# Patient Record
Sex: FEMALE | Race: BLACK OR AFRICAN AMERICAN | Employment: UNEMPLOYED | URBAN - METROPOLITAN AREA
[De-identification: names, ages, dates, MRNs, and addresses within clinical notes are randomized per-mention and may not be internally consistent; named-entity substitution may affect disease eponyms.]

---

## 2017-07-13 ENCOUNTER — OFFICE VISIT (OUTPATIENT)
Dept: FAMILY MEDICINE CLINIC | Age: 57
End: 2017-07-13

## 2017-07-13 VITALS
HEIGHT: 65 IN | HEART RATE: 52 BPM | TEMPERATURE: 98.5 F | DIASTOLIC BLOOD PRESSURE: 71 MMHG | SYSTOLIC BLOOD PRESSURE: 110 MMHG | RESPIRATION RATE: 20 BRPM | WEIGHT: 258 LBS | OXYGEN SATURATION: 96 % | BODY MASS INDEX: 42.99 KG/M2

## 2017-07-13 DIAGNOSIS — J44.9 CHRONIC OBSTRUCTIVE PULMONARY DISEASE, UNSPECIFIED COPD TYPE (HCC): Primary | ICD-10-CM

## 2017-07-13 DIAGNOSIS — F17.218 NICOTINE DEPENDENCE, CIGARETTES, WITH OTHER NICOTINE-INDUCED DISORDERS: ICD-10-CM

## 2017-07-13 DIAGNOSIS — C49.9 FIBROSARCOMA (HCC): ICD-10-CM

## 2017-07-13 DIAGNOSIS — F33.42 RECURRENT MAJOR DEPRESSIVE DISORDER, IN FULL REMISSION (HCC): ICD-10-CM

## 2017-07-13 DIAGNOSIS — M25.561 CHRONIC PAIN OF RIGHT KNEE: ICD-10-CM

## 2017-07-13 DIAGNOSIS — G89.29 CHRONIC PAIN OF RIGHT KNEE: ICD-10-CM

## 2017-07-13 RX ORDER — ALBUTEROL SULFATE 90 UG/1
AEROSOL, METERED RESPIRATORY (INHALATION)
COMMUNITY
End: 2017-12-14 | Stop reason: SDUPTHER

## 2017-07-13 RX ORDER — MULTIVIT WITH MINERALS/HERBS
1 TABLET ORAL DAILY
COMMUNITY
End: 2020-03-20 | Stop reason: SDUPTHER

## 2017-07-13 RX ORDER — DEXLANSOPRAZOLE 60 MG/1
CAPSULE, DELAYED RELEASE ORAL
COMMUNITY
End: 2017-12-14 | Stop reason: SDUPTHER

## 2017-07-13 RX ORDER — FLUTICASONE PROPIONATE 50 MCG
2 SPRAY, SUSPENSION (ML) NASAL DAILY
COMMUNITY
End: 2017-12-14 | Stop reason: SDUPTHER

## 2017-07-13 RX ORDER — DIPHENHYDRAMINE HCL 25 MG
4 CAPSULE ORAL AS NEEDED
Qty: 20 EACH | Refills: 2 | Status: SHIPPED | OUTPATIENT
Start: 2017-07-13 | End: 2017-08-12

## 2017-07-13 RX ORDER — BUPROPION HYDROCHLORIDE 75 MG/1
75 TABLET ORAL DAILY
Qty: 30 TAB | Refills: 0 | Status: SHIPPED | OUTPATIENT
Start: 2017-07-13 | End: 2017-08-15 | Stop reason: SDUPTHER

## 2017-07-13 RX ORDER — ARIPIPRAZOLE 5 MG/1
TABLET ORAL DAILY
COMMUNITY
End: 2018-05-08 | Stop reason: SDUPTHER

## 2017-07-13 RX ORDER — PREGABALIN 150 MG/1
150 CAPSULE ORAL 2 TIMES DAILY
COMMUNITY
End: 2017-07-27 | Stop reason: SDUPTHER

## 2017-07-13 RX ORDER — CITALOPRAM 40 MG/1
40 TABLET, FILM COATED ORAL DAILY
COMMUNITY
End: 2018-01-23 | Stop reason: SDUPTHER

## 2017-07-13 RX ORDER — BISMUTH SUBSALICYLATE 262 MG
1 TABLET,CHEWABLE ORAL DAILY
COMMUNITY

## 2017-07-13 NOTE — PROGRESS NOTES
Chief Complaint   Patient presents with   [address]     pt here  to establish care     1. Have you been to the ER, urgent care clinic since your last visit? Hospitalized since your last visit? No    2. Have you seen or consulted any other health care providers outside of the [address] [place] [address] since your last visit? Include any pap smears or colon screening.  No

## 2017-07-13 NOTE — MR AVS SNAPSHOT
Visit Information Date & Time Provider Department Dept. Phone Encounter #  
 7/13/2017 11:15 AM Serina Garcia Vasiliy [phone] [id number] Follow-up Instructions Return in about 2 months (around 9/13/2017) for chronic disease followup- 30 min. Upcoming Health Maintenance Date Due  
 MEDICARE YEARLY EXAM 6/6/1976 DTaP/Tdap/Td series (1 - Tdap) 6/6/1979 PAP AKA CERVICAL CYTOLOGY 6/6/1979 BREAST CANCER SCRN MAMMOGRAM 6/6/2008 FOBT Q 1 YEAR AGE 50-75 6/6/2008 INFLUENZA AGE 9 TO ADULT 8/1/2017 Allergies as of 7/13/2017  Review Complete On: 7/13/2017 By: Serina Garcia NP Severity Noted Reaction Type Reactions Adhesive Tape-silicones High 18/51/7638   Topical Swelling REALLY BAD SWELLING AND SORE APPEAR Alcohol High 07/13/2017   Intolerance Other (comments) PT STATES SHE IS AN ALCOHOLIC Lactose High 07/13/2017   Intolerance Other (comments) PT STATES IT MAKES HER STOMACH HURT Percodan [Oxycodone Hcl-oxycodone-asa] High 07/13/2017   Systemic Itching, Other (comments) crying Nsaids (Non-steroidal Anti-inflammatory Drug)  07/13/2017   Intolerance Other (comments) PT NOT ABLE TO TAKE DUE TO GASTRIC BYPASS Current Immunizations  Never Reviewed No immunizations on file. Not reviewed this visit You Were Diagnosed With   
  
 Codes Comments Chronic obstructive pulmonary disease, unspecified COPD type ([facility]    -  Primary ICD-10-CM: J44.9 ICD-9-CM: 652 Recurrent major depressive disorder, in full remission ([facility]     ICD-10-CM: F33.42 
ICD-9-CM: 296.36 Fibrosarcoma ([facility]     ICD-10-CM: C49.9 ICD-9-CM: 171.9 Chronic pain of right knee     ICD-10-CM: M25.561, G89.29 ICD-9-CM: 719.46, 338.29 Nicotine dependence, cigarettes, with other nicotine-induced disorders     ICD-10-CM: F17.218 ICD-9-CM: 292.89 Vitals BP Pulse Temp Resp Height(growth percentile) Weight(growth percentile) 110/71 (BP 1 Location: Right arm, BP Patient Position: Sitting) (!) 52 98.5 °F (36.9 °C) (Oral) 20 5' 5\" (1.651 m) 258 lb (117 kg) SpO2 BMI OB Status Smoking Status 96% 42.93 kg/m2 Hysterectomy Current Every Day Smoker Vitals History BMI and BSA Data Body Mass Index Body Surface Area 42.93 kg/m 2 2.32 m 2 Preferred Pharmacy Pharmacy Name Phone [facility] [address] [facility] [phone] Your Updated Medication List  
  
   
This list is accurate as of: 7/13/17 11:49 AM.  Always use your most recent med list.  
  
  
  
  
 ABILIFY 5 mg tablet Generic drug:  ARIPiprazole Take  by mouth daily. albuterol 90 mcg/actuation inhaler Commonly known as:  PROVENTIL HFA, VENTOLIN HFA, PROAIR HFA Take  by inhalation. b complex vitamins tablet Take 1 Tab by mouth daily. buPROPion 75 mg tablet Commonly known as:  [facility] Take 1 Tab by mouth daily. CALCIUM 600 + D 600-125 mg-unit Tab Generic drug:  calcium-cholecalciferol (d3) Take 1,065 mg by mouth nightly. Indications: TAKES 2 AT BEDTIME  
  
 citalopram 40 mg tablet Commonly known as:  Leny Dusky Take 40 mg by mouth daily. DEXILANT 60 mg Cpdb Generic drug:  Dexlansoprazole Take  by mouth. FISH -160-1,000 mg Cap Generic drug:  omega 3-dha-epa-fish oil Take 1,065 mg by mouth daily. * FLOVENT DISKUS 100 mcg/actuation Dsdv Generic drug:  fluticasone Take  by inhalation. * fluticasone 50 mcg/actuation nasal spray Commonly known as:  Cyndra Puna 2 Sprays by Both Nostrils route daily. Iron 160 mg (50 mg iron) Tber tablet Generic drug:  ferrous sulfate ER Take 1 Tab by mouth daily. Indications: PT TAKES 40 MG  
  
 LYRICA 150 mg capsule Generic drug:  pregabalin Take 150 mg by mouth two (2) times a day. multivitamin tablet Commonly known as:  ONE A DAY Take 1 Tab by mouth daily. nicotine 4 mg gum Commonly known as:  Wagner Saliva Take 1 Each by mouth as needed for Smoking Cessation for up to 30 days. * Notice: This list has 2 medication(s) that are the same as other medications prescribed for you. Read the directions carefully, and ask your doctor or other care provider to review them with you. Prescriptions Sent to Pharmacy Refills buPROPion (WELLBUTRIN) 75 mg tablet 0 Sig: Take 1 Tab by mouth daily. Class: Normal  
 Pharmacy: [facility] [address] [place] [address] Ph #: [phone] Route: Oral  
 nicotine (NICORETTE) 4 mg gum 2 Sig: Take 1 Each by mouth as needed for Smoking Cessation for up to 30 days. Class: Normal  
 Pharmacy: [facility] [address] [place] [address] Ph #: [phone] Route: Oral  
  
We Performed the Following REFERRAL TO ORTHOPEDICS [VFW822 Custom] Comments:  
 Please evaluate for knee pain, chronic. Follow-up Instructions Return in about 2 months (around 9/13/2017) for chronic disease followup- 30 min. Referral Information Referral ID Referred By Referred To  
  
 [id number] Beverly KASPER Not Available Visits Status Start Date End Date 1 New Request 7/13/17 7/13/18 If your referral has a status of pending review or denied, additional information will be sent to support the outcome of this decision. Introducing [facility]! Crista Vanessa introduces [employer] patient portal. Now you can access parts of your medical record, email your doctor's office, and request medication refills online. 1. In your internet browser, go to [email or web address]. [employer]. [employer] 2. Click on the First Time User? Click Here link in the Sign In box. You will see the New Member Sign Up page. 3. Enter your [employer] Access Code exactly as it appears below. You will not need to use this code after youve completed the sign-up process. If you do not sign up before the expiration date, you must request a new code. · [employer] Access Code: [email or web address] Expires: 10/11/2017 11:48 AM 
 
4. Enter the last four digits of your Social Security Number (xxxx) and Date of Birth (mm/dd/yyyy) as indicated and click Submit. You will be taken to the next sign-up page. 5. Create a [employer] ID. This will be your [employer] login ID and cannot be changed, so think of one that is secure and easy to remember. 6. Create a [employer] password. You can change your password at any time. 7. Enter your Password Reset Question and Answer. This can be used at a later time if you forget your password. 8. Enter your e-mail address. You will receive e-mail notification when new information is available in [address] 9. Click Sign Up. You can now view and download portions of your medical record. 10. Click the Download Summary menu link to download a portable copy of your medical information. If you have questions, please visit the Frequently Asked Questions section of the [employer] website. Remember, [employer] is NOT to be used for urgent needs. For medical emergencies, dial 911. Now available from your iPhone and Android! Please provide this summary of care documentation to your next provider. If you have any questions after today's visit, please call [phone].

## 2017-07-13 NOTE — PROGRESS NOTES
HISTORY OF PRESENT ILLNESS  Kuldip Beth is a 59 y.o. female. 7/13/2017  11:22 AM    Chief Complaint   Patient presents with   [address] [place] [address] Chang Establish Care     pt here  to establish care       HPI: Here today as a new patient, establishing care in office. Moved to [place] about 5 weeks ago from [place]. Used to follow with [id number] in [place] [address]. No records available at this time. She reports having labs done shortly before leaving [place]. COPD: Diagnosed a few years ago- taking Flovent for maintenance and uses Albuterol PRN- using Albuterol about 1-2 times a week. Feels controlled. Continues to smoke about 2 PPD- would like to quit smoking. Depression: Taking Celexa and Abilify as prescribed. Reports feeling stable on medication but has some good days and bad days. Denies SI/HI. History of alcoholism. 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 Reports several surgeries due to condition. Reports UTD on mammo and colonoscopy. History of hysterectomy. Complains of chronic right knee pain- states that she has had several procedures done. Used to follow with orthopedics- has not found anyone here in [address] [facility]. Review of Systems   Constitutional: Negative for chills, fever and malaise/fatigue. Eyes: Negative for double vision, photophobia and pain. Respiratory: Negative for cough, shortness of breath and wheezing. Cardiovascular: Positive for leg swelling. Negative for chest pain and palpitations.        +chronic swelling per patient   Gastrointestinal: Negative for abdominal pain, constipation, diarrhea, nausea and vomiting. Genitourinary: Negative for dysuria, frequency and urgency. Musculoskeletal: Positive for joint pain. Negative for back pain and myalgias. Neurological: Negative for dizziness, weakness and headaches. Psychiatric/Behavioral: Negative for depression and suicidal ideas.  The patient is not nervous/anxious. PHQ Screening   PHQ over the last two weeks 7/13/2017   Little interest or pleasure in doing things Not at all   Feeling down, depressed or hopeless Not at all   Total Score PHQ 2 0         History  Past Medical History:   Diagnosis Date    Alcoholism in remission ([facility]     Chronic obstructive pulmonary disease ([facility]     Depression     Fibrosarcoma ([facility] 1963    connective tissue cancer       Past Surgical History:   Procedure Laterality Date    HX ARTHRODESIS  01/2000    Herniated Disc, arthritis right foot 06/06, 07/07, C 6/7, C 4/6 Stenosis    HX CHOLECYSTECTOMY  09/2008    gallbladder disease    HX COLONOSCOPY  05/2009    HX GI  2012    GASTRIC BYPASS  [place] [facility] Gastric Bypass      HX HYSTERECTOMY  06/1994    HX MENISCECTOMY  10/2015    right repari of torn meniscus    HX MYOMECTOMY  06/1984    benign tumor    HX ORTHOPAEDIC  02/1964 / 03/1664    orthopaedic excision Fibrosarcoma and Lymphangiogram    HX PELVIC LAPAROSCOPY  17/2971    large uterine blockage       Social History     Social History    Marital status:      Spouse name: N/A    Number of children: N/A    Years of education: N/A     Occupational History    Not on file.      Social History Main Topics    Smoking status: Current Every Day Smoker    Smokeless tobacco: Never Used    Alcohol use No    Drug use: No    Sexual activity: No     Other Topics Concern    Not on file     Social History Narrative    No narrative on file       Family History   Problem Relation Age of Onset    Hypertension Mother     Depression Mother     Cancer Mother     Cancer Father     Cancer Sister     Depression Sister     Depression Brother        Allergies   Allergen Reactions    Adhesive Tape-Silicones Swelling     REALLY BAD SWELLING AND SORE APPEAR    Alcohol Other (comments)     PT STATES SHE IS AN ALCOHOLIC    Lactose Other (comments)     PT STATES IT MAKES HER STOMACH HURT    Percodan [Oxycodone Hcl-Oxycodone-Asa] Itching and Other (comments)     crying    Nsaids (Non-Steroidal Anti-Inflammatory Drug) Other (comments)     PT NOT ABLE TO TAKE DUE TO GASTRIC BYPASS       Current Outpatient Prescriptions   Medication Sig Dispense Refill    citalopram (CELEXA) 40 mg tablet Take 40 mg by mouth daily.  ARIPiprazole (ABILIFY) 5 mg tablet Take  by mouth daily.  Dexlansoprazole (DEXILANT) 60 mg CpDB Take  by mouth.  fluticasone (FLOVENT DISKUS) 100 mcg/actuation dsdv Take  by inhalation.  pregabalin (LYRICA) 150 mg capsule Take 150 mg by mouth two (2) times a day.  b complex vitamins tablet Take 1 Tab by mouth daily.  calcium-cholecalciferol, d3, (CALCIUM 600 + D) 600-125 mg-unit tab Take 1,065 mg by mouth nightly. Indications: TAKES 2 AT BEDTIME      omega 3-dha-epa-fish oil (FISH OIL) 100-160-1,000 mg cap Take 1,065 mg by mouth daily.  fluticasone (FLONASE) 50 mcg/actuation nasal spray 2 Sprays by Both Nostrils route daily.  ferrous sulfate ER (IRON) 160 mg (50 mg iron) TbER tablet Take 1 Tab by mouth daily. Indications: PT TAKES 40 MG      multivitamin (ONE A DAY) tablet Take 1 Tab by mouth daily.  albuterol (PROVENTIL HFA, VENTOLIN HFA, PROAIR HFA) 90 mcg/actuation inhaler Take  by inhalation. Health Maintenance and Screenings  *will get records from previous PCP- will need Medicare Wellness      Advance Care Planning:   Patient was offered the opportunity to discuss advance care planning NO   Does patient have an Advance Directive:  NO   If no, did you provide information on Caring Connections? Patient Care Team:  Patient Care Team:  Linda Raygoza NP as PCP - General (Nurse Practitioner)        LABS:  None new to review    RADIOLOGY:  None new to review      Physical Exam   Constitutional: She is oriented to person, place, and time. She appears well-developed and well-nourished. No distress. Neck: Normal range of motion. Neck supple.  No thyromegaly present. Cardiovascular: Normal rate, regular rhythm and normal heart sounds. No murmur heard. Pulmonary/Chest: Effort normal and breath sounds normal. No respiratory distress. Abdominal: Soft. Bowel sounds are normal. There is no tenderness. Musculoskeletal: She exhibits edema.   -bilateral ankle and pedal +1 nonpitting edema   Neurological: She is alert and oriented to person, place, and time. She exhibits normal muscle tone. Coordination normal.   Skin: Skin is warm and dry. Vitals:    07/13/17 1055   BP: 110/71   Pulse: (!) 52   Resp: 20   Temp: 98.5 °F (36.9 °C)   TempSrc: Oral   SpO2: 96%   Weight: 258 lb (117 kg)   Height: 5' 5\" (1.651 m)   PainSc:   4   PainLoc: Knee       ASSESSMENT and PLAN  Larita Cockayne was seen today for establish care. Diagnoses and all orders for this visit:    Chronic obstructive pulmonary disease, unspecified COPD type ([facility]  *Continue current management- stable. Denies need for refills at this time. Recurrent major depressive disorder, in full remission ([address] [facility]  *Continue current medications. She reports stable. Denies need for refills at this time. Fibrosarcoma ([facility]  *Noted. She reports that she completed her observation period. Will review previous records when received and continue with age related cancer surveillance per guidelines. Address PRN. Chronic pain of right knee  *Will refer- patient preference. -     REFERRAL TO ORTHOPEDICS    Nicotine dependence, cigarettes, with other nicotine-induced disorders  *Discussion for about 5 minutes regarding smoking cessation:  She began smoking 40 years ago. She currently smokes 2 packs per day. She has attempted to quit smoking in the past. Barriers to quitting include fear of failing again and enjoyment of the habit. Tobacco Use/Cessation. I assessed Reba Ansari to be in contemplative stage with respect to tobacco use.  Plan is to start on Wellbutrin low dose, use NRT gum, and replace habit with something constructive or healthy that she enjoys. *Requested that she call office once close to being finished with Wellbutrin to update me on progress. May increase Wellbutrin to 150 mg daily (either together or split) in a few weeks if desired. -     buPROPion (WELLBUTRIN) 75 mg tablet; Take 1 Tab by mouth daily. -     nicotine (NICORETTE) 4 mg gum; Take 1 Each by mouth as needed for Smoking Cessation for up to 30 days. *Plan of care reviewed with patient. Patient in agreement with plan and expresses understanding. All questions answered and patient encouraged to call or RTO if further questions or concerns. Follow-up Disposition:  Return in about 2 months (around 9/13/2017) for chronic disease followup and Medicare Wellness- 30 min.

## 2017-07-27 ENCOUNTER — OFFICE VISIT (OUTPATIENT)
Dept: FAMILY MEDICINE CLINIC | Age: 57
End: 2017-07-27

## 2017-07-27 ENCOUNTER — TELEPHONE (OUTPATIENT)
Dept: FAMILY MEDICINE CLINIC | Age: 57
End: 2017-07-27

## 2017-07-27 VITALS
BODY MASS INDEX: 43.65 KG/M2 | WEIGHT: 262 LBS | DIASTOLIC BLOOD PRESSURE: 84 MMHG | TEMPERATURE: 97.5 F | RESPIRATION RATE: 20 BRPM | HEART RATE: 66 BPM | OXYGEN SATURATION: 96 % | HEIGHT: 65 IN | SYSTOLIC BLOOD PRESSURE: 124 MMHG

## 2017-07-27 DIAGNOSIS — M48.00 SPINAL STENOSIS, UNSPECIFIED SPINAL REGION: ICD-10-CM

## 2017-07-27 DIAGNOSIS — M79.605 LEFT LEG PAIN: Primary | ICD-10-CM

## 2017-07-27 DIAGNOSIS — M79.89 LEG SWELLING: ICD-10-CM

## 2017-07-27 DIAGNOSIS — M51.26 HNP (HERNIATED NUCLEUS PULPOSUS), LUMBAR: ICD-10-CM

## 2017-07-27 DIAGNOSIS — M54.42 ACUTE LEFT-SIDED LOW BACK PAIN WITH LEFT-SIDED SCIATICA: ICD-10-CM

## 2017-07-27 RX ORDER — PREGABALIN 150 MG/1
150 CAPSULE ORAL 2 TIMES DAILY
Qty: 180 CAP | Refills: 0 | Status: SHIPPED | OUTPATIENT
Start: 2017-07-27 | End: 2017-12-14 | Stop reason: SDUPTHER

## 2017-07-27 RX ORDER — CYCLOBENZAPRINE HCL 5 MG
5 TABLET ORAL
Qty: 20 TAB | Refills: 0 | Status: SHIPPED | OUTPATIENT
Start: 2017-07-27 | End: 2017-09-14 | Stop reason: ALTCHOICE

## 2017-07-27 RX ORDER — METHYLPREDNISOLONE 4 MG/1
TABLET ORAL
Qty: 1 DOSE PACK | Refills: 0 | Status: SHIPPED | OUTPATIENT
Start: 2017-07-27 | End: 2017-09-14 | Stop reason: ALTCHOICE

## 2017-07-27 RX ORDER — ACETAMINOPHEN AND CODEINE PHOSPHATE 300; 30 MG/1; MG/1
1 TABLET ORAL
Qty: 10 TAB | Refills: 0 | Status: SHIPPED | OUTPATIENT
Start: 2017-07-27 | End: 2017-09-14 | Stop reason: ALTCHOICE

## 2017-07-27 NOTE — PROGRESS NOTES
Chief Complaint   Patient presents with    Leg Pain     pt here with c/o left leg pain has noted for 3 days    Medication Refill     pt requesting refill on Lyrica 90 day supply     1. Have you been to the ER, urgent care clinic since your last visit? Hospitalized since your last visit? No    2. Have you seen or consulted any other health care providers outside of the [address] [place] [address] since your last visit? Include any pap smears or colon screening.  No

## 2017-07-27 NOTE — PATIENT INSTRUCTIONS
Learning About Relief for Back Pain  What is back tension and strain? Back strain happens when you overstretch, or pull, a muscle in your back. You may hurt your back in an accident or when you exercise or lift something. Most back pain will get better with rest and time. You can take care of yourself at home to help your back heal.  What can you do first to relieve back pain? When you first feel back pain, try these steps:  · Walk. Take a short walk (10 to 20 minutes) on a level surface (no slopes, hills, or stairs) every 2 to 3 hours. Walk only distances you can manage without pain, especially leg pain. · Relax. Find a comfortable position for rest. Some people are comfortable on the floor or a medium-firm bed with a small pillow under their head and another under their knees. Some people prefer to lie on their side with a pillow between their knees. Don't stay in one position for too long. · Try heat or ice. Try using a heating pad on a low or medium setting, or take a warm shower, for 15 to 20 minutes every 2 to 3 hours. Or you can buy single-use heat wraps that last up to 8 hours. You can also try an ice pack for 10 to 15 minutes every 2 to 3 hours. You can use an ice pack or a bag of frozen vegetables wrapped in a thin towel. There is not strong evidence that either heat or ice will help, but you can try them to see if they help. You may also want to try switching between heat and cold. · Take pain medicine exactly as directed. ¨ If the doctor gave you a prescription medicine for pain, take it as prescribed. ¨ If you are not taking a prescription pain medicine, ask your doctor if you can take an over-the-counter medicine. What else can you do? · Stretch and exercise. Exercises that increase flexibility may relieve your pain and make it easier for your muscles to keep your spine in a good, neutral position. And don't forget to keep walking. · Do self-massage.  You can use self-massage to unwind after work or school or to energize yourself in the morning. You can easily massage your feet, hands, or neck. Self-massage works best if you are in comfortable clothes and are sitting or lying in a comfortable position. Use oil or lotion to massage bare skin. · Reduce stress. Back pain can lead to a vicious [place]: Distress about the pain tenses the muscles in your back, which in turn causes more pain. Learn how to relax your mind and your muscles to lower your stress. Where can you learn more? Go to http://steve-anand.info/. Enter X191 in the search box to learn more about \"Learning About Relief for Back Pain. \"  Current as of: March 21, 2017  Content Version: 11.3  © 2450-1080 "[employer]", [employer]. Care instructions adapted under license by [employer] (which disclaims liability or warranty for this information). If you have questions about a medical condition or this instruction, always ask your healthcare professional. [place] [address] any warranty or liability for your use of this information.

## 2017-07-27 NOTE — TELEPHONE ENCOUNTER
Call made to pt to make aware of appointment made for venus doppler studies to be done at [facility] tomorrow at 9:00 Pt to arrive at 8:30. Order faxed to [phone]. Arnoldo Wilson.

## 2017-07-27 NOTE — MR AVS SNAPSHOT
Visit Information Date & Time Provider Department Dept. Phone Encounter #  
 7/27/2017 10:30 AM Ariella Ramirez, [address] [facility] [address] [phone] [id number] Follow-up Instructions Return for already scheduled appointment. Sooner if needed. Christine Camara Your Appointments 9/14/2017  8:00 AM  
ROUTINE CARE with Ariella Ramirez  [facility] [address] ([address]) Appt Note: Return in about 2 months (around 9/13/2017) for chronic disease followup- 30 min. [address] [id number] [address]  
  
   
 [address] Formerly [facility], [facility] Upcoming Health Maintenance Date Due Hepatitis C Screening 1960 MEDICARE YEARLY EXAM 6/6/1976 Pneumococcal 19-64 Highest Risk (1 of 3 - PCV13) 6/6/1977 DTaP/Tdap/Td series (1 - Tdap) 6/6/1979 PAP AKA CERVICAL CYTOLOGY 6/6/1979 BREAST CANCER SCRN MAMMOGRAM 6/6/2008 FOBT Q 1 YEAR AGE 50-75 6/6/2008 INFLUENZA AGE 9 TO ADULT 8/1/2017 Allergies as of 7/27/2017  Review Complete On: 7/27/2017 By: Ariella Ramirez NP Severity Noted Reaction Type Reactions Adhesive Tape-silicones High 13/05/0968   Topical Swelling REALLY BAD SWELLING AND SORE APPEAR Alcohol High 07/13/2017   Intolerance Other (comments) PT STATES SHE IS AN ALCOHOLIC Lactose High 07/13/2017   Intolerance Other (comments) PT STATES IT MAKES HER STOMACH HURT Percodan [Oxycodone Hcl-oxycodone-asa] High 07/13/2017   Systemic Itching, Other (comments) crying Nsaids (Non-steroidal Anti-inflammatory Drug)  07/13/2017   Intolerance Other (comments) PT NOT ABLE TO TAKE DUE TO GASTRIC BYPASS Current Immunizations  Never Reviewed No immunizations on file. Not reviewed this visit You Were Diagnosed With   
  
 Codes Comments Left leg pain    -  Primary ICD-10-CM: M79.605 ICD-9-CM: 729.5 Acute left-sided low back pain with left-sided sciatica     ICD-10-CM: M54.42 
ICD-9-CM: 724.2, 724.3 Vitals BP Pulse Temp Resp Height(growth percentile) Weight(growth percentile) 124/84 (BP 1 Location: Right arm, BP Patient Position: Sitting) 66 97.5 °F (36.4 °C) (Oral) 20 5' 5\" (1.651 m) 262 lb (118.8 kg) SpO2 BMI OB Status Smoking Status 96% 43.6 kg/m2 Hysterectomy Current Every Day Smoker Vitals History BMI and BSA Data Body Mass Index Body Surface Area  
 43.6 kg/m 2 2.33 m 2 Preferred Pharmacy Pharmacy Name Phone [facility] [address] [place] [address] AT [facility] [phone] Your Updated Medication List  
  
   
This list is accurate as of: 7/27/17 11:05 AM.  Always use your most recent med list.  
  
  
  
  
 ABILIFY 5 mg tablet Generic drug:  ARIPiprazole Take  by mouth daily. acetaminophen-codeine 300-30 mg per tablet Commonly known as:  TYLENOL #3 Take 1 Tab by mouth every six (6) hours as needed for Pain. Max Daily Amount: 4 Tabs. albuterol 90 mcg/actuation inhaler Commonly known as:  PROVENTIL HFA, VENTOLIN HFA, PROAIR HFA Take  by inhalation. b complex vitamins tablet Take 1 Tab by mouth daily. buPROPion 75 mg tablet Commonly known as:  [facility] Take 1 Tab by mouth daily. CALCIUM 600 + D 600-125 mg-unit Tab Generic drug:  calcium-cholecalciferol (d3) Take 1,065 mg by mouth nightly. Indications: TAKES 2 AT BEDTIME  
  
 citalopram 40 mg tablet Commonly known as:  Margreta Amita Take 40 mg by mouth daily. cyclobenzaprine 5 mg tablet Commonly known as:  FLEXERIL Take 1 Tab by mouth every twelve (12) hours as needed for Muscle Spasm(s). DEXILANT 60 mg Cpdb Generic drug:  Dexlansoprazole Take  by mouth. FISH -160-1,000 mg Cap Generic drug:  omega 3-dha-epa-fish oil Take 1,065 mg by mouth daily. * FLOVENT DISKUS 100 mcg/actuation Dsdv Generic drug:  fluticasone Take  by inhalation. * fluticasone 50 mcg/actuation nasal spray Commonly known as:  Cyndra Puna 2 Sprays by Both Nostrils route daily. Iron 160 mg (50 mg iron) Tber tablet Generic drug:  ferrous sulfate ER Take 1 Tab by mouth daily. Indications: PT TAKES 40 MG  
  
 LYRICA 150 mg capsule Generic drug:  pregabalin Take 150 mg by mouth two (2) times a day. methylPREDNISolone 4 mg tablet Commonly known as:  Limmie Gilford Take as directed  
  
 multivitamin tablet Commonly known as:  ONE A DAY Take 1 Tab by mouth daily. nicotine 4 mg gum Commonly known as:  Mer Arbour Take 1 Each by mouth as needed for Smoking Cessation for up to 30 days. * Notice: This list has 2 medication(s) that are the same as other medications prescribed for you. Read the directions carefully, and ask your doctor or other care provider to review them with you. Prescriptions Printed Refills  
 acetaminophen-codeine (TYLENOL #3) 300-30 mg per tablet 0 Sig: Take 1 Tab by mouth every six (6) hours as needed for Pain. Max Daily Amount: 4 Tabs. Class: Print Route: Oral  
  
Prescriptions Sent to Pharmacy Refills  
 methylPREDNISolone (MEDROL DOSEPACK) 4 mg tablet 0 Sig: Take as directed Class: Normal  
 Pharmacy: [facility] [place] [employer] [place] [address] Ph #: [phone]  
 cyclobenzaprine (FLEXERIL) 5 mg tablet 0 Sig: Take 1 Tab by mouth every twelve (12) hours as needed for Muscle Spasm(s). Class: Normal  
 [facility] [address] [place] [address] [facility] Ph #: [phone] Route: Oral  
  
We Performed the Following DUPLEX EXTREM VENOUS,BILAT [id number] CPT(R)] Follow-up Instructions Return for already scheduled appointment. Sooner if needed. Azeem Brink Patient Instructions Learning About Relief for Back Pain What is back tension and strain? Back strain happens when you overstretch, or pull, a muscle in your back. You may hurt your back in an accident or when you exercise or lift something. Most back pain will get better with rest and time. You can take care of yourself at home to help your back heal. 
What can you do first to relieve back pain? When you first feel back pain, try these steps: 
· Walk. Take a short walk (10 to 20 minutes) on a level surface (no slopes, hills, or stairs) every 2 to 3 hours. Walk only distances you can manage without pain, especially leg pain. · Relax. Find a comfortable position for rest. Some people are comfortable on the floor or a medium-firm bed with a small pillow under their head and another under their knees. Some people prefer to lie on their side with a pillow between their knees. Don't stay in one position for too long. · Try heat or ice. Try using a heating pad on a low or medium setting, or take a warm shower, for 15 to 20 minutes every 2 to 3 hours. Or you can buy single-use heat wraps that last up to 8 hours. You can also try an ice pack for 10 to 15 minutes every 2 to 3 hours. You can use an ice pack or a bag of frozen vegetables wrapped in a thin towel. There is not strong evidence that either heat or ice will help, but you can try them to see if they help. You may also want to try switching between heat and cold. · Take pain medicine exactly as directed. ¨ If the doctor gave you a prescription medicine for pain, take it as prescribed. ¨ If you are not taking a prescription pain medicine, ask your doctor if you can take an over-the-counter medicine. What else can you do? · Stretch and exercise. Exercises that increase flexibility may relieve your pain and make it easier for your muscles to keep your spine in a good, neutral position. And don't forget to keep walking. · Do self-massage. You can use self-massage to unwind after work or school or to energize yourself in the morning. You can easily massage your feet, hands, or neck. Self-massage works best if you are in comfortable clothes and are sitting or lying in a comfortable position. Use oil or lotion to massage bare skin. · Reduce stress. Back pain can lead to a vicious [place]: Distress about the pain tenses the muscles in your back, which in turn causes more pain. Learn how to relax your mind and your muscles to lower your stress. Where can you learn more? Go to http://steve-anand.info/. Enter H177 in the search box to learn more about \"Learning About Relief for Back Pain. \" Current as of: March 21, 2017 Content Version: 11.3 © 7569-9305 Healthwise, Incorporated. Care instructions adapted under license by [employer] (which disclaims liability or warranty for this information). If you have questions about a medical condition or this instruction, always ask your healthcare professional. [place] [address] any warranty or liability for your use of this information. Introducing [facility]! [employer] introduces betNOW patient portal. Now you can access parts of your medical record, email your doctor's office, and request medication refills online. 1. In your internet browser, go to [email or web address] 2. Click on the First Time User? Click Here link in the Sign In box. You will see the New Member Sign Up page. 3. Enter your betNOW Access Code exactly as it appears below. You will not need to use this code after youve completed the sign-up process. If you do not sign up before the expiration date, you must request a new code. · betNOW Access Code: [email or web address] Expires: 10/11/2017 11:48 AM 
 
4.  Enter the last four digits of your Social Security Number (xxxx) and Date of Birth (mm/dd/yyyy) as indicated and click Submit. You will be taken to the next sign-up page. 5. Create a [employer] ID. This will be your [employer] login ID and cannot be changed, so think of one that is secure and easy to remember. 6. Create a [employer] password. You can change your password at any time. 7. Enter your Password Reset Question and Answer. This can be used at a later time if you forget your password. 8. Enter your e-mail address. You will receive e-mail notification when new information is available in [address] 9. Click Sign Up. You can now view and download portions of your medical record. 10. Click the Download Summary menu link to download a portable copy of your medical information. If you have questions, please visit the Frequently Asked Questions section of the [employer] website. Remember, [employer] is NOT to be used for urgent needs. For medical emergencies, dial 911. Now available from your iPhone and Android! Please provide this summary of care documentation to your next provider. Your primary care clinician is listed as Abby Early. If you have any questions after today's visit, please call [phone].

## 2017-07-27 NOTE — PROGRESS NOTES
HISTORY OF PRESENT ILLNESS  Omari Leo is a 59 y.o. female. 7/27/2017  10:50 AM    Chief Complaint   Patient presents with    Leg Pain     pt here with c/o left leg pain has noted for 3 days    Medication Refill     pt requesting refill on Lyrica 90 day supply       HPI: Here today for complaints of left leg pain that has been happening for about 3 days. Reports that she has associated leg swelling. In addition, she also reports some left sided low back pain. No numbness or tingling of legs. No urinary or bowel incontinence. Has not been taking anything to help with pain. Pain worse with some movements, but constant in nature. No injuries that could have caused symptoms. No recent travel or bed ridden status. Also needs refill on chronic Lyrica. Review of Systems   Constitutional: Negative for malaise/fatigue. Cardiovascular: Positive for leg swelling. Gastrointestinal: Negative for abdominal pain, diarrhea, nausea and vomiting. Genitourinary: Negative for dysuria, frequency, hematuria and urgency. Musculoskeletal: Positive for back pain and myalgias. Negative for joint pain. Neurological: Negative for dizziness, tingling, sensory change, weakness and headaches.         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       Past Surgical History:   Procedure Laterality Date    HX ARTHRODESIS  01/2000    Herniated Disc, arthritis right foot 06/06, 07/07, C 6/7, C 4/6 Stenosis    HX CHOLECYSTECTOMY  09/2008    gallbladder disease    HX COLONOSCOPY  05/2009    HX GI  2012    GASTRIC BYPASS  [place] [facility] Gastric Bypass      HX HYSTERECTOMY  06/1994    HX MENISCECTOMY  10/2015    right repari of torn meniscus    HX MYOMECTOMY  06/1984    benign tumor    HX ORTHOPAEDIC  02/1964 / 03/1664    orthopaedic excision Fibrosarcoma and Lymphangiogram    HX PELVIC LAPAROSCOPY  93/0699    large uterine blockage       Social History     Social History    Marital status:      Spouse name: N/A    Number of children: N/A    Years of education: N/A     Occupational History    Not on file. Social History Main Topics    Smoking status: Current Every Day Smoker    Smokeless tobacco: Never Used    Alcohol use No    Drug use: No    Sexual activity: No     Other Topics Concern    Not on file     Social History Narrative       Allergies   Allergen Reactions    Adhesive Tape-Silicones Swelling     REALLY BAD SWELLING AND SORE APPEAR    Alcohol Other (comments)     PT STATES SHE IS AN ALCOHOLIC    Lactose Other (comments)     PT STATES IT MAKES HER STOMACH HURT    Percodan [Oxycodone Hcl-Oxycodone-Asa] Itching and Other (comments)     crying    Nsaids (Non-Steroidal Anti-Inflammatory Drug) Other (comments)     PT NOT ABLE TO TAKE DUE TO GASTRIC BYPASS       Current Outpatient Prescriptions   Medication Sig Dispense Refill    citalopram (CELEXA) 40 mg tablet Take 40 mg by mouth daily.  ARIPiprazole (ABILIFY) 5 mg tablet Take  by mouth daily.  Dexlansoprazole (DEXILANT) 60 mg CpDB Take  by mouth.  fluticasone (FLOVENT DISKUS) 100 mcg/actuation dsdv Take  by inhalation.  pregabalin (LYRICA) 150 mg capsule Take 150 mg by mouth two (2) times a day.  b complex vitamins tablet Take 1 Tab by mouth daily.  calcium-cholecalciferol, d3, (CALCIUM 600 + D) 600-125 mg-unit tab Take 1,065 mg by mouth nightly. Indications: TAKES 2 AT BEDTIME      omega 3-dha-epa-fish oil (FISH OIL) 100-160-1,000 mg cap Take 1,065 mg by mouth daily.  fluticasone (FLONASE) 50 mcg/actuation nasal spray 2 Sprays by Both Nostrils route daily.       ferrous sulfate ER (IRON) 160 mg (50 mg iron) TbER tablet Take 1 Tab by mouth daily. Indications: PT TAKES 40 MG      multivitamin (ONE A DAY) tablet Take 1 Tab by mouth daily.  albuterol (PROVENTIL HFA, VENTOLIN HFA, PROAIR HFA) 90 mcg/actuation inhaler Take  by inhalation.  buPROPion (WELLBUTRIN) 75 mg tablet Take 1 Tab by mouth daily. 30 Tab 0    nicotine (NICORETTE) 4 mg gum Take 1 Each by mouth as needed for Smoking Cessation for up to 30 days. 20 Each 2         Patient Care Team:  Patient Care Team:  Vadim Germain NP as PCP - General (Nurse Practitioner)        LABS:  None new to review    RADIOLOGY:  None new to review      Physical Exam   Constitutional: She is oriented to person, place, and time. She appears well-developed and well-nourished. No distress. Musculoskeletal: She exhibits no edema. Left hip: She exhibits normal range of motion, normal strength, no tenderness and no swelling. Lumbar back: She exhibits pain. She exhibits normal range of motion, no tenderness, no swelling and no spasm. Back:    -negative Bereket's sign bilaterally  -straight leg raise negative bilaterally  -5/5 strength bilateral lower extremities   Neurological: She is alert and oriented to person, place, and time. She exhibits normal muscle tone. Coordination normal.   Skin: Skin is warm and dry. Vitals:    07/27/17 1041   BP: 124/84   Pulse: 66   Resp: 20   Temp: 97.5 °F (36.4 °C)   TempSrc: Oral   SpO2: 96%   Weight: 262 lb (118.8 kg)   Height: 5' 5\" (1.651 m)   PainSc:   7   PainLoc: Leg       ASSESSMENT and PLAN  Diagnoses and all orders for this visit:    Left leg pain  *Discussed with patient- will do venous US to r/o DVT- will schedule for later today or tomorrow. PRN OTC medications for mild to moderate pain. PRN Tylenol #3 for severe pain. *Discussed with patient about symptoms that would require an ER visit.  Patient understands symptoms that are an emergency and will go to the ER if they occur.   -     acetaminophen-codeine (TYLENOL #3) 300-30 mg per tablet; Take 1 Tab by mouth every six (6) hours as needed for Pain. Max Daily Amount: 4 Tabs. -     DUPLEX EXTREM VENOUS,BILAT    Acute left-sided low back pain with left-sided sciatica  *Symptoms may be due to sciatica- will do Medrol dosepack and PRN Flexeril to alleviate musculoskeletal cause. Due to history of HNP and spinal stenosis, this is a likely cause. -     methylPREDNISolone (MEDROL DOSEPACK) 4 mg tablet; Take as directed  -     cyclobenzaprine (FLEXERIL) 5 mg tablet; Take 1 Tab by mouth every twelve (12) hours as needed for Muscle Spasm(s). HNP (herniated nucleus pulposus), lumbar/ Spinal stenosis, unspecified spinal region  *Refilled. -     pregabalin (LYRICA) 150 mg capsule; Take 1 Cap by mouth two (2) times a day. Max Daily Amount: 300 mg.      *Plan of care reviewed with patient. Patient in agreement with plan and expresses understanding. All questions answered and patient encouraged to call or RTO if further questions or concerns. Follow-up Disposition:  Return for already scheduled appointment. Sooner if needed. Leonor Angelo

## 2017-07-28 NOTE — TELEPHONE ENCOUNTER
7/28/2017  9:11 AM    Chief Complaint   Patient presents with    New Order       Dinesh Rodriguez from [facility] called requesting change of order to left leg since there is no indication to US the right leg. Order changed and will be faxed to [phone] as requested.

## 2017-08-01 ENCOUNTER — TELEPHONE (OUTPATIENT)
Dept: FAMILY MEDICINE CLINIC | Age: 57
End: 2017-08-01

## 2017-08-01 NOTE — TELEPHONE ENCOUNTER
Return call made to pt regarding her appointment with Orthopedics, pt states it the wrong leg that needed to be evaluated. On pts first appointment one of her complaints were right knee pain. Referral was placed to evaluate right knee pain, meanwhile, pt was seen 7 days later for left leg pain. Pt is requesting that both knees be evaluated. Ortho needs a new referral entered for this. Pt made aware the provider was out of the office for 2 weeks. Will make  aware on next week pts request when he returns.

## 2017-08-08 DIAGNOSIS — M25.562 PAIN IN BOTH KNEES, UNSPECIFIED CHRONICITY: Primary | ICD-10-CM

## 2017-08-08 DIAGNOSIS — M25.561 PAIN IN BOTH KNEES, UNSPECIFIED CHRONICITY: Primary | ICD-10-CM

## 2017-08-15 DIAGNOSIS — F17.218 NICOTINE DEPENDENCE, CIGARETTES, WITH OTHER NICOTINE-INDUCED DISORDERS: ICD-10-CM

## 2017-08-17 RX ORDER — BUPROPION HYDROCHLORIDE 75 MG/1
TABLET ORAL
Qty: 30 TAB | Refills: 0 | Status: SHIPPED | OUTPATIENT
Start: 2017-08-17 | End: 2017-09-14 | Stop reason: SDUPTHER

## 2017-08-17 NOTE — TELEPHONE ENCOUNTER
8/17/2017  9:38 AM    Chief Complaint   Patient presents with    Medication Refill       Noted refill request for below medication. Last visit 7/2017 and upcoming 9/2017. Refill completed. She did not call with update on her smoking cessation progress as requested at last visit.      Requested Prescriptions     Signed Prescriptions Disp Refills    buPROPion (WELLBUTRIN) 75 mg tablet 30 Tab 0     Sig: TAKE 1 TABLET BY MOUTH DAILY     Authorizing Provider: Rondel Dance

## 2017-09-14 ENCOUNTER — HOSPITAL ENCOUNTER (OUTPATIENT)
Dept: LAB | Age: 57
Discharge: HOME | End: 2017-09-14
Payer: MEDICARE

## 2017-09-14 ENCOUNTER — OFFICE VISIT (OUTPATIENT)
Dept: FAMILY MEDICINE CLINIC | Age: 57
End: 2017-09-14

## 2017-09-14 VITALS
RESPIRATION RATE: 16 BRPM | DIASTOLIC BLOOD PRESSURE: 63 MMHG | WEIGHT: 265 LBS | HEIGHT: 65 IN | HEART RATE: 70 BPM | SYSTOLIC BLOOD PRESSURE: 108 MMHG | BODY MASS INDEX: 44.15 KG/M2 | TEMPERATURE: 97.5 F | OXYGEN SATURATION: 98 %

## 2017-09-14 DIAGNOSIS — Z13.0 SCREENING FOR ENDOCRINE, METABOLIC AND IMMUNITY DISORDER: ICD-10-CM

## 2017-09-14 DIAGNOSIS — Z23 NEED FOR SHINGLES VACCINE: ICD-10-CM

## 2017-09-14 DIAGNOSIS — F33.9 RECURRENT DEPRESSION (HCC): ICD-10-CM

## 2017-09-14 DIAGNOSIS — F10.21 ALCOHOLISM IN REMISSION (HCC): ICD-10-CM

## 2017-09-14 DIAGNOSIS — Z00.00 ENCOUNTER FOR MEDICARE ANNUAL WELLNESS EXAM: ICD-10-CM

## 2017-09-14 DIAGNOSIS — Z23 ENCOUNTER FOR IMMUNIZATION: ICD-10-CM

## 2017-09-14 DIAGNOSIS — M51.26 HNP (HERNIATED NUCLEUS PULPOSUS), LUMBAR: ICD-10-CM

## 2017-09-14 DIAGNOSIS — Z01.89 ENCOUNTER FOR LABORATORY EXAMINATION: ICD-10-CM

## 2017-09-14 DIAGNOSIS — E56.9 VITAMIN DEFICIENCY: ICD-10-CM

## 2017-09-14 DIAGNOSIS — Z12.31 ENCOUNTER FOR SCREENING MAMMOGRAM FOR BREAST CANCER: ICD-10-CM

## 2017-09-14 DIAGNOSIS — Z13.29 SCREENING FOR ENDOCRINE, METABOLIC AND IMMUNITY DISORDER: ICD-10-CM

## 2017-09-14 DIAGNOSIS — Z98.84 HISTORY OF GASTRIC BYPASS: ICD-10-CM

## 2017-09-14 DIAGNOSIS — Z13.1 SCREENING FOR DIABETES MELLITUS: ICD-10-CM

## 2017-09-14 DIAGNOSIS — Z12.2 ENCOUNTER FOR SCREENING FOR LUNG CANCER: ICD-10-CM

## 2017-09-14 DIAGNOSIS — C49.9 FIBROSARCOMA (HCC): ICD-10-CM

## 2017-09-14 DIAGNOSIS — F17.219 CIGARETTE NICOTINE DEPENDENCE WITH NICOTINE-INDUCED DISORDER: ICD-10-CM

## 2017-09-14 DIAGNOSIS — Z13.820 ENCOUNTER FOR SCREENING FOR OSTEOPOROSIS: ICD-10-CM

## 2017-09-14 DIAGNOSIS — Z13.6 ENCOUNTER FOR LIPID SCREENING FOR CARDIOVASCULAR DISEASE: ICD-10-CM

## 2017-09-14 DIAGNOSIS — Z78.0 POST-MENOPAUSAL: ICD-10-CM

## 2017-09-14 DIAGNOSIS — Z13.228 SCREENING FOR ENDOCRINE, METABOLIC AND IMMUNITY DISORDER: ICD-10-CM

## 2017-09-14 DIAGNOSIS — G47.30 SLEEP APNEA, UNSPECIFIED TYPE: ICD-10-CM

## 2017-09-14 DIAGNOSIS — Z71.89 ACP (ADVANCE CARE PLANNING): ICD-10-CM

## 2017-09-14 DIAGNOSIS — G89.29 CHRONIC PAIN OF BOTH KNEES: ICD-10-CM

## 2017-09-14 DIAGNOSIS — M25.561 CHRONIC PAIN OF BOTH KNEES: ICD-10-CM

## 2017-09-14 DIAGNOSIS — M48.061 LUMBAR STENOSIS: ICD-10-CM

## 2017-09-14 DIAGNOSIS — M25.562 CHRONIC PAIN OF BOTH KNEES: ICD-10-CM

## 2017-09-14 DIAGNOSIS — Z13.220 ENCOUNTER FOR LIPID SCREENING FOR CARDIOVASCULAR DISEASE: ICD-10-CM

## 2017-09-14 DIAGNOSIS — J44.9 CHRONIC OBSTRUCTIVE PULMONARY DISEASE, UNSPECIFIED COPD TYPE (HCC): Primary | ICD-10-CM

## 2017-09-14 LAB
ALBUMIN SERPL-MCNC: 3.4 G/DL (ref 3.4–5)
ALBUMIN/GLOB SERPL: 1.1 {RATIO} (ref 0.8–1.7)
ALP SERPL-CCNC: 146 U/L (ref 45–117)
ALT SERPL-CCNC: 115 U/L (ref 13–56)
ANION GAP SERPL CALC-SCNC: 3 MMOL/L (ref 3–18)
AST SERPL-CCNC: 166 U/L (ref 15–37)
BILIRUB SERPL-MCNC: 0.3 MG/DL (ref 0.2–1)
BUN SERPL-MCNC: 6 MG/DL (ref 7–18)
BUN/CREAT SERPL: 7 (ref 12–20)
CALCIUM SERPL-MCNC: 8.5 MG/DL (ref 8.5–10.1)
CHLORIDE SERPL-SCNC: 108 MMOL/L (ref 100–108)
CHOLEST SERPL-MCNC: 155 MG/DL
CO2 SERPL-SCNC: 32 MMOL/L (ref 21–32)
CREAT SERPL-MCNC: 0.87 MG/DL (ref 0.6–1.3)
ERYTHROCYTE [DISTWIDTH] IN BLOOD BY AUTOMATED COUNT: 14.8 % (ref 11.6–14.5)
FOLATE SERPL-MCNC: >20 NG/ML (ref 3.1–17.5)
GLOBULIN SER CALC-MCNC: 3.2 G/DL (ref 2–4)
GLUCOSE SERPL-MCNC: 101 MG/DL (ref 74–99)
HCT VFR BLD AUTO: 43.8 % (ref 35–45)
HDLC SERPL-MCNC: 63 MG/DL (ref 40–60)
HDLC SERPL: 2.5 {RATIO} (ref 0–5)
HGB BLD-MCNC: 14.6 G/DL (ref 12–16)
LDLC SERPL CALC-MCNC: 79 MG/DL (ref 0–100)
LIPID PROFILE,FLP: ABNORMAL
MCH RBC QN AUTO: 30.8 PG (ref 24–34)
MCHC RBC AUTO-ENTMCNC: 33.3 G/DL (ref 31–37)
MCV RBC AUTO: 92.4 FL (ref 74–97)
PLATELET # BLD AUTO: 316 K/UL (ref 135–420)
PMV BLD AUTO: 10.6 FL (ref 9.2–11.8)
POTASSIUM SERPL-SCNC: 5 MMOL/L (ref 3.5–5.5)
PROT SERPL-MCNC: 6.6 G/DL (ref 6.4–8.2)
RBC # BLD AUTO: 4.74 M/UL (ref 4.2–5.3)
SODIUM SERPL-SCNC: 143 MMOL/L (ref 136–145)
TRIGL SERPL-MCNC: 65 MG/DL (ref ?–150)
TSH SERPL DL<=0.05 MIU/L-ACNC: 1.89 UIU/ML (ref 0.36–3.74)
VIT B12 SERPL-MCNC: 1052 PG/ML (ref 211–911)
VLDLC SERPL CALC-MCNC: 13 MG/DL
WBC # BLD AUTO: 10.6 K/UL (ref 4.6–13.2)

## 2017-09-14 PROCEDURE — 82607 VITAMIN B-12: CPT | Performed by: NURSE PRACTITIONER

## 2017-09-14 PROCEDURE — 82746 ASSAY OF FOLIC ACID SERUM: CPT | Performed by: NURSE PRACTITIONER

## 2017-09-14 PROCEDURE — 85027 COMPLETE CBC AUTOMATED: CPT | Performed by: NURSE PRACTITIONER

## 2017-09-14 PROCEDURE — 82652 VIT D 1 25-DIHYDROXY: CPT | Performed by: NURSE PRACTITIONER

## 2017-09-14 PROCEDURE — 84443 ASSAY THYROID STIM HORMONE: CPT | Performed by: NURSE PRACTITIONER

## 2017-09-14 PROCEDURE — 80061 LIPID PANEL: CPT | Performed by: NURSE PRACTITIONER

## 2017-09-14 PROCEDURE — 80053 COMPREHEN METABOLIC PANEL: CPT | Performed by: NURSE PRACTITIONER

## 2017-09-14 RX ORDER — METHYLPREDNISOLONE 4 MG/1
TABLET ORAL
Qty: 1 DOSE PACK | Refills: 0 | Status: SHIPPED | OUTPATIENT
Start: 2017-09-14 | End: 2017-12-14 | Stop reason: ALTCHOICE

## 2017-09-14 RX ORDER — BUPROPION HYDROCHLORIDE 75 MG/1
75 TABLET ORAL 2 TIMES DAILY
Qty: 180 TAB | Refills: 0 | Status: SHIPPED | OUTPATIENT
Start: 2017-09-14 | End: 2017-11-29 | Stop reason: SDUPTHER

## 2017-09-14 NOTE — PROGRESS NOTES
9/14/2017  8:45 AM      This is an Initial Medicare Annual Wellness Exam (AWV) (Performed 12 months after IPPE or effective date of Medicare Part B enrollment, Once in a lifetime)    I have reviewed the patient's medical history in detail and updated the computerized patient record. 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 tissue cancer    HNP (herniated nucleus pulposus), lumbar     patient reported    Recurrent depression ([facility]     Sleep apnea     Spinal stenosis, lumbar     patient reported      Past Surgical History:   Procedure Laterality Date    HX ARTHRODESIS  01/2000    Herniated Disc, arthritis right foot 06/06, 07/07, C 6/7, C 4/6 Stenosis    HX CHOLECYSTECTOMY  09/2008    gallbladder disease    HX COLONOSCOPY  05/2009    HX GI  2012    GASTRIC BYPASS  [facility] Gastric Bypass      HX HYSTERECTOMY  06/1994    HX MENISCECTOMY  10/2015    right repari of torn meniscus    HX MYOMECTOMY  06/1984    benign tumor    HX ORTHOPAEDIC  02/1964 / 03/1664    orthopaedic excision Fibrosarcoma and Lymphangiogram    HX PELVIC LAPAROSCOPY  28/2471    large uterine blockage     Current Outpatient Prescriptions   Medication Sig Dispense Refill    buPROPion (WELLBUTRIN) 75 mg tablet Take 1 Tab by mouth daily. 180 Tab 0    pregabalin (LYRICA) 150 mg capsule Take 1 Cap by mouth two (2) times a day. Max Daily Amount: 300 mg. 180 Cap 0    citalopram (CELEXA) 40 mg tablet Take 40 mg by mouth daily.  ARIPiprazole (ABILIFY) 5 mg tablet Take  by mouth daily.  Dexlansoprazole (DEXILANT) 60 mg CpDB Take  by mouth.  fluticasone (FLOVENT DISKUS) 100 mcg/actuation dsdv Take  by inhalation.  b complex vitamins tablet Take 1 Tab by mouth daily.  calcium-cholecalciferol, d3, (CALCIUM 600 + D) 600-125 mg-unit tab Take 1,065 mg by mouth nightly.  Indications: TAKES 2 AT BEDTIME      omega 3-dha-epa-fish oil (FISH OIL) 100-160-1,000 mg cap Take 1,065 mg by mouth daily.  fluticasone (FLONASE) 50 mcg/actuation nasal spray 2 Sprays by Both Nostrils route daily.  ferrous sulfate ER (IRON) 160 mg (50 mg iron) TbER tablet Take 1 Tab by mouth daily. Indications: PT TAKES 40 MG      multivitamin (ONE A DAY) tablet Take 1 Tab by mouth daily.  albuterol (PROVENTIL HFA, VENTOLIN HFA, PROAIR HFA) 90 mcg/actuation inhaler Take  by inhalation. Allergies   Allergen Reactions    Adhesive Tape-Silicones Swelling     REALLY BAD SWELLING AND SORE APPEAR    Alcohol Other (comments)     PT STATES SHE IS AN ALCOHOLIC    Lactose Other (comments)     PT STATES IT MAKES HER STOMACH HURT    Percodan [Oxycodone Hcl-Oxycodone-Asa] Itching and Other (comments)     crying    Nsaids (Non-Steroidal Anti-Inflammatory Drug) Other (comments)     PT NOT ABLE TO TAKE DUE TO GASTRIC BYPASS     Family History   Problem Relation Age of Onset    Hypertension Mother     Depression Mother     Cancer Mother     Cancer Father     Cancer Sister     Depression Sister     Depression Brother      Social History   Substance Use Topics    Smoking status: Current Every Day Smoker    Smokeless tobacco: Never Used    Alcohol use No     Patient Active Problem List   Diagnosis Code    Chronic obstructive pulmonary disease (HCC) J44.9    Recurrent depression (HCC) F33.9    Fibrosarcoma ([facility] C49.9    Alcoholism in remission ([facility] F10.21    Sleep apnea G47.30    History of gastric bypass Z98.890         Depression Risk Factor Screening:     PHQ over the last two weeks 9/14/2017   Little interest or pleasure in doing things Not at all   Feeling down, depressed or hopeless Not at all   Total Score PHQ 2 0       Alcohol Risk Factor Screening:    You do not drink alcohol      Functional Ability and Level of Safety:     Hearing Loss   No hearing loss noted    Activities of Daily Living Self-care. Requires assistance with: no ADLs    Fall Risk     Fall Risk Assessment, last 12 mths 9/14/2017   Able to walk? Yes   Fall in past 12 months? No       Abuse Screen   Patient is not abused  Denies financial, physical, or verbal abuse    Review of Systems   Constitutional: negative for fevers, chills and weight loss  Respiratory: negative for cough, wheezing or dyspnea on exertion  Cardiovascular: negative for chest pain, palpitations  Gastrointestinal: negative for abdominal pain, N/V/D  Musculoskeletal:negative for myalgias and muscle weakness    Labwork/Imaging     No results found for: GLU, GLUCPOC    No results found for: CHOL, CHOLPOCT, CHOLX, CHLST, CHOLV, HDL, HDLPOC, LDL, LDLCPOC, LDLC, DLDLP, VLDLC, VLDL, TGLX, TRIGL, TRIGP, TGLPOCT, CHHD, CHHDX        Physical Examination     Evaluation of Cognitive Function:  Mood/affect:  neutral  Appearance: age appropriate, casually dressed and within normal Limits  Family member/caregiver input: none present    Blood pressure 108/63, pulse 70, temperature 97.5 °F (36.4 °C), temperature source Oral, resp. rate 16, height 5' 5\" (1.651 m), weight 265 lb (120.2 kg), SpO2 98 %. Body mass index is 44.1 kg/(m^2). General appearance: alert, cooperative, no distress  Lungs: clear to auscultation bilaterally  Heart: regular rate and rhythm, S1, S2 normal, no murmur, click, rub or gallop  Abdomen: soft, non-tender.  Bowel sounds normal. No masses,  no organomegaly  Extremities: extremities normal, atraumatic, no cyanosis or edema    Patient Care Team:  Adriana Guardado NP as PCP - General (Nurse Practitioner)  Jourdan Copeland DO (Orthopedic Surgery)    Advice/Referrals/Counseling   Education and counseling provided:  Are appropriate based on today's review and evaluation  End-of-Life planning (with patient's consent)  Influenza Vaccine  Screening Mammography  Cardiovascular screening blood test  Bone mass measurement (DEXA)  Screening for glaucoma  Diabetes screening test  Low dose CT scan  Shingles vaccine    Assessment/Plan   Diagnoses and all orders for this visit:      Encounter for Medicare annual wellness exam  *Medicare wellness completed. Education and counseling provided, recommendations made- see Medicare chart in patient instructions and see below. *I have reviewed/discussed the above normal BMI with the patient. I have recommended the following interventions: dietary management education, guidance, and counseling, encourage exercise and monitor weight . Dian Barrios Recurrent depression ([facility]  Assessment & Plan:  Stable, based on history, physical exam and review of pertinent labs, studies and medications; meds reconciled; continue current treatment plan. Key Psychotherapeutic Meds             buPROPion (WELLBUTRIN) 75 mg tablet  (Taking) Take 1 Tab by mouth two (2) times a day. citalopram (CELEXA) 40 mg tablet  (Taking) Take 40 mg by mouth daily. ARIPiprazole (ABILIFY) 5 mg tablet  (Taking) Take  by mouth daily. Other [facility] Meds             pregabalin (LYRICA) 150 mg capsule  (Taking) Take 1 Cap by mouth two (2) times a day. Max Daily Amount: 300 mg. No results found for: NA, NAPOC, CREA, MCREA, CREAPOC, CREATEXT, TSH, WBC, WBCT, WBCPOC, GPT, ALTPOC, ALT, SGOT, ASTPOC, GGT, LITHM, ATRPA, NORTR, TSHEXT      Alcoholism in remission [facility])  Assessment & Plan:  Resolved, in remission since 1988. Key Psychotherapeutic Meds             buPROPion (WELLBUTRIN) 75 mg tablet  (Taking) Take 1 Tab by mouth two (2) times a day. citalopram (CELEXA) 40 mg tablet  (Taking) Take 40 mg by mouth daily. ARIPiprazole (ABILIFY) 5 mg tablet  (Taking) Take  by mouth daily. Other [address] Meds             pregabalin (LYRICA) 150 mg capsule  (Taking) Take 1 Cap by mouth two (2) times a day. Max Daily Amount: 300 mg.         No results found for: NA, NAPOC, CREA, MCREA, CREAPOC, CREATEXT, TSH, WBC, WBCT, WBCPOC, GPT, ALTPOC, ALT, SGOT, ASTPOC, GGT, LITHM, ATRPA, NORTR, TSHEXT      Encounter for lipid screening for cardiovascular disease  -     LIPID PANEL; Future    Screening for diabetes mellitus  -     METABOLIC PANEL, COMPREHENSIVE; Future    Encounter for screening mammogram for breast cancer  -     ANSHU MAMMO BI SCREENING INCL CAD; Future    Encounter for screening for osteoporosis/ Post-menopausal  -     DEXA BONE DENSITY STUDY AXIAL; Future    Encounter for screening for lung cancer/ Cigarette nicotine dependence with nicotine-induced disorder  -     buPROPion (WELLBUTRIN) 75 mg tablet; Take 1 Tab by mouth two (2) times a day. -     CT LOW DOSE LUNG CANCER SCREENING; Future    Need for shingles vaccine  -     varicella zoster vacine live (VARICELLA-ZOSTER VACINE LIVE) 19,400 unit/0.65 mL susr injection; 1 Vial by SubCUTAneous route once for 1 dose. Encounter for immunization  *Given today in office. See LPN documentation.   -     ADMIN INFLUENZA VIRUS VAC  -     INFLUENZA VIRUS VACCINE QUADRIVALENT, PRESERVATIVE FREE SYRINGE ([id number])    Encounter for laboratory examination  -     MD COLLECTION VENOUS BLOOD,VENIPUNCTURE    ACP (advance care planning)  *See ACP note. *Plan of care reviewed with patient. Patient in agreement with plan and expresses understanding. All questions answered and patient encouraged to call or RTO if further questions or concerns. Follow-up Disposition:  Return in about 3 months (around 12/14/2017) for chronic disease routine care- 30 min. Medicare Wellness yearly. ..

## 2017-09-14 NOTE — ACP (ADVANCE CARE PLANNING)
Advance Care Planning (ACP) Provider Note - Comprehensive     Date of ACP Conversation: 09/14/17  Persons included in Conversation:  patient  Length of ACP Conversation in minutes:  <16 minutes (Non-Billable)    Authorized Decision Maker (if patient is incapable of making informed decisions): This person is:  None at this time          General ACP for ALL Patients with Decision Making Capacity:   Importance of advance care planning, including choosing a healthcare agent to communicate patient's healthcare decisions if patient lost the ability to make decisions, such as after a sudden illness or accident    Review of Existing Advance Directive:  N/A    For Serious or Chronic Illness:  Understanding of medical condition    Understanding of CPR, goals and expected outcomes, benefits and burdens discussed.     Interventions Provided:  Recommended completion of Advance Directive form after review of ACP materials and conversation with prospective healthcare agent

## 2017-09-14 NOTE — MR AVS SNAPSHOT
Visit Information Date & Time Provider Department Dept. Phone Encounter #  
 9/14/2017  8:00 AM Ghazala Santana NP [facility] [phone] [id number] Follow-up Instructions Return in about 3 months (around 12/14/2017) for chronic disease routine care- 30 min. Medicare Wellness yearly. Evette Alpha Upcoming Health Maintenance Date Due Hepatitis C Screening 1960 MEDICARE YEARLY EXAM 6/6/1976 COLONOSCOPY 6/6/1976 Pneumococcal 19-64 Highest Risk (1 of 3 - PCV13) 6/6/1977 DTaP/Tdap/Td series (1 - Tdap) 6/6/1979 BREAST CANCER SCRN MAMMOGRAM 6/6/2008 INFLUENZA AGE 9 TO ADULT 8/1/2017 Allergies as of 9/14/2017  Review Complete On: 9/14/2017 By: Ghazala Santana NP Severity Noted Reaction Type Reactions Adhesive Tape-silicones High 21/89/2004   Topical Swelling REALLY BAD SWELLING AND SORE APPEAR Alcohol High 07/13/2017   Intolerance Other (comments) PT STATES SHE IS AN ALCOHOLIC Lactose High 07/13/2017   Intolerance Other (comments) PT STATES IT MAKES HER STOMACH HURT Percodan [Oxycodone Hcl-oxycodone-asa] High 07/13/2017   Systemic Itching, Other (comments) crying Nsaids (Non-steroidal Anti-inflammatory Drug)  07/13/2017   Intolerance Other (comments) PT NOT ABLE TO TAKE DUE TO GASTRIC BYPASS Current Immunizations  Never Reviewed Name Date Influenza Vaccine (Quad) PF  Incomplete Not reviewed this visit You Were Diagnosed With   
  
 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   
 Cigarette nicotine dependence with nicotine-induced disorder     ICD-10-CM: F17.219 ICD-9-CM: 292.9 Nicotine dependence, cigarettes, with other nicotine-induced disorders     ICD-10-CM: F17.218 ICD-9-CM: 292.89 Encounter for lipid screening for cardiovascular disease     ICD-10-CM: Z13.220, Z13.6 ICD-9-CM: V77.91, V81.2 Screening for diabetes mellitus     ICD-10-CM: Z13.1 ICD-9-CM: V77.1 Screening for endocrine, metabolic and immunity disorder     ICD-10-CM: Z13.29, Z13.228, Z13.0 ICD-9-CM: V77.99 History of gastric bypass     ICD-10-CM: Z98.890 ICD-9-CM: V45.86 Vitamin deficiency     ICD-10-CM: E56.9 ICD-9-CM: 269.2 Left leg pain     ICD-10-CM: M79.605 ICD-9-CM: 729.5 Acute left-sided low back pain with left-sided sciatica     ICD-10-CM: M54.42 
ICD-9-CM: 724.2, 724.3 Encounter for immunization     ICD-10-CM: K60 ICD-9-CM: V03.89 Need for shingles vaccine     ICD-10-CM: T66 ICD-9-CM: V04.89 Encounter for screening mammogram for breast cancer     ICD-10-CM: Z12.31 
ICD-9-CM: V76.12 Encounter for screening for osteoporosis     ICD-10-CM: Z13.820 ICD-9-CM: V82.81 Post-menopausal     ICD-10-CM: Z78.0 ICD-9-CM: V49.81 Encounter for screening for lung cancer     ICD-10-CM: Z12.2 ICD-9-CM: V76.0 Encounter for Medicare annual wellness exam     ICD-10-CM: Z00.00 ICD-9-CM: V70.0 Vitals BP Pulse Temp Resp Height(growth percentile) Weight(growth percentile) 108/63 (BP 1 Location: Left arm, BP Patient Position: Sitting) 70 97.5 °F (36.4 °C) (Oral) 16 5' 5\" (1.651 m) 265 lb (120.2 kg) SpO2 BMI OB Status Smoking Status 98% 44.1 kg/m2 Hysterectomy Current Every Day Smoker BMI and BSA Data Body Mass Index Body Surface Area  
 44.1 kg/m 2 2.35 m 2 Preferred Pharmacy Pharmacy Name Phone [facility] [address] [place] [address] AT [facility] [phone] Your Updated Medication List  
  
   
This list is accurate as of: 9/14/17  9:03 AM.  Always use your most recent med list.  
  
  
  
  
 ABILIFY 5 mg tablet Generic drug:  ARIPiprazole Take  by mouth daily. albuterol 90 mcg/actuation inhaler Commonly known as:  PROVENTIL HFA, VENTOLIN HFA, PROAIR HFA Take  by inhalation. b complex vitamins tablet Take 1 Tab by mouth daily. buPROPion 75 mg tablet Commonly known as:  [facility] Take 1 Tab by mouth two (2) times a day. CALCIUM 600 + D 600-125 mg-unit Tab Generic drug:  calcium-cholecalciferol (d3) Take 1,065 mg by mouth nightly. Indications: TAKES 2 AT BEDTIME  
  
 citalopram 40 mg tablet Commonly known as:  Jerl Bulla Take 40 mg by mouth daily. DEXILANT 60 mg Cpdb Generic drug:  Dexlansoprazole Take  by mouth. FISH -160-1,000 mg Cap Generic drug:  omega 3-dha-epa-fish oil Take 1,065 mg by mouth daily. * FLOVENT DISKUS 100 mcg/actuation Dsdv Generic drug:  fluticasone Take  by inhalation. * fluticasone 50 mcg/actuation nasal spray Commonly known as:  Luisa Price 2 Sprays by Both Nostrils route daily. Iron 160 mg (50 mg iron) Tber tablet Generic drug:  ferrous sulfate ER Take 1 Tab by mouth daily. Indications: PT TAKES 40 MG  
  
 methylPREDNISolone 4 mg tablet Commonly known as:  Vernestine Bonds Take as directed  
  
 multivitamin tablet Commonly known as:  ONE A DAY Take 1 Tab by mouth daily. pregabalin 150 mg capsule Commonly known as:  Carlrose marien Danyel Take 1 Cap by mouth two (2) times a day. Max Daily Amount: 300 mg.  
  
 varicella zoster vacine live 19,400 unit/0.65 mL Susr injection Commonly known as:  varicella-zoster vacine live 1 Vial by SubCUTAneous route once for 1 dose. * Notice: This list has 2 medication(s) that are the same as other medications prescribed for you.  Read the directions carefully, and ask your doctor or other care provider to review them with you. Prescriptions Printed Refills  
 varicella zoster vacine live (VARICELLA-ZOSTER VACINE LIVE) 19,400 unit/0.65 mL susr injection 0 Si Vial by SubCUTAneous route once for 1 dose. Class: Print Route: SubCUTAneous Prescriptions Sent to Pharmacy Refills buPROPion (WELLBUTRIN) 75 mg tablet 0 Sig: Take 1 Tab by mouth two (2) times a day. Class: Normal  
 Pharmacy: [facility] [address] [place] [address] [facility] Ph #: [phone] Route: Oral  
 methylPREDNISolone (MEDROL DOSEPACK) 4 mg tablet 0 Sig: Take as directed Class: Normal  
 Pharmacy: [facility] [address] [place] [address] [facility] Ph #: [phone] We Performed the Following ADMIN INFLUENZA VIRUS VAC [ HCP] INFLUENZA VIRUS VAC QUAD,SPLIT,PRESV FREE SYRINGE IM [id number] CPT(R)] SLEEP MEDICINE REFERRAL [MBD909 Custom] Comments:  
 Please evaluate patient for new order for CPAP. Sleep study earlier this year with previous PCP. Follow-up Instructions Return in about 3 months (around 2017) for chronic disease routine care- 30 min. Medicare Wellness yearly. [facility] To-Do List   
 2017 Imaging:  CT LOW DOSE LUNG CANCER SCREENING   
  
 2017 Imaging:  DEXA BONE DENSITY STUDY AXIAL   
  
 2017 Imaging:  ANSHU MAMMO BI SCREENING INCL CAD Around 09/15/2017 Lab:  CBC W/O DIFF Around 09/15/2017 Lab:  FOLATE Around 09/15/2017 Lab:  LIPID PANEL Around 09/15/2017 Lab:  METABOLIC PANEL, COMPREHENSIVE Around 09/15/2017 Lab:  TSH 3RD GENERATION Around 09/15/2017 Lab:  URINALYSIS W/MICROSCOPIC Around 09/15/2017 Lab:  VITAMIN B12   
  
 09/15/2017 Lab:  VITAMIN D, 1, 25 DIHYDROXY Referral Information Referral ID Referred By Referred To  
  
 [id number] Becca KASPER Not Available Visits Status Start Date End Date 1 New Request 9/14/17 9/14/18 If your referral has a status of pending review or denied, additional information will be sent to support the outcome of this decision. Patient Instructions Preventive Services Limitations Recommendation Preventative Services Limitations Recommendation Glaucoma Screening (no USPSTF recommendation) Diabetes mellitus, family history, , age 48 or over,  American, age 72 or over Recommended Periodontal Disease- Dentistry Services *May not be covered under Medicare Recommended Skin Cancer Screening Exam every year *May not be covered under Medicare Self checks encouraged Hearing Impairment Pure Tone Test every 3 years *May not be covered under Medicare No hearing loss noted COPD and Lung Cancer Screening CT chest or chest xray yearly as deemed necessary *May not be covered under Medicare Diagnosed with COPD Opts to do CT low dose for lung cancer screening Counseling to Prevent Tobacco Use 
- Counseling greater than 3 and up to 10 minutes - Counseling greater than 10 minutes Patients must be asymptomatic of tobacco-related conditions to receive as preventive service Up to 8 sessions/year Working on cessation Alcohol Misuse Counseling 4 brief face to face counseling sessions/year History of alcoholism Sober since 1988 Obesity Screening and Counseling BMI of 30 or more. Weekly visits for first month, then biweekly for months 2-6, then every month for months 7-12 if you lose 6.6 lbs in months 1-6 Body mass index is 44.1 kg/(m^2). Screening for STIs and Counseling Annually screenings- 2 face to face counseling sessions/year Declines Human Immunodeficiency Virus (HIV) Screening (annually for increased risk patients) HIV-1 and HIV-2 by EIA, MAO, rapid antibody test, or oral mucosa transudate Tests covered annually for patients at increased risk. Pregnant patients may receive up to 3 test during pregnancy. Declines Diabetes Screening Tests (at least every 3 years, Medicare covers annually or at 6-month intervals for prediabetic patients) Fasting blood sugar (FBS) or glucose tolerance test (GTT) Diagnosed with one of the following: 
-Hypertension, Dyslipidemia, obesity, previous impaired FBS or GTT 
Or any two of the following: overweight, FH of diabetes, age ? 72, history of gestational diabetes, birth of baby weighing more than 9 pounds Glucose ordered today Cardiovascular Screening Blood Tests (every 5 years) Total cholesterol, HDL, Triglycerides Order as a panel if possible Lipids ordered today Medical Nutrition Therapy (MNT) (for diabetes or renal disease not recommended schedule) Requires referral by treating physician for patient with diabetes or renal disease. Up to 3 hours for initial year and 2 hours in subsequent years. Not indicated Diabetes Self-Management Training (DSMT) (no USPSTF recommendation) Requires referral by treating physician for patient with diabetes or renal disease. 10 hours of initial DSMT session of no less than 30 minutes each in a continuous 12-month period. 2 hours of follow-up DSMT in subsequent years. Not indicated Behavioral Therapy for Cardiovascular Disease Annually Lipids drawn today Will discuss further once results resceived Ultrasound Screening for Abdominal Aortic Aneurysm (AAA) (once) Patient must be referred through IPPE. Criteria: 
- Men who are 73-68 years old and smoked >100 cigarettes. -Anyone with FH of AAA 
-Or recommended for screening by USPSTF Not indicated Prostate Cancer Screening (annually up to age 76) - Digital rectal exam (MICHAEL) - Prostate specific antigen (PSA) Annually (age 48 or over), MICHAEL not paid separately when covered E/M service is provided on same date N/A Colorectal Cancer Screening -Fecal occult blood test (annual) -Flexible sigmoidoscopy (5y) 
-Screening colonoscopy (10y) -Barium Enema Colonoscopy in 2017- patient reports repeat in 5 years due to polyps Awaiting records Bone Mass Measurement 
(age 72 & older, biennial) Requires diagnosis related to osteoporosis or estrogen deficiency. History of long-term glucocorticoid tx or baseline is needed. DEXA ordered Screening Mammography (biennial age 54-69) Annually (age 36 or over) Mammo ordered Screening Pap Tests and Pelvic Examination (up to age 79 and after 79 if unknown history or abnormal study last 10 years) Every 24 months except high risk Not indicated for PAP testing due to history hysterectomy Hepatitis B Vaccinations (if medium/high risk) Medium/high risk factors:  End-stage renal disease, Hemophiliacs who received Factor VIII or IX concentrates, Clients of institutions for the mentally retarded, Persons who live in the same house as a HepB virus carrier, Homosexual men, Illicit injectable drug abusers. Not indicated Seasonal Influenza Vaccination (annually)  Given today Pneumococcal Vaccination (once after 65)  She reports given in 2017 Awaiting records Herpes Zoster (Shingles) Vaccination (once after age 61) [de-identified] to persons at age 48 years in specific conditions *May not be covered by Medicare Given Rx today Tetanus Vaccine Td booster every 10 years- Tdap if exposed to children under 1 year) *May not be covered by [facility] Unsure of last booster No medical indication Optometry/Ophthalmology [employer] [address]. General[address] Phone: [phone] [address] [place] [address] Latasha Phone: [phone] [facility] 34, [place], [address] Phone: [phone] [address] No Adrianna Easton, [place], [address]  Phone: [phone] [employer] [address] [facility], [address] Phone: [phone] [employer] [facility] [address], [place], [address] Phone: [phone] Dental 
 
[address] Dentistry- also has several [place] and [facility] locations [address] [facility], [address], [place], [place] Phone: [phone] [address] Dentistry [address] [facility], [address] [facility] [address] Phone: [phone] [facility]- also has [place], [place], and [place] locations [address] [facility] Phone: [phone] [facility] [address] [place], [place] Phone: [phone] Gilda Stanton, & [facility] [address] [place], [place] Phone: [phone] [address] Pediatric Dentistry- also has [facility] office [address] [place], [place] Phone: [phone] 900 [facility], [address], [place], [place] Phone: [phone] [facility] [place] 1010 [facility], [address] [place] [address] Road [phone] Ching Castro DDS 
[address] [place], [address] [phone] Shaina SANON [address] [place], [address] [phone] Dr. Jourdan Rees, 9320 [facility] 
200 [facility], [address] [phone] Dr. Tariq Stephens 1 [facility], [address] [phone] 508 [facility] DDS 
401 [facility], [address] [phone] Here are some dental clinics in the area that offer discounted care: 
 
[facility] 2[address] [facility]. [place], [place] Phone: [phone] [facility] 49 [address] [place], [place] Phone: [phone] [address] [place] [address]. [place], [place] Phone: [phone] [facility] [address]01 [place], [place] Phone: [phone] [facility] [address] [place] [address] 
 Phone: [phone] Introducing [facility] & HEALTH SERVICES! Kirsten Marlene introduces [employer] patient portal. Now you can access parts of your medical record, email your doctor's office, and request medication refills online. 1. In your internet browser, go to [email or web address] 2. Click on the First Time User? Click Here link in the Sign In box. You will see the New Member Sign Up page. 3. Enter your [employer] Access Code exactly as it appears below. You will not need to use this code after youve completed the sign-up process. If you do not sign up before the expiration date, you must request a new code. · [employer] Access Code: [email or web address] Expires: 10/11/2017 11:48 AM 
 
4. Enter the last four digits of your Social Security Number (xxxx) and Date of Birth (mm/dd/yyyy) as indicated and click Submit. You will be taken to the next sign-up page. 5. Create a [employer] ID. This will be your [employer] login ID and cannot be changed, so think of one that is secure and easy to remember. 6. Create a [employer] password. You can change your password at any time. 7. Enter your Password Reset Question and Answer. This can be used at a later time if you forget your password. 8. Enter your e-mail address. You will receive e-mail notification when new information is available in [address] 9. Click Sign Up. You can now view and download portions of your medical record. 10. Click the Download Summary menu link to download a portable copy of your medical information. If you have questions, please visit the Frequently Asked Questions section of the [employer] website. Remember, [employer] is NOT to be used for urgent needs. For medical emergencies, dial 911. Now available from your iPhone and Android! Please provide this summary of care documentation to your next provider. Your primary care clinician is listed as Kosta Wade.  If you have any questions after today's visit, please call [phone].

## 2017-09-14 NOTE — PROGRESS NOTES
HISTORY OF PRESENT ILLNESS  Uvaldo Dubin is a 59 y.o. female. 9/14/2017  8:22 AM    Chief Complaint   Patient presents with    COPD    Leg Pain    Nicotine Dependence       HPI: Here today for follow up on chronic disease and Medicare Wellness (see other note). Newer patient practice. Used to follow with [address] in [address]. No records available at this time. Also needs Handicap placard form filled out today. COPD/ AYE: Diagnosed a few years ago- taking Flovent for maintenance and uses Albuterol PRN- using Albuterol about 1-2 times a week. Feels controlled. Has been smoking less- previously smoking about 2 packs a day and now down to a little over a pack per day. Reports diagnosis of AYE in past- used to CPAP- has lost machine in move from [place]. Depression: Taking Celexa and Abilify as prescribed. Reports feeling stable on medication but has some good days and bad days. Denies SI/HI. History of alcoholism. 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 Reports several surgeries due to condition. Reports UTD on colonoscopy. Due for mammogram in 10/2017. History of hysterectomy. HNP/spine stenosis lumbar/ knee pain: Patient reported- no records available. Taking Lyrica- no complaints related to. Has been following with orthopedics for right knee pain- would like to have left knee pain evaluated and referral needs amended. States that she has been told she has arthritis in both knees. Would like Medrol dose pack to help with acute on chronic left knee pain. Review of Systems   Constitutional: Negative for chills, fever and malaise/fatigue. Eyes: Negative for double vision, photophobia and pain. Respiratory: Negative for cough, shortness of breath and wheezing. Cardiovascular: Positive for leg swelling.  Negative for chest pain and palpitations.        +chronic swelling per patient Gastrointestinal: Negative for abdominal pain, constipation, diarrhea, nausea and vomiting. Genitourinary: Negative for dysuria, frequency and urgency. Musculoskeletal: Positive for back pain and joint pain. Negative for myalgias. +chronic pain in back and knees   Neurological: Negative for dizziness, weakness and headaches. Psychiatric/Behavioral: Negative for depression and suicidal ideas. The patient is not nervous/anxious. PHQ Screening   PHQ over the last two weeks 9/14/2017   Little interest or pleasure in doing things Not at all   Feeling down, depressed or hopeless Not at all   Total Score PHQ 2 0         History  Past Medical History:   Diagnosis Date    [facility]ism [facility] ([facility]     Chronic obstructive pulmonary disease ([address]     Fibrosarcoma ([facility] 1963    connective tissue cancer    HNP (herniated nucleus pulposus), lumbar     patient reported    Recurrent depression ([facility]     Sleep apnea     Spinal stenosis, lumbar     patient reported       Past Surgical History:   Procedure Laterality Date    HX ARTHRODESIS  01/2000    Herniated Disc, arthritis right foot 06/06, 07/07, C 6/7, C 4/6 Stenosis    HX CHOLECYSTECTOMY  09/2008    gallbladder disease    HX COLONOSCOPY  05/2009    HX GI  2012    GASTRIC BYPASS  [place] [facility] Gastric Bypass      HX HYSTERECTOMY  06/1994    HX MENISCECTOMY  10/2015    right repari of torn meniscus    HX MYOMECTOMY  06/1984    benign tumor    HX ORTHOPAEDIC  02/1964 / 03/1664    orthopaedic excision Fibrosarcoma and Lymphangiogram    HX PELVIC LAPAROSCOPY  64/0215    large uterine blockage       Social History     Social History    Marital status:      Spouse name: N/A    Number of children: N/A    Years of education: N/A     Occupational History    Not on file.      Social History Main Topics    Smoking status: Current Every Day Smoker    Smokeless tobacco: Never Used    Alcohol use No    Drug use: No    Sexual activity: No Other Topics Concern    Not on file     Social History Narrative       Family History   Problem Relation Age of Onset    Hypertension Mother     Depression Mother     Cancer Mother     Cancer Father     Cancer Sister     Depression Sister     Depression Brother        Allergies   Allergen Reactions    Adhesive Tape-Silicones Swelling     REALLY BAD SWELLING AND SORE APPEAR    Alcohol Other (comments)     PT STATES SHE IS AN ALCOHOLIC    Lactose Other (comments)     PT STATES IT MAKES HER STOMACH HURT    Percodan [Oxycodone Hcl-Oxycodone-Asa] Itching and Other (comments)     crying    Nsaids (Non-Steroidal Anti-Inflammatory Drug) Other (comments)     PT NOT ABLE TO TAKE DUE TO GASTRIC BYPASS       Current Outpatient Prescriptions   Medication Sig Dispense Refill    buPROPion (WELLBUTRIN) 75 mg tablet TAKE 1 TABLET BY MOUTH DAILY 30 Tab 0    pregabalin (LYRICA) 150 mg capsule Take 1 Cap by mouth two (2) times a day. Max Daily Amount: 300 mg. 180 Cap 0    citalopram (CELEXA) 40 mg tablet Take 40 mg by mouth daily.  ARIPiprazole (ABILIFY) 5 mg tablet Take  by mouth daily.  Dexlansoprazole (DEXILANT) 60 mg CpDB Take  by mouth.  fluticasone (FLOVENT DISKUS) 100 mcg/actuation dsdv Take  by inhalation.  b complex vitamins tablet Take 1 Tab by mouth daily.  calcium-cholecalciferol, d3, (CALCIUM 600 + D) 600-125 mg-unit tab Take 1,065 mg by mouth nightly. Indications: TAKES 2 AT BEDTIME      omega 3-dha-epa-fish oil (FISH OIL) 100-160-1,000 mg cap Take 1,065 mg by mouth daily.  fluticasone (FLONASE) 50 mcg/actuation nasal spray 2 Sprays by Both Nostrils route daily.  ferrous sulfate ER (IRON) 160 mg (50 mg iron) TbER tablet Take 1 Tab by mouth daily. Indications: PT TAKES 40 MG      multivitamin (ONE A DAY) tablet Take 1 Tab by mouth daily.  albuterol (PROVENTIL HFA, VENTOLIN HFA, PROAIR HFA) 90 mcg/actuation inhaler Take  by inhalation.          Health Maintenance and Screenings  *Need to get records from previous PCP, still not received- Medicare Wellness done today      Advance Care Planning:   Patient was offered the opportunity to discuss advance care planning YES   Does patient have an Advance Directive:  NO   If no, did you provide information on Caring Connections? YES       Patient Care Team:  Patient Care Team:  Valeria Ochoa NP as PCP - General (Nurse Practitioner)  Kandy Rosales DO (Orthopedic Surgery)        LABS:  None new to review    RADIOLOGY:  None new to review      Physical Exam   Constitutional: She is oriented to person, place, and time. She appears well-developed and well-nourished. No distress. Neck: Normal range of motion. Neck supple. No thyromegaly present. Cardiovascular: Normal rate, regular rhythm and normal heart sounds. No murmur heard. Pulmonary/Chest: Effort normal and breath sounds normal. No respiratory distress. Abdominal: Soft. Bowel sounds are normal. There is no tenderness. Musculoskeletal: She exhibits edema.   -bilateral ankle and pedal +1 nonpitting edema  -musculoskeletal exam deferred (no new complaints today)   Neurological: She is alert and oriented to person, place, and time. She exhibits normal muscle tone. Coordination normal.   Skin: Skin is warm and dry. Vitals:    09/14/17 0819   BP: 108/63   Pulse: 70   Resp: 16   Temp: 97.5 °F (36.4 °C)   TempSrc: Oral   SpO2: 98%   Weight: 265 lb (120.2 kg)   Height: 5' 5\" (1.651 m)   PainSc:   7   PainLoc: Knee       ASSESSMENT and PLAN    Diagnoses and all orders for this visit:    Chronic obstructive pulmonary disease, unspecified COPD type ([facility]  *Stable. Continue current medications- denies need for refill at this time. Assessment & Plan:  Stable, based on history, physical exam and review of pertinent labs, studies and medications; meds reconciled; continue current treatment plan.      Key COPD Medications             methylPREDNISolone (MEDROL DOSEPACK) 4 mg tablet  (Taking) Take as directed    fluticasone (FLOVENT DISKUS) 100 mcg/actuation dsdv  (Taking) Take  by inhalation. albuterol (PROVENTIL HFA, VENTOLIN HFA, PROAIR HFA) 90 mcg/actuation inhaler  (Taking) Take  by inhalation. No results found for: WBC, WBCT, WBCPOC, HGB, HGBPOC, HCT, HCTPOC, PLT, PLTPOC, BNP, BNPP, BNPPPOC, SHAMA, DDIME, HDDQN, COTIN, HGBEXT, HCTEXT, PLTEXT    Sleep apnea, unspecified type  *Will refer for further management.   -     SLEEP MEDICINE REFERRAL    Cigarette nicotine dependence with nicotine-induced disorder  *Increased up on Wellbutrin- has not had any interaction with chronic psychiatric medications. Encouraged continued effort to smoking cessation.   -     buPROPion (WELLBUTRIN) 75 mg tablet; Take 1 Tab by mouth two (2) times a day. Recurrent depression ([facility]  *Continue- stable- denies need for refill on medications. Assessment & Plan:  Stable, based on history, physical exam and review of pertinent labs, studies and medications; meds reconciled; continue current treatment plan. Key Psychotherapeutic Meds             buPROPion (WELLBUTRIN) 75 mg tablet  (Taking) Take 1 Tab by mouth two (2) times a day. citalopram (CELEXA) 40 mg tablet  (Taking) Take 40 mg by mouth daily. ARIPiprazole (ABILIFY) 5 mg tablet  (Taking) Take  by mouth daily. Other 5 [facility] Meds             pregabalin (LYRICA) 150 mg capsule  (Taking) Take 1 Cap by mouth two (2) times a day. Max Daily Amount: 300 mg. No results found for: NA, NAPOC, CREA, MCREA, CREAPOC, CREATEXT, TSH, WBC, WBCT, WBCPOC, GPT, ALTPOC, ALT, SGOT, ASTPOC, GGT, LITHM, ATRPA, NORTR, TSHEXT      Alcoholism in remission ([facility]  *[place] [id number]. plan established. Address PRN. Assessment & Plan:  Resolved, in remission since 1988. Key Psychotherapeutic Meds             buPROPion (WELLBUTRIN) 75 mg tablet  (Taking) Take 1 Tab by mouth two (2) times a day.     citalopram (CELEXA) 40 mg tablet  (Taking) Take 40 mg by mouth daily. ARIPiprazole (ABILIFY) 5 mg tablet  (Taking) Take  by mouth daily. Other [address] Meds             pregabalin (LYRICA) 150 mg capsule  (Taking) Take 1 Cap by mouth two (2) times a day. Max Daily Amount: 300 mg. No results found for: NA, NAPOC, CREA, MCREA, CREAPOC, CREATEXT, TSH, WBC, WBCT, WBCPOC, GPT, ALTPOC, ALT, SGOT, ASTPOC, GGT, LITHM, ATRPA, NORTR, TSHEXT      Fibrosarcoma (HCC)  *HCC plan established. She reports that she completed her observation period. Will review previous records when received and continue with age related cancer surveillance per guidelines. Address PRN. Assessment & Plan:  Resolved per patient. Routine monitoring of age related screenings needed. Key Oncology Meds     Patient is on no Oncologic meds. Key Pain Meds     The patient is on no pain meds. No results found for: WBC, WBCT, WBCPOC, ANEU, HGB, HGBPOC, HCT, HCTPOC, PLT, PLTPOC, CREA, MCREA, CREAPOC, CREATEXT, BUN, IBUN, BUNPOC, GPT, ALTPOC, ALT, SGOT, ASTPOC, ALB, ALBPOC, PREALB, HGBEXT, HCTEXT, PLTEXT      HNP (herniated nucleus pulposus), lumbar/ Lumbar stenosis  *Patient reported. She requests Medrol dose pack- see below. Continue with Lyrica. -     methylPREDNISolone (MEDROL DOSEPACK) 4 mg tablet; Take as directed    Chronic pain of both knees  *Will amend referral to orthopedics. Medrol dosepack as requested is reasonable. -     methylPREDNISolone (MEDROL DOSEPACK) 4 mg tablet; Take as directed    History of gastric bypass/ Screening for endocrine, metabolic and immunity disorder/ Vitamin deficiency  *Check labs routinely. -     CBC W/O DIFF; Future  -     TSH 3RD GENERATION; Future  -     FOLATE; Future  -     VITAMIN B12; Future  -     VITAMIN D, 1, 25 DIHYDROXY; Future    Encounter for laboratory examination  -     DE COLLECTION VENOUS BLOOD,VENIPUNCTURE      *Plan of care reviewed with patient.  Patient in agreement with plan and expresses understanding. All questions answered and patient encouraged to call or RTO if further questions or concerns. Follow-up Disposition:  Return in about 3 months (around 12/14/2017) for chronic disease routine care- 30 min. Medicare Wellness yearly. [place]

## 2017-09-14 NOTE — ASSESSMENT & PLAN NOTE
Stable, based on history, physical exam and review of pertinent labs, studies and medications; meds reconciled; continue current treatment plan. Key COPD Medications             methylPREDNISolone (MEDROL DOSEPACK) 4 mg tablet  (Taking) Take as directed    fluticasone (FLOVENT DISKUS) 100 mcg/actuation dsdv  (Taking) Take  by inhalation. albuterol (PROVENTIL HFA, VENTOLIN HFA, PROAIR HFA) 90 mcg/actuation inhaler  (Taking) Take  by inhalation.         No results found for: WBC, WBCT, WBCPOC, HGB, HGBPOC, HCT, HCTPOC, PLT, PLTPOC, BNP, BNPP, BNPPPOC, SHAMA, DDIME, HDDQN, COTIN, HGBEXT, HCTEXT, PLTEXT

## 2017-09-14 NOTE — ASSESSMENT & PLAN NOTE
Resolved per patient. Routine monitoring of age related screenings needed. Key Oncology Meds     Patient is on no Oncologic meds. Key Pain Meds     The patient is on no pain meds.         No results found for: WBC, WBCT, WBCPOC, ANEU, HGB, HGBPOC, HCT, HCTPOC, PLT, PLTPOC, CREA, MCREA, CREAPOC, CREATEXT, BUN, IBUN, BUNPOC, GPT, ALTPOC, ALT, SGOT, ASTPOC, ALB, ALBPOC, PREALB, HGBEXT, HCTEXT, PLTEXT

## 2017-09-14 NOTE — ASSESSMENT & PLAN NOTE
Stable, based on history, physical exam and review of pertinent labs, studies and medications; meds reconciled; continue current treatment plan. Key Psychotherapeutic Meds             buPROPion (WELLBUTRIN) 75 mg tablet  (Taking) Take 1 Tab by mouth two (2) times a day. citalopram (CELEXA) 40 mg tablet  (Taking) Take 40 mg by mouth daily. ARIPiprazole (ABILIFY) 5 mg tablet  (Taking) Take  by mouth daily. Other Vitals ([email or web address])5 [facility] Meds             pregabalin (LYRICA) 150 mg capsule  (Taking) Take 1 Cap by mouth two (2) times a day. Max Daily Amount: 300 mg.         No results found for: NA, NAPOC, CREA, MCREA, CREAPOC, CREATEXT, TSH, WBC, WBCT, WBCPOC, GPT, ALTPOC, ALT, SGOT, ASTPOC, GGT, LITHM, ATRPA, NORTR, TSHEXT

## 2017-09-14 NOTE — PROGRESS NOTES
Chief Complaint   Patient presents with    COPD    Leg Pain    Nicotine Dependence     1. Have you been to the ER, urgent care clinic since your last visit? Hospitalized since your last visit? No    2. Have you seen or consulted any other health care providers outside of the [address] [place] [address] since your last visit? Include any pap smears or colon screening.  No

## 2017-09-14 NOTE — PATIENT INSTRUCTIONS
Preventive Services Limitations Recommendation Preventative Services Limitations Recommendation   Glaucoma Screening (no USPSTF recommendation) Diabetes mellitus, family history, , age 48 or over,  American, age 72 or over Recommended Periodontal Disease- Dentistry Services *May not be covered under Medicare Recommended   Skin Cancer Screening Exam every year  *May not be covered under Medicare   Self checks encouraged Hearing Impairment Pure Tone Test every 3 years  *May not be covered under Medicare No hearing loss noted   COPD and Lung Cancer Screening CT chest or chest xray yearly as deemed necessary  *May not be covered under Medicare Diagnosed with COPD     Opts to do CT low dose for lung cancer screening Counseling to Prevent Tobacco Use  - Counseling greater than 3 and up to 10 minutes  - Counseling greater than 10 minutes Patients must be asymptomatic of tobacco-related conditions to receive as preventive service    Up to 8 sessions/year Working on cessation   Alcohol Misuse Counseling 4 brief face to face counseling sessions/year History of alcoholism     Sober since 1988 Obesity Screening and Counseling BMI of 30 or more. Weekly visits for first month, then biweekly for months 2-6, then every month for months 7-12 if you lose 6.6 lbs in months 1-6 Body mass index is 44.1 kg/(m^2). Screening for STIs and Counseling Annually screenings- 2 face to face counseling sessions/year Declines Human Immunodeficiency Virus (HIV) Screening (annually for increased risk patients)  HIV-1 and HIV-2 by EIA, MAO, rapid antibody test, or oral mucosa transudate Tests covered annually for patients at increased risk. Pregnant patients may receive up to 3 test during pregnancy.  Declines   Diabetes Screening Tests (at least every 3 years, Medicare covers annually or at 6-month intervals for prediabetic patients)    Fasting blood sugar (FBS) or glucose tolerance test (GTT) Diagnosed with one of the following:  -Hypertension, Dyslipidemia, obesity, previous impaired FBS or GTT  Or any two of the following: overweight, FH of diabetes, age ? 72, history of gestational diabetes, birth of baby weighing more than 9 pounds Glucose ordered today Cardiovascular Screening Blood Tests (every 5 years)  Total cholesterol, HDL, Triglycerides Order as a panel if possible Lipids ordered today   Medical Nutrition Therapy (MNT) (for diabetes or renal disease not recommended schedule) Requires referral by treating physician for patient with diabetes or renal disease. Up to 3 hours for initial year and 2 hours in subsequent years. Not indicated Diabetes Self-Management Training (DSMT) (no USPSTF recommendation) Requires referral by treating physician for patient with diabetes or renal disease. 10 hours of initial DSMT session of no less than 30 minutes each in a continuous 12-month period. 2 hours of follow-up DSMT in subsequent years. Not indicated   Behavioral Therapy for Cardiovascular Disease Annually Lipids drawn today    Will discuss further once results resceived Ultrasound Screening for Abdominal Aortic Aneurysm (AAA) (once) Patient must be referred through IPPE. Criteria:  - Men who are 73-68 years old and smoked >100 cigarettes. -Anyone with FH of AAA  -Or recommended for screening by USPSTF Not indicated   Prostate Cancer Screening (annually up to age 76)  - Digital rectal exam (MICHAEL)  - Prostate specific antigen (PSA) Annually (age 48 or over), MICHAEL not paid separately when covered E/M service is provided on same date N/A Colorectal Cancer Screening -Fecal occult blood test (annual)  -Flexible sigmoidoscopy (5y)  -Screening colonoscopy (10y)  -Barium Enema Colonoscopy in 2017- patient reports repeat in 5 years due to polyps    Awaiting records   Bone Mass Measurement  (age 72 & older, biennial) Requires diagnosis related to osteoporosis or estrogen deficiency.  History of long-term glucocorticoid tx or baseline is needed. DEXA ordered Screening Mammography (biennial age 54-69) Annually (age 36 or over) Mammo ordered   Screening Pap Tests and Pelvic Examination (up to age 79 and after 79 if unknown history or abnormal study last 10 years) Every 24 months except high risk Not indicated for PAP testing due to history hysterectomy Hepatitis B Vaccinations (if medium/high risk) Medium/high risk factors:  End-stage renal disease,  Hemophiliacs who received Factor VIII or IX concentrates, Clients of institutions for the mentally retarded, Persons who live in the same house as a HepB virus carrier, Homosexual men, Illicit injectable drug abusers.  Not indicated   Seasonal Influenza Vaccination (annually)  Given today Pneumococcal Vaccination (once after 65)  She reports given in 2017    Awaiting records   Herpes Zoster (Shingles) Vaccination (once after age 61) [de-identified] to persons at age 48 years in specific conditions  *May not be covered by Medicare Given Rx today Tetanus Vaccine Td booster every 10 years- Tdap if exposed to children under 1 year)  *May not be covered by Yadira Rodriges of last booster    No medical indication         Optometry/Ophthalmology    [facility]. [address]  Phone: [phone]    [address], [address] [place] [address]  Phone: [phone]    [address], [address]  Phone: [phone]    [facility]  [address] [place] [address], [address]  Phone: [phone]    [facility]  [address] [place], [place], [address]  Phone: [phone] and Surgeons  [address] [place], [place], [address]  Phone: [phone]    [facility]- also has several [place] locations  [address], [address] [place], [place]  Phone: [phone]    [address] Dentistry  [address] [facility], [address]  Phone: [phone], Implant, and Family Dentistry- also has [place], [place], and [place] locations  [address] [place], [place]  Phone: [phone]    [address] [facility] [address], [place], [place]  Phone: [phone]    Lui Rivera, & [address] [place] [address]  Phone: [phone]    [facility] [place] [facility]- also has MADYSON TORRES [facility]. [place], [place]  Phone: [phone]  &  [address] [facility], [place], [place]  Phone: [phone]    [facility] [place]  [address] [facility], [address]  [phone] [facility] DDS  [address], [address]  [phone]    Tony SANON [address], [address]  [phone]    Dr. Adonay Mosher, DDS  [address] [facility], [address]  [phone]    Dr. Erik Padilla  [address] [facility], [address]  [phone] [facility] DDS  [address] [facility], [address]  [phone]    Here are some dental clinics in the area that offer discounted care:    [facility]  [address] [facility]. [place], [place]  Phone: ([phone]  [address] [place], [place]  Phone: [phone]    [address]  [place], [place]  Phone: [phone]    [place], [place]  Phone: [phone]    [facility]  [address] [place] [address]  Phone: [phone]

## 2017-09-14 NOTE — ASSESSMENT & PLAN NOTE
Resolved, in remission since 1988. Key Psychotherapeutic Meds             buPROPion (WELLBUTRIN) 75 mg tablet  (Taking) Take 1 Tab by mouth two (2) times a day. citalopram (CELEXA) 40 mg tablet  (Taking) Take 40 mg by mouth daily. ARIPiprazole (ABILIFY) 5 mg tablet  (Taking) Take  by mouth daily. Other [address] Meds             pregabalin (LYRICA) 150 mg capsule  (Taking) Take 1 Cap by mouth two (2) times a day. Max Daily Amount: 300 mg.         No results found for: NA, NAPOC, CREA, MCREA, CREAPOC, CREATEXT, TSH, WBC, WBCT, WBCPOC, GPT, ALTPOC, ALT, SGOT, ASTPOC, GGT, LITHM, ATRPA, NORTR, TSHEXT

## 2017-09-15 LAB — 1,25(OH)2D3 SERPL-MCNC: 54.6 PG/ML (ref 19.9–79.3)

## 2017-09-18 ENCOUNTER — NURSE NAVIGATOR (OUTPATIENT)
Dept: OTHER | Age: 57
End: 2017-09-18

## 2017-09-21 DIAGNOSIS — R79.89 ELEVATED LFTS: Primary | ICD-10-CM

## 2017-10-03 ENCOUNTER — HOSPITAL ENCOUNTER (OUTPATIENT)
Dept: BONE DENSITY | Age: 57
Discharge: HOME | End: 2017-10-03
Attending: NURSE PRACTITIONER
Payer: MEDICARE

## 2017-10-03 ENCOUNTER — HOSPITAL ENCOUNTER (OUTPATIENT)
Dept: MAMMOGRAPHY | Age: 57
Discharge: HOME | End: 2017-10-03
Attending: NURSE PRACTITIONER
Payer: MEDICARE

## 2017-10-03 DIAGNOSIS — Z12.31 ENCOUNTER FOR SCREENING MAMMOGRAM FOR BREAST CANCER: ICD-10-CM

## 2017-10-03 DIAGNOSIS — Z13.820 ENCOUNTER FOR SCREENING FOR OSTEOPOROSIS: ICD-10-CM

## 2017-10-03 DIAGNOSIS — Z78.0 POST-MENOPAUSAL: ICD-10-CM

## 2017-10-03 PROCEDURE — 77067 SCR MAMMO BI INCL CAD: CPT

## 2017-10-03 PROCEDURE — 77080 DXA BONE DENSITY AXIAL: CPT

## 2017-10-04 ENCOUNTER — HOSPITAL ENCOUNTER (OUTPATIENT)
Dept: CT IMAGING | Age: 57
Discharge: HOME | End: 2017-10-04
Attending: NURSE PRACTITIONER
Payer: MEDICARE

## 2017-10-04 ENCOUNTER — CLINICAL SUPPORT (OUTPATIENT)
Dept: FAMILY MEDICINE CLINIC | Age: 57
End: 2017-10-04

## 2017-10-04 DIAGNOSIS — F17.219 CIGARETTE NICOTINE DEPENDENCE WITH NICOTINE-INDUCED DISORDER: ICD-10-CM

## 2017-10-04 DIAGNOSIS — J44.9 CHRONIC OBSTRUCTIVE PULMONARY DISEASE, UNSPECIFIED COPD TYPE (HCC): ICD-10-CM

## 2017-10-04 DIAGNOSIS — Z12.2 ENCOUNTER FOR SCREENING FOR LUNG CANCER: ICD-10-CM

## 2017-10-04 DIAGNOSIS — Z11.1 ENCOUNTER FOR PPD TEST: Primary | ICD-10-CM

## 2017-10-04 PROCEDURE — G0297 LDCT FOR LUNG CA SCREEN: HCPCS

## 2017-10-04 NOTE — PROGRESS NOTES
Chief Complaint   Patient presents with    PPD Placement     Pt here for PPD placement     PPD placed in left forearm. Pt tolerated well. Pt instructed not to scratch or irritate area.

## 2017-10-06 ENCOUNTER — CLINICAL SUPPORT (OUTPATIENT)
Dept: FAMILY MEDICINE CLINIC | Age: 57
End: 2017-10-06

## 2017-10-06 DIAGNOSIS — Z11.1 ENCOUNTER FOR PPD SKIN TEST READING: Primary | ICD-10-CM

## 2017-10-06 LAB
MM INDURATION POC: NORMAL MM (ref 0–5)
PPD POC: NORMAL NEGATIVE

## 2017-10-12 ENCOUNTER — HOSPITAL ENCOUNTER (OUTPATIENT)
Dept: RESPIRATORY THERAPY | Age: 57
Discharge: HOME | End: 2017-10-12
Attending: INTERNAL MEDICINE
Payer: MEDICARE

## 2017-10-12 DIAGNOSIS — J44.9 COPD (CHRONIC OBSTRUCTIVE PULMONARY DISEASE) (HCC): ICD-10-CM

## 2017-10-12 PROCEDURE — 94729 DIFFUSING CAPACITY: CPT

## 2017-10-12 PROCEDURE — 94726 PLETHYSMOGRAPHY LUNG VOLUMES: CPT

## 2017-10-12 PROCEDURE — 94060 EVALUATION OF WHEEZING: CPT

## 2017-11-22 ENCOUNTER — OFFICE VISIT (OUTPATIENT)
Dept: FAMILY MEDICINE CLINIC | Age: 57
End: 2017-11-22

## 2017-11-22 VITALS
HEART RATE: 68 BPM | HEIGHT: 65 IN | SYSTOLIC BLOOD PRESSURE: 118 MMHG | DIASTOLIC BLOOD PRESSURE: 73 MMHG | OXYGEN SATURATION: 95 % | WEIGHT: 271 LBS | RESPIRATION RATE: 20 BRPM | TEMPERATURE: 96.5 F | BODY MASS INDEX: 45.15 KG/M2

## 2017-11-22 DIAGNOSIS — M25.562 LEFT KNEE PAIN, UNSPECIFIED CHRONICITY: Primary | ICD-10-CM

## 2017-11-22 DIAGNOSIS — Z72.0 TOBACCO ABUSE: ICD-10-CM

## 2017-11-22 DIAGNOSIS — E66.01 MORBID OBESITY (HCC): ICD-10-CM

## 2017-11-22 DIAGNOSIS — Z23 ENCOUNTER FOR IMMUNIZATION: ICD-10-CM

## 2017-11-22 DIAGNOSIS — J44.9 CHRONIC OBSTRUCTIVE PULMONARY DISEASE, UNSPECIFIED COPD TYPE (HCC): ICD-10-CM

## 2017-11-22 DIAGNOSIS — F33.9 RECURRENT DEPRESSION (HCC): ICD-10-CM

## 2017-11-22 RX ORDER — FLUTICASONE FUROATE AND VILANTEROL 100; 25 UG/1; UG/1
1 POWDER RESPIRATORY (INHALATION) DAILY
COMMUNITY
End: 2017-12-14 | Stop reason: ALTCHOICE

## 2017-11-22 RX ORDER — DEXTROMETHORPHAN HYDROBROMIDE, GUAIFENESIN 5; 100 MG/5ML; MG/5ML
650 LIQUID ORAL
Qty: 100 TAB | Refills: 0 | Status: SHIPPED | OUTPATIENT
Start: 2017-11-22 | End: 2019-05-13

## 2017-11-22 NOTE — PROGRESS NOTES
Odette Greenwood is a 59 y.o. female who presents for routine immunizations. She denies any symptoms , reactions or allergies that would exclude them from being immunized today. Risks and adverse reactions were discussed and the VIS was given to them. All questions were addressed. She was observed for 15min post injection. There were no reactions observed.     Wing Caller, LPN

## 2017-11-22 NOTE — MR AVS SNAPSHOT
Visit Information Date & Time Provider Department Dept. Phone Encounter #  
 11/22/2017 11:30 AM Hesed MD Vasiliy Munoz [phone] [id number] Follow-up Instructions Return in about 1 week (around 11/29/2017), or if symptoms worsen or fail to improve, for knee pain, mood disorder. Your Appointments 12/14/2017  7:30 AM  
ROUTINE CARE with KIM Platt ([facility]) Appt Note: [id number] chronic disease [address] [id number] [address]  
  
   
 [address] [facility] [place] Health Maintenance Date Due Hepatitis C Screening 1960 Pneumococcal 19-64 Highest Risk (2 of 3 - PPSV23) 12/19/2013 MEDICARE YEARLY EXAM 9/15/2018 DTaP/Tdap/Td series (2 - Td) 2/10/2019 Bone Densitometry 10/3/2019 BREAST CANCER SCRN MAMMOGRAM 10/3/2019 COLONOSCOPY 9/20/2021 Allergies as of 11/22/2017  Review Complete On: 11/22/2017 By: Marilu Hamm MD  
  
 Severity Noted Reaction Type Reactions Adhesive Tape-silicones High 10[birth year]97/1957   Topical Swelling REALLY BAD SWELLING AND SORE APPEAR Alcohol High 07/13/2017   Intolerance Other (comments) PT STATES SHE IS AN ALCOHOLIC Lactose High 07/13/2017   Intolerance Other (comments) PT STATES IT MAKES HER STOMACH HURT Percodan [Oxycodone Hcl-oxycodone-asa] High 07/13/2017   Systemic Itching, Other (comments) crying Nsaids (Non-steroidal Anti-inflammatory Drug)  07/13/2017   Intolerance Other (comments) PT NOT ABLE TO TAKE DUE TO GASTRIC BYPASS Current Immunizations  Reviewed on 10/6/2017 Name Date Influenza Vaccine 10/24/2013, 10/3/2012, 9/16/2011 Influenza Vaccine (Quad) PF 9/14/2017 Pneumococcal Conjugate (PCV-13) 10/24/2013 TB Skin Test (PPD) Intradermal 10/6/2017 Tdap 2/10/2009 Not reviewed this visit You Were Diagnosed With   
  
 Codes Comments Left knee pain, unspecified chronicity    -  Primary ICD-10-CM: Z48.008 ICD-9-CM: 719.46 Morbid obesity ([facility]     ICD-10-CM: E66.01 
ICD-9-CM: 278.01 Tobacco abuse     ICD-10-CM: Z72.0 ICD-9-CM: 305.1 Chronic obstructive pulmonary disease, unspecified COPD type ([facility]     ICD-10-CM: J44.9 ICD-9-CM: 144 Vitals BP Pulse Temp Resp Height(growth percentile) Weight(growth percentile) 118/73 (BP 1 Location: Left arm, BP Patient Position: Sitting) 68 96.5 °F (35.8 °C) (Oral) 20 5' 5\" (1.651 m) 271 lb (122.9 kg) SpO2 BMI OB Status Smoking Status 95% 45.1 kg/m2 Hysterectomy Current Every Day Smoker Vitals History BMI and BSA Data Body Mass Index Body Surface Area  
 45.1 kg/m 2 2.37 m 2 Preferred Pharmacy Pharmacy Name Phone [facility] [address] [place] [address] AT [place] [phone] Your Updated Medication List  
  
   
This list is accurate as of: 11/22/17 11:45 AM.  Always use your most recent med list.  
  
  
  
  
 ABILIFY 5 mg tablet Generic drug:  ARIPiprazole Take  by mouth daily. acetaminophen 650 mg Tber Commonly known as:  TYLENOL ARTHRITIS PAIN Take 1 Tab by mouth three (3) times daily as needed. albuterol 90 mcg/actuation inhaler Commonly known as:  PROVENTIL HFA, VENTOLIN HFA, PROAIR HFA Take  by inhalation. b complex vitamins tablet Take 1 Tab by mouth daily. BREO ELLIPTA 100-25 mcg/dose inhaler Generic drug:  fluticasone-vilanterol Take 1 Puff by inhalation daily. buPROPion 75 mg tablet Commonly known as:  [facility] Take 1 Tab by mouth two (2) times a day. CALCIUM 600 + D 600-125 mg-unit Tab Generic drug:  calcium-cholecalciferol (d3) Take 1,065 mg by mouth nightly. Indications: TAKES 2 AT BEDTIME  
  
 citalopram 40 mg tablet Commonly known as:  Liz Walker Take 40 mg by mouth daily. DEXILANT 60 mg Cpdb Generic drug:  Dexlansoprazole Take  by mouth. FISH -160-1,000 mg Cap Generic drug:  omega 3-dha-epa-fish oil Take 1,065 mg by mouth daily. * FLOVENT DISKUS 100 mcg/actuation Dsdv Generic drug:  fluticasone Take  by inhalation. * fluticasone 50 mcg/actuation nasal spray Commonly known as:  Rahat Niland 2 Sprays by Both Nostrils route daily. Iron 160 mg (50 mg iron) Tber tablet Generic drug:  ferrous sulfate ER Take 1 Tab by mouth daily. Indications: PT TAKES 40 MG  
  
 methylPREDNISolone 4 mg tablet Commonly known as:  Karly Cambric Take as directed  
  
 multivitamin tablet Commonly known as:  ONE A DAY Take 1 Tab by mouth daily. pregabalin 150 mg capsule Commonly known as:  Yanique Citizen Take 1 Cap by mouth two (2) times a day. Max Daily Amount: 300 mg.  
  
 * Notice: This list has 2 medication(s) that are the same as other medications prescribed for you. Read the directions carefully, and ask your doctor or other care provider to review them with you. Prescriptions Sent to Pharmacy Refills  
 acetaminophen (TYLENOL ARTHRITIS PAIN) 650 mg TbER 0 Sig: Take 1 Tab by mouth three (3) times daily as needed. Class: Normal  
 Pharmacy: [facility] [address] [place] [address] #: [phone] Route: Oral  
  
We Performed the Following REFERRAL TO BARIATRIC SURGERY [JIP872 Custom] Follow-up Instructions Return in about 1 week (around 11/29/2017), or if symptoms worsen or fail to improve, for knee pain, mood disorder. Referral Information Referral ID Referred By Referred To  
  
 [id number] Kamron Lisa MD   
   [id number] [facility]: [phone] Fax: [phone] Visits Status Start Date End Date 1 New Request 11/22/17 11/22/18 If your referral has a status of pending review or denied, additional information will be sent to support the outcome of this decision. Patient Instructions Knee Pain or Injury: Care Instructions Your Care Instructions Injuries are a common cause of knee problems. Sudden (acute) injuries may be caused by a direct blow to the knee. They can also be caused by abnormal twisting, bending, or falling on the knee. Pain, bruising, or swelling may be severe, and may start within minutes of the injury. Overuse is another cause of knee pain. Other causes are climbing stairs, kneeling, and other activities that use the knee. Everyday wear and tear, especially as you get older, also can cause knee pain. Rest, along with home treatment, often relieves pain and allows your knee to heal. If you have a serious knee injury, you may need tests and treatment. Follow-up care is a key part of your treatment and safety. Be sure to make and go to all appointments, and call your doctor if you are having problems. It's also a good idea to know your test results and keep a list of the medicines you take. How can you care for yourself at home? · Be safe with medicines. Read and follow all instructions on the label. ¨ If the doctor gave you a prescription medicine for pain, take it as prescribed. ¨ If you are not taking a prescription pain medicine, ask your doctor if you can take an over-the-counter medicine. · Rest and protect your knee. Take a break from any activity that may cause pain. · Put ice or a cold pack on your knee for 10 to 20 minutes at a time. Put a thin cloth between the ice and your skin. · Prop up a sore knee on a pillow when you ice it or anytime you sit or lie down for the next 3 days. Try to keep it above the level of your heart. This will help reduce swelling.  
· If your knee is not swollen, you can put moist heat, a heating pad, or a warm cloth on your knee. · If your doctor recommends an elastic bandage, sleeve, or other type of support for your knee, wear it as directed. · Follow your doctor's instructions about how much weight you can put on your leg. Use a cane, crutches, or a walker as instructed. · Follow your doctor's instructions about activity during your healing process. If you can do mild exercise, slowly increase your activity. · Reach and stay at a healthy weight. Extra weight can strain the joints, especially the knees and hips, and make the pain worse. Losing even a few pounds may help. When should you call for help? Call 911 anytime you think you may need emergency care. For example, call if: 
? · You have symptoms of a blood clot in your lung (called a pulmonary embolism). These may include: 
¨ Sudden chest pain. ¨ Trouble breathing. ¨ Coughing up blood. ?Call your doctor now or seek immediate medical care if: 
? · You have severe or increasing pain. ? · Your leg or foot turns cold or changes color. ? · You cannot stand or put weight on your knee. ? · Your knee looks twisted or bent out of shape. ? · You cannot move your knee. ? · You have signs of infection, such as: 
¨ Increased pain, swelling, warmth, or redness. ¨ Red streaks leading from the knee. ¨ Pus draining from a place on your knee. ¨ A fever. ? · You have signs of a blood clot in your leg (called a deep vein thrombosis), such as: 
¨ Pain in your calf, back of the knee, thigh, or groin. ¨ Redness and swelling in your leg or groin. ? Watch closely for changes in your health, and be sure to contact your doctor if: 
? · You have tingling, weakness, or numbness in your knee. ? · You have any new symptoms, such as swelling. ? · You have bruises from a knee injury that last longer than 2 weeks. ? · You do not get better as expected. Where can you learn more? Go to http://steve-anand.info/. Enter K195 in the search box to learn more about \"Knee Pain or Injury: Care Instructions. \" Current as of: March 20, 2017 Content Version: 11.4 © 4391-3025 [employer]. Care instructions adapted under license by [employer] (which disclaims liability or warranty for this information). If you have questions about a medical condition or this instruction, always ask your healthcare professional. [address] any warranty or liability for your use of this information. Introducing [facility]! Dear Alison Wayne: 
Thank you for requesting a [employer] account. Our records indicate that you already have an active [employer] account. You can access your account anytime at [email or web address] Did you know that you can access your hospital and ER discharge instructions at any time in [employer]? You can also review all of your test results from your hospital stay or ER visit. Additional Information If you have questions, please visit the Frequently Asked Questions section of the [employer] website at [email or web address] Remember, MedCPU is NOT to be used for urgent needs. For medical emergencies, dial 911. Now available from your iPhone and Android! Please provide this summary of care documentation to your next provider. Your primary care clinician is listed as Yesenia Nassar. If you have any questions after today's visit, please call [phone].

## 2017-11-22 NOTE — PATIENT INSTRUCTIONS
Knee Pain or Injury: Care Instructions  Your Care Instructions    Injuries are a common cause of knee problems. Sudden (acute) injuries may be caused by a direct blow to the knee. They can also be caused by abnormal twisting, bending, or falling on the knee. Pain, bruising, or swelling may be severe, and may start within minutes of the injury. Overuse is another cause of knee pain. Other causes are climbing stairs, kneeling, and other activities that use the knee. Everyday wear and tear, especially as you get older, also can cause knee pain. Rest, along with home treatment, often relieves pain and allows your knee to heal. If you have a serious knee injury, you may need tests and treatment. Follow-up care is a key part of your treatment and safety. Be sure to make and go to all appointments, and call your doctor if you are having problems. It's also a good idea to know your test results and keep a list of the medicines you take. How can you care for yourself at home? · Be safe with medicines. Read and follow all instructions on the label. ¨ If the doctor gave you a prescription medicine for pain, take it as prescribed. ¨ If you are not taking a prescription pain medicine, ask your doctor if you can take an over-the-counter medicine. · Rest and protect your knee. Take a break from any activity that may cause pain. · Put ice or a cold pack on your knee for 10 to 20 minutes at a time. Put a thin cloth between the ice and your skin. · Prop up a sore knee on a pillow when you ice it or anytime you sit or lie down for the next 3 days. Try to keep it above the level of your heart. This will help reduce swelling. · If your knee is not swollen, you can put moist heat, a heating pad, or a warm cloth on your knee. · If your doctor recommends an elastic bandage, sleeve, or other type of support for your knee, wear it as directed.   · Follow your doctor's instructions about how much weight you can put on your leg. Use a cane, crutches, or a walker as instructed. · Follow your doctor's instructions about activity during your healing process. If you can do mild exercise, slowly increase your activity. · Reach and stay at a healthy weight. Extra weight can strain the joints, especially the knees and hips, and make the pain worse. Losing even a few pounds may help. When should you call for help? Call 911 anytime you think you may need emergency care. For example, call if:  ? · You have symptoms of a blood clot in your lung (called a pulmonary embolism). These may include:  ¨ Sudden chest pain. ¨ Trouble breathing. ¨ Coughing up blood. ?Call your doctor now or seek immediate medical care if:  ? · You have severe or increasing pain. ? · Your leg or foot turns cold or changes color. ? · You cannot stand or put weight on your knee. ? · Your knee looks twisted or bent out of shape. ? · You cannot move your knee. ? · You have signs of infection, such as:  ¨ Increased pain, swelling, warmth, or redness. ¨ Red streaks leading from the knee. ¨ Pus draining from a place on your knee. ¨ A fever. ? · You have signs of a blood clot in your leg (called a deep vein thrombosis), such as:  ¨ Pain in your calf, back of the knee, thigh, or groin. ¨ Redness and swelling in your leg or groin. ? Watch closely for changes in your health, and be sure to contact your doctor if:  ? · You have tingling, weakness, or numbness in your knee. ? · You have any new symptoms, such as swelling. ? · You have bruises from a knee injury that last longer than 2 weeks. ? · You do not get better as expected. Where can you learn more? Go to http://steve-anand.info/. Enter K195 in the search box to learn more about \"Knee Pain or Injury: Care Instructions. \"  Current as of: March 20, 2017  Content Version: 11.4  © 8912-5506 [employer].  Care instructions adapted under license by [employer] (which disclaims liability or warranty for this information). If you have questions about a medical condition or this instruction, always ask your healthcare professional. [address] any warranty or liability for your use of this information.

## 2017-11-22 NOTE — PROGRESS NOTES
Chief Complaint   Patient presents with    Knee Pain     pt here with c/o left knee pain     1. Have you been to the ER, urgent care clinic since your last visit? Hospitalized since your last visit? No    2. Have you seen or consulted any other health care providers outside of the [address] [place] [address] since your last visit? Include any pap smears or colon screening. No     [facility]  Margloretta Filter comes in for acute care. Knee pain: Patient has left knee pain that is severe. She has seen the orthopedic physician and did get a knee injection that did not help. She states that the pain got worse. She now has  limited motion with the knee that triggers pain. She would like medication for pain. I will put on Tylenol arthritis to take up to 3 times a day as needed. She should follow-up with orthopedist.  She does have an MRI ordered. She could get this done at [facility] if her insurance allows. Morbid obesity: Patient has a BMI of 45. She has had gastric bypass surgery done in the past. She wonders about modification for the gastric bypass. I will refer her to the bariatric clinic for this discussion. She does needs to lose weight. This will also help with relieving weight on her knees. COPD: Patient has a history of COPD. Occasionally having wheezing on and off. She is followed up by her PCP for this. Currently she is not wheezing. She does have an albuterol inhaler and Flovent inhaler. She should use this and follow-up with her primary doctor. We will give her pneumonia 23 vaccine today. Tobacco abuse: Patient is on Wellbutrin for this. Currently at 75 mg twice daily. She will follow-up with her primary provider for this. I did continue to encourage her to try and quit smoking. Mood disorder: Patient has history of mood disorder with depression. She is on Celexa. Wonders about a different medication. I did let her know that Wellbutrin is also on antidepressant.   She should discuss with her provider about any changes in medication. She will make an appointment to come in next week to talk to her PCP about this. 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    HNP (herniated nucleus pulposus), lumbar     patient reported    Lung nodules     monitor yearly- stable 10/2017    Osteopenia 10/03/2017    Recurrent depression ([facility]     Sleep apnea     Spinal stenosis, lumbar     patient reported       Surgical History  Past Surgical History:   Procedure Laterality Date    HX ARTHRODESIS  01/2000    Herniated Disc, arthritis right foot 06/06, 07/07, C 6/7, C 4/6 Stenosis    HX CHOLECYSTECTOMY  09/2008    gallbladder disease    HX COLONOSCOPY  05/2009    HX GI  2012    GASTRIC BYPASS  [facility] Gastric Bypass      HX HYSTERECTOMY  06/1994    HX MENISCECTOMY  10/2015    right repari of torn meniscus    HX MYOMECTOMY  06/1984    benign tumor    HX ORTHOPAEDIC  02/1964 / 03/1664    orthopaedic excision Fibrosarcoma and Lymphangiogram    HX PELVIC LAPAROSCOPY  26/0192    large uterine blockage        Medications  Current Outpatient Prescriptions   Medication Sig Dispense Refill    fluticasone-vilanterol (BREO ELLIPTA) 100-25 mcg/dose inhaler Take 1 Puff by inhalation daily.  buPROPion (WELLBUTRIN) 75 mg tablet Take 1 Tab by mouth two (2) times a day. 180 Tab 0    pregabalin (LYRICA) 150 mg capsule Take 1 Cap by mouth two (2) times a day. Max Daily Amount: 300 mg. 180 Cap 0    citalopram (CELEXA) 40 mg tablet Take 40 mg by mouth daily.  ARIPiprazole (ABILIFY) 5 mg tablet Take  by mouth daily.  Dexlansoprazole (DEXILANT) 60 mg CpDB Take  by mouth.  b complex vitamins tablet Take 1 Tab by mouth daily.  calcium-cholecalciferol, d3, (CALCIUM 600 + D) 600-125 mg-unit tab Take 1,065 mg by mouth nightly.  Indications: TAKES 2 AT BEDTIME      omega 3-dha-epa-fish oil (FISH OIL) 100-160-1,000 mg cap Take 1,065 mg by mouth daily.  fluticasone (FLONASE) 50 mcg/actuation nasal spray 2 Sprays by Both Nostrils route daily.  ferrous sulfate ER (IRON) 160 mg (50 mg iron) TbER tablet Take 1 Tab by mouth daily. Indications: PT TAKES 40 MG      multivitamin (ONE A DAY) tablet Take 1 Tab by mouth daily.  albuterol (PROVENTIL HFA, VENTOLIN HFA, PROAIR HFA) 90 mcg/actuation inhaler Take  by inhalation.  methylPREDNISolone (MEDROL DOSEPACK) 4 mg tablet Take as directed 1 Dose Pack 0    fluticasone (FLOVENT DISKUS) 100 mcg/actuation dsdv Take  by inhalation. Allergies  Allergies   Allergen Reactions    Adhesive Tape-Silicones Swelling     REALLY BAD SWELLING AND SORE APPEAR    Alcohol Other (comments)     PT STATES SHE IS AN ALCOHOLIC    Lactose Other (comments)     PT STATES IT MAKES HER STOMACH HURT    Percodan [Oxycodone Hcl-Oxycodone-Asa] Itching and Other (comments)     crying    Nsaids (Non-Steroidal Anti-Inflammatory Drug) Other (comments)     PT NOT ABLE TO TAKE DUE TO GASTRIC BYPASS       Family History  Family History   Problem Relation Age of Onset    Hypertension Mother     Depression Mother     Cancer Mother     Ovarian Cancer Mother     Breast Problems Mother     Cancer Father     Cancer Sister     Depression Sister     Depression Brother        Social History  Social History     Social History    Marital status:      Spouse name: N/A    Number of children: N/A    Years of education: N/A     Occupational History    Not on file.      Social History Main Topics    Smoking status: Current Every Day Smoker    Smokeless tobacco: Never Used    Alcohol use No    Drug use: No    Sexual activity: No     Other Topics Concern    Not on file     Social History Narrative       Review of Systems  Review of Systems - History obtained from chart review and the patient  General ROS: negative for - chills or fever positive for weight gain,   Psychological ROS: positive for - behavioral disorder and mood swings  Ophthalmic ROS: negative   ENT ROS: negative  Allergy and Immunology ROS: positive for - nasal congestion  Respiratory ROS: positive for - wheezing  negative for - cough, hemoptysis, shortness of breath or sputum changes  Cardiovascular ROS: negative  Gastrointestinal ROS: no abdominal pain, change in bowel habits, or black or bloody stools  Genito-Urinary ROS: negative  Musculoskeletal ROS: positive for - joint pain, joint stiffness and pain in knee - left  Neurological ROS: no TIA or stroke symptoms    Vital Signs  Visit Vitals    /73 (BP 1 Location: Left arm, BP Patient Position: Sitting)    Pulse 68    Temp 96.5 °F (35.8 °C) (Oral)    Resp 20    Ht 5' 5\" (1.651 m)    Wt 271 lb (122.9 kg)    SpO2 95%    BMI 45.1 kg/m2         Physical Exam  Physical Examination: General appearance - oriented to person, place, and time and overweight  Mental status - alert, oriented to person, place, and time, affect appropriate to mood  Neck - supple, no significant adenopathy  Chest - clear to auscultation, no wheezes, rales or rhonchi, symmetric air entry, no tachypnea, retractions or cyanosis  Heart - S1 and S2 normal  Back exam - limited range of motion  Neurological - alert, oriented, normal speech, no focal findings or movement disorder noted  Musculoskeletal -left knee with tenderness along the medial margins. Limitation in the passive flexion and extension due to discomfort. Extremities - intact peripheral pulses    Results  Results for orders placed or performed in visit on 10/04/17   AMB POC TUBERCULOSIS, INTRADERMAL (SKIN TEST)   Result Value Ref Range    PPD neg Negative    mm Induration  mm       ASSESSMENT and PLAN  1. Left knee pain, unspecified chronicity  Patient has been followed up by the orthopedist and has an MRI pending.   Previous knee x-rays was stated to be negative for osteoarthritis. She may need to use a knee brace as advised by the orthopedist.  The meantime will put on pain medication. - acetaminophen (TYLENOL ARTHRITIS PAIN) 650 mg TbER; Take 1 Tab by mouth three (3) times daily as needed. Dispense: 100 Tab; Refill: 0    2. Morbid obesity ([facility]  Patient has had previous  gastric bypass in [place]. I will refer her back to the bariatric clinic to discuss surgical options if any.  - [facility]ric S[facility] Ref [facility]    3. Tobacco abuse  Patient is on Wellbutrin. 4. Chronic obstructive pulmonary disease, unspecified COPD type ([facility] [address]  Continue with inhaler medication. 5. Encounter for immunization  - PNEUMOCOCCAL POLYSACCHARIDE VACCINE, 23-VALENT, ADULT OR IMMUNOSUPPRESSED PT DOSE,  - ADMIN PNEUMOCOCCAL VACCINE  (MEDICARE ONLY)    6. Recurrent depression ([facility] [address] [facility]  Patient currently on Celexa and is also taking Wellbutrin. She should come in and discuss any medication adjustments with her PCP. I have discussed the diagnosis with the patient and the intended plan of care as seen in the above orders. The patient has received an after-visit summary and questions were answered concerning future plans. I have discussed medication, side effects, and warnings with the patient in detail. The patient verbalized understanding and is in agreement with the plan of care. The patient will contact the office with any additional concerns.     Chelsie Weller MD

## 2017-11-29 ENCOUNTER — TELEPHONE (OUTPATIENT)
Dept: FAMILY MEDICINE CLINIC | Age: 57
End: 2017-11-29

## 2017-11-29 ENCOUNTER — OFFICE VISIT (OUTPATIENT)
Dept: FAMILY MEDICINE CLINIC | Age: 57
End: 2017-11-29

## 2017-11-29 ENCOUNTER — PATIENT MESSAGE (OUTPATIENT)
Dept: FAMILY MEDICINE CLINIC | Age: 57
End: 2017-11-29

## 2017-11-29 VITALS
DIASTOLIC BLOOD PRESSURE: 70 MMHG | RESPIRATION RATE: 20 BRPM | HEART RATE: 69 BPM | WEIGHT: 280 LBS | TEMPERATURE: 97 F | HEIGHT: 65 IN | OXYGEN SATURATION: 98 % | BODY MASS INDEX: 46.65 KG/M2 | SYSTOLIC BLOOD PRESSURE: 123 MMHG

## 2017-11-29 DIAGNOSIS — M79.662 PAIN OF LEFT CALF: Primary | ICD-10-CM

## 2017-11-29 DIAGNOSIS — G89.29 CHRONIC PAIN OF LEFT KNEE: ICD-10-CM

## 2017-11-29 DIAGNOSIS — M79.662 PAIN AND SWELLING OF LEFT LOWER LEG: ICD-10-CM

## 2017-11-29 DIAGNOSIS — F10.21 ALCOHOLISM IN REMISSION (HCC): ICD-10-CM

## 2017-11-29 DIAGNOSIS — R41.3 MEMORY CHANGES: ICD-10-CM

## 2017-11-29 DIAGNOSIS — M79.89 PAIN AND SWELLING OF LEFT LOWER LEG: ICD-10-CM

## 2017-11-29 DIAGNOSIS — E66.01 MORBID OBESITY WITH BMI OF 45.0-49.9, ADULT (HCC): ICD-10-CM

## 2017-11-29 DIAGNOSIS — M25.562 CHRONIC PAIN OF LEFT KNEE: ICD-10-CM

## 2017-11-29 DIAGNOSIS — F33.9 RECURRENT DEPRESSION (HCC): ICD-10-CM

## 2017-11-29 DIAGNOSIS — F17.219 CIGARETTE NICOTINE DEPENDENCE WITH NICOTINE-INDUCED DISORDER: ICD-10-CM

## 2017-11-29 DIAGNOSIS — Z98.84 HISTORY OF GASTRIC BYPASS: ICD-10-CM

## 2017-11-29 RX ORDER — BUPROPION HYDROCHLORIDE 100 MG/1
100 TABLET ORAL 2 TIMES DAILY
Qty: 60 TAB | Refills: 0 | Status: SHIPPED | OUTPATIENT
Start: 2017-11-29 | End: 2017-12-14 | Stop reason: SDUPTHER

## 2017-11-29 RX ORDER — HYDROCODONE BITARTRATE AND ACETAMINOPHEN 5; 325 MG/1; MG/1
1 TABLET ORAL
Qty: 21 TAB | Refills: 0 | Status: SHIPPED | OUTPATIENT
Start: 2017-11-29 | End: 2017-12-14 | Stop reason: ALTCHOICE

## 2017-11-29 NOTE — MR AVS SNAPSHOT
Visit Information Date & Time Provider Department Dept. Phone Encounter #  
 11/29/2017 10:30 AM Vasiliy Villa [phone] [id number] Follow-up Instructions Return for already scheduled appointment 12/2017. Renato Winter Your Appointments 12/14/2017  7:30 AM  
ROUTINE CARE with KIM Villaourjeison ([facility]) Appt Note: [id number] chronic disease [id number] [address]  
  
   
 [address] [facility] Upcoming Health Maintenance Date Due Hepatitis C Screening 1960 MEDICARE YEARLY EXAM 9/15/2018 DTaP/Tdap/Td series (2 - Td) 2/10/2019 Bone Densitometry 10/3/2019 BREAST CANCER SCRN MAMMOGRAM 10/3/2019 COLONOSCOPY 9/20/2021 Pneumococcal 19-64 Highest Risk (3 of 3 - PPSV23) 11/22/2022 Allergies as of 11/29/2017  Review Complete On: 11/29/2017 By: Jm Ramirez NP Severity Noted Reaction Type Reactions Adhesive Tape-silicones High 36/01/2789   Topical Swelling REALLY BAD SWELLING AND SORE APPEAR Alcohol High 07/13/2017   Intolerance Other (comments) PT STATES SHE IS AN ALCOHOLIC Lactose High 07/13/2017   Intolerance Other (comments) PT STATES IT MAKES HER STOMACH HURT Percodan [Oxycodone Hcl-oxycodone-asa] High 07/13/2017   Systemic Itching, Other (comments) crying Nsaids (Non-steroidal Anti-inflammatory Drug)  07/13/2017   Intolerance Other (comments) PT NOT ABLE TO TAKE DUE TO GASTRIC BYPASS Current Immunizations  Reviewed on 10/6/2017 Name Date Influenza Vaccine 10/24/2013, 10/3/2012, 9/16/2011 Influenza Vaccine (Quad) PF 9/14/2017 Pneumococcal Conjugate (PCV-13) 10/24/2013 Pneumococcal Polysaccharide (PPSV-23) 11/22/2017 TB Skin Test (PPD) Intradermal 10/6/2017 Tdap 2/10/2009 Not reviewed this visit You Were Diagnosed With   
  
 Codes Comments Pain of left calf    -  Primary ICD-10-CM: R47.888 ICD-9-CM: 729.5 Pain and swelling of left lower leg     ICD-10-CM: M79.662, M79.89 ICD-9-CM: 729.5, 729.81 Chronic pain of left knee     ICD-10-CM: M25.562, G89.29 ICD-9-CM: 719.46, 338.29 Cigarette nicotine dependence with nicotine-induced disorder     ICD-10-CM: F17.219 ICD-9-CM: 292.9 Vitals BP Pulse Temp Resp Height(growth percentile) Weight(growth percentile) 123/70 (BP 1 Location: Left arm, BP Patient Position: Sitting) 69 97 °F (36.1 °C) (Oral) 20 5' 5\" (1.651 m) 280 lb (127 kg) SpO2 BMI OB Status Smoking Status 98% 46.59 kg/m2 Hysterectomy Current Every Day Smoker Vitals History BMI and BSA Data Body Mass Index Body Surface Area  
 46.59 kg/m 2 2.41 m 2 Preferred Pharmacy Pharmacy Name Phone [facility] [address] [place] [address] AT [facility] [phone] Your Updated Medication List  
  
   
This list is accurate as of: 11/29/17 11:09 AM.  Always use your most recent med list.  
  
  
  
  
 ABILIFY 5 mg tablet Generic drug:  ARIPiprazole Take  by mouth daily. acetaminophen 650 mg Tber Commonly known as:  TYLENOL ARTHRITIS PAIN Take 1 Tab by mouth three (3) times daily as needed. albuterol 90 mcg/actuation inhaler Commonly known as:  PROVENTIL HFA, VENTOLIN HFA, PROAIR HFA Take  by inhalation. b complex vitamins tablet Take 1 Tab by mouth daily. BREO ELLIPTA 100-25 mcg/dose inhaler Generic drug:  fluticasone-vilanterol Take 1 Puff by inhalation daily. buPROPion 100 mg tablet Commonly known as:  [facility] Take 1 Tab by mouth two (2) times a day. CALCIUM 600 + D 600-125 mg-unit Tab Generic drug:  calcium-cholecalciferol (d3) Take 1,065 mg by mouth nightly. Indications: TAKES 2 AT BEDTIME  
  
 citalopram 40 mg tablet Commonly known as:  Lovetta Rape Take 40 mg by mouth daily. DEXILANT 60 mg Cpdb Generic drug:  Dexlansoprazole Take  by mouth. FISH -160-1,000 mg Cap Generic drug:  omega 3-dha-epa-fish oil Take 1,065 mg by mouth daily. * FLOVENT DISKUS 100 mcg/actuation Dsdv Generic drug:  fluticasone Take  by inhalation. * fluticasone 50 mcg/actuation nasal spray Commonly known as:  Yumiko Finely 2 Sprays by Both Nostrils route daily. HYDROcodone-acetaminophen 5-325 mg per tablet Commonly known as:  Wayna Glaze Take 1 Tab by mouth every eight (8) hours as needed for Pain. Max Daily Amount: 3 Tabs. Iron 160 mg (50 mg iron) Tber tablet Generic drug:  ferrous sulfate ER Take 1 Tab by mouth daily. Indications: PT TAKES 40 MG  
  
 methylPREDNISolone 4 mg tablet Commonly known as:  Scooter Angst Take as directed  
  
 multivitamin tablet Commonly known as:  ONE A DAY Take 1 Tab by mouth daily. pregabalin 150 mg capsule Commonly known as:  Lorenza Rico Take 1 Cap by mouth two (2) times a day. Max Daily Amount: 300 mg.  
  
 * Notice: This list has 2 medication(s) that are the same as other medications prescribed for you. Read the directions carefully, and ask your doctor or other care provider to review them with you. Prescriptions Printed Refills HYDROcodone-acetaminophen (NORCO) 5-325 mg per tablet 0 Sig: Take 1 Tab by mouth every eight (8) hours as needed for Pain. Max Daily Amount: 3 Tabs. Class: Print Route: Oral  
  
Prescriptions Sent to Pharmacy Refills buPROPion (WELLBUTRIN) 100 mg tablet 0 Sig: Take 1 Tab by mouth two (2) times a day. Class: Normal  
 Pharmacy: [facility] [address] [facility] [address]  #: [phone] Route: Oral  
  
Follow-up Instructions Return for already scheduled appointment 12/2017. Meliza Gordon To-Do List   
 11/29/2017   Imaging:  DUPLEX LOWER EXT VENOUS LEFT   
 Patient Instructions Starting a Weight Loss Plan: Care Instructions Your Care Instructions If you are thinking about losing weight, it can be hard to know where to start. Your doctor can help you set up a weight loss plan that best meets your needs. You may want to take a class on nutrition or exercise, or join a weight loss support group. If you have questions about how to make changes to your eating or exercise habits, ask your doctor about seeing a registered dietitian or an exercise specialist. 
It can be a big challenge to lose weight. But you do not have to make huge changes at once. Make small changes, and stick with them. When those changes become habit, add a few more changes. If you do not think you are ready to make changes right now, try to pick a date in the future. Make an appointment to see your doctor to discuss whether the time is right for you to start a plan. Follow-up care is a key part of your treatment and safety. Be sure to make and go to all appointments, and call your doctor if you are having problems. It's also a good idea to know your test results and keep a list of the medicines you take. How can you care for yourself at home? · Set realistic goals. Many people expect to lose much more weight than is likely. A weight loss of 5% to 10% of your body weight may be enough to improve your health. · Get family and friends involved to provide support. Talk to them about why you are trying to lose weight, and ask them to help. They can help by participating in exercise and having meals with you, even if they may be eating something different. · Find what works best for you. If you do not have time or do not like to cook, a program that offers meal replacement bars or shakes may be better for you.  Or if you like to prepare meals, finding a plan that includes daily menus and recipes may be best. 
· Ask your doctor about other health professionals who can help you achieve your weight loss goals. ¨ A dietitian can help you make healthy changes in your diet. ¨ An exercise specialist or  can help you develop a safe and effective exercise program. 
¨ A counselor or psychiatrist can help you cope with issues such as depression, anxiety, or family problems that can make it hard to focus on weight loss. · Consider joining a support group for people who are trying to lose weight. Your doctor can suggest groups in your area. Where can you learn more? Go to http://steve-anand.info/. Enter C489 in the search box to learn more about \"Starting a Weight Loss Plan: Care Instructions. \" Current as of: October 13, 2016 Content Version: 11.4 © 4628-3551 [employer]. Care instructions adapted under license by [employer] (which disclaims liability or warranty for this information). If you have questions about a medical condition or this instruction, always ask your healthcare professional. [place] [address] any warranty or liability for your use of this information. Introducing [facility]! Dear Mary Beth El: 
Thank you for requesting a Av[employer]â„¢ account. Our records indicate that you already have an active [employer]â„¢ account. You can access your account anytime at [email or web address] Did you know that you can access your hospital and ER discharge instructions at any time in Avocadoâ„¢? You can also review all of your test results from your hospital stay or ER visit. Additional Information If you have questions, please visit the Frequently Asked Questions section of the Av[employer]â„¢ website at [email or web address] Remember, [employer]ocadoâ„¢ is NOT to be used for urgent needs. For medical emergencies, dial 911. Now available from your iPhone and Android! Please provide this summary of care documentation to your next provider. Your primary care clinician is listed as Shannan Palomino. If you have any questions after today's visit, please call [phone].

## 2017-11-29 NOTE — PROGRESS NOTES
OFFICE NOTE    Murali Beth is a 59 y.o. female presenting today for office visit. 11/29/2017  10:49 AM      Chief Complaint   Patient presents with    Knee Pain     pt here with c/o pain behind her left knee    Weight Management     pt here to discuss weight management    Medication Evaluation     pt wants to discuss increasing the medication to help with smoke habits         HPI: Here today for multiple complaints. Evaluated last week by Dr Raquel Rg for same complaints and here today for follow up on them. Primary complaint of left knee and leg pain that has been happening for a few months; however, the pain has changed slightly and has worsened significantly over the last 1-2 weeks. She has been seeing orthopedics- did an injection which made worse and is in process of getting an MRI scheduled. Finds it frustrating that this has not been completed and also feels that it may not be from her knee. She reports the pain behind the knee/upper calf area ad that has tenderness as well. Has bilateral lower leg swelling, no worsening. She expresses concern of something further happening. Calf area burning pain at times. Has not had any prolonged travel, no injury to leg, and no open areas. Has been taking Tylenol- does not help. Unable to do NSAIDs due to history of gastric bypass. Also, would like to discuss management of her smoking cessation and history of depression. Currently on Celexa 40 mg and expresses that she is not sure if it really is helping. Not really having any symptoms of depression or feelings, however. She has been on Wellbutrin 75 mg BID for smoking cessation and has cut back to about 1.5 PPD instead of 2 PPD. She is wondering about increasing up on the Wellbutrin. She is concerned about her weight as well- history of gastric bypass in 2012.     Finally, she reports that she is having memory changes- finds herself being lost in conversations and talking about things that do not pertain to current situation. Review of Systems   Constitutional: Negative for appetite change, fatigue and unexpected weight change. Musculoskeletal: Positive for arthralgias and myalgias. Neurological: Negative for dizziness, numbness and headaches. +memory changes   Psychiatric/Behavioral: Negative for agitation, behavioral problems, dysphoric mood, sleep disturbance and suicidal ideas. The patient is not nervous/anxious. 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    HNP (herniated nucleus pulposus), lumbar     patient reported    Lung nodules     monitor yearly- stable 10/2017    Osteopenia 10/03/2017    Recurrent depression ([facility]     Sleep apnea     Spinal stenosis, lumbar     patient reported       Past Surgical History:   Procedure Laterality Date    HX ARTHRODESIS  01/2000    Herniated Disc, arthritis right foot 06/06, 07/07, C 6/7, C 4/6 Stenosis    HX CHOLECYSTECTOMY  09/2008    gallbladder disease    HX COLONOSCOPY  05/2009    HX GI  2012    GASTRIC BYPASS  [facility] Gastric Bypass      HX HYSTERECTOMY  06/1994    HX MENISCECTOMY  10/2015    right repari of torn meniscus    HX MYOMECTOMY  06/1984    benign tumor    HX ORTHOPAEDIC  02/1964 / 03/1664    orthopaedic excision Fibrosarcoma and Lymphangiogram    HX PELVIC LAPAROSCOPY  33/2908    large uterine blockage       Social History     Social History    Marital status:      Spouse name: N/A    Number of children: N/A    Years of education: N/A     Occupational History    Not on file.      Social History Main Topics    Smoking status: Current Every Day Smoker    Smokeless tobacco: Never Used    Alcohol use No    Drug use: No    Sexual activity: No     Other Topics Concern    Not on file     Social History Narrative       Allergies   Allergen Reactions    Adhesive Tape-Silicones Swelling     REALLY BAD SWELLING AND SORE APPEAR    Alcohol Other (comments)     PT STATES SHE IS AN ALCOHOLIC    Lactose Other (comments)     PT STATES IT MAKES HER STOMACH HURT    Percodan [Oxycodone Hcl-Oxycodone-Asa] Itching and Other (comments)     crying    Nsaids (Non-Steroidal Anti-Inflammatory Drug) Other (comments)     PT NOT ABLE TO TAKE DUE TO GASTRIC BYPASS       Current Outpatient Prescriptions   Medication Sig Dispense Refill    fluticasone-vilanterol (BREO ELLIPTA) 100-25 mcg/dose inhaler Take 1 Puff by inhalation daily.  buPROPion (WELLBUTRIN) 75 mg tablet Take 1 Tab by mouth two (2) times a day. 180 Tab 0    citalopram (CELEXA) 40 mg tablet Take 40 mg by mouth daily.  ARIPiprazole (ABILIFY) 5 mg tablet Take  by mouth daily.  Dexlansoprazole (DEXILANT) 60 mg CpDB Take  by mouth.  b complex vitamins tablet Take 1 Tab by mouth daily.  calcium-cholecalciferol, d3, (CALCIUM 600 + D) 600-125 mg-unit tab Take 1,065 mg by mouth nightly. Indications: TAKES 2 AT BEDTIME      omega 3-dha-epa-fish oil (FISH OIL) 100-160-1,000 mg cap Take 1,065 mg by mouth daily.  fluticasone (FLONASE) 50 mcg/actuation nasal spray 2 Sprays by Both Nostrils route daily.  ferrous sulfate ER (IRON) 160 mg (50 mg iron) TbER tablet Take 1 Tab by mouth daily. Indications: PT TAKES 40 MG      multivitamin (ONE A DAY) tablet Take 1 Tab by mouth daily.  albuterol (PROVENTIL HFA, VENTOLIN HFA, PROAIR HFA) 90 mcg/actuation inhaler Take  by inhalation.  acetaminophen (TYLENOL ARTHRITIS PAIN) 650 mg TbER Take 1 Tab by mouth three (3) times daily as needed.  100 Tab 0    methylPREDNISolone (MEDROL DOSEPACK) 4 mg tablet Take as directed 1 Dose Pack 0    pregabalin (LYRICA) 150 mg capsule Take 1 Cap by mouth two (2) times a day. Max Daily Amount: 300 mg. 180 Cap 0    fluticasone (FLOVENT DISKUS) 100 mcg/actuation dsdv Take  by inhalation. Patient Care Team:  Patient Care Team:  Kadie Thompson NP as PCP - General (Nurse Practitioner)  Jose Snyder DO (Orthopedic Surgery)        LABS:  None new to review    RADIOLOGY:  None new to review        Physical Exam   Constitutional: She is oriented to person, place, and time. She appears well-developed and well-nourished. No distress. Pulmonary/Chest: Effort normal. No respiratory distress. Musculoskeletal:        Left knee: She exhibits decreased range of motion and swelling. She exhibits no erythema, no LCL laxity, normal patellar mobility and no MCL laxity. Tenderness found. Left lower leg: She exhibits tenderness and swelling. Legs:  -bilateral lower extremities to about mid calf with 1+ edema   Neurological: She is alert and oriented to person, place, and time. She exhibits normal muscle tone. Coordination normal.   Skin: Skin is warm and dry. Vitals:    11/29/17 1034   BP: 123/70   Pulse: 69   Resp: 20   Temp: 97 °F (36.1 °C)   TempSrc: Oral   SpO2: 98%   Weight: 280 lb (127 kg)   Height: 5' 5\" (1.651 m)   PainSc:   9   PainLoc: Knee       Acute Pain Review  Gretchen Meza presents due to pain of her left knee and left leg that is secondary to unknown reasons. Since this event her pain has worsened. She describes the pain as aching, burning, sharp. Since this event she is able to do  her normal daily activities. She reports the following adverse side effects from treatment: none. Least pain over the last week has been 5/10. Worst pain over the last week has been 10/10. Prior records: available records have been reviewed   Personal or family history of psychiatric, addiction, or substance abuse: yes    Aberrant behaviors: None.       Urine Drug Screen: N/A  Pain Management Contract: N/A      [facility] reviewed: yes. Concomitant use of a benzodiazepine: no        Assessment and Plan    Pain of left calf/ Pain and swelling of left lower leg  *Discussed with patient about possible causes. Will do venous doppler to r/o DVT. Advised on need to follow up with orthopedics as below. PRN Kenna- 7 day supply. - DUPLEX LOWER EXT VENOUS LEFT; Future  - HYDROcodone-acetaminophen (NORCO) 5-325 mg per tablet; Take 1 Tab by mouth every eight (8) hours as needed for Pain. Max Daily Amount: 3 Tabs. Dispense: 21 Tab; Refill: 0    Chronic pain of left knee  *As above. Follow up with orthopedics. I placed a phone call at end of visit to [facility] requesting call back regarding status of MRI. Cigarette nicotine dependence with nicotine-induced disorder  *Discussed with patient. She opts to increase up on Wellbutrin. Advised on use of Celexa and interactions between medications- titrating downwards as below. *Commended patient on smoking reduction- encouragement given to continue. She reports current difficulty with need to do something with her hands- advised on gum, straws, knitting, coloring, etc.   - buPROPion (WELLBUTRIN) 100 mg tablet; Take 1 Tab by mouth two (2) times a day. Dispense: 60 Tab; Refill: 0    Recurrent depression (HCC)/ Alcoholism in remission ([facility]  *Will titrate down on Celexa to 20 mg with increase to 100 mg BID on Wellbutrin. Will revisit in a few weeks at next visit regarding appropriateness for continuation. Morbid obesity with BMI of 45.0-49.9, adult (HCC)/ History of gastric bypass  *Encouraged to develop a weight loss plan. Advised on typical recommendation to lose 5-10% of total body weight before pharmacological or surgical management. Noted that Dr Yodit Pizarro has placed a referral to [facility]. Memory changes  *Due to complexity and time constraints, unable to address fully. Will discuss further at next visit. *Plan of care reviewed with patient.  Patient in agreement with plan and expresses understanding. All questions answered and patient encouraged to call or RTO if further questions or concerns. Follow-up Disposition:  Return for already scheduled appointment 12/2017. Mikayla Serna

## 2017-11-29 NOTE — TELEPHONE ENCOUNTER
11/29/2017  3:24 PM    Chief Complaint   Patient presents with    Visit Follow-up Call       Call received back from [facility], [facility]. She reports that patient is able to be scheduled for the MRI of the left knee and she will call the patient to let her know. Patient will then call to get scheduled for the MRI at her leisure.

## 2017-11-29 NOTE — PATIENT INSTRUCTIONS

## 2017-12-07 ENCOUNTER — HOSPITAL ENCOUNTER (OUTPATIENT)
Dept: VASCULAR SURGERY | Age: 57
Discharge: HOME | End: 2017-12-07
Attending: NURSE PRACTITIONER
Payer: MEDICARE

## 2017-12-07 ENCOUNTER — HOSPITAL ENCOUNTER (OUTPATIENT)
Age: 57
Discharge: HOME | End: 2017-12-07
Attending: NURSE PRACTITIONER
Payer: MEDICARE

## 2017-12-07 DIAGNOSIS — M25.562 LEFT KNEE PAIN: ICD-10-CM

## 2017-12-07 DIAGNOSIS — M79.89 PAIN AND SWELLING OF LEFT LOWER LEG: ICD-10-CM

## 2017-12-07 DIAGNOSIS — M79.662 PAIN OF LEFT CALF: ICD-10-CM

## 2017-12-07 DIAGNOSIS — M79.662 PAIN AND SWELLING OF LEFT LOWER LEG: ICD-10-CM

## 2017-12-07 PROCEDURE — 93971 EXTREMITY STUDY: CPT

## 2017-12-07 PROCEDURE — 73721 MRI JNT OF LWR EXTRE W/O DYE: CPT

## 2017-12-07 NOTE — PROCEDURES
[facility] [place] [facility]  *** FINAL REPORT ***    Name: Calin Shin  MRN: [id number]    Outpatient  : 1960  HIS Order #: [id number]  [address] [facility] Visit #: [id number]  Date: 07 Dec 2017    TYPE OF TEST: Peripheral Venous Testing    REASON FOR TEST  Pain in limb    Left Leg:-  Deep venous thrombosis:           No  Superficial venous thrombosis:    No  Deep venous insufficiency:        Not examined  Superficial venous insufficiency: Not examined      INTERPRETATION/FINDINGS  Duplex images were obtained using 2-D gray scale, color flow, and  spectral Doppler analysis. Left leg :  1. Deep vein(s) visualized include the common femoral, proximal  femoral, mid femoral, distal femoral, popliteal(above knee),  popliteal(fossa), popliteal(below knee), posterior tibial and peroneal   veins. 2. No evidence of deep venous thrombosis detected in the veins  visualized. 3. Superficial vein(s) visualized include the great saphenous vein. 4. No evidence of superficial thrombosis detected. ADDITIONAL COMMENTS    I have personally reviewed the data relevant to the interpretation of  this  study. TECHNOLOGIST: Val Mckenzie, [facility], RVT/  Signed: 2017 08:26 AM    PHYSICIAN: Augie Carney D.O.   Signed: 2017 08:51 AM

## 2017-12-13 DIAGNOSIS — M51.26 HNP (HERNIATED NUCLEUS PULPOSUS), LUMBAR: ICD-10-CM

## 2017-12-13 DIAGNOSIS — M48.00 SPINAL STENOSIS, UNSPECIFIED SPINAL REGION: ICD-10-CM

## 2017-12-13 RX ORDER — PREGABALIN 150 MG/1
150 CAPSULE ORAL 2 TIMES DAILY
Qty: 180 CAP | Refills: 0 | Status: CANCELLED | OUTPATIENT
Start: 2017-12-13

## 2017-12-14 ENCOUNTER — OFFICE VISIT (OUTPATIENT)
Dept: FAMILY MEDICINE CLINIC | Age: 57
End: 2017-12-14

## 2017-12-14 ENCOUNTER — HOSPITAL ENCOUNTER (OUTPATIENT)
Dept: LAB | Age: 57
Discharge: HOME | End: 2017-12-14
Payer: MEDICARE

## 2017-12-14 VITALS
SYSTOLIC BLOOD PRESSURE: 133 MMHG | BODY MASS INDEX: 47.15 KG/M2 | TEMPERATURE: 96.9 F | HEART RATE: 71 BPM | WEIGHT: 283 LBS | OXYGEN SATURATION: 95 % | RESPIRATION RATE: 20 BRPM | HEIGHT: 65 IN | DIASTOLIC BLOOD PRESSURE: 84 MMHG

## 2017-12-14 DIAGNOSIS — R41.3 MEMORY CHANGES: ICD-10-CM

## 2017-12-14 DIAGNOSIS — M79.89 LEG SWELLING: ICD-10-CM

## 2017-12-14 DIAGNOSIS — J44.9 CHRONIC OBSTRUCTIVE PULMONARY DISEASE, UNSPECIFIED COPD TYPE (HCC): Primary | ICD-10-CM

## 2017-12-14 DIAGNOSIS — R79.89 ELEVATED LFTS: ICD-10-CM

## 2017-12-14 DIAGNOSIS — G47.30 SLEEP APNEA, UNSPECIFIED TYPE: ICD-10-CM

## 2017-12-14 DIAGNOSIS — K21.9 GASTROESOPHAGEAL REFLUX DISEASE, ESOPHAGITIS PRESENCE NOT SPECIFIED: ICD-10-CM

## 2017-12-14 DIAGNOSIS — Z01.89 ENCOUNTER FOR LABORATORY EXAMINATION: ICD-10-CM

## 2017-12-14 DIAGNOSIS — M48.00 SPINAL STENOSIS, UNSPECIFIED SPINAL REGION: ICD-10-CM

## 2017-12-14 DIAGNOSIS — J30.9 ALLERGIC RHINITIS, UNSPECIFIED CHRONICITY, UNSPECIFIED SEASONALITY, UNSPECIFIED TRIGGER: ICD-10-CM

## 2017-12-14 DIAGNOSIS — F33.9 RECURRENT DEPRESSION (HCC): ICD-10-CM

## 2017-12-14 DIAGNOSIS — M51.26 HNP (HERNIATED NUCLEUS PULPOSUS), LUMBAR: ICD-10-CM

## 2017-12-14 DIAGNOSIS — E66.01 OBESITY, MORBID (HCC): ICD-10-CM

## 2017-12-14 DIAGNOSIS — F17.219 CIGARETTE NICOTINE DEPENDENCE WITH NICOTINE-INDUCED DISORDER: ICD-10-CM

## 2017-12-14 PROBLEM — M85.80 OSTEOPENIA: Status: ACTIVE | Noted: 2017-12-14

## 2017-12-14 LAB
ALBUMIN SERPL-MCNC: 3.7 G/DL (ref 3.4–5)
ALBUMIN/GLOB SERPL: 1.1 {RATIO} (ref 0.8–1.7)
ALP SERPL-CCNC: 99 U/L (ref 45–117)
ALT SERPL-CCNC: 41 U/L (ref 13–56)
ANION GAP SERPL CALC-SCNC: 9 MMOL/L (ref 3–18)
APPEARANCE UR: CLEAR
AST SERPL-CCNC: 26 U/L (ref 15–37)
BACTERIA URNS QL MICRO: NEGATIVE /HPF
BILIRUB SERPL-MCNC: 0.3 MG/DL (ref 0.2–1)
BILIRUB UR QL: NEGATIVE
BUN SERPL-MCNC: 5 MG/DL (ref 7–18)
BUN/CREAT SERPL: 6 (ref 12–20)
CALCIUM SERPL-MCNC: 9 MG/DL (ref 8.5–10.1)
CHLORIDE SERPL-SCNC: 105 MMOL/L (ref 100–108)
CO2 SERPL-SCNC: 26 MMOL/L (ref 21–32)
COLOR UR: YELLOW
CREAT SERPL-MCNC: 0.81 MG/DL (ref 0.6–1.3)
EPITH CASTS URNS QL MICRO: NORMAL /LPF (ref 0–5)
GLOBULIN SER CALC-MCNC: 3.3 G/DL (ref 2–4)
GLUCOSE SERPL-MCNC: 89 MG/DL (ref 74–99)
GLUCOSE UR STRIP.AUTO-MCNC: NEGATIVE MG/DL
HGB UR QL STRIP: NEGATIVE
KETONES UR QL STRIP.AUTO: NEGATIVE MG/DL
LEUKOCYTE ESTERASE UR QL STRIP.AUTO: NEGATIVE
NITRITE UR QL STRIP.AUTO: NEGATIVE
PH UR STRIP: 8 [PH] (ref 5–8)
POTASSIUM SERPL-SCNC: 4.5 MMOL/L (ref 3.5–5.5)
PROT SERPL-MCNC: 7 G/DL (ref 6.4–8.2)
PROT UR STRIP-MCNC: NEGATIVE MG/DL
RBC #/AREA URNS HPF: 0 /HPF (ref 0–5)
SODIUM SERPL-SCNC: 140 MMOL/L (ref 136–145)
SP GR UR REFRACTOMETRY: 1.01 (ref 1–1.03)
UROBILINOGEN UR QL STRIP.AUTO: 0.2 EU/DL (ref 0.2–1)
WBC URNS QL MICRO: NORMAL /HPF (ref 0–4)

## 2017-12-14 PROCEDURE — 81001 URINALYSIS AUTO W/SCOPE: CPT | Performed by: NURSE PRACTITIONER

## 2017-12-14 PROCEDURE — 80053 COMPREHEN METABOLIC PANEL: CPT | Performed by: NURSE PRACTITIONER

## 2017-12-14 PROCEDURE — 80074 ACUTE HEPATITIS PANEL: CPT | Performed by: NURSE PRACTITIONER

## 2017-12-14 RX ORDER — CYCLOBENZAPRINE HCL 10 MG
TABLET ORAL
Refills: 0 | COMMUNITY
Start: 2017-11-22 | End: 2017-12-14 | Stop reason: ALTCHOICE

## 2017-12-14 RX ORDER — TRAMADOL HYDROCHLORIDE 50 MG/1
TABLET ORAL
Refills: 0 | COMMUNITY
Start: 2017-12-11 | End: 2018-01-23

## 2017-12-14 RX ORDER — PREGABALIN 150 MG/1
150 CAPSULE ORAL 2 TIMES DAILY
Qty: 180 CAP | Refills: 1 | Status: SHIPPED | OUTPATIENT
Start: 2017-12-14 | End: 2018-05-08 | Stop reason: SDUPTHER

## 2017-12-14 RX ORDER — BUPROPION HYDROCHLORIDE 100 MG/1
100 TABLET ORAL 2 TIMES DAILY
Qty: 180 TAB | Refills: 1 | Status: SHIPPED | OUTPATIENT
Start: 2017-12-14 | End: 2018-05-08 | Stop reason: SDUPTHER

## 2017-12-14 RX ORDER — FUROSEMIDE 20 MG/1
20 TABLET ORAL
Qty: 30 TAB | Refills: 2 | Status: SHIPPED | OUTPATIENT
Start: 2017-12-14 | End: 2019-05-13

## 2017-12-14 RX ORDER — ALBUTEROL SULFATE 90 UG/1
2 AEROSOL, METERED RESPIRATORY (INHALATION)
Qty: 3 INHALER | Refills: 4 | Status: SHIPPED | OUTPATIENT
Start: 2017-12-14 | End: 2019-03-27 | Stop reason: SDUPTHER

## 2017-12-14 RX ORDER — DEXLANSOPRAZOLE 60 MG/1
1 CAPSULE, DELAYED RELEASE ORAL DAILY
Qty: 90 CAP | Refills: 1 | Status: SHIPPED | OUTPATIENT
Start: 2017-12-14 | End: 2018-05-08 | Stop reason: SDUPTHER

## 2017-12-14 RX ORDER — BUDESONIDE AND FORMOTEROL FUMARATE DIHYDRATE 160; 4.5 UG/1; UG/1
2 AEROSOL RESPIRATORY (INHALATION) 2 TIMES DAILY
Qty: 6 INHALER | Refills: 1 | Status: SHIPPED | OUTPATIENT
Start: 2017-12-14 | End: 2018-05-08 | Stop reason: SDUPTHER

## 2017-12-14 RX ORDER — FLUTICASONE PROPIONATE 50 MCG
2 SPRAY, SUSPENSION (ML) NASAL DAILY
Qty: 3 BOTTLE | Refills: 4 | Status: SHIPPED | OUTPATIENT
Start: 2017-12-14 | End: 2019-02-05 | Stop reason: SDUPTHER

## 2017-12-14 NOTE — MR AVS SNAPSHOT
Visit Information Date & Time Provider Department Dept. Phone Encounter #  
 12/14/2017  7:30 AM Kindra Bita, [facility] [phone] [id number] Follow-up Instructions Return in about 3 months (around 3/14/2018) for chronic disease routine care- 30 min. Upcoming Health Maintenance Date Due Hepatitis C Screening 1960 MEDICARE YEARLY EXAM 9/15/2018 DTaP/Tdap/Td series (2 - Td) 2/10/2019 Bone Densitometry 10/3/2019 COLONOSCOPY 9/20/2021 Pneumococcal 19-64 Highest Risk (3 of 3 - PPSV23) 11/22/2022 Allergies as of 12/14/2017  Review Complete On: 12/14/2017 By: Chantel Miller LPN Severity Noted Reaction Type Reactions Adhesive Tape-silicones High 75/83/3922   Topical Swelling REALLY BAD SWELLING AND SORE APPEAR Alcohol High 07/13/2017   Intolerance Other (comments) PT STATES SHE IS AN ALCOHOLIC Lactose High 07/13/2017   Intolerance Other (comments) PT STATES IT MAKES HER STOMACH HURT Percodan [Oxycodone Hcl-oxycodone-asa] High 07/13/2017   Systemic Itching, Other (comments) crying Nsaids (Non-steroidal Anti-inflammatory Drug)  07/13/2017   Intolerance Other (comments) PT NOT ABLE TO TAKE DUE TO GASTRIC BYPASS Current Immunizations  Reviewed on 10/6/2017 Name Date Influenza Vaccine 10/24/2013, 10/3/2012, 9/16/2011 Influenza Vaccine (Quad) PF 9/14/2017 Pneumococcal Conjugate (PCV-13) 10/24/2013 Pneumococcal Polysaccharide (PPSV-23) 11/22/2017 TB Skin Test (PPD) Intradermal 10/6/2017 Tdap 2/10/2009 Not reviewed this visit You Were Diagnosed With   
  
 Codes Comments Chronic obstructive pulmonary disease, unspecified COPD type ([facility]    -  Primary ICD-10-CM: J44.9 ICD-9-CM: 901 Sleep apnea, unspecified type     ICD-10-CM: G47.30 ICD-9-CM: 780.57 Recurrent depression ([facility]     ICD-10-CM: F33.9 ICD-9-CM: 296.30 HNP (herniated nucleus pulposus), lumbar     ICD-10-CM: M51.26 
ICD-9-CM: 722.10 Spinal stenosis, unspecified spinal region     ICD-10-CM: M48.00 ICD-9-CM: 724.00 Cigarette nicotine dependence with nicotine-induced disorder     ICD-10-CM: F17.219 ICD-9-CM: 292.9 Leg swelling     ICD-10-CM: M79.89 ICD-9-CM: 729.81 Memory changes     ICD-10-CM: R41.3 ICD-9-CM: 780.93 Elevated LFTs     ICD-10-CM: R79.89 ICD-9-CM: 790.6 Encounter for laboratory examination     ICD-10-CM: Z01.89 ICD-9-CM: V72.60 Vitals BP Pulse Temp Resp Height(growth percentile) Weight(growth percentile) 133/84 (BP 1 Location: Left arm, BP Patient Position: Sitting) 71 96.9 °F (36.1 °C) (Oral) 20 5' 5\" (1.651 m) 283 lb (128.4 kg) SpO2 BMI OB Status Smoking Status 95% 47.09 kg/m2 Hysterectomy Current Every Day Smoker Vitals History BMI and BSA Data Body Mass Index Body Surface Area 47.09 kg/m 2 2.43 m 2 Preferred Pharmacy Pharmacy Name Phone [facility] [address] [place] [address] [phone] Your Updated Medication List  
  
   
This list is accurate as of: 12/14/17  8:38 AM.  Always use your most recent med list.  
  
  
  
  
 ABILIFY 5 mg tablet Generic drug:  ARIPiprazole Take  by mouth daily. acetaminophen 650 mg Tber Commonly known as:  TYLENOL ARTHRITIS PAIN Take 1 Tab by mouth three (3) times daily as needed. albuterol 90 mcg/actuation inhaler Commonly known as:  PROVENTIL HFA, VENTOLIN HFA, PROAIR HFA Take 2 Puffs by inhalation every four (4) hours as needed for Wheezing or Shortness of Breath.  
  
 b complex vitamins tablet Take 1 Tab by mouth daily. budesonide-formoterol 160-4.5 mcg/actuation Hfaa Commonly known as:  SYMBICORT Take 2 Puffs by inhalation two (2) times a day. buPROPion 100 mg tablet Commonly known as:  [facility]  
 Take 1 Tab by mouth two (2) times a day. CALCIUM 600 + D 600-125 mg-unit Tab Generic drug:  calcium-cholecalciferol (d3) Take 1,065 mg by mouth nightly. Indications: TAKES 2 AT BEDTIME  
  
 citalopram 40 mg tablet Commonly known as:  Edwina Eye Take 40 mg by mouth daily. Dexlansoprazole 60 mg Cpdb Commonly known as:  DEXILANT Take 1 Cap by mouth daily. FISH -160-1,000 mg Cap Generic drug:  omega 3-dha-epa-fish oil Take 1,065 mg by mouth daily. fluticasone 50 mcg/actuation nasal spray Commonly known as:  Sangeeta La Jara 2 Sprays by Both Nostrils route daily. furosemide 20 mg tablet Commonly known as:  LASIX Take 1 Tab by mouth daily as needed. Iron 160 mg (50 mg iron) Tber tablet Generic drug:  ferrous sulfate ER Take 1 Tab by mouth daily. Indications: PT TAKES 40 MG  
  
 multivitamin tablet Commonly known as:  ONE A DAY Take 1 Tab by mouth daily. pregabalin 150 mg capsule Commonly known as:  Sofie Tay Take 1 Cap by mouth two (2) times a day. Max Daily Amount: 300 mg.  
  
 traMADol 50 mg tablet Commonly known as:  ULTRAM  
TK 1 TO 2 TS PO Q 6 TO 8 H PRN Prescriptions Printed Refills  
 pregabalin (LYRICA) 150 mg capsule 1 Sig: Take 1 Cap by mouth two (2) times a day. Max Daily Amount: 300 mg. Class: Print Route: Oral  
  
Prescriptions Sent to Pharmacy Refills  
 fluticasone (FLONASE) 50 mcg/actuation nasal spray 4 Si Sprays by Both Nostrils route daily. Class: Normal  
 Pharmacy: [facility] [address] [place] [address] Ph #: [phone] Route: Both Nostrils  
 albuterol (PROVENTIL HFA, VENTOLIN HFA, PROAIR HFA) 90 mcg/actuation inhaler 4 Sig: Take 2 Puffs by inhalation every four (4) hours as needed for Wheezing or Shortness of Breath.   
 Class: Normal  
 Pharmacy: [facility] [address] [place] [address], [address] [place] [address] [facility] Ph #: [phone] Route: Inhalation Dexlansoprazole (DEXILANT) 60 mg CpDB 1 Sig: Take 1 Cap by mouth daily. Class: Normal  
 Pharmacy: [facility] [place] [address] Ph #: [phone] Route: Oral  
 buPROPion (WELLBUTRIN) 100 mg tablet 1 Sig: Take 1 Tab by mouth two (2) times a day. Class: Normal  
 Pharmacy: [facility] [place] [address] Ph #: [phone] Route: Oral  
 budesonide-formoterol (SYMBICORT) 160-4.5 mcg/actuation HFAA 1 Sig: Take 2 Puffs by inhalation two (2) times a day. Class: Normal  
 Pharmacy: [facility] [place] [address] Ph #: [phone] Route: Inhalation  
 furosemide (LASIX) 20 mg tablet 2 Sig: Take 1 Tab by mouth daily as needed. Class: Normal  
 Pharmacy: [facility] [place] [address] Ph #: [phone] Route: Oral  
  
We Performed the Following SD COLLECTION VENOUS BLOOD,VENIPUNCTURE [id number] CPT(R)] REFERRAL TO NEUROLOGY [BZB38 Custom] Follow-up Instructions Return in about 3 months (around 3/14/2018) for chronic disease routine care- 30 min. To-Do List   
 Around 12/14/2017 Lab:  HEPATITIS PANEL, ACUTE Around 12/15/2017 Lab:  METABOLIC PANEL, COMPREHENSIVE Referral Information Referral ID Referred By Referred To  
  
 [id number] Edy Donis MD   
   [address] [place] [address] [address] [place], [address] Phone: [phone] Fax: [phone] Visits Status Start Date End Date 1 New Request 12/14/17 12/14/18 If your referral has a status of pending review or denied, additional information will be sent to support the outcome of this decision. Introducing [facility] [place] [facility]! Dear JIA: 
Thank you for requesting a [employer] account. Our records indicate that you already have an active [employer] account. You can access your account anytime at [email or web address] Did you know that you can access your hospital and ER discharge instructions at any time in [employer]? You can also review all of your test results from your hospital stay or ER visit. Additional Information If you have questions, please visit the Frequently Asked Questions section of the [employer] website at [email or web address] Remember, [employer] is NOT to be used for urgent needs. For medical emergencies, dial 911. Now available from your iPhone and Android! Please provide this summary of care documentation to your next provider. Your primary care clinician is listed as Anita Fruits. If you have any questions after today's visit, please call [phone].

## 2017-12-14 NOTE — PROGRESS NOTES
OFFICE NOTE    Kenia Dockery is a 59 y.o. female presenting today for office visit. 12/14/2017  7:42 AM    Chief Complaint   Patient presents with    Follow Up Chronic Condition     pt here for follow up chronic conditions       HPI: Here today for follow up on chronic disease and follow up on several complaints. Last labs done 9/2017- needs follow up on elevated LFTs. Follows with sleep medicine and orthopedics. Referral pending to bariatrics. COPD/ AYE: Diagnosed a few years ago- taking Breo Ellipta as prescribed by sleep medicine provider- used to be on Flovent. She reports that she has been having increased wheezing the last few weeks- unsure of reasoning- has been using Albuterol 2 times a day lately. She has a cough but no production. Has been smoking less- previously smoking about 1 pack a day from 2 PPD. Reports diagnosis of AYE in past- has been seeing sleep medicine for CPAP- sleep study upcoming on 12/27. Has allergies that she uses Flonase for. GERD: Taking Dexilant for GERD symptoms chronically. Reports that it keeps her symptoms controlled. Depression: Taking Celexa and Abilify as prescribed. Reports feeling stable on medication but has some good days and bad days. Denies SI/HI. History of alcoholism. Also on Wellbutrin for smoking cessation- states that this has been doing well for her. HNP/spine stenosis lumbar/ knee pain: Patient reported. Taking Lyrica- no complaints related to. Recent diagnosis of left knee meniscal tear- surgery being discussed for end of Jan, begin Feb. She has been taking Tramadol PRN as prescribed by orthopedics. States that she has been told she has arthritis in both knees.        Complains of memory changes that have been happening for a while, but she has recently starting noticing more.  She states that she can be having a conversation with someone and finds herself wandering to other thoughts, which leads to her speaking about these thoughts rather than the current conversation. She likens it some to zoning out at times. She is worried about it and would like further evaluation.      Also complains of bilateral leg swelling that has been happening for an undetermined amount of time. She reports that it seems to be worsening. Would like a water pill for swelling in her legs. States that she does try to keep her legs elevated when possible. Review of Systems   Constitutional: Negative for chills, fatigue and fever. Respiratory: Positive for cough, shortness of breath and wheezing. Cardiovascular: Positive for leg swelling. Negative for chest pain and palpitations. Gastrointestinal: Negative for abdominal pain, constipation, diarrhea, nausea and vomiting. Genitourinary: Negative for difficulty urinating and frequency. Musculoskeletal: Positive for arthralgias and back pain. Negative for myalgias. Skin: Negative for rash. Neurological: Negative for dizziness and headaches. Psychiatric/Behavioral: Negative for behavioral problems and dysphoric mood. The patient is not nervous/anxious.          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    GERD (gastroesophageal reflux disease)     HNP (herniated nucleus pulposus), lumbar     patient reported    Lung nodules     monitor yearly- stable 10/2017    Osteopenia 10/03/2017    Recurrent depression ([address]     Sleep apnea     Spinal stenosis, lumbar     patient reported       Past Surgical History:   Procedure Laterality Date    HX ARTHRODESIS  01/2000    Herniated Disc, arthritis right foot 06/06, 07/07, C 6/7, C 4/6 Stenosis    HX CHOLECYSTECTOMY  09/2008    gallbladder disease    HX COLONOSCOPY  05/2009    HX GI  2012    GASTRIC BYPASS  [facility] Gastric Bypass      HX HYSTERECTOMY  06/1994    HX MENISCECTOMY  10/2015    right repari of torn meniscus    HX MYOMECTOMY  06/1984    benign tumor    HX ORTHOPAEDIC  02/1964 / 03/1664    orthopaedic excision Fibrosarcoma and Lymphangiogram    HX PELVIC LAPAROSCOPY  04/4088    large uterine blockage       Social History     Social History    Marital status:      Spouse name: N/A    Number of children: N/A    Years of education: N/A     Occupational History    Not on file. Social History Main Topics    Smoking status: Current Every Day Smoker    Smokeless tobacco: Never Used    Alcohol use No    Drug use: No    Sexual activity: No     Other Topics Concern    Not on file     Social History Narrative       Family History   Problem Relation Age of Onset    Hypertension Mother     Depression Mother     Cancer Mother     Ovarian Cancer Mother     Breast Problems Mother     Cancer Father     Cancer Sister     Depression Sister     Depression Brother        Allergies   Allergen Reactions    Adhesive Tape-Silicones Swelling     REALLY BAD SWELLING AND SORE APPEAR    Alcohol Other (comments)     PT STATES SHE IS AN ALCOHOLIC    Lactose Other (comments)     PT STATES IT MAKES HER STOMACH HURT    Percodan [Oxycodone Hcl-Oxycodone-Asa] Itching and Other (comments)     crying    Nsaids (Non-Steroidal Anti-Inflammatory Drug) Other (comments)     PT NOT ABLE TO TAKE DUE TO GASTRIC BYPASS       Current Outpatient Prescriptions   Medication Sig Dispense Refill    traMADol (ULTRAM) 50 mg tablet TK 1 TO 2 TS PO Q 6 TO 8 H PRN  0    buPROPion (WELLBUTRIN) 100 mg tablet Take 1 Tab by mouth two (2) times a day. 60 Tab 0    fluticasone-vilanterol (BREO ELLIPTA) 100-25 mcg/dose inhaler Take 1 Puff by inhalation daily.       acetaminophen (TYLENOL ARTHRITIS PAIN) 650 mg TbER Take 1 Tab by mouth three (3) times daily as needed. 100 Tab 0    pregabalin (LYRICA) 150 mg capsule Take 1 Cap by mouth two (2) times a day. Max Daily Amount: 300 mg. 180 Cap 0    citalopram (CELEXA) 40 mg tablet Take 40 mg by mouth daily.  ARIPiprazole (ABILIFY) 5 mg tablet Take  by mouth daily.  Dexlansoprazole (DEXILANT) 60 mg CpDB Take  by mouth.  b complex vitamins tablet Take 1 Tab by mouth daily.  calcium-cholecalciferol, d3, (CALCIUM 600 + D) 600-125 mg-unit tab Take 1,065 mg by mouth nightly. Indications: TAKES 2 AT BEDTIME      omega 3-dha-epa-fish oil (FISH OIL) 100-160-1,000 mg cap Take 1,065 mg by mouth daily.  fluticasone (FLONASE) 50 mcg/actuation nasal spray 2 Sprays by Both Nostrils route daily.  ferrous sulfate ER (IRON) 160 mg (50 mg iron) TbER tablet Take 1 Tab by mouth daily. Indications: PT TAKES 40 MG      multivitamin (ONE A DAY) tablet Take 1 Tab by mouth daily.  albuterol (PROVENTIL HFA, VENTOLIN HFA, PROAIR HFA) 90 mcg/actuation inhaler Take  by inhalation. Health Maintenance and Screenings  *Medicare wellness done 9/14/17      Advance Care Planning:   Patient was offered the opportunity to discuss advance care planning NO   Does patient have an Advance Directive:  NO   If no, did you provide information on [facility]? Patient Care Team:  Patient Care Team:  Felicia Ramirez NP as PCP - General (Nurse Practitioner)  Arie Lopez DO (Orthopedic Surgery)        LABS:     Ref.  Range 9/14/2017 09:09   WBC Latest Ref Range: 4.6 - 13.2 K/uL 10.6   RBC Latest Ref Range: 4.20 - 5.30 M/uL 4.74   HGB Latest Ref Range: 12.0 - 16.0 g/dL 14.6   HCT Latest Ref Range: 35.0 - 45.0 % 43.8   MCV Latest Ref Range: 74.0 - 97.0 FL 92.4   MCH Latest Ref Range: 24.0 - 34.0 PG 30.8   MCHC Latest Ref Range: 31.0 - 37.0 g/dL 33.3   RDW Latest Ref Range: 11.6 - 14.5 % 14.8 (H)   PLATELET Latest Ref Range: 135 - 420 K/uL 316   MPV Latest Ref Range: 9.2 - 11.8 FL 10.6   Sodium Latest Ref Range: 136 - 145 mmol/L 143   Potassium Latest Ref Range: 3.5 - 5.5 mmol/L 5.0   Chloride Latest Ref Range: 100 - 108 mmol/L 108   CO2 Latest Ref Range: 21 - 32 mmol/L 32   Anion gap Latest Ref Range: 3.0 - 18 mmol/L 3   Glucose Latest Ref Range: 74 - 99 mg/dL 101 (H)   BUN Latest Ref Range: 7.0 - 18 MG/DL 6 (L)   Creatinine Latest Ref Range: 0.6 - 1.3 MG/DL 0.87   BUN/Creatinine ratio Latest Ref Range: 12 - 20   7 (L)   Calcium Latest Ref Range: 8.5 - 10.1 MG/DL 8.5   GFR est non-AA Latest Ref Range: >60 ml/min/1.73m2 >60   GFR est AA Latest Ref Range: >60 ml/min/1.73m2 >60   Bilirubin, total Latest Ref Range: 0.2 - 1.0 MG/DL 0.3   Protein, total Latest Ref Range: 6.4 - 8.2 g/dL 6.6   Albumin Latest Ref Range: 3.4 - 5.0 g/dL 3.4   Globulin Latest Ref Range: 2.0 - 4.0 g/dL 3.2   A-G Ratio Latest Ref Range: 0.8 - 1.7   1.1   ALT (SGPT) Latest Ref Range: 13 - 56 U/L 115 (H)   AST Latest Ref Range: 15 - 37 U/L 166 (H)   Alk. phosphatase Latest Ref Range: 45 - 117 U/L 146 (H)   Triglyceride Latest Ref Range: <150 MG/DL 65   Cholesterol, total Latest Ref Range: <200 MG/   HDL Cholesterol Latest Ref Range: 40 - 60 MG/DL 63 (H)   CHOL/HDL Ratio Latest Ref Range: 0 - 5.0   2.5   LDL, calculated Latest Ref Range: 0 - 100 MG/DL 79   VLDL, calculated Latest Units: MG/DL 13   Vitamin B12 Latest Ref Range: 211 - 911 pg/mL 1052 (H)   Folate Latest Ref Range: 3.10 - 17.50 ng/mL >20.0 (H)   TSH Latest Ref Range: 0.36 - 3.74 uIU/mL 1.89   VITAMIN D, 1, 25 DIHYDROXY Unknown 54.6         RADIOLOGY:    INTERPRETATION/FINDINGS  Duplex images were obtained using 2-D gray scale, color flow, and  spectral Doppler analysis. Left leg :  1. Deep vein(s) visualized include the common femoral, proximal  femoral, mid femoral, distal femoral, popliteal(above knee),  popliteal(fossa), popliteal(below knee), posterior tibial and peroneal   veins.   2. No evidence of deep venous thrombosis detected in the veins  visualized. 3. Superficial vein(s) visualized include the great saphenous vein. 4. No evidence of superficial thrombosis detected. MRI left knee. TECHNIQUE: MRI of the knee was obtained utilizing sagittal T2 fat-saturated and  proton density fat-saturated, coronal fast spin-echo proton density and fast  spin-echo T2 fat-saturated and axial fast spin-echo T2 fat-saturated sequences  without the administration of IV contrast.  HISTORY: Severe left knee pain. COMPARISON: None. FINDINGS:   Soft tissues: Mild joint effusion. Soft tissue edema in the anterior knee. Osseous/Cartilage Structures: No fracture is seen. Mild marrow edema at the  tibial eminence. Cartilage along the patella is intact. Mild thinning of  cartilage in the medial compartment. Cartilage in the lateral compartment is  intact. Menisci: Abnormal signal and irregularity of posterior root of medial meniscus. There is underlying marrow edema. Anterior horn of the medial meniscus is  intact. Lateral meniscus is intact. Ligaments: ACL, PCL, MCL, LCL complex is intact   Muscles/Tendons: Quadriceps and patellar tendons are intact. Impression   IMPRESSION:  Tear of the posterior root of the medial meniscus. Mild joint effusion. Physical Exam   Constitutional: She is oriented to person, place, and time. She appears well-developed and well-nourished. No distress. Neck: Normal range of motion. Neck supple. No thyromegaly present. Cardiovascular: Normal rate, regular rhythm and normal heart sounds. No murmur heard. Pulmonary/Chest: Effort normal. No respiratory distress. She has decreased breath sounds. Abdominal: Soft. Bowel sounds are normal. There is no tenderness. Musculoskeletal: She exhibits edema. +1 bilateral leg edema noted; appears to be mild lymphedema   Neurological: She is alert and oriented to person, place, and time. She exhibits normal muscle tone.  Coordination and gait normal.   MMSE 28 out of 30- reported season being fall and did not include verb in sentence   Skin: Skin is warm and dry. Psychiatric: Her speech is normal and behavior is normal. Judgment and thought content normal. Her mood appears not anxious. Cognition and memory are normal. She does not exhibit a depressed mood. Vitals:    12/14/17 0739   BP: 133/84   Pulse: 71   Resp: 20   Temp: 96.9 °F (36.1 °C)   TempSrc: Oral   SpO2: 95%   Weight: 283 lb (128.4 kg)   Height: 5' 5\" (1.651 m)   PainSc:   6   PainLoc: Knee         Assessment and Plan    Chronic obstructive pulmonary disease, unspecified COPD type ([address]  *Discussed with patient about increase in symptoms. Switched not long ago from [employer] to [employer]. Will switch to BID ICS/LABA in attempt to get better control of symptoms. Continue with PRN Albuterol.   - albuterol (PROVENTIL HFA, VENTOLIN HFA, PROAIR HFA) 90 mcg/actuation inhaler; Take 2 Puffs by inhalation every four (4) hours as needed for Wheezing or Shortness of Breath. Dispense: 3 Inhaler; Refill: 4  - budesonide-formoterol (SYMBICORT) 160-4.5 mcg/actuation HFAA; Take 2 Puffs by inhalation two (2) times a day. Dispense: 6 Inhaler; Refill: 1    Sleep apnea, unspecified type  *Continue with sleep specialist. Upcoming sleep study scheduled. Allergic rhinitis, unspecified chronicity, unspecified seasonality, unspecified trigger  *Refilled. Address PRN. - fluticasone (FLONASE) 50 mcg/actuation nasal spray; 2 Sprays by Both Nostrils route daily. Dispense: 3 Bottle; Refill: 4    Gastroesophageal reflux disease, esophagitis presence not specified  *Refilled. Will need to discuss in the future of the impact PPIs can have on bone density. She reports it keeps symptoms controlled. - Dexlansoprazole (DEXILANT) 60 mg CpDB; Take 1 Cap by mouth daily. Dispense: 90 Cap; Refill: 1    Recurrent depression ([address]  *Continue with Celexa and Abilify- controlled per patient. Denies need for refills.  On Wellbutrin as well for smoking cessation- no interaction side effects noted. HNP (herniated nucleus pulposus), lumbar/ Spinal stenosis, unspecified spinal region  *Refilled. Manages chronic back pain. - pregabalin (LYRICA) 150 mg capsule; Take 1 Cap by mouth two (2) times a day. Max Daily Amount: 300 mg. Dispense: 180 Cap; Refill: 1    Cigarette nicotine dependence with nicotine-induced disorder  *Continue with higher dose Wellbutrin as it is helping with smoking cessation. She is down to about 1 PPD. Encouragement given. Her daughter is working on quitting as well and their goal is to be done with cigarettes by the end of the year. - buPROPion (WELLBUTRIN) 100 mg tablet; Take 1 Tab by mouth two (2) times a day. Dispense: 180 Tab; Refill: 1    Leg swelling  *Discussed with patient. Advised on need for compression stockings- Rx given, as well as DME stores in the area. PRN Lasix for swelling. Negative venous doppler done on left leg recently. - furosemide (LASIX) 20 mg tablet; Take 1 Tab by mouth daily as needed. Dispense: 30 Tab; Refill: 2    Memory changes  *MMSE within normal ranges (28 of 30). I do not note any deficits today. She opts to do referral still with neurology for further discussion.   - [facility]    Elevated LFTs  *Repeat LFTs, check hepatitis panel. Will follow up on.  - METABOLIC PANEL, COMPREHENSIVE; Future  - HEPATITIS PANEL, ACUTE; Future  - URINALYSIS W/MICROSCOPIC; Future    Obesity, morbid ([address]  *Pending bariatric referral.   *I have reviewed/discussed the above normal BMI with the patient. I have recommended the following interventions: dietary management education, guidance, and counseling, encourage exercise and monitor weight . Encounter for laboratory examination  - DE COLLECTION VENOUS BLOOD,VENIPUNCTURE        *Plan of care reviewed with patient. Patient in agreement with plan and expresses understanding.  All questions answered and patient encouraged to call or RTO if further questions or concerns. Follow-up Disposition:  Return in about 3 months (around 3/14/2018) for chronic disease routine care- 30 min.

## 2017-12-15 LAB
HAV IGM SER QL: NEGATIVE
HBV CORE IGM SER QL: NEGATIVE
HBV SURFACE AG SER QL: <0.1 INDEX
HBV SURFACE AG SER QL: NEGATIVE
HCV AB SER IA-ACNC: 0.07 INDEX
HCV AB SERPL QL IA: NEGATIVE
HCV COMMENT,HCGAC: NORMAL
SP1: NORMAL
SP2: NORMAL
SP3: NORMAL

## 2017-12-18 DIAGNOSIS — M79.89 LEG SWELLING: ICD-10-CM

## 2017-12-19 RX ORDER — FUROSEMIDE 20 MG/1
20 TABLET ORAL
Qty: 30 TAB | Refills: 2 | OUTPATIENT
Start: 2017-12-19

## 2017-12-19 NOTE — TELEPHONE ENCOUNTER
12/19/2017  8:44 AM    Chief Complaint   Patient presents with    Medication Refill       Noted request for 90 day supply of Lasix. Change not appropriate. This is a PRN medication, not to be taken daily. Trial started at last visit and if effective during next evaluation, will consider increasing tab quantity.

## 2017-12-22 ENCOUNTER — OFFICE VISIT (OUTPATIENT)
Dept: NEUROLOGY | Age: 57
End: 2017-12-22

## 2017-12-22 VITALS
OXYGEN SATURATION: 95 % | SYSTOLIC BLOOD PRESSURE: 140 MMHG | RESPIRATION RATE: 18 BRPM | TEMPERATURE: 97.1 F | BODY MASS INDEX: 45.48 KG/M2 | DIASTOLIC BLOOD PRESSURE: 90 MMHG | HEART RATE: 85 BPM | WEIGHT: 273 LBS | HEIGHT: 65 IN

## 2017-12-22 DIAGNOSIS — R41.3 MEMORY IMPAIRMENT: ICD-10-CM

## 2017-12-22 DIAGNOSIS — R41.840 ATTENTION OR CONCENTRATION DEFICIT: Primary | ICD-10-CM

## 2017-12-22 DIAGNOSIS — R44.3 HALLUCINATIONS: ICD-10-CM

## 2017-12-22 NOTE — PROGRESS NOTES
[facility]  [address], [address] [place], [id number], [address]  Office:  [phone]  Fax: [phone]    Referring: Shannan Palomino NP    Chief Complaint   Patient presents with   [facility]     NP: Memory Changes       This is a 59year old female who presents for new patient evaluation with concerns for her memory. She said she cannot track a long conversation. She said she will be in a conversation and she will close her eyes and go into a \"daydream\" state. She said she will hear something different from what the conversation is actually saying. She will answer regarding the \"daydream,\" and people will ask what she is talking about. This happens when she speaks to various people. She talks on the phone a lot of because she is new to the area. She previously lived and [place]. This is happening 3-4 times a week. Denies waking up on the floor unsure how she got there. Denies biting her tongue or losing her urine. She endorses auditory hallucinations. She endorses hearing noises that are not there. Denies hearing voices. This happened once a day. She endorses seeing things in her periphery that are not there. This happens twice a day. This started happening about a year ago. She endorses feeling lethargic during a conversation. She said she will not feel tired though. Describes this as \"zoning out. \" She moves to [place] in June to be with her sister. She said she does not leave the house much. Endorses agoraphobia. Patient also has a daughter that lives close. Her family tells her that she \"zones out\" and \"answers inappropriately. 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 She has had people hang up at her on work because she could not track the conversation. She does not have a pyschiatrist local. Her PCP adjusted her Celexa and Wellbutrin four week ago. Denies having a family member with something similar.  Has a nephew who is schiziphrenic and another that is bipolar. Denies homicidal or suicidal ideation. 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    GERD (gastroesophageal reflux disease)     HNP (herniated nucleus pulposus), lumbar     patient reported    Lung nodules     monitor yearly- stable 10/2017    Osteopenia 10/03/2017    Recurrent depression ([facility]     Sleep apnea     Spinal stenosis, lumbar     patient reported       Past Surgical History:   Procedure Laterality Date    HX ARTHRODESIS  01/2000    Herniated Disc, arthritis right foot 06/06, 07/07, C 6/7, C 4/6 Stenosis    HX CHOLECYSTECTOMY  09/2008    gallbladder disease    HX COLONOSCOPY  05/2009    HX GI  2012    GASTRIC BYPASS  [facility]andel[facility] En [facility] Gastric Bypass      HX HYSTERECTOMY  06/1994    HX MENISCECTOMY  10/2015    right repari of torn meniscus    HX MYOMECTOMY  06/1984    benign tumor    HX ORTHOPAEDIC  02/1964 / 03/1664    orthopaedic excision Fibrosarcoma and Lymphangiogram    HX PELVIC LAPAROSCOPY  87/7951    large uterine blockage       Current Outpatient Prescriptions   Medication Sig Dispense Refill    pregabalin (LYRICA) 150 mg capsule Take 1 Cap by mouth two (2) times a day. Max Daily Amount: 300 mg. 180 Cap 1    traMADol (ULTRAM) 50 mg tablet TK 1 TO 2 TS PO Q 6 TO 8 H PRN  0    fluticasone (FLONASE) 50 mcg/actuation nasal spray 2 Sprays by Both Nostrils route daily. 3 Bottle 4    albuterol (PROVENTIL HFA, VENTOLIN HFA, PROAIR HFA) 90 mcg/actuation inhaler Take 2 Puffs by inhalation every four (4) hours as needed for Wheezing or Shortness of Breath. 3 Inhaler 4    Dexlansoprazole (DEXILANT) 60 mg CpDB Take 1 Cap by mouth daily. 90 Cap 1    buPROPion (WELLBUTRIN) 100 mg tablet Take 1 Tab by mouth two (2) times a day.  180 Tab 1    budesonide-formoterol (SYMBICORT) 160-4.5 mcg/actuation HFAA Take 2 Puffs by inhalation two (2) times a day. 6 Inhaler 1    furosemide (LASIX) 20 mg tablet Take 1 Tab by mouth daily as needed. 30 Tab 2    acetaminophen (TYLENOL ARTHRITIS PAIN) 650 mg TbER Take 1 Tab by mouth three (3) times daily as needed. 100 Tab 0    citalopram (CELEXA) 40 mg tablet Take 40 mg by mouth daily.  ARIPiprazole (ABILIFY) 5 mg tablet Take  by mouth daily.  b complex vitamins tablet Take 1 Tab by mouth daily.  calcium-cholecalciferol, d3, (CALCIUM 600 + D) 600-125 mg-unit tab Take 1,065 mg by mouth nightly. Indications: TAKES 2 AT BEDTIME      omega 3-dha-epa-fish oil (FISH OIL) 100-160-1,000 mg cap Take 1,065 mg by mouth daily.  ferrous sulfate ER (IRON) 160 mg (50 mg iron) TbER tablet Take 1 Tab by mouth daily. Indications: PT TAKES 40 MG      multivitamin (ONE A DAY) tablet Take 1 Tab by mouth daily. Allergies   Allergen Reactions    Adhesive Tape-Silicones Swelling     REALLY BAD SWELLING AND SORE APPEAR    Alcohol Other (comments)     PT STATES SHE IS AN ALCOHOLIC    Lactose Other (comments)     PT STATES IT MAKES HER STOMACH HURT    Percodan [Oxycodone Hcl-Oxycodone-Asa] Itching and Other (comments)     crying    Nsaids (Non-Steroidal Anti-Inflammatory Drug) Other (comments)     PT NOT ABLE TO TAKE DUE TO GASTRIC BYPASS       Social History   Substance Use Topics    Smoking status: Current Every Day Smoker    Smokeless tobacco: Never Used    Alcohol use No       Family History   Problem Relation Age of Onset    Hypertension Mother     Depression Mother     Cancer Mother     Ovarian Cancer Mother     Breast Problems Mother     Cancer Father     Cancer Sister     Depression Sister     Depression Brother        Review of Systems:  Pertinent positives and negatives as noted otherwise comprehensive review is negative.     Physical Examination:    Visit Vitals    /90    Pulse 85    Temp 97.1 °F (36.2 °C) (Oral)    Resp 18    Ht 5' 5\" (1.651 m)    Wt 123.8 kg (273 lb)    SpO2 95%    BMI 45.43 kg/m2     General:  Well defined, obese and well groomed individual in no acute distress. Neck: Supple, nontender, no bruits. Heart: Regular rate and rhythm, no murmurs, rub, or gallop. Normal S1S2. Lungs:  Clear to auscultation bilaterally with equal chest expansion, no cough, no wheeze  Musculoskeletal:  Extremities revealed no apparent edema   Psych:  Good mood and bright affect    Neurological Examination:     Mental Status:   Alert and oriented to person, place, and time with recent and remote memory intact. Attention span and concentration are normal. Speech is fluent with a full fund of knowledge. Cranial Nerves:    II, III, IV, VI:  Visual acuity grossly intact. Visual fields are normal.    Pupils are equal, round, and reactive to light and accommodation. Extra-ocular movements are full and fluid. No ptosis or nystagmus. V-XII: Hearing is grossly intact. Facial features are symmetric. The palate rises symmetrically and the tongue protrudes midline. Sternocleidomastoids 5/5. Motor Examination: Normal tone, bulk, and strength, 5/5 muscle strength throughout. No cogwheel rigidity or clonus present. Sensory exam:  Sensory examination is intact to primary modalities and there is no lateralization of sensation. Coordination:  Finger to nose was normal.   No resting or intention tremor    Gait and Station: She ambulates with the assistance of a cane. .  No pronator drift. No muscle wasting or fasiculations noted. Reflexes:  DTRs symmetric. Impression/Plan  Andres Mays is a 59 y.o. female whose history and physical are consistent with memory impairment, lack of concentration, and hallucinations. Differential diagnosis includes metabolic versus structural versus underlying psychiatric disorder versus dementing process versus other. Patient endorses 1 year history of what she calls periods of \"zoning out. \"  She endorses both auditory and visual hallucinations. Denies homicidal or suicidal ideation. She carries a diagnosis of depression and anxiety. Since moving to [place] 6 months ago she denies having a psychiatrist manage her medications. I will place referral for psychiatry. Regarding her lack of concentration and memory complaints I will place referral to neuropsychology for formal memory evaluation. Consider this may be due to a underlying psychiatric disorder. Today patient is alert and oriented and answers questions appropriately. Obtain MRI of the brain. Obtain EEG. I have reviewed connect care to see normal B12 and thyroid levels. We will follow-up after tests and determine plan of care. Patient is agreeable with plan of care. Diagnoses and all orders for this visit:    1. Attention or concentration deficit  -     REFERRAL TO PSYCHIATRY    2. Memory impairment  -     REFERRAL TO NEUROPSYCHOLOGY  -     MRI BRAIN W WO CONT; Future  -     EEG; Future  -     REFERRAL TO PSYCHIATRY    3. Hallucinations  -     REFERRAL TO PSYCHIATRY    Signed By: Prabhjot Hutchinson NP    This note will not be viewable in [employer]. This note was created using voice recognition software. Despite editing, there may be syntax errors.

## 2017-12-22 NOTE — MR AVS SNAPSHOT
Visit Information Date & Time Provider Department Dept. Phone Encounter #  
 12/22/2017 10:15 AM Amaury Ferrell NP [facility] at [address] [id number] Follow-up Instructions Return for after tests and neuropsych results. Upcoming Health Maintenance Date Due  
 MEDICARE YEARLY EXAM 9/15/2018 DTaP/Tdap/Td series (2 - Td) 2/10/2019 Bone Densitometry 10/3/2019 COLONOSCOPY 9/20/2021 Pneumococcal 19-64 Highest Risk (3 of 3 - PPSV23) 11/22/2022 Allergies as of 12/22/2017  Review Complete On: 12/22/2017 By: Mervat Gallardo LPN Severity Noted Reaction Type Reactions Adhesive Tape-silicones High 23/14/3741   Topical Swelling REALLY BAD SWELLING AND SORE APPEAR Alcohol High 07/13/2017   Intolerance Other (comments) PT STATES SHE IS AN ALCOHOLIC Lactose High 07/13/2017   Intolerance Other (comments) PT STATES IT MAKES HER STOMACH HURT Percodan [Oxycodone Hcl-oxycodone-asa] High 07/13/2017   Systemic Itching, Other (comments) crying Nsaids (Non-steroidal Anti-inflammatory Drug)  07/13/2017   Intolerance Other (comments) PT NOT ABLE TO TAKE DUE TO GASTRIC BYPASS Current Immunizations  Reviewed on 10/6/2017 Name Date Influenza Vaccine 10/24/2013, 10/3/2012, 9/16/2011 Influenza Vaccine (Quad) PF 9/14/2017 Pneumococcal Conjugate (PCV-13) 10/24/2013 Pneumococcal Polysaccharide (PPSV-23) 11/22/2017 TB Skin Test (PPD) Intradermal 10/6/2017 Tdap 2/10/2009 Not reviewed this visit You Were Diagnosed With   
  
 Codes Comments Attention or concentration deficit    -  Primary ICD-10-CM: R41.840 ICD-9-CM: 799.51 Memory impairment     ICD-10-CM: R41.3 ICD-9-CM: 780.93 Hallucinations     ICD-10-CM: R44.3 ICD-9-CM: 780.1 Vitals BP Pulse Temp Resp Height(growth percentile) Weight(growth percentile) 140/90 85 97.1 °F (36.2 °C) (Oral) 18 5' 5\" (1.651 m) 273 lb (123.8 kg) SpO2 BMI OB Status Smoking Status 95% 45.43 kg/m2 Hysterectomy Current Every Day Smoker BMI and BSA Data Body Mass Index Body Surface Area 45.43 kg/m 2 2.38 m 2 Preferred Pharmacy Pharmacy Name Phone [facility] [address] [place] [address] AT [facility] [phone] Your Updated Medication List  
  
   
This list is accurate as of: 12/22/17 10:59 AM.  Always use your most recent med list.  
  
  
  
  
 ABILIFY 5 mg tablet Generic drug:  ARIPiprazole Take  by mouth daily. acetaminophen 650 mg Tber Commonly known as:  TYLENOL ARTHRITIS PAIN Take 1 Tab by mouth three (3) times daily as needed. albuterol 90 mcg/actuation inhaler Commonly known as:  PROVENTIL HFA, VENTOLIN HFA, PROAIR HFA Take 2 Puffs by inhalation every four (4) hours as needed for Wheezing or Shortness of Breath.  
  
 b complex vitamins tablet Take 1 Tab by mouth daily. budesonide-formoterol 160-4.5 mcg/actuation Hfaa Commonly known as:  SYMBICORT Take 2 Puffs by inhalation two (2) times a day. buPROPion 100 mg tablet Commonly known as:  [facility] Take 1 Tab by mouth two (2) times a day. CALCIUM 600 + D 600-125 mg-unit Tab Generic drug:  calcium-cholecalciferol (d3) Take 1,065 mg by mouth nightly. Indications: TAKES 2 AT BEDTIME  
  
 citalopram 40 mg tablet Commonly known as:  Delona Nestle Take 40 mg by mouth daily. Dexlansoprazole 60 mg Cpdb Commonly known as:  DEXILANT Take 1 Cap by mouth daily. FISH -160-1,000 mg Cap Generic drug:  omega 3-dha-epa-fish oil Take 1,065 mg by mouth daily. fluticasone 50 mcg/actuation nasal spray Commonly known as:  Cathlyn Erik 2 Sprays by Both Nostrils route daily. furosemide 20 mg tablet Commonly known as:  LASIX Take 1 Tab by mouth daily as needed. Iron 160 mg (50 mg iron) Tber tablet Generic drug:  ferrous sulfate ER Take 1 Tab by mouth daily. Indications: PT TAKES 40 MG  
  
 multivitamin tablet Commonly known as:  ONE A DAY Take 1 Tab by mouth daily. pregabalin 150 mg capsule Commonly known as:  Sofie Bard Take 1 Cap by mouth two (2) times a day. Max Daily Amount: 300 mg.  
  
 traMADol 50 mg tablet Commonly known as:  ULTRAM  
TK 1 TO 2 TS PO Q 6 TO 8 H PRN We Performed the Following REFERRAL TO NEUROPSYCHOLOGY [RUC38 Custom] Comments:  
 Please evaluate patient for memory impairment. REFERRAL TO PSYCHIATRY [REF91 Custom] Comments:  
 Please evaluate for anxiety, depression, and hallucinations. Follow-up Instructions Return for after tests and neuropsych results. To-Do List   
 12/22/2017 Neurology:  EEG   
  
 12/22/2017 Imaging:  MRI BRAIN W WO CONT Referral Information Referral ID Referred By Referred To  
  
 [id number] Benedicto VAUGHAN Not Available Visits Status Start Date End Date 1 New Request 12/22/17 12/22/18 If your referral has a status of pending review or denied, additional information will be sent to support the outcome of this decision. Referral ID Referred By Referred To  
 [id number] Benedicto VAUGHAN Not Available Visits Status Start Date End Date 1 New Request 12/22/17 12/22/18 If your referral has a status of pending review or denied, additional information will be sent to support the outcome of this decision. Referral ID Referred By Referred To  
 [id number] Benedicto VAUGHAN Not Available Visits Status Start Date End Date 1 New Request 12/22/17 12/22/18 If your referral has a status of pending review or denied, additional information will be sent to support the outcome of this decision. Referral ID Referred By Referred To  
 [id number] Benedicto VAUGHAN Not Available Visits Status Start Date End Date 1 New Request 12/22/17 12/22/18 If your referral has a status of pending review or denied, additional information will be sent to support the outcome of this decision. Patient Instructions Have tests complete and we will follow up afterwards. Happy Holidays! Introducing [facility]! Dear Paulo Salgado: 
Thank you for requesting a Remark account. Our records indicate that you already have an active Remark account. You can access your account anytime at [email or web address] Did you know that you can access your hospital and ER discharge instructions at any time in Remark? You can also review all of your test results from your hospital stay or ER visit. Additional Information If you have questions, please visit the Frequently Asked Questions section of the Remark website at [email or web address] Remember, Remark is NOT to be used for urgent needs. For medical emergencies, dial 911. Now available from your iPhone and Android! Please provide this summary of care documentation to your next provider. Your primary care clinician is listed as Letty Hawley. If you have any questions after today's visit, please call [phone].

## 2018-01-12 ENCOUNTER — HOSPITAL ENCOUNTER (OUTPATIENT)
Dept: MRI IMAGING | Age: 58
Discharge: HOME | End: 2018-01-12
Attending: NURSE PRACTITIONER
Payer: MEDICARE

## 2018-01-12 ENCOUNTER — HOSPITAL ENCOUNTER (OUTPATIENT)
Dept: NEUROLOGY | Age: 58
End: 2018-01-12
Attending: NURSE PRACTITIONER
Payer: MEDICARE

## 2018-01-12 DIAGNOSIS — R41.3 MEMORY IMPAIRMENT: ICD-10-CM

## 2018-01-12 PROCEDURE — A9575 INJ GADOTERATE MEGLUMI 0.1ML: HCPCS | Performed by: NURSE PRACTITIONER

## 2018-01-12 PROCEDURE — 74011636320 HC RX REV CODE- 636/320: Performed by: NURSE PRACTITIONER

## 2018-01-12 PROCEDURE — 70553 MRI BRAIN STEM W/O & W/DYE: CPT

## 2018-01-12 RX ORDER — GADOTERATE MEGLUMINE 376.9 MG/ML
20 INJECTION INTRAVENOUS
Status: COMPLETED | OUTPATIENT
Start: 2018-01-12 | End: 2018-01-12

## 2018-01-12 RX ADMIN — GADOTERATE MEGLUMINE 20 ML: 376.9 INJECTION INTRAVENOUS at 10:00

## 2018-01-12 NOTE — PROGRESS NOTES
Patient arrived for EEG at 10:05 AM to a 9:30 AM appointment and when she was received it was later than expected and the test would run into the 11:00 AM patient. Pt had hair extensions on scalp which made it difficult for the tech to access her scalp the extensions caused the marks to be moved out of position so patient stated that she could remove them.   She was rescheduled for Friday Jan 19, 2018 at 8:00 AM.

## 2018-01-15 ENCOUNTER — TELEPHONE (OUTPATIENT)
Dept: FAMILY MEDICINE CLINIC | Age: 58
End: 2018-01-15

## 2018-01-19 ENCOUNTER — HOSPITAL ENCOUNTER (OUTPATIENT)
Dept: NEUROLOGY | Age: 58
Discharge: HOME | End: 2018-01-19
Attending: NURSE PRACTITIONER
Payer: MEDICARE

## 2018-01-19 DIAGNOSIS — R41.3 MEMORY IMPAIRMENT: ICD-10-CM

## 2018-01-19 PROCEDURE — 95816 EEG AWAKE AND DROWSY: CPT

## 2018-01-23 ENCOUNTER — OFFICE VISIT (OUTPATIENT)
Dept: FAMILY MEDICINE CLINIC | Age: 58
End: 2018-01-23

## 2018-01-23 VITALS
OXYGEN SATURATION: 98 % | WEIGHT: 275 LBS | TEMPERATURE: 96.5 F | HEART RATE: 76 BPM | RESPIRATION RATE: 20 BRPM | HEIGHT: 65 IN | BODY MASS INDEX: 45.82 KG/M2 | DIASTOLIC BLOOD PRESSURE: 68 MMHG | SYSTOLIC BLOOD PRESSURE: 110 MMHG

## 2018-01-23 DIAGNOSIS — S89.92XA INJURY OF LEFT KNEE, INITIAL ENCOUNTER: Primary | ICD-10-CM

## 2018-01-23 DIAGNOSIS — R41.3 MEMORY CHANGES: ICD-10-CM

## 2018-01-23 DIAGNOSIS — E66.01 OBESITY, MORBID (HCC): ICD-10-CM

## 2018-01-23 DIAGNOSIS — F33.9 RECURRENT DEPRESSION (HCC): ICD-10-CM

## 2018-01-23 RX ORDER — HYDROCODONE BITARTRATE AND ACETAMINOPHEN 5; 325 MG/1; MG/1
1 TABLET ORAL
Qty: 20 TAB | Refills: 0 | Status: SHIPPED | OUTPATIENT
Start: 2018-01-23 | End: 2018-05-03

## 2018-01-23 RX ORDER — CYCLOBENZAPRINE HCL 5 MG
5 TABLET ORAL
Qty: 20 TAB | Refills: 0 | Status: SHIPPED | OUTPATIENT
Start: 2018-01-23 | End: 2018-05-03

## 2018-01-23 RX ORDER — CITALOPRAM 20 MG/1
20 TABLET, FILM COATED ORAL DAILY
Qty: 90 TAB | Refills: 1 | Status: SHIPPED | OUTPATIENT
Start: 2018-01-23 | End: 2018-05-08 | Stop reason: SDUPTHER

## 2018-01-23 NOTE — PROGRESS NOTES
OFFICE NOTE    Arleth Avitia is a 59 y.o. female presenting today for office visit. 1/23/2018  8:17 AM      Chief Complaint   Patient presents with   [facility] Fall     pt here with c/o falling on her left knee on yesterday whie at the [employer] Results     pt asking for results of test she had done    Medication Evaluation     pt asking to evaluate her Celexa dosage         HPI: Here today for complaints of left knee pain, review of results, and medication evaluation. Last chronic disease follow up in 12/2017. Reports that she injured her left knee further yesterday while at the [employer]. She states that she was walking and slipped causing her knee to turn inward and her to fall. She reports landing on the inside of her knee. She has been seeing [facility] for left knee pain and was diagnosed with a tear of the posterior root of the medial mensicus of left knee via MRI 12/2017- she was scheduled to have surgery here in Feb; however, she reports that the hospital he does surgery at is out of network for her. She has scheduled an appointment with Dr Kim Lopez on 2/2. She reports increased pain since the fall yesterday and Tylenol has not been helping. She was given Tramadol in the past by orthopedics but this is not helping either. Also, she had MRI brain and EEG done in work up by neurology for memory changes. She has also been seeing neuropsychology. She is hoping that the results of her testing by neurology is returned. Also, she would like to discuss a decrease in her Celexa dosing. She is also on Abilify for depression as adjunctive medication. She is on Wellbutrin for smoking cessation. Review of Systems   Constitutional: Negative for appetite change, fatigue and unexpected weight change. Musculoskeletal: Positive for arthralgias. Neurological: Negative for dizziness, numbness and headaches.         +memory changes   Psychiatric/Behavioral: Negative for agitation, behavioral problems, dysphoric mood, sleep disturbance and suicidal ideas. The patient is not nervous/anxious. 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    GERD (gastroesophageal reflux disease)     HNP (herniated nucleus pulposus), lumbar     patient reported    Lung nodules     monitor yearly- stable 10/2017    Osteopenia 10/03/2017    Recurrent depression ([facility]     Sleep apnea     Spinal stenosis, lumbar     patient reported       Past Surgical History:   Procedure Laterality Date    HX ARTHRODESIS  01/2000    Herniated Disc, arthritis right foot 06/06, 07/07, C 6/7, C 4/6 Stenosis    HX CHOLECYSTECTOMY  09/2008    gallbladder disease    HX COLONOSCOPY  05/2009    HX GI  2012    GASTRIC BYPASS  [facility] Gastric Bypass      HX HYSTERECTOMY  06/1994    HX MENISCECTOMY  10/2015    right repari of torn meniscus    HX MYOMECTOMY  06/1984    benign tumor    HX ORTHOPAEDIC  02/1964 / 03/1664    orthopaedic excision Fibrosarcoma and Lymphangiogram    HX PELVIC LAPAROSCOPY  60/2376    large uterine blockage       Social History     Social History    Marital status:      Spouse name: N/A    Number of children: N/A    Years of education: N/A     Occupational History    Not on file.      Social History Main Topics    Smoking status: Current Every Day Smoker    Smokeless tobacco: Never Used    Alcohol use No    Drug use: No    Sexual activity: No     Other Topics Concern    Not on file     Social History Narrative       Allergies   Allergen Reactions    Adhesive Tape-Silicones Swelling     REALLY BAD SWELLING AND SORE APPEAR    Alcohol Other (comments)     PT STATES SHE IS AN ALCOHOLIC    Lactose Other (comments)     PT STATES IT MAKES HER STOMACH HURT    Percodan [Oxycodone Hcl-Oxycodone-Asa] Itching and Other (comments)     crying    Nsaids (Non-Steroidal Anti-Inflammatory Drug) Other (comments)     PT NOT ABLE TO TAKE DUE TO GASTRIC BYPASS       Current Outpatient Prescriptions   Medication Sig Dispense Refill    pregabalin (LYRICA) 150 mg capsule Take 1 Cap by mouth two (2) times a day. Max Daily Amount: 300 mg. 180 Cap 1    traMADol (ULTRAM) 50 mg tablet TK 1 TO 2 TS PO Q 6 TO 8 H PRN  0    fluticasone (FLONASE) 50 mcg/actuation nasal spray 2 Sprays by Both Nostrils route daily. 3 Bottle 4    albuterol (PROVENTIL HFA, VENTOLIN HFA, PROAIR HFA) 90 mcg/actuation inhaler Take 2 Puffs by inhalation every four (4) hours as needed for Wheezing or Shortness of Breath. 3 Inhaler 4    Dexlansoprazole (DEXILANT) 60 mg CpDB Take 1 Cap by mouth daily. 90 Cap 1    buPROPion (WELLBUTRIN) 100 mg tablet Take 1 Tab by mouth two (2) times a day. 180 Tab 1    budesonide-formoterol (SYMBICORT) 160-4.5 mcg/actuation HFAA Take 2 Puffs by inhalation two (2) times a day. 6 Inhaler 1    furosemide (LASIX) 20 mg tablet Take 1 Tab by mouth daily as needed. 30 Tab 2    acetaminophen (TYLENOL ARTHRITIS PAIN) 650 mg TbER Take 1 Tab by mouth three (3) times daily as needed. 100 Tab 0    citalopram (CELEXA) 40 mg tablet Take 40 mg by mouth daily.  ARIPiprazole (ABILIFY) 5 mg tablet Take  by mouth daily.  b complex vitamins tablet Take 1 Tab by mouth daily.  calcium-cholecalciferol, d3, (CALCIUM 600 + D) 600-125 mg-unit tab Take 1,065 mg by mouth nightly. Indications: TAKES 2 AT BEDTIME      omega 3-dha-epa-fish oil (FISH OIL) 100-160-1,000 mg cap Take 1,065 mg by mouth daily.  ferrous sulfate ER (IRON) 160 mg (50 mg iron) TbER tablet Take 1 Tab by mouth daily. Indications: PT TAKES 40 MG      multivitamin (ONE A DAY) tablet Take 1 Tab by mouth daily.            Patient Care Team:  Patient Care Team:  Lauri Weston NP as PCP - General (Nurse Practitioner)  Lavell Blanton DO (Orthopedic Surgery)  Farooq Peterson NP (Neurology)        LABS:  None new to review    RADIOLOGY:    *Xray left knee done in office at this time- awaiting radiology reading      12/7/2017 11:53 AM - Malik, Rad Results In       Narrative      MRI left knee. TECHNIQUE: MRI of the knee was obtained utilizing sagittal T2 fat-saturated and  proton density fat-saturated, coronal fast spin-echo proton density and fast  spin-echo T2 fat-saturated and axial fast spin-echo T2 fat-saturated sequences  without the administration of IV contrast.    HISTORY: Severe left knee pain. COMPARISON: None. FINDINGS:     Soft tissues: Mild joint effusion. Soft tissue edema in the anterior knee. Osseous/Cartilage Structures: No fracture is seen. Mild marrow edema at the  tibial eminence. Cartilage along the patella is intact. Mild thinning of  cartilage in the medial compartment. Cartilage in the lateral compartment is  intact. Menisci: Abnormal signal and irregularity of posterior root of medial meniscus. There is underlying marrow edema. Anterior horn of the medial meniscus is  intact. Lateral meniscus is intact. Ligaments: ACL, PCL, MCL, LCL complex is intact     Muscles/Tendons: Quadriceps and patellar tendons are intact.             Impression      IMPRESSION:    Tear of the posterior root of the medial meniscus. Mild joint effusion. Physical Exam   Constitutional: She is oriented to person, place, and time. She appears well-developed and well-nourished. No distress. Pulmonary/Chest: Effort normal. No respiratory distress. Musculoskeletal:        Left knee: She exhibits decreased range of motion. Tenderness found. Neurological: She is alert and oriented to person, place, and time. She exhibits normal muscle tone. Coordination normal.   Skin: Skin is warm and dry.    Psychiatric: Her speech is normal and behavior is normal. Her mood appears not anxious. She does not exhibit a depressed mood. Vitals:    01/23/18 0809   BP: 110/68   Pulse: 76   Resp: 20   Temp: 96.5 °F (35.8 °C)   TempSrc: Oral   SpO2: 98%   Weight: 275 lb (124.7 kg)   Height: 5' 5\" (1.651 m)   PainSc:   7   PainLoc: Knee       Acute Pain Review  Verner Foil presents due to pain of her left knee that is secondary to fall, yesterday, as well as chronic condition. Since this event her pain has worsened. She describes the pain as aching, sharp. Since this event she is able to do  her normal daily activities. Least pain over the last week has been 5/10. Worst pain over the last week has been 9/10. Prior records: Not needed at this time. Personal or family history of psychiatric, addiction, or substance abuse: yes    Aberrant behaviors: None. Urine Drug Screen: N/A  Pain Management Contract: Not of file       reviewed: yes. Concomitant use of a benzodiazepine: no      Assessment and Plan    Injury of left knee, initial encounter  *Discussed with patient. Xray obtained in office today to r/o fracture. Will await radiology reading. Upcoming appointment with new orthopedic on 2/2. *Has not been getting relief with Tylenol or Tramadol. Will do short term supply of Norco. Flexeril given as she is experiencing muscle spasms in the left leg and knee area. - XR KNEE LT MIN 4 V; Future  - HYDROcodone-acetaminophen (NORCO) 5-325 mg per tablet; Take 1 Tab by mouth every eight (8) hours as needed for Pain. Max Daily Amount: 3 Tabs. Dispense: 20 Tab; Refill: 0  - cyclobenzaprine (FLEXERIL) 5 mg tablet; Take 1 Tab by mouth every twelve (12) hours as needed for Muscle Spasm(s). Dispense: 20 Tab; Refill: 0    Memory changes  *Continue with neurology and neuropsychology. Encouraged to follow up with neurology regarding results of testing completed. Recurrent depression ([facility]  *She opts to lower Celexa dosing. Has been started on Wellbutrin recently for smoking cessation and has found this to be effective as well. - citalopram (CELEXA) 20 mg tablet; Take 1 Tab by mouth daily. Dispense: 90 Tab; Refill: 1    Obesity, morbid ([facility]  *Reports having weight loss seminar upcoming on 2/8. *Plan of care reviewed with patient. Patient in agreement with plan and expresses understanding. All questions answered and patient encouraged to call or RTO if further questions or concerns. Follow-up Disposition:  Return in about 2 months (around 3/23/2018) for chronic disease routine care- 30 min.

## 2018-01-23 NOTE — PATIENT INSTRUCTIONS
Knee Pain or Injury: Care Instructions  Your Care Instructions    Injuries are a common cause of knee problems. Sudden (acute) injuries may be caused by a direct blow to the knee. They can also be caused by abnormal twisting, bending, or falling on the knee. Pain, bruising, or swelling may be severe, and may start within minutes of the injury. Overuse is another cause of knee pain. Other causes are climbing stairs, kneeling, and other activities that use the knee. Everyday wear and tear, especially as you get older, also can cause knee pain. Rest, along with home treatment, often relieves pain and allows your knee to heal. If you have a serious knee injury, you may need tests and treatment. Follow-up care is a key part of your treatment and safety. Be sure to make and go to all appointments, and call your doctor if you are having problems. It's also a good idea to know your test results and keep a list of the medicines you take. How can you care for yourself at home? · Be safe with medicines. Read and follow all instructions on the label. ¨ If the doctor gave you a prescription medicine for pain, take it as prescribed. ¨ If you are not taking a prescription pain medicine, ask your doctor if you can take an over-the-counter medicine. · Rest and protect your knee. Take a break from any activity that may cause pain. · Put ice or a cold pack on your knee for 10 to 20 minutes at a time. Put a thin cloth between the ice and your skin. · Prop up a sore knee on a pillow when you ice it or anytime you sit or lie down for the next 3 days. Try to keep it above the level of your heart. This will help reduce swelling. · If your knee is not swollen, you can put moist heat, a heating pad, or a warm cloth on your knee. · If your doctor recommends an elastic bandage, sleeve, or other type of support for your knee, wear it as directed.   · Follow your doctor's instructions about how much weight you can put on your leg. Use a cane, crutches, or a walker as instructed. · Follow your doctor's instructions about activity during your healing process. If you can do mild exercise, slowly increase your activity. · Reach and stay at a healthy weight. Extra weight can strain the joints, especially the knees and hips, and make the pain worse. Losing even a few pounds may help. When should you call for help? Call 911 anytime you think you may need emergency care. For example, call if:  ? · You have symptoms of a blood clot in your lung (called a pulmonary embolism). These may include:  ¨ Sudden chest pain. ¨ Trouble breathing. ¨ Coughing up blood. ?Call your doctor now or seek immediate medical care if:  ? · You have severe or increasing pain. ? · Your leg or foot turns cold or changes color. ? · You cannot stand or put weight on your knee. ? · Your knee looks twisted or bent out of shape. ? · You cannot move your knee. ? · You have signs of infection, such as:  ¨ Increased pain, swelling, warmth, or redness. ¨ Red streaks leading from the knee. ¨ Pus draining from a place on your knee. ¨ A fever. ? · You have signs of a blood clot in your leg (called a deep vein thrombosis), such as:  ¨ Pain in your calf, back of the knee, thigh, or groin. ¨ Redness and swelling in your leg or groin. ? Watch closely for changes in your health, and be sure to contact your doctor if:  ? · You have tingling, weakness, or numbness in your knee. ? · You have any new symptoms, such as swelling. ? · You have bruises from a knee injury that last longer than 2 weeks. ? · You do not get better as expected. Where can you learn more? Go to http://steve-anand.info/. Enter K195 in the search box to learn more about \"Knee Pain or Injury: Care Instructions. \"  Current as of: March 20, 2017  Content Version: 11.4  © 2870-0200 [employer].  Care instructions adapted under license by [employer] (which disclaims liability or warranty for this information). If you have questions about a medical condition or this instruction, always ask your healthcare professional. [address] any warranty or liability for your use of this information.

## 2018-01-23 NOTE — MR AVS SNAPSHOT
[facility] [address] 
[phone] Patient: Tiff Traylor MRN: [id number] HDS:7/6/[birth year]103 Visit Information Date & Time Provider Department Dept. Phone Encounter #  
 1/23/2018  7:45 AM Bo Bruner, [facility] 21  Follow-up Instructions Return in about 2 months (around 3/23/2018) for chronic disease routine care- 30 min. Your Appointments 2/2/2018  8:30 AM  
New Patient with Magdi Abraham MD  
[address] [facility] [address] [facility] [place] ([address]) Appt Note: lt knee pain [address] [facility], [address]  
[phone] [address] [facility], [facility] [address] Upcoming Health Maintenance Date Due  
 MEDICARE YEARLY EXAM 9/15/2018 DTaP/Tdap/Td series (2 - Td) 2/10/2019 Bone Densitometry 10/3/2019 BREAST CANCER SCRN MAMMOGRAM 10/3/2019 COLONOSCOPY 9/20/2021 Pneumococcal 19-64 Highest Risk (3 of 3 - PPSV23) 11/22/2022 Allergies as of 1/23/2018  Review Complete On: 1/23/2018 By: Bo Bruner NP Severity Noted Reaction Type Reactions Adhesive Tape-silicones High 33/68/1332   Topical Swelling REALLY BAD SWELLING AND SORE APPEAR Alcohol High 07/13/2017   Intolerance Other (comments) PT STATES SHE IS AN ALCOHOLIC Lactose High 07/13/2017   Intolerance Other (comments) PT STATES IT MAKES HER STOMACH HURT Percodan [Oxycodone Hcl-oxycodone-asa] High 07/13/2017   Systemic Itching, Other (comments) crying Nsaids (Non-steroidal Anti-inflammatory Drug)  07/13/2017   Intolerance Other (comments) PT NOT ABLE TO TAKE DUE TO GASTRIC BYPASS Current Immunizations  Reviewed on 10/6/2017 Name Date Influenza Vaccine 10/24/2013, 10/3/2012, 9/16/2011 Influenza Vaccine (Quad) PF 9/14/2017 Pneumococcal Conjugate (PCV-13) 10/24/2013 Pneumococcal Polysaccharide (PPSV-23) 11/22/2017 TB Skin Test (PPD) Intradermal 10/6/2017 Tdap 2/10/2009 Not reviewed this visit You Were Diagnosed With   
  
 Codes Comments Injury of left knee, initial encounter    -  Primary ICD-10-CM: S89. 92XA ICD-9-CM: 959.7 Recurrent depression ([facility]     ICD-10-CM: F33.9 ICD-9-CM: 296.30 Obesity, morbid ([facility]     ICD-10-CM: E66.01 
ICD-9-CM: 278.01 Memory changes     ICD-10-CM: R41.3 ICD-9-CM: 780.93 Vitals BP Pulse Temp Resp Height(growth percentile) Weight(growth percentile) 110/68 (BP 1 Location: Left arm, BP Patient Position: Sitting) 76 96.5 °F (35.8 °C) (Oral) 20 5' 5\" (1.651 m) 275 lb (124.7 kg) SpO2 BMI OB Status Smoking Status 98% 45.76 kg/m2 Hysterectomy Current Every Day Smoker Vitals History BMI and BSA Data Body Mass Index Body Surface Area 45.76 kg/m 2 2.39 m 2 Preferred Pharmacy Pharmacy Name Phone [facility] [address] [place] [address] AT [facility] [phone] Your Updated Medication List  
  
   
This list is accurate as of: 1/23/18  8:40 AM.  Always use your most recent med list.  
  
  
  
  
 ABILIFY 5 mg tablet Generic drug:  ARIPiprazole Take  by mouth daily. acetaminophen 650 mg Tber Commonly known as:  TYLENOL ARTHRITIS PAIN Take 1 Tab by mouth three (3) times daily as needed. albuterol 90 mcg/actuation inhaler Commonly known as:  PROVENTIL HFA, VENTOLIN HFA, PROAIR HFA Take 2 Puffs by inhalation every four (4) hours as needed for Wheezing or Shortness of Breath.  
  
 b complex vitamins tablet Take 1 Tab by mouth daily. budesonide-formoterol 160-4.5 mcg/actuation Hfaa Commonly known as:  SYMBICORT Take 2 Puffs by inhalation two (2) times a day. buPROPion 100 mg tablet Commonly known as:  [facility] Take 1 Tab by mouth two (2) times a day. CALCIUM 600 + D 600-125 mg-unit Tab Generic drug:  calcium-cholecalciferol (d3) Take 1,065 mg by mouth nightly. Indications: TAKES 2 AT BEDTIME  
  
 citalopram 20 mg tablet Commonly known as:  Layla Seen Take 1 Tab by mouth daily. cyclobenzaprine 5 mg tablet Commonly known as:  FLEXERIL Take 1 Tab by mouth every twelve (12) hours as needed for Muscle Spasm(s). Dexlansoprazole 60 mg Cpdb Commonly known as:  DEXILANT Take 1 Cap by mouth daily. FISH -160-1,000 mg Cap Generic drug:  omega 3-dha-epa-fish oil Take 1,065 mg by mouth daily. fluticasone 50 mcg/actuation nasal spray Commonly known as:  [place] Speller 2 Sprays by Both Nostrils route daily. furosemide 20 mg tablet Commonly known as:  LASIX Take 1 Tab by mouth daily as needed. HYDROcodone-acetaminophen 5-325 mg per tablet Commonly known as:  Clemetine Brendan Take 1 Tab by mouth every eight (8) hours as needed for Pain. Max Daily Amount: 3 Tabs. Iron 160 mg (50 mg iron) Tber tablet Generic drug:  ferrous sulfate ER Take 1 Tab by mouth daily. Indications: PT TAKES 40 MG  
  
 multivitamin tablet Commonly known as:  ONE A DAY Take 1 Tab by mouth daily. pregabalin 150 mg capsule Commonly known as:  Papa Schultz Take 1 Cap by mouth two (2) times a day. Max Daily Amount: 300 mg. Prescriptions Printed Refills HYDROcodone-acetaminophen (NORCO) 5-325 mg per tablet 0 Sig: Take 1 Tab by mouth every eight (8) hours as needed for Pain. Max Daily Amount: 3 Tabs. Class: Print Route: Oral  
  
Prescriptions Sent to Pharmacy Refills  
 citalopram (CELEXA) 20 mg tablet 1 Sig: Take 1 Tab by mouth daily. Class: Normal  
 Pharmacy: [facility] [address] [place] [address] [address] Ph #: [phone]  Route: Oral  
 cyclobenzaprine (FLEXERIL) 5 mg tablet 0  
 Sig: Take 1 Tab by mouth every twelve (12) hours as needed for Muscle Spasm(s). Class: Normal  
 Pharmacy: [facility] [address] [facility] [address] Ph #: [phone] Route: Oral  
  
Follow-up Instructions Return in about 2 months (around 3/23/2018) for chronic disease routine care- 30 min. To-Do List   
 01/23/2018 Imaging:  XR KNEE LT MIN 4 V Patient Instructions Knee Pain or Injury: Care Instructions Your Care Instructions Injuries are a common cause of knee problems. Sudden (acute) injuries may be caused by a direct blow to the knee. They can also be caused by abnormal twisting, bending, or falling on the knee. Pain, bruising, or swelling may be severe, and may start within minutes of the injury. Overuse is another cause of knee pain. Other causes are climbing stairs, kneeling, and other activities that use the knee. Everyday wear and tear, especially as you get older, also can cause knee pain. Rest, along with home treatment, often relieves pain and allows your knee to heal. If you have a serious knee injury, you may need tests and treatment. Follow-up care is a key part of your treatment and safety. Be sure to make and go to all appointments, and call your doctor if you are having problems. It's also a good idea to know your test results and keep a list of the medicines you take. How can you care for yourself at home? · Be safe with medicines. Read and follow all instructions on the label. ¨ If the doctor gave you a prescription medicine for pain, take it as prescribed. ¨ If you are not taking a prescription pain medicine, ask your doctor if you can take an over-the-counter medicine. · Rest and protect your knee. Take a break from any activity that may cause pain. · Put ice or a cold pack on your knee for 10 to 20 minutes at a time. Put a thin cloth between the ice and your skin. · Prop up a sore knee on a pillow when you ice it or anytime you sit or lie down for the next 3 days. Try to keep it above the level of your heart. This will help reduce swelling. · If your knee is not swollen, you can put moist heat, a heating pad, or a warm cloth on your knee. · If your doctor recommends an elastic bandage, sleeve, or other type of support for your knee, wear it as directed. · Follow your doctor's instructions about how much weight you can put on your leg. Use a cane, crutches, or a walker as instructed. · Follow your doctor's instructions about activity during your healing process. If you can do mild exercise, slowly increase your activity. · Reach and stay at a healthy weight. Extra weight can strain the joints, especially the knees and hips, and make the pain worse. Losing even a few pounds may help. When should you call for help? Call 911 anytime you think you may need emergency care. For example, call if: 
? · You have symptoms of a blood clot in your lung (called a pulmonary embolism). These may include: 
¨ Sudden chest pain. ¨ Trouble breathing. ¨ Coughing up blood. ?Call your doctor now or seek immediate medical care if: 
? · You have severe or increasing pain. ? · Your leg or foot turns cold or changes color. ? · You cannot stand or put weight on your knee. ? · Your knee looks twisted or bent out of shape. ? · You cannot move your knee. ? · You have signs of infection, such as: 
¨ Increased pain, swelling, warmth, or redness. ¨ Red streaks leading from the knee. ¨ Pus draining from a place on your knee. ¨ A fever. ? · You have signs of a blood clot in your leg (called a deep vein thrombosis), such as: 
¨ Pain in your calf, back of the knee, thigh, or groin. ¨ Redness and swelling in your leg or groin. ? Watch closely for changes in your health, and be sure to contact your doctor if: 
? · You have tingling, weakness, or numbness in your knee. ? · You have any new symptoms, such as swelling. ? · You have bruises from a knee injury that last longer than 2 weeks. ? · You do not get better as expected. Where can you learn more? Go to http://steve-anand.info/. Enter K195 in the search box to learn more about \"Knee Pain or Injury: Care Instructions. \" Current as of: March 20, 2017 Content Version: 11.4 © 9433-3466 [employer]. Care instructions adapted under license by [employer] (which disclaims liability or warranty for this information). If you have questions about a medical condition or this instruction, always ask your healthcare professional. [address] any warranty or liability for your use of this information. Introducing \[facility] [place] [facility]\"" & HEALTH SERVICES! Dear Camron Ramos: 
Thank you for requesting a [employer] account. Our records indicate that you already have an active [employer] account. You can access your account anytime at [email or web address] Did you know that you can access your hospital and ER discharge instructions at any time in [employer]? You can also review all of your test results from your hospital stay or ER visit. Additional Information If you have questions, please visit the Frequently Asked Questions section of the [employer] website at [email or web address] Remember, [employer] is NOT to be used for urgent needs. For medical emergencies, dial 911. Now available from your iPhone and Android! Please provide this summary of care documentation to your next provider. Your primary care clinician is listed as Kamron Flor. If you have any questions after today's visit, please call [phone].

## 2018-02-02 ENCOUNTER — OFFICE VISIT (OUTPATIENT)
Dept: ORTHOPEDIC SURGERY | Age: 58
End: 2018-02-02

## 2018-02-02 VITALS
WEIGHT: 282 LBS | HEIGHT: 65 IN | BODY MASS INDEX: 46.98 KG/M2 | HEART RATE: 77 BPM | OXYGEN SATURATION: 94 % | TEMPERATURE: 96.9 F | SYSTOLIC BLOOD PRESSURE: 125 MMHG | DIASTOLIC BLOOD PRESSURE: 71 MMHG

## 2018-02-02 DIAGNOSIS — G89.29 CHRONIC PAIN OF LEFT KNEE: Primary | ICD-10-CM

## 2018-02-02 DIAGNOSIS — M25.562 CHRONIC PAIN OF LEFT KNEE: Primary | ICD-10-CM

## 2018-02-02 DIAGNOSIS — S83.242A ACUTE MEDIAL MENISCUS TEAR, LEFT, INITIAL ENCOUNTER: ICD-10-CM

## 2018-02-02 RX ORDER — BETAMETHASONE SODIUM PHOSPHATE AND BETAMETHASONE ACETATE 3; 3 MG/ML; MG/ML
6 INJECTION, SUSPENSION INTRA-ARTICULAR; INTRALESIONAL; INTRAMUSCULAR; SOFT TISSUE ONCE
Qty: 1 ML | Refills: 0 | Status: CANCELLED
Start: 2018-02-02 | End: 2018-02-02

## 2018-02-02 NOTE — PROGRESS NOTES
Patient: Prateek Almaguer                MRN: [id number]       SSN: [id number]  YOB: 1960        AGE: 59 y.o. SEX: female  Body mass index is 46.93 kg/(m^2). PCP: Evan Rod NP  02/02/18    HISTORY: JIA presents today with left knee pain. It is moderate, aching. She states it hurts all the time. It is worse with stair, kneeling, and getting up and down from a chair. She has no true locking, but she does have difficulties in sitting with it out straight at home in the recliner chairs. She denies radiculopathy. There is not too much in the way of back problems. She has otherwise been feeling well. She has already tried a cortisone injection, which was not efficacious. She states it made it worse. PHYSICAL EXAMINATION:  On examination today, she has a touch of a positive movie theater sign when arising from a chair. She stands in varus on the right and has neutral alignment on the left. Her BMI is 47. She is very pleasant. She moves the head and neck adequately. The hips rotate nicely. The left knee has just a slight effusion. She actually has good motion today. There is good full extension, and she bends to about 115° limited by body habitus and a painful but non-clicking Randa's test itself. There is a little bit of patellofemoral irritation as well. The lateral joint line is not as tender. She is neurologically intact distally to L4-5. Tib/ant and EHL are 5/5. The calf is nontender. Bereket's sign is negative. RADIOGRAPHS:  Review of her x-rays, AP, tunnel, lateral, and skyline, confirms mild to moderate arthritis of the left knee, fairly severe arthritis of the right knee. MRI confirms a medial meniscus tear. PLAN:  This lady has already tried nonoperative measures including pills and injection. I would recommend an arthroscopy for her.   I am going to recommend Dr. Fabiola Griffin for her, and she will see him next week with the thought of a knee scope in mind. She will need a right knee replacement eventually, but I think it is too soon for the left. I think she will do well with a scope. REVIEW OF SYSTEMS:      CON: negative for weight loss, fever  EYE: negative for double vision  ENT: negative for hoarseness  RS:   negative for Tb  GI:    negative for blood in stool  :  negative for blood in urine  Other systems reviewed and noted below. Past Medical History:   Diagnosis Date    Alcoholism in remission ([facility]     Chronic obstructive pulmonary disease ([facility]     Fibrosarcoma (HCC) 1963    connective tissue cancer    GERD (gastroesophageal reflux disease)     HNP (herniated nucleus pulposus), lumbar     patient reported    Lung nodules     monitor yearly- stable 10/2017    Osteopenia 10/03/2017    Recurrent depression ([facility]     Sleep apnea     Spinal stenosis, lumbar     patient reported       Family History   Problem Relation Age of Onset    Hypertension Mother     Depression Mother     Cancer Mother     Ovarian Cancer Mother     Breast Problems Mother     Cancer Father     Cancer Sister     Depression Sister     Depression Brother        Current Outpatient Prescriptions   Medication Sig Dispense Refill    citalopram (CELEXA) 20 mg tablet Take 1 Tab by mouth daily. 90 Tab 1    HYDROcodone-acetaminophen (NORCO) 5-325 mg per tablet Take 1 Tab by mouth every eight (8) hours as needed for Pain. Max Daily Amount: 3 Tabs. 20 Tab 0    cyclobenzaprine (FLEXERIL) 5 mg tablet Take 1 Tab by mouth every twelve (12) hours as needed for Muscle Spasm(s). 20 Tab 0    pregabalin (LYRICA) 150 mg capsule Take 1 Cap by mouth two (2) times a day. Max Daily Amount: 300 mg. 180 Cap 1    fluticasone (FLONASE) 50 mcg/actuation nasal spray 2 Sprays by Both Nostrils route daily.  3 Bottle 4    albuterol (PROVENTIL HFA, VENTOLIN HFA, PROAIR HFA) 90 mcg/actuation inhaler Take 2 Puffs by inhalation every four (4) hours as needed for Wheezing or Shortness of Breath. 3 Inhaler 4    Dexlansoprazole (DEXILANT) 60 mg CpDB Take 1 Cap by mouth daily. 90 Cap 1    buPROPion (WELLBUTRIN) 100 mg tablet Take 1 Tab by mouth two (2) times a day. 180 Tab 1    budesonide-formoterol (SYMBICORT) 160-4.5 mcg/actuation HFAA Take 2 Puffs by inhalation two (2) times a day. 6 Inhaler 1    furosemide (LASIX) 20 mg tablet Take 1 Tab by mouth daily as needed. 30 Tab 2    acetaminophen (TYLENOL ARTHRITIS PAIN) 650 mg TbER Take 1 Tab by mouth three (3) times daily as needed. 100 Tab 0    ARIPiprazole (ABILIFY) 5 mg tablet Take  by mouth daily.  b complex vitamins tablet Take 1 Tab by mouth daily.  calcium-cholecalciferol, d3, (CALCIUM 600 + D) 600-125 mg-unit tab Take 1,065 mg by mouth nightly. Indications: TAKES 2 AT BEDTIME      omega 3-dha-epa-fish oil (FISH OIL) 100-160-1,000 mg cap Take 1,065 mg by mouth daily.  ferrous sulfate ER (IRON) 160 mg (50 mg iron) TbER tablet Take 1 Tab by mouth daily. Indications: PT TAKES 40 MG      multivitamin (ONE A DAY) tablet Take 1 Tab by mouth daily.          Allergies   Allergen Reactions    Adhesive Tape-Silicones Swelling     REALLY BAD SWELLING AND SORE APPEAR    Alcohol Other (comments)     PT STATES SHE IS AN ALCOHOLIC    Lactose Other (comments)     PT STATES IT MAKES HER STOMACH HURT    Percodan [Oxycodone Hcl-Oxycodone-Asa] Itching and Other (comments)     crying    Nsaids (Non-Steroidal Anti-Inflammatory Drug) Other (comments)     PT NOT ABLE TO TAKE DUE TO GASTRIC BYPASS       Past Surgical History:   Procedure Laterality Date    HX ARTHRODESIS  01/2000    Herniated Disc, arthritis right foot 06/06, 07/07, C 6/7, C 4/6 Stenosis    HX CHOLECYSTECTOMY  09/2008    gallbladder disease    HX COLONOSCOPY  05/2009    HX GI  2012    GASTRIC BYPASS  Candelario En Y Gastric Bypass      HX HYSTERECTOMY  06/1994    HX MENISCECTOMY  10/2015    right repari of torn meniscus    HX MYOMECTOMY  06/1984 benign tumor    HX ORTHOPAEDIC  02/1964 / 03/1664    orthopaedic excision Fibrosarcoma and Lymphangiogram    HX PELVIC LAPAROSCOPY  41/1044    large uterine blockage       Social History     Social History    Marital status:      Spouse name: N/A    Number of children: N/A    Years of education: N/A     Occupational History    Not on file. Social History Main Topics    Smoking status: Current Every Day Smoker    Smokeless tobacco: Never Used    Alcohol use No    Drug use: No    Sexual activity: No     Other Topics Concern    Not on file     Social History Narrative       Visit Vitals    /71 (BP 1 Location: Left arm, BP Patient Position: Sitting)    Pulse 77    Temp 96.9 °F (36.1 °C)    Ht 5' 5\" (1.651 m)    Wt 282 lb (127.9 kg)    SpO2 94%    BMI 46.93 kg/m2         PHYSICAL EXAMINATION:  GENERAL: Alert and oriented x3, in no acute distress, well-developed, well-nourished, afebrile. HEART: No JVD. EYES: No scleral icterus   NECK: No significant lymphadenopathy   LUNGS: No respiratory compromise or indrawing  ABDOMEN: Soft, non-tender, non-distended. Electronically signed by:  Delisa Nuñez MD

## 2018-02-07 ENCOUNTER — OFFICE VISIT (OUTPATIENT)
Dept: ORTHOPEDIC SURGERY | Age: 58
End: 2018-02-07

## 2018-02-07 VITALS
WEIGHT: 279.2 LBS | OXYGEN SATURATION: 97 % | BODY MASS INDEX: 46.52 KG/M2 | HEART RATE: 81 BPM | HEIGHT: 65 IN | SYSTOLIC BLOOD PRESSURE: 134 MMHG | DIASTOLIC BLOOD PRESSURE: 86 MMHG

## 2018-02-07 DIAGNOSIS — M17.12 PRIMARY OSTEOARTHRITIS OF LEFT KNEE: ICD-10-CM

## 2018-02-07 DIAGNOSIS — S83.242A ACUTE MEDIAL MENISCUS TEAR, LEFT, INITIAL ENCOUNTER: Primary | ICD-10-CM

## 2018-02-07 DIAGNOSIS — E66.01 MORBID OBESITY WITH BMI OF 45.0-49.9, ADULT (HCC): ICD-10-CM

## 2018-02-07 NOTE — PROGRESS NOTES
Nedra Lindsey  1960   Chief Complaint   Patient presents with    Knee Pain     left        HISTORY OF PRESENT ILLNESS  Nedra Lindsey is a 59 y.o. female who presents today for evaluation of left knee pain. Patient was referred by Dr. Bridger Potts. she rates her pain 5/10 today. Pain has been present for one year, worsening over the last few months. Patient describes the pain as aching, sharp and stabbing that is Intermittent in nature. Symptoms are worse with certain movements of the leg, standing up after sitting for a long time, getting in bed, getting dressed, prolonged walking and standing, Activity and is better with  Rest. Associated symptoms include stiffness, swelling. Since problem started, it: has worsened. Pain does wake patient up at night. Has Flexeril, Norco, and Tylenol recent medications for the problem. Has tried following treatments: Injections:YES; Brace:NO;  Therapy:NO; Cane/Crutch:NO       Allergies   Allergen Reactions    Adhesive Tape-Silicones Swelling     REALLY BAD SWELLING AND SORE APPEAR    Alcohol Other (comments)     PT STATES SHE IS AN ALCOHOLIC    Lactose Other (comments)     PT STATES IT MAKES HER STOMACH HURT    Percodan [Oxycodone Hcl-Oxycodone-Asa] Itching and Other (comments)     crying    Nsaids (Non-Steroidal Anti-Inflammatory Drug) Other (comments)     PT NOT ABLE TO TAKE DUE TO GASTRIC BYPASS        Past Medical History:   Diagnosis Date    [facility]ism [facility] remission [facility] [address]     Chronic obstructive pulmonary disease (HCC)     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 Social History Main Topics    Smoking status: Current Every Day Smoker    Smokeless tobacco: Never Used    Alcohol use No    Drug use: No    Sexual activity: No     Other Topics Concern    Not on file     Social History Narrative      Past Surgical History:   Procedure Laterality Date    HX ARTHRODESIS  01/2000    Herniated Disc, arthritis right foot 06/06, 07/07, C 6/7, C 4/6 Stenosis    HX CHOLECYSTECTOMY  09/2008    gallbladder disease    HX COLONOSCOPY  05/2009    HX GI  2012    GASTRIC BYPASS  Candelario En Y Gastric Bypass      HX HYSTERECTOMY  06/1994    HX MENISCECTOMY  10/2015    right repari of torn meniscus    HX MYOMECTOMY  06/1984    benign tumor    HX ORTHOPAEDIC  02/1964 / 03/1664    orthopaedic excision Fibrosarcoma and Lymphangiogram    HX PELVIC LAPAROSCOPY  81/9327    large uterine blockage      Family History   Problem Relation Age of Onset    Hypertension Mother     Depression Mother     Cancer Mother     Ovarian Cancer Mother     Breast Problems Mother     Cancer Father     Cancer Sister     Depression Sister     Depression Brother       Current Outpatient Prescriptions   Medication Sig    citalopram (CELEXA) 20 mg tablet Take 1 Tab by mouth daily.  HYDROcodone-acetaminophen (NORCO) 5-325 mg per tablet Take 1 Tab by mouth every eight (8) hours as needed for Pain. Max Daily Amount: 3 Tabs.  cyclobenzaprine (FLEXERIL) 5 mg tablet Take 1 Tab by mouth every twelve (12) hours as needed for Muscle Spasm(s).  pregabalin (LYRICA) 150 mg capsule Take 1 Cap by mouth two (2) times a day. Max Daily Amount: 300 mg.    fluticasone (FLONASE) 50 mcg/actuation nasal spray 2 Sprays by Both Nostrils route daily.  albuterol (PROVENTIL HFA, VENTOLIN HFA, PROAIR HFA) 90 mcg/actuation inhaler Take 2 Puffs by inhalation every four (4) hours as needed for Wheezing or Shortness of Breath.     Dexlansoprazole (DEXILANT) 60 mg CpDB Take 1 Cap by mouth daily.  buPROPion (WELLBUTRIN) 100 mg tablet Take 1 Tab by mouth two (2) times a day.  budesonide-formoterol (SYMBICORT) 160-4.5 mcg/actuation HFAA Take 2 Puffs by inhalation two (2) times a day.  furosemide (LASIX) 20 mg tablet Take 1 Tab by mouth daily as needed.  acetaminophen (TYLENOL ARTHRITIS PAIN) 650 mg TbER Take 1 Tab by mouth three (3) times daily as needed.  ARIPiprazole (ABILIFY) 5 mg tablet Take  by mouth daily.  b complex vitamins tablet Take 1 Tab by mouth daily.  calcium-cholecalciferol, d3, (CALCIUM 600 + D) 600-125 mg-unit tab Take 1,065 mg by mouth nightly. Indications: TAKES 2 AT BEDTIME    omega 3-dha-epa-fish oil (FISH OIL) 100-160-1,000 mg cap Take 1,065 mg by mouth daily.  ferrous sulfate ER (IRON) 160 mg (50 mg iron) TbER tablet Take 1 Tab by mouth daily. Indications: PT TAKES 40 MG    multivitamin (ONE A DAY) tablet Take 1 Tab by mouth daily. No current facility-administered medications for this visit. REVIEW OF SYSTEM   Patient denies: Weight loss, Fever/Chills, HA, Visual changes, Fatigue, Chest pain, SOB, Abdominal pain, N/V/D/C, Blood in stool or urine, Edema. Pertinent positive as above in HPI. All others were negative    PHYSICAL EXAM:   Visit Vitals    /86 (BP 1 Location: Left arm)    Pulse 81    Ht 5' 5\" (1.651 m)    Wt 279 lb 3.2 oz (126.6 kg)    SpO2 97%    BMI 46.46 kg/m2     The patient is a well-developed, well-nourished female   in no acute distress. The patient is alert and oriented times three. The patient is alert and oriented times three. Mood and affect are normal.  LYMPHATIC: lymph nodes are not enlarged and are within normal limits  SKIN: normal in color and non tender to palpation. There are no bruises or abrasions noted. NEUROLOGICAL: Motor sensory exam is within normal limits. Reflexes are equal bilaterally.  There is normal sensation to pinprick and light touch  MUSCULOSKELETAL:  Examination Left knee   Skin Intact   Range of motion 0-110   Effusion +   Medial joint line tenderness +   Lateral joint line tenderness -   Tenderness Pes Bursa -   Tenderness insertion MCL -   Tenderness insertion LCL -   Randas +   Patella crepitus -   Patella grind -   Lachman -   Pivot shift -   Anterior drawer -   Posterior drawer -   Varus stress -   Valgus stress -   Neurovascular Intact   Calf Swelling and Tenderness to Palpation -   Bereket's Test -   Hamstring Cord Tightness -       IMAGING: MRI of the left knee dated 12/7/17 was reviewed and read:   IMPRESSION:  Tear of the posterior root of the medial meniscus. Mild joint effusion. XR of the left knee dated 1/23/18 was reviewed and read:   IMPRESSION:  1. No acute pathology appreciated in the left knee. IMPRESSION:      ICD-10-CM ICD-9-CM    1. Acute medial meniscus tear, left, initial encounter S83.242A 836.0    2. Primary osteoarthritis of left knee M17.12 715.16    3. Morbid obesity with BMI of 45.0-49.9, adult ([facility] E66.01 278.01     Z68.42 V85.42         PLAN:  1. Patient has an MRI documented left medial meniscus tear and degenerative changes. Discussed future visco supplementation. I discussed the risks and benefits and potential adverse outcomes of both operative vs non operative treatment of left medial meniscus tear with the patient. Patient wishes to proceed with arthroscopic left knee medial meniscectomy. Risks of operative intervention include but not limited to bleeding, infection, deep vein thrombosis, pulmonary embolism, death, limb length discrepancy, reflexive sympathetic dystrophy, fat embolism syndrome,damage to blood vessels and nerves, malunion, non-union, delayed union, failure of hardware, post traumatic arthritis, stroke, heart attack, and death. Patient understands that infection may arise and may require numerous surgeries.     History and physical exam scheduled for a later date.    Risk factors include: BMI>45  2. No cortisone injection indicated today   3. No Physical/Occupational Therapy indicated today  4. No diagnostic test indicated today  5. No durable medical equipment indicated today  6. No referral indicated today   7. No medications indicated today  8. No Narcotic indicated today     RTC H&P  Follow-up Disposition: Not on File  Office note will be sent to referring provider.     Scribed by Ramakrishna Marquez ([address] [place] [address] [address]) as dictated by TREY Galvan [address] and Spine Specialist

## 2018-02-12 DIAGNOSIS — S83.242A ACUTE MEDIAL MENISCUS TEAR OF LEFT KNEE, INITIAL ENCOUNTER: Primary | ICD-10-CM

## 2018-02-12 DIAGNOSIS — Z01.818 PREOP EXAMINATION: ICD-10-CM

## 2018-02-21 ENCOUNTER — HOSPITAL ENCOUNTER (OUTPATIENT)
Dept: NON INVASIVE DIAGNOSTICS | Age: 58
Discharge: HOME | End: 2018-02-21
Attending: INTERNAL MEDICINE
Payer: MEDICARE

## 2018-02-21 ENCOUNTER — HOSPITAL ENCOUNTER (OUTPATIENT)
Dept: GENERAL RADIOLOGY | Age: 58
Discharge: HOME | End: 2018-02-21
Attending: INTERNAL MEDICINE
Payer: MEDICARE

## 2018-02-21 DIAGNOSIS — R63.5 RAPID WEIGHT GAIN: ICD-10-CM

## 2018-02-21 DIAGNOSIS — R06.00 DYSPNEA: ICD-10-CM

## 2018-02-21 DIAGNOSIS — Z98.84 BARIATRIC SURGERY STATUS: ICD-10-CM

## 2018-02-21 DIAGNOSIS — K21.9 ESOPHAGEAL REFLUX: ICD-10-CM

## 2018-02-21 DIAGNOSIS — R60.9 EDEMA: ICD-10-CM

## 2018-02-21 PROCEDURE — 74011000255 HC RX REV CODE- 255

## 2018-02-21 PROCEDURE — 74241 XR UPPER GI W KUB/ BA SWALLOW: CPT

## 2018-02-21 PROCEDURE — 93306 TTE W/DOPPLER COMPLETE: CPT

## 2018-02-21 RX ADMIN — BARIUM SULFATE 700 MG: 700 TABLET ORAL at 10:00

## 2018-02-21 RX ADMIN — BARIUM SULFATE 176 G: 960 POWDER, FOR SUSPENSION ORAL at 10:00

## 2018-02-21 NOTE — PROGRESS NOTES
Completed echocardiogram. Report to follow. I removed the band off and placed in shred box.        Jennifer Concepcion, RCS, RDCS

## 2018-03-06 ENCOUNTER — OFFICE VISIT (OUTPATIENT)
Dept: FAMILY MEDICINE CLINIC | Age: 58
End: 2018-03-06

## 2018-03-06 ENCOUNTER — HOSPITAL ENCOUNTER (OUTPATIENT)
Dept: LAB | Age: 58
Discharge: HOME | End: 2018-03-06
Payer: MEDICARE

## 2018-03-06 VITALS
RESPIRATION RATE: 20 BRPM | BODY MASS INDEX: 44.65 KG/M2 | HEIGHT: 65 IN | HEART RATE: 75 BPM | OXYGEN SATURATION: 97 % | TEMPERATURE: 96.1 F | DIASTOLIC BLOOD PRESSURE: 70 MMHG | WEIGHT: 268 LBS | SYSTOLIC BLOOD PRESSURE: 123 MMHG

## 2018-03-06 DIAGNOSIS — S83.242A ACUTE MEDIAL MENISCUS TEAR OF LEFT KNEE, INITIAL ENCOUNTER: ICD-10-CM

## 2018-03-06 DIAGNOSIS — Z01.818 PREOPERATIVE CLEARANCE: ICD-10-CM

## 2018-03-06 DIAGNOSIS — Z01.89 ENCOUNTER FOR LABORATORY EXAMINATION: ICD-10-CM

## 2018-03-06 DIAGNOSIS — S83.207D ACUTE MENISCAL TEAR OF LEFT KNEE, SUBSEQUENT ENCOUNTER: ICD-10-CM

## 2018-03-06 DIAGNOSIS — Z01.818 PREOP EXAMINATION: ICD-10-CM

## 2018-03-06 DIAGNOSIS — Z01.818 PREOPERATIVE CLEARANCE: Primary | ICD-10-CM

## 2018-03-06 LAB
ANION GAP SERPL CALC-SCNC: 7 MMOL/L (ref 3–18)
APPEARANCE UR: CLEAR
ATRIAL RATE: 75 BPM
BACTERIA URNS QL MICRO: NEGATIVE /HPF
BASOPHILS # BLD: 0 K/UL (ref 0–0.06)
BASOPHILS NFR BLD: 0 % (ref 0–2)
BILIRUB UR QL: ABNORMAL
BUN SERPL-MCNC: 11 MG/DL (ref 7–18)
BUN/CREAT SERPL: 12 (ref 12–20)
CALCIUM SERPL-MCNC: 9.6 MG/DL (ref 8.5–10.1)
CALCULATED P AXIS, ECG09: 30 DEGREES
CALCULATED R AXIS, ECG10: -6 DEGREES
CALCULATED T AXIS, ECG11: 11 DEGREES
CHLORIDE SERPL-SCNC: 108 MMOL/L (ref 100–108)
CO2 SERPL-SCNC: 27 MMOL/L (ref 21–32)
COLOR UR: ABNORMAL
CREAT SERPL-MCNC: 0.94 MG/DL (ref 0.6–1.3)
DIAGNOSIS, 93000: NORMAL
DIFFERENTIAL METHOD BLD: ABNORMAL
EOSINOPHIL # BLD: 0.2 K/UL (ref 0–0.4)
EOSINOPHIL NFR BLD: 2 % (ref 0–5)
EPITH CASTS URNS QL MICRO: ABNORMAL /LPF (ref 0–5)
ERYTHROCYTE [DISTWIDTH] IN BLOOD BY AUTOMATED COUNT: 14.9 % (ref 11.6–14.5)
GLUCOSE SERPL-MCNC: 94 MG/DL (ref 74–99)
GLUCOSE UR STRIP.AUTO-MCNC: NEGATIVE MG/DL
HCT VFR BLD AUTO: 47.2 % (ref 35–45)
HGB BLD-MCNC: 15.5 G/DL (ref 12–16)
HGB UR QL STRIP: NEGATIVE
KETONES UR QL STRIP.AUTO: NEGATIVE MG/DL
LEUKOCYTE ESTERASE UR QL STRIP.AUTO: NEGATIVE
LYMPHOCYTES # BLD: 2.8 K/UL (ref 0.9–3.6)
LYMPHOCYTES NFR BLD: 30 % (ref 21–52)
MCH RBC QN AUTO: 30.2 PG (ref 24–34)
MCHC RBC AUTO-ENTMCNC: 32.8 G/DL (ref 31–37)
MCV RBC AUTO: 92 FL (ref 74–97)
MONOCYTES # BLD: 0.4 K/UL (ref 0.05–1.2)
MONOCYTES NFR BLD: 5 % (ref 3–10)
NEUTS SEG # BLD: 5.8 K/UL (ref 1.8–8)
NEUTS SEG NFR BLD: 63 % (ref 40–73)
NITRITE UR QL STRIP.AUTO: NEGATIVE
P-R INTERVAL, ECG05: 144 MS
PH UR STRIP: 5 [PH] (ref 5–8)
PLATELET # BLD AUTO: 417 K/UL (ref 135–420)
PMV BLD AUTO: 10.6 FL (ref 9.2–11.8)
POTASSIUM SERPL-SCNC: 4.4 MMOL/L (ref 3.5–5.5)
PROT UR STRIP-MCNC: NEGATIVE MG/DL
Q-T INTERVAL, ECG07: 402 MS
QRS DURATION, ECG06: 84 MS
QTC CALCULATION (BEZET), ECG08: 448 MS
RBC # BLD AUTO: 5.13 M/UL (ref 4.2–5.3)
RBC #/AREA URNS HPF: 0 /HPF (ref 0–5)
SODIUM SERPL-SCNC: 142 MMOL/L (ref 136–145)
SP GR UR REFRACTOMETRY: 1.02 (ref 1–1.03)
UROBILINOGEN UR QL STRIP.AUTO: 0.2 EU/DL (ref 0.2–1)
VENTRICULAR RATE, ECG03: 75 BPM
WBC # BLD AUTO: 9.3 K/UL (ref 4.6–13.2)
WBC URNS QL MICRO: ABNORMAL /HPF (ref 0–4)

## 2018-03-06 PROCEDURE — 93005 ELECTROCARDIOGRAM TRACING: CPT

## 2018-03-06 PROCEDURE — 80048 BASIC METABOLIC PNL TOTAL CA: CPT | Performed by: PHYSICIAN ASSISTANT

## 2018-03-06 PROCEDURE — 81001 URINALYSIS AUTO W/SCOPE: CPT | Performed by: NURSE PRACTITIONER

## 2018-03-06 PROCEDURE — 85025 COMPLETE CBC W/AUTO DIFF WBC: CPT | Performed by: PHYSICIAN ASSISTANT

## 2018-03-06 RX ORDER — PHENTERMINE HYDROCHLORIDE 8 MG/1
TABLET ORAL
Refills: 5 | COMMUNITY
Start: 2018-03-02 | End: 2019-05-13

## 2018-03-06 NOTE — MR AVS SNAPSHOT
[facility] [address] 
[phone] Patient: Jose Macias MRN: [id number] [facility]:[birth year] Visit Information Date & Time Provider Department Dept. Phone Encounter #  
 3/6/2018  8:00 AM Felisha De Dios NP [address] [phone] [id number] Follow-up Instructions Return in about 2 months (around 5/6/2018) for chronic disease routine care- 30 min. Kevin Levine Your Appointments 3/8/2018 11:00 AM  
HISTORY AND PHYSICAL with RHONDA Mcfarlane  
[facility] ([facility]) Appt Note: LEFT KNEE ARTHROSCOPIC PARTIAL MEDIAL  MENISECTOMY [address], [address]  
[phone] [address], [address]  
  
    
 3/23/2018  7:30 AM  
SAME DAY with Felisha De Dios NP [address] ([facility]) Appt Note: chronic disease routine care- 30 min. [address]  
  
   
 [address]  
  
    
 3/23/2018  1:00 PM  
[facility] ([facility] Appt Note: LEFT KNEE ARTHROSCOPIC PARTIAL MEDIAL  MENISECTOMY [address], [address]  
[phone] [address] [facility], [facility] [address] Upcoming Health Maintenance Date Due  
 MEDICARE YEARLY EXAM 9/15/2018 DTaP/Tdap/Td series (2 - Td) 2/10/2019 Bone Densitometry 10/3/2019 BREAST CANCER SCRN MAMMOGRAM 10/3/2019 COLONOSCOPY 9/20/2021 Pneumococcal 19-64 Highest Risk (3 of 3 - PPSV23) 11/22/2022 Allergies as of 3/6/2018  Review Complete On: 3/6/2018 By: Felisha De Dios NP Severity Noted Reaction Type Reactions Adhesive Tape-silicones High 72/45/6859   Topical Swelling REALLY BAD SWELLING AND SORE APPEAR Alcohol High 07/13/2017   Intolerance Other (comments) PT STATES SHE IS AN ALCOHOLIC Lactose High 07/13/2017   Intolerance Other (comments) PT STATES IT MAKES HER STOMACH HURT Percodan [Oxycodone Hcl-oxycodone-asa] High 07/13/2017   Systemic Itching, Other (comments) crying Nsaids (Non-steroidal Anti-inflammatory Drug)  07/13/2017   Intolerance Other (comments) PT NOT ABLE TO TAKE DUE TO GASTRIC BYPASS Current Immunizations  Reviewed on 10/6/2017 Name Date Influenza Vaccine 10/24/2013, 10/3/2012, 9/16/2011 Influenza Vaccine (Quad) PF 9/14/2017 Pneumococcal Conjugate (PCV-13) 10/24/2013 Pneumococcal Polysaccharide (PPSV-23) 11/22/2017 TB Skin Test (PPD) Intradermal 10/6/2017 Tdap 2/10/2009 Not reviewed this visit You Were Diagnosed With   
  
 Codes Comments Preoperative clearance    -  Primary ICD-10-CM: N49.536 ICD-9-CM: V72.84 Encounter for laboratory examination     ICD-10-CM: Z01.89 ICD-9-CM: V72.60 Vitals BP Pulse Temp Resp Height(growth percentile) Weight(growth percentile) 123/70 (BP 1 Location: Left arm, BP Patient Position: Sitting) 75 96.1 °F (35.6 °C) (Oral) 20 5' 5\" (1.651 m) 268 lb (121.6 kg) SpO2 BMI OB Status Smoking Status 97% 44.6 kg/m2 Hysterectomy Current Every Day Smoker Vitals History BMI and BSA Data Body Mass Index Body Surface Area  
 44.6 kg/m 2 2.36 m 2 Preferred Pharmacy Pharmacy Name Phone [facility] [address] [place] [address] AT [facility] [phone] Your Updated Medication List  
  
   
This list is accurate as of 3/6/18  8:40 AM.  Always use your most recent med list.  
  
  
  
  
 ABILIFY 5 mg tablet Generic drug:  ARIPiprazole Take  by mouth daily. acetaminophen 650 mg Tber Commonly known as:  TYLENOL ARTHRITIS PAIN  
 Take 1 Tab by mouth three (3) times daily as needed. albuterol 90 mcg/actuation inhaler Commonly known as:  PROVENTIL HFA, VENTOLIN HFA, PROAIR HFA Take 2 Puffs by inhalation every four (4) hours as needed for Wheezing or Shortness of Breath.  
  
 b complex vitamins tablet Take 1 Tab by mouth daily. budesonide-formoterol 160-4.5 mcg/actuation Hfaa Commonly known as:  SYMBICORT Take 2 Puffs by inhalation two (2) times a day. buPROPion 100 mg tablet Commonly known as:  [facility] Take 1 Tab by mouth two (2) times a day. CALCIUM 600 + D 600-125 mg-unit Tab Generic drug:  calcium-cholecalciferol (d3) Take 1,065 mg by mouth nightly. Indications: TAKES 2 AT BEDTIME  
  
 citalopram 20 mg tablet Commonly known as:  Drucilla Eng Take 1 Tab by mouth daily. cyclobenzaprine 5 mg tablet Commonly known as:  FLEXERIL Take 1 Tab by mouth every twelve (12) hours as needed for Muscle Spasm(s). Dexlansoprazole 60 mg Cpdb Commonly known as:  DEXILANT Take 1 Cap by mouth daily. FISH -160-1,000 mg Cap Generic drug:  omega 3-dha-epa-fish oil Take 1,065 mg by mouth daily. fluticasone 50 mcg/actuation nasal spray Commonly known as:  Theadore David 2 Sprays by Both Nostrils route daily. furosemide 20 mg tablet Commonly known as:  LASIX Take 1 Tab by mouth daily as needed. HYDROcodone-acetaminophen 5-325 mg per tablet Commonly known as:  Treasure Na Take 1 Tab by mouth every eight (8) hours as needed for Pain. Max Daily Amount: 3 Tabs. Iron 160 mg (50 mg iron) Tber tablet Generic drug:  ferrous sulfate ER Take 1 Tab by mouth daily. Indications: PT TAKES 40 MG  
  
 multivitamin tablet Commonly known as:  ONE A DAY Take 1 Tab by mouth daily. pregabalin 150 mg capsule Commonly known as:  Mercedes Lone Take 1 Cap by mouth two (2) times a day. Max Daily Amount: 300 mg. We Performed the Following COLLECTION VENOUS BLOOD,VENIPUNCTURE [id number] CPT(R)] Follow-up Instructions Return in about 2 months (around 5/6/2018) for chronic disease routine care- 30 min. Parmjit Lock To-Do List   
 Around 03/07/2018 Lab:  URINALYSIS W/MICROSCOPIC Introducing [facility]! Dear Jeanette Wren: 
Thank you for requesting a reBounces account. Our records indicate that you already have an active reBounces account. You can access your account anytime at [email or web address] Did you know that you can access your hospital and ER discharge instructions at any time in reBounces? You can also review all of your test results from your hospital stay or ER visit. Additional Information If you have questions, please visit the Frequently Asked Questions section of the reBounces website at [email or web address] Remember, reBounces is NOT to be used for urgent needs. For medical emergencies, dial 911. Now available from your iPhone and Android! Please provide this summary of care documentation to your next provider. Your primary care clinician is listed as Orlando Nicholson. If you have any questions after today's visit, please call [phone].

## 2018-03-06 NOTE — PROGRESS NOTES
OFFICE NOTE    Lucía Wong is a 59 y.o. female presenting today for office visit. 3/6/2018  8:21 AM      Chief Complaint   Patient presents with    Pre-op Exam     pt here for pre-op for left knee repair         HPI: Here today for preoperative examination for left knee arthroscopic partial meniscectomy with Dr Ilsa Holter on 3/16/18. She denies any complaints today and has been working on losing weight through the [employer]- newly started on Phentermine (Lomaira) 8 mg. Last chronic disease routine care visit with me 12/14/17- significant health diagnoses of COPD, sleep apnea, obesity, and depression. Other conditions include allergies, GERD, HNP, and spinal stenosis. Review of Systems   Constitutional: Negative for chills, fatigue and fever. HENT: Negative for congestion, ear pain, sinus pressure and sore throat. Eyes: Negative for visual disturbance. Respiratory: Negative for cough, shortness of breath and wheezing. Cardiovascular: Negative for chest pain, palpitations and leg swelling. Gastrointestinal: Negative for abdominal pain, constipation, diarrhea, nausea and vomiting. Endocrine: Negative for cold intolerance, heat intolerance, polydipsia, polyphagia and polyuria. Genitourinary: Negative for difficulty urinating, dysuria and frequency. Musculoskeletal: Positive for arthralgias. Skin: Negative for rash. Neurological: Negative for dizziness, weakness, numbness and headaches. Psychiatric/Behavioral: Negative for dysphoric mood and sleep disturbance. The patient is not nervous/anxious.           PHQ Screening   PHQ over the last two weeks 9/14/2017   Little interest or pleasure in doing things Not at all   Feeling down, depressed or hopeless Not at all   Total Score PHQ 2 0         History  Past Medical History:   Diagnosis Date    [facility]ism in remission ([facility] [address]     Chronic obstructive pulmonary disease ([facility] [address]     Fibrosarcoma ([facility] [address] 1963    connective tissue cancer    GERD (gastroesophageal reflux disease)     HNP (herniated nucleus pulposus), lumbar     patient reported    Lung nodules     monitor yearly- stable 10/2017    Osteopenia 10/03/2017    Recurrent depression ([facility] [address]     Sleep apnea     Spinal stenosis, lumbar     patient reported       Past Surgical History:   Procedure Laterality Date    HX ARTHRODESIS  01/2000    Herniated Disc, arthritis right foot 06/06, 07/07, C 6/7, C 4/6 Stenosis    HX CHOLECYSTECTOMY  09/2008    gallbladder disease    HX COLONOSCOPY  05/2009    HX GI  2012    GASTRIC BYPASS  Candelario En Y Gastric Bypass      HX HYSTERECTOMY  06/1994    HX MENISCECTOMY  10/2015    right repari of torn meniscus    HX MYOMECTOMY  06/1984    benign tumor    HX ORTHOPAEDIC  02/1964 / 03/1664    orthopaedic excision Fibrosarcoma and Lymphangiogram    HX PELVIC LAPAROSCOPY  82/4922    large uterine blockage       Social History     Social History    Marital status:      Spouse name: N/A    Number of children: N/A    Years of education: N/A     Occupational History    Not on file. Social History Main Topics    Smoking status: Current Every Day Smoker    Smokeless tobacco: Never Used    Alcohol use No    Drug use: No    Sexual activity: No     Other Topics Concern    Not on file     Social History Narrative       Allergies   Allergen Reactions    Adhesive Tape-Silicones Swelling     REALLY BAD SWELLING AND SORE APPEAR    Alcohol Other (comments)     PT STATES SHE IS AN ALCOHOLIC    Lactose Other (comments)     PT STATES IT MAKES HER STOMACH HURT    Percodan [Oxycodone Hcl-Oxycodone-Asa] Itching and Other (comments)     crying    Nsaids (Non-Steroidal Anti-Inflammatory Drug) Other (comments)     PT NOT ABLE TO TAKE DUE TO GASTRIC BYPASS       Current Outpatient Prescriptions   Medication Sig Dispense Refill    citalopram (CELEXA) 20 mg tablet Take 1 Tab by mouth daily.  90 Tab 1    pregabalin (LYRICA) 150 mg capsule Take 1 Cap by mouth two (2) times a day. Max Daily Amount: 300 mg. 180 Cap 1    fluticasone (FLONASE) 50 mcg/actuation nasal spray 2 Sprays by Both Nostrils route daily. 3 Bottle 4    albuterol (PROVENTIL HFA, VENTOLIN HFA, PROAIR HFA) 90 mcg/actuation inhaler Take 2 Puffs by inhalation every four (4) hours as needed for Wheezing or Shortness of Breath. 3 Inhaler 4    Dexlansoprazole (DEXILANT) 60 mg CpDB Take 1 Cap by mouth daily. 90 Cap 1    buPROPion (WELLBUTRIN) 100 mg tablet Take 1 Tab by mouth two (2) times a day. 180 Tab 1    budesonide-formoterol (SYMBICORT) 160-4.5 mcg/actuation HFAA Take 2 Puffs by inhalation two (2) times a day. 6 Inhaler 1    furosemide (LASIX) 20 mg tablet Take 1 Tab by mouth daily as needed. 30 Tab 2    acetaminophen (TYLENOL ARTHRITIS PAIN) 650 mg TbER Take 1 Tab by mouth three (3) times daily as needed. 100 Tab 0    b complex vitamins tablet Take 1 Tab by mouth daily.  calcium-cholecalciferol, d3, (CALCIUM 600 + D) 600-125 mg-unit tab Take 1,065 mg by mouth nightly. Indications: TAKES 2 AT BEDTIME      omega 3-dha-epa-fish oil (FISH OIL) 100-160-1,000 mg cap Take 1,065 mg by mouth daily.  ferrous sulfate ER (IRON) 160 mg (50 mg iron) TbER tablet Take 1 Tab by mouth daily. Indications: PT TAKES 40 MG      multivitamin (ONE A DAY) tablet Take 1 Tab by mouth daily.  LOMAIRA 8 mg tab TK 1 T PO  BID  5    HYDROcodone-acetaminophen (NORCO) 5-325 mg per tablet Take 1 Tab by mouth every eight (8) hours as needed for Pain. Max Daily Amount: 3 Tabs. 20 Tab 0    cyclobenzaprine (FLEXERIL) 5 mg tablet Take 1 Tab by mouth every twelve (12) hours as needed for Muscle Spasm(s). 20 Tab 0    ARIPiprazole (ABILIFY) 5 mg tablet Take  by mouth daily.            Patient Care Team:  Patient Care Team:  Umang Purvis NP as PCP - General (Nurse Practitioner)  Ha Gill DO (Orthopedic Surgery)  Uma Nicholson NP (Neurology)        LABS:  None new to review    RADIOLOGY:  None new to review      Physical Exam   Constitutional: She is oriented to person, place, and time. She appears well-developed and well-nourished. No distress. Neck: Normal range of motion. Neck supple. No thyromegaly present. Cardiovascular: Normal rate, regular rhythm and normal heart sounds. No murmur heard. Pulmonary/Chest: Effort normal and breath sounds normal. No respiratory distress. Abdominal: Soft. Bowel sounds are normal. There is no tenderness. Musculoskeletal: She exhibits no edema. +knee pain reported   Neurological: She is alert and oriented to person, place, and time. She exhibits normal muscle tone. Coordination and gait normal.   Skin: Skin is warm and dry. Vitals:    03/06/18 0808   BP: 123/70   Pulse: 75   Resp: 20   Temp: 96.1 °F (35.6 °C)   TempSrc: Oral   SpO2: 97%   Weight: 268 lb (121.6 kg)   Height: 5' 5\" (1.651 m)   PainSc:   6       Wt Readings from Last 3 Encounters:   03/06/18 268 lb (121.6 kg)   02/07/18 279 lb 3.2 oz (126.6 kg)   02/02/18 282 lb (127.9 kg)         Assessment and Plan    Preoperative clearance/ Acute meniscal tear of left knee, subsequent encounter  *Preoperative examination completed today, but labs and EKG were not completed by patient before visit; therefore, once resulted final clearance can be given. Labs done in office today per orthopedics order and U/A added. Encouraged to have EKG done at facility as previously ordered by orthopedics. *Historically has pretty well controlled chronic conditions. - URINALYSIS W/MICROSCOPIC; Future    Encounter for laboratory examination  - COLLECTION VENOUS BLOOD,VENIPUNCTURE      *Plan of care reviewed with patient. Patient in agreement with plan and expresses understanding. All questions answered and patient encouraged to call or RTO if further questions or concerns.        Follow-up Disposition:  Return in about 2 months (around 5/6/2018) for chronic disease routine care- 27 min.. Move upcoming appointment to in May 2018.

## 2018-03-08 ENCOUNTER — OFFICE VISIT (OUTPATIENT)
Dept: ORTHOPEDIC SURGERY | Age: 58
End: 2018-03-08

## 2018-03-08 VITALS
DIASTOLIC BLOOD PRESSURE: 80 MMHG | WEIGHT: 270 LBS | HEIGHT: 65 IN | HEART RATE: 85 BPM | SYSTOLIC BLOOD PRESSURE: 120 MMHG | TEMPERATURE: 97.6 F | BODY MASS INDEX: 44.98 KG/M2 | OXYGEN SATURATION: 96 %

## 2018-03-08 DIAGNOSIS — S83.242D OTHER TEAR OF MEDIAL MENISCUS OF LEFT KNEE AS CURRENT INJURY, SUBSEQUENT ENCOUNTER: Primary | ICD-10-CM

## 2018-03-08 RX ORDER — HYDROCODONE BITARTRATE AND ACETAMINOPHEN 10; 325 MG/1; MG/1
1-2 TABLET ORAL
Qty: 60 TAB | Refills: 0 | Status: SHIPPED | OUTPATIENT
Start: 2018-03-08 | End: 2018-05-03

## 2018-03-08 NOTE — H&P
HISTORY AND PHYSICAL          Patient: Abdulkadir Denise                MRN: [id number]       SSN: [id number]  YOB: 1960          AGE: 59 y.o. SEX: female      Patient scheduled for:  Left knee arthroscopic partial medial menisectomy    Surgeon: Jonnie Hutchison MD    ANESTHESIA TYPE:  General    HISTORY:     The patient was seen in the office today for a preoperative history and physical for an upcoming above listed surgery. The patient is a pleasant 59 y.o. female who has a history of left knee pain. Patient was referred by Dr. Brice Lindsey. she rates her pain 7/10 today. Pain has been present for one year, worsening over the last few months. Patient describes the pain as aching, sharp and stabbing that is Intermittent in nature. Symptoms are worse with certain movements of the leg, standing up after sitting for a long time, getting in bed, getting dressed, prolonged walking and standing, Activity and is better with  Rest. Associated symptoms include stiffness, swelling. Since problem started, it: has worsened. Pain does wake patient up at night. Has Flexeril, Norco, and Tylenol recent medications for the problem. Has tried following treatments: Injections:YES; Brace:NO; Therapy:NO; Cane/Crutch:NO       Due to the current findings, affected activity of daily living and continued pain and discomfort, surgical intervention is indicated. The alternatives, risks, and complications, including but not limited to infection, blood loss, need for blood transfusion, neurovascular damage, alma-incisional numbness, subcutaneous hematoma, bone fracture, anesthetic complications, DVT, PE, death, RSD, postoperative stiffness and pain, possible surgical scar, delayed healing and nonhealing, reflexive sympathetic dystrophy, damage to blood vessels and nerves, need for more surgery, MI, and stroke,  failure of hardware, gait disturbances,have been discussed.   The patient understands and wishes to proceed with surgery. 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 Max Daily Amount: 12 Tabs. Do not take until after surgery 60 Tab 0    LOMAIRA 8 mg tab TK 1 T PO  BID  5    citalopram (CELEXA) 20 mg tablet Take 1 Tab by mouth daily. 90 Tab 1    HYDROcodone-acetaminophen (NORCO) 5-325 mg per tablet Take 1 Tab by mouth every eight (8) hours as needed for Pain. Max Daily Amount: 3 Tabs. 20 Tab 0    cyclobenzaprine (FLEXERIL) 5 mg tablet Take 1 Tab by mouth every twelve (12) hours as needed for Muscle Spasm(s). 20 Tab 0    pregabalin (LYRICA) 150 mg capsule Take 1 Cap by mouth two (2) times a day. Max Daily Amount: 300 mg. 180 Cap 1    fluticasone (FLONASE) 50 mcg/actuation nasal spray 2 Sprays by Both Nostrils route daily. 3 Bottle 4    albuterol (PROVENTIL HFA, VENTOLIN HFA, PROAIR HFA) 90 mcg/actuation inhaler Take 2 Puffs by inhalation every four (4) hours as needed for Wheezing or Shortness of Breath. 3 Inhaler 4    Dexlansoprazole (DEXILANT) 60 mg CpDB Take 1 Cap by mouth daily. 90 Cap 1    buPROPion (WELLBUTRIN) 100 mg tablet Take 1 Tab by mouth two (2) times a day. 180 Tab 1    budesonide-formoterol (SYMBICORT) 160-4.5 mcg/actuation HFAA Take 2 Puffs by inhalation two (2) times a day.  6 Inhaler 1    furosemide (LASIX) 20 mg tablet Take 1 Tab by mouth daily as needed. 30 Tab 2    acetaminophen (TYLENOL ARTHRITIS PAIN) 650 mg TbER Take 1 Tab by mouth three (3) times daily as needed. 100 Tab 0    ARIPiprazole (ABILIFY) 5 mg tablet Take  by mouth daily.  b complex vitamins tablet Take 1 Tab by mouth daily.  calcium-cholecalciferol, d3, (CALCIUM 600 + D) 600-125 mg-unit tab Take 1,065 mg by mouth nightly. Indications: TAKES 2 AT BEDTIME      omega 3-dha-epa-fish oil (FISH OIL) 100-160-1,000 mg cap Take 1,065 mg by mouth daily.  ferrous sulfate ER (IRON) 160 mg (50 mg iron) TbER tablet Take 1 Tab by mouth daily. Indications: PT TAKES 40 MG      multivitamin (ONE A DAY) tablet Take 1 Tab by mouth daily.          ALLERGIES:     Allergies   Allergen Reactions    Adhesive Tape-Silicones Swelling     REALLY BAD SWELLING AND SORE APPEAR    Alcohol Other (comments)     PT STATES SHE IS AN ALCOHOLIC    Lactose Other (comments)     PT STATES IT MAKES HER STOMACH HURT    Percodan [Oxycodone Hcl-Oxycodone-Asa] Itching and Other (comments)     crying    Nsaids (Non-Steroidal Anti-Inflammatory Drug) Other (comments)     PT NOT ABLE TO TAKE DUE TO GASTRIC BYPASS         SURGICAL HISTORY:     Past Surgical History:   Procedure Laterality Date    HX ARTHRODESIS  01/2000    Herniated Disc, arthritis right foot 06/06, 07/07, C 6/7, C 4/6 Stenosis    HX CHOLECYSTECTOMY  09/2008    gallbladder disease    HX COLONOSCOPY  05/2009    HX GI  2012    GASTRIC BYPASS  [facility] Gastric Bypass      HX HYSTERECTOMY  06/1994    HX MENISCECTOMY  10/2015    right repari of torn meniscus    HX MYOMECTOMY  06/1984    benign tumor    HX ORTHOPAEDIC  02/1964 / 03/1664    orthopaedic excision Fibrosarcoma and Lymphangiogram    HX PELVIC LAPAROSCOPY  21/1006    large uterine blockage    NEUROLOGICAL PROCEDURE UNLISTED  2000, 2007    Cervical fusion       SOCIAL HISTORY:     Social History     Social History    Marital status:      Spouse name: N/A    Number of children: N/A    Years of education: N/A     Social History Main Topics    Smoking status: Current Every Day Smoker     Packs/day: 1.00     Years: 45.00    Smokeless tobacco: Never Used    Alcohol use No    Drug use: No      Comment: marijuana, cocaine 30 yrs ago    Sexual activity: No     Other Topics Concern    None     Social History Narrative       FAMILY HISTORY:     Family History   Problem Relation Age of Onset    Hypertension Mother     Depression Mother     Cancer Mother     Ovarian Cancer Mother     Breast Problems Mother     Cancer Father     Cancer Sister     Depression Sister     Depression Brother        REVIEW OF SYSTEMS:     Negative for fevers, chills, chest pain, shortness of breath, weight loss, recent illness     General: Negative for fever and chills. No unexpected change in weight. Denies fatigue. No change in appetite. Skin: Negative for rash or itching. HEENT: Negative for congestion, sore throat, neck pain and neck stiffness. No change in vision or hearing. Hasn't noted any enlarged lymph nodes in the neck. Cardiovascular:  Negative for chest pain and palpitations. Has not noted pedal edema. Respiratory: Negative for cough, colds, sinus, hemoptysis, shortness of breath and wheezing. Gastrointestinal: Negative for nausea and vomiting, rectal bleeding, coffee ground emesis, abdominal pain, diarrhea and constipation. Genitourinary: Negative for dysuria, frequency urgency, or burning on micturition. No flank pain, no foul smelling urine, no difficulty with initiating urination. Hematological: Negative for bleeding or easy bruising. Musculoskeletal: Negative  for arthralgias, back pain or neck pain. Neurological: Negative for dizziness, seizures or syncopal episodes. Denies headaches. Endocrine: Denies excessive thirst.  No heat/cold intolerance. Psychiatric: Negative for depression or insomnia.     PHYSICAL EXAMINATION:     VITALS:   Visit Vitals    /80  Pulse 85    Temp 97.6 °F (36.4 °C)    Ht 5' 5\" (1.651 m)    Wt 270 lb (122.5 kg)    SpO2 96%    BMI 44.93 kg/m2     GEN:  Well developed, well nourished 59 y.o. female in no acute distress. HEENT: Normocephalic and atraumatic. Eyes: Conjunctivae and EOM are normal.Pupils are equal, round, and reactive to light. External ear normal appearance, external nose normal appearing. Mouth/Throat: Oropharynx is clear and moist, able to handle oral secretions w/out difficulty, airway patent  NECK: Supple. Normal ROM, No lymphadenopathy. Trachea is midline. No bruising, swelling or deformity  RESP: Clear to auscultation bilaterally. No wheezes, rales, rhonchi. Normal effort and breath sounds. No respiratory distress  CARDIO:  Normal rate, regular rhythm and normal heart sounds. No MGR. ABDOMEN: Soft, non-tender, non-distended, normoactive bowel sounds in all four quadrants. There is no tenderness. There is no rebound and no guarding. BACK: No CVA or spinal tenderness  BREAST:  Deferred  PELVIC:    Deferred   RECTAL:  Deferred   :           Deferred  EXTREMITIES: EXAMINATION OF: left knee  Examination Left knee   Skin Intact   Range of motion 0-110   Effusion +   Medial joint line tenderness +   Lateral joint line tenderness -   Tenderness Pes Bursa -   Tenderness insertion MCL -   Tenderness insertion LCL -   Randas -   Patella crepitus +   Patella grind -   Lachman -   Pivot shift -   Anterior drawer -   Posterior drawer -   Varus stress -   Valgus stress -   Neurovascular Intact   Calf Swelling and Tenderness to Palpation -   Bereket's Test -   Hamstring Cord Tightness -       NEUROVASCULAR: Sensation intact to light touch and strength grossly intact and symmetrical. No nystagmus. Positive distal pulses and capillary refill. DVT ASSESSMENT:  There is not  calf tenderness. No evidence of DVT seen on physical exam.  MOTOR: In tact  PSYCH: Alert an oriented to person, place and time.  Mood, memory, affect, behavior and judgment normal       RADIOGRAPHS & DIAGNOSTIC STUDIES:     MRI/xray reveals : MRI of the left knee dated 12/7/17 was reviewed and read:   IMPRESSION:  Tear of the posterior root of the medial meniscus. Mild joint effusion. LABS:       @  CBC:   Lab Results   Component Value Date/Time    WBC 9.3 03/06/2018 09:55 AM    RBC 5.13 03/06/2018 09:55 AM    HGB 15.5 03/06/2018 09:55 AM    HCT 47.2 (H) 03/06/2018 09:55 AM    PLATELET 635 15/58/1497 09:55 AM    and BMP:   Lab Results   Component Value Date/Time    Glucose 94 03/06/2018 09:55 AM    Sodium 142 03/06/2018 09:55 AM    Potassium 4.4 03/06/2018 09:55 AM    Chloride 108 03/06/2018 09:55 AM    CO2 27 03/06/2018 09:55 AM    BUN 11 03/06/2018 09:55 AM    Creatinine 0.94 03/06/2018 09:55 AM    Calcium 9.6 03/06/2018 09:55 AM   @    Preoperative labs were reviewed and are substantially within normal limits   EKG: Normal sinus rhythm   Minimal voltage criteria for LVH, may be normal variant   Borderline ECG   No previous ECGs available   Confirmed by Azeem Cortez ([id number]) on 3/6/2018 11:57:52 AM       ASSESSMENT:       Encounter Diagnosis   Name Primary?  Other tear of medial meniscus of left knee as current injury, subsequent encounter Yes       PLAN:     Again, the alternatives, risks, and complications, as well as expected outcome were discussed. The patient understands and agrees to proceed with left knee arthroscopic partial medial menisectomy. Patient has been cleared by her PCP.  Patient given orders listed below:    Orders Placed This Encounter    HYDROcodone-acetaminophen (NORCO)  mg tablet         Alva Traylor PA-C  3/8/2018  11:43 AM

## 2018-03-09 NOTE — DISCHARGE INSTRUCTIONS
Dr. Olga Garcia Knee Arthroscopy  Postoperative Information    You will be given a prescription for pain medication. It may be taken every 4-6 hours as needed for the first 4-5 days. You may elevate your leg and place an ice pack on top of the dressing in  order to prevent swelling. A soft bandage was placed on your knee to soak up blood and fluid. You may take the bandage off the day after surgery, carefully cleaning the incision sites with peroxide. Band-Aids should be used for the first 4-5 days over each incision. There are 2 small incisions in your knee that may be sore and develop bruising over the next several days. This should resolve over the next few weeks. No special care will be needed. You should expect swelling in the area. You may elevate your leg and apply an icepack on top of the dressing to help minimize the swelling. Deep massage to the lower leg may also be utilized. It is safe to take a shower two days after surgery. You may begin bearing weight on your leg with the use of crutches for the first 4-5 days. Apply as much weight as tolerated so that at the end of 4-5 days, you can walk without crutches. Avoid prolonged walking or standing during the first few weeks after surgery. During your first week please work on gentle range of motion of your knee. Even though your incisions are small, there has been an operation inside and around the knee joint. Complete healing may take several months. If you have a high temperature, unexpected pain, redness or swelling, or any drainage around your knee area, please call my office immediately. Please make an appointment to return to my office in one week. Dr. Olga Garcia office number [phone]         Knee Arthroscopy: What to Expect at Home  Your Recovery    Arthroscopy is a way to find problems and do surgery inside a joint without making a large cut (incision).  Your doctor put a lighted tube with a tiny camera-called an arthroscope, or scope-and surgical tools through small incisions in your knee. You will feel tired for several days. Your knee will be swollen, and you may notice that your skin is a different color near the cuts (incisions). The swelling is normal and will start to go away in a few days. Keeping your leg higher than your heart will help with swelling and pain. You will probably need about 6 weeks to recover. If your doctor repaired damaged tissue, recovery will take longer. You may have to limit your activity until your knee strength and movement return to normal. You may also be in a physical rehabilitation (rehab) program.  You may be able to return to a desk job or your normal routine in a few days. But if you do physical labor, it may be as long as 2 months before you can return to work. This care sheet gives you a general idea about how long it will take for you to recover. But each person recovers at a different pace. Follow the steps below to get better as quickly as possible. How can you care for yourself at home? Activity  ? · Rest when you feel tired. Getting enough sleep will help you recover. Use pillows to raise your ankle and leg above the level of your heart. ? · Try to walk each day, after your doctor has said you can. Start by walking a little more than you did the day before. Bit by bit, increase the amount you walk. Walking boosts blood flow and helps prevent pneumonia and constipation. ? · You may have a brace or crutches or both. ? · Your doctor will tell you how often and how much you can move your leg and knee. ? · If you have a desk job, you may be able to return to work a few days after the surgery. If you lift things or stand or walk a lot at work, it may be as long as 2 months before you can return. ? · You can take a shower 48 to 72 hours after surgery and clean the incisions with regular soap and water.  Do not take a bath or soak your knee until your doctor says it is okay.   ? · Ask your doctor when you can drive again. ? · If you had a repair of torn tissue, follow your doctor's instructions for lifting things or moving your knee. Diet  ? · You can eat your normal diet. If your stomach is upset, try bland, low-fat foods like plain rice, broiled chicken, toast, and yogurt. ? · Drink plenty of fluids, unless your doctor tells you not to. ? · You may notice that your bowel movements are not regular right after your surgery. This is common. Try to avoid constipation and straining with bowel movements. You may want to take a fiber supplement every day. If you have not had a bowel movement after a couple of days, ask your doctor about taking a mild laxative. Medicines  ? · Your doctor will tell you if and when you can restart your medicines. He or she will also give you instructions about taking any new medicines. ? · If you take blood thinners, such as warfarin (Coumadin), clopidogrel (Plavix), or aspirin, be sure to talk to your doctor. He or she will tell you if and when to start taking those medicines again. Make sure that you understand exactly what your doctor wants you to do. ? · Be safe with medicines. Take pain medicines exactly as directed. ¨ If the doctor gave you a prescription medicine for pain, take it as prescribed. ¨ If you are not taking a prescription pain medicine, ask your doctor if you can take an over-the-counter medicine. ? · If you think your pain medicine is making you sick to your stomach:  ¨ Take your medicine after meals (unless your doctor has told you not to). ¨ Ask your doctor for a different pain medicine. ? · If your doctor prescribed antibiotics, take them as directed. Do not stop taking them just because you feel better. You need to take the full course of antibiotics. Incision care  ? · If you have a dressing over your cuts (incisions), keep it clean and dry. You may remove it 48 to 72 hours after the surgery.    ? · If your incisions are open to the air, keep the area clean and dry. ? · If you have strips of tape on the incisions, leave the tape on for a week or until it falls off. Exercise  ? · Move your toes and ankle as much as your bandages will allow. ? · Bend and straighten your knee slowly several times during the day. ? · Depending on why you had the surgery, you may have to do ankle and leg exercises. Your doctor or physical therapist will give you exercises as part of a rehabilitation program.   ? · Stop any activity that causes sharp pain. Talk to your doctor or physical therapist about what sports or other exercise you can do. Ice and elevation  ? · To reduce swelling and pain, put ice or a cold pack on your knee for 10 to 20 minutes at a time. Do this every 1 to 2 hours. Put a thin cloth between the ice and your skin. Follow-up care is a key part of your treatment and safety. Be sure to make and go to all appointments, and call your doctor if you are having problems. It's also a good idea to know your test results and keep a list of the medicines you take. When should you call for help? Call 911 anytime you think you may need emergency care. For example, call if:  ? · You passed out (lost consciousness). ? · You have severe trouble breathing. ? · You have sudden chest pain and shortness of breath, or you cough up blood. ?Call your doctor now or seek immediate medical care if:  ? · Your foot or toes are numb or tingling. ? · Your foot is cool or pale, or it changes color. ? · You have signs of a blood clot, such as:  ¨ Pain in your calf, back of the knee, thigh, or groin. ¨ Redness and swelling in your leg or groin. ? · You are sick to your stomach or cannot keep fluids down. ? · You have pain that does not get better after you take pain medicine. ? · You have loose stitches, or your incision comes open. ? · Bright red blood has soaked through the bandage over your incision.    ? · You have signs of infection, such as:  ¨ Increased pain, swelling, warmth, or redness. ¨ Red streaks leading from the incisions. ¨ Pus draining from the incisions. ¨ A fever. ? Watch closely for any changes in your health, and be sure to contact your doctor if:  ? · You do not have a bowel movement after taking a laxative. Where can you learn more? Go to http://steve-anand.info/. Enter M892 in the search box to learn more about \"Knee Arthroscopy: What to Expect at Home. \"  Current as of: March 21, 2017  Content Version: 11.4  © 7781-2109 [employer]. Care instructions adapted under license by [employer] (which disclaims liability or warranty for this information). If you have questions about a medical condition or this instruction, always ask your healthcare professional. [place] [address] any warranty or liability for your use of this information. Learning About How to Use a [address] Instructions  Using a walker can help you move with less pain and more stability. A walker can help you be more independent and safe as you do your daily activities. Be sure your walker fits you. When you stand up in your normal posture and rest your hands on the walker's hand , your hands should be even with the tops of your legs. Your elbows should be slightly bent. To stay safe when using a walker:  · Look straight ahead, not down at your feet. · Clear away small rugs, cords, or anything else that could cause you to trip, slip, or fall. · Be very careful around pets and small children. They can get in your path when you least expect it. · Be sure the rubber tips on your walker are clean and in good condition to help prevent slipping. · Avoid slick conditions, such as wet floors and snowy or icy driveways. In bad weather, be especially careful on curbs and steps. How to use a walker  Getting started    1.  Set the walker at arm's length in front of you, with all four legs on the floor. If your walker has wheels on the front legs, push the walker forward so it's an arm's length in front of you. Walking with a walker    1. Use the handles of the walker for balance as you move your weak or injured leg forward to the middle area of the walker. Don't step all the way to the front. 2. Push straight down on the handles of the walker as you bring your strong leg up, so it is even with your injured leg. 3. Repeat. Getting out of a chair and sitting down    1. To get out of a chair, use both hands and push against the arms of your chair. Then put both hands on your walker. 2. Don't use your walker to help you stand up or sit down. 3. To sit, back up to the chair. Touch the back of your legs to the chair. 4. Support most of your weight on your strong leg, and reach back for the arms of the chair. 5. Slowly and carefully lower yourself into the chair. Going up and down a curb    1. The first few times you try this, have another person nearby to steady you if needed. 2. Stand as close to the edge as you can while keeping all four legs of the walker on the surface you're standing on.  3. When you have your balance, move the walker up or down to the surface you are moving to. 4. Push straight down on the handles for balance and to take weight off your injured leg. 5. If you are going up, step up with your stronger leg first, and then bring your weaker or injured leg up to meet it. If you are going down, step down with your weaker leg first, and then bring your stronger leg down to meet it. (Remember \"up with the good, and down with the bad\" to help you lead with the correct leg.)  6. Get your balance again before you start to walk. Follow-up care is a key part of your treatment and safety. Be sure to make and go to all appointments, and call your doctor if you are having problems.  It's also a good idea to know your test results and keep a list of the medicines you take. Where can you learn more? Go to http://steve-anand.info/. Enter M137 in the search box to learn more about \"Learning About How to Use a Walker. \"  Current as of: March 21, 2017  Content Version: 11.4  © 8239-3284 [employer]. Care instructions adapted under license by [employer] (which disclaims liability or warranty for this information). If you have questions about a medical condition or this instruction, always ask your healthcare professional. [place] [address] any warranty or liability for your use of this information. DISCHARGE SUMMARY from Nurse    PATIENT INSTRUCTIONS:    After general anesthesia or intravenous sedation, for 24 hours or while taking prescription Narcotics:  · Limit your activities  · Do not drive and operate hazardous machinery  · Do not make important personal or business decisions  · Do  not drink alcoholic beverages  · If you have not urinated within 8 hours after discharge, please contact your surgeon on call. Report the following to your surgeon:  · Excessive pain, swelling, redness or odor of or around the surgical area  · Temperature over 100.5  · Nausea and vomiting lasting longer than 4 hours or if unable to take medications  · Any signs of decreased circulation or nerve impairment to extremity: change in color, persistent  numbness, tingling, coldness or increase pain  · Any questions    *  Please give a list of your current medications to your Primary Care Provider. *  Please update this list whenever your medications are discontinued, doses are      changed, or new medications (including over-the-counter products) are added. *  Please carry medication information at all times in case of emergency situations.     These are general instructions for a healthy lifestyle:    No smoking/ No tobacco products/ Avoid exposure to second hand smoke  Surgeon General's Warning:  Quitting smoking now greatly reduces serious risk to your health. Obesity, smoking, and sedentary lifestyle greatly increases your risk for illness    A healthy diet, regular physical exercise & weight monitoring are important for maintaining a healthy lifestyle    You may be retaining fluid if you have a history of heart failure or if you experience any of the following symptoms:  Weight gain of 3 pounds or more overnight or 5 pounds in a week, increased swelling in our hands or feet or shortness of breath while lying flat in bed. Please call your doctor as soon as you notice any of these symptoms; do not wait until your next office visit. Recognize signs and symptoms of STROKE:    F-face looks uneven    A-arms unable to move or move unevenly    S-speech slurred or non-existent    T-time-call 911 as soon as signs and symptoms begin-DO NOT go       Back to bed or wait to see if you get better-TIME IS BRAIN. Warning Signs of HEART ATTACK     Call 911 if you have these symptoms:   Chest discomfort. Most heart attacks involve discomfort in the center of the chest that lasts more than a few minutes, or that goes away and comes back. It can feel like uncomfortable pressure, squeezing, fullness, or pain.  Discomfort in other areas of the upper body. Symptoms can include pain or discomfort in one or both arms, the back, neck, jaw, or stomach.  Shortness of breath with or without chest discomfort.  Other signs may include breaking out in a cold sweat, nausea, or lightheadedness. Don't wait more than five minutes to call 911 - MINUTES MATTER! Fast action can save your life. Calling 911 is almost always the fastest way to get lifesaving treatment. Emergency Medical Services staff can begin treatment when they arrive -- up to an hour sooner than if someone gets to the hospital by car. The discharge information has been reviewed with the patient and sister. The patient and sister verbalized understanding.   Discharge medications reviewed with the patient and sister and appropriate educational materials and side effects teaching were provided.   ___________________________________________________________________________________________________________________________________

## 2018-03-15 ENCOUNTER — ANESTHESIA EVENT (OUTPATIENT)
Dept: SURGERY | Age: 58
End: 2018-03-15
Payer: MEDICARE

## 2018-03-15 DIAGNOSIS — M79.89 LEG SWELLING: ICD-10-CM

## 2018-03-16 ENCOUNTER — ANESTHESIA (OUTPATIENT)
Dept: SURGERY | Age: 58
End: 2018-03-16
Payer: MEDICARE

## 2018-03-16 ENCOUNTER — HOSPITAL ENCOUNTER (OUTPATIENT)
Age: 58
Setting detail: OUTPATIENT SURGERY
Discharge: HOME | End: 2018-03-16
Attending: ORTHOPAEDIC SURGERY | Admitting: ORTHOPAEDIC SURGERY
Payer: MEDICARE

## 2018-03-16 VITALS
BODY MASS INDEX: 44.32 KG/M2 | OXYGEN SATURATION: 95 % | WEIGHT: 266 LBS | TEMPERATURE: 95.9 F | RESPIRATION RATE: 16 BRPM | SYSTOLIC BLOOD PRESSURE: 122 MMHG | HEIGHT: 65 IN | HEART RATE: 81 BPM | DIASTOLIC BLOOD PRESSURE: 78 MMHG

## 2018-03-16 PROCEDURE — 74011250636 HC RX REV CODE- 250/636: Performed by: PHYSICIAN ASSISTANT

## 2018-03-16 PROCEDURE — 77030020782 HC GWN BAIR PAWS FLX 3M -B: Performed by: ORTHOPAEDIC SURGERY

## 2018-03-16 PROCEDURE — 74011250636 HC RX REV CODE- 250/636: Performed by: NURSE ANESTHETIST, CERTIFIED REGISTERED

## 2018-03-16 PROCEDURE — 77030002933 HC SUT MCRYL J&J -A: Performed by: ORTHOPAEDIC SURGERY

## 2018-03-16 PROCEDURE — 74011000250 HC RX REV CODE- 250: Performed by: NURSE ANESTHETIST, CERTIFIED REGISTERED

## 2018-03-16 PROCEDURE — 74011000250 HC RX REV CODE- 250

## 2018-03-16 PROCEDURE — 77030010509 HC AIRWY LMA MSK TELE -A: Performed by: ANESTHESIOLOGY

## 2018-03-16 PROCEDURE — 76060000032 HC ANESTHESIA 0.5 TO 1 HR: Performed by: ORTHOPAEDIC SURGERY

## 2018-03-16 PROCEDURE — 74011250636 HC RX REV CODE- 250/636

## 2018-03-16 PROCEDURE — 76210000006 HC OR PH I REC 0.5 TO 1 HR: Performed by: ORTHOPAEDIC SURGERY

## 2018-03-16 PROCEDURE — 77030032490 HC SLV COMPR SCD KNE COVD -B: Performed by: ORTHOPAEDIC SURGERY

## 2018-03-16 PROCEDURE — 74011000250 HC RX REV CODE- 250: Performed by: ORTHOPAEDIC SURGERY

## 2018-03-16 PROCEDURE — 77030008574 HC TBNG SUC IRR STRY -B: Performed by: ORTHOPAEDIC SURGERY

## 2018-03-16 PROCEDURE — 76210000021 HC REC RM PH II 0.5 TO 1 HR: Performed by: ORTHOPAEDIC SURGERY

## 2018-03-16 PROCEDURE — 76010000138 HC OR TIME 0.5 TO 1 HR: Performed by: ORTHOPAEDIC SURGERY

## 2018-03-16 RX ORDER — MORPHINE SULFATE 4 MG/ML
INJECTION, SOLUTION INTRAMUSCULAR; INTRAVENOUS
Status: DISCONTINUED
Start: 2018-03-16 | End: 2018-03-16 | Stop reason: HOSPADM

## 2018-03-16 RX ORDER — KETOROLAC TROMETHAMINE 30 MG/ML
INJECTION, SOLUTION INTRAMUSCULAR; INTRAVENOUS AS NEEDED
Status: DISCONTINUED | OUTPATIENT
Start: 2018-03-16 | End: 2018-03-16 | Stop reason: HOSPADM

## 2018-03-16 RX ORDER — SODIUM CHLORIDE 0.9 % (FLUSH) 0.9 %
5-10 SYRINGE (ML) INJECTION EVERY 8 HOURS
Status: DISCONTINUED | OUTPATIENT
Start: 2018-03-16 | End: 2018-03-16 | Stop reason: HOSPADM

## 2018-03-16 RX ORDER — FAMOTIDINE 10 MG/ML
20 INJECTION INTRAVENOUS ONCE
Status: COMPLETED | OUTPATIENT
Start: 2018-03-16 | End: 2018-03-16

## 2018-03-16 RX ORDER — SODIUM CHLORIDE, SODIUM LACTATE, POTASSIUM CHLORIDE, CALCIUM CHLORIDE 600; 310; 30; 20 MG/100ML; MG/100ML; MG/100ML; MG/100ML
75 INJECTION, SOLUTION INTRAVENOUS CONTINUOUS
Status: DISCONTINUED | OUTPATIENT
Start: 2018-03-16 | End: 2018-03-16 | Stop reason: HOSPADM

## 2018-03-16 RX ORDER — DIPHENHYDRAMINE HYDROCHLORIDE 50 MG/ML
12.5 INJECTION, SOLUTION INTRAMUSCULAR; INTRAVENOUS
Status: DISCONTINUED | OUTPATIENT
Start: 2018-03-16 | End: 2018-03-16 | Stop reason: HOSPADM

## 2018-03-16 RX ORDER — SODIUM CHLORIDE 0.9 % (FLUSH) 0.9 %
5-10 SYRINGE (ML) INJECTION AS NEEDED
Status: DISCONTINUED | OUTPATIENT
Start: 2018-03-16 | End: 2018-03-16 | Stop reason: HOSPADM

## 2018-03-16 RX ORDER — FUROSEMIDE 20 MG/1
TABLET ORAL
Qty: 30 TAB | Refills: 0 | OUTPATIENT
Start: 2018-03-16

## 2018-03-16 RX ORDER — DEXAMETHASONE SODIUM PHOSPHATE 4 MG/ML
INJECTION, SOLUTION INTRA-ARTICULAR; INTRALESIONAL; INTRAMUSCULAR; INTRAVENOUS; SOFT TISSUE AS NEEDED
Status: DISCONTINUED | OUTPATIENT
Start: 2018-03-16 | End: 2018-03-16 | Stop reason: HOSPADM

## 2018-03-16 RX ORDER — HYDROCODONE BITARTRATE AND ACETAMINOPHEN 5; 325 MG/1; MG/1
1 TABLET ORAL
Status: DISCONTINUED | OUTPATIENT
Start: 2018-03-16 | End: 2018-03-16 | Stop reason: HOSPADM

## 2018-03-16 RX ORDER — ONDANSETRON 2 MG/ML
INJECTION INTRAMUSCULAR; INTRAVENOUS AS NEEDED
Status: DISCONTINUED | OUTPATIENT
Start: 2018-03-16 | End: 2018-03-16 | Stop reason: HOSPADM

## 2018-03-16 RX ORDER — LIDOCAINE HYDROCHLORIDE 20 MG/ML
INJECTION, SOLUTION EPIDURAL; INFILTRATION; INTRACAUDAL; PERINEURAL AS NEEDED
Status: DISCONTINUED | OUTPATIENT
Start: 2018-03-16 | End: 2018-03-16 | Stop reason: HOSPADM

## 2018-03-16 RX ORDER — PROPOFOL 10 MG/ML
INJECTION, EMULSION INTRAVENOUS AS NEEDED
Status: DISCONTINUED | OUTPATIENT
Start: 2018-03-16 | End: 2018-03-16 | Stop reason: HOSPADM

## 2018-03-16 RX ORDER — INSULIN LISPRO 100 [IU]/ML
INJECTION, SOLUTION INTRAVENOUS; SUBCUTANEOUS ONCE
Status: DISCONTINUED | OUTPATIENT
Start: 2018-03-16 | End: 2018-03-16 | Stop reason: HOSPADM

## 2018-03-16 RX ORDER — FENTANYL CITRATE 50 UG/ML
INJECTION, SOLUTION INTRAMUSCULAR; INTRAVENOUS AS NEEDED
Status: DISCONTINUED | OUTPATIENT
Start: 2018-03-16 | End: 2018-03-16 | Stop reason: HOSPADM

## 2018-03-16 RX ORDER — ONDANSETRON 2 MG/ML
4 INJECTION INTRAMUSCULAR; INTRAVENOUS AS NEEDED
Status: DISCONTINUED | OUTPATIENT
Start: 2018-03-16 | End: 2018-03-16 | Stop reason: HOSPADM

## 2018-03-16 RX ORDER — BUPIVACAINE HYDROCHLORIDE 5 MG/ML
INJECTION, SOLUTION EPIDURAL; INTRACAUDAL AS NEEDED
Status: DISCONTINUED | OUTPATIENT
Start: 2018-03-16 | End: 2018-03-16 | Stop reason: HOSPADM

## 2018-03-16 RX ORDER — LIDOCAINE HYDROCHLORIDE 10 MG/ML
0.1 INJECTION, SOLUTION EPIDURAL; INFILTRATION; INTRACAUDAL; PERINEURAL AS NEEDED
Status: DISCONTINUED | OUTPATIENT
Start: 2018-03-16 | End: 2018-03-16 | Stop reason: HOSPADM

## 2018-03-16 RX ORDER — MORPHINE SULFATE 2 MG/ML
2 INJECTION, SOLUTION INTRAMUSCULAR; INTRAVENOUS
Status: COMPLETED | OUTPATIENT
Start: 2018-03-16 | End: 2018-03-16

## 2018-03-16 RX ADMIN — ONDANSETRON 4 MG: 2 INJECTION INTRAMUSCULAR; INTRAVENOUS at 13:20

## 2018-03-16 RX ADMIN — FAMOTIDINE 20 MG: 10 INJECTION INTRAVENOUS at 11:09

## 2018-03-16 RX ADMIN — PROPOFOL 200 MG: 10 INJECTION, EMULSION INTRAVENOUS at 12:51

## 2018-03-16 RX ADMIN — DEXAMETHASONE SODIUM PHOSPHATE 4 MG: 4 INJECTION, SOLUTION INTRA-ARTICULAR; INTRALESIONAL; INTRAMUSCULAR; INTRAVENOUS; SOFT TISSUE at 12:56

## 2018-03-16 RX ADMIN — Medication 2 MG: at 13:54

## 2018-03-16 RX ADMIN — Medication 2 MG: at 14:03

## 2018-03-16 RX ADMIN — CEFAZOLIN SODIUM IN SODIUM CHLORIDE 0.9% IV SOLN 3 GM/100ML 3 G: 3-0.9/1 SOLUTION at 12:45

## 2018-03-16 RX ADMIN — Medication 2 MG: at 14:13

## 2018-03-16 RX ADMIN — SODIUM CHLORIDE, SODIUM LACTATE, POTASSIUM CHLORIDE, AND CALCIUM CHLORIDE 75 ML/HR: 600; 310; 30; 20 INJECTION, SOLUTION INTRAVENOUS at 13:43

## 2018-03-16 RX ADMIN — SODIUM CHLORIDE, SODIUM LACTATE, POTASSIUM CHLORIDE, AND CALCIUM CHLORIDE 75 ML/HR: 600; 310; 30; 20 INJECTION, SOLUTION INTRAVENOUS at 11:01

## 2018-03-16 RX ADMIN — FENTANYL CITRATE 50 MCG: 50 INJECTION, SOLUTION INTRAMUSCULAR; INTRAVENOUS at 12:51

## 2018-03-16 RX ADMIN — KETOROLAC TROMETHAMINE 30 MG: 30 INJECTION, SOLUTION INTRAMUSCULAR; INTRAVENOUS at 13:35

## 2018-03-16 RX ADMIN — LIDOCAINE HYDROCHLORIDE 50 MG: 20 INJECTION, SOLUTION EPIDURAL; INFILTRATION; INTRACAUDAL; PERINEURAL at 12:51

## 2018-03-16 RX ADMIN — Medication 2 MG: at 14:23

## 2018-03-16 NOTE — PROGRESS NOTES
conducted a pre-surgery visit with Jose Dockery, who is a 59 y.o.,female. The  provided the following Interventions:  Initiated a relationship of care and support. Assisted patient with the execution of Advance Medical Directive. Placed the copy into patient's \"like paper chart. \"  See Flow Sheet for other pastoral interventions. Chaplains will continue to follow and provide pastoral care on an as needed/requested basis. Plan:  Chaplains will continue to follow and will provide pastoral care on an as needed/requested basis.  recommends bedside caregivers page  on duty if patient shows signs of acute spiritual or emotional distress.     [facility] Certified [address] [place] [address]   [phone]

## 2018-03-16 NOTE — ACP (ADVANCE CARE PLANNING)
Assisted patient with the execution of Advance Medical Directive. Placed the copy into patient's \"like paper chart. \"  See Flow Sheet for other pastoral interventions. Chaplains will continue to follow and provide pastoral care on an as needed/requested basis.     [facility] Certified [address] [place] [address]   [phone]

## 2018-03-16 NOTE — PERIOP NOTES
Report rec'd from [facility] - told pt needed walker and CM @ ext [phone] was working on it. Spoke with CM and told DME rep would deliver it when it was approved by insurance. Spoke with pt and given update. @0735: I have reviewed discharge instructions with the patient and sister. The patient and sister verbalized understanding. @7295: CM called and said she was on her way now to bring the walker down to pt. Pt updated. @1229: CM given pt walker and instructed her on use. Pt amb with steady gait using walker. Patient armband removed and shredded.

## 2018-03-16 NOTE — ROUTINE PROCESS
TRANSFER - OUT REPORT:    Verbal report given to Patodavid radha on Lety Kappa  being transferred to room 5 for routine progression of care       Report consisted of patients Situation, Background, Assessment and   Recommendations(SBAR). Information from the following report(s) SBAR, OR Summary, Intake/Output and MAR was reviewed with the receiving nurse. Opportunity for questions and clarification was provided.       Patient transported with:   [employer]

## 2018-03-16 NOTE — PROGRESS NOTES
Care Management Interventions  PCP Verified by CM: Yes Dc Zuniga NP)  Palliative Care Criteria Met (RRAT>21 & CHF Dx)?: No  Mode of Transport at Discharge: Other (see comment) (Family POV)  Transition of Care Consult (CM Consult): Other (Home to self care with walker. Patient has used one before, no steps, feels comfortable using it. [employer] will deliver and put it together for her. Call if in need of further assistance. )  Discharge Durable Medical Equipment: Yes (FWW)  Physical Therapy Consult: No  Occupational Therapy Consult: No  Speech Therapy Consult: No  Current Support Network: Own Home (Adult daughter to help her with recovery activities and rides to f/u. )  Confirm Follow Up Transport: Family  Plan discussed with Pt/Family/Caregiver: Yes  Discharge Location  Discharge Placement: Home     Consulted to PACU to dispense a walker. Order was in for [employer] and Lorenzo Terrazas the liaison is aware. Patient lives with her adult daughter who can help with recovery activities. She has used a walker in the past and knows how to negotiate with no weight bearing and partial weight bearing. Discussed the step over tub to sit on the edge then swing her legs in, but just sponge off for the first couple days or but a shower stool OTC, she agreed. No steps to negotiate. She will call after she gets the walker if any other instruction needed will ask for a PT consult.

## 2018-03-16 NOTE — IP AVS SNAPSHOT
[address] Patient: Claudia Leiva MRN: [id number] About your hospitalization You were admitted on:  March 16, 2018 You last received care in the:  [facility] PHASE 2 RECOVERY You were discharged on:  March 16, 2018 Why you were hospitalized Your primary diagnosis was:  Not on File Follow-up Information Follow up With Details Comments Contact Info [address] [facility] 
[phone] Goldie Kendall MD Schedule an appointment as soon as possible for a visit today Call for a follow up appt in 1 wk. [address] [facility] [address] [facility] 
[phone] Your Scheduled Appointments Friday March 23, 2018  1:00 PM EDT  
POST OP with [facility] [place] [address] ([address] [facility]) [place] [address], [address] [facility]  
[phone] Tuesday May 08, 2018  7:30 AM EDT ROUTINE CARE with Sanjuana Jaramillo NP [facility] ([address] [facility]) [address] [facility]  
[phone] Discharge Orders None A check miquel indicates which time of day the medication should be taken. My Medications CONTINUE taking these medications Instructions Each Dose to Equal  
 Morning Noon Evening Bedtime ABILIFY 5 mg tablet Generic drug:  ARIPiprazole Your last dose was: Your next dose is: Take  by mouth daily. acetaminophen 650 mg Tber Commonly known as:  TYLENOL ARTHRITIS PAIN Your last dose was: Your next dose is: Take 1 Tab by mouth three (3) times daily as needed. 650 mg  
    
   
   
   
  
 albuterol 90 mcg/actuation inhaler Commonly known as:  PROVENTIL HFA, VENTOLIN HFA, PROAIR HFA Your last dose was: Your next dose is: Take 2 Puffs by inhalation every four (4) hours as needed for Wheezing or Shortness of Breath. 2 Puff  
    
   
   
   
  
 b complex vitamins tablet Your last dose was: Your next dose is: Take 1 Tab by mouth daily. 1 Tab  
    
   
   
   
  
 budesonide-formoterol 160-4.5 mcg/actuation Hfaa Commonly known as:  SYMBICORT Your last dose was: Your next dose is: Take 2 Puffs by inhalation two (2) times a day. 2 Puff  
    
   
   
   
  
 buPROPion 100 mg tablet Commonly known as:  [facility] Your last dose was: Your next dose is: Take 1 Tab by mouth two (2) times a day. 100 mg CALCIUM 600 + D 600-125 mg-unit Tab Generic drug:  calcium-cholecalciferol (d3) Your last dose was: Your next dose is: Take 1,065 mg by mouth nightly. Indications: TAKES 2 AT BEDTIME  
 1065 mg  
    
   
   
   
  
 citalopram 20 mg tablet Commonly known as:  Shantanu Taylor Your last dose was: Your next dose is: Take 1 Tab by mouth daily. 20 mg  
    
   
   
   
  
 cyclobenzaprine 5 mg tablet Commonly known as:  FLEXERIL Your last dose was: Your next dose is: Take 1 Tab by mouth every twelve (12) hours as needed for Muscle Spasm(s). 5 mg Dexlansoprazole 60 mg Cpdb Commonly known as:  DEXILANT Your last dose was: Your next dose is: Take 1 Cap by mouth daily. 1 Cap FISH -160-1,000 mg Cap Generic drug:  omega 3-dha-epa-fish oil Your last dose was: Your next dose is: Take 1,065 mg by mouth daily. 1065 mg  
    
   
   
   
  
 fluticasone 50 mcg/actuation nasal spray Commonly known as:  Loren Paget Your last dose was: Your next dose is: 2 Sprays by Both Nostrils route daily. 2 Spray  
    
   
   
   
  
 furosemide 20 mg tablet Commonly known as:  LASIX Your last dose was: Your next dose is: Take 1 Tab by mouth daily as needed. 20 mg  
    
   
   
   
  
 * HYDROcodone-acetaminophen 5-325 mg per tablet Commonly known as:  Inocencio Daring Your last dose was: Your next dose is: Take 1 Tab by mouth every eight (8) hours as needed for Pain. Max Daily Amount: 3 Tabs. 1 Tab  
    
   
   
   
  
 * HYDROcodone-acetaminophen  mg tablet Commonly known as:  Delano Daring Your last dose was: Your next dose is: Take 1-2 Tabs by mouth every four (4) hours as needed for Pain. Max Daily Amount: 12 Tabs. Do not take until after surgery 1-2 Tab Iron 160 mg (50 mg iron) Tber tablet Generic drug:  ferrous sulfate ER Your last dose was: Your next dose is: Take 1 Tab by mouth daily. Indications: PT TAKES 40 MG  
 1 Tab  
    
   
   
   
  
 LOMAIRA 8 mg Tab Generic drug:  phentermine Your last dose was: Your next dose is:    
   
   
 TK 1 T PO  BID  
     
   
   
   
  
 multivitamin tablet Commonly known as:  ONE A DAY Your last dose was: Your next dose is: Take 1 Tab by mouth daily. 1 Tab  
    
   
   
   
  
 pregabalin 150 mg capsule Commonly known as:  Ayana Zuñiga Your last dose was: Your next dose is: Take 1 Cap by mouth two (2) times a day. Max Daily Amount: 300 mg.  
 150 mg  
    
   
   
   
  
 * Notice: This list has 2 medication(s) that are the same as other medications prescribed for you. Read the directions carefully, and ask your doctor or other care provider to review them with you. Discharge Instructions Dr. Meaghan Cheng Postoperative Information You will be given a prescription for pain medication. It may be taken every 4-6 hours as needed for the first 4-5 days. You may elevate your leg and place an ice pack on top of the dressing in 
order to prevent swelling. A soft bandage was placed on your knee to soak up blood and fluid. You may take the bandage off the day after surgery, carefully cleaning the incision sites with peroxide. Band-Aids should be used for the first 4-5 days over each incision. There are 2 small incisions in your knee that may be sore and develop bruising over the next several days. This should resolve over the next few weeks. No special care will be needed. You should expect swelling in the area. You may elevate your leg and apply an icepack on top of the dressing to help minimize the swelling. Deep massage to the lower leg may also be utilized. It is safe to take a shower two days after surgery. You may begin bearing weight on your leg with the use of crutches for the first 4-5 days. Apply as much weight as tolerated so that at the end of 4-5 days, you can walk without crutches. Avoid prolonged walking or standing during the first few weeks after surgery. During your first week please work on gentle range of motion of your knee. Even though your incisions are small, there has been an operation inside and around the knee joint. Complete healing may take several months. If you have a high temperature, unexpected pain, redness or swelling, or any drainage around your knee area, please call my office immediately. Please make an appointment to return to my office in one week. Dr. Olga Garcia office number [phone] Knee Arthroscopy: What to Expect at [facility] Your Recovery Arthroscopy is a way to find problems and do surgery inside a joint without making a large cut (incision).  Your doctor put a lighted tube with a tiny camera-called an arthroscope, or scope-and surgical tools through small incisions in your knee. You will feel tired for several days. Your knee will be swollen, and you may notice that your skin is a different color near the cuts (incisions). The swelling is normal and will start to go away in a few days. Keeping your leg higher than your heart will help with swelling and pain. You will probably need about 6 weeks to recover. If your doctor repaired damaged tissue, recovery will take longer. You may have to limit your activity until your knee strength and movement return to normal. You may also be in a physical rehabilitation (rehab) program. 
You may be able to return to a desk job or your normal routine in a few days. But if you do physical labor, it may be as long as 2 months before you can return to work. This care sheet gives you a general idea about how long it will take for you to recover. But each person recovers at a different pace. Follow the steps below to get better as quickly as possible. How can you care for yourself at home? Activity ? · Rest when you feel tired. Getting enough sleep will help you recover. Use pillows to raise your ankle and leg above the level of your heart. ? · Try to walk each day, after your doctor has said you can. Start by walking a little more than you did the day before. Bit by bit, increase the amount you walk. Walking boosts blood flow and helps prevent pneumonia and constipation. ? · You may have a brace or crutches or both. ? · Your doctor will tell you how often and how much you can move your leg and knee. ? · If you have a desk job, you may be able to return to work a few days after the surgery. If you lift things or stand or walk a lot at work, it may be as long as 2 months before you can return. ? · You can take a shower 48 to 72 hours after surgery and clean the incisions with regular soap and water.  Do not take a bath or soak your knee until your doctor says it is okay. ? · Ask your doctor when you can drive again. ? · If you had a repair of torn tissue, follow your doctor's instructions for lifting things or moving your knee. Diet ? · You can eat your normal diet. If your stomach is upset, try bland, low-fat foods like plain rice, broiled chicken, toast, and yogurt. ? · Drink plenty of fluids, unless your doctor tells you not to. ? · You may notice that your bowel movements are not regular right after your surgery. This is common. Try to avoid constipation and straining with bowel movements. You may want to take a fiber supplement every day. If you have not had a bowel movement after a couple of days, ask your doctor about taking a mild laxative. Medicines ? · Your doctor will tell you if and when you can restart your medicines. He or she will also give you instructions about taking any new medicines. ? · If you take blood thinners, such as warfarin (Coumadin), clopidogrel (Plavix), or aspirin, be sure to talk to your doctor. He or she will tell you if and when to start taking those medicines again. Make sure that you understand exactly what your doctor wants you to do. ? · Be safe with medicines. Take pain medicines exactly as directed. ¨ If the doctor gave you a prescription medicine for pain, take it as prescribed. ¨ If you are not taking a prescription pain medicine, ask your doctor if you can take an over-the-counter medicine. ? · If you think your pain medicine is making you sick to your stomach: 
¨ Take your medicine after meals (unless your doctor has told you not to). ¨ Ask your doctor for a different pain medicine. ? · If your doctor prescribed antibiotics, take them as directed. Do not stop taking them just because you feel better. You need to take the full course of antibiotics. Incision care ?  · If you have a dressing over your cuts (incisions), keep it clean and dry. You may remove it 48 to 72 hours after the surgery. ? · If your incisions are open to the air, keep the area clean and dry. ? · If you have strips of tape on the incisions, leave the tape on for a week or until it falls off. Exercise ? · Move your toes and ankle as much as your bandages will allow. ? · Bend and straighten your knee slowly several times during the day. ? · Depending on why you had the surgery, you may have to do ankle and leg exercises. Your doctor or physical therapist will give you exercises as part of a rehabilitation program.  
? · Stop any activity that causes sharp pain. Talk to your doctor or physical therapist about what sports or other exercise you can do. Ice and elevation ? · To reduce swelling and pain, put ice or a cold pack on your knee for 10 to 20 minutes at a time. Do this every 1 to 2 hours. Put a thin cloth between the ice and your skin. Follow-up care is a key part of your treatment and safety. Be sure to make and go to all appointments, and call your doctor if you are having problems. It's also a good idea to know your test results and keep a list of the medicines you take. When should you call for help? Call 911 anytime you think you may need emergency care. For example, call if: 
? · You passed out (lost consciousness). ? · You have severe trouble breathing. ? · You have sudden chest pain and shortness of breath, or you cough up blood. ?Call your doctor now or seek immediate medical care if: 
? · Your foot or toes are numb or tingling. ? · Your foot is cool or pale, or it changes color. ? · You have signs of a blood clot, such as: 
¨ Pain in your calf, back of the knee, thigh, or groin. ¨ Redness and swelling in your leg or groin. ? · You are sick to your stomach or cannot keep fluids down. ? · You have pain that does not get better after you take pain medicine. ? · You have loose stitches, or your incision comes open. ? · Bright red blood has soaked through the bandage over your incision. ? · You have signs of infection, such as: 
¨ Increased pain, swelling, warmth, or redness. ¨ Red streaks leading from the incisions. ¨ Pus draining from the incisions. ¨ A fever. ? Watch closely for any changes in your health, and be sure to contact your doctor if: 
? · You do not have a bowel movement after taking a laxative. Where can you learn more? Go to http://steve-anand.info/. Enter K022 in the search box to learn more about \"Knee Arthroscopy: What to Expect at Home. \" Current as of: March 21, 2017 Content Version: 11.4 © 2264-1567 [employer]. Care instructions adapted under license by [employer] (which disclaims liability or warranty for this information). If you have questions about a medical condition or this instruction, always ask your healthcare professional. [place] [address] any warranty or liability for your use of this information. Learning About How to Use a Lety Sugey Your Care Instructions Using a walker can help you move with less pain and more stability. A walker can help you be more independent and safe as you do your daily activities. Be sure your walker fits you. When you stand up in your normal posture and rest your hands on the walker's hand , your hands should be even with the tops of your legs. Your elbows should be slightly bent. To stay safe when using a walker: · Look straight ahead, not down at your feet. · Clear away small rugs, cords, or anything else that could cause you to trip, slip, or fall. · Be very careful around pets and small children. They can get in your path when you least expect it. · Be sure the rubber tips on your walker are clean and in good condition to help prevent slipping. · Avoid slick conditions, such as wet floors and snowy or icy driveways. In bad weather, be especially careful on curbs and steps. How to use a walker Getting started 1. Set the walker at arm's length in front of you, with all four legs on the floor. If your walker has wheels on the front legs, push the walker forward so it's an arm's length in front of you. Walking with a walker 1. Use the handles of the walker for balance as you move your weak or injured leg forward to the middle area of the walker. Don't step all the way to the front. 2. Push straight down on the handles of the walker as you bring your strong leg up, so it is even with your injured leg. 3. Repeat. Getting out of a chair and sitting down 1. To get out of a chair, use both hands and push against the arms of your chair. Then put both hands on your walker. 2. Don't use your walker to help you stand up or sit down. 3. To sit, back up to the chair. Touch the back of your legs to the chair. 4. Support most of your weight on your strong leg, and reach back for the arms of the chair. 5. Slowly and carefully lower yourself into the chair. Going up and down a curb 1. The first few times you try this, have another person nearby to steady you if needed. 2. Stand as close to the edge as you can while keeping all four legs of the walker on the surface you're standing on. 
3. When you have your balance, move the walker up or down to the surface you are moving to. 4. Push straight down on the handles for balance and to take weight off your injured leg. 5. If you are going up, step up with your stronger leg first, and then bring your weaker or injured leg up to meet it. If you are going down, step down with your weaker leg first, and then bring your stronger leg down to meet it. (Remember \"up with the good, and down with the bad\" to help you lead with the correct leg.) 6. Get your balance again before you start to walk. Follow-up care is a key part of your treatment and safety.  Be sure to make and go to all appointments, and call your doctor if you are having problems. It's also a good idea to know your test results and keep a list of the medicines you take. Where can you learn more? Go to http://steve-anand.info/. Enter M137 in the search box to learn more about \"Learning About How to Use a Walker. \" Current as of: March 21, 2017 Content Version: 11.4 © 1950-8443 [employer]. Care instructions adapted under license by [employer] (which disclaims liability or warranty for this information). If you have questions about a medical condition or this instruction, always ask your healthcare professional. [place] [address] any warranty or liability for your use of this information. DISCHARGE SUMMARY from Nurse PATIENT INSTRUCTIONS: 
 
 
F-face looks uneven A-arms unable to move or move unevenly S-speech slurred or non-existent T-time-call 911 as soon as signs and symptoms begin-DO NOT go Back to bed or wait to see if you get better-TIME IS BRAIN. Warning Signs of HEART ATTACK Call 911 if you have these symptoms: 
? Chest discomfort. Most heart attacks involve discomfort in the center of the chest that lasts more than a few minutes, or that goes away and comes back. It can feel like uncomfortable pressure, squeezing, fullness, or pain. ? Discomfort in other areas of the upper body. Symptoms can include pain or discomfort in one or both arms, the back, neck, jaw, or stomach. ? Shortness of breath with or without chest discomfort. ? Other signs may include breaking out in a cold sweat, nausea, or lightheadedness. Don't wait more than five minutes to call [address]! Fast action can save your life. Calling 911 is almost always the fastest way to get lifesaving treatment. Emergency Medical Services staff can begin treatment when they arrive  up to an hour sooner than if someone gets to the hospital by car. The discharge information has been reviewed with the {PATIENT PARENT GUARDIAN:16180}. The {PATIENT PARENT GUARDIAN:12649} verbalized understanding. Discharge medications reviewed with the {Dishcarge meds reviewed RNCI:46355} and appropriate educational materials and side effects teaching were provided. ___________________________________________________________________________________________________________________________________ Introducing [facility]! Dear Agnes Keane: 
Thank you for requesting a ZinkoTek account. Our records indicate that you already have an active ZinkoTek account. You can access your account anytime at [email or web address] "[email or web address]/Pet Wireless Did you know that you can access your hospital and ER discharge instructions at any time in ZinkoTek? You can also review all of your test results from your hospital stay or ER visit. Additional Information If you have questions, please visit the Frequently Asked Questions section of the ZinkoTek website at [email or web address] Remember, ZinkoTek is NOT to be used for urgent needs. For medical emergencies, dial 911. Now available from your iPhone and Android! Providers Seen During Your Hospitalization Provider Specialty Primary office phone Rajwinder Salinas MD Orthopedic Surgery [phone] Your Primary Care Physician (PCP) Primary Care Physician Office Phone Office Fax Jarad Díaz [phone] You are allergic to the following Allergen Reactions Adhesive Tape-Silicones Swelling REALLY BAD SWELLING AND SORE APPEAR Alcohol Other (comments) PT STATES SHE IS AN ALCOHOLIC Lactose Other (comments) PT STATES IT MAKES HER STOMACH HURT Percodan (Oxycodone Hcl-Oxycodone-Asa) Itching Other (comments) crying Nsaids (Non-Steroidal Anti-Inflammatory Drug) Other (comments) PT NOT ABLE TO TAKE DUE TO GASTRIC BYPASS Recent Documentation Height Weight BMI OB Status Smoking Status 1.651 m 120.7 kg 44.26 kg/m2 Hysterectomy Current Every Day Smoker Emergency Contacts Name Discharge Info Relation Home Work Mobile [address] CAREGIVER [3] Sister [23] [phone] [facility] DISCHARGE CAREGIVER [3] Daughter [21] [phone] Patient Belongings The following personal items are in your possession at time of discharge: 
  Dental Appliances: None  Visual Aid: Glasses      Home Medications: None   Jewelry: None  Clothing: Undergarments, Jacket/Coat, Socks, Footwear, Shirt, Pants    Other Valuables: Cell Phone, [employer] Please provide this summary of care documentation to your next provider. Signatures-by signing, you are acknowledging that this After Visit Summary has been reviewed with you and you have received a copy. Patient Signature:  ____________________________________________________________ Date:  ____________________________________________________________  
  
Krunal Avila Provider Signature:  ____________________________________________________________ Date:  ____________________________________________________________

## 2018-03-16 NOTE — BRIEF OP NOTE
BRIEF OPERATIVE NOTE    Date of Procedure: 3/16/2018   Preoperative Diagnosis: Acute medial meniscal tear, left, initial encounter [S83.242A]  Postoperative Diagnosis: Acute medial meniscal tear, left, initial encounter [S83.242A]    Procedure(s):  LEFT KNEE ARTHROSCOPIC PARTIAL MEDIAL MENISCECTOMY  Surgeon(s) and Role:     * Goldie Kendall MD - Primary         Assistant Staff: None      Surgical Staff:  Circ-1: Pricilla Weaver RN  Scrub Tech-1: Tariq Chaudhary  Surg Asst-1: Susan Cheng  Event Time In   Incision Start 1308   Incision Close      Anesthesia: General   Estimated Blood Loss: minimal  Specimens: * No specimens in log *   Findings: as above   Complications: none  Implants: * No implants in log *

## 2018-03-16 NOTE — ANESTHESIA PREPROCEDURE EVALUATION
Anesthetic History   No history of anesthetic complications            Review of Systems / Medical History  Patient summary reviewed and pertinent labs reviewed    Pulmonary    COPD    Sleep apnea: CPAP  Smoker         Neuro/Psych           Pertinent negatives: Psychiatric history: Depression. Cardiovascular                  Exercise tolerance: >4 METS     GI/Hepatic/Renal     GERD: well controlled           Endo/Other        Morbid obesity     Other Findings   Comments: Documentation of current medication  Current medications obtained, documented and obtained? YES      Risk Factors for Postoperative nausea/vomiting:       History of postoperative nausea/vomiting? NO       Female? YES       Motion sickness? NO       Intended opioid administration for postoperative analgesia? YES      Smoking Abstinence:  Current Smoker? YES  Elective Surgery? YES  Seen preoperatively by anesthesiologist or proxy prior to day of surgery? YES  Pt abstained from smoking 24 hours prior to anesthesia?  NO    Preventive care/screening for High Blood Pressure:  Aged 18 years and older: YES  Screened for high blood pressure: YES  Patients with high blood pressure referred to primary care provider   for BP management: YES                 Physical Exam    Airway  Mallampati: II  TM Distance: 4 - 6 cm  Neck ROM: normal range of motion   Mouth opening: Normal     Cardiovascular  Regular rate and rhythm,  S1 and S2 normal,  no murmur, click, rub, or gallop             Dental  No notable dental hx       Pulmonary  Breath sounds clear to auscultation               Abdominal  GI exam deferred       Other Findings            Anesthetic Plan    ASA: 3  Anesthesia type: general          Induction: Intravenous  Anesthetic plan and risks discussed with: Patient

## 2018-03-16 NOTE — PROGRESS NOTES
Date of Surgery Update:  German Santillan was seen and examined. History and physical has been reviewed. The patient has been examined.  There have been no significant clinical changes since the completion of the originally dated History and Physical.    Signed By: Mu Velazquez MD     March 16, 2018 1:24 PM

## 2018-03-16 NOTE — ANESTHESIA POSTPROCEDURE EVALUATION
Post-Anesthesia Evaluation and Assessment    Patient: Lety Mahoney MRN: [id number]  SSN: [id number]    YOB: 1960  Age: 59 y.o. Sex: female       Cardiovascular Function/Vital Signs  Visit Vitals    /78 (BP 1 Location: Left arm, BP Patient Position: At rest)    Pulse 81    Temp 35.5 °C (95.9 °F)    Resp 16    Ht 5' 5\" (1.651 m)    Wt 120.7 kg (266 lb)    SpO2 95%    BMI 44.26 kg/m2       Patient is status post general anesthesia for Procedure(s):  LEFT KNEE ARTHROSCOPIC PARTIAL MEDIAL MENISCECTOMY. Nausea/Vomiting: None    Postoperative hydration reviewed and adequate. Pain:  Pain Scale 1: Numeric (0 - 10) (03/16/18 1450)  Pain Intensity 1: 0 (03/16/18 1450)   Managed    Neurological Status:   Neuro (WDL): Within Defined Limits (03/16/18 1105)   At baseline    Mental Status and Level of Consciousness: Arousable    Pulmonary Status:   O2 Device: Room air (03/16/18 1450)   Adequate oxygenation and airway patent    Complications related to anesthesia: None    Post-anesthesia assessment completed.  No concerns    Signed By: Katherine Mares MD     March 16, 2018

## 2018-03-17 NOTE — TELEPHONE ENCOUNTER
3/16/2018  10:20 PM    Chief Complaint   Patient presents with    Medication Refill       Noted refill request for Lasix tablets. This is a PRN medication. Last routine care office visit 12/2017 and has been seen for acute care and preoperative clearance since then. She has been recommended to use compression stockings as main therapy. Refill declined at this time, will need to discuss further with patient in future. She has just had knee surgery.

## 2018-03-19 ENCOUNTER — PATIENT OUTREACH (OUTPATIENT)
Dept: FAMILY MEDICINE CLINIC | Age: 58
End: 2018-03-19

## 2018-03-19 RX ORDER — IBUPROFEN 200 MG
CAPSULE ORAL DAILY
COMMUNITY
End: 2019-05-13

## 2018-03-19 NOTE — PROGRESS NOTES
Follow Up Telephone Call     Patient admitted to [facility]  Admission Date:  3-16-18  Discharge Date:   3-16-18    Disposition:  Home     Discharge Diagnosis and DC Instructions per Dr. Bhargav Main  (In Italics):     Left Knee Arthroscopic Partial Medial Meniscetomy      Discharge Instructions        Dr. Mikael Berrios Knee Arthroscopy  Postoperative Information     You will be given a prescription for pain medication. It may be taken every 4-6 hours as needed for the first 4-5 days. You may elevate your leg and place an ice pack on top of the dressing in  order to prevent swelling.     A soft bandage was placed on your knee to soak up blood and fluid. You may take the bandage off the day after surgery, carefully cleaning the incision sites with peroxide. Band-Aids should be used for the first 4-5 days over each incision.      There are 2 small incisions in your knee that may be sore and develop bruising over the next several days. This should resolve over the next few weeks. No special care will be needed. You should expect swelling in the area. You may elevate your leg and apply an icepack on top of the dressing to help minimize the swelling. Deep massage to the lower leg may also be utilized.      It is safe to take a shower two days after surgery.     You may begin bearing weight on your leg with the use of crutches for the first 4-5 days. Apply as much weight as tolerated so that at the end of 4-5 days, you can walk without crutches. Avoid prolonged walking or standing during the first few weeks after surgery. During your first week please work on gentle range of motion of your knee.      Even though your incisions are small, there has been an operation inside and around the knee joint.  Complete healing may take several months.     If you have a high temperature, unexpected pain, redness or swelling, or any drainage around your knee area, please call my office immediately.      Please make an appointment to return to my office in one week.     Dr. Pietro Naidu office number [phone]      Subjective:   Patient states, \" I am doing OK\"   Patient states that her leg feels a little tight and that she is not in a lot of pain. Patient denies fever, redness, or excessive swelling. Vianney Do referred back to orthopedic surgeon re: report of leg feeling tight. DC Instructions (as noted above) reviewed with patient. Red Flags per dc instructions reviewed with patient. Patient denies fever, redness, or excessive swelling. Patient states that her leg feels a little tight. Patient referred back to orthopedic surgeon. DME:   Patient reports that she has a walker and cane for use as needed. Medication Review completed with patient. Follow Up Appointments: See Goals     Goals Addressed      Attends follow-up appointments as directed. Follow Up appointments scheduled as follows:     - RHONDA Puga with [facility] for 3/23/18     - Faith [address]  5/8/18  Patient did not want to schedule a sooner appointment with PCP       Understands red flags post discharge. 3-19-18:  Red flags per dc instuctions reviewed with patient. - High Temperature  - Unexpected pain, redness, swelling  - Any Drainage around knee area  Call Dr. Bridger Meyer office immediately @ [phone]          Patient alerted to availability of PCP/specialty provider 24 hours a day 7 days a week through the answering service for any concerns or questions. Nurse Navigator contact information and hours of availability provided. Patient voiced understanding  information provided. Chart CC to BARBARA Da Silva for review.

## 2018-03-23 ENCOUNTER — OFFICE VISIT (OUTPATIENT)
Dept: ORTHOPEDIC SURGERY | Age: 58
End: 2018-03-23

## 2018-03-23 VITALS
OXYGEN SATURATION: 97 % | DIASTOLIC BLOOD PRESSURE: 69 MMHG | HEART RATE: 93 BPM | TEMPERATURE: 97 F | SYSTOLIC BLOOD PRESSURE: 118 MMHG

## 2018-03-23 DIAGNOSIS — Z98.890 STATUS POST MEDIAL MENISCECTOMY OF LEFT KNEE: Primary | ICD-10-CM

## 2018-03-23 NOTE — PROGRESS NOTES
Patient: Telly Munoz  YOB: 1960       HISTORY:  The patient presents for reevaluation of her left knee status post arthroscopic partial medial menisectomy on 3/16/18. Patient is improved, states pain is a 4 out of 10. She states it feels stiff and achey. she has not gone to physical therapy. Patient denies any fever, chills, chest pain, shortness of breath or calf pain. There are no new illness or injuries to report since last seen in the office. PHYSICAL EXAMINATION:    Visit Vitals    /69    Pulse 93    Temp 97 °F (36.1 °C)    SpO2 97%     The patient is a well-developed, well-nourished female in no acute distress. The patient is alert and oriented times three. The patient appears to be well groomed. Mood and affect are normal.   ORTHOPEDIC EXAM of Left knee: Inspection: Effusion present,  incisions clean, dry intact, sutures in place  TTP: medial joint line  Range of motion: 0-100 flexion  Stability: Stable  Strength: 5/5  2+ distal pulses    IMPRESSION:  Status post Left knee arthroscopic partial medial menisectomy with degenerative arthritis with joint space narrowing. PLAN: Incisions cleaned. Surgery was discussed at length today. Stressed to patient that nothing causes an increase in pain or swelling. Patient is weight bearing as tolerated. OK to d/c use of crutches/walker. Will set up with physical therapy. Patient does not request refill of pain medication. Patient will follow up in 3 weeks.     [facility]

## 2018-03-27 ENCOUNTER — HOSPITAL ENCOUNTER (OUTPATIENT)
Dept: PHYSICAL THERAPY | Age: 58
Discharge: HOME | End: 2018-03-27
Payer: MEDICARE

## 2018-03-27 PROCEDURE — 97110 THERAPEUTIC EXERCISES: CPT

## 2018-03-27 PROCEDURE — 97162 PT EVAL MOD COMPLEX 30 MIN: CPT

## 2018-03-27 PROCEDURE — 97140 MANUAL THERAPY 1/> REGIONS: CPT

## 2018-03-27 PROCEDURE — G8979 MOBILITY GOAL STATUS: HCPCS

## 2018-03-27 PROCEDURE — G8978 MOBILITY CURRENT STATUS: HCPCS

## 2018-03-27 NOTE — PROGRESS NOTES
[facility]              [address]        [place], [address] [place]   [phone] fax    Plan of Care/ Statement of Necessity for Physical Therapy Services    Patient name: Juancarlos Saravia Start of Care: 3/27/2018   Referral source: Cheryl Galvan,* : 1960    Medical Diagnosis: Pain in left knee [M25.562]   Onset Date:DoS 3/16/2018    Treatment Diagnosis: s/p left knee medial menisectomy   Prior Hospitalization: see medical history Provider#: [id number]   Medications: Verified on Patient summary List    Comorbidities: L foot fusion x2 (, ), Chronic R Knee Pain (arthroscopy ), BMI>30, Depression, Tobacco Use, Gastric Bypass   Prior Level of Function: SPC with ambulation, Chronic limitations in activity tolerance secondary to bilateral knee pain and L foot pain,  (3x/week - Resistance, Cardio)      The Plan of Care and following information is based on the information from the initial evaluation. Assessment:    Patient presents s/p left knee partial medial menisectomy 3/16/2018 with patient reporting completion secondary to chronic left knee pain with utilization of SPC prior to surgery secondarily. Patient as well reports history of chronic right knee pain (medial menisectomy ) and left foot fusion x2 (, ) with patient reporting chronic limitations in activity and weightbearing tolerance secondarily. Patient reports self-discharge of utilization of FWW x4 days post-surgery with patient reporting an uncomplicated post-surgical recovery and denying the presence of signs and symptoms consistent with infection, DVT, PE nor neurovascular compromise. Patient reports chronic left foot numbness/tingling in a non-dermatomal pattern secondary to [address] consistent with foot surgery but reports non-presence of foot drop.  Patient subjectively reports pain primarily localized to popliteal region without radiation superiorly or inferiorly post-surgically with patient noted with TTP to the medial and lateral distal hamstring musculature and painful weakness with strength assessment. Patient noted with left knee PROM 8-0-100 degrees but actively with ability to only achieve 40 degrees knee flexion AROM secondary to distal hamstring discomfort reported. Emphasis of treatment to be placed on reducing irritability of left hamstring musculature through progressive loading with concomitant progressive weightbearing strengthening and balance training. Patient may be limited in progression of weightbearing exercises secondary to chronic right knee and left ankle pain. Patient with desire to return back to completion of workout regime with  3x/week.     Patient will continue to benefit from skilled PT services to modify and progress therapeutic interventions, address functional mobility deficits, address ROM deficits, address strength deficits, analyze and address soft tissue restrictions, analyze and cue movement patterns, analyze and modify body mechanics/ergonomics and assess and modify postural abnormalities to attain remaining goals. Key Information:    BP: 114/84 mmHg  Posture/Observation:                        Static Standing: Symmetrical standing posture with bilateral knee valgus moment     Gait: L antalgic gait pattern with decreased L stance phase time and diminished hip extension during termnal stance phase     Functional Tests:  1. Bilateral Squat: Bilateral anterior knee translation with knee valgus moment and increased forward trunk flexion (popliteal pain 4.10)  2.  SLS: L <2 sec (p! 6/10), R <2 sec     Neurovascular Screen: Diminished sensation noted to L foot (non-specific dermatomal pattern), Dorsalis pedis L/R 1+     ROM / Strength [] Unable to assess AROM                  MMT (1-5)      Left Right Left Right   Hip Flexion     4 (p!) 5     Extension             Abduction             ER     NT 5     IR     NT 5   Knee Flexion 40 (p! 4/10)  PROM 100 122 4 (p!) 5     Extension 8(0) 8(0)  5 4 (p!)   Ankle Dorsiflexion     5 5   *Assessed in supine     Palpation:                        Pain: TTP medial/lateral distal hamstrings                        Edema: None noted     Evaluation Complexity History MEDIUM  Complexity : 1-2 comorbidities / personal factors will impact the outcome/ POC ; Examination MEDIUM Complexity : 3 Standardized tests and measures addressing body structure, function, activity limitation and / or participation in recreation  ;Presentation MEDIUM Complexity : Evolving with changing characteristics  ; Clinical Decision Making MEDIUM Complexity : FOTO score of 26-74  Overall Complexity Rating: MEDIUM  Problem List: pain affecting function, decrease ROM, decrease strength, impaired gait/ balance, decrease ADL/ functional abilitiies, decrease activity tolerance and decrease flexibility/ joint mobility   Treatment Plan may include any combination of the following: Therapeutic exercise, Therapeutic activities, Neuromuscular re-education, Physical agent/modality, Gait/balance training, Manual therapy, Patient education, Self Care training, Functional mobility training, Home safety training and Stair training  Patient / Family readiness to learn indicated by: asking questions, trying to perform skills and interest  Persons(s) to be included in education: patient (P)  Barriers to Learning/Limitations: None  Patient Goal (s): \"I want to improve my ease with getting dressed\", \"Improve my ease with donning. doffing shoes\"  Patient Self Reported Health Status: good  Rehabilitation Potential: good    Short Term Goals: To be accomplished in 3 weeks:  1.  Patient will subjectively report full compliance with prescribed HEP. 2. Patient will demonstrate supine left knee AROM 0-120 degrees in order to improve ease with donning/doffing clothing. 3. Patient will demonstrate left hamstring MMT 5/5 without pain in order to improve ease with bed mobility. Long Term Goals: To be accomplished in 6 weeks:  1. Patient will demonstrate a significant functional improvement as demonstrated by a score of >/= 52 on FOTO. 2. Patient will demonstrate the ability to perform bilateral SLS >/=10 seconds in order to improve ease with donning/doffing pants. 3. Patient will demonstrate the ability to ascend/descent 4 stairs with a step-over-step gait pattern with single UE assist in order to improve ease with household ambulation. Frequency / Duration: Patient to be seen 2 times per week for 8 weeks. Patient/ Caregiver education and instruction: Diagnosis, prognosis, self care, activity modification and exercises   [x]  Plan of care has been reviewed with SEVERO    G-Codes (GP)  Mobility   Current  CL= 60-79%   Goal  CK= 40-59%    The severity rating is based on clinical judgment and the FOTO score. Certification Period: 3/27/2018 - 5/26/2018  Oz Dunn, PT 3/27/2018 8:07 AM  ________________________________________________________________________    I certify that the above Therapy Services are being furnished while the patient is under my care. I agree with the treatment plan and certify that this therapy is necessary.     [de-identified] Signature:____________________  Date:____________Time: _________    Please sign and return to [facility] - [address] [place], [address] [employer]   [phone] fax

## 2018-03-27 NOTE — PROGRESS NOTES
PT DAILY TREATMENT NOTE - [facility]     Patient Name: Angela Dutta  Date:3/27/2018  : 1960  [x]  Patient  Verified  Payor: [employer] COMPLETE / Plan: [address] / Product Type: Managed Care Medicare /    In time:905  Out time:1000  Total Treatment Time (min): 55  Total Timed Codes (min): 45  1:1 Treatment Time ( [address] only): 25   Visit #: 1 of 16    Treatment Area: Pain in left knee [M25.562]    Physical Therapy Evaluation - Knee    SUBJECTIVE      Any medication changes, allergies to medications, adverse drug reactions, diagnosis change, or new procedure performed?: [x] No    [] Yes (see summary sheet for update)    Subjective functional status/changes:     PLOF: SPC with ambulation, Chronic limitations in activity tolerance secondary to bilateral knee pain and L foot pain,  (3x/week - Resistance, Cardio)  Current Functional Status: Walking tolerance (5 min), No assistive devices utilized, Driving tolerance 60 min  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 doffing shoes\"    OBJECTIVE EXAMINATION    BP: 114/84 mmHg  Posture/Observation:   Static Standing: Symmetrical standing posture with bilateral knee valgus moment    Gait: L antalgic gait pattern with decreased L stance phase time and diminished hip extension during termnal stance phase    Functional Tests:  1. Bilateral Squat: Bilateral anterior knee translation with knee valgus moment and increased forward trunk flexion (popliteal pain 4.10)  2.  SLS: L <2 sec (p! 6/10), R <2 sec    Neurovascular Screen: Diminished sensation noted to L foot (non-specific dermatomal pattern), Dorsalis pedis L/R 1+    ROM / Strength [] Unable to assess                                                                              AROM                              MMT (1-5)    Left Right Left Right   Hip Flexion   4 (p!) 5    Extension        Abduction        ER   NT 5    IR   NT 5   Knee Flexion 40 (p! 4/10)  PROM 100 122 4 (p!) 5    Extension 8(0) 8(0)  5 4 (p!)   Ankle Dorsiflexion   5 5   *Assessed in supine    Palpation:   Pain: TTP medial/lateral distal hamstrings   Edema: None noted     OBJECTIVE    Modality rationale: decrease pain and increase tissue extensibility to improve the patients ability to improve ease with sleep   Min Type Additional Details   10 -  Ice     X  heat  -  Ice massage Position: Supine  Location: Left Distal Hamstring, Post-Tx     20 min [x]Eval                  []Re-Eval     15 min Therapeutic Exercise:  [x] See flow sheet : Patient educated in completion of prescribed HEP and provided with written HEP instructions, Patient educated regarding diagnosis and PT POC   Rationale: increase ROM and increase strength to improve the patients ability to improve ease with household ambulation    10 min Manual Therapy:    Supine, Left Hamstring 90-90 Contract-Relax  Seated, Left Tibial Distraction with Knee Flexion PROM  Supine, Manually Resisted Hamstring Curl (w/ SB)   Rationale: decrease pain, increase ROM and increase tissue extensibility to improve ease with stair management.,          With   [] TE   [] TA   [] neuro   [] other: Patient Education: [x] Review HEP    [] Progressed/Changed HEP based on:   [] positioning   [] body mechanics   [] transfers   [] heat/ice application    [] other:      Pain Level (0-10 scale) post treatment: 4    ASSESSMENT/Changes in Function: Patient presents s/p left knee partial medial menisectomy 3/16/2018 with patient reporting completion secondary to chronic left knee pain with utilization of SPC prior to surgery secondarily. Patient as well reports history of chronic right knee pain (medial menisectomy 2016) and left foot fusion x2 (2006, 2007) with patient reporting chronic limitations in activity and weightbearing tolerance secondarily. Patient reports self-discharge of utilization of FWW x4 days post-surgery with patient reporting an uncomplicated post-surgical recovery and denying the presence of signs and symptoms consistent with infection, DVT, PE nor neurovascular compromise. Patient reports chronic left foot numbness/tingling in a non-dermatomal pattern secondary to [address] consistent with foot surgery but reports non-presence of foot drop. Patient subjectively reports pain primarily localized to popliteal region without radiation superiorly or inferiorly post-surgically with patient noted with TTP to the medial and lateral distal hamstring musculature and painful weakness with strength assessment. Patient noted with left knee PROM 8-0-100 degrees but actively with ability to only achieve 40 degrees knee flexion AROM secondary to distal hamstring discomfort reported. Emphasis of treatment to be placed on reducing irritability of left hamstring musculature through progressive loading with concomitant progressive weightbearing strengthening and balance training. Patient may be limited in progression of weightbearing exercises secondary to chronic right knee and left ankle pain. Patient with desire to return back to completion of workout regime with  3x/week.     Patient will continue to benefit from skilled PT services to modify and progress therapeutic interventions, address functional mobility deficits, address ROM deficits, address strength deficits, analyze and address soft tissue restrictions, analyze and cue movement patterns, analyze and modify body mechanics/ergonomics and assess and modify postural abnormalities to attain remaining goals. [x]  See Plan of Care  []  See progress note/recertification  []  See Discharge Summary         Progress towards goals / Updated goals:    Short Term Goals: To be accomplished in 3 weeks:  1. Patient will subjectively report full compliance with prescribed HEP. Eval: HEP provided  2. Patient will demonstrate supine left knee AROM 0-120 degrees in order to improve ease with donning/doffing clothing. Eval: Supine Left Knee AROM 8-0-40 degrees  3. Patient will demonstrate left hamstring MMT 5/5 without pain in order to improve ease with bed mobility. Eval: Left Hamstring MMT 4/5 (pain)    Long Term Goals: To be accomplished in 6 weeks:  1. Patient will demonstrate a significant functional improvement as demonstrated by a score of >/= 52 on FOTO. Eval: FOTO = 26  2. Patient will demonstrate the ability to perform bilateral SLS >/=10 seconds in order to improve ease with donning/doffing pants. Eval: Left SLS <2 sec, Right SLS <2 sec  3. Patient will demonstrate the ability to ascend/descent 4 stairs with a step-over-step gait pattern with single UE assist in order to improve ease with household ambulation.   Eval: Step-to gait pattern with single UE assist subjectively reported    PLAN  [x]  Upgrade activities as tolerated     []  Continue plan of care  []  Update interventions per flow sheet       []  Discharge due to:_  []  Other:_      Matt Bertrand PT 3/27/2018  8:06 AM    Future Appointments  Date Time Provider [facility]   3/27/2018 9:00 AM Matt Bertrand PT [facility]   4/13/2018 1:30 PM RHONDA [address]if[address]es [address]   4/20/2018 8:00 AM MD Ana Gunn [address]   5/8/2018 7:30 AM Marion Simmons NP [address]

## 2018-03-28 NOTE — OP NOTES
[facility]  OPERATIVE REPORT    Chucho Rinaldi  MR#: [id number]  : 1960  ACCOUNT #: [de-identified]   DATE OF SERVICE: 2018    PREOPERATIVE DIAGNOSIS:  Medial meniscus tear, left knee. POSTOPERATIVE DIAGNOSIS:  Medial meniscus tear, left knee. PROCEDURE:  Arthroscopy with partial medial meniscectomy, left knee. SURGEON:  Goldie Kendall MD     ESTIMATED BLOOD LOSS:  Minimal.    COMPLICATIONS:  None. SPECIMENS:  None. ASSISTANT:  Lety Mayes    IMPLANTS:  None. ANESTHESIA:  General.    BRIEF HISTORY:  The patient has had significant amount of problems with the left knee not responding conservative treatment, was consented for surgery after having discussed at length possible risks and complications of surgery including infection, bleeding, recurrence, pain, among other possible problems. PROCEDURE IN DETAIL:  The patient was taken to the operating room. General endotracheal anesthesia by the anesthesia staff, placed in standard arthroscopy martines, and her left leg was prepped with ChloraPrep solution and draped as a free sterile field. The anterolateral portal was used as an arthroscopy portal and 2 medial portals were working portals. Portals were made with 11 blade followed by blunt trocar. Once the arthroscope was in place, the knee was systematically evaluated, suprapatellar pouch, patellofemoral joint. No significant changes noted. Diffuse degenerative changes in the medial compartment with a radial tear on the posterior medial attachment. It was debrided using straight basket forceps and 3.5 shaver, leaving a stable rim. Anterior cruciate ligament was intact. The lateral compartment had no evidence of tears. Fluid was suctioned out. The knee was injected with a mixture of Marcaine. Portals were closed with 4-0 Monocryl. Sterile dressings were applied.   The patient tolerated the procedure well and was taken to recovery room without problems.       MD CRISPIN Connors / Ivis Nevarez  D: 03/28/2018 15:52     T: 03/28/2018 17:28  JOB #: [id number]

## 2018-03-30 ENCOUNTER — HOSPITAL ENCOUNTER (OUTPATIENT)
Dept: PHYSICAL THERAPY | Age: 58
Discharge: HOME | End: 2018-03-30
Payer: MEDICARE

## 2018-03-30 PROCEDURE — 97140 MANUAL THERAPY 1/> REGIONS: CPT

## 2018-03-30 PROCEDURE — 97110 THERAPEUTIC EXERCISES: CPT

## 2018-03-30 NOTE — PROGRESS NOTES
PT DAILY TREATMENT NOTE - [facility]     Patient Name: Zenobia Albarran  Date:3/30/2018  : 1960  [x]  Patient  Verified  Payor: [employer] MEDICARE COMPLETE / Plan: [address] / Product Type: Managed Care Medicare /    In time:3:07  Out time:3:56  Total Treatment Time (min): 49  Total Timed Codes (min): 39  1:1 Treatment Time ( [address] only): 44   Visit #: 2 of 16    Treatment Area: Pain in left knee [M25.562]    SUBJECTIVE  Pain Level (0-10 scale): 4  Any medication changes, allergies to medications, adverse drug reactions, diagnosis change, or new procedure performed?: [x] No    [] Yes (see summary sheet for update)  Subjective functional status/changes:   [] No changes reported  Pt reports that she has been doing her home exercises. OBJECTIVE    Modality rationale: decrease pain to improve the patients ability to decrease difficulty while performing tasks.     Min Type Additional Details    [] Estim:  []Unatt       []IFC  []Premod                        []Other:  []w/ice   []w/heat  Position:  Location:    [] Estim: []Att    []TENS instruct  []NMES                    []Other:  []w/US   []w/ice   []w/heat  Position:  Location:    []  Traction: [] Cervical       []Lumbar                       [] Prone          []Supine                       []Intermittent   []Continuous Lbs:  [] before manual  [] after manual    []  Ultrasound: []Continuous   [] Pulsed                           []1MHz   []3MHz W/cm2:  Location:    []  Iontophoresis with dexamethasone         Location: [] Take home patch   [] In clinic   10 []  Ice     [x]  heat  []  Ice massage  []  Laser   []  Anodyne Position:sitting  Location:hamstrings    []  Laser with stim  []  Other:  Position:  Location:    []  Vasopneumatic Device Pressure:       [] lo [] med [] hi   Temperature: [] lo [] med [] hi   [] Skin assessment post-treatment:  []intact []redness- no adverse reaction    []redness - adverse reaction:       30 min Therapeutic Exercise: [x] See flow sheet :   Rationale: increase ROM and increase strength to improve the patients ability to increase tolerance to activities. 9 min Manual Therapy:  STM/TPR distal hamstring, proximal gastroc, knee flex and extension mobs,    Rationale: decrease pain, increase ROM, increase tissue extensibility and decrease trigger points to increase ease with ADLs. With   [] TE   [] TA   [] neuro   [] other: Patient Education: [x] Review HEP    [] Progressed/Changed HEP based on:   [] positioning   [] body mechanics   [] transfers   [] heat/ice application    [] other:      Other Objective/Functional Measures: pt reports performing HEP     Pain Level (0-10 scale) post treatment: 4     ASSESSMENT/Changes in Function: Initiated exercises per POC. Pt tender along distal hamstrings. Patient will continue to benefit from skilled PT services to modify and progress therapeutic interventions, address functional mobility deficits, address ROM deficits, address strength deficits and analyze and address soft tissue restrictions to attain remaining goals. []  See Plan of Care  []  See progress note/recertification  []  See Discharge Summary         Progress towards goals / Updated goals:  Short Term Goals: To be accomplished in 3 weeks:  1. Patient will subjectively report full compliance with prescribed HEP. Eval: HEP provided  Current: Goal met: pt reports performing HEP. 3/30/18  2. Patient will demonstrate supine left knee AROM 0-120 degrees in order to improve ease with donning/doffing clothing. Eval: Supine Left Knee AROM 8-0-40 degrees  3. Patient will demonstrate left hamstring MMT 5/5 without pain in order to improve ease with bed mobility. Eval: Left Hamstring MMT 4/5 (pain)     Long Term Goals: To be accomplished in 6 weeks:  1. Patient will demonstrate a significant functional improvement as demonstrated by a score of >/= 52 on FOTO. Eval: FOTO = 26  2.  Patient will demonstrate the ability to perform bilateral SLS >/=10 seconds in order to improve ease with donning/doffing pants. Eval: Left SLS <2 sec, Right SLS <2 sec  3. Patient will demonstrate the ability to ascend/descent 4 stairs with a step-over-step gait pattern with single UE assist in order to improve ease with household ambulation.   Eval: Step-to gait pattern with single UE assist subjectively reported    PLAN  []  Upgrade activities as tolerated     [x]  Continue plan of care  []  Update interventions per flow sheet       []  Discharge due to:_  []  Other:_      Aakash Pruitt, PTA 3/30/2018  3:30 PM    Future Appointments  Date Time Provider Cesar Dang   4/2/2018 10:00 AM Ludlow Malling, PT [facility]   4/5/2018 9:00 AM Sunshine Canchola, PT [facility]   4/10/2018 9:30 AM Aakash Pruitt, PTA [facility]   4/12/2018 8:30 AM Ludlowmartin Partidaing, PT [facility]   4/13/2018 1:30 PM RHONDA Claros [facility] GERRY SCHED   4/16/2018 9:00 AM Aakash Pruitt, PTA [facility]   4/19/2018 8:30 AM Ludlowmartin Partidaing, PT [facility]   4/20/2018 8:00 AM [facility]   4/23/2018 8:30 AM Aakash Pruitt, PTA [facility]   4/27/2018 8:00 AM Jovanymartin Partidaing, PT [facility]   4/30/2018 8:30 AM Aakash Pruitt, PTA [facility]   5/3/2018 8:30 AM Jovanymartin Partidaing, PT [facility]   5/8/2018 7:30 AM Molly Auguste NP [facility] GERRY [address] [place] [address]

## 2018-04-05 ENCOUNTER — HOSPITAL ENCOUNTER (OUTPATIENT)
Dept: PHYSICAL THERAPY | Age: 58
Discharge: HOME | End: 2018-04-05
Payer: MEDICARE

## 2018-04-05 PROCEDURE — 97112 NEUROMUSCULAR REEDUCATION: CPT

## 2018-04-05 PROCEDURE — 97140 MANUAL THERAPY 1/> REGIONS: CPT
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
## 2018-04-10 ENCOUNTER — HOSPITAL ENCOUNTER (OUTPATIENT)
Dept: PHYSICAL THERAPY | Age: 58
Discharge: HOME | End: 2018-04-10
Payer: MEDICARE

## 2018-04-10 PROCEDURE — 97140 MANUAL THERAPY 1/> REGIONS: CPT | Performed by: PHYSICAL THERAPIST

## 2018-04-10 PROCEDURE — 97112 NEUROMUSCULAR REEDUCATION: CPT | Performed by: PHYSICAL THERAPIST

## 2018-04-10 NOTE — PROGRESS NOTES
PT DAILY TREATMENT NOTE - [facility]     Patient Name: Michael Gonzalez  Date:4/10/2018  : 1960  [x]  Patient  Verified  Payor: [employer] MEDICARE COMPLETE / Plan: [facility] MEDICARE COMPLETE / Product Type: Managed Care Medicare /    In time:230  Out time:301  Total Treatment Time (min): 31  Total Timed Codes (min): 31  1:1 Treatment Time ([facility] only): 31   Visit #: 4 of 16    Treatment Area: Pain in left knee [M25.562]    SUBJECTIVE  Pain Level (0-10 scale): 0/10  Any medication changes, allergies to medications, adverse drug reactions, diagnosis change, or new procedure performed?: [x] No    [] Yes (see summary sheet for update)  Subjective functional status/changes:   [] No changes reported  Pt reports improved pain overall and mostly soreness from the exercises    OBJECTIVE    Modality rationale:  to improve the patients ability to    Min Type Additional Details    [] Estim:  []Unatt       []IFC  []Premod                        []Other:  []w/ice   []w/heat  Position:  Location:    [] Estim: []Att    []TENS instruct  []NMES                    []Other:  []w/US   []w/ice   []w/heat  Position:  Location:    []  Traction: [] Cervical       []Lumbar                       [] Prone          []Supine                       []Intermittent   []Continuous Lbs:  [] before manual  [] after manual    []  Ultrasound: []Continuous   [] Pulsed                           []1MHz   []3MHz W/cm2:  Location:    []  Iontophoresis with dexamethasone         Location: [] Take home patch   [] In clinic    []  Ice     []  heat  []  Ice massage  []  Laser   []  Anodyne Position:  Location:    []  Laser with stim  []  Other:  Position:  Location:    []  Vasopneumatic Device Pressure:       [] lo [] med [] hi   Temperature: [] lo [] med [] hi   [] Skin assessment post-treatment:  []intact []redness- no adverse reaction    []redness - adverse reaction:      min []Eval                  []Re-Eval       13 min Therapeutic Exercise:  [x] See flow sheet : increased per flow sheet   Rationale: increase ROM, increase strength and improve coordination to improve the patients ability to improve activity tolerance and gait     min Therapeutic Activity:  []  See flow sheet :   Rationale:   to improve the patients ability to      10 min Neuromuscular Re-education:  [x]  See flow sheet : core and quad activation ex's per flow sheet   Rationale: increase strength, improve coordination, improve balance and increase proprioception  to improve the patients ability to decrease pain and improve activity tolerance     8 min Manual Therapy:  Stick to distal HS and ITB, knee flexion mobs   Rationale: decrease pain, increase ROM, increase tissue extensibility and decrease trigger points to improve activity tolerance and gait      min Gait Training:  ___ feet with ___ device on level surfaces with ___ level of assist   Rationale: With   [] TE   [] TA   [] neuro   [] other: Patient Education: [x] Review HEP    [] Progressed/Changed HEP based on:   [] positioning   [] body mechanics   [] transfers   [] heat/ice application    [] other:      Other Objective/Functional Measures:      Pain Level (0-10 scale) post treatment: 0/10    ASSESSMENT/Changes in Function: Pt is progressing well and improving towards all goals. Continue to progress as tolerated. Patient will continue to benefit from skilled PT services to modify and progress therapeutic interventions, address functional mobility deficits, address ROM deficits, address strength deficits, analyze and address soft tissue restrictions, analyze and cue movement patterns, analyze and modify body mechanics/ergonomics, address imbalance/dizziness and instruct in home and community integration to attain remaining goals. []  See Plan of Care  []  See progress note/recertification  []  See Discharge Summary         Progress towards goals / Updated goals:  Short Term Goals: To be accomplished in 3 weeks:  1.  Patient will subjectively report full compliance with prescribed HEP. Eval: HEP provided  Current: Goal met: pt reports performing HEP. 3/30/18  2. Patient will demonstrate supine left knee AROM 0-120 degrees in order to improve ease with donning/doffing clothing. Eval: Supine Left Knee AROM 8-0-40 degrees   Current: Progressing, Supine Left Knee AROM 8-0-115 degrees, 4/10/2018   3. Patient will demonstrate left hamstring MMT 5/5 without pain in order to improve ease with bed mobility. Eval: Left Hamstring MMT 4/5 (pain)  Current: progressing, MMT left HS = 4+/5 w/ pain reported at 4/10 4/10/18    Long Term Goals: To be accomplished in 6 weeks:  1. Patient will demonstrate a significant functional improvement as demonstrated by a score of >/= 52 on FOTO. Eval: FOTO = 26  2. Patient will demonstrate the ability to perform bilateral SLS >/=10 seconds in order to improve ease with donning/doffing pants. Eval: Left SLS <2 sec, Right SLS <2 sec  3. Patient will demonstrate the ability to ascend/descent 4 stairs with a step-over-step gait pattern with single UE assist in order to improve ease with household ambulation.   Eval: Step-to gait pattern with single UE assist subjectively reported    PLAN  [x]  Upgrade activities as tolerated     [x]  Continue plan of care  []  Update interventions per flow sheet       []  Discharge due to:_  []  Other:_      Jossie Sanchez, PT 4/10/2018  2:45 PM    Future Appointments  Date Time Provider Cesar Leong   4/12/2018 8:30 AM Olimpia Carrillo, PT [facility]   4/13/2018 1:30 PM RHONDA Blackwell [address]   4/16/2018 9:00 AM Tommy Balloon, PTA [facility]   4/19/2018 8:30 AM Olimpia Carrillo, PT [facility]   4/20/2018 8:00 AM MD Sol Royaltsa [address]   4/23/2018 8:30 AM Maredouard Balloon, PTA [facility]   4/27/2018 8:00 AM Olimpia Bending, PT [facility]   4/30/2018 8:30 AM Tommy Baez, PTA [facility]   5/3/2018 8:30 AM Olimpia Bending, PT [facility]   5/8/2018 7:30 AM KIM Cornejo SCHED

## 2018-04-12 ENCOUNTER — HOSPITAL ENCOUNTER (OUTPATIENT)
Dept: PHYSICAL THERAPY | Age: 58
Discharge: HOME | End: 2018-04-12
Payer: MEDICARE

## 2018-04-12 PROCEDURE — 97140 MANUAL THERAPY 1/> REGIONS: CPT

## 2018-04-12 PROCEDURE — 97112 NEUROMUSCULAR REEDUCATION: CPT

## 2018-04-12 NOTE — PROGRESS NOTES
PT DAILY TREATMENT NOTE - [facility]     Patient Name: Grace Phipps  Date:2018  : 1960  [x]  Patient  Verified  Payor: Vonda Padilla / Plan: [facility] MEDICARE COMPLETE / Product Type: Managed Care Medicare /    In RQAR:[id number]  Out ARVU:[id number]  Total Treatment Time (min): 49  Total Timed Codes (min): 39  1:1 Treatment Time ( only): 28   Visit #: 5 of 16    Treatment Area: Pain in left knee [M25.562]    SUBJECTIVE  Pain Level (0-10 scale): 4  Any medication changes, allergies to medications, adverse drug reactions, diagnosis change, or new procedure performed?: [x] No    [] Yes (see summary sheet for update)  Subjective functional status/changes:   [] No changes reported  Patient reports increase in bilateral knee pain without known reason for increase.     OBJECTIVE    Modality rationale: decrease pain and increase tissue extensibility to improve the patients ability to improve ease with sleep   Min Type Additional Details   10 []  Ice     [x]  heat  []  Ice massage Position: Reclined  Location: Left Hamstring, Post-Tx     20 min Therapeutic Exercise:  [x] See flow sheet : Emphasis placed on improving available knee AROM and improving activation and recruitment of the gluteal and quadriceps musculature   Rationale: increase ROM and increase strength to improve the patients ability to improve ease with stair management.     11 min Neuromuscular Re-education:  [x]  See flow sheet : Emphasis placed on improving activation and recruitment of the gluteal and quadriceps musculature and improving LE proprioceptive and kinesthetic awareness   Rationale: increase ROM, increase strength, improve balance and increase proprioception  to improve the patients ability to improve safety with community ambulation.     8 min Manual Therapy:    Supine, Left Hamstring 90-90 Contract-Relax  Supine, 90-90 Left Hamstring STM with Stick  Supine, Manually Resisted TKE   Rationale: decrease pain, increase ROM and increase tissue extensibility to improve ease with stair management. With   [] TE   [] TA   [] neuro   [] other: Patient Education: [x] Review HEP    [] Progressed/Changed HEP based on:   [] positioning   [] body mechanics   [] transfers   [] heat/ice application    [] other:      Pain Level (0-10 scale) post treatment: 2    ASSESSMENT/Changes in Function: Patient noted with increased hypertonicity to the medial hamstring musculature with reproduction of patient reported symptoms with manual palpation. Further progressed resistance with weightbearing and non-weightbearing knee extension with good tolerance demonstrated. Patient encouraged to utilize heat independently for reduction of hamstring hypertonicity. Patient will continue to benefit from skilled PT services to modify and progress therapeutic interventions, address functional mobility deficits, address ROM deficits, address strength deficits, analyze and address soft tissue restrictions and analyze and cue movement patterns to attain remaining goals. []  See Plan of Care  []  See progress note/recertification  []  See Discharge Summary         Progress towards goals / Updated goals:    Short Term Goals: To be accomplished in 3 weeks:  1. Patient will subjectively report full compliance with prescribed HEP. Eval: HEP provided  Current: Goal met: pt reports performing HEP. 3/30/18  2. Patient will demonstrate supine left knee AROM 0-120 degrees in order to improve ease with donning/doffing clothing. Eval: Supine Left Knee AROM 8-0-40 degrees   Current: Progressing, Supine Left Knee AROM 8-0-115 degrees, 4/10/2018   3. Patient will demonstrate left hamstring MMT 5/5 without pain in order to improve ease with bed mobility. Eval: Left Hamstring MMT 4/5 (pain)  Current: progressing, MMT left HS = 4+/5 w/ pain reported at 4/10 4/10/18    Long Term Goals: To be accomplished in 6 weeks:  1.  Patient will demonstrate a significant functional improvement as demonstrated by a score of >/= 52 on FOTO. Eval: FOTO = 26  2. Patient will demonstrate the ability to perform bilateral SLS >/=10 seconds in order to improve ease with donning/doffing pants. Eval: Left SLS <2 sec, Right SLS <2 sec  3. Patient will demonstrate the ability to ascend/descent 4 stairs with a step-over-step gait pattern with single UE assist in order to improve ease with household ambulation.   Eval: Step-to gait pattern with single UE assist subjectively reported    PLAN  [x]  Upgrade activities as tolerated     [x]  Continue plan of care  []  Update interventions per flow sheet       []  Discharge due to:_  []  Other:_      Lizette Martinez PT 4/12/2018  7:32 AM    Future Appointments  Date Time Provider Cesar Leong   4/12/2018 8:30 AM Lizette Martinez PT [facility]   4/13/2018 1:30 PM RHONDA Goff [address]   4/16/2018 9:00 AM Felipe Lutz PTA [facility]   4/19/2018 8:30 AM Lizette Martinez PT [facility]   4/20/2018 8:00 AM [facility]   4/23/2018 8:30 AM Felipe Lutz PTA [facility]   4/27/2018 8:00 AM Lizette Martinez PT [facility]   4/30/2018 8:30 AM Felipe Lutz PTA [facility]   5/3/2018 8:30 AM Lizette Martinez PT [facility]   5/8/2018 7:30 AM KIM Hannah

## 2018-04-13 ENCOUNTER — OFFICE VISIT (OUTPATIENT)
Dept: ORTHOPEDIC SURGERY | Age: 58
End: 2018-04-13

## 2018-04-13 VITALS
HEIGHT: 65 IN | WEIGHT: 266 LBS | OXYGEN SATURATION: 98 % | BODY MASS INDEX: 44.32 KG/M2 | DIASTOLIC BLOOD PRESSURE: 77 MMHG | HEART RATE: 76 BPM | SYSTOLIC BLOOD PRESSURE: 139 MMHG

## 2018-04-13 DIAGNOSIS — Z98.890 STATUS POST MEDIAL MENISCECTOMY OF LEFT KNEE: Primary | ICD-10-CM

## 2018-04-13 NOTE — PROGRESS NOTES
Patient: Tahmina Officer  YOB: 1960       HISTORY:  The patient presents for reevaluation of her left knee status post arthroscopic partial medial menisectomy on 3/16/18. Patient is improved, states pain is a 0 out of 10. She has been doing PT and has about 2 weeks remaining. She is walking without assist and only has occasional aches - usually at night. Patient denies any fever, chills, chest pain, shortness of breath or calf pain. There are no new illness or injuries to report since last seen in the office. PHYSICAL EXAMINATION:    Visit Vitals    /77    Pulse 76    Ht 5' 5\" (1.651 m)    Wt 266 lb (120.7 kg)    SpO2 98%    BMI 44.26 kg/m2     The patient is a well-developed, well-nourished female in no acute distress. The patient is alert and oriented times three. The patient appears to be well groomed. Mood and affect are normal.   ORTHOPEDIC EXAM of Left knee: Inspection: Effusion not present,  incisions well healed  TTP: medial joint line  Range of motion: 0-120 flexion  Stability: Stable  Strength: 5/5  2+ distal pulses    IMPRESSION:  Status post Left knee arthroscopic partial medial menisectomy with degenerative arthritis with joint space narrowing.      PLAN:   Patient is doing very well post operatively  She will cont with her PT transitioning to  A HEP when ready  Cont to let pain and swelling be her guide  RTC PRN    TREY Stallings [facility]

## 2018-04-16 ENCOUNTER — HOSPITAL ENCOUNTER (OUTPATIENT)
Dept: PHYSICAL THERAPY | Age: 58
Discharge: HOME | End: 2018-04-16
Payer: MEDICARE

## 2018-04-16 PROCEDURE — 97112 NEUROMUSCULAR REEDUCATION: CPT

## 2018-04-16 PROCEDURE — 97110 THERAPEUTIC EXERCISES: CPT

## 2018-04-16 NOTE — PROGRESS NOTES
PT DAILY TREATMENT NOTE - [facility]     Patient Name: Judson Mckeon  Date:2018  : 1960  [x]  Patient  Verified  Payor: [employer] / Plan: [facility] MEDICARE COMPLETE / Product Type: Managed Care Medicare /    In time:8:56  Out time:9:34  Total Treatment Time (min): 38  Total Timed Codes (min): 38  1:1 Treatment Time ( only): 45   Visit #: 6 of 16    Treatment Area: Pain in left knee [M25.562]    SUBJECTIVE  Pain Level (0-10 scale): 0  Any medication changes, allergies to medications, adverse drug reactions, diagnosis change, or new procedure performed?: [x] No    [] Yes (see summary sheet for update)  Subjective functional status/changes:   [] No changes reported  Pt reports that walking and bending her knee has gotten a lot better, but she continues to have difficulty crossing one leg over the other.       OBJECtive  Min Type Additional Details    [] Estim:  []Unatt       []IFC  []Premod                        []Other:  []w/ice   []w/heat  Position:  Location:    [] Estim: []Att    []TENS instruct  []NMES                    []Other:  []w/US   []w/ice   []w/heat  Position:  Location:    []  Traction: [] Cervical       []Lumbar                       [] Prone          []Supine                       []Intermittent   []Continuous Lbs:  [] before manual  [] after manual    []  Ultrasound: []Continuous   [] Pulsed                           []1MHz   []3MHz W/cm2:  Location:    []  Iontophoresis with dexamethasone         Location: [] Take home patch   [] In clinic    []  Ice     []  heat  []  Ice massage  []  Laser   []  Anodyne Position:  Location:    []  Laser with stim  []  Other:  Position:  Location:    []  Vasopneumatic Device Pressure:       [] lo [] med [] hi   Temperature: [] lo [] med [] hi   [] Skin assessment post-treatment:  []intact []redness- no adverse reaction    []redness - adverse reaction:        23 min Therapeutic Exercise:  [x] See flow sheet :   Rationale: increase ROM and increase strength to improve the patients ability to increase tolerance to activities.         15 min Neuromuscular Re-education:  [x]  See flow sheet :quad activation exercises. Rationale: increase ROM, increase strength, improve balance and increase proprioception  to improve the patients ability to improve safety with community ambulation.                   With   [] TE   [] TA   [] neuro   [] other: Patient Education: [x] Review HEP    [] Progressed/Changed HEP based on:   [] positioning   [] body mechanics   [] transfers   [] heat/ice application    [] other:      Other Objective/Functional Measures: Supine Left Knee AROM 8-0-126 degrees     Pain Level (0-10 scale) post treatment: 0    ASSESSMENT/Changes in Function: pt's AROM of left knee has returned to WNL. Pt had no increase in pain with exercises. Patient will continue to benefit from skilled PT services to modify and progress therapeutic interventions, address functional mobility deficits, address ROM deficits, address strength deficits and analyze and address soft tissue restrictions to attain remaining goals. []  See Plan of Care  []  See progress note/recertification  []  See Discharge Summary         Progress towards goals / Updated goals:     Short Term Goals: To be accomplished in 3 weeks:  1. Patient will subjectively report full compliance with prescribed HEP. Eval: HEP provided  Current: Goal met: pt reports performing HEP. 3/30/18  2. Patient will demonstrate supine left knee AROM 0-120 degrees in order to improve ease with donning/doffing clothing. Eval: Supine Left Knee AROM 8-0-40 degrees   Current: Progressing, Supine Left Knee AROM 8-0-126 degrees, 4/16/2018   3. Patient will demonstrate left hamstring MMT 5/5 without pain in order to improve ease with bed mobility.   Eval: Left Hamstring MMT 4/5 (pain)  Current: progressing, MMT left HS = 4+/5 w/ pain reported at 4/10 4/10/18    Long Term Goals: To be accomplished in 6 weeks:  1. Patient will demonstrate a significant functional improvement as demonstrated by a score of >/= 52 on FOTO. Eval: FOTO = 26  Current: Progressin, increased by 20 since [facility]. 18  2. Patient will demonstrate the ability to perform bilateral SLS >/=10 seconds in order to improve ease with donning/doffing pants. Eval: Left SLS <2 sec, Right SLS <2 sec  3. Patient will demonstrate the ability to ascend/descent 4 stairs with a step-over-step gait pattern with single UE assist in order to improve ease with household ambulation.   Eval: Step-to gait pattern with single UE assist subjectively reported    PLAN  []  Upgrade activities as tolerated     [x]  Continue plan of care  []  Update interventions per flow sheet       []  Discharge due to:_  []  Other:_      Aakash Pruitt PTA 2018  9:05 AM    Future Appointments  Date Time Provider Cesar Leong   2018 8:30 AM Jovany Echevarria PT [facility]   2018 8:00 AM MD Ana Perkins 67   2018 8:30 AM SE[facility]O [facility]   2018 8:00 AM Jovnay [facility]chevarria PT [facility]   2018 8:30 AM [facility]   5/3/2018 8:30 AM Jovany Echevarria PT [facility]   2018 7:30 AM KIM Aranda

## 2018-04-17 ENCOUNTER — PATIENT OUTREACH (OUTPATIENT)
Dept: FAMILY MEDICINE CLINIC | Age: 58
End: 2018-04-17

## 2018-04-17 NOTE — PROGRESS NOTES
Post Hospitalization Follow UP     Nurse Navigator spoke with patient via telephone call. Patient reports, \" I am doing pretty good\" and that she is still receiving physical therapy. Patient reports that she has all of her medications that are needed. Nurse Navigator reviewed follow up appointment with Mrs. Denia Graham @ [address] is scheduled for   5/8/17 @ 4944. Patient stated, \" I will be there\". Patient reminded that she can contact physician office 24/7 to include evenings, weekends, and holidays should she have any concerns or questions. Chart reviewed, patient was not re-admitted within 30 days of 3-16-18  Hospital Discharge. Goals completed.        Post Hospitalization Encounter To Be Resolved

## 2018-04-19 ENCOUNTER — HOSPITAL ENCOUNTER (OUTPATIENT)
Dept: PHYSICAL THERAPY | Age: 58
Discharge: HOME | End: 2018-04-19
Payer: MEDICARE

## 2018-04-19 PROCEDURE — 97112 NEUROMUSCULAR REEDUCATION: CPT

## 2018-04-19 PROCEDURE — 97110 THERAPEUTIC EXERCISES: CPT

## 2018-04-19 NOTE — PROGRESS NOTES
PT DAILY TREATMENT NOTE - [facility]     Patient Name: Ji Champion  Date:2018  : 1960  [x]  Patient  Verified  Payor: Froy Murry / Plan: [facility] MEDICARE COMPLETE / Product Type: Managed Care Medicare /    In IPIL:[id number]  Out time:0911  Total Treatment Time (min): 44  Total Timed Codes (min): 34  1:1 Treatment Time ( only): 34   Visit #: 7 of 16    Treatment Area: Pain in left knee [M25.562]    SUBJECTIVE  Pain Level (0-10 scale): 0  Any medication changes, allergies to medications, adverse drug reactions, diagnosis change, or new procedure performed?: [x] No    [] Yes (see summary sheet for update)  Subjective functional status/changes:   [] No changes reported  Patient reports good response to last treatment session without adverse response without completion of manual therapy. OBJECTIVE    Modality rationale: decrease inflammation and decrease pain to improve the patients ability to improve ease with sleep   Min Type Additional Details   10 [x]  Ice     []  heat  []  Ice massage Position: Reclined  Location: Right Knee, Post-Tx     20 min Therapeutic Exercise:  [x] See flow sheet : Emphasis placed on improving available knee AROM and improving activation and recruitment of the gluteal and quadriceps musculature   Rationale: increase ROM and increase strength to improve the patients ability to improve ease with stair management.      14 min Neuromuscular Re-education:  [x]  See flow sheet : Emphasis placed on improving activation and recruitment of the gluteal and quadriceps musculature and improving LE proprioceptive and kinesthetic awareness   Rationale: increase ROM, increase strength, improve balance and increase proprioception  to improve the patients ability to improve safety with community ambulation.                                                            With   [] TE   [] TA   [] neuro   [] other: Patient Education: [x] Review HEP    [] Progressed/Changed HEP based on: [] positioning   [] body mechanics   [] transfers   [] heat/ice application    [] other:      Pain Level (0-10 scale) post treatment: 0    ASSESSMENT/Changes in Function: Continue to defer completion of manual therapy with patient with good tolerance to last treatment session without adverse response. Significant improvement in hamstring strength demonstrated with patient demonstrating full strength but slight discomfort with assessment. Progressed weightbearing exercises in order to improve functional activity tolerance. Patient will continue to benefit from skilled PT services to modify and progress therapeutic interventions, address functional mobility deficits, address ROM deficits, address strength deficits, analyze and address soft tissue restrictions, analyze and cue movement patterns and analyze and modify body mechanics/ergonomics to attain remaining goals. []  See Plan of Care  []  See progress note/recertification  []  See Discharge Summary         Progress towards goals / Updated goals:    Short Term Goals: To be accomplished in 3 weeks:  1. Patient will subjectively report full compliance with prescribed HEP. Eval: HEP provided  Current: Goal met: pt reports performing HEP. 3/30/18  2. Patient will demonstrate supine left knee AROM 0-120 degrees in order to improve ease with donning/doffing clothing. Eval: Supine Left Knee AROM 8-0-40 degrees   Current: Met, Supine Left Knee AROM 8-0-126 degrees, 4/16/2018   3. Patient will demonstrate left hamstring MMT 5/5 without pain in order to improve ease with bed mobility. Eval: Left Hamstring MMT 4/5 (pain)  Current: Progressing, Left Hamstring MMT 5/5 (pain), 4/19/2018  Long Term Goals: To be accomplished in 6 weeks:  1. Patient will demonstrate a significant functional improvement as demonstrated by a score of >/= 52 on FOTO. Eval: FOTO = 26  Current: Progressing: FOTO = 46, increased by 20 since [facility]. 4/16/18  2.  Patient will demonstrate the ability to perform bilateral SLS >/=10 seconds in order to improve ease with donning/doffing pants. Eval: Left SLS <2 sec, Right SLS <2 sec   Current: Progressing, Left SLS 10 sec, Right SLS 4 sec, 4/19/2018    3. Patient will demonstrate the ability to ascend/descent 4 stairs with a step-over-step gait pattern with single UE assist in order to improve ease with household ambulation.   Eval: Step-to gait pattern with single UE assist subjectively reported    PLAN  [x]  Upgrade activities as tolerated     [x]  Continue plan of care  []  Update interventions per flow sheet       []  Discharge due to:_  []  Other:_      Corey Chairez PT 4/19/2018  7:37 AM    Future Appointments  Date Time Provider Cesar Leong   4/19/2018 8:30 AM Corey Chairez, PT [facility]   4/23/2018 8:30 AM Shann Rubinstein, PTA [facility]   4/27/2018 8:00 AM Corey Chairez PT [facility]   4/30/2018 8:30 AM Shann Rubinstein, SEVERO [facility]   5/3/2018 8:30 AM Corey Chairez PT [facility]   5/3/2018 3:00 PM MD Netta Hawk [address]   5/8/2018 7:30 AM Joceline Long NP [address]

## 2018-04-23 ENCOUNTER — HOSPITAL ENCOUNTER (OUTPATIENT)
Dept: PHYSICAL THERAPY | Age: 58
Discharge: HOME | End: 2018-04-23
Payer: MEDICARE

## 2018-04-23 PROCEDURE — 97112 NEUROMUSCULAR REEDUCATION: CPT

## 2018-04-23 PROCEDURE — 97110 THERAPEUTIC EXERCISES: CPT

## 2018-04-23 NOTE — PROGRESS NOTES
PT DAILY TREATMENT NOTE - [facility]     Patient Name: Julianne Reyna  Date:2018  : 1960  [x]  Patient  Verified  Payor: Manish Krueger / Plan: [address] / Product Type: Managed Care Medicare /    In time:11:59  Out time:12:35  Total Treatment Time (min): 36  Total Timed Codes (min): 36  1:1 Treatment Time ( [address] only): 36   Visit #: 8 of 16    Treatment Area: Pain in left knee [M25.562]    SUBJECTIVE  Pain Level (0-10 scale): 0  Any medication changes, allergies to medications, adverse drug reactions, diagnosis change, or new procedure performed?: [x] No    [] Yes (see summary sheet for update)  Subjective functional status/changes:   [] No changes reported  Pt reports that her knee gives her the biggest problem at night and she has difficulty sleeping.      OBJECTIVE    Modality rationale:    Min Type Additional Details    [] Estim:  []Unatt       []IFC  []Premod                        []Other:  []w/ice   []w/heat  Position:  Location:    [] Estim: []Att    []TENS instruct  []NMES                    []Other:  []w/US   []w/ice   []w/heat  Position:  Location:    []  Traction: [] Cervical       []Lumbar                       [] Prone          []Supine                       []Intermittent   []Continuous Lbs:  [] before manual  [] after manual    []  Ultrasound: []Continuous   [] Pulsed                           []1MHz   []3MHz W/cm2:  Location:    []  Iontophoresis with dexamethasone         Location: [] Take home patch   [] In clinic    []  Ice     []  heat  []  Ice massage  []  Laser   []  Anodyne Position:  Location:    []  Laser with stim  []  Other:  Position:  Location:    []  Vasopneumatic Device Pressure:       [] lo [] med [] hi   Temperature: [] lo [] med [] hi   [] Skin assessment post-treatment:  []intact []redness- no adverse reaction    []redness - adverse reaction:      21 min Therapeutic Exercise:  [x] See flow sheet :   Rationale: increase ROM and increase strength to improve the patients ability to increase tolerance to activities.         15 min Neuromuscular Re-education:  [x]  See flow sheet :quad activation exercises. Rationale: increase ROM, increase strength, improve balance and increase proprioception  to improve the patients ability to improve safety with community ambulation.                 With   [] TE   [] TA   [] neuro   [] other: Patient Education: [x] Review HEP    [] Progressed/Changed HEP based on:   [] positioning   [] body mechanics   [] transfers   [] heat/ice application    [] other:      Other Objective/Functional Measures:  Pt is able to perform step over step with slight pain in hamstrings and at times using B UE     Pain Level (0-10 scale) post treatment: 1    ASSESSMENT/Changes in Function: Continue to hold on manual. Continue to progress pt as tolerated. Patient will continue to benefit from skilled PT services to modify and progress therapeutic interventions, address functional mobility deficits, address ROM deficits, address strength deficits and analyze and address soft tissue restrictions to attain remaining goals. []  See Plan of Care  []  See progress note/recertification  []  See Discharge Summary         Progress towards goals / Updated goals:  Short Term Goals: To be accomplished in 3 weeks:  1. Patient will subjectively report full compliance with prescribed HEP. Eval: HEP provided  Current: Goal met: pt reports performing HEP. 3/30/18  2. Patient will demonstrate supine left knee AROM 0-120 degrees in order to improve ease with donning/doffing clothing. Eval: Supine Left Knee AROM 8-0-40 degrees   Current: Met, Supine Left Knee AROM 8-0-126 degrees, 4/16/2018   3. Patient will demonstrate left hamstring MMT 5/5 without pain in order to improve ease with bed mobility.   Eval: Left Hamstring MMT 4/5 (pain)  Current: Progressing, Left Hamstring MMT 5/5 (pain), 4/19/2018  Long Term Goals: To be accomplished in 6 weeks:  1. Patient will demonstrate a significant functional improvement as demonstrated by a score of >/= 52 on FOTO. Eval: FOTO = 26  Current: Progressing: FOTO = 46, increased by 20 since [facility]. 4/16/18  2. Patient will demonstrate the ability to perform bilateral SLS >/=10 seconds in order to improve ease with donning/doffing pants. Eval: Left SLS <2 sec, Right SLS <2 sec   Current: Progressing, Left SLS 10 sec, Right SLS 4 sec, 4/19/2018    3. Patient will demonstrate the ability to ascend/descent 4 stairs with a step-over-step gait pattern with single UE assist in order to improve ease with household ambulation.   Eval: Step-to gait pattern with single UE assist subjectively reported  Current; Progressing: Pt is able to perform step over step with slight pain in hamstrings and at times using B UE. 4/23/18    PLAN  []  Upgrade activities as tolerated     [x]  Continue plan of care  []  Update interventions per flow sheet       []  Discharge due to:_  []  Other:_      Viral Cruz PTA 4/23/2018  12:19 PM    Future Appointments  Date Time Provider Cesar Leong   4/27/2018 8:00 AM Alistair Post, PT [facility]   4/30/2018 8:30 AM Viral Cruz PTA [facility]   5/3/2018 8:30 AM Alistair Post, PT [facility]   5/3/2018 3:00 PM MD Netta Graham 75   5/8/2018 7:30 AM Linda Raygoza NP [facility]

## 2018-04-27 ENCOUNTER — HOSPITAL ENCOUNTER (OUTPATIENT)
Dept: PHYSICAL THERAPY | Age: 58
Discharge: HOME | End: 2018-04-27
Payer: MEDICARE

## 2018-04-27 PROCEDURE — 97140 MANUAL THERAPY 1/> REGIONS: CPT

## 2018-04-27 PROCEDURE — 97110 THERAPEUTIC EXERCISES: CPT

## 2018-04-27 PROCEDURE — G8978 MOBILITY CURRENT STATUS: HCPCS

## 2018-04-27 PROCEDURE — G8979 MOBILITY GOAL STATUS: HCPCS

## 2018-04-27 NOTE — PROGRESS NOTES
PT DAILY TREATMENT NOTE - [facility]     Patient Name: Calin Serra  Date:2018  : 1960  [x]  Patient  Verified  Payor: Sapna Meza / Plan: [facility] MEDICARE COMPLETE / Product Type: Managed Care Medicare /    In [id number]  Out [id number]  Total Treatment Time (min): 40  Total Timed Codes (min): 40  1:1 Treatment Time ( [address] only): 25   Visit #: 9 of 16    Treatment Area: Pain in left knee [M25.562]    SUBJECTIVE  Pain Level (0-10 scale): 4  Any medication changes, allergies to medications, adverse drug reactions, diagnosis change, or new procedure performed?: [x] No    [] Yes (see summary sheet for update)  Subjective functional status/changes:   [] No changes reported  Patient reports increase in pain secondary to sitting in low chairs x2 days. OBJECTIVE    12 min Therapeutic Exercise:  [x] See flow sheet : Emphasis placed on improving available knee AROM and improving activation and recruitment of the gluteal and quadriceps musculature   Rationale: increase ROM and increase strength to improve the patients ability to improve ease with stair management.      20 min Neuromuscular Re-education:  [x]  See flow sheet : Emphasis placed on improving activation and recruitment of the gluteal and quadriceps musculature and improving LE proprioceptive and kinesthetic awareness   Rationale: increase ROM, increase strength, improve balance and increase proprioception  to improve the patients ability to improve safety with community ambulation.     8 min Manual Therapy:    Supine, Left Knee Flexion Physiological Grade III Mobilization with Popliteal Wedge  Supine, Left Knee Flexion Physiological Grade III Mobilization with Distraction   Rationale: decrease pain, increase ROM and increase tissue extensibility to improve ease with stair management.            With   [] TE   [] TA   [] neuro   [] other: Patient Education: [x] Review HEP    [] Progressed/Changed HEP based on:   [] positioning   [] body mechanics   [] transfers   [] heat/ice application    [] other:      Pain Level (0-10 scale) post treatment: 2    ASSESSMENT/Changes in Function: Since SoC patient has demonstrated a significant functional improvement with patient objectively demonstrating left knee AROM 8-0-126 degrees. Patient noted with pain primarily localized to the distal hamstring region with patient demonstrating full but painful knee flexion MMT. A significant improvement in SLS stability and ease with stair management demonstrated with patient subjectively reporting greater tolerance to weightbearing functional activities and ambulation. Patient would benefit from the continuation of PT services in order to allow patient to further gain functional strength and mobility to improve ease with functional ADLs. Patient will continue to benefit from skilled PT services to modify and progress therapeutic interventions, address functional mobility deficits, address ROM deficits, address strength deficits, analyze and address soft tissue restrictions, analyze and cue movement patterns, analyze and modify body mechanics/ergonomics and assess and modify postural abnormalities to attain remaining goals. []  See Plan of Care  [x]  See progress note/recertification  []  See Discharge Summary         Progress towards goals / Updated goals:    Short Term Goals: To be accomplished in 3 weeks:  1. Patient will subjectively report full compliance with prescribed HEP. Eval: HEP provided  Current: Goal met: pt reports performing HEP. 3/30/18  2. Patient will demonstrate supine left knee AROM 0-120 degrees in order to improve ease with donning/doffing clothing. Eval: Supine Left Knee AROM 8-0-40 degrees   Current: Met, Supine Left Knee AROM 8-0-126 degrees, 4/16/2018   3. Patient will demonstrate left hamstring MMT 5/5 without pain in order to improve ease with bed mobility.   Eval: Left Hamstring MMT 4/5 (pain)  Current: Progressing, Left Hamstring MMT 5/5 (pain), 4/19/2018  Long Term Goals: To be accomplished in 6 weeks:  1. Patient will demonstrate a significant functional improvement as demonstrated by a score of >/= 52 on FOTO. Eval: FOTO = 26  Current: Progressing: FOTO = 46, increased by 20 since [facility]. 4/16/18  2. Patient will demonstrate the ability to perform bilateral SLS >/=10 seconds in order to improve ease with donning/doffing pants. Eval: Left SLS <2 sec, Right SLS <2 sec   Current: Progressing, Left SLS 10 sec, Right SLS 4 sec, 4/19/2018    3. Patient will demonstrate the ability to ascend/descent 4 stairs with a step-over-step gait pattern with single UE assist in order to improve ease with household ambulation.   Eval: Step-to gait pattern with single UE assist subjectively reported  Current; Progressing: Pt is able to perform step over step with slight pain in hamstrings and at times using B UE. 4/23/18    PLAN  [x]  Upgrade activities as tolerated     [x]  Continue plan of care  []  Update interventions per flow sheet       []  Discharge due to:_  []  Other:_      Alistair Post, PT 4/27/2018  6:39 AM    Future Appointments  Date Time Provider Cesar Leong   4/27/2018 8:00 AM Alistair Post PT [facility] [id number]   4/30/2018 8:30 AM Viral Cruz PTA [facility] [address]   5/3/2018 8:30 AM Alistair Post PT [facility] [address]   5/3/2018 3:00 PM MD Ana Graham [address]   5/8/2018 7:30 AM KIM Kerns

## 2018-04-27 NOTE — PROGRESS NOTES
[facility]              [address]        [place], [address] [place]   [phone] fax    Medicare Progress Report    Patient name: Cherelle Quevedo Start of Care: 3/27/2018   Referral source: Tarik Pace,* : 1960   Medical/Treatment Diagnosis: Pain in left knee [M25.562] Onset Date:DoS 3/16/2018     Prior Hospitalization: see medical history Provider#: [id number]   Medications: Verified on Patient Summary List    Comorbidities: L foot fusion x2 (, ), Chronic R Knee Pain (arthroscopy ), BMI>30, Depression, Tobacco Use, Gastric Bypass   Prior Level of Function: SPC with ambulation, Chronic limitations in activity tolerance secondary to bilateral knee pain and L foot pain,  (3x/week - Resistance, Cardio)  Visits from Start of Care: 9    Missed Visits: 2    Reporting Period: 3/27/2018 to 2018    Subjective Reports:     Short Term Goals: To be accomplished in 3 weeks:  1. Patient will subjectively report full compliance with prescribed HEP. Eval: HEP provided  At PN: Goal met: pt reports performing HEP  2. Patient will demonstrate supine left knee AROM 0-120 degrees in order to improve ease with donning/doffing clothing. Eval: Supine Left Knee AROM 8-0-40 degrees   At PN: Met, Supine Left Knee AROM 8-0-126 degrees  3. Patient will demonstrate left hamstring MMT 5/5 without pain in order to improve ease with bed mobility. Eval: Left Hamstring MMT 4/5 (pain)  At PN: Progressing, Left Hamstring MMT 5/5 (pain)  Long Term Goals: To be accomplished in 6 weeks:  1. Patient will demonstrate a significant functional improvement as demonstrated by a score of >/= 52 on FOTO. Eval: FOTO = 26  At PN: Progressing: FOTO = 46, increased by 20 since [facility]  2. Patient will demonstrate the ability to perform bilateral SLS >/=10 seconds in order to improve ease with donning/doffing pants.    Eval: Left SLS <2 sec, Right SLS <2 sec   At PN: Progressing, Left SLS 10 sec, Right SLS 4 sec  3. Patient will demonstrate the ability to ascend/descent 4 stairs with a step-over-step gait pattern with single UE assist in order to improve ease with household ambulation. Eval: Step-to gait pattern with single UE assist subjectively reported  At PN: Progressing: Pt is able to perform step over step with slight pain in hamstrings and at times using B UE. Key functional changes: See above goals. Problems/ barriers to goal attainment: None     Assessment / Recommendations:    Since SoC patient has demonstrated a significant functional improvement with patient objectively demonstrating left knee AROM 8-0-126 degrees. Patient noted with pain primarily localized to the distal hamstring region with patient demonstrating full but painful knee flexion MMT. A significant improvement in SLS stability and ease with stair management demonstrated with patient subjectively reporting greater tolerance to weightbearing functional activities and ambulation. Patient would benefit from the continuation of PT services in order to allow patient to further gain functional strength and mobility to improve ease with functional ADLs.    Patient will continue to benefit from skilled PT services to modify and progress therapeutic interventions, address functional mobility deficits, address ROM deficits, address strength deficits, analyze and address soft tissue restrictions, analyze and cue movement patterns, analyze and modify body mechanics/ergonomics and assess and modify postural abnormalities to attain remaining goals.     Problem List: pain affecting function, decrease ROM, decrease strength, impaired gait/ balance, decrease ADL/ functional abilitiies, decrease activity tolerance, decrease flexibility/ joint mobility and decrease transfer abilities   Treatment Plan: Therapeutic exercise, Therapeutic activities, Neuromuscular re-education, Physical agent/modality, Gait/balance training, Manual therapy, Patient education, Self Care training, Functional mobility training and Home safety training    Updated Goals: Continue with unmet goals above. Frequency / Duration: Patient to be seen 2 times per week for 4 weeks:    G-Virgil (GP)  Mobility  [id number] Current  CK= 40-59%   Goal  CK= 40-59%    The severity rating is based on clinical judgment and the FOTO score.       Latha Mi, PT 4/27/2018 6:40 AM

## 2018-04-30 ENCOUNTER — HOSPITAL ENCOUNTER (OUTPATIENT)
Dept: PHYSICAL THERAPY | Age: 58
Discharge: HOME | End: 2018-04-30
Payer: MEDICARE

## 2018-04-30 PROCEDURE — 97110 THERAPEUTIC EXERCISES: CPT

## 2018-04-30 PROCEDURE — 97112 NEUROMUSCULAR REEDUCATION: CPT

## 2018-04-30 NOTE — PROGRESS NOTES
PT DAILY TREATMENT NOTE - [facility]     Patient Name: Uvaldo Dubin  Date:2018  : 1960  [x]  Patient  Verified  Payor: Chichi Richardson [facility] / Product Type: Managed Care Medicare /    In time:8:30  Out time:9:10  Total Treatment Time (min): 40  Total Timed Codes (min): 40  1:1 Treatment Time ( [address] only): 40   Visit #: 10 of 16    Treatment Area: Pain in left knee [M25.562]    SUBJECTIVE  Pain Level (0-10 scale): 2  Any medication changes, allergies to medications, adverse drug reactions, diagnosis change, or new procedure performed?: [x] No    [] Yes (see summary sheet for update)  Subjective functional status/changes:   [] No changes reported  Pt reports she did a lot of walking around the mall this weekend and her knee bothered her with that.      OBJECTIVE        Min Type Additional Details    [] Estim:  []Unatt       []IFC  []Premod                        []Other:  []w/ice   []w/heat  Position:  Location:    [] Estim: []Att    []TENS instruct  []NMES                    []Other:  []w/US   []w/ice   []w/heat  Position:  Location:    []  Traction: [] Cervical       []Lumbar                       [] Prone          []Supine                       []Intermittent   []Continuous Lbs:  [] before manual  [] after manual    []  Ultrasound: []Continuous   [] Pulsed                           []1MHz   []3MHz W/cm2:  Location:    []  Iontophoresis with dexamethasone         Location: [] Take home patch   [] In clinic    []  Ice     []  heat  []  Ice massage  []  Laser   []  Anodyne Position:  Location:    []  Laser with stim  []  Other:  Position:  Location:    []  Vasopneumatic Device Pressure:       [] lo [] med [] hi   Temperature: [] lo [] med [] hi   [] Skin assessment post-treatment:  []intact []redness- no adverse reaction    []redness - adverse reaction:       25 min Therapeutic Exercise:  [x] See flow sheet :   Rationale: increase ROM and increase strength to improve the patients ability to increase tolerance to activities.         15 min Neuromuscular Re-education:  [x]  See flow sheet :quad activation exercises. Rationale: increase ROM, increase strength, improve balance and increase proprioception  to improve the patients ability to improve safety with community ambulation. With   [] TE   [] TA   [] neuro   [] other: Patient Education: [x] Review HEP    [] Progressed/Changed HEP based on:   [] positioning   [] body mechanics   [] transfers   [] heat/ice application    [] other:      Other Objective/Functional Measures:      Pain Level (0-10 scale) post treatment: 0    ASSESSMENT/Changes in Function: Pt was having pain along hamstrings with prone hamstring curls with weight. Decreased the weights and pt had less pain. Patient will continue to benefit from skilled PT services to modify and progress therapeutic interventions, address functional mobility deficits, address ROM deficits, address strength deficits and analyze and address soft tissue restrictions to attain remaining goals. []  See Plan of Care  []  See progress note/recertification  []  See Discharge Summary         Progress towards goals / Updated goals:     Short Term Goals: To be accomplished in 3 weeks:  1. Patient will subjectively report full compliance with prescribed HEP. AT PN: Goal met: pt reports performing HEP. 3/30/18  2. Patient will demonstrate supine left knee AROM 0-120 degrees in order to improve ease with donning/doffing clothing. AT PN:  Met, Supine Left Knee AROM 8-0-126 degrees, 4/16/2018   3. Patient will demonstrate left hamstring MMT 5/5 without pain in order to improve ease with bed mobility. AT PN Progressing, Left Hamstring MMT 5/5 (pain), 4/19/2018  Long Term Goals: To be accomplished in 6 weeks:  1. Patient will demonstrate a significant functional improvement as demonstrated by a score of >/= 52 on FOTO. AT PN:  FOTO = 46, increased by 20 since [facility]. 4/16/18  2. Patient will demonstrate the ability to perform bilateral SLS >/=10 seconds in order to improve ease with donning/doffing pants. AT PN: Left SLS 10 sec, Right SLS 4 sec, 4/19/2018    3. Patient will demonstrate the ability to ascend/descent 4 stairs with a step-over-step gait pattern with single UE assist in order to improve ease with household ambulation.   AT PN:  Pt is able to perform step over step with slight pain in hamstrings and at times using B UE. 4/23/18    PLAN  []  Upgrade activities as tolerated     [x]  Continue plan of care  []  Update interventions per flow sheet       []  Discharge due to:_  []  Other:_      Armando Greenwood PTA 4/30/2018  8:42 AM    Future Appointments  Date Time Provider Cesar Leong   5/3/2018 8:30 AM Priscilla Greenfield PT [facility]   5/3/2018 3:00 PM Corie Wiggins MD [address]   5/8/2018 7:30 AM Santy Olson NP [id number] [facility]

## 2018-05-03 ENCOUNTER — HOSPITAL ENCOUNTER (OUTPATIENT)
Dept: PHYSICAL THERAPY | Age: 58
Discharge: HOME | End: 2018-05-03
Payer: MEDICARE

## 2018-05-03 ENCOUNTER — OFFICE VISIT (OUTPATIENT)
Dept: ORTHOPEDIC SURGERY | Age: 58
End: 2018-05-03

## 2018-05-03 VITALS
WEIGHT: 258.4 LBS | OXYGEN SATURATION: 96 % | TEMPERATURE: 97.8 F | HEIGHT: 65 IN | DIASTOLIC BLOOD PRESSURE: 79 MMHG | SYSTOLIC BLOOD PRESSURE: 118 MMHG | RESPIRATION RATE: 16 BRPM | BODY MASS INDEX: 43.05 KG/M2

## 2018-05-03 DIAGNOSIS — M17.11 PRIMARY OSTEOARTHRITIS OF RIGHT KNEE: ICD-10-CM

## 2018-05-03 DIAGNOSIS — M25.561 RIGHT KNEE PAIN, UNSPECIFIED CHRONICITY: Primary | ICD-10-CM

## 2018-05-03 PROCEDURE — 97110 THERAPEUTIC EXERCISES: CPT

## 2018-05-03 PROCEDURE — 97112 NEUROMUSCULAR REEDUCATION: CPT

## 2018-05-03 RX ORDER — BETAMETHASONE SODIUM PHOSPHATE AND BETAMETHASONE ACETATE 3; 3 MG/ML; MG/ML
6 INJECTION, SUSPENSION INTRA-ARTICULAR; INTRALESIONAL; INTRAMUSCULAR; SOFT TISSUE ONCE
Qty: 1 ML | Refills: 0
Start: 2018-05-03 | End: 2018-05-03

## 2018-05-03 NOTE — PROGRESS NOTES
Patient: Alon Borrero                MRN: [id number]       SSN: [id number]  YOB: 1960        AGE: 59 y.o. SEX: female  Body mass index is 43 kg/(m^2). PCP: Marisol Santana NP  05/03/18    HISTORY: JIA returns in follow up with right knee pain. She is status post left knee arthroscopy by Dr. Dora Ibarra. I referred her to him. She is doing well. She is just finishing up therapy. The right knee has achy pain. It is worse with getting up and down from a chair, stairs, kneeling, and night pain is a feature. She has on catching, locking, or giving way. She is actually requesting an injection. The pain is moderate and aching in nature. PHYSICAL EXAMINATION:  On examination today, she moves the head and neck adequately. There is no respiratory compromise or indrawing. There is no scleral icterus. There is no JVD. EOM is normal.  Abdominal examination is nontender. The pelvis itself is stable. Both feet are warm and well perfused. She is mildly obese. Her BMI is just over 40. RADIOGRAPHS:  X-rays reviewed, AP, tunnel, lateral, and skyline, reveal advanced arthritis involving the right knee and moderate left. PROCEDURE:  Under aseptic conditions and after informed, written consent with a time out, the right knee was injected with 1 cc of the Celestone preparation, i.e. 6 mg, which was well tolerated. PLAN:  She will return to see us in about three or four weeks time. We may consider viscosupplementation for the right knee, and at some point, she will likely need some injections for the left knee as well, which I would be happy to do for her. We will certainly try to maximize her nonoperative management.            REVIEW OF SYSTEMS:      CON: negative for weight loss, fever  EYE: negative for double vision  ENT: negative for hoarseness  RS:   negative for Tb  GI:    negative for blood in stool  :  negative for blood in urine  Other systems reviewed and noted below. Past Medical History:   Diagnosis Date    Alcoholism in remission 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    GERD (gastroesophageal reflux disease)     History of gastric bypass 2012    History of meniscal tear 2015, 2018    right in 2015, left in 2018    HNP (herniated nucleus pulposus), lumbar     patient reported    Lung nodules     monitor yearly- stable 10/2017    Osteopenia 10/03/2017    Recurrent depression ([facility] [address]     Sleep apnea     on cpap    Spinal stenosis, lumbar     patient reported       Family History   Problem Relation Age of Onset    Hypertension Mother     Depression Mother     Cancer Mother     Ovarian Cancer Mother     Breast Problems Mother     Cancer Father     Cancer Sister     Depression Sister     Depression Brother        Current Outpatient Prescriptions   Medication Sig Dispense Refill    calcium citrate 200 mg (950 mg) tablet Take  by mouth daily.  LOMAIRA 8 mg tab TK 1 T PO  BID  5    citalopram (CELEXA) 20 mg tablet Take 1 Tab by mouth daily. 90 Tab 1    pregabalin (LYRICA) 150 mg capsule Take 1 Cap by mouth two (2) times a day. Max Daily Amount: 300 mg. 180 Cap 1    fluticasone (FLONASE) 50 mcg/actuation nasal spray 2 Sprays by Both Nostrils route daily. 3 Bottle 4    albuterol (PROVENTIL HFA, VENTOLIN HFA, PROAIR HFA) 90 mcg/actuation inhaler Take 2 Puffs by inhalation every four (4) hours as needed for Wheezing or Shortness of Breath. 3 Inhaler 4    Dexlansoprazole (DEXILANT) 60 mg CpDB Take 1 Cap by mouth daily. 90 Cap 1    buPROPion (WELLBUTRIN) 100 mg tablet Take 1 Tab by mouth two (2) times a day. 180 Tab 1    budesonide-formoterol (SYMBICORT) 160-4.5 mcg/actuation HFAA Take 2 Puffs by inhalation two (2) times a day. 6 Inhaler 1    furosemide (LASIX) 20 mg tablet Take 1 Tab by mouth daily as needed.  30 Tab 2    ARIPiprazole (ABILIFY) 5 mg tablet Take  by mouth daily.      b complex vitamins tablet Take 1 Tab by mouth daily.  calcium-cholecalciferol, d3, (CALCIUM 600 + D) 600-125 mg-unit tab Take 1,065 mg by mouth nightly. Indications: TAKES 2 AT BEDTIME      omega 3-dha-epa-fish oil (FISH OIL) 100-160-1,000 mg cap Take 1,065 mg by mouth daily.  ferrous sulfate ER (IRON) 160 mg (50 mg iron) TbER tablet Take 1 Tab by mouth daily. Indications: PT TAKES 40 MG      multivitamin (ONE A DAY) tablet Take 1 Tab by mouth daily.  acetaminophen (TYLENOL ARTHRITIS PAIN) 650 mg TbER Take 1 Tab by mouth three (3) times daily as needed.  100 Tab 0       Allergies   Allergen Reactions    Adhesive Tape-Silicones Swelling     REALLY BAD SWELLING AND SORE APPEAR    Alcohol Other (comments)     PT STATES SHE IS AN ALCOHOLIC    Lactose Other (comments)     PT STATES IT MAKES HER STOMACH HURT    Percodan [Oxycodone Hcl-Oxycodone-Asa] Itching and Other (comments)     crying    Nsaids (Non-Steroidal Anti-Inflammatory Drug) Other (comments)     PT NOT ABLE TO TAKE DUE TO GASTRIC BYPASS       Past Surgical History:   Procedure Laterality Date    HX ARTHRODESIS  01/2000    Herniated Disc, arthritis right foot 06/06, 07/07, C 6/7, C 4/6 Stenosis    HX CHOLECYSTECTOMY  09/2008    gallbladder disease    HX COLONOSCOPY  05/2009    HX GASTRIC BYPASS  2012    GASTRIC BYPASS  [place] [facility] Gastric Bypass      HX HYSTERECTOMY  06/1994    HX MENISCECTOMY  10/2015    right repari of torn meniscus    HX MENISCECTOMY Left 03/16/2018    partial meniscectomy due to tear    HX MYOMECTOMY  06/1984    benign tumor    HX ORTHOPAEDIC  1964    orthopaedic excision Fibrosarcoma and Lymphangiogram    HX PELVIC LAPAROSCOPY  04/1984    large uterine blockage    NEUROLOGICAL PROCEDURE UNLISTED  2000, 2007    Cervical fusion       Social History     Social History    Marital status:      Spouse name: N/A    Number of children: N/A    Years of education: N/A     Occupational History    Not on file. Social History Main Topics    Smoking status: Current Every Day Smoker     Packs/day: 1.00     Years: 45.00    Smokeless tobacco: Never Used    Alcohol use No    Drug use: No      Comment: marijuana, cocaine 30 yrs ago    Sexual activity: No     Other Topics Concern    Not on file     Social History Narrative       Visit Vitals    /79    Temp 97.8 °F (36.6 °C) (Oral)    Resp 16    Ht 5' 5\" (1.651 m)    Wt 258 lb 6.4 oz (117.2 kg)    SpO2 96%    BMI 43 kg/m2         PHYSICAL EXAMINATION:  GENERAL: Alert and oriented x3, in no acute distress, well-developed, well-nourished, afebrile. HEART: No JVD. EYES: No scleral icterus   NECK: No significant lymphadenopathy   LUNGS: No respiratory compromise or indrawing  ABDOMEN: Soft, non-tender, non-distended. Electronically signed by:  Hussein Lozoya MD

## 2018-05-03 NOTE — PROGRESS NOTES
PT DAILY TREATMENT NOTE - [facility]     Patient Name: Calin Serra  Date:5/3/2018  : 1960  [x]  Patient  Verified  Payor: [address] [place] [address] / Plan: [facility] MEDICARE COMPLETE / Product Type: Managed Care Medicare /    In MZD  Out time:[id number]  Total Treatment Time (min): 37  Total Timed Codes (min): 37  1:1 Treatment Time ( [address] only): 30   Visit #: 11 of 16    Treatment Area: Pain in left knee [M25.562]    SUBJECTIVE  Pain Level (0-10 scale): 0  Any medication changes, allergies to medications, adverse drug reactions, diagnosis change, or new procedure performed?: [x] No    [] Yes (see summary sheet for update)  Subjective functional status/changes:   [] No changes reported  Patient reports greatest difficulty with sleeping with stiffness reported with prolonged positioning. OBJECTIVE    20 min Therapeutic Exercise:  [x] See flow sheet : Emphasis placed on improving available knee AROM and improving activation and recruitment of the gluteal and quadriceps musculature   Rationale: increase ROM and increase strength to improve the patients ability to improve ease with stair management.      17 min Neuromuscular Re-education:  [x]  See flow sheet : Emphasis placed on improving activation and recruitment of the gluteal and quadriceps musculature and improving LE proprioceptive and kinesthetic awareness   Rationale: increase ROM, increase strength, improve balance and increase proprioception  to improve the patients ability to improve safety with community ambulation.           With   [] TE   [] TA   [] neuro   [] other: Patient Education: [x] Review HEP    [] Progressed/Changed HEP based on:   [] positioning   [] body mechanics   [] transfers   [] heat/ice application    [] other:      Pain Level (0-10 scale) post treatment: 0    ASSESSMENT/Changes in Function: Continue to hold completion of manual therapy secondary to good tolerance demonstrated with emphasis placed on strengthening with patient noted with continued poor SLS stability bilaterally. Patient to be seen for one remaining session and then placed on hold x30 days with continued performance of HEP as prescribed. Patient to be out of town x14 days, beginning 5/12/2018. Patient will continue to benefit from skilled PT services to modify and progress therapeutic interventions, address functional mobility deficits, address ROM deficits, address strength deficits, analyze and address soft tissue restrictions, analyze and cue movement patterns and analyze and modify body mechanics/ergonomics to attain remaining goals. []  See Plan of Care  []  See progress note/recertification  []  See Discharge Summary         Progress towards goals / Updated goals:    Short Term Goals: To be accomplished in 3 weeks:  1. Patient will subjectively report full compliance with prescribed HEP. AT PN: Goal met: pt reports performing HEP. 3/30/18  2. Patient will demonstrate supine left knee AROM 0-120 degrees in order to improve ease with donning/doffing clothing. AT PN:  Met, Supine Left Knee AROM 8-0-126 degrees, 4/16/2018   3. Patient will demonstrate left hamstring MMT 5/5 without pain in order to improve ease with bed mobility. AT PN Progressing, Left Hamstring MMT 5/5 (pain), 4/19/2018  Long Term Goals: To be accomplished in 6 weeks:  1. Patient will demonstrate a significant functional improvement as demonstrated by a score of >/= 52 on FOTO. AT PN:  FOTO = 46, increased by 20 since [facility]. 4/16/18  2. Patient will demonstrate the ability to perform bilateral SLS >/=10 seconds in order to improve ease with donning/doffing pants. AT PN: Left SLS 10 sec, Right SLS 4 sec, 4/19/2018   Current: Remains,  Left SLS 10 sec, Right SLS 4 sec, 5/3/2018   3. Patient will demonstrate the ability to ascend/descent 4 stairs with a step-over-step gait pattern with single UE assist in order to improve ease with household ambulation.   AT PN:  Pt is able to perform step over step with slight pain in hamstrings and at times using B UE. 4/23/18       PLAN  [x]  Upgrade activities as tolerated     [x]  Continue plan of care  []  Update interventions per flow sheet       []  Discharge due to:_  []  Other:_      Abdias Paul, PT 5/3/2018  12:25 PM    Future Appointments  Date Time Provider Cesar Leong   5/3/2018 3:00 PM MD Netta Coates 75   5/8/2018 7:30 AM Argenis Gresham NP [address]

## 2018-05-08 ENCOUNTER — HOSPITAL ENCOUNTER (OUTPATIENT)
Dept: PHYSICAL THERAPY | Age: 58
Discharge: HOME | End: 2018-05-08
Payer: MEDICARE

## 2018-05-08 ENCOUNTER — OFFICE VISIT (OUTPATIENT)
Dept: FAMILY MEDICINE CLINIC | Age: 58
End: 2018-05-08

## 2018-05-08 VITALS
RESPIRATION RATE: 20 BRPM | BODY MASS INDEX: 42.65 KG/M2 | OXYGEN SATURATION: 98 % | SYSTOLIC BLOOD PRESSURE: 127 MMHG | WEIGHT: 256 LBS | HEIGHT: 65 IN | DIASTOLIC BLOOD PRESSURE: 73 MMHG | HEART RATE: 69 BPM | TEMPERATURE: 97.9 F

## 2018-05-08 DIAGNOSIS — G47.30 SLEEP APNEA, UNSPECIFIED TYPE: ICD-10-CM

## 2018-05-08 DIAGNOSIS — M51.26 HNP (HERNIATED NUCLEUS PULPOSUS), LUMBAR: ICD-10-CM

## 2018-05-08 DIAGNOSIS — F33.9 RECURRENT DEPRESSION (HCC): ICD-10-CM

## 2018-05-08 DIAGNOSIS — C49.9 FIBROSARCOMA (HCC): ICD-10-CM

## 2018-05-08 DIAGNOSIS — E66.01 OBESITY, MORBID (HCC): ICD-10-CM

## 2018-05-08 DIAGNOSIS — J44.9 CHRONIC OBSTRUCTIVE PULMONARY DISEASE, UNSPECIFIED COPD TYPE (HCC): Primary | ICD-10-CM

## 2018-05-08 DIAGNOSIS — K21.9 GASTROESOPHAGEAL REFLUX DISEASE, ESOPHAGITIS PRESENCE NOT SPECIFIED: ICD-10-CM

## 2018-05-08 DIAGNOSIS — F10.21 ALCOHOLISM IN REMISSION (HCC): ICD-10-CM

## 2018-05-08 DIAGNOSIS — Z23 NEED FOR SHINGLES VACCINE: ICD-10-CM

## 2018-05-08 DIAGNOSIS — F17.219 CIGARETTE NICOTINE DEPENDENCE WITH NICOTINE-INDUCED DISORDER: ICD-10-CM

## 2018-05-08 DIAGNOSIS — M48.00 SPINAL STENOSIS, UNSPECIFIED SPINAL REGION: ICD-10-CM

## 2018-05-08 PROCEDURE — G8979 MOBILITY GOAL STATUS: HCPCS

## 2018-05-08 PROCEDURE — 97110 THERAPEUTIC EXERCISES: CPT

## 2018-05-08 PROCEDURE — 97112 NEUROMUSCULAR REEDUCATION: CPT

## 2018-05-08 PROCEDURE — G8980 MOBILITY D/C STATUS: HCPCS

## 2018-05-08 RX ORDER — BUPROPION HYDROCHLORIDE 100 MG/1
100 TABLET ORAL 2 TIMES DAILY
Qty: 180 TAB | Refills: 1 | Status: SHIPPED | OUTPATIENT
Start: 2018-05-08 | End: 2018-09-12 | Stop reason: ALTCHOICE

## 2018-05-08 RX ORDER — PREGABALIN 150 MG/1
150 CAPSULE ORAL 2 TIMES DAILY
Qty: 180 CAP | Refills: 1 | Status: SHIPPED | OUTPATIENT
Start: 2018-05-08 | End: 2019-01-12 | Stop reason: SDUPTHER

## 2018-05-08 RX ORDER — BUDESONIDE AND FORMOTEROL FUMARATE DIHYDRATE 160; 4.5 UG/1; UG/1
2 AEROSOL RESPIRATORY (INHALATION) 2 TIMES DAILY
Qty: 6 INHALER | Refills: 1 | Status: SHIPPED | OUTPATIENT
Start: 2018-05-08 | End: 2019-03-27 | Stop reason: SDUPTHER

## 2018-05-08 RX ORDER — DEXLANSOPRAZOLE 60 MG/1
1 CAPSULE, DELAYED RELEASE ORAL DAILY
Qty: 90 CAP | Refills: 1 | Status: SHIPPED | OUTPATIENT
Start: 2018-05-08 | End: 2019-03-01 | Stop reason: SDUPTHER

## 2018-05-08 RX ORDER — CITALOPRAM 20 MG/1
20 TABLET, FILM COATED ORAL DAILY
Qty: 90 TAB | Refills: 1 | Status: SHIPPED | OUTPATIENT
Start: 2018-05-08 | End: 2018-12-27 | Stop reason: SDUPTHER

## 2018-05-08 RX ORDER — ARIPIPRAZOLE 5 MG/1
5 TABLET ORAL DAILY
Qty: 90 TAB | Refills: 1 | Status: SHIPPED | OUTPATIENT
Start: 2018-05-08 | End: 2018-09-12 | Stop reason: ALTCHOICE

## 2018-05-08 NOTE — PATIENT INSTRUCTIONS
Shingles Vaccine: What You Need to Know  What is shingles? Shingles is a painful skin rash, often with blisters. It is also called herpes zoster, or just zoster. A shingles rash usually appears on one side of the face or body and lasts from 2 to 4 weeks. Its main symptom is pain, which can be quite severe. Other symptoms of shingles can include fever, headache, chills and upset stomach. Very rarely, a shingles infection can lead to pneumonia, hearing problems, blindness, brain inflammation (encephalitis) or death. For about 1 person in 5, severe pain can continue even long after the rash clears up. This is called post-herpetic neuralgia. Shingles is caused by the varicella zoster virus, the same virus that causes chickenpox. Only someone who has had chickenpox-or, rarely, has gotten chickenpox vaccine-can get shingles. The virus stays in your body, and can cause shingles many years later. You can't catch shingles from another person with shingles. However, a person who has never had chickenpox (or chickenpox vaccine) could get chickenpox from someone with shingles. This is not very common. Shingles is far more common in people 48years of age and older than in younger people. It is also more common in people whose immune systems are weakened because of a disease such as cancer, or drugs such as steroids or chemotherapy. At least 1 million people a year in the [facility] get shingles. Shingles vaccine  A vaccine for shingles was licensed in 1546. In clinical trials, the vaccine reduced the risk of shingles by 50%. It can also reduce pain in people who still get shingles after being vaccinated. A single dose of shingles vaccine is recommended for adults 61years of age and older.   Some people should not get shingles vaccine or should wait  A person should not get shingles vaccine who:  · Has ever had a life-threatening allergic reaction to gelatin, the antibiotic neomycin, or any other component of shingles vaccine. Tell your doctor if you have any severe allergies. · Has a weakened immune system because of current:  ¨ AIDS or another disease that affects the immune system. ¨ Treatment with drugs that affect the immune system, such as prolonged use of high-dose steroids. ¨ Cancer treatment such as radiation or chemotherapy. ¨ Cancer affecting the bone marrow or lymphatic system, such as leukemia or lymphoma. · Is pregnant, or might be pregnant. Women should not become pregnant until at least 4 weeks after getting shingles vaccine. Someone with a minor acute illness, such as a cold, may be vaccinated. But anyone with a moderate or severe acute illness should usually wait until they recover before getting the vaccine. This includes anyone with a temperature of 101.3° F or higher. What are the risks from shingles vaccine? A vaccine, like any medicine, could possibly cause serious problems, such as severe allergic reactions. However, the risk of a vaccine causing serious harm, or death, is extremely small. No serious problems have been identified with shingles vaccine. Mild problems  · Redness, soreness, swelling, or itching at the site of the injection (about 1 person in 3)  · Headache (about 1 person in 70)  Like all vaccines, shingles vaccine is being closely monitored for unusual or severe problems. What if there is a serious reaction? What should I look for? · Look for anything that concerns you, such as signs of a severe allergic reaction, very high fever, or behavior changes. Signs of a severe allergic reaction can include hives, swelling of the face and throat, difficulty breathing, a fast heartbeat, dizziness, and weakness. These would start a few minutes to a few hours after the vaccination. What should I do? · If you think it is a severe allergic reaction or other emergency that can't wait, call 9-1-1 or get the person to the nearest hospital. Otherwise, call your doctor.   · Afterward, the reaction should be reported to the Vaccine Adverse Event Reporting System (VAERS). Your doctor might file this report, or you can do it yourself through the VAERS web site at www.vaers. [facility].gov, or by calling 3-827.207.4571. VAERS is only for reporting reactions. They do not give medical advice. How can I learn more? · Ask your doctor. · Call your local or state health department. · Contact the Centers for Disease Control and Prevention (CDC):  ¨ Call 5-592.626.7327 (1-800-CDC-INFO) or  ¨ Visit CDC's website at www.cdc.gov/vaccines  Vaccine Information Statement  Shingles Vaccine  (10/6/2009)  Department of Health and Human Services  Centers for Disease Control and Prevention  Many Vaccine Information Statements are available in Vietnamese and other languages. See www.immunize.org/vis. Muchas hojas de información sobre vacunas están disponibles en español y en otros idiomas. Visite www.immunize.org/vis. Care instructions adapted under license by [employer] (which disclaims liability or warranty for this information). If you have questions about a medical condition or this instruction, always ask your healthcare professional. [address] any warranty or liability for your use of this information.

## 2018-05-08 NOTE — PROGRESS NOTES
[facility] - [facility]              [address]        [place], [address] [place]   [phone] fax      Discharge Summary  Patient name: Rico Nash Start of Care: 3/27/2018   Referral source: Ronald Cruz,* : 1960   Medical/Treatment Diagnosis: Pain in left knee [M25.562] Onset Date:DoS 3/16/2018     Prior Hospitalization: see medical history Provider#: [id number]   Medications: Verified on Patient Summary List    Comorbidities: L foot fusion x2 (, ), Chronic R Knee Pain (arthroscopy ), BMI>30, Depression, Tobacco Use, Gastric Bypass   Prior Level of Function: SPC with ambulation, Chronic limitations in activity tolerance secondary to bilateral knee pain and L foot pain,  (3x/week - Resistance, Cardio)  Visits from Start of Care: 12    Missed Visits: 2  Reporting Period : 2018 to 2018    Summary of Care:    Short Term Goals: To be accomplished in 3 weeks:  1. Patient will subjectively report full compliance with prescribed HEP. AT PN: Goal met: pt reports performing HEP  2. Patient will demonstrate supine left knee AROM 0-120 degrees in order to improve ease with donning/doffing clothing. AT PN:  Met, Supine Left Knee AROM 8-0-126 degrees   3. Patient will demonstrate left hamstring MMT 5/5 without pain in order to improve ease with bed mobility. AT PN Progressing, Left Hamstring MMT 5/5 (pain)  At DC: Remains, Left Hamstring MMT 5/5 (pain)  Long Term Goals: To be accomplished in 6 weeks:  1. Patient will demonstrate a significant functional improvement as demonstrated by a score of >/= 52 on FOTO. AT PN:  FOTO = 46, increased by 20 since  [address]: Remains, FOTO = 46  2. Patient will demonstrate the ability to perform bilateral SLS >/=10 seconds in order to improve ease with donning/doffing pants. AT PN: Left SLS 10 sec, Right SLS 4 sec  At DC: Remains,  Left SLS 10 sec, Right SLS 4 sec  3.  Patient will demonstrate the ability to ascend/descent 4 stairs with a step-over-step gait pattern with single UE assist in order to improve ease with household ambulation. AT PN:  Pt is able to perform step over step with slight pain in hamstrings and at times using B UE. At DC: Met, Step-over-step gait pattern without UE assistance with slight pain reported,    G-Codes (GP)  Mobility    Goal  CK= 40-59%  D/C  CK= 40-59%    The severity rating is based on clinical judgment and the FOTO Score score. ASSESSMENT/RECOMMENDATIONS:    At this time patient suitable for discharge with patient having demonstrated significant improvement in functional mobility since [facility]. Patient demonstrates left knee AROM symmetrical to that of the right knee with patient noted with full and symmetrical LE strength bilaterally with slight pain continued to be reported with hamstring MMT. Patient reports no functional limitations secondary to left knee pain but does report continued sleep disturbances with disturbances reported ~2x/night. Patient in agreement to continue with completion of prescribed HEP as instructed post-discharge.     [x]Discontinue therapy: [x]Patient has reached or is progressing toward set goals      []Patient is non-compliant or has abdicated      []Due to lack of appreciable progress towards set [address] Coy Vázquez, PT 5/8/2018 8:11 AM

## 2018-05-08 NOTE — MR AVS SNAPSHOT
[facility] [address] [facility] [address] 
[phone] Patient: Dion Ferguson MRN: [id number] TFO:[birth year] Visit Information Date & Time Provider Department Dept. Phone Encounter #  
 5/8/2018  7:30 AM Mani Quintero Vasiliy [phone] [id number] Follow-up Instructions Return in about 4 months (around 9/8/2018) for Medicare Wellness Visit and chronic routine care- 45 mins. Your Appointments 6/1/2018  8:45 AM  
Follow Up with Iris Perez MD  
[facility] - [facility] ([address]) Appt Note: 1 MONTH rt knee F/U  
 [address], [address] [facility] [address]  
[phone] [address] [place] [address], [address] [facility] Upcoming Health Maintenance Date Due ZOSTER VACCINE AGE 60> 4/6/2018 Influenza Age 5 to Adult 8/1/2018 MEDICARE YEARLY EXAM 9/15/2018 DTaP/Tdap/Td series (2 - Td) 2/10/2019 Bone Densitometry 10/3/2019 BREAST CANCER SCRN MAMMOGRAM 10/3/2019 COLONOSCOPY 9/20/2021 Pneumococcal 19-64 Highest Risk (3 of 3 - PPSV23) 11/22/2022 Allergies as of 5/8/2018  Review Complete On: 5/8/2018 By: Mani Quintero NP Severity Noted Reaction Type Reactions Adhesive Tape-silicones High 18/95/6278   Topical Swelling REALLY BAD SWELLING AND SORE APPEAR Alcohol High 07/13/2017   Intolerance Other (comments) PT STATES SHE IS AN ALCOHOLIC Lactose High 07/13/2017   Intolerance Other (comments) PT STATES IT MAKES HER STOMACH HURT Percodan [Oxycodone Hcl-oxycodone-asa] High 07/13/2017   Systemic Itching, Other (comments) crying Nsaids (Non-steroidal Anti-inflammatory Drug)  07/13/2017   Intolerance Other (comments) PT NOT ABLE TO TAKE DUE TO GASTRIC BYPASS Current Immunizations  Reviewed on 10/6/2017 Name Date Influenza Vaccine 10/24/2013, 10/3/2012, 9/16/2011 Influenza Vaccine (Quad) PF 9/14/2017 Pneumococcal Conjugate (PCV-13) 10/24/2013 Pneumococcal Polysaccharide (PPSV-23) 11/22/2017 TB Skin Test (PPD) Intradermal 10/6/2017 Tdap 2/10/2009 Not reviewed this visit You Were Diagnosed With   
  
 Codes Comments Need for shingles vaccine    -  Primary ICD-10-CM: R85 ICD-9-CM: V04.89 HNP (herniated nucleus pulposus), lumbar     ICD-10-CM: M51.26 
ICD-9-CM: 722.10 Spinal stenosis, unspecified spinal region     ICD-10-CM: M48.00 ICD-9-CM: 724.00 Recurrent depression ([facility]     ICD-10-CM: F33.9 ICD-9-CM: 296.30 Gastroesophageal reflux disease, esophagitis presence not specified     ICD-10-CM: K21.9 ICD-9-CM: 530.81 Cigarette nicotine dependence with nicotine-induced disorder     ICD-10-CM: F17.219 ICD-9-CM: 292.9 Chronic obstructive pulmonary disease, unspecified COPD type ([facility]     ICD-10-CM: J44.9 ICD-9-CM: 630 Vitals BP Pulse Temp Resp Height(growth percentile) Weight(growth percentile) 127/73 (BP 1 Location: Left arm, BP Patient Position: Sitting) 69 97.9 °F (36.6 °C) (Oral) 20 5' 5\" (1.651 m) 256 lb (116.1 kg) SpO2 BMI OB Status Smoking Status 98% 42.6 kg/m2 Hysterectomy Current Every Day Smoker BMI and BSA Data Body Mass Index Body Surface Area  
 42.6 kg/m 2 2.31 m 2 Preferred Pharmacy Pharmacy Name Phone [facility] [address] [place] [address] AT [facility] [phone] Your Updated Medication List  
  
   
This list is accurate as of 5/8/18  8:06 AM.  Always use your most recent med list.  
  
  
  
  
 acetaminophen 650 mg Tber Commonly known as:  TYLENOL ARTHRITIS PAIN Take 1 Tab by mouth three (3) times daily as needed. albuterol 90 mcg/actuation inhaler Commonly known as:  PROVENTIL HFA, VENTOLIN HFA, PROAIR HFA Take 2 Puffs by inhalation every four (4) hours as needed for Wheezing or Shortness of Breath. ARIPiprazole 5 mg tablet Commonly known as:  ABILIFY Take 1 Tab by mouth daily. b complex vitamins tablet Take 1 Tab by mouth daily. budesonide-formoterol 160-4.5 mcg/actuation Hfaa Commonly known as:  SYMBICORT Take 2 Puffs by inhalation two (2) times a day. buPROPion 100 mg tablet Commonly known as:  [facility] Take 1 Tab by mouth two (2) times a day. CALCIUM 600 + D 600-125 mg-unit Tab Generic drug:  calcium-cholecalciferol (d3) Take 1,065 mg by mouth nightly. Indications: TAKES 2 AT BEDTIME  
  
 calcium citrate 200 mg (950 mg) tablet Take  by mouth daily. citalopram 20 mg tablet Commonly known as:  Dearl Bury Take 1 Tab by mouth daily. Dexlansoprazole 60 mg Cpdb Commonly known as:  DEXILANT Take 1 Cap by mouth daily. FISH -160-1,000 mg Cap Generic drug:  omega 3-dha-epa-fish oil Take 1,065 mg by mouth daily. fluticasone 50 mcg/actuation nasal spray Commonly known as:  Deetta Britain 2 Sprays by Both Nostrils route daily. furosemide 20 mg tablet Commonly known as:  LASIX Take 1 Tab by mouth daily as needed. Iron 160 mg (50 mg iron) Tber tablet Generic drug:  ferrous sulfate ER Take 1 Tab by mouth daily. Indications: PT TAKES 40 MG  
  
 LOMAIRA 8 mg Tab Generic drug:  phentermine TK 1 T PO  BID  
  
 multivitamin tablet Commonly known as:  ONE A DAY Take 1 Tab by mouth daily. pregabalin 150 mg capsule Commonly known as:  Flora Paredes Take 1 Cap by mouth two (2) times a day. Max Daily Amount: 300 mg.  
  
 varicella zoster vaccine live 19,400 unit/0.65 mL Susr injection Commonly known as:  ZOSTAVAX  
1 Vial by SubCUTAneous route once for 1 dose. Prescriptions Printed Refills  
 pregabalin (LYRICA) 150 mg capsule 1 Sig: Take 1 Cap by mouth two (2) times a day. Max Daily Amount: 300 mg. Class: Print  Route: Oral  
 varicella zoster vaccine live (ZOSTAVAX) 19,400 unit/0.65 mL susr injection 0 Si Vial by SubCUTAneous route once for 1 dose. Class: Print Route: SubCUTAneous Prescriptions Sent to Pharmacy Refills  
 citalopram (CELEXA) 20 mg tablet 1 Sig: Take 1 Tab by mouth daily. Class: Normal  
 Pharmacy: [facility] [facility] [place] [address] Ph #: [phone] Route: Oral  
 Dexlansoprazole (DEXILANT) 60 mg CpDB 1 Sig: Take 1 Cap by mouth daily. Class: Normal  
 Pharmacy: [facility] Store [address] [place] [address] Ph #: [phone] Route: Oral  
 buPROPion (WELLBUTRIN) 100 mg tablet 1 Sig: Take 1 Tab by mouth two (2) times a day. Class: Normal  
 Pharmacy: [facility] [place] [address] Ph #: [phone] Route: Oral  
 budesonide-formoterol (SYMBICORT) 160-4.5 mcg/actuation HFAA 1 Sig: Take 2 Puffs by inhalation two (2) times a day. Class: Normal  
 Pharmacy: [facility] [place] [address] Ph #: [phone] Route: Inhalation ARIPiprazole (ABILIFY) 5 mg tablet 1 Sig: Take 1 Tab by mouth daily. Class: Normal  
 Pharmacy: [facility] [place] [address] Ph #: [phone] Route: Oral  
  
Follow-up Instructions Return in about 4 months (around 2018) for Medicare Wellness Visit and chronic routine care- 45 mins. To-Do List   
 2018 11:00 AM  
  Appointment with Marianna Choi PT at [address] ([phone]) Patient Instructions Shingles Vaccine: What You Need to Know What is shingles? Shingles is a painful skin rash, often with blisters. It is also called herpes zoster, or just zoster. A shingles rash usually appears on one side of the face or body and lasts from 2 to 4 weeks. Its main symptom is pain, which can be quite severe. Other symptoms of shingles can include fever, headache, chills and upset stomach. Very rarely, a shingles infection can lead to pneumonia, hearing problems, blindness, brain inflammation (encephalitis) or death. For about 1 person in 5, severe pain can continue even long after the rash clears up. This is called post-herpetic neuralgia. Shingles is caused by the varicella zoster virus, the same virus that causes chickenpox. Only someone who has had chickenpox-or, rarely, has gotten chickenpox vaccine-can get shingles. The virus stays in your body, and can cause shingles many years later. You can't catch shingles from another person with shingles. However, a person who has never had chickenpox (or chickenpox vaccine) could get chickenpox from someone with shingles. This is not very common. Shingles is far more common in people 48years of age and older than in younger people. It is also more common in people whose immune systems are weakened because of a disease such as cancer, or drugs such as steroids or chemotherapy. At least 1 million people a year in the [facility] get shingles. Shingles vaccine A vaccine for shingles was licensed in 9121. In clinical trials, the vaccine reduced the risk of shingles by 50%. It can also reduce pain in people who still get shingles after being vaccinated. A single dose of shingles vaccine is recommended for adults 61years of age and older. Some people should not get shingles vaccine or should wait A person should not get shingles vaccine who: 
· Has ever had a life-threatening allergic reaction to gelatin, the antibiotic neomycin, or any other component of shingles vaccine. Tell your doctor if you have any severe allergies. · Has a weakened immune system because of current: ¨ AIDS or another disease that affects the immune system. ¨ Treatment with drugs that affect the immune system, such as prolonged use of high-dose steroids. ¨ Cancer treatment such as radiation or chemotherapy. ¨ Cancer affecting the bone marrow or lymphatic system, such as leukemia or lymphoma. · Is pregnant, or might be pregnant. Women should not become pregnant until at least 4 weeks after getting shingles vaccine. Someone with a minor acute illness, such as a cold, may be vaccinated. But anyone with a moderate or severe acute illness should usually wait until they recover before getting the vaccine. This includes anyone with a temperature of 101.3° F or higher. What are the risks from shingles vaccine? A vaccine, like any medicine, could possibly cause serious problems, such as severe allergic reactions. However, the risk of a vaccine causing serious harm, or death, is extremely small. No serious problems have been identified with shingles vaccine. Mild problems · Redness, soreness, swelling, or itching at the site of the injection (about 1 person in 3) · Headache (about 1 person in 79) Like all vaccines, shingles vaccine is being closely monitored for unusual or severe problems. What if there is a serious reaction? What should I look for? · Look for anything that concerns you, such as signs of a severe allergic reaction, very high fever, or behavior changes. Signs of a severe allergic reaction can include hives, swelling of the face and throat, difficulty breathing, a fast heartbeat, dizziness, and weakness. These would start a few minutes to a few hours after the vaccination. What should I do? · If you think it is a severe allergic reaction or other emergency that can't wait, call 9-1-1 or get the person to the nearest hospital. Otherwise, call your doctor.  
· Afterward, the reaction should be reported to the Vaccine Adverse Event Reporting System (VAERS). Your doctor might file this report, or you can do it yourself through the VAERS web site at www.vaers. [facility].gov, or by calling 4-857.755.6705. VAERS is only for reporting reactions. They do not give medical advice. How can I learn more? · Ask your doctor. · Call your local or state health department. · Contact the Centers for Disease Control and Prevention (CDC): 
¨ Call 5-967.323.4399 (1-800-CDC-INFO) or ¨ Visit CDC's website at www.cdc.gov/vaccines Vaccine Information Statement Shingles Vaccine 
(10/6/2009) Department of Health and [employer] Centers for Disease Control and Prevention Many Vaccine Information Statements are available in Citizen of [place] and other languages. See www.immunize.org/vis. Muchas hojas de información sobre vacunas están disponibles en español y en otros idiomas. Visite www.immunize.org/vis. Care instructions adapted under license by [employer] (which disclaims liability or warranty for this information). If you have questions about a medical condition or this instruction, always ask your healthcare professional. [address] any warranty or liability for your use of this information. Introducing [facility]! Dear JIA: 
Thank you for requesting a OROS account. Our records indicate that you already have an active OROS account. You can access your account anytime at [email or web address] Did you know that you can access your hospital and ER discharge instructions at any time in OROS? You can also review all of your test results from your hospital stay or ER visit. Additional Information If you have questions, please visit the Frequently Asked Questions section of the OROS website at [email or web address] Remember, OROS is NOT to be used for urgent needs. For medical emergencies, dial 911. Now available from your iPhone and Android! Please provide this summary of care documentation to your next provider. Your primary care clinician is listed as Charles Rosado. If you have any questions after today's visit, please call [phone].

## 2018-05-08 NOTE — PROGRESS NOTES
OFFICE NOTE    Aleks Gonzalez is a 59 y.o. female presenting today for office visit. 5/8/2018  7:42 AM    Chief Complaint   Patient presents with    Follow Up Chronic Condition     pt here for follow up chronic conditions       HPI: Here today for follow up on chronic disease. Last routine labs done 9/2017 and most recently for operative work up 3/2018. Follows with sleep medicine, weight loss center, and orthopedics. Remote history of fibrosarcoma addressed PRN. COPD/ AYE: Currently on Symbicort and PRN Albuterol with allergy medications. Follows with sleep specialist. No complaints related to. On Wellbutrin for smoking cessation. GERD: Taking Dexilant for GERD symptoms chronically. Reports in past that it keeps her symptoms controlled. Depression: Taking Celexa and Abilify as prescribed. Reports in past of feeling stable on medication but has some good days and bad days. Denies SI/HI. History of alcoholism. Also on Wellbutrin for smoking cessation. HNP/spine stenosis lumbar/ knee pain: Patient reported. Taking Lyrica- no complaints related to. Recent diagnosis of left knee meniscal tear- surgical repair completed 3/16/18. Recently got injection in right knee. States that she has been told she has arthritis in both knees.          Review of Systems   Constitutional: Negative for chills, fatigue and fever. Respiratory: Negative for cough, shortness of breath and wheezing. Cardiovascular: Negative for chest pain, palpitations and leg swelling. Gastrointestinal: Negative for abdominal pain, constipation, diarrhea, nausea and vomiting. Genitourinary: Negative for difficulty urinating and frequency. Musculoskeletal: Positive for arthralgias and back pain.        +chronic symptoms   Skin: Negative for rash. Neurological: Negative for dizziness and headaches.          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    connective tissue cancer    GERD (gastroesophageal reflux disease)     History of gastric bypass 2012    History of meniscal tear 2015, 2018    right in 2015, left in 2018    HNP (herniated nucleus pulposus), lumbar     patient reported    Lung nodules     monitor yearly- stable 10/2017    Osteopenia 10/03/2017    Recurrent depression ([facility]     Sleep apnea     on cpap    Spinal stenosis, lumbar     patient reported       Past Surgical History:   Procedure Laterality Date    HX ARTHRODESIS  01/2000    Herniated Disc, arthritis right foot 06/06, 07/07, C 6/7, C 4/6 Stenosis    HX CHOLECYSTECTOMY  09/2008    gallbladder disease    HX COLONOSCOPY  05/2009    HX GASTRIC BYPASS  2012    GASTRIC BYPASS  [address] Gastric Bypass      HX HYSTERECTOMY  06/1994    HX MENISCECTOMY  10/2015    right repari of torn meniscus    HX MENISCECTOMY Left 03/16/2018    partial meniscectomy due to tear    HX MYOMECTOMY  06/1984    benign tumor    HX ORTHOPAEDIC  1964    orthopaedic excision Fibrosarcoma and Lymphangiogram    HX PELVIC LAPAROSCOPY  04/1984    large uterine blockage    NEUROLOGICAL PROCEDURE UNLISTED  2000, 2007    Cervical fusion       Social History     Social History    Marital status:      Spouse name: N/A    Number of children: N/A    Years of education: N/A     Occupational History    Not on file.      Social History Main Topics    Smoking status: Current Every Day Smoker     Packs/day: 1.00     Years: 45.00    Smokeless tobacco: Never Used    Alcohol use No    Drug use: No      Comment: marijuana, cocaine 30 yrs ago    Sexual activity: No     Other Topics Concern    Not on file     Social History Narrative       Family History   Problem Relation Age of Onset  Hypertension Mother     Depression Mother     Cancer Mother     Ovarian Cancer Mother     Breast Problems Mother     Cancer Father     Cancer Sister     Depression Sister     Depression Brother        Allergies   Allergen Reactions    Adhesive Tape-Silicones Swelling     REALLY BAD SWELLING AND SORE APPEAR    Alcohol Other (comments)     PT STATES SHE IS AN ALCOHOLIC    Lactose Other (comments)     PT STATES IT MAKES HER STOMACH HURT    Percodan [Oxycodone Hcl-Oxycodone-Asa] Itching and Other (comments)     crying    Nsaids (Non-Steroidal Anti-Inflammatory Drug) Other (comments)     PT NOT ABLE TO TAKE DUE TO GASTRIC BYPASS       Current Outpatient Prescriptions   Medication Sig Dispense Refill    calcium citrate 200 mg (950 mg) tablet Take  by mouth daily.  LOMAIRA 8 mg tab TK 1 T PO  BID  5    citalopram (CELEXA) 20 mg tablet Take 1 Tab by mouth daily. 90 Tab 1    pregabalin (LYRICA) 150 mg capsule Take 1 Cap by mouth two (2) times a day. Max Daily Amount: 300 mg. 180 Cap 1    fluticasone (FLONASE) 50 mcg/actuation nasal spray 2 Sprays by Both Nostrils route daily. 3 Bottle 4    albuterol (PROVENTIL HFA, VENTOLIN HFA, PROAIR HFA) 90 mcg/actuation inhaler Take 2 Puffs by inhalation every four (4) hours as needed for Wheezing or Shortness of Breath. 3 Inhaler 4    Dexlansoprazole (DEXILANT) 60 mg CpDB Take 1 Cap by mouth daily. 90 Cap 1    buPROPion (WELLBUTRIN) 100 mg tablet Take 1 Tab by mouth two (2) times a day. 180 Tab 1    budesonide-formoterol (SYMBICORT) 160-4.5 mcg/actuation HFAA Take 2 Puffs by inhalation two (2) times a day. 6 Inhaler 1    omega 3-dha-epa-fish oil (FISH OIL) 100-160-1,000 mg cap Take 1,065 mg by mouth daily.  ferrous sulfate ER (IRON) 160 mg (50 mg iron) TbER tablet Take 1 Tab by mouth daily. Indications: PT TAKES 40 MG      multivitamin (ONE A DAY) tablet Take 1 Tab by mouth daily.       furosemide (LASIX) 20 mg tablet Take 1 Tab by mouth daily as needed. 30 Tab 2    acetaminophen (TYLENOL ARTHRITIS PAIN) 650 mg TbER Take 1 Tab by mouth three (3) times daily as needed. 100 Tab 0    ARIPiprazole (ABILIFY) 5 mg tablet Take  by mouth daily.  b complex vitamins tablet Take 1 Tab by mouth daily.  calcium-cholecalciferol, d3, (CALCIUM 600 + D) 600-125 mg-unit tab Take 1,065 mg by mouth nightly. Indications: TAKES 2 AT BEDTIME           Health Maintenance and Screenings  *Medicare wellness done 9/14/17      Advance Care Planning:   Patient was offered the opportunity to discuss advance care planning NO   Does patient have an Advance Directive:  NO   If no, did you provide information on Caring Connections? Patient Care Team:  Patient Care Team:  Shawna Matrell NP as PCP - General (Nurse Practitioner)  Sergio Simpson DO (Orthopedic Surgery)  Elizabeth Carias NP (Neurology)  Nav Poole MD ([address] [place] [address])  RHONDA Martins as Physician Assistant (Psychiatry)        LABS:  None new to review    RADIOLOGY:  None new to review        Physical Exam   Constitutional: She is oriented to person, place, and time. She appears well-developed and well-nourished. No distress. Pulmonary/Chest: Effort normal. No respiratory distress. Neurological: She is alert and oriented to person, place, and time. She exhibits normal muscle tone. Coordination normal.   Skin: Skin is warm and dry. Psychiatric: Her speech is normal and behavior is normal. Her mood appears not anxious. She does not exhibit a depressed mood.          Vitals:    05/08/18 0750   BP: 127/73   Pulse: 69   Resp: 20   Temp: 97.9 °F (36.6 °C)   TempSrc: Oral   SpO2: 98%   Weight: 256 lb (116.1 kg)   Height: 5' 5\" (1.651 m)   PainSc:   0 - No pain     Wt Readings from Last 3 Encounters:   05/08/18 256 lb (116.1 kg)   05/03/18 258 lb 6.4 oz (117.2 kg)   04/13/18 266 lb (120.7 kg)         Assessment and Plan      Chronic obstructive pulmonary disease, unspecified COPD type ([facility]  *Refilled. - budesonide-formoterol (SYMBICORT) 160-4.5 mcg/actuation HFAA; Take 2 Puffs by inhalation two (2) times a day. Dispense: 6 Inhaler; Refill: 1    Sleep apnea, unspecified type  *Follows with sleep specialist.    Gastroesophageal reflux disease, esophagitis presence not specified  *Refilled. Will need to discuss in the future of the impact PPIs can have on bone density. She reports it keeps symptoms controlled. - Dexlansoprazole (DEXILANT) 60 mg CpDB; Take 1 Cap by mouth daily. Dispense: 90 Cap; Refill: 1    Recurrent depression (HCC)/ Alcoholism in remission ([facility]  *Continue with Celexa and Abilify- controlled per patient. Denies need for refills. On Wellbutrin as well for smoking cessation- no interaction side effects noted. - citalopram (CELEXA) 20 mg tablet; Take 1 Tab by mouth daily. Dispense: 90 Tab; Refill: 1  - ARIPiprazole (ABILIFY) 5 mg tablet; Take 1 Tab by mouth daily. Dispense: 90 Tab; Refill: 1    HNP (herniated nucleus pulposus), lumbar/ Spinal stenosis, unspecified spinal region  *Refilled. Manages chronic back pain. - pregabalin (LYRICA) 150 mg capsule; Take 1 Cap by mouth two (2) times a day. Max Daily Amount: 300 mg. Dispense: 180 Cap; Refill: 1    Obesity, morbid ([facility]  *She has lost some weight. Commended patient. Following with weight loss center- on Phentermine. Cigarette nicotine dependence with nicotine-induced disorder  *Refilled. - buPROPion (WELLBUTRIN) 100 mg tablet; Take 1 Tab by mouth two (2) times a day. Dispense: 180 Tab; Refill: 1    Fibrosarcoma ([facility]  *In past, she reports that she completed her observation period. Continue with age related cancer surveillance per guidelines. Address PRN. Need for shingles vaccine  *Discussed. She opts for Zostavax- given Rx to get at pharmacy. - varicella zoster vaccine live (ZOSTAVAX) 19,400 unit/0.65 mL susr injection; 1 Vial by SubCUTAneous route once for 1 dose.   Dispense: 0.65 mL; Refill: 0        *Plan of care reviewed with patient. Patient in agreement with plan and expresses understanding. All questions answered and patient encouraged to call or RTO if further questions or concerns. Follow-up Disposition:  Return in about 4 months (around 9/8/2018) for Medicare Wellness Visit and chronic routine care- 45 mins.

## 2018-05-08 NOTE — PROGRESS NOTES
PT DAILY TREATMENT NOTE - [facility]     Patient Name: Elias Taylor  Date:2018  : 1960  [x]  Patient  Verified  Payor: [employer] MEDICARE COMPLETE / Plan: [facility] MEDICARE COMPLETE / Product Type: Managed Care Medicare /    In time:1057  Out time:1133  Total Treatment Time (min): 36  Total Timed Codes (min): 36  1:1 Treatment Time ( only): 36   Visit #: 12 of 16    Treatment Area: Pain in left knee [M25.562]    SUBJECTIVE  Pain Level (0-10 scale): 0  Any medication changes, allergies to medications, adverse drug reactions, diagnosis change, or new procedure performed?: [x] No    [] Yes (see summary sheet for update)  Subjective functional status/changes:   [] No changes reported  Patient reports greatest difficulties with sleep    OBJECTIVE    26 min Therapeutic Exercise:  [x] See flow sheet : Emphasis placed on improving available knee AROM and improving activation and recruitment of the gluteal and quadriceps musculature   Rationale: increase ROM and increase strength to improve the patients ability to improve ease with stair management.      10 min Neuromuscular Re-education:  [x]  See flow sheet : Emphasis placed on improving activation and recruitment of the gluteal and quadriceps musculature and improving LE proprioceptive and kinesthetic awareness   Rationale: increase ROM, increase strength, improve balance and increase proprioception  to improve the patients ability to improve safety with community ambulation. With   [] TE   [] TA   [] neuro   [] other: Patient Education: [x] Review HEP    [] Progressed/Changed HEP based on:   [] positioning   [] body mechanics   [] transfers   [] heat/ice application    [] other:      Pain Level (0-10 scale) post treatment: 0    ASSESSMENT/Changes in Function: At this time patient suitable for discharge with patient having demonstrated significant improvement in functional mobility since [facility].  Patient demonstrates left knee AROM symmetrical to that of the right knee with patient noted with full and symmetrical LE strength bilaterally with slight pain continued to be reported with hamstring MMT. Patient reports no functional limitations secondary to left knee pain but does report continued sleep disturbances with disturbances reported ~2x/night. Patient in agreement to continue with completion of prescribed HEP as instructed post-discharge. []  See Plan of Care  []  See progress note/recertification  [x]  See Discharge Summary         Progress towards goals / Updated goals:    Short Term Goals: To be accomplished in 3 weeks:  1. Patient will subjectively report full compliance with prescribed HEP. AT PN: Goal met: pt reports performing HEP. 3/30/18  2. Patient will demonstrate supine left knee AROM 0-120 degrees in order to improve ease with donning/doffing clothing. AT PN:  Met, Supine Left Knee AROM 8-0-126 degrees, 4/16/2018   3. Patient will demonstrate left hamstring MMT 5/5 without pain in order to improve ease with bed mobility. AT PN Progressing, Left Hamstring MMT 5/5 (pain), 4/19/2018  Current: Remains, Left Hamstring MMT 5/5 (pain), 5/8/2018  Long Term Goals: To be accomplished in 6 weeks:  1. Patient will demonstrate a significant functional improvement as demonstrated by a score of >/= 52 on FOTO. AT PN:  FOTO = 46, increased by 20 since [facility]. 4/16/18  Current: Remains, FOTO = 46, 5/8/2018  2. Patient will demonstrate the ability to perform bilateral SLS >/=10 seconds in order to improve ease with donning/doffing pants. AT PN: Left SLS 10 sec, Right SLS 4 sec, 4/19/2018   Current: Remains,  Left SLS 10 sec, Right SLS 4 sec, 5/8/2018   3. Patient will demonstrate the ability to ascend/descent 4 stairs with a step-over-step gait pattern with single UE assist in order to improve ease with household ambulation.   AT PN:  Pt is able to perform step over step with slight pain in hamstrings and at times using B UE. 4/23/18  Current: Met, Step-over-step gait pattern without UE assistance with slight pain reported, 5/8/2018    PLAN  []  Upgrade activities as tolerated     []  Continue plan of care  []  Update interventions per flow sheet       [x]  Discharge due to:_  []  Other:_      Lizette Martinez, PT 5/8/2018  8:10 AM    Future Appointments  Date Time Provider Cesar Leong   5/8/2018 11:00 AM Isiah Hameed [facility]   6/1/2018 8:45 AM MD Ana Anderson [address]   9/11/2018 7:30 AM Tip Bee NP [address]

## 2018-05-21 ENCOUNTER — PATIENT OUTREACH (OUTPATIENT)
Dept: FAMILY MEDICINE CLINIC | Age: 58
End: 2018-05-21

## 2018-06-01 ENCOUNTER — OFFICE VISIT (OUTPATIENT)
Dept: ORTHOPEDIC SURGERY | Age: 58
End: 2018-06-01

## 2018-06-01 VITALS
SYSTOLIC BLOOD PRESSURE: 127 MMHG | DIASTOLIC BLOOD PRESSURE: 89 MMHG | BODY MASS INDEX: 43.32 KG/M2 | WEIGHT: 260 LBS | HEIGHT: 65 IN | TEMPERATURE: 95.3 F | HEART RATE: 90 BPM | RESPIRATION RATE: 16 BRPM | OXYGEN SATURATION: 95 %

## 2018-06-01 DIAGNOSIS — M17.0 PRIMARY OSTEOARTHRITIS OF BOTH KNEES: Primary | ICD-10-CM

## 2018-06-01 DIAGNOSIS — M17.12 PRIMARY OSTEOARTHRITIS OF LEFT KNEE: ICD-10-CM

## 2018-06-01 RX ORDER — BETAMETHASONE SODIUM PHOSPHATE AND BETAMETHASONE ACETATE 3; 3 MG/ML; MG/ML
6 INJECTION, SUSPENSION INTRA-ARTICULAR; INTRALESIONAL; INTRAMUSCULAR; SOFT TISSUE ONCE
Qty: 1 VIAL | Refills: 0
Start: 2018-06-01 | End: 2018-06-01

## 2018-06-01 NOTE — PROGRESS NOTES
Patient: Jared Cancer                MRN: [id number]       SSN: [id number]  YOB: 1960        AGE: 59 y.o. SEX: female  Body mass index is 43.27 kg/(m^2). PCP: Tip Bee NP  06/01/18  HISTORY: Ms. Poornima Aguila returns in follow up with complaints of knee pain. It is actually more on the left than the right. She has known advanced arthritis on the right knee and moderate left. A cortisone injection helped quite a bit, and she is very interested in viscosupplementation, a friend of hers had it. The pain in the left knee is moderate. It took about 2 weeks for the right knee injection to work and it is helping quite a bit, decreased her pain significantly. She denies fevers or chills. She is actually in a weight loss program and lost a little bit of weight already. PHYSICAL EXAMINATION:  She is a very nice lady, appears younger than her stated age. Hips rotate adequately. She is neurologically intact distally. Calf nontender. Homans sign is negative and both feet were normally perfused. The left knee has almost full range of motion, missing just a degree or 2 of extension, bends well. The right knee, about 2-3 degrees of flexion deformity. Mild angular deformity. Calf nontender. Homans sign negative, and no evidence for DVT or infection. Minimal effusion. Previous portal evidence on the right side, clean and dry without evidence of infection. RADIOGRAPHS:  Review of x-rays confirmed moderately advanced arthritis, right knee, and moderate left. PROCEDURE:  Under aseptic conditions and after informed, written consent with a time out, the left knee was injected with 1 mL of Celestone preparation, 6 mg, which was well tolerated. PLAN:  She would like viscosupplementation for both knees. I think it is a good idea. I think she will respond well to it. Therefore, we will have it ordered for both knees. Euflexxa to start in a couple of weeks.           REVIEW OF SYSTEMS:      CON: negative for weight loss, fever  EYE: negative for double vision  ENT: negative for hoarseness  RS:   negative for Tb  GI:    negative for blood in stool  :  negative for blood in urine  Other systems reviewed and noted below. Past Medical History:   Diagnosis Date    Alcoholism in remission [facility])     Chronic obstructive pulmonary disease [facility]     Fibrosarcoma ([facility] [facility] 1963    connective tissue cancer    GERD (gastroesophageal reflux disease)     History of gastric bypass 2012    History of meniscal tear 2015, 2018    right in 2015, left in 2018    HNP (herniated nucleus pulposus), lumbar     patient reported    Lung nodules     monitor yearly- stable 10/2017    Osteopenia 10/03/2017    Recurrent depression ([facility]     Sleep apnea     on cpap    Spinal stenosis, lumbar     patient reported       Family History   Problem Relation Age of Onset    Hypertension Mother     Depression Mother     Cancer Mother     Ovarian Cancer Mother     Breast Problems Mother     Cancer Father     Cancer Sister     Depression Sister     Depression Brother        Current Outpatient Prescriptions   Medication Sig Dispense Refill    betamethasone (CELESTONE SOLUSPAN) 6 mg/mL injection 1 mL by Intra artICUlar route once for 1 dose. 1 Vial 0    pregabalin (LYRICA) 150 mg capsule Take 1 Cap by mouth two (2) times a day. Max Daily Amount: 300 mg. 180 Cap 1    citalopram (CELEXA) 20 mg tablet Take 1 Tab by mouth daily. 90 Tab 1    Dexlansoprazole (DEXILANT) 60 mg CpDB Take 1 Cap by mouth daily. 90 Cap 1    buPROPion (WELLBUTRIN) 100 mg tablet Take 1 Tab by mouth two (2) times a day. 180 Tab 1    budesonide-formoterol (SYMBICORT) 160-4.5 mcg/actuation HFAA Take 2 Puffs by inhalation two (2) times a day. 6 Inhaler 1    ARIPiprazole (ABILIFY) 5 mg tablet Take 1 Tab by mouth daily. 90 Tab 1    calcium citrate 200 mg (950 mg) tablet Take  by mouth daily.       LOMAIRA 8 mg tab TK 1 T PO  BID  5  fluticasone (FLONASE) 50 mcg/actuation nasal spray 2 Sprays by Both Nostrils route daily. 3 Bottle 4    albuterol (PROVENTIL HFA, VENTOLIN HFA, PROAIR HFA) 90 mcg/actuation inhaler Take 2 Puffs by inhalation every four (4) hours as needed for Wheezing or Shortness of Breath. 3 Inhaler 4    acetaminophen (TYLENOL ARTHRITIS PAIN) 650 mg TbER Take 1 Tab by mouth three (3) times daily as needed. 100 Tab 0    b complex vitamins tablet Take 1 Tab by mouth daily.  calcium-cholecalciferol, d3, (CALCIUM 600 + D) 600-125 mg-unit tab Take 1,065 mg by mouth nightly. Indications: TAKES 2 AT BEDTIME      omega 3-dha-epa-fish oil (FISH OIL) 100-160-1,000 mg cap Take 1,065 mg by mouth daily.  ferrous sulfate ER (IRON) 160 mg (50 mg iron) TbER tablet Take 1 Tab by mouth daily. Indications: PT TAKES 40 MG      multivitamin (ONE A DAY) tablet Take 1 Tab by mouth daily.  furosemide (LASIX) 20 mg tablet Take 1 Tab by mouth daily as needed.  30 Tab 2       Allergies   Allergen Reactions    Adhesive Tape-Silicones Swelling     REALLY BAD SWELLING AND SORE APPEAR    Alcohol Other (comments)     PT STATES SHE IS AN ALCOHOLIC    Lactose Other (comments)     PT STATES IT MAKES HER STOMACH HURT    Percodan [Oxycodone Hcl-Oxycodone-Asa] Itching and Other (comments)     crying    Nsaids (Non-Steroidal Anti-Inflammatory Drug) Other (comments)     PT NOT ABLE TO TAKE DUE TO GASTRIC BYPASS       Past Surgical History:   Procedure Laterality Date    HX ARTHRODESIS  01/2000    Herniated Disc, arthritis right foot 06/06, 07/07, C 6/7, C 4/6 Stenosis    HX CHOLECYSTECTOMY  09/2008    gallbladder disease    HX COLONOSCOPY  05/2009    HX GASTRIC BYPASS  2012    GASTRIC BYPASS  [place] [facility] Gastric Bypass      HX HYSTERECTOMY  06/1994    HX MENISCECTOMY  10/2015    right repari of torn meniscus    HX MENISCECTOMY Left 03/16/2018    partial meniscectomy due to tear    HX MYOMECTOMY  06/1984    benign tumor    HX [address] 13    orthopaedic excision Fibrosarcoma and Lymphangiogram    HX PELVIC LAPAROSCOPY  04/1984    large uterine blockage    NEUROLOGICAL PROCEDURE UNLISTED  2000, 2007    Cervical fusion       Social History     Social History    Marital status:      Spouse name: N/A    Number of children: N/A    Years of education: N/A     Occupational History    Not on file. Social History Main Topics    Smoking status: Current Every Day Smoker     Packs/day: 1.00     Years: 45.00    Smokeless tobacco: Never Used    Alcohol use No    Drug use: No      Comment: marijuana, cocaine 30 yrs ago    Sexual activity: No     Other Topics Concern    Not on file     Social History Narrative       Visit Vitals    /89    Pulse 90    Temp 95.3 °F (35.2 °C) (Oral)    Resp 16    Ht 5' 5\" (1.651 m)    Wt 260 lb (117.9 kg)    SpO2 95%    BMI 43.27 kg/m2         PHYSICAL EXAMINATION:  GENERAL: Alert and oriented x3, in no acute distress, well-developed, well-nourished, afebrile. HEART: No JVD. EYES: No scleral icterus   NECK: No significant lymphadenopathy   LUNGS: No respiratory compromise or indrawing  ABDOMEN: Soft, non-tender, non-distended. Electronically signed by:  Pia Snyder MD

## 2018-07-26 ENCOUNTER — OFFICE VISIT (OUTPATIENT)
Dept: ORTHOPEDIC SURGERY | Age: 58
End: 2018-07-26

## 2018-07-26 VITALS
HEART RATE: 80 BPM | BODY MASS INDEX: 41.65 KG/M2 | TEMPERATURE: 97.2 F | OXYGEN SATURATION: 96 % | DIASTOLIC BLOOD PRESSURE: 71 MMHG | SYSTOLIC BLOOD PRESSURE: 115 MMHG | RESPIRATION RATE: 16 BRPM | WEIGHT: 250 LBS | HEIGHT: 65 IN

## 2018-07-26 DIAGNOSIS — M17.0 PRIMARY OSTEOARTHRITIS OF BOTH KNEES: Primary | ICD-10-CM

## 2018-07-26 RX ORDER — HYALURONATE SODIUM 10 MG/ML
2 SYRINGE (ML) INTRAARTICULAR ONCE
Qty: 2 ML | Refills: 0
Start: 2018-07-26 | End: 2018-07-26

## 2018-07-26 NOTE — PROGRESS NOTES
Patient: Andres Mays                MRN: [id number]       SSN: [id number]  YOB: 1960        AGE: 59 y.o. SEX: female  Body mass index is 41.6 kg/(m^2). PCP: Marybel Carlos NP  07/26/18        Pt presents today for their 1st euflexxa  injection for Bilateral knee . There has been recent fevers/chills, systemic changes, or injuries to report. PE:  General A&Ox3, NAD  Exam of the knees reveals skin intact, no erythema, no ecchymosis, no signs for infection. No cyanosis, clubbing, or edema present distally. Neg calf tenderness, neg homans, no signs for DVT present. A: Bilateral knee OA    P: * Patient was identified by name and date of birth       * Agreement on procedure being performed was verified      * Risks and Benefits explained to the patient      * Procedure site verified and marked as necessary      * Patient was positioned for comfort      * Consent was signed and verified     3:09 PM      the Bilateral knee was injected US guided assistance without complications. Patient was instructed on post injection care.        Rayne Reece PA-C

## 2018-08-02 ENCOUNTER — OFFICE VISIT (OUTPATIENT)
Dept: ORTHOPEDIC SURGERY | Age: 58
End: 2018-08-02

## 2018-08-02 VITALS
OXYGEN SATURATION: 97 % | DIASTOLIC BLOOD PRESSURE: 82 MMHG | HEART RATE: 76 BPM | HEIGHT: 65 IN | BODY MASS INDEX: 41.99 KG/M2 | WEIGHT: 252 LBS | TEMPERATURE: 97.6 F | RESPIRATION RATE: 16 BRPM | SYSTOLIC BLOOD PRESSURE: 126 MMHG

## 2018-08-02 DIAGNOSIS — G89.29 CHRONIC PAIN OF BOTH KNEES: Primary | ICD-10-CM

## 2018-08-02 DIAGNOSIS — M25.562 CHRONIC PAIN OF BOTH KNEES: Primary | ICD-10-CM

## 2018-08-02 DIAGNOSIS — M17.0 PRIMARY OSTEOARTHRITIS OF BOTH KNEES: ICD-10-CM

## 2018-08-02 DIAGNOSIS — M25.561 CHRONIC PAIN OF BOTH KNEES: Primary | ICD-10-CM

## 2018-08-02 RX ORDER — HYDROCODONE BITARTRATE AND ACETAMINOPHEN 5; 325 MG/1; MG/1
1 TABLET ORAL
Qty: 28 TAB | Refills: 0 | Status: SHIPPED | OUTPATIENT
Start: 2018-08-02 | End: 2018-09-12 | Stop reason: ALTCHOICE

## 2018-08-02 RX ORDER — HYALURONATE SODIUM 10 MG/ML
2 SYRINGE (ML) INTRAARTICULAR ONCE
Qty: 2 ML | Refills: 0
Start: 2018-08-02 | End: 2018-08-02

## 2018-08-02 RX ORDER — DICLOFENAC SODIUM 10 MG/G
2 GEL TOPICAL 4 TIMES DAILY
Qty: 100 G | Refills: 2 | Status: SHIPPED | OUTPATIENT
Start: 2018-08-02 | End: 2019-03-27 | Stop reason: SDUPTHER

## 2018-08-02 NOTE — PROGRESS NOTES
Patient: Odette Greenwood                MRN: [id number]       SSN: [id number]  YOB: 1960        AGE: 59 y.o. SEX: female  Body mass index is 41.93 kg/(m^2). PCP: Lakesha Perry NP  08/02/18    Chief Complaint   Patient presents with    Knee Pain     bilateral knee inj 2/3       HISTORY:  Odette Greenwood is a 59 y.o. female who is seen for reevaluation of Bilateral knee here for 2nd injection of euflexxa. PROCEDURE:  Patient's Bilateral knee, after timeout under sterile conditions, was injected with 2 cc of Euflexxa. [facility] - [facility]  OFFICE PROCEDURE PROGRESS NOTE        Chart reviewed for the following:   Han EMERSON PA-C, have reviewed the History, Physical and updated the Allergic reactions for Sunshine TSE [address] performed immediately prior to start of procedure:   Han EMERSON PA-C, have performed the following reviews on Odette Greenwood prior to the start of the procedure:            * Patient was identified by name and date of birth   * Agreement on procedure being performed was verified  * Risks and Benefits explained to the patient  * Procedure site verified and marked as necessary  * Patient was positioned for comfort  * Consent was signed and verified     Time: 3:43 PM    Date of procedure: 8/2/2018    Procedure performed by:  Lesvia Gutierrez PA-C  Provider assisted by: None     How tolerated by patient: tolerated the procedure well with no complications    Comments: none    IMPRESSION:     ICD-10-CM ICD-9-CM    1. Chronic pain of both knees M25.561 719.46 SC DRAIN/INJECT LARGE JOINT/BURSA    M25.562 338.29 EUFLEXXA INJECTION PER DOSE    G89.29  sodium hyaluronate (SUPARTZ FX/HYALGAN/GENIVSC) 10 mg/mL syrg injection   2.  Primary osteoarthritis of both knees M17.0 715.16 SC DRAIN/INJECT LARGE JOINT/BURSA      EUFLEXXA INJECTION PER DOSE      sodium hyaluronate (SUPARTZ FX/HYALGAN/GENIVSC) 10 mg/mL syrg injection HYDROcodone-acetaminophen (NORCO) 5-325 mg per tablet        PLAN:  Ms. Rahat Hickey will return in one week for her third Euflexxa injection. Also given a rx for volteran gel and norco 5 mg to help with discomfort. Patient given pain medication for short term acute pain relief. Goal is to treat patient according to above plan and to ultimately have patient off all pain medications once appropriate. If chronic pain management is required beyond what is expected for current orthopedic problem, will refer patient to pain management.   was reviewed and will be reviewed with every medication refill request.       TREY Campbell [facility]

## 2018-08-09 ENCOUNTER — OFFICE VISIT (OUTPATIENT)
Dept: ORTHOPEDIC SURGERY | Age: 58
End: 2018-08-09

## 2018-08-09 VITALS
OXYGEN SATURATION: 92 % | BODY MASS INDEX: 41.69 KG/M2 | TEMPERATURE: 97.4 F | HEART RATE: 88 BPM | DIASTOLIC BLOOD PRESSURE: 72 MMHG | WEIGHT: 250.2 LBS | HEIGHT: 65 IN | RESPIRATION RATE: 16 BRPM | SYSTOLIC BLOOD PRESSURE: 123 MMHG

## 2018-08-09 DIAGNOSIS — M25.561 CHRONIC PAIN OF BOTH KNEES: Primary | ICD-10-CM

## 2018-08-09 DIAGNOSIS — M25.562 CHRONIC PAIN OF BOTH KNEES: Primary | ICD-10-CM

## 2018-08-09 DIAGNOSIS — M17.0 PRIMARY OSTEOARTHRITIS OF BOTH KNEES: ICD-10-CM

## 2018-08-09 DIAGNOSIS — G89.29 CHRONIC PAIN OF BOTH KNEES: Primary | ICD-10-CM

## 2018-08-09 RX ORDER — HYALURONATE SODIUM 10 MG/ML
2 SYRINGE (ML) INTRAARTICULAR ONCE
Qty: 2 ML | Refills: 0
Start: 2018-08-09 | End: 2018-08-09

## 2018-08-09 NOTE — PROGRESS NOTES
Patient: Andres Mays                MRN: [id number]       SSN: [id number]  YOB: 1960        AGE: 59 y.o. SEX: female  Body mass index is 41.64 kg/(m^2). PCP: Marybel Carlos NP  08/09/18    Chief Complaint   Patient presents with    Knee Pain     SHELBY KNEE PAIN, F/U APPT. HISTORY:  Andres Mays is a 59 y.o. female who is seen for reevaluation of Bilateral knee here for 3rd and final injection of euflexxa. PROCEDURE:  Patient's Bilateral knee, after timeout under sterile conditions, was injected with 2 cc of Euflexxa. [facility]  OFFICE PROCEDURE PROGRESS NOTE        Chart reviewed for the following:   Pushpa EMERSON PA-C, have reviewed the History, Physical and updated the Allergic reactions for Sunshine [address]akes 157 performed immediately prior to start of procedure:   Pushpa EMERSON PA-C, have performed the following reviews on Andres Mays prior to the start of the procedure:            * Patient was identified by name and date of birth   * Agreement on procedure being performed was verified  * Risks and Benefits explained to the patient  * Procedure site verified and marked as necessary  * Patient was positioned for comfort  * Consent was signed and verified     Time: 3:54 PM       Date of procedure: 8/9/2018    Procedure performed by:  Theoplis Cabot PA-C  Provider assisted by: None     How tolerated by patient: tolerated the procedure well with no complications    Comments: none    IMPRESSION:     ICD-10-CM ICD-9-CM    1. Chronic pain of both knees M25.561 719.46 TN DRAIN/INJECT LARGE JOINT/BURSA    M25.562 338.29 EUFLEXXA INJECTION PER DOSE    G89.29  sodium hyaluronate (SUPARTZ FX/HYALGAN/GENIVSC) 10 mg/mL syrg injection   2.  Primary osteoarthritis of both knees M17.0 715.16 TN DRAIN/INJECT LARGE JOINT/BURSA      EUFLEXXA INJECTION PER DOSE      sodium hyaluronate (SUPARTZ FX/HYALGAN/GENIVSC) 10 mg/mL syrg injection        PLAN: Ms. Georges Dawson has completed her Euflexxa injection series. she will return as needed.       [facility]

## 2018-09-12 ENCOUNTER — OFFICE VISIT (OUTPATIENT)
Dept: FAMILY MEDICINE CLINIC | Age: 58
End: 2018-09-12

## 2018-09-12 VITALS
BODY MASS INDEX: 41.99 KG/M2 | OXYGEN SATURATION: 98 % | TEMPERATURE: 97.5 F | SYSTOLIC BLOOD PRESSURE: 121 MMHG | DIASTOLIC BLOOD PRESSURE: 68 MMHG | RESPIRATION RATE: 20 BRPM | WEIGHT: 252 LBS | HEART RATE: 76 BPM | HEIGHT: 65 IN

## 2018-09-12 DIAGNOSIS — Z23 NEED FOR SHINGLES VACCINE: ICD-10-CM

## 2018-09-12 DIAGNOSIS — Z12.31 ENCOUNTER FOR SCREENING MAMMOGRAM FOR BREAST CANCER: ICD-10-CM

## 2018-09-12 DIAGNOSIS — M85.80 OSTEOPENIA, UNSPECIFIED LOCATION: ICD-10-CM

## 2018-09-12 DIAGNOSIS — F10.21 ALCOHOLISM IN REMISSION (HCC): ICD-10-CM

## 2018-09-12 DIAGNOSIS — J44.9 CHRONIC OBSTRUCTIVE PULMONARY DISEASE, UNSPECIFIED COPD TYPE (HCC): Primary | ICD-10-CM

## 2018-09-12 DIAGNOSIS — F33.9 RECURRENT DEPRESSION (HCC): ICD-10-CM

## 2018-09-12 DIAGNOSIS — M25.562 CHRONIC PAIN OF BOTH KNEES: ICD-10-CM

## 2018-09-12 DIAGNOSIS — Z00.00 ENCOUNTER FOR MEDICARE ANNUAL WELLNESS EXAM: ICD-10-CM

## 2018-09-12 DIAGNOSIS — G47.30 SLEEP APNEA, UNSPECIFIED TYPE: ICD-10-CM

## 2018-09-12 DIAGNOSIS — M51.26 HNP (HERNIATED NUCLEUS PULPOSUS), LUMBAR: ICD-10-CM

## 2018-09-12 DIAGNOSIS — E66.01 OBESITY, MORBID (HCC): ICD-10-CM

## 2018-09-12 DIAGNOSIS — G89.29 CHRONIC PAIN OF BOTH KNEES: ICD-10-CM

## 2018-09-12 DIAGNOSIS — Z23 ENCOUNTER FOR IMMUNIZATION: ICD-10-CM

## 2018-09-12 DIAGNOSIS — F17.219 CIGARETTE NICOTINE DEPENDENCE WITH NICOTINE-INDUCED DISORDER: ICD-10-CM

## 2018-09-12 DIAGNOSIS — M25.561 CHRONIC PAIN OF BOTH KNEES: ICD-10-CM

## 2018-09-12 DIAGNOSIS — Z71.89 ACP (ADVANCE CARE PLANNING): ICD-10-CM

## 2018-09-12 DIAGNOSIS — Z80.41 FAMILY HISTORY OF OVARIAN CANCER: ICD-10-CM

## 2018-09-12 DIAGNOSIS — M48.00 SPINAL STENOSIS, UNSPECIFIED SPINAL REGION: ICD-10-CM

## 2018-09-12 DIAGNOSIS — C49.9 FIBROSARCOMA (HCC): ICD-10-CM

## 2018-09-12 DIAGNOSIS — K21.9 GASTROESOPHAGEAL REFLUX DISEASE, ESOPHAGITIS PRESENCE NOT SPECIFIED: ICD-10-CM

## 2018-09-12 DIAGNOSIS — Z80.3 FAMILY HISTORY OF BREAST CANCER: ICD-10-CM

## 2018-09-12 DIAGNOSIS — R91.8 LUNG NODULE, MULTIPLE: ICD-10-CM

## 2018-09-12 RX ORDER — PHENTERMINE HYDROCHLORIDE 37.5 MG/1
TABLET ORAL
Refills: 1 | COMMUNITY
Start: 2018-08-21 | End: 2019-03-27

## 2018-09-12 RX ORDER — BUPROPION HYDROCHLORIDE 150 MG/1
TABLET, EXTENDED RELEASE ORAL
Refills: 3 | COMMUNITY
Start: 2018-09-05 | End: 2018-09-12 | Stop reason: SDUPTHER

## 2018-09-12 RX ORDER — TOPIRAMATE 25 MG/1
TABLET ORAL
Refills: 1 | COMMUNITY
Start: 2018-06-15 | End: 2019-03-27

## 2018-09-12 RX ORDER — BUPROPION HYDROCHLORIDE 150 MG/1
150 TABLET, EXTENDED RELEASE ORAL 2 TIMES DAILY
Qty: 180 TAB | Refills: 0 | Status: SHIPPED | OUTPATIENT
Start: 2018-09-12 | End: 2019-02-19 | Stop reason: SDUPTHER

## 2018-09-12 NOTE — MR AVS SNAPSHOT
[facility] 107 200 [facility] 
[phone] Patient: Catherine Heart MRN: [id number] Visit Information Date & Time Provider Department Dept. Phone Encounter #  
 9/12/2018 10:30 AM Hans Gentile, [address] [phone] [id number] Follow-up Instructions Return in about 6 months (around 3/12/2019) for chronic disease routine care- 30 min, Yearly Medicare Wellness-  done today. [facility] Upcoming Health Maintenance Date Due ZOSTER VACCINE AGE 60> 4/6/2018 Influenza Age 5 to Adult 8/1/2018 MEDICARE YEARLY EXAM 9/15/2018 DTaP/Tdap/Td series (2 - Td) 2/10/2019 Bone Densitometry 10/3/2019 BREAST CANCER SCRN MAMMOGRAM 10/3/2019 COLONOSCOPY 9/20/2021 Pneumococcal 19-64 Highest Risk (3 of 3 - PPSV23) 11/22/2022 Allergies as of 9/12/2018  Review Complete On: 8/9/2018 By: RHONDA Aguirre Severity Noted Reaction Type Reactions Adhesive Tape-silicones High 46/34/0843   Topical Swelling REALLY BAD SWELLING AND SORE APPEAR Alcohol High 07/13/2017   Intolerance Other (comments) PT STATES SHE IS AN ALCOHOLIC Lactose High 07/13/2017   Intolerance Other (comments) PT STATES IT MAKES HER STOMACH HURT Percodan [Oxycodone Hcl-oxycodone-asa] High 07/13/2017   Systemic Itching, Other (comments) crying Nsaids (Non-steroidal Anti-inflammatory Drug)  07/13/2017   Intolerance Other (comments) PT NOT ABLE TO TAKE DUE TO GASTRIC BYPASS Current Immunizations  Reviewed on 10/6/2017 Name Date Influenza Vaccine 10/24/2013, 10/3/2012, 9/16/2011 Influenza Vaccine (Quad) PF  Incomplete, 9/14/2017 Pneumococcal Conjugate (PCV-13) 10/24/2013 Pneumococcal Polysaccharide (PPSV-23) 11/22/2017 TB Skin Test (PPD) Intradermal 10/6/2017 Tdap 2/10/2009 Not reviewed this visit You Were Diagnosed With   
  
 Codes Comments Encounter for Medicare annual wellness exam    -  Primary ICD-10-CM: Z00.00 ICD-9-CM: V70.0 Recurrent depression ([address]     ICD-10-CM: F33.9 ICD-9-CM: 296.30 Alcoholism in remission [facility])     ICD-10-CM: W70.71 ICD-9-CM: 303.93 Lung nodule, multiple     ICD-10-CM: R91.8 ICD-9-CM: 793.19 Cigarette nicotine dependence with nicotine-induced disorder     ICD-10-CM: F17.219 ICD-9-CM: 292.9 Encounter for screening mammogram for breast cancer     ICD-10-CM: Z12.31 
ICD-9-CM: V76.12 Encounter for immunization     ICD-10-CM: Y82 ICD-9-CM: V03.89   
 ACP (advance care planning)     ICD-10-CM: Z71.89 ICD-9-CM: V65.49 Vitals BP Pulse Temp Resp Height(growth percentile) Weight(growth percentile) 121/68 (BP 1 Location: Left arm, BP Patient Position: Sitting) 76 97.5 °F (36.4 °C) (Oral) 20 5' 5\" (1.651 m) 252 lb (114.3 kg) SpO2 BMI OB Status Smoking Status 98% 41.93 kg/m2 Hysterectomy Current Every Day Smoker Vitals History BMI and BSA Data Body Mass Index Body Surface Area 41.93 kg/m 2 2.29 m 2 Preferred Pharmacy Pharmacy Name Phone [facility] [address] [place], [place] [phone] Your Updated Medication List  
  
   
This list is accurate as of 9/12/18 11:53 AM.  Always use your most recent med list.  
  
  
  
  
 acetaminophen 650 mg Tber Commonly known as:  TYLENOL ARTHRITIS PAIN Take 1 Tab by mouth three (3) times daily as needed. albuterol 90 mcg/actuation inhaler Commonly known as:  PROVENTIL HFA, VENTOLIN HFA, PROAIR HFA Take 2 Puffs by inhalation every four (4) hours as needed for Wheezing or Shortness of Breath.  
  
 b complex vitamins tablet Take 1 Tab by mouth daily. budesonide-formoterol 160-4.5 mcg/actuation Hfaa Commonly known as:  SYMBICORT Take 2 Puffs by inhalation two (2) times a day. buPROPion  mg SR tablet Commonly known as:  Amrita [place] Take 1 Tab by mouth two (2) times a day. CALCIUM 600 + D 600-125 mg-unit Tab Generic drug:  calcium-cholecalciferol (d3) Take 1,065 mg by mouth nightly. Indications: TAKES 2 AT BEDTIME  
  
 calcium citrate 200 mg (950 mg) tablet Take  by mouth daily. citalopram 20 mg tablet Commonly known as:  Malhotra Busing Take 1 Tab by mouth daily. Dexlansoprazole 60 mg Cpdb Commonly known as:  DEXILANT Take 1 Cap by mouth daily. diclofenac 1 % Gel Commonly known as:  VOLTAREN Apply 2 g to affected area four (4) times daily. FISH -160-1,000 mg Cap Generic drug:  omega 3-dha-epa-fish oil Take 1,065 mg by mouth daily. fluticasone 50 mcg/actuation nasal spray Commonly known as:  Elihue Caller 2 Sprays by Both Nostrils route daily. furosemide 20 mg tablet Commonly known as:  LASIX Take 1 Tab by mouth daily as needed. Iron 160 mg (50 mg iron) Tber tablet Generic drug:  ferrous sulfate ER Take 1 Tab by mouth daily. Indications: PT TAKES 40 MG * LOMAIRA 8 mg Tab Generic drug:  phentermine TK 1 T PO  BID * phentermine 37.5 mg tablet Commonly known as:  ADIPEX-P  
  
 multivitamin tablet Commonly known as:  ONE A DAY Take 1 Tab by mouth daily. pregabalin 150 mg capsule Commonly known as:  Edman Speaker Take 1 Cap by mouth two (2) times a day. Max Daily Amount: 300 mg.  
  
 topiramate 25 mg tablet Commonly known as:  TOPAMAX TK 1 TO 2 TS PO QD  
  
 * Notice: This list has 2 medication(s) that are the same as other medications prescribed for you. Read the directions carefully, and ask your doctor or other care provider to review them with you. Prescriptions Sent to Pharmacy Refills buPROPion SR (WELLBUTRIN SR) 150 mg SR tablet 0 Sig: Take 1 Tab by mouth two (2) times a day.   
 Class: Normal  
 Pharmacy: [facility] [address] [facility], 310 [facility] #: [phone] Route: Oral  
  
We Performed the Following ADMIN INFLUENZA VIRUS VAC [ [facility]] INFLUENZA VIRUS VAC QUAD,SPLIT,PRESV FREE SYRINGE IM [id number] CPT(R)] Follow-up Instructions Return in about 6 months (around 3/12/2019) for chronic disease routine care- 30 min, Yearly Medicare Wellness-  done today. Baylee Gearing To-Do List   
 10/12/2018 Imaging:  CT LOW DOSE LUNG CANCER SCREENING   
  
 10/12/2018 Imaging:  ANSHU MAMMO BI SCREENING INCL CAD Referral Information Referral ID Referred By Referred To  
  
 [id number] Babatunde KASPER Not Available Visits Status Start Date End Date 1 New Request 9/12/18 9/12/19 If your referral has a status of pending review or denied, additional information will be sent to support the outcome of this decision. Patient Instructions Preventive Services Limitations Recommendation Preventative Services Limitations Recommendation Glaucoma Screening (no USPSTF recommendation) Diabetes mellitus, family history, , age 48 or over,  American, age 72 or over Recommended List given Periodontal Disease- Dentistry Services *May not be covered under Medicare Recommended List given Skin Cancer Screening Exam every year *May not be covered under Medicare Self checks encouraged Hearing Impairment Pure Tone Test every 3 years *May not be covered under Medicare No hearing loss noted COPD and Lung Cancer Screening CT chest or chest xray yearly as deemed necessary *May not be covered under Medicare Diagnosed with COPD  
  
CT low dose with some small nodules 10/2017 Repeat in one year Counseling to Prevent Tobacco Use 
- Counseling greater than 3 and up to 10 minutes - Counseling greater than 10 minutes Patients must be asymptomatic of tobacco-related conditions to receive as preventive service 
  
Up to 8 sessions/year Working on cessation Alcohol Misuse Counseling 4 brief face to face counseling sessions/year History of alcoholism  
  
Sober for the second time for 6 years Obesity Screening and Counseling BMI of 30 or more. Weekly visits for first month, then biweekly for months 2-6, then every month for months 7-12 if you lose 6.6 lbs in months 1-6 Body mass index is 41.93 kg/(m^2). Working on weight loss Screening for STIs and Counseling Annually screenings- 2 face to face counseling sessions/year Declines Hep C negative 12/2017 Human Immunodeficiency Virus (HIV) Screening (annually for increased risk patients) HIV-1 and HIV-2 by EIA, MAO, rapid antibody test, or oral mucosa transudate Tests covered annually for patients at increased risk. Pregnant patients may receive up to 3 test during pregnancy. Declines Diabetes Screening Tests (at least every 3 years, Medicare covers annually or at 6-month intervals for prediabetic patients) 
  Fasting blood sugar (FBS) or glucose tolerance test (GTT) Diagnosed with one of the following: 
-Hypertension, Dyslipidemia, obesity, previous impaired FBS or GTT 
Or any two of the following: overweight, FH of diabetes, age ? 72, history of gestational diabetes, birth of baby weighing more than 9 pounds Glucose 94 3/2018 Cardiovascular Screening Blood Tests (every 5 years) Total cholesterol, HDL, Triglycerides Order as a panel if possible LDL 79 9/2017 Medical Nutrition Therapy (MNT) (for diabetes or renal disease not recommended schedule) Requires referral by treating physician for patient with diabetes or renal disease. Up to 3 hours for initial year and 2 hours in subsequent years. Not indicated Diabetes Self-Management Training (DSMT) (no USPSTF recommendation) Requires referral by treating physician for patient with diabetes or renal disease. 10 hours of initial DSMT session of no less than 30 minutes each in a continuous 12-month period. 2 hours of follow-up DSMT in subsequent years. Not indicated Behavioral Therapy for Cardiovascular Disease Annually LDL controlled, not diabetic, BP controlled Continue to stop smoking and on weight loss Ultrasound Screening for Abdominal Aortic Aneurysm (AAA) (once) Patient must be referred through IPPE. Criteria: 
- Men who are 73-68 years old and smoked >100 cigarettes. -Anyone with FH of AAA 
-Or recommended for screening by USPSTF Not indicated Prostate Cancer Screening (annually up to age 76) - Digital rectal exam (MICHAEL) - Prostate specific antigen (PSA) Annually (age 48 or over), MICHAEL not paid separately when covered E/M service is provided on same date N/A Colorectal Cancer Screening -Fecal occult blood test (annual) -Flexible sigmoidoscopy (5y) 
-Screening colonoscopy (10y) -Barium Enema Colonoscopy 9/2016 repeat in about 5 years Bone Mass Measurement 
(age 72 & older, biennial) Requires diagnosis related to osteoporosis or estrogen deficiency. History of long-term glucocorticoid tx or baseline is needed. DEXA osteopenia 10/2017 Take Calcium and Vit D Repeat testing in 2 years Screening Mammography (biennial age 54-69) Annually (age 36 or over) Mammo 10/2017 normal 
 
Ordered for yearly screening Screening Pap Tests and Pelvic Examination (up to age 79 and after 79 if unknown history or abnormal study last 10 years) Every 24 months except high risk Not indicated for PAP testing due to history hysterectomy Hepatitis B Vaccinations (if medium/high risk) Medium/high risk factors:  End-stage renal disease, Hemophiliacs who received Factor VIII or IX concentrates, Clients of institutions for the mentally retarded, Persons who live in the same house as a HepB virus carrier, Homosexual men, Illicit injectable drug abusers. Not indicated Seasonal Influenza Vaccination (annually)   Given today Pneumococcal Vaccination (once after 65)   PCV 13 and PPSV 23 given in the past 
 
 Boosters indicated at age 72 years Herpes Zoster (Shingles) Vaccination (once after age 61) [de-identified] to persons at age 48 years in specific conditions *May not be covered by Medicare She reports shingles vaccine at [employer] Will call to obtain records Tetanus Vaccine Td booster every 10 years- Tdap if exposed to children under 1 year) *May not be covered by Medicare Unsure of last booster No medical indication  
  
 
Optometry/Ophthalmology [employer] Дмитрийda. [facility] [place], [place] Phone: [phone] 418 [facility], [address] [place] [address] Дмитрийloretta Phone: [phone] [facility] [address], [place], [address] Phone: [phone] 347 [facility], [address] Phone: [phone] [employer] 1225 [facility] [address]  Phone: [phone] [employer] [facility] 1501 [facility], [address]  Phone: [phone] Dental 
 
411 [facility]- also has several [place] and [place] locations 5755 [facility] [address], [place] Phone: [phone] 80 [facility] [address] [place] [address] Phone: [phone] [facility]- also has [place], [place], and [place] locations 5633 [facility] Phone: [phone] [address] 27 Dentistry [address] [place], [place] Phone: [phone] [facility] [address] [place], [place] Phone: [phone] [address] Pediatric Dentistry- also has [place] office 818 [place], [place] Phone: [phone] 900 [facility] [address], [place], [place] Phone: [phone] [facility] [place] [address], [address] [phone] Alfonso Frye DDS 
9271 Clfif Johnson, [place], [address] [place] [address]  [phone] Marce BARNETT [facility], [address] [phone] Dr. Moi Mora, 9320 [facility] 
200 [facility], [address] [phone] Dr. Flowers Levels 1 [facility], [address] [phone] 508 [facility] DDS 
401 [facility], [address] [phone] Here are some dental clinics in the area that offer discounted care: 
 
[facility] 2145 [facility] HEALTHCARE Phone: [phone] [facility] 49 316 [facility] HEALTHCARE Phone: [phone] 305 [facility] 141 [facility]. [facility], [facility] HEALTHCARE Phone: [phone] [employer] 0401 [facility]ALTHCARE Phone: [phone] [facility] 2102 [facility] HEALTHCARE Phone: [phone] Introducing [facility]! Dear Kimber Morton: 
Thank you for requesting a [employer] account. Our records indicate that you already have an active [employer] account. You can access your account anytime at [email or web address] Did you know that you can access your hospital and ER discharge instructions at any time in IO.com? You can also review all of your test results from your hospital stay or ER visit. Additional Information If you have questions, please visit the Frequently Asked Questions section of the IO.com website at [email or web address] Remember, IO.com is NOT to be used for urgent needs. For medical emergencies, dial 911. Now available from your iPhone and Android! Please provide this summary of care documentation to your next provider. Your primary care clinician is listed as Gabi Diane. If you have any questions after today's visit, please call [phone].

## 2018-09-12 NOTE — PATIENT INSTRUCTIONS
Preventive Services Limitations Recommendation Preventative Services Limitations Recommendation   Glaucoma Screening (no USPSTF recommendation) Diabetes mellitus, family history, , age 48 or over,  American, age 72 or over Recommended    List given Periodontal Disease- Dentistry Services *May not be covered under Medicare Recommended    List given   Skin Cancer Screening Exam every year  *May not be covered under Medicare Self checks encouraged Hearing Impairment Pure Tone Test every 3 years  *May not be covered under Medicare No hearing loss noted   COPD and Lung Cancer Screening CT chest or chest xray yearly as deemed necessary  *May not be covered under Medicare Diagnosed with COPD      CT low dose with some small nodules 10/2017    Repeat in one year Counseling to Prevent Tobacco Use  - Counseling greater than 3 and up to 10 minutes  - Counseling greater than 10 minutes Patients must be asymptomatic of tobacco-related conditions to receive as preventive service     Up to 8 sessions/year Working on cessation   Alcohol Misuse Counseling 4 brief face to face counseling sessions/year History of alcoholism      Sober for the second time for 6 years Obesity Screening and Counseling BMI of 30 or more. Weekly visits for first month, then biweekly for months 2-6, then every month for months 7-12 if you lose 6.6 lbs in months 1-6 Body mass index is 41.93 kg/(m^2). Working on weight loss   Screening for STIs and Counseling Annually screenings- 2 face to face counseling sessions/year Declines    Hep C negative 12/2017 Human Immunodeficiency Virus (HIV) Screening (annually for increased risk patients)  HIV-1 and HIV-2 by EIA, MAO, rapid antibody test, or oral mucosa transudate Tests covered annually for patients at increased risk. Pregnant patients may receive up to 3 test during pregnancy.  Declines   Diabetes Screening Tests (at least every 3 years, Medicare covers annually or at 6-month intervals for prediabetic patients)     Fasting blood sugar (FBS) or glucose tolerance test (GTT) Diagnosed with one of the following:  -Hypertension, Dyslipidemia, obesity, previous impaired FBS or GTT  Or any two of the following: overweight, FH of diabetes, age ? 72, history of gestational diabetes, birth of baby weighing more than 9 pounds Glucose 94 3/2018 Cardiovascular Screening Blood Tests (every 5 years)  Total cholesterol, HDL, Triglycerides Order as a panel if possible LDL 79 9/2017   Medical Nutrition Therapy (MNT) (for diabetes or renal disease not recommended schedule) Requires referral by treating physician for patient with diabetes or renal disease. Up to 3 hours for initial year and 2 hours in subsequent years. Not indicated Diabetes Self-Management Training (DSMT) (no USPSTF recommendation) Requires referral by treating physician for patient with diabetes or renal disease. 10 hours of initial DSMT session of no less than 30 minutes each in a continuous 12-month period. 2 hours of follow-up DSMT in subsequent years. Not indicated   Behavioral Therapy for Cardiovascular Disease Annually LDL controlled, not diabetic, BP controlled    Continue to stop smoking and on weight loss Ultrasound Screening for Abdominal Aortic Aneurysm (AAA) (once) Patient must be referred through IPPE. Criteria:  - Men who are 73-68 years old and smoked >100 cigarettes.   -Anyone with FH of AAA  -Or recommended for screening by USPSTF Not indicated   Prostate Cancer Screening (annually up to age 76)  - Digital rectal exam (MICHAEL)  - Prostate specific antigen (PSA) Annually (age 48 or over), MICHALE not paid separately when covered E/M service is provided on same date N/A Colorectal Cancer Screening -Fecal occult blood test (annual)  -Flexible sigmoidoscopy (5y)  -Screening colonoscopy (10y)  -Barium Enema Colonoscopy 9/2016 repeat in about 5 years   Bone Mass Measurement  (age 72 & older, biennial) Requires diagnosis related to osteoporosis or estrogen deficiency. History of long-term glucocorticoid tx or baseline is needed. DEXA osteopenia 10/2017    Take Calcium and Vit D    Repeat testing in 2 years Screening Mammography (biennial age 54-69) Annually (age 36 or over) Mammo 10/2017 normal    Ordered for yearly screening   Screening Pap Tests and Pelvic Examination (up to age 79 and after 79 if unknown history or abnormal study last 10 years) Every 24 months except high risk Not indicated for PAP testing due to history hysterectomy Hepatitis B Vaccinations (if medium/high risk) Medium/high risk factors:  End-stage renal disease,  Hemophiliacs who received Factor VIII or IX concentrates, Clients of institutions for the mentally retarded, Persons who live in the same house as a HepB virus carrier, Homosexual men, Illicit injectable drug abusers.  Not indicated   Seasonal Influenza Vaccination (annually)   Given today Pneumococcal Vaccination (once after 65)   PCV 13 and PPSV 23 given in the past    Boosters indicated at age 70 years     Herpes Zoster (Shingles) Vaccination (once after age 61) [de-identified] to persons at age 48 years in specific conditions  *May not be covered by Medicare She reports shingles vaccine at [place]    Will call to obtain records Tetanus Vaccine Td booster every 10 years- Tdap if exposed to children under 1 year)  *May not be covered by Sylvester Garcia of last booster    No medical indication        Optometry/Ophthalmology    [facility]. [address], [address] [facility]  Phone: [phone] [address], [address] [place] [address]  Phone: [phone]    [address], [address]  Phone: [phone]    [facility]  800 [facility], [address]  Phone: [phone]    [place] [address] [facility], [address]  Phone: [phone] 2326 [facility]  [address] [facility], [address]  Phone: [phone]      ERICKA Berny Mims 1 also has several [place] and [place] locations  [address], [place], [place]  Phone: [phone]    80 First  Dent[facility]  2900 [facility], [address]  Phone: [phone]    [facility]- also has [place], [place], and [place] locations  1100 [place], [place]  Phone: [phone]    430 [facility]  1878 [address] [facility] [address], [place], [place]  Phone: [phone]    Jesse Shane, [facility]  [address] [place], [place]  Phone: [phone]    [facility]- also has [place] office  [address] [place], [place]  Phone: [phone]  &  1215 [place], [place]  Phone: [phone]    [facility] [place]  5201 [facility], [address]  [phone] [facility] DDS  [address], [address] [place]   [phone]    Melody SANON [address], [address] [place]   [phone]    Dr. Vane Mace, DDS  200 [facility], [address] [place]   [phone]    Dr. Celia Neville  1 [facility] [place], [address] [place]   [phone] [facility] DDS  401 [facility] Ab[place]han [place], [address] [place]   [phone]    Here are some dental clinics in the area that offer discounted care:    [facility]  2145 [facility] Addison Hernandez. [place], [place]  Phone: ([address]  [address] [place], [place]  Phone: [phone]    [address]  [place], [place]  Phone: [phone]    John [place], [place]  Phone: [phone]    [facility]  [address] [place] [address]  Phone: [phone]

## 2018-09-12 NOTE — PROGRESS NOTES
OFFICE NOTE    Zuly Landaverde is a 59 y.o. female presenting today for office visit. 9/12/2018  11:05 AM    Chief Complaint   Patient presents with    Follow Up Chronic Condition     pt here for follow up chronic conditions    Annual Wellness Visit     pt here for medicare wellness visit       HPI: Here today for follow up on chronic disease and Medicare Wellness (see other note). Last routine labs done 9/2017 and most recently for operative work up 3/2018. Follows with sleep medicine, weight loss center, and orthopedics. Remote history of fibrosarcoma addressed PRN. COPD/ AYE: Currently on Symbicort and PRN Albuterol with allergy medications. Follows with sleep specialist. No complaints related to. On Wellbutrin for smoking cessation- smoking about 1 PPD, which is reduced from 2 PPD. GERD: Taking Dexilant for GERD symptoms chronically. Reports in past that it keeps her symptoms controlled. Depression: Has been adjusted by weight specialist on Wellbutrin and taken off Abilify. Currently taking Celexa and higher dose of Wellbutrin. She reports that she has been doing pretty well on this management. Denies SI/HI. History of alcoholism. Also on Wellbutrin for smoking cessation- the increase in dosing has not improved her smoking habits. HNP/spine stenosis lumbar/ knee pain: Patient reported. Taking Lyrica- no complaints related to. Recent diagnosis of left knee meniscal tear- surgical repair completed 3/16/18. Following with orthopedics. States that she has been told she has arthritis in both knees.          Review of Systems   Constitutional: Negative for appetite change, chills, fatigue, fever and unexpected weight change. Respiratory: Negative for cough, shortness of breath and wheezing. Cardiovascular: Negative for chest pain, palpitations and leg swelling. Gastrointestinal: Negative for abdominal pain, constipation, diarrhea, nausea and vomiting.    Genitourinary: Negative for difficulty urinating and frequency. Musculoskeletal:        +chronic arthralgias; no pain reported today   Skin: Negative for rash. Neurological: Negative for dizziness and headaches. Psychiatric/Behavioral: Negative for dysphoric mood, self-injury, sleep disturbance and suicidal ideas. The patient is not nervous/anxious.           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    GERD (gastroesophageal reflux disease)     History of gastric bypass 2012    History of meniscal tear 2015, 2018    right in 2015, left in 2018    HNP (herniated nucleus pulposus), lumbar     patient reported    Lung nodules     monitor yearly- stable 10/2017    Osteopenia 10/03/2017    Recurrent depression ([address]     Sleep apnea     on cpap    Spinal stenosis, lumbar     patient reported       Past Surgical History:   Procedure Laterality Date    HX ARTHRODESIS  01/2000    Herniated Disc, arthritis right foot 06/06, 07/07, C 6/7, C 4/6 Stenosis    HX CHOLECYSTECTOMY  09/2008    gallbladder disease    HX COLONOSCOPY  05/2009    HX GASTRIC BYPASS  2012    GASTRIC BYPASS  [facility] Gastric Bypass      HX HYSTERECTOMY  06/1994    HX MENISCECTOMY  10/2015    right repari of torn meniscus    HX MENISCECTOMY Left 03/16/2018    partial meniscectomy due to tear    HX MYOMECTOMY  06/1984    benign tumor    HX ORTHOPAEDIC  1964    orthopaedic excision Fibrosarcoma and Lymphangiogram    HX PELVIC LAPAROSCOPY  04/1984    large uterine blockage    NEUROLOGICAL PROCEDURE UNLISTED  2000, 2007    Cervical fusion       Social History     Social History    Marital status:      Spouse name: N/A    Number of children: N/A    Years of education: N/A     Occupational History    Not on file. Social History Main Topics    Smoking status: Current Every Day Smoker     Packs/day: 1.00     Years: 45.00    Smokeless tobacco: Never Used    Alcohol use No    Drug use: No      Comment: marijuana, cocaine 30 yrs ago    Sexual activity: No     Other Topics Concern    Not on file     Social History Narrative       Family History   Problem Relation Age of Onset    Hypertension Mother     Depression Mother     Cancer Mother     Ovarian Cancer Mother     Breast Problems Mother     Cancer Father     Cancer Sister     Depression Sister     Depression Brother        Allergies   Allergen Reactions    Adhesive Tape-Silicones Swelling     REALLY BAD SWELLING AND SORE APPEAR    Alcohol Other (comments)     PT STATES SHE IS AN ALCOHOLIC    Lactose Other (comments)     PT STATES IT MAKES HER STOMACH HURT    Percodan [Oxycodone Hcl-Oxycodone-Asa] Itching and Other (comments)     crying    Nsaids (Non-Steroidal Anti-Inflammatory Drug) Other (comments)     PT NOT ABLE TO TAKE DUE TO GASTRIC BYPASS       Current Outpatient Prescriptions   Medication Sig Dispense Refill    pregabalin (LYRICA) 150 mg capsule Take 1 Cap by mouth two (2) times a day. Max Daily Amount: 300 mg. 180 Cap 1    citalopram (CELEXA) 20 mg tablet Take 1 Tab by mouth daily. 90 Tab 1    Dexlansoprazole (DEXILANT) 60 mg CpDB Take 1 Cap by mouth daily. 90 Cap 1    budesonide-formoterol (SYMBICORT) 160-4.5 mcg/actuation HFAA Take 2 Puffs by inhalation two (2) times a day. 6 Inhaler 1    calcium citrate 200 mg (950 mg) tablet Take  by mouth daily.  fluticasone (FLONASE) 50 mcg/actuation nasal spray 2 Sprays by Both Nostrils route daily. 3 Bottle 4    albuterol (PROVENTIL HFA, VENTOLIN HFA, PROAIR HFA) 90 mcg/actuation inhaler Take 2 Puffs by inhalation every four (4) hours as needed for Wheezing or Shortness of Breath.  3 Inhaler 4    b complex vitamins tablet Take 1 Tab by mouth daily.  calcium-cholecalciferol, d3, (CALCIUM 600 + D) 600-125 mg-unit tab Take 1,065 mg by mouth nightly. Indications: TAKES 2 AT BEDTIME      omega 3-dha-epa-fish oil (FISH OIL) 100-160-1,000 mg cap Take 1,065 mg by mouth daily.  ferrous sulfate ER (IRON) 160 mg (50 mg iron) TbER tablet Take 1 Tab by mouth daily. Indications: PT TAKES 40 MG      multivitamin (ONE A DAY) tablet Take 1 Tab by mouth daily.  buPROPion SR (WELLBUTRIN SR) 150 mg SR tablet TK 1 T PO BID  3    phentermine (ADIPEX-P) 37.5 mg tablet   1    topiramate (TOPAMAX) 25 mg tablet TK 1 TO 2 TS PO QD  1    diclofenac (VOLTAREN) 1 % gel Apply 2 g to affected area four (4) times daily. 100 g 2    LOMAIRA 8 mg tab TK 1 T PO  BID  5    furosemide (LASIX) 20 mg tablet Take 1 Tab by mouth daily as needed. 30 Tab 2    acetaminophen (TYLENOL ARTHRITIS PAIN) 650 mg TbER Take 1 Tab by mouth three (3) times daily as needed. 100 Tab 0         Advance Care Planning:   Patient was offered the opportunity to discuss advance care planning YES   Does patient have an Advance Directive:  YES   If no, did you provide information on Caring Connections? Patient Care Team:  Patient Care Team:  Toyin Cronin NP as PCP - General (Nurse Practitioner)  Chago Clinton DO (Orthopedic Surgery)  Fide Chance NP (Neurology)  Vicky Malik MD ([address] [place] [address])  RHONDA Pineda as Physician Assistant (Psychiatry)        LABS:  None new to review    RADIOLOGY:  None new to review        Physical Exam   Constitutional: She is oriented to person, place, and time. She appears well-developed and well-nourished. No distress. Neck: Normal range of motion. Neck supple. Cardiovascular: Normal rate, regular rhythm and normal heart sounds. No murmur heard. Pulmonary/Chest: Effort normal and breath sounds normal. No respiratory distress. Abdominal: Soft.  Bowel sounds are normal. There is no tenderness. Musculoskeletal: She exhibits no edema. Neurological: She is alert and oriented to person, place, and time. She exhibits normal muscle tone. Coordination and gait normal.   Skin: Skin is warm and dry. Psychiatric: Her speech is normal and behavior is normal. Her mood appears not anxious. She does not exhibit a depressed mood. Vitals:    09/12/18 1050   BP: 121/68   Pulse: 76   Resp: 20   Temp: 97.5 °F (36.4 °C)   TempSrc: Oral   SpO2: 98%   Weight: 252 lb (114.3 kg)   Height: 5' 5\" (1.651 m)   PainSc:   0 - No pain     Wt Readings from Last 3 Encounters:   09/12/18 252 lb (114.3 kg)   08/09/18 250 lb 3.2 oz (113.5 kg)   08/02/18 252 lb (114.3 kg)         Assessment and Plan    Chronic obstructive pulmonary disease, unspecified COPD type (HCC)/ Sleep apnea, unspecified type  *Continue on inhalers and continue with sleep specialist. Does not need refills today. Gastroesophageal reflux disease, esophagitis presence not specified  *Does not need refill on PPI today. Consider switching to H2 blocker due to long term adverse effects of PPI therapy. Recurrent depression (HCC)/ Alcoholism in remission ([facility]  *Refilled on Wellbutrin that she is currently taking since weight management is deferring. Has not been very effective at reducing smoking cessation further but has been managing in combination with SSRI for depression.  - buPROPion SR (WELLBUTRIN SR) 150 mg SR tablet; Take 1 Tab by mouth two (2) times a day. Dispense: 180 Tab; Refill: 0    HNP (herniated nucleus pulposus), lumbar/ Spinal stenosis, unspecified spinal region/ Chronic pain of both knees  *Continue on Lyrica. Refill not needed at this time. Following with orthopedics. *Plan of care reviewed with patient. Patient in agreement with plan and expresses understanding. All questions answered and patient encouraged to call or RTO if further questions or concerns.        Follow-up Disposition:  Return in about 6 months (around 3/12/2019) for chronic disease routine care- 30 min, Yearly Medicare Wellness-  done today. [place] Brennan

## 2018-09-14 PROBLEM — M25.562 CHRONIC PAIN OF BOTH KNEES: Status: ACTIVE | Noted: 2018-09-14

## 2018-09-14 PROBLEM — G89.29 CHRONIC PAIN OF BOTH KNEES: Status: ACTIVE | Noted: 2018-09-14

## 2018-09-14 PROBLEM — M25.561 CHRONIC PAIN OF BOTH KNEES: Status: ACTIVE | Noted: 2018-09-14

## 2018-09-14 NOTE — PROGRESS NOTES
9/12/2018  11:20 AM      Chief Complaint   Patient presents with    Follow Up Chronic Condition     pt here for follow up chronic conditions    Annual Wellness Visit     pt here for medicare wellness visit       This is a Subsequent Medicare Annual Wellness Visit providing Personalized Prevention Plan Services (PPPS) (Performed 12 months after initial AWV and PPPS )    I have reviewed the patient's medical history in detail and updated the computerized patient record.      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    GERD (gastroesophageal reflux disease)     History of gastric bypass 2012    History of meniscal tear 2015, 2018    right in 2015, left in 2018    HNP (herniated nucleus pulposus), lumbar     patient reported    Lung nodules     monitor yearly- stable 10/2017    Osteopenia 10/03/2017    Recurrent depression ([facility]     Sleep apnea     on cpap    Spinal stenosis, lumbar     patient reported      Past Surgical History:   Procedure Laterality Date    HX ARTHRODESIS  01/2000    Herniated Disc, arthritis right foot 06/06, 07/07, C 6/7, C 4/6 Stenosis    HX CHOLECYSTECTOMY  09/2008    gallbladder disease    HX COLONOSCOPY  05/2009    HX GASTRIC BYPASS  2012    GASTRIC BYPASS  [facility] Gastric Bypass      HX HYSTERECTOMY  06/1994    HX MENISCECTOMY  10/2015    right repari of torn meniscus    HX MENISCECTOMY Left 03/16/2018    partial meniscectomy due to tear    HX MYOMECTOMY  06/1984    benign tumor    HX ORTHOPAEDIC  1964    orthopaedic excision Fibrosarcoma and Lymphangiogram    HX PELVIC LAPAROSCOPY  04/1984    large uterine blockage    NEUROLOGICAL PROCEDURE UNLISTED  2000, 2007    Cervical fusion     Current Outpatient Prescriptions   Medication Sig Dispense Refill    buPROPion SR (WELLBUTRIN SR) 150 mg SR tablet Take 1 Tab by mouth two (2) times a day. 180 Tab 0    pregabalin (LYRICA) 150 mg capsule Take 1 Cap by mouth two (2) times a day. Max Daily Amount: 300 mg. 180 Cap 1    citalopram (CELEXA) 20 mg tablet Take 1 Tab by mouth daily. 90 Tab 1    Dexlansoprazole (DEXILANT) 60 mg CpDB Take 1 Cap by mouth daily. 90 Cap 1    budesonide-formoterol (SYMBICORT) 160-4.5 mcg/actuation HFAA Take 2 Puffs by inhalation two (2) times a day. 6 Inhaler 1    calcium citrate 200 mg (950 mg) tablet Take  by mouth daily.  fluticasone (FLONASE) 50 mcg/actuation nasal spray 2 Sprays by Both Nostrils route daily. 3 Bottle 4    albuterol (PROVENTIL HFA, VENTOLIN HFA, PROAIR HFA) 90 mcg/actuation inhaler Take 2 Puffs by inhalation every four (4) hours as needed for Wheezing or Shortness of Breath. 3 Inhaler 4    b complex vitamins tablet Take 1 Tab by mouth daily.  calcium-cholecalciferol, d3, (CALCIUM 600 + D) 600-125 mg-unit tab Take 1,065 mg by mouth nightly. Indications: TAKES 2 AT BEDTIME      omega 3-dha-epa-fish oil (FISH OIL) 100-160-1,000 mg cap Take 1,065 mg by mouth daily.  ferrous sulfate ER (IRON) 160 mg (50 mg iron) TbER tablet Take 1 Tab by mouth daily. Indications: PT TAKES 40 MG      multivitamin (ONE A DAY) tablet Take 1 Tab by mouth daily.  phentermine (ADIPEX-P) 37.5 mg tablet   1    topiramate (TOPAMAX) 25 mg tablet TK 1 TO 2 TS PO QD  1    diclofenac (VOLTAREN) 1 % gel Apply 2 g to affected area four (4) times daily. 100 g 2    LOMAIRA 8 mg tab TK 1 T PO  BID  5    furosemide (LASIX) 20 mg tablet Take 1 Tab by mouth daily as needed. 30 Tab 2    acetaminophen (TYLENOL ARTHRITIS PAIN) 650 mg TbER Take 1 Tab by mouth three (3) times daily as needed.  100 Tab 0     Allergies   Allergen Reactions    Adhesive Tape-Silicones Swelling     REALLY BAD SWELLING AND SORE APPEAR    Alcohol Other (comments)     PT STATES SHE IS AN ALCOHOLIC    Lactose Other (comments)     PT STATES IT MAKES HER STOMACH HURT    Percodan [Oxycodone Hcl-Oxycodone-Asa] Itching and Other (comments)     crying    Nsaids (Non-Steroidal Anti-Inflammatory Drug) Other (comments)     PT NOT ABLE TO TAKE DUE TO GASTRIC BYPASS     Family History   Problem Relation Age of Onset    Hypertension Mother     Depression Mother     Cancer Mother     Ovarian Cancer Mother     Breast Problems Mother     Cancer Father     Cancer Sister     Depression Sister     Depression Brother      Social History   Substance Use Topics    Smoking status: Current Every Day Smoker     Packs/day: 1.00     Years: 45.00    Smokeless tobacco: Never Used    Alcohol use No     Patient Active Problem List   Diagnosis Code    Chronic obstructive pulmonary disease (HCC) J44.9    Recurrent depression ([facility] F33.9    Fibrosarcoma ([facility] C49.9    Alcoholism in remission ([facility] F10.21    Sleep apnea G47.30    History of gastric bypass Z98.84    Obesity, morbid ([facility] E66.01    Osteopenia M85.80    GERD (gastroesophageal reflux disease) K21.9    Chronic pain of both knees M25.561, M25.562, G89.29       Depression Risk Factor Screening:     PHQ over the last two weeks 9/14/2017   Little interest or pleasure in doing things Not at all   Feeling down, depressed, irritable, or hopeless Not at all   Total Score PHQ 2 0       Alcohol Risk Factor Screening: You do not drink alcohol or very rarely. Functional Ability and Level of Safety:     Hearing Loss   Hearing is good. Activities of Daily Living   Self-care. Requires assistance with: no ADLs    Fall Risk     Fall Risk Assessment, last 12 mths 9/14/2017   Able to walk? Yes   Fall in past 12 months?  No       Abuse Screen   Patient is not abused  Denies financial, physical, or verbal abuse    Review of Systems   Constitutional: negative for fevers, chills and weight loss  Respiratory: negative for cough, sputum, wheezing or dyspnea on exertion  Cardiovascular: negative for chest pain, palpitations, syncope, lower extremity edema  Gastrointestinal: negative for nausea, vomiting and change in bowel habits  Neurological: negative for headaches, dizziness and weakness      Labwork/Imaging     No results found for: HBA1C, HGBE8, YND1VOWL, MWY1OHGZ    Lab Results   Component Value Date/Time    Glucose 94 03/06/2018 09:55 AM       Lab Results   Component Value Date/Time    Cholesterol, total 155 09/14/2017 09:09 AM    HDL Cholesterol 63 (H) 09/14/2017 09:09 AM    LDL, calculated 79 09/14/2017 09:09 AM    VLDL, calculated 13 09/14/2017 09:09 AM    Triglyceride 65 09/14/2017 09:09 AM    CHOL/HDL Ratio 2.5 09/14/2017 09:09 AM       Physical Examination     Evaluation of Cognitive Function:  Mood/affect:  neutral  Appearance: age appropriate and casually dressed  Family member/caregiver input: none present    Blood pressure 121/68, pulse 76, temperature 97.5 °F (36.4 °C), temperature source Oral, resp. rate 20, height 5' 5\" (1.651 m), weight 252 lb (114.3 kg), SpO2 98 %. Body mass index is 41.93 kg/(m^2). General appearance: alert, cooperative, no distress  Lungs: clear to auscultation bilaterally  Heart: regular rate and rhythm, S1, S2 normal  Abdomen: soft, non-tender.  Bowel sounds normal. No masses,  no organomegaly  Extremities: extremities normal, atraumatic, no cyanosis or edema      Patient Care Team:  Kadie Thompson NP as PCP - General (Nurse Practitioner)  Jose Snyder DO (Orthopedic Surgery)  Mi Simons NP (Neurology)  Suzan Graves MD (Bariatrics)  RHONDA Millard as Physician Assistant (Psychiatry)    Advice/Referrals/Counseling   Education and counseling provided:  Are appropriate based on today's review and evaluation  End-of-Life planning (with patient's consent)  Influenza Vaccine  Screening Mammography  Cardiovascular screening blood test  Bone mass measurement (DEXA)  Diabetes screening test    Assessment/Plan   Diagnoses and all orders for this visit:    Encounter for Medicare annual wellness exam  *Medicare wellness completed. Education and counseling provided, recommendations made- see Medicare chart in patient instructions and see below. Recurrent depression (HCC)/ Alcoholism in remission [facility])  *Doing well. Continue on medications. Obesity, morbid ([address]  *Continues to follow with weight loss center. Unsure exactly what patient is on other than Wellbutrin. Fibrosarcoma (HCC)/ Family history of ovarian cancer/ Family history of breast cancer  *Discussed with patient. She opts to do genetic screening- ordered at this time through [employer] Swab completed at this time and sent. Lung nodule, multiple  *Yearly monitoring ordered. -     CT LOW DOSE LUNG CANCER SCREENING; Future    Osteopenia, unspecified location  *Due for repeat DEXA soon. Continue on Calcium and Vit D. Cigarette nicotine dependence with nicotine-induced disorder  *Continue with Wellbutrin and cutting back. Encounter for screening mammogram for breast cancer  -     [facility] MAMMO BI SCREENING INCL CAD; Future    Encounter for immunization  *Given today in office. See LPN documentation.   -     ADMIN INFLUENZA VIRUS VAC  -     INFLUENZA VIRUS VACCINE QUADRIVALENT, PRESERVATIVE FREE SYRINGE (10044)    Need for shingles vaccine  *Called pharmacy and staff member does not show that it was administered. Advised patient to discuss vaccination status with pharmacist.     ACP (advance care planning)  *See ACP note. *Plan of care reviewed with patient. Patient in agreement with plan and expresses understanding. All questions answered and patient encouraged to call or RTO if further questions or concerns. Follow-up Disposition:  Return in about 6 months (around 3/12/2019) for chronic disease routine care- 30 min, Yearly Medicare Wellness-  done today. ..

## 2018-09-14 NOTE — ACP (ADVANCE CARE PLANNING)
Advance Care Planning (ACP) Provider Note - Comprehensive     Date of ACP Conversation: 09/12/18  Persons included in Conversation:  patient  Length of ACP Conversation in minutes:  <16 minutes (Non-Billable)    Authorized Decision Maker (if patient is incapable of making informed decisions): This person is:  Healthcare Agent/Medical Power of  under Advance Directive          General ACP for ALL Patients with Decision Making Capacity:   Importance of advance care planning, including choosing a healthcare agent to communicate patient's healthcare decisions if patient lost the ability to make decisions, such as after a sudden illness or accident    Review of Existing Advance Directive:  Does this advance directive still reflect your preferences? Yes (Provide new form/Refer for assistance in updating)    For Serious or Chronic Illness:  Understanding of medical condition    Understanding of CPR, goals and expected outcomes, benefits and burdens discussed.     Interventions Provided:  Reviewed existing Advance Directive

## 2018-09-20 ENCOUNTER — NURSE NAVIGATOR (OUTPATIENT)
Dept: OTHER | Age: 58
End: 2018-09-20

## 2018-09-20 NOTE — NURSE NAVIGATOR
Referring Provider: Randy Grady NP      Lung Cancer Risk Profile:   Age: 59  Gender: Female  Height: 65\"  Weight: 252#    Smoking History:  Smoking Status: current use  # years smokin  # years quit: 0  Packs/day: was 2 now 1  Pack years: 80    Patient discussed smoking cessation with PCP: Yes, per patient report    Patient participated in shared decision making process with PCP: Unknown    Patient is currently experiencing symptoms: No, per patient report    If yes what symptoms:     Co-Morbidities:  COPD    Cancer History:  Fibrosarcoma    Additional Risk Factors:     Exposure to second hand smoke      Patient's smoking history discussed via phone. Patient meets LDCT lung cancer screening criteria.  Call transferred to central scheduling to schedule exam.      MIGUEL CastilloN, RN, [phone] [facility] Nurse Navigator

## 2018-10-05 ENCOUNTER — HOSPITAL ENCOUNTER (OUTPATIENT)
Dept: CT IMAGING | Age: 58
Discharge: HOME | End: 2018-10-05
Attending: NURSE PRACTITIONER
Payer: MEDICARE

## 2018-10-05 ENCOUNTER — HOSPITAL ENCOUNTER (OUTPATIENT)
Dept: MAMMOGRAPHY | Age: 58
Discharge: HOME | End: 2018-10-05
Attending: NURSE PRACTITIONER
Payer: MEDICARE

## 2018-10-05 DIAGNOSIS — R91.8 LUNG NODULE, MULTIPLE: ICD-10-CM

## 2018-10-05 DIAGNOSIS — Z12.31 ENCOUNTER FOR SCREENING MAMMOGRAM FOR BREAST CANCER: ICD-10-CM

## 2018-10-05 PROCEDURE — G0297 LDCT FOR LUNG CA SCREEN: HCPCS

## 2018-10-05 PROCEDURE — 77067 SCR MAMMO BI INCL CAD: CPT

## 2018-10-24 LAB — HBA1C MFR BLD HPLC: 5.4 %

## 2018-10-29 DIAGNOSIS — F33.9 RECURRENT DEPRESSION (HCC): ICD-10-CM

## 2018-11-01 ENCOUNTER — TELEPHONE (OUTPATIENT)
Dept: FAMILY MEDICINE CLINIC | Age: 58
End: 2018-11-01

## 2018-11-01 RX ORDER — ARIPIPRAZOLE 5 MG/1
TABLET ORAL
Qty: 90 TAB | Refills: 0 | OUTPATIENT
Start: 2018-11-01

## 2018-11-01 NOTE — TELEPHONE ENCOUNTER
11/1/2018  11:02 AM    Chief Complaint   Patient presents with    Medication Refill       Noted surescripts refill request for Abilify. According to past documentation, she was discontinued off this medication by another clinician. Refill declined.

## 2018-11-03 NOTE — TELEPHONE ENCOUNTER
11/3/2018  12:08 PM    Chief Complaint   Patient presents with    Medication Refill       Noted refill request for Abilify. It is documented during one of my past visits with the patient that this was stopped by another clinician. [employer] message sent to patient for clarification.

## 2018-12-21 ENCOUNTER — DOCUMENTATION ONLY (OUTPATIENT)
Dept: FAMILY MEDICINE CLINIC | Age: 58
End: 2018-12-21

## 2018-12-27 DIAGNOSIS — F33.9 RECURRENT DEPRESSION (HCC): ICD-10-CM

## 2018-12-28 RX ORDER — CITALOPRAM 20 MG/1
TABLET, FILM COATED ORAL
Qty: 90 TAB | Refills: 0 | Status: SHIPPED | OUTPATIENT
Start: 2018-12-28 | End: 2019-03-27 | Stop reason: SDUPTHER

## 2018-12-28 NOTE — TELEPHONE ENCOUNTER
12/28/2018  1:02 PM    Chief Complaint   Patient presents with    Medication Refill       Noted refill request for Celexa. Last office visit 9/2018 and upcoming 3/2019.  Refill completed

## 2019-01-01 NOTE — ACP (ADVANCE CARE PLANNING)
Non-Provider Advance Care Planning (ACP) Note    Date of ACP Conversation: 4/17/2018  Persons included in Conversation: patient  Length of ACP Conversation in minutes: <16 minutes (Non-Billable)    Conversation requested by:   Nurse Navigator         General ACP for ALL Patients with Decision Making Capacity:      Review of Existing Advance Directive: (Select questions covered)  Does this advance directive still reflect your preferences?   Yes Statement Selected

## 2019-01-12 DIAGNOSIS — M51.26 HNP (HERNIATED NUCLEUS PULPOSUS), LUMBAR: ICD-10-CM

## 2019-01-12 DIAGNOSIS — M48.00 SPINAL STENOSIS, UNSPECIFIED SPINAL REGION: ICD-10-CM

## 2019-01-16 RX ORDER — PREGABALIN 150 MG/1
CAPSULE ORAL
Qty: 180 CAP | Refills: 0 | Status: SHIPPED | OUTPATIENT
Start: 2019-01-16 | End: 2019-03-27 | Stop reason: SDUPTHER

## 2019-01-16 NOTE — TELEPHONE ENCOUNTER
Requested Prescriptions     Pending Prescriptions Disp Refills    LYRICA 150 mg capsule [Pharmacy Med Name: LYRICA 150MG CAPSULES] 180 Cap 0     Sig: TAKE ONE CAPSULE BY MOUTH TWICE DAILY     Last Refill: 10/11/2018

## 2019-01-16 NOTE — TELEPHONE ENCOUNTER
1/16/2019  12:13 PM    Chief Complaint   Patient presents with    Medication Refill       Noted refill request for Lyrica. Last seen in office 9/2018 and upcoming appointment 3/2019. Refill completed and will be faxed to [facility] [address] for fill. Patient may need to  Rx.

## 2019-02-05 DIAGNOSIS — J30.9 ALLERGIC RHINITIS: ICD-10-CM

## 2019-02-09 RX ORDER — FLUTICASONE PROPIONATE 50 MCG
SPRAY, SUSPENSION (ML) NASAL
Qty: 3 BOTTLE | Refills: 3 | Status: SHIPPED | OUTPATIENT
Start: 2019-02-09 | End: 2020-02-06

## 2019-02-19 DIAGNOSIS — F17.219 CIGARETTE NICOTINE DEPENDENCE WITH NICOTINE-INDUCED DISORDER: ICD-10-CM

## 2019-02-19 DIAGNOSIS — F10.21 ALCOHOLISM IN REMISSION (HCC): ICD-10-CM

## 2019-02-19 DIAGNOSIS — F33.9 RECURRENT DEPRESSION (HCC): ICD-10-CM

## 2019-02-21 RX ORDER — BUPROPION HYDROCHLORIDE 150 MG/1
TABLET, EXTENDED RELEASE ORAL
Qty: 180 TAB | Refills: 3 | Status: ON HOLD | OUTPATIENT
Start: 2019-02-21 | End: 2020-02-18 | Stop reason: SDUPTHER

## 2019-02-21 NOTE — TELEPHONE ENCOUNTER
2/21/2019  5:28 PM    Chief Complaint   Patient presents with    Medication Refill       Noted refill request for Wellbutrin. Last seen 9/2018 and upcoming 3/2019. Refill sent.

## 2019-03-01 ENCOUNTER — DOCUMENTATION ONLY (OUTPATIENT)
Dept: FAMILY MEDICINE CLINIC | Age: 59
End: 2019-03-01

## 2019-03-01 DIAGNOSIS — K21.9 GASTROESOPHAGEAL REFLUX DISEASE, ESOPHAGITIS PRESENCE NOT SPECIFIED: ICD-10-CM

## 2019-03-05 RX ORDER — DEXLANSOPRAZOLE 60 MG/1
CAPSULE, DELAYED RELEASE ORAL
Qty: 90 CAP | Refills: 3 | Status: SHIPPED | OUTPATIENT
Start: 2019-03-05 | End: 2020-03-20 | Stop reason: SDUPTHER

## 2019-03-05 NOTE — TELEPHONE ENCOUNTER
3/5/2019  2:40 PM    Chief Complaint   Patient presents with    Medication Refill       Noted refill request for Dexilant. Upcoming appointment next week. Refill completed.

## 2019-03-23 DIAGNOSIS — F33.9 RECURRENT DEPRESSION (HCC): ICD-10-CM

## 2019-03-27 ENCOUNTER — HOSPITAL ENCOUNTER (OUTPATIENT)
Dept: LAB | Age: 59
Discharge: HOME | End: 2019-03-27
Payer: MEDICARE

## 2019-03-27 ENCOUNTER — OFFICE VISIT (OUTPATIENT)
Dept: FAMILY MEDICINE CLINIC | Age: 59
End: 2019-03-27

## 2019-03-27 VITALS
BODY MASS INDEX: 45.32 KG/M2 | RESPIRATION RATE: 20 BRPM | HEIGHT: 65 IN | TEMPERATURE: 97.5 F | OXYGEN SATURATION: 96 % | SYSTOLIC BLOOD PRESSURE: 141 MMHG | WEIGHT: 272 LBS | HEART RATE: 80 BPM | DIASTOLIC BLOOD PRESSURE: 82 MMHG

## 2019-03-27 DIAGNOSIS — F33.9 RECURRENT DEPRESSION (HCC): ICD-10-CM

## 2019-03-27 DIAGNOSIS — Z13.6 ENCOUNTER FOR LIPID SCREENING FOR CARDIOVASCULAR DISEASE: ICD-10-CM

## 2019-03-27 DIAGNOSIS — M25.561 CHRONIC PAIN OF BOTH KNEES: ICD-10-CM

## 2019-03-27 DIAGNOSIS — Z13.0 SCREENING FOR ENDOCRINE, METABOLIC AND IMMUNITY DISORDER: ICD-10-CM

## 2019-03-27 DIAGNOSIS — M25.562 CHRONIC PAIN OF BOTH KNEES: ICD-10-CM

## 2019-03-27 DIAGNOSIS — E66.01 OBESITY, MORBID (HCC): ICD-10-CM

## 2019-03-27 DIAGNOSIS — F10.21 ALCOHOLISM IN REMISSION (HCC): ICD-10-CM

## 2019-03-27 DIAGNOSIS — J44.9 CHRONIC OBSTRUCTIVE PULMONARY DISEASE, UNSPECIFIED COPD TYPE (HCC): Primary | ICD-10-CM

## 2019-03-27 DIAGNOSIS — Z98.84 HISTORY OF GASTRIC BYPASS: ICD-10-CM

## 2019-03-27 DIAGNOSIS — M51.26 HNP (HERNIATED NUCLEUS PULPOSUS), LUMBAR: ICD-10-CM

## 2019-03-27 DIAGNOSIS — K21.9 GASTROESOPHAGEAL REFLUX DISEASE, ESOPHAGITIS PRESENCE NOT SPECIFIED: ICD-10-CM

## 2019-03-27 DIAGNOSIS — Z13.220 ENCOUNTER FOR LIPID SCREENING FOR CARDIOVASCULAR DISEASE: ICD-10-CM

## 2019-03-27 DIAGNOSIS — Z13.228 SCREENING FOR ENDOCRINE, METABOLIC AND IMMUNITY DISORDER: ICD-10-CM

## 2019-03-27 DIAGNOSIS — G47.30 SLEEP APNEA, UNSPECIFIED TYPE: ICD-10-CM

## 2019-03-27 DIAGNOSIS — J06.9 UPPER RESPIRATORY TRACT INFECTION, UNSPECIFIED TYPE: ICD-10-CM

## 2019-03-27 DIAGNOSIS — G89.29 CHRONIC PAIN OF BOTH KNEES: ICD-10-CM

## 2019-03-27 DIAGNOSIS — M48.00 SPINAL STENOSIS, UNSPECIFIED SPINAL REGION: ICD-10-CM

## 2019-03-27 DIAGNOSIS — Z13.29 SCREENING FOR ENDOCRINE, METABOLIC AND IMMUNITY DISORDER: ICD-10-CM

## 2019-03-27 DIAGNOSIS — C49.9 FIBROSARCOMA (HCC): ICD-10-CM

## 2019-03-27 DIAGNOSIS — Z01.89 ENCOUNTER FOR LABORATORY EXAMINATION: ICD-10-CM

## 2019-03-27 LAB
ALBUMIN SERPL-MCNC: 3.4 G/DL (ref 3.4–5)
ALBUMIN/GLOB SERPL: 1.2 {RATIO} (ref 0.8–1.7)
ALP SERPL-CCNC: 94 U/L (ref 45–117)
ALT SERPL-CCNC: 33 U/L (ref 13–56)
ANION GAP SERPL CALC-SCNC: 10 MMOL/L (ref 3–18)
APPEARANCE UR: NORMAL
AST SERPL-CCNC: 20 U/L (ref 15–37)
BACTERIA URNS QL MICRO: NEGATIVE /HPF
BILIRUB SERPL-MCNC: 0.2 MG/DL (ref 0.2–1)
BILIRUB UR QL: NEGATIVE
BUN SERPL-MCNC: 10 MG/DL (ref 7–18)
BUN/CREAT SERPL: 12 (ref 12–20)
CALCIUM SERPL-MCNC: 8.1 MG/DL (ref 8.5–10.1)
CHLORIDE SERPL-SCNC: 110 MMOL/L (ref 100–108)
CHOLEST SERPL-MCNC: 135 MG/DL
CO2 SERPL-SCNC: 23 MMOL/L (ref 21–32)
COLOR UR: YELLOW
CREAT SERPL-MCNC: 0.82 MG/DL (ref 0.6–1.3)
EPITH CASTS URNS QL MICRO: NORMAL /LPF (ref 0–5)
ERYTHROCYTE [DISTWIDTH] IN BLOOD BY AUTOMATED COUNT: 14.3 % (ref 11.6–14.5)
GLOBULIN SER CALC-MCNC: 2.9 G/DL (ref 2–4)
GLUCOSE SERPL-MCNC: 94 MG/DL (ref 74–99)
GLUCOSE UR STRIP.AUTO-MCNC: NEGATIVE MG/DL
HCT VFR BLD AUTO: 43.8 % (ref 35–45)
HDLC SERPL-MCNC: 61 MG/DL (ref 40–60)
HDLC SERPL: 2.2 {RATIO} (ref 0–5)
HGB BLD-MCNC: 14.2 G/DL (ref 12–16)
HGB UR QL STRIP: NEGATIVE
KETONES UR QL STRIP.AUTO: NEGATIVE MG/DL
LDLC SERPL CALC-MCNC: 60.8 MG/DL (ref 0–100)
LEUKOCYTE ESTERASE UR QL STRIP.AUTO: NEGATIVE
LIPID PROFILE,FLP: ABNORMAL
MCH RBC QN AUTO: 29.7 PG (ref 24–34)
MCHC RBC AUTO-ENTMCNC: 32.4 G/DL (ref 31–37)
MCV RBC AUTO: 91.6 FL (ref 74–97)
NITRITE UR QL STRIP.AUTO: NEGATIVE
PH UR STRIP: 7 [PH] (ref 5–8)
PLATELET # BLD AUTO: 374 K/UL (ref 135–420)
PMV BLD AUTO: 10.7 FL (ref 9.2–11.8)
POTASSIUM SERPL-SCNC: 4 MMOL/L (ref 3.5–5.5)
PROT SERPL-MCNC: 6.3 G/DL (ref 6.4–8.2)
PROT UR STRIP-MCNC: NEGATIVE MG/DL
RBC # BLD AUTO: 4.78 M/UL (ref 4.2–5.3)
RBC #/AREA URNS HPF: 0 /HPF (ref 0–5)
SODIUM SERPL-SCNC: 143 MMOL/L (ref 136–145)
SP GR UR REFRACTOMETRY: 1.02 (ref 1–1.03)
TRIGL SERPL-MCNC: 66 MG/DL (ref ?–150)
TSH SERPL DL<=0.05 MIU/L-ACNC: 1.6 UIU/ML (ref 0.36–3.74)
UROBILINOGEN UR QL STRIP.AUTO: 0.2 EU/DL (ref 0.2–1)
VLDLC SERPL CALC-MCNC: 13.2 MG/DL
WBC # BLD AUTO: 7.8 K/UL (ref 4.6–13.2)
WBC URNS QL MICRO: NORMAL /HPF (ref 0–4)

## 2019-03-27 PROCEDURE — 85027 COMPLETE CBC AUTOMATED: CPT

## 2019-03-27 PROCEDURE — 84443 ASSAY THYROID STIM HORMONE: CPT

## 2019-03-27 PROCEDURE — 80061 LIPID PANEL: CPT

## 2019-03-27 PROCEDURE — 80053 COMPREHEN METABOLIC PANEL: CPT

## 2019-03-27 PROCEDURE — 81001 URINALYSIS AUTO W/SCOPE: CPT

## 2019-03-27 RX ORDER — DICLOFENAC SODIUM 10 MG/G
2 GEL TOPICAL
Qty: 100 G | Refills: 11 | Status: SHIPPED | OUTPATIENT
Start: 2019-03-27 | End: 2021-06-22 | Stop reason: SDUPTHER

## 2019-03-27 RX ORDER — ALBUTEROL SULFATE 90 UG/1
2 AEROSOL, METERED RESPIRATORY (INHALATION)
Qty: 3 INHALER | Refills: 4 | Status: SHIPPED | OUTPATIENT
Start: 2019-03-27 | End: 2020-07-20 | Stop reason: SDUPTHER

## 2019-03-27 RX ORDER — PREGABALIN 150 MG/1
150 CAPSULE ORAL 2 TIMES DAILY
Qty: 180 CAP | Refills: 1 | Status: SHIPPED | OUTPATIENT
Start: 2019-03-27 | End: 2019-09-27 | Stop reason: SDUPTHER

## 2019-03-27 RX ORDER — CITALOPRAM 20 MG/1
20 TABLET, FILM COATED ORAL DAILY
Qty: 90 TAB | Refills: 3 | Status: SHIPPED | OUTPATIENT
Start: 2019-03-27 | End: 2020-02-16

## 2019-03-27 RX ORDER — CITALOPRAM 20 MG/1
TABLET, FILM COATED ORAL
Qty: 90 TAB | Refills: 0 | OUTPATIENT
Start: 2019-03-27

## 2019-03-27 RX ORDER — BUDESONIDE AND FORMOTEROL FUMARATE DIHYDRATE 160; 4.5 UG/1; UG/1
2 AEROSOL RESPIRATORY (INHALATION) 2 TIMES DAILY
Qty: 6 INHALER | Refills: 3 | Status: SHIPPED | OUTPATIENT
Start: 2019-03-27 | End: 2019-07-02 | Stop reason: SDUPTHER

## 2019-03-27 NOTE — PROGRESS NOTES
Pt in for visit. Venipuncture done in left AC. Blood specimen obtained. Pt tolerated well. No comps. See blood collection.

## 2019-03-27 NOTE — PROGRESS NOTES
OFFICE NOTE    Wing Lopez is a 59 y.o. female presenting today for office visit. 3/27/2019  7:53 AM    Chief Complaint   Patient presents with    Arthritis    Depression    Cold Symptoms     pt c/o sore throat and cough       HPI: Here today for follow up on chronic disease. Last routine labs done 9/2017 and then for operative work up 3/2018. Follows with sleep medicine, weight loss center, and orthopedics. Remote history of fibrosarcoma addressed PRN. COPD/ AYE: Currently on Symbicort and PRN Albuterol with allergy medications. Follows with sleep specialist. No complaints related to. On Wellbutrin for smoking cessation- has had reduction in smoking but has not stopped completed. GERD: Taking Dexilant for GERD symptoms chronically. Reports in past that it keeps her symptoms controlled. Depression: Currently on Celexa and Wellbutrin for management- had been taking off Abilify by past weight specialist. She reports that she has been doing pretty well on this management. Denies SI/HI. History of alcoholism. Also on Wellbutrin for smoking cessation. HNP/spine stenosis lumbar/ knee pain: Patient taking Lyrica- no complaints related to other than would like to have increase in pain relief if possible. Following with orthopedics. States that she has been told she has arthritis in both knees. Noted to have lumbar chronic issues. Complains of cold symptoms that started about three days ago. The worst symptom is a dry cough that irritates her throat, and generalized body aches. She denies fevers, nasal drainage, ear pain. She has been around sick contacts. Review of Systems   Constitutional: Negative for appetite change, chills, fatigue, fever and unexpected weight change. HENT: Positive for sore throat. Negative for congestion, ear pain, facial swelling, postnasal drip, rhinorrhea, sinus pressure, sinus pain and sneezing.     Eyes: Negative for pain, discharge, itching and visual disturbance. Respiratory: Positive for cough. Negative for shortness of breath and wheezing. Cardiovascular: Negative for chest pain, palpitations and leg swelling. Gastrointestinal: Negative for abdominal pain, constipation, diarrhea, nausea and vomiting. Genitourinary: Negative for difficulty urinating and frequency. Musculoskeletal: Positive for arthralgias (chronic) and myalgias. Skin: Negative for rash. Allergic/Immunologic: Negative for environmental allergies. Neurological: Negative for dizziness and headaches. Psychiatric/Behavioral: Negative for dysphoric mood, self-injury, sleep disturbance and suicidal ideas. The patient is not nervous/anxious.           PHQ Screening   3 most recent PHQ Screens 3/27/2019   Little interest or pleasure in doing things Not at all   Feeling down, depressed, irritable, or hopeless Several days   Total Score PHQ 2 1         History  Past Medical History:   Diagnosis Date    Alcoholism in remission ([address]     Chronic obstructive pulmonary disease ([address]     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    GERD (gastroesophageal reflux disease)     History of gastric bypass 2012    History of meniscal tear 2015, 2018    right in 2015, left in 2018    HNP (herniated nucleus pulposus), lumbar     patient reported    Lung nodules     resolved 2018 CT    Osteopenia 10/03/2017    Recurrent depression ([address]     Sleep apnea     on cpap    Spinal stenosis, lumbar     patient reported       Past Surgical History:   Procedure Laterality Date    HX ARTHRODESIS  01/2000    Herniated Disc, arthritis right foot 06/06, 07/07, C 6/7, C 4/6 Stenosis    HX CHOLECYSTECTOMY  09/2008    gallbladder disease    HX COLONOSCOPY  05/2009    HX GASTRIC BYPASS  2012    GASTRIC BYPASS  [facility] Gastric Bypass      HX HYSTERECTOMY  06/1994    HX MENISCECTOMY  10/2015    right repari of torn meniscus    HX MENISCECTOMY Left 03/16/2018    partial meniscectomy due to tear    HX MYOMECTOMY  06/1984    benign tumor    HX ORTHOPAEDIC  1964    orthopaedic excision Fibrosarcoma and Lymphangiogram    HX PELVIC LAPAROSCOPY  04/1984    large uterine blockage    NEUROLOGICAL PROCEDURE UNLISTED  2000, 2007    Cervical fusion       Social History     Socioeconomic History    Marital status:      Spouse name: Not on file    Number of children: Not on file    Years of education: Not on file    Highest education level: Not on file   Occupational History    Not on file   Social Needs    Financial resource strain: Not on file    Food insecurity:     Worry: Not on file     Inability: Not on file    Transportation needs:     Medical: Not on file     Non-medical: Not on file   Tobacco Use    Smoking status: Current Every Day Smoker     Packs/day: 1.00     Years: 45.00     Pack years: 45.00    Smokeless tobacco: Never Used   Substance and Sexual Activity    Alcohol use: No    Drug use: No     Comment: marijuana, cocaine 30 yrs ago    Sexual activity: Never   Lifestyle    Physical activity:     Days per week: Not on file     Minutes per session: Not on file    Stress: Not on file   Relationships    Social connections:     Talks on phone: Not on file     Gets together: Not on file     Attends Voodoo service: Not on file     Active member of club or organization: Not on file     Attends meetings of clubs or organizations: Not on file     Relationship status: Not on file    Intimate partner violence:     Fear of current or ex partner: Not on file     Emotionally abused: Not on file     Physically abused: Not on file     Forced sexual activity: Not on file   Other Topics Concern    Not on file   Social History Narrative    Not on file       Family History   Problem Relation Age of Onset    Hypertension Mother     Depression Mother     Cancer Mother     Ovarian Cancer Mother     Breast Problems Mother     Cancer Father     Cancer Sister     Depression Sister    [facility] Depression Brother        Allergies   Allergen Reactions    Adhesive Tape-Silicones Swelling     REALLY BAD SWELLING AND SORE APPEAR    Alcohol Other (comments)     PT STATES SHE IS AN ALCOHOLIC    Lactose Other (comments)     PT STATES IT MAKES HER STOMACH HURT    Percodan [Oxycodone Hcl-Oxycodone-Asa] Itching and Other (comments)     crying    Nsaids (Non-Steroidal Anti-Inflammatory Drug) Other (comments)     PT NOT ABLE TO TAKE DUE TO GASTRIC BYPASS       Current Outpatient Medications   Medication Sig Dispense Refill    DEXILANT 60 mg CpDB capsule (delayed release) TAKE 1 CAPSULE BY MOUTH DAILY 90 Cap 3    buPROPion SR (WELLBUTRIN SR) 150 mg SR tablet TAKE 1 TABLET BY MOUTH TWICE DAILY 180 Tab 3    fluticasone (FLONASE) 50 mcg/actuation nasal spray SHAKE LIQUID AND USE 2 SPRAYS IN EACH NOSTRIL DAILY 3 Bottle 3    LYRICA 150 mg capsule TAKE ONE CAPSULE BY MOUTH TWICE DAILY 180 Cap 0    citalopram (CELEXA) 20 mg tablet TAKE 1 TABLET BY MOUTH EVERY DAY 90 Tab 0    budesonide-formoterol (SYMBICORT) 160-4.5 mcg/actuation HFAA Take 2 Puffs by inhalation two (2) times a day. 6 Inhaler 1    calcium citrate 200 mg (950 mg) tablet Take  by mouth daily.  LOMAIRA 8 mg tab TK 1 T PO  BID  5    albuterol (PROVENTIL HFA, VENTOLIN HFA, PROAIR HFA) 90 mcg/actuation inhaler Take 2 Puffs by inhalation every four (4) hours as needed for Wheezing or Shortness of Breath. 3 Inhaler 4    furosemide (LASIX) 20 mg tablet Take 1 Tab by mouth daily as needed. 30 Tab 2    acetaminophen (TYLENOL ARTHRITIS PAIN) 650 mg TbER Take 1 Tab by mouth three (3) times daily as needed. 100 Tab 0    b complex vitamins tablet Take 1 Tab by mouth daily.  calcium-cholecalciferol, d3, (CALCIUM 600 + D) 600-125 mg-unit tab Take 1,065 mg by mouth nightly. Indications: TAKES 2 AT BEDTIME      omega 3-dha-epa-fish oil (FISH OIL) 100-160-1,000 mg cap Take 1,065 mg by mouth daily.       ferrous sulfate ER (IRON) 160 mg (50 mg iron) TbER tablet Take 1 Tab by mouth daily. Indications: PT TAKES 40 MG      multivitamin (ONE A DAY) tablet Take 1 Tab by mouth daily.  diclofenac (VOLTAREN) 1 % gel Apply 2 g to affected area four (4) times daily. 100 g 2           Advance Care Planning:   Patient was offered the opportunity to discuss advance care planning NO   Does patient have an Advance Directive: If no, did you provide information on [facility]? Patient Care Team:  Patient Care Team:  Jia Le NP as PCP - General (Nurse Practitioner)  Bryanna Donnelly DO (Orthopedic Surgery)  Beverly Santos NP (Neurology)  Noris Ferro MD ([address] [place] [address])  [place], [place] as Physician Assistant (Psychiatry)        LABS:  None new to review    RADIOLOGY:  None new to review      Physical Exam   Constitutional: She is oriented to person, place, and time. She appears well-developed and well-nourished. No distress. HENT:   Right Ear: Tympanic membrane, external ear and ear canal normal.   Left Ear: Tympanic membrane, external ear and ear canal normal.   Nose: Nose normal. No mucosal edema or rhinorrhea. Right sinus exhibits no maxillary sinus tenderness and no frontal sinus tenderness. Left sinus exhibits no maxillary sinus tenderness and no frontal sinus tenderness. Mouth/Throat: Uvula is midline, oropharynx is clear and moist and mucous membranes are normal. No oropharyngeal exudate or posterior oropharyngeal erythema. Eyes: Pupils are equal, round, and reactive to light. EOM are normal. Right eye exhibits no discharge. Left eye exhibits no discharge. Neck: Normal range of motion. Neck supple. Cardiovascular: Normal rate, regular rhythm and normal heart sounds. No murmur heard. Pulmonary/Chest: Effort normal and breath sounds normal. No respiratory distress. Abdominal: Soft. Bowel sounds are normal. There is no tenderness. Musculoskeletal: She exhibits no edema.         Right knee: Tenderness found. Left knee: Tenderness found. +slow gait   Lymphadenopathy:     She has no cervical adenopathy. Neurological: She is alert and oriented to person, place, and time. She exhibits normal muscle tone. Coordination and gait normal.   Skin: Skin is warm and dry. No rash noted. Psychiatric: Her speech is normal and behavior is normal. Her mood appears not anxious. She does not exhibit a depressed mood. Vitals:    03/27/19 0741 03/27/19 0748   BP: 142/83 141/82   Pulse: 80    Resp: 20    Temp: 97.5 °F (36.4 °C)    TempSrc: Oral    SpO2: 96%    Weight: 272 lb (123.4 kg)    Height: 5' 5\" (1.651 m)    PainSc:   5    PainLoc: Knee      BP Readings from Last 3 Encounters:   03/27/19 141/82   09/12/18 121/68   08/09/18 123/72     Wt Readings from Last 3 Encounters:   03/27/19 272 lb (123.4 kg)   09/12/18 252 lb (114.3 kg)   08/09/18 250 lb 3.2 oz (113.5 kg)       Assessment and Plan    Chronic obstructive pulmonary disease, unspecified COPD type ([facility]  *Refilled on inhaler therapy. Controlled. - budesonide-formoterol (SYMBICORT) 160-4.5 mcg/actuation HFAA; Take 2 Puffs by inhalation two (2) times a day. Dispense: 6 Inhaler; Refill: 3  - albuterol (PROVENTIL HFA, VENTOLIN HFA, PROAIR HFA) 90 mcg/actuation inhaler; Take 2 Puffs by inhalation every four (4) hours as needed for Wheezing or Shortness of Breath. Dispense: 3 Inhaler; Refill: 4    Sleep apnea, unspecified type  *Continue with sleep specialist    Gastroesophageal reflux disease, esophagitis presence not specified  *Continue with PPI    Recurrent depression (HCC)/ Alcoholism in remission ([facility]  *Continue Celexa and Wellbutrin. Feels controlled on medication  - citalopram (CELEXA) 20 mg tablet; Take 1 Tab by mouth daily. Dispense: 90 Tab; Refill: 3    HNP (herniated nucleus pulposus), lumbar/ Spinal stenosis, unspecified spinal region  *Discussed other options for management of low back and chronic knee pain.  Advised on Voltaren gel that has been prescribed in past- she states that she will trial this and continue with Lyrica  - pregabalin (LYRICA) 150 mg capsule; Take 1 Cap by mouth two (2) times a day. Max Daily Amount: 300 mg. Dispense: 180 Cap; Refill: 1    Chronic pain of both knees  *Continue with orthopedics. Upper respiratory tract infection, unspecified type  *Discussed viral URI and symptom management. Obesity, morbid (HCC)/ History of gastric bypass  *I have reviewed/discussed the above normal BMI with the patient. I have recommended the following interventions: dietary management education, guidance, and counseling and encourage exercise . *Discussed increasing weight. She reports that she has found a new weight specialist. Advised on low impact exercise such as swimming    Fibrosarcoma ([facility]  *Remote history. No symptoms or issues related to    Screening for endocrine, metabolic and immunity disorder  - CBC W/O DIFF; Future  - METABOLIC PANEL, COMPREHENSIVE; Future  - TSH 3RD GENERATION; Future  - URINALYSIS W/MICROSCOPIC; Future    Encounter for lipid screening for cardiovascular disease  - LIPID PANEL; Future    Encounter for laboratory examination  - COLLECTION VENOUS BLOOD,VENIPUNCTURE        *Plan of care reviewed with patient. Patient in agreement with plan and expresses understanding. All questions answered and patient encouraged to call or RTO if further questions or concerns. Follow-up and Dispositions    · Return in about 6 months (around 9/27/2019) for chronic disease routine care- 30 min.

## 2019-03-28 ENCOUNTER — TELEPHONE (OUTPATIENT)
Dept: FAMILY MEDICINE CLINIC | Age: 59
End: 2019-03-28

## 2019-03-28 RX ORDER — BENZONATATE 100 MG/1
100 CAPSULE ORAL
Qty: 21 CAP | Refills: 0 | Status: SHIPPED | OUTPATIENT
Start: 2019-03-28 | End: 2019-04-04

## 2019-03-28 NOTE — TELEPHONE ENCOUNTER
3/28/2019  5:36 PM    Chief Complaint   Patient presents with    Request For New Medication     cough medicine       Noted patient request for cough medication. Sent in [place] at this time.

## 2019-03-28 NOTE — TELEPHONE ENCOUNTER
Patient called stating she thought she was supposed to get cough medicine. She picked up meds from pharmacy yesterday and there was none. Please advise.

## 2019-04-30 ENCOUNTER — OFFICE VISIT (OUTPATIENT)
Dept: FAMILY MEDICINE CLINIC | Age: 59
End: 2019-04-30

## 2019-04-30 ENCOUNTER — HOSPITAL ENCOUNTER (OUTPATIENT)
Dept: LAB | Age: 59
Discharge: HOME | End: 2019-04-30
Payer: MEDICARE

## 2019-04-30 VITALS
TEMPERATURE: 97.2 F | RESPIRATION RATE: 20 BRPM | DIASTOLIC BLOOD PRESSURE: 81 MMHG | WEIGHT: 269 LBS | SYSTOLIC BLOOD PRESSURE: 133 MMHG | HEART RATE: 71 BPM | HEIGHT: 65 IN | OXYGEN SATURATION: 96 % | BODY MASS INDEX: 44.82 KG/M2

## 2019-04-30 DIAGNOSIS — Z11.59 SPECIAL SCREENING EXAMINATION FOR VIRAL DISEASE: ICD-10-CM

## 2019-04-30 DIAGNOSIS — Z01.89 ENCOUNTER FOR LABORATORY EXAMINATION: ICD-10-CM

## 2019-04-30 DIAGNOSIS — Z71.85 IMMUNIZATION COUNSELING: ICD-10-CM

## 2019-04-30 DIAGNOSIS — M54.6 ACUTE LEFT-SIDED THORACIC BACK PAIN: Primary | ICD-10-CM

## 2019-04-30 DIAGNOSIS — M54.6 ACUTE LEFT-SIDED THORACIC BACK PAIN: ICD-10-CM

## 2019-04-30 LAB
BILIRUB UR QL STRIP: NEGATIVE
GLUCOSE UR-MCNC: NEGATIVE MG/DL
KETONES P FAST UR STRIP-MCNC: NEGATIVE MG/DL
PH UR STRIP: 7 [PH] (ref 4.6–8)
PROT UR QL STRIP: NEGATIVE
SP GR UR STRIP: 1.01 (ref 1–1.03)
UA UROBILINOGEN AMB POC: NORMAL (ref 0.2–1)
URINALYSIS CLARITY POC: CLEAR
URINALYSIS COLOR POC: YELLOW
URINE BLOOD POC: NEGATIVE
URINE LEUKOCYTES POC: NEGATIVE
URINE NITRITES POC: NEGATIVE

## 2019-04-30 PROCEDURE — 86765 RUBEOLA ANTIBODY: CPT

## 2019-04-30 PROCEDURE — 86735 MUMPS ANTIBODY: CPT

## 2019-04-30 PROCEDURE — 86762 RUBELLA ANTIBODY: CPT

## 2019-04-30 RX ORDER — BACLOFEN 10 MG/1
TABLET ORAL
Qty: 192 TAB | Refills: 0 | OUTPATIENT
Start: 2019-04-30

## 2019-04-30 RX ORDER — BACLOFEN 10 MG/1
10 TABLET ORAL
Qty: 15 TAB | Refills: 0 | Status: SHIPPED | OUTPATIENT
Start: 2019-04-30 | End: 2019-05-13

## 2019-04-30 RX ORDER — METHYLPREDNISOLONE 4 MG/1
TABLET ORAL
Qty: 1 DOSE PACK | Refills: 0 | Status: SHIPPED | OUTPATIENT
Start: 2019-04-30 | End: 2019-05-07 | Stop reason: ALTCHOICE

## 2019-04-30 RX ORDER — CYCLOBENZAPRINE HCL 5 MG
5 TABLET ORAL
Qty: 30 TAB | Refills: 0 | Status: CANCELLED | OUTPATIENT
Start: 2019-04-30

## 2019-04-30 RX ORDER — LIDOCAINE 50 MG/G
PATCH TOPICAL
Qty: 15 EACH | Refills: 0 | Status: SHIPPED | OUTPATIENT
Start: 2019-04-30 | End: 2019-05-13

## 2019-04-30 NOTE — PROGRESS NOTES
Pt here for visit. labs drawn. Venipuncture done in left AC. Blood specimen obtained. Pt tolerated well. No comps.

## 2019-05-01 LAB — RUBV IGG SER-IMP: NORMAL

## 2019-05-02 LAB
MEV IGG SER IA-ACNC: 83.8 AU/ML
MUV IGG SER IA-ACNC: <9 AU/ML

## 2019-05-07 ENCOUNTER — OFFICE VISIT (OUTPATIENT)
Dept: FAMILY MEDICINE CLINIC | Age: 59
End: 2019-05-07

## 2019-05-07 VITALS
BODY MASS INDEX: 44.98 KG/M2 | OXYGEN SATURATION: 95 % | HEART RATE: 75 BPM | WEIGHT: 270 LBS | TEMPERATURE: 97.6 F | HEIGHT: 65 IN | RESPIRATION RATE: 24 BRPM | SYSTOLIC BLOOD PRESSURE: 136 MMHG | DIASTOLIC BLOOD PRESSURE: 75 MMHG

## 2019-05-07 DIAGNOSIS — R19.8 CHANGE IN BOWEL MOVEMENT: ICD-10-CM

## 2019-05-07 DIAGNOSIS — R07.9 CHEST PAIN, UNSPECIFIED TYPE: ICD-10-CM

## 2019-05-07 DIAGNOSIS — R05.9 COUGH: ICD-10-CM

## 2019-05-07 DIAGNOSIS — M54.6 ACUTE LEFT-SIDED THORACIC BACK PAIN: Primary | ICD-10-CM

## 2019-05-07 DIAGNOSIS — R06.02 SOB (SHORTNESS OF BREATH): ICD-10-CM

## 2019-05-07 DIAGNOSIS — K21.9 GASTROESOPHAGEAL REFLUX DISEASE, ESOPHAGITIS PRESENCE NOT SPECIFIED: ICD-10-CM

## 2019-05-07 RX ORDER — CODEINE PHOSPHATE AND GUAIFENESIN 10; 100 MG/5ML; MG/5ML
5 SOLUTION ORAL
Qty: 105 ML | Refills: 0 | Status: SHIPPED | OUTPATIENT
Start: 2019-05-07 | End: 2019-05-13

## 2019-05-07 NOTE — PROGRESS NOTES
OFFICE NOTE    Checo Cintron is a 59 y.o. female presenting today for office visit. 5/7/2019  4:53 PM      Chief Complaint   Patient presents with    Back Pain         HPI: Here today for complaints of chest pain and back pain. She states that the chest pain is newer- seen a few weeks ago for the left sided mid-back pain and trialed on Medrol Dosepack and Baclofen- was not having chest pain at that time. She denies any improvement in her back symptoms since using steroids and muscle relaxer. She is not sure if the back pain and chest pain are occurring together or separate- she reports they occur both at rest and with movement. She just woke up with the back pain- no injuries that she can think of when it started. Chest pain is newer and happening the last 1-2 weeks. She reports symptoms in her chest are like \"terrible heartburn\" and worse with laying down- she has adding Zantac to her daily regimen of Dexilant without relief. She has also noted that her stools are softer recently although not loose. She has also noticed a cough and some SOB, especially with exertion. She is on BID Symbicort and has been using- diagnosed with COPD in the past. She has not really been using her Albuterol inhaler. Cough is sometimes productive, no fevers or chills. Review of Systems   Constitutional: Negative for chills, fatigue and fever. HENT: Negative for congestion, ear pain, facial swelling, postnasal drip, rhinorrhea, sinus pressure, sinus pain, sneezing and sore throat. Eyes: Negative for pain, discharge, itching and visual disturbance. Respiratory: Positive for cough and shortness of breath. Negative for wheezing. Cardiovascular: Positive for chest pain. Negative for palpitations and leg swelling. Gastrointestinal: Negative for abdominal pain, constipation, diarrhea, nausea and vomiting. +heartburn, softer stools   Genitourinary: Negative for difficulty urinating, dysuria and frequency. Musculoskeletal: Positive for back pain. Negative for arthralgias and myalgias. Skin: Negative for rash. Neurological: Negative for dizziness, numbness and headaches.          PHQ Screening   3 most recent PHQ Screens 3/27/2019   Little interest or pleasure in doing things Not at all   Feeling down, depressed, irritable, or hopeless Several days   Total Score PHQ 2 1         History  Past Medical History:   Diagnosis Date    [facility]     Chronic obstructive pulmonary disease (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 [address]     Sleep apnea     on cpap    Spinal stenosis, lumbar     patient reported       Past Surgical History:   Procedure Laterality Date    HX ARTHRODESIS  01/2000    Herniated Disc, arthritis right foot 06/06, 07/07, C 6/7, C 4/6 Stenosis    HX CHOLECYSTECTOMY  09/2008    gallbladder disease    HX COLONOSCOPY  05/2009    HX GASTRIC BYPASS  2012    GASTRIC BYPASS  [facility] Gastric Bypass      HX HYSTERECTOMY  06/1994    HX MENISCECTOMY  10/2015    right repari of torn meniscus    HX MENISCECTOMY Left 03/16/2018    partial meniscectomy due to tear    HX MYOMECTOMY  06/1984    benign tumor    HX ORTHOPAEDIC  1964    orthopaedic excision Fibrosarcoma and Lymphangiogram    HX PELVIC LAPAROSCOPY  04/1984    large uterine blockage    NEUROLOGICAL PROCEDURE UNLISTED  2000, 2007    Cervical fusion       Social History     Socioeconomic History    Marital status:      Spouse name: Not on file    Number of children: Not on file    Years of education: Not on file    Highest education level: Not on file   Occupational History    Not on file Social Needs    Financial resource strain: Not on file    Food insecurity:     Worry: Not on file     Inability: Not on file    Transportation needs:     Medical: Not on file     Non-medical: Not on file   Tobacco Use    Smoking status: Current Every Day Smoker     Packs/day: 1.00     Years: 45.00     Pack years: 45.00    Smokeless tobacco: Never Used   Substance and Sexual Activity    Alcohol use: No    Drug use: No     Comment: marijuana, cocaine 30 yrs ago    Sexual activity: Never   Lifestyle    Physical activity:     Days per week: Not on file     Minutes per session: Not on file    Stress: Not on file   Relationships    Social connections:     Talks on phone: Not on file     Gets together: Not on file     Attends Latter day service: Not on file     Active member of club or organization: Not on file     Attends meetings of clubs or organizations: Not on file     Relationship status: Not on file    Intimate partner violence:     Fear of current or ex partner: Not on file     Emotionally abused: Not on file     Physically abused: Not on file     Forced sexual activity: Not on file   Other Topics Concern    Not on file   Social History Narrative    Not on file       Allergies   Allergen Reactions    Adhesive Tape-Silicones Swelling     REALLY BAD SWELLING AND SORE APPEAR    Alcohol Other (comments)     PT STATES SHE IS AN ALCOHOLIC    Lactose Other (comments)     PT STATES IT MAKES HER STOMACH HURT    Percodan [Oxycodone Hcl-Oxycodone-Asa] Itching and Other (comments)     crying    Nsaids (Non-Steroidal Anti-Inflammatory Drug) Other (comments)     PT NOT ABLE TO TAKE DUE TO GASTRIC BYPASS       Current Outpatient Medications   Medication Sig Dispense Refill    baclofen (LIORESAL) 10 mg tablet Take 1 Tab by mouth every twelve (12) hours as needed for Other (muscle spasm). 15 Tab 0    lidocaine (LIDODERM) 5 % Apply patch to the affected area for 12 hours a day and remove for 12 hours a day.  15 Each 0    citalopram (CELEXA) 20 mg tablet Take 1 Tab by mouth daily. 90 Tab 3    budesonide-formoterol (SYMBICORT) 160-4.5 mcg/actuation HFAA Take 2 Puffs by inhalation two (2) times a day. 6 Inhaler 3    pregabalin (LYRICA) 150 mg capsule Take 1 Cap by mouth two (2) times a day. Max Daily Amount: 300 mg. 180 Cap 1    albuterol (PROVENTIL HFA, VENTOLIN HFA, PROAIR HFA) 90 mcg/actuation inhaler Take 2 Puffs by inhalation every four (4) hours as needed for Wheezing or Shortness of Breath. 3 Inhaler 4    diclofenac (VOLTAREN) 1 % gel Apply 2 g to affected area every six (6) hours as needed for Pain. 100 g 11    DEXILANT 60 mg CpDB capsule (delayed release) TAKE 1 CAPSULE BY MOUTH DAILY 90 Cap 3    buPROPion SR (WELLBUTRIN SR) 150 mg SR tablet TAKE 1 TABLET BY MOUTH TWICE DAILY 180 Tab 3    fluticasone (FLONASE) 50 mcg/actuation nasal spray SHAKE LIQUID AND USE 2 SPRAYS IN EACH NOSTRIL DAILY 3 Bottle 3    furosemide (LASIX) 20 mg tablet Take 1 Tab by mouth daily as needed. 30 Tab 2    acetaminophen (TYLENOL ARTHRITIS PAIN) 650 mg TbER Take 1 Tab by mouth three (3) times daily as needed. 100 Tab 0    b complex vitamins tablet Take 1 Tab by mouth daily.  calcium-cholecalciferol, d3, (CALCIUM 600 + D) 600-125 mg-unit tab Take 1,065 mg by mouth nightly. Indications: TAKES 2 AT BEDTIME      omega 3-dha-epa-fish oil (FISH OIL) 100-160-1,000 mg cap Take 1,065 mg by mouth daily.  ferrous sulfate ER (IRON) 160 mg (50 mg iron) TbER tablet Take 1 Tab by mouth daily. Indications: PT TAKES 40 MG      multivitamin (ONE A DAY) tablet Take 1 Tab by mouth daily.  methylPREDNISolone (MEDROL DOSEPACK) 4 mg tablet Take as directed on pack 1 Dose Pack 0    calcium citrate 200 mg (950 mg) tablet Take  by mouth daily.       LOMAIRA 8 mg tab TK 1 T PO  BID  5         Patient Care Team:  Patient Care Team:  Paz Vargas NP as PCP - General (Nurse Practitioner)  Agnes Rowan DO (Orthopedic Surgery)  Jada Arriaga NP (Neurology)  Andra Bran MD ([facility])  Ranulfo Handley as Physician Assistant (Psychiatry)        LABS:  None new to review    RADIOLOGY:    *Cxray completed today in office      Physical Exam   Constitutional: She is oriented to person, place, and time. She appears well-developed and well-nourished. No distress. Cardiovascular: Normal rate, regular rhythm and normal heart sounds. No murmur heard. Pulmonary/Chest: Effort normal and breath sounds normal. No respiratory distress. She exhibits no tenderness, no crepitus, no edema and no deformity. Abdominal: Soft. Normal appearance and bowel sounds are normal. She exhibits no distension. There is no tenderness. There is no rebound. Musculoskeletal: She exhibits no edema. Thoracic back: She exhibits decreased range of motion, tenderness and pain. She exhibits no edema and no spasm. Back:    Neurological: She is alert and oriented to person, place, and time. She exhibits normal muscle tone. Coordination and gait normal.   Skin: Skin is warm and dry. Psychiatric: Her speech is normal and behavior is normal. Her mood appears not anxious. She does not exhibit a depressed mood. Vitals:    05/07/19 1638   BP: 136/75   Pulse: 75   Resp: 24   Temp: 97.6 °F (36.4 °C)   TempSrc: Oral   SpO2: 95%   Weight: 270 lb (122.5 kg)   Height: 5' 5\" (1.651 m)   PainSc:   9   PainLoc: Chest         Assessment and Plan    Acute left-sided thoracic back pain  *As below. Xray taken today of chest to r/o pneumonia. Did not have any improvement with steroid and PRN muscle relaxer- initially felt to be due to musculoskeletal issue but now with more symptoms as below. Chest pain, unspecified type  *Discussed with patient. Cxray taken today. Refer to cardiology to evaluate cardiac status.   - REFERRAL TO CARDIOLOGY  - XR CHEST PA LAT; Future    Cough/ SOB (shortness of breath)  *Cxray today.  Had been on steroid recently but was not having SOB and cough when last seen for back pain. Will r/o pneumonia- if present will need to start on AB therapy and possibly course of steroids again. Gave PRN cough medication at this time. Continue on Symbicort- she has not been using Albuterol- encouraged to use. - XR CHEST PA LAT; Future  - guaiFENesin-codeine (GUAIFENESIN AC) 100-10 mg/5 mL solution; Take 5 mL by mouth three (3) times daily as needed for Cough for up to 7 days. Max Daily Amount: 15 mL. Dispense: 105 mL; Refill: 0    Gastroesophageal reflux disease, esophagitis presence not specified/n Change in bowel movement  *Refer to GI for further evaluation. Chest pains are described more as indigestion, but refer to cardiology as well. Continue [facility] reviewed with patient. Patient in agreement with plan and expresses understanding. All questions answered and patient encouraged to call or RTO if further questions or concerns. Follow-up and Dispositions    · Return if symptoms worsen or fail to improve.

## 2019-05-13 ENCOUNTER — APPOINTMENT (OUTPATIENT)
Dept: GENERAL RADIOLOGY | Age: 59
End: 2019-05-13
Attending: EMERGENCY MEDICINE
Payer: MEDICARE

## 2019-05-13 ENCOUNTER — HOSPITAL ENCOUNTER (EMERGENCY)
Age: 59
Discharge: HOME | End: 2019-05-13
Attending: EMERGENCY MEDICINE
Payer: MEDICARE

## 2019-05-13 VITALS
SYSTOLIC BLOOD PRESSURE: 133 MMHG | BODY MASS INDEX: 44.98 KG/M2 | WEIGHT: 270 LBS | OXYGEN SATURATION: 94 % | HEART RATE: 73 BPM | RESPIRATION RATE: 18 BRPM | HEIGHT: 65 IN | TEMPERATURE: 98.5 F | DIASTOLIC BLOOD PRESSURE: 81 MMHG

## 2019-05-13 DIAGNOSIS — R07.89 CHEST WALL PAIN: ICD-10-CM

## 2019-05-13 DIAGNOSIS — J20.9 ACUTE BRONCHITIS, UNSPECIFIED ORGANISM: Primary | ICD-10-CM

## 2019-05-13 LAB
ALBUMIN SERPL-MCNC: 3.1 G/DL (ref 3.4–5)
ALBUMIN/GLOB SERPL: 0.8 {RATIO} (ref 0.8–1.7)
ALP SERPL-CCNC: 152 U/L (ref 45–117)
ALT SERPL-CCNC: 40 U/L (ref 13–56)
ANION GAP SERPL CALC-SCNC: 5 MMOL/L (ref 3–18)
APPEARANCE UR: CLEAR
AST SERPL-CCNC: 5 U/L (ref 15–37)
BACTERIA URNS QL MICRO: ABNORMAL /HPF
BASOPHILS # BLD: 0 K/UL (ref 0–0.1)
BASOPHILS NFR BLD: 0 % (ref 0–2)
BILIRUB SERPL-MCNC: 0.2 MG/DL (ref 0.2–1)
BILIRUB UR QL: NEGATIVE
BUN SERPL-MCNC: 10 MG/DL (ref 7–18)
BUN/CREAT SERPL: 9 (ref 12–20)
CALCIUM SERPL-MCNC: 8.1 MG/DL (ref 8.5–10.1)
CHLORIDE SERPL-SCNC: 110 MMOL/L (ref 100–108)
CK MB CFR SERPL CALC: 1.8 % (ref 0–4)
CK MB SERPL-MCNC: 3.1 NG/ML (ref 5–25)
CK SERPL-CCNC: 175 U/L (ref 26–192)
CO2 SERPL-SCNC: 29 MMOL/L (ref 21–32)
COLOR UR: YELLOW
CREAT SERPL-MCNC: 1.08 MG/DL (ref 0.6–1.3)
DIFFERENTIAL METHOD BLD: NORMAL
EOSINOPHIL # BLD: 0.4 K/UL (ref 0–0.4)
EOSINOPHIL NFR BLD: 3 % (ref 0–5)
EPITH CASTS URNS QL MICRO: ABNORMAL /LPF (ref 0–5)
ERYTHROCYTE [DISTWIDTH] IN BLOOD BY AUTOMATED COUNT: 14.2 % (ref 11.6–14.5)
GLOBULIN SER CALC-MCNC: 3.8 G/DL (ref 2–4)
GLUCOSE SERPL-MCNC: 124 MG/DL (ref 74–99)
GLUCOSE UR STRIP.AUTO-MCNC: NEGATIVE MG/DL
HCT VFR BLD AUTO: 43.7 % (ref 35–45)
HGB BLD-MCNC: 14.2 G/DL (ref 12–16)
HGB UR QL STRIP: NEGATIVE
KETONES UR QL STRIP.AUTO: NEGATIVE MG/DL
LEUKOCYTE ESTERASE UR QL STRIP.AUTO: ABNORMAL
LIPASE SERPL-CCNC: 80 U/L (ref 73–393)
LYMPHOCYTES # BLD: 3.4 K/UL (ref 0.9–3.6)
LYMPHOCYTES NFR BLD: 28 % (ref 21–52)
MCH RBC QN AUTO: 29.6 PG (ref 24–34)
MCHC RBC AUTO-ENTMCNC: 32.5 G/DL (ref 31–37)
MCV RBC AUTO: 91 FL (ref 74–97)
MONOCYTES # BLD: 0.6 K/UL (ref 0.05–1.2)
MONOCYTES NFR BLD: 5 % (ref 3–10)
NEUTS SEG # BLD: 7.7 K/UL (ref 1.8–8)
NEUTS SEG NFR BLD: 64 % (ref 40–73)
NITRITE UR QL STRIP.AUTO: NEGATIVE
PH UR STRIP: 6 [PH] (ref 5–8)
PLATELET # BLD AUTO: 324 K/UL (ref 135–420)
PMV BLD AUTO: 9.6 FL (ref 9.2–11.8)
POTASSIUM SERPL-SCNC: 4.2 MMOL/L (ref 3.5–5.5)
PROT SERPL-MCNC: 6.9 G/DL (ref 6.4–8.2)
PROT UR STRIP-MCNC: NEGATIVE MG/DL
RBC # BLD AUTO: 4.8 M/UL (ref 4.2–5.3)
RBC #/AREA URNS HPF: ABNORMAL /HPF (ref 0–5)
SODIUM SERPL-SCNC: 144 MMOL/L (ref 136–145)
SP GR UR REFRACTOMETRY: 1.01 (ref 1–1.03)
TROPONIN I SERPL-MCNC: <0.02 NG/ML (ref 0–0.04)
UROBILINOGEN UR QL STRIP.AUTO: 0.2 EU/DL (ref 0.2–1)
WBC # BLD AUTO: 12.2 K/UL (ref 4.6–13.2)
WBC URNS QL MICRO: ABNORMAL /HPF (ref 0–4)

## 2019-05-13 PROCEDURE — 96375 TX/PRO/DX INJ NEW DRUG ADDON: CPT

## 2019-05-13 PROCEDURE — 99284 EMERGENCY DEPT VISIT MOD MDM: CPT

## 2019-05-13 PROCEDURE — 74011250636 HC RX REV CODE- 250/636: Performed by: EMERGENCY MEDICINE

## 2019-05-13 PROCEDURE — 96374 THER/PROPH/DIAG INJ IV PUSH: CPT

## 2019-05-13 PROCEDURE — 80053 COMPREHEN METABOLIC PANEL: CPT

## 2019-05-13 PROCEDURE — 71046 X-RAY EXAM CHEST 2 VIEWS: CPT

## 2019-05-13 PROCEDURE — 74011250637 HC RX REV CODE- 250/637: Performed by: EMERGENCY MEDICINE

## 2019-05-13 PROCEDURE — 83690 ASSAY OF LIPASE: CPT

## 2019-05-13 PROCEDURE — 81001 URINALYSIS AUTO W/SCOPE: CPT

## 2019-05-13 PROCEDURE — 85025 COMPLETE CBC W/AUTO DIFF WBC: CPT

## 2019-05-13 PROCEDURE — 82550 ASSAY OF CK (CPK): CPT

## 2019-05-13 RX ORDER — VARENICLINE TARTRATE 1 MG/1
1 TABLET, FILM COATED ORAL
COMMUNITY
End: 2019-07-02 | Stop reason: SDUPTHER

## 2019-05-13 RX ORDER — DIPHENHYDRAMINE HYDROCHLORIDE 50 MG/ML
25 INJECTION, SOLUTION INTRAMUSCULAR; INTRAVENOUS ONCE
Status: COMPLETED | OUTPATIENT
Start: 2019-05-13 | End: 2019-05-13

## 2019-05-13 RX ORDER — GUAIFENESIN 100 MG/5ML
200 SOLUTION ORAL
Status: COMPLETED | OUTPATIENT
Start: 2019-05-13 | End: 2019-05-13

## 2019-05-13 RX ORDER — AMOXICILLIN 250 MG/1
500 CAPSULE ORAL EVERY 8 HOURS
Status: DISCONTINUED | OUTPATIENT
Start: 2019-05-13 | End: 2019-05-14 | Stop reason: HOSPADM

## 2019-05-13 RX ORDER — AMOXICILLIN 500 MG/1
500 TABLET, FILM COATED ORAL 3 TIMES DAILY
Qty: 30 TAB | Refills: 0 | Status: SHIPPED | OUTPATIENT
Start: 2019-05-13 | End: 2019-07-02 | Stop reason: ALTCHOICE

## 2019-05-13 RX ORDER — TRAMADOL HYDROCHLORIDE 50 MG/1
50 TABLET ORAL
Qty: 8 TAB | Refills: 0 | Status: SHIPPED | OUTPATIENT
Start: 2019-05-13 | End: 2019-05-17

## 2019-05-13 RX ORDER — ACETAMINOPHEN 500 MG
TABLET ORAL
COMMUNITY

## 2019-05-13 RX ORDER — MORPHINE SULFATE 2 MG/ML
2 INJECTION, SOLUTION INTRAMUSCULAR; INTRAVENOUS
Status: COMPLETED | OUTPATIENT
Start: 2019-05-13 | End: 2019-05-13

## 2019-05-13 RX ORDER — GUAIFENESIN 100 MG/5ML
200 SOLUTION ORAL
Qty: 1 BOTTLE | Refills: 0 | Status: SHIPPED | OUTPATIENT
Start: 2019-05-13 | End: 2019-07-02 | Stop reason: ALTCHOICE

## 2019-05-13 RX ADMIN — DIPHENHYDRAMINE HYDROCHLORIDE 25 MG: 50 INJECTION INTRAMUSCULAR; INTRAVENOUS at 20:05

## 2019-05-13 RX ADMIN — AMOXICILLIN 500 MG: 250 CAPSULE ORAL at 23:04

## 2019-05-13 RX ADMIN — GUAIFENESIN 200 MG: 200 SOLUTION ORAL at 23:04

## 2019-05-13 RX ADMIN — MORPHINE SULFATE 2 MG: 2 INJECTION, SOLUTION INTRAMUSCULAR; INTRAVENOUS at 20:05

## 2019-05-13 NOTE — ED TRIAGE NOTES
Patient states productive cough x 1.5 weeks. C/o pain across back at bra line - mainly on left side. States hx of asthma and COPD. Continues to smoke.

## 2019-05-13 NOTE — LETTER
[address] [place] [address] Dr [facility] EMERG[facility] DEPT 
[address] [facility] [id number] [phone] Work/School Note Date: 5/13/2019 To Whom It May concern: 
 
Jose Luis Medina was seen and treated today in the emergency room by the following provider(s): 
Attending Provider: Donny Patricia MD.   
 
Jose Luis Medina was accompanied by her daughter, Lilian Fine, until early morning. Please excuse Ms. Mikhail Sagastume from work on 5/14/2019.  
 
Sincerely, 
 
 
 
 
Shannon Barrera RN

## 2019-05-13 NOTE — ED PROVIDER NOTES
HPI Patient C/C a  productive cough x 1.5 weeks accompanied with left upper rib/back pain across upper back at bra line. Back  wall pain mainly on left side. States hx of asthma and COPD. Past Medical History:  
Diagnosis Date  [facility]ism [facility] ([facility] [address]  Chronic obstructive pulmonary disease ([facility] [address]  Fibrosarcoma ([facility] [address] 1963  
 connective tissue cancer  GERD (gastroesophageal reflux disease)  History of gastric bypass 2012  History of meniscal tear 2015, 2018  
 right in 2015, left in 2018  HNP (herniated nucleus pulposus), lumbar   
 patient reported  Lung nodules   
 resolved 2018 CT  
 Osteopenia 10/03/2017  Recurrent depression ([facility] [address]  Sleep apnea   
 on cpap  Spinal stenosis, lumbar   
 patient reported Past Surgical History:  
Procedure Laterality Date  HX ARTHRODESIS  01/2000 Herniated Disc, arthritis right foot 06/06, 07/07, C 6/7, C 4/6 Stenosis  HX CHOLECYSTECTOMY  09/2008  
 gallbladder disease  HX COLONOSCOPY  05/2009  HX GASTRIC BYPASS  2012 GASTRIC BYPASS  [facility] Gastric Bypass  HX HYSTERECTOMY  06/1994  HX MENISCECTOMY  10/2015  
 right repari of torn meniscus  HX MENISCECTOMY Left 03/16/2018  
 partial meniscectomy due to tear Dinesh Fore  04/8217  
 benign tumor  HX ORTHOPAEDIC  1964  
 orthopaedic excision Fibrosarcoma and Lymphangiogram  
 HX PELVIC LAPAROSCOPY  04/1984  
 large uterine blockage [address]  2000, 2007 Cervical fusion Family History:  
Problem Relation Age of Onset  Hypertension Mother  Depression Mother  Cancer Mother  Ovarian Cancer Mother  Breast Problems Mother  Cancer Father  Cancer Sister  Depression Sister  Depression Brother Social History Socioeconomic History  Marital status:  Spouse name: Not on file  Number of children: Not on file  Years of education: Not on file  Highest education level: Not on file Occupational History  Not on file Social Needs  Financial resource strain: Not on file  Food insecurity:  
  Worry: Not on file Inability: Not on file  Transportation needs:  
  Medical: Not on file Non-medical: Not on file Tobacco Use  Smoking status: Current Every Day Smoker Packs/day: 1.00 Years: 45.00 Pack years: 45.00  Smokeless tobacco: Never Used Substance and Sexual Activity  Alcohol use: No  
 Drug use: No  
  Comment: marijuana, cocaine 30 yrs ago  Sexual activity: Never Lifestyle  Physical activity:  
  Days per week: Not on file Minutes per session: Not on file  Stress: Not on file Relationships  Social connections:  
  Talks on phone: Not on file Gets together: Not on file Attends [employer]op[employer] service: Not on file Active member of club or organization: Not on file Attends meetings of clubs or organizations: Not on file Relationship status: Not on file  Intimate partner violence:  
  Fear of current or ex partner: Not on file Emotionally abused: Not on file Physically abused: Not on file Forced sexual activity: Not on file Other Topics Concern  Not on file Social History Narrative  Not on file ALLERGIES: Adhesive tape-silicones; Alcohol; Lactose; Percodan [oxycodone hcl-oxycodone-asa]; and Nsaids (non-steroidal anti-inflammatory drug) Review of Systems Constitutional: Negative. HENT: Negative. Eyes: Negative. Respiratory: Negative. Cardiovascular: Negative. Gastrointestinal: Negative. Endocrine: Negative. Genitourinary: Negative. Musculoskeletal: Positive for back pain (upper left). Skin: Negative. Allergic/Immunologic: Negative. Neurological: Negative. Hematological: Negative. Psychiatric/Behavioral: Negative. All other systems reviewed and are negative. Vitals:  
 05/13/19 1816 BP: 133/81 Pulse: 73 Resp: 18 Temp: 98.5 °F (36.9 °C) SpO2: 94% Weight: 122.5 kg (270 lb) Height: 5' 5\" (1.651 m) Physical Exam  
Constitutional: She is oriented to person, place, and time. She appears well-developed and well-nourished. No distress. HENT:  
Head: Normocephalic. Right Ear: External ear normal.  
Left Ear: External ear normal.  
Mouth/Throat: No oropharyngeal exudate. Eyes: Pupils are equal, round, and reactive to light. Conjunctivae and EOM are normal. Right eye exhibits no discharge. Left eye exhibits no discharge. No scleral icterus. Neck: Normal range of motion. Neck supple. No JVD present. No tracheal deviation present. No thyromegaly present. Cardiovascular: Normal rate, regular rhythm, normal heart sounds and intact distal pulses. Exam reveals no gallop and no friction rub. No murmur heard. Pulmonary/Chest: Effort normal and breath sounds normal. No stridor. No respiratory distress. She has no wheezes. She has no rales. She exhibits no tenderness. Abdominal: Soft. Bowel sounds are normal. She exhibits no distension and no mass. There is no tenderness. There is no rebound and no guarding. Musculoskeletal: Normal range of motion. She exhibits tenderness (severe tenderness with palpation of left posterior ribs 4,5,6). She exhibits no edema. Lymphadenopathy:  
  She has no cervical adenopathy. Neurological: She is alert and oriented to person, place, and time. She displays normal reflexes. No cranial nerve deficit. She exhibits normal muscle tone. Coordination normal.  
Skin: Skin is warm and dry. No rash noted. She is not diaphoretic. No erythema. No pallor. Nursing note and vitals reviewed. MDM diagnosis: Pneumonia, bronchitis, muscle skeletal pain pneumothorax, 
  
 
Procedures Diagnosis: Bronchitis/early pneumonia Disposition: Discharge home

## 2019-05-14 NOTE — ED NOTES
Patient states not feeling any better, wants to know \"what's going on\". Dr. Earline Mitchell aware. More warm blankets provided.

## 2019-05-17 ENCOUNTER — OFFICE VISIT (OUTPATIENT)
Dept: CARDIOLOGY CLINIC | Age: 59
End: 2019-05-17

## 2019-05-17 VITALS
HEIGHT: 65 IN | HEART RATE: 66 BPM | SYSTOLIC BLOOD PRESSURE: 140 MMHG | DIASTOLIC BLOOD PRESSURE: 79 MMHG | BODY MASS INDEX: 44.48 KG/M2 | WEIGHT: 267 LBS

## 2019-05-17 DIAGNOSIS — R07.9 CHEST PAIN, UNSPECIFIED TYPE: Primary | ICD-10-CM

## 2019-05-17 DIAGNOSIS — E66.01 SEVERE OBESITY (BMI >= 40) (HCC): ICD-10-CM

## 2019-05-17 DIAGNOSIS — R06.09 DYSPNEA ON EXERTION: ICD-10-CM

## 2019-05-17 NOTE — PATIENT INSTRUCTIONS
Medications Discontinued During This Encounter   Medication Reason    traMADol (ULTRAM) 50 mg tablet           Chest Pain: Care Instructions  Your Care Instructions    There are many things that can cause chest pain. Some are not serious and will get better on their own in a few days. But some kinds of chest pain need more testing and treatment. Your doctor may have recommended a follow-up visit in the next 8 to 12 hours. If you are not getting better, you may need more tests or treatment. Even though your doctor has released you, you still need to watch for any problems. The doctor carefully checked you, but sometimes problems can develop later. If you have new symptoms or if your symptoms do not get better, get medical care right away. If you have worse or different chest pain or pressure that lasts more than 5 minutes or you passed out (lost consciousness), call 911 or seek other emergency help right away. A medical visit is only one step in your treatment. Even if you feel better, you still need to do what your doctor recommends, such as going to all suggested follow-up appointments and taking medicines exactly as directed. This will help you recover and help prevent future problems. How can you care for yourself at home? · Rest until you feel better. · Take your medicine exactly as prescribed. Call your doctor if you think you are having a problem with your medicine. · Do not drive after taking a prescription pain medicine. When should you call for help? Call 911 if:    · You passed out (lost consciousness).     · You have severe difficulty breathing.     · You have symptoms of a heart attack. These may include:  ? Chest pain or pressure, or a strange feeling in your chest.  ? Sweating. ? Shortness of breath. ? Nausea or vomiting. ? Pain, pressure, or a strange feeling in your back, neck, jaw, or upper belly or in one or both shoulders or arms. ? Lightheadedness or sudden weakness.   ? A fast or irregular heartbeat. After you call 911, the  may tell you to chew 1 adult-strength or 2 to 4 low-dose aspirin. Wait for an ambulance. Do not try to drive yourself.    Call your doctor today if:    · You have any trouble breathing.     · Your chest pain gets worse.     · You are dizzy or lightheaded, or you feel like you may faint.     · You are not getting better as expected.     · You are having new or different chest pain. Where can you learn more? Go to http://steve-anand.info/. Enter A120 in the search box to learn more about \"Chest Pain: Care Instructions. \"  Current as of: September 23, 2018  Content Version: 11.9  © 1648-2024 [employer]. Care instructions adapted under license by [employer] (which disclaims liability or warranty for this information). If you have questions about a medical condition or this instruction, always ask your healthcare professional. [place] [address] any warranty or liability for your use of this information.

## 2019-05-17 NOTE — PROGRESS NOTES
HISTORY OF PRESENT ILLNESS  Garrett Olivares is a 59 y.o. female. Chest Pain (Angina)    The history is provided by the patient. This is a new problem. The current episode started more than 1 week ago (5/19). Duration of episode(s) is 1 hour. The problem occurs daily. Associated with: waking up and again towards the evening but then it is throughout the chest. The pain is present in the substernal region. The quality of the pain is described as pressure-like. The pain radiates to the right jaw and left jaw. Associated symptoms include diaphoresis (mild on face/chest) and lower extremity edema. Pertinent negatives include no claudication, no cough, no dizziness, no fever, no headaches, no malaise/fatigue, no nausea, no orthopnea, no palpitations, no PND, no shortness of breath and no vomiting. Treatments tried: moving around/walking. Leg Swelling   The history is provided by the patient. This is a chronic problem. The current episode started more than 1 week ago (yrs). Associated symptoms include chest pain. Pertinent negatives include no headaches and no shortness of breath. The symptoms are aggravated by standing. The symptoms are relieved by sleep. Shortness of Breath   The history is provided by the patient. This is a chronic problem. The problem occurs intermittently. The current episode started more than 1 week ago. Associated symptoms include chest pain and leg swelling. Pertinent negatives include no fever, no headaches, no cough, no wheezing, no PND, no orthopnea, no vomiting, no rash and no claudication. The problem's precipitants include exercise (1/3 flight). Review of Systems   Constitutional: Positive for diaphoresis (mild on face/chest). Negative for chills, fever, malaise/fatigue and weight loss. HENT: Negative for nosebleeds. Eyes: Negative for discharge. Respiratory: Negative for cough, shortness of breath and wheezing. Cardiovascular: Positive for chest pain and leg swelling. Negative for palpitations, orthopnea, claudication and PND. Gastrointestinal: Negative for diarrhea, nausea and vomiting. Genitourinary: Negative for dysuria and hematuria. Musculoskeletal: Negative for joint pain. Skin: Negative for rash. Neurological: Negative for dizziness, seizures, loss of consciousness and headaches. Endo/Heme/Allergies: Negative for polydipsia. Does not bruise/bleed easily. Psychiatric/Behavioral: Negative for depression and substance abuse. The patient does not have insomnia.       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    GERD (gastroesophageal reflux disease)     History of gastric bypass 2012    History of meniscal tear 2015, 2018    right in 2015, left in 2018    HNP (herniated nucleus pulposus), lumbar     patient reported    Lung nodules     resolved 2018 CT    Osteopenia 10/03/2017    Recurrent depression [facility] [address]     Sleep apnea     on cpap    Spinal stenosis, lumbar     patient reported       Family History   Problem Relation Age of Onset    Hypertension Mother     Depression Mother     Cancer Mother     Ovarian Cancer Mother     Breast Problems Mother     Cancer Father     Cancer Sister     Depression Sister     Depression Brother     Stroke Neg Hx     Heart Attack Neg Hx        Social History     Tobacco Use    Smoking status: Current Every Day Smoker     Packs/day: 1.00     Years: 45.00     Pack years: 45.00    Smokeless tobacco: Never Used   Substance Use Topics    Alcohol use: No     Comment: quit 1980s- was heavy etoh    Drug use: No     Comment: marijuana, cocaine 30 yrs ago        Current Outpatient Medications   Medication Sig    acetaminophen (TYLENOL EXTRA STRENGTH) 500 mg tablet Take  by mouth every six (6) hours as needed for Pain.  varenicline (CHANTIX) 1 mg tablet Take 1 mg by mouth two (2) times daily (after meals).  amoxicillin 500 mg tab Take 500 mg by mouth three (3) times daily.  guaiFENesin (ROBITUSSIN) 100 mg/5 mL liquid Take 10 mL by mouth four (4) times daily as needed for Cough.  citalopram (CELEXA) 20 mg tablet Take 1 Tab by mouth daily.  budesonide-formoterol (SYMBICORT) 160-4.5 mcg/actuation HFAA Take 2 Puffs by inhalation two (2) times a day.  pregabalin (LYRICA) 150 mg capsule Take 1 Cap by mouth two (2) times a day. Max Daily Amount: 300 mg.    albuterol (PROVENTIL HFA, VENTOLIN HFA, PROAIR HFA) 90 mcg/actuation inhaler Take 2 Puffs by inhalation every four (4) hours as needed for Wheezing or Shortness of Breath.  diclofenac (VOLTAREN) 1 % gel Apply 2 g to affected area every six (6) hours as needed for Pain.  DEXILANT 60 mg CpDB capsule (delayed release) TAKE 1 CAPSULE BY MOUTH DAILY    buPROPion SR (WELLBUTRIN SR) 150 mg SR tablet TAKE 1 TABLET BY MOUTH TWICE DAILY    fluticasone (FLONASE) 50 mcg/actuation nasal spray SHAKE LIQUID AND USE 2 SPRAYS IN EACH NOSTRIL DAILY    b complex vitamins tablet Take 1 Tab by mouth daily.  calcium-cholecalciferol, d3, (CALCIUM 600 + D) 600-125 mg-unit tab Take 1,065 mg by mouth nightly. Indications: TAKES 2 AT BEDTIME    omega 3-dha-epa-fish oil (FISH OIL) 100-160-1,000 mg cap Take 1,065 mg by mouth daily.  ferrous sulfate ER (IRON) 160 mg (50 mg iron) TbER tablet Take 1 Tab by mouth daily.  Indications: PT TAKES 40 MG    multivitamin (ONE A DAY) tablet Take 1 Tab by mouth daily. No current facility-administered medications for this visit. Past Surgical History:   Procedure Laterality Date    HX ARTHRODESIS  01/2000    Herniated Disc, arthritis right foot 06/06, 07/07, C 6/7, C 4/6 Stenosis    HX CHOLECYSTECTOMY  09/2008    gallbladder disease    HX COLONOSCOPY  05/2009    HX GASTRIC BYPASS  2012    GASTRIC BYPASS  [facility] Gastric Bypass      HX HYSTERECTOMY  06/1994    HX MENISCECTOMY  10/2015    right repari of torn meniscus    HX MENISCECTOMY Left 03/16/2018    partial meniscectomy due to tear    HX MYOMECTOMY  06/1984    benign tumor    HX ORTHOPAEDIC  1964    orthopaedic excision Fibrosarcoma and Lymphangiogram    HX PELVIC LAPAROSCOPY  04/1984    large uterine blockage    NEUROLOGICAL PROCEDURE UNLISTED  2000, 2007    Cervical fusion       Visit Vitals  /79   Pulse 66   Ht 5' 5\" (1.651 m)   Wt 121.1 kg (267 lb)   BMI 44.43 kg/m²       Diagnostic Studies:  I have reviewed the relevant tests done on the patient and show as follows  EKG tracings reviewed by me today. EKG Results     Procedure 720 Value Units Date/Time    AMB POC EKG ROUTINE W/ 12 LEADS, INTER & REP [[id number]] Resulted:  05/17/19 0809    Order Status:  Completed Updated:  05/17/19 0804        XR Results (most recent):  Results from [place] encounter on 05/13/19   XR CHEST PA LAT    Narrative Examination: PA and lateral chest     History: Productive cough    Comparison: May 7, 2019    Findings: Increasing left basilar streaky opacities likely atelectasis. There is  increasing central bronchial wall thickening. There is no pleural effusion. No  pneumothorax or pulmonary edema. Heart size is stable. Tortuous thoracic aorta. Degenerative changes of the spine. Impression Impression:  1. Central bronchial wall thickening has increased from prior. Streaky opacity  in the left lung base is most likely atelectasis. No flowsheet data found.     Ms. Zechariah Cao has a reminder for a \"due or due soon\" health maintenance. I have asked that she contact her primary care provider for follow-up on this health maintenance. Physical Exam   Constitutional: She is oriented to person, place, and time. She appears well-developed and well-nourished. No distress. sev obese   HENT:   Head: Normocephalic and atraumatic. Mouth/Throat: Normal dentition. Eyes: Right eye exhibits no discharge. Left eye exhibits no discharge. No scleral icterus. Neck: Neck supple. No JVD present. Carotid bruit is not present. No thyromegaly present. Cardiovascular: Normal rate, regular rhythm, S1 normal, S2 normal, normal heart sounds and intact distal pulses. Exam reveals no gallop and no friction rub. No murmur heard. Pulmonary/Chest: Effort normal and breath sounds normal. She has no wheezes. She has no rales. She exhibits tenderness (Reproducible diffuse, left more than right chest wall). Abdominal: Soft. She exhibits no mass. There is no tenderness. Musculoskeletal: She exhibits no edema. Lymphadenopathy:        Right cervical: No superficial cervical adenopathy present. Left cervical: No superficial cervical adenopathy present. Neurological: She is alert and oriented to person, place, and time. Skin: Skin is warm and dry. No rash noted. Psychiatric: She has a normal mood and affect. Her behavior is normal.       ASSESSMENT and PLAN    Lipids : Results for Elaina PALM\" (MRN [id number]) as of 5/17/2019 08:26   Ref.  Range 3/27/2019 08:41   Triglyceride Latest Ref Range: <150 MG/DL 66   Cholesterol, total Latest Ref Range: <200 MG/   HDL Cholesterol Latest Ref Range: 40 - 60 MG/DL 61 (H)   CHOL/HDL Ratio Latest Ref Range: 0 - 5.0   2.2   LDL, calculated Latest Ref Range: 0 - 100 MG/DL 60.8   VLDL, calculated Latest Units: MG/DL 13.2     Chest pain is clinically musculoskeletal.  Patient has not been helped by tramadol in the past and she does not want that prescription today. Discussed NSAIDs but that cannot be used due to her previous gastric bypass. She is already taking Tylenol daily 3 4 times a day. In fact, I cautioned her not to take so much Tylenol to avoid liver toxicity. She may need to be seen by physical therapy/rehab to help her pain. We will get an echo due to dyspnea though it may be related to her smoking and weight along with deconditioning. Diagnoses and all orders for this visit:    1. Chest pain, unspecified type  -     AMB POC EKG ROUTINE W/ 12 LEADS, INTER & REP  -     ECHO ADULT COMPLETE; Future    2. Severe obesity (BMI >= 40) (HCC)    3. Dyspnea on exertion  -     ECHO ADULT COMPLETE; Future        Pertinent laboratory and test data reviewed and discussed with patient.   See patient instructions also for other medical advice given    Medications Discontinued During This Encounter   Medication Reason    traMADol (ULTRAM) 50 mg tablet        Follow-up and Dispositions    · Return in about 3 months (around 8/17/2019), or if symptoms worsen or fail to improve, for post test.

## 2019-06-27 DIAGNOSIS — J44.9 CHRONIC OBSTRUCTIVE PULMONARY DISEASE, UNSPECIFIED COPD TYPE (HCC): ICD-10-CM

## 2019-07-01 NOTE — TELEPHONE ENCOUNTER
[facility]/ PHARMACY  SECOND PRESCRIPTION REFILL REQUEST    SYMBICORT 106/4. 5MCG (120 ORAL INH)    INHALE 2 PUFFS BY MOUTH TWICE DAILY

## 2019-07-02 ENCOUNTER — OFFICE VISIT (OUTPATIENT)
Dept: FAMILY MEDICINE CLINIC | Age: 59
End: 2019-07-02

## 2019-07-02 VITALS
WEIGHT: 266 LBS | HEIGHT: 65 IN | DIASTOLIC BLOOD PRESSURE: 78 MMHG | OXYGEN SATURATION: 97 % | HEART RATE: 94 BPM | BODY MASS INDEX: 44.32 KG/M2 | SYSTOLIC BLOOD PRESSURE: 127 MMHG | RESPIRATION RATE: 20 BRPM | TEMPERATURE: 96.5 F

## 2019-07-02 DIAGNOSIS — M51.26 HNP (HERNIATED NUCLEUS PULPOSUS), LUMBAR: ICD-10-CM

## 2019-07-02 DIAGNOSIS — F33.9 RECURRENT DEPRESSION (HCC): ICD-10-CM

## 2019-07-02 DIAGNOSIS — M48.00 SPINAL STENOSIS, UNSPECIFIED SPINAL REGION: ICD-10-CM

## 2019-07-02 DIAGNOSIS — J44.9 CHRONIC OBSTRUCTIVE PULMONARY DISEASE, UNSPECIFIED COPD TYPE (HCC): Primary | ICD-10-CM

## 2019-07-02 DIAGNOSIS — K21.9 GASTROESOPHAGEAL REFLUX DISEASE, ESOPHAGITIS PRESENCE NOT SPECIFIED: ICD-10-CM

## 2019-07-02 DIAGNOSIS — M79.89 LEG SWELLING: ICD-10-CM

## 2019-07-02 DIAGNOSIS — F10.21 ALCOHOLISM IN REMISSION (HCC): ICD-10-CM

## 2019-07-02 DIAGNOSIS — M25.561 CHRONIC PAIN OF BOTH KNEES: ICD-10-CM

## 2019-07-02 DIAGNOSIS — G89.29 CHRONIC PAIN OF BOTH KNEES: ICD-10-CM

## 2019-07-02 DIAGNOSIS — G47.30 SLEEP APNEA, UNSPECIFIED TYPE: ICD-10-CM

## 2019-07-02 DIAGNOSIS — F17.219 CIGARETTE NICOTINE DEPENDENCE WITH NICOTINE-INDUCED DISORDER: ICD-10-CM

## 2019-07-02 DIAGNOSIS — M25.562 CHRONIC PAIN OF BOTH KNEES: ICD-10-CM

## 2019-07-02 RX ORDER — VARENICLINE TARTRATE 1 MG/1
1 TABLET, FILM COATED ORAL
Qty: 60 TAB | Refills: 2 | Status: SHIPPED | OUTPATIENT
Start: 2019-07-31 | End: 2019-09-27 | Stop reason: SDUPTHER

## 2019-07-02 RX ORDER — BUDESONIDE AND FORMOTEROL FUMARATE DIHYDRATE 160; 4.5 UG/1; UG/1
2 AEROSOL RESPIRATORY (INHALATION) 2 TIMES DAILY
Qty: 6 INHALER | Refills: 2 | Status: SHIPPED | OUTPATIENT
Start: 2019-07-02 | End: 2019-11-03 | Stop reason: SDUPTHER

## 2019-07-02 RX ORDER — BUDESONIDE AND FORMOTEROL FUMARATE DIHYDRATE 160; 4.5 UG/1; UG/1
AEROSOL RESPIRATORY (INHALATION)
Refills: 0 | OUTPATIENT
Start: 2019-07-02

## 2019-07-02 RX ORDER — VARENICLINE TARTRATE 25 MG
KIT ORAL
Qty: 1 DOSE PACK | Refills: 0 | Status: SHIPPED | OUTPATIENT
Start: 2019-07-02 | End: 2019-07-23

## 2019-07-02 RX ORDER — FUROSEMIDE 20 MG/1
20 TABLET ORAL
Qty: 90 TAB | Refills: 1 | Status: SHIPPED | OUTPATIENT
Start: 2019-07-02 | End: 2021-11-17

## 2019-07-02 RX ORDER — SUCRALFATE 1 G/1
1 TABLET ORAL 2 TIMES DAILY
COMMUNITY
End: 2020-07-20 | Stop reason: SDUPTHER

## 2019-07-02 NOTE — PROGRESS NOTES
OFFICE NOTE    Carmelo Hsu is a 62 y.o. female presenting today for office visit. 7/2/2019  7:52 AM    Chief Complaint   Patient presents with    Follow Up Chronic Condition       HPI: Here today for follow up on chronic conditions. Last routine labs completed 3/2019. Follows with sleep medicine, weight loss center (will be following soon with [employer] weight loss center), cardiology, GI, and orthopedics. Remote history of fibrosarcoma addressed PRN. COPD/ AYE: Currently on Symbicort and PRN Albuterol with allergy medications. Follows with sleep specialist. No complaints related to. On Wellbutrin for smoking cessation- has had reduction in smoking but has not stopped completed- would like to try Chantix. GERD: Taking Dexilant for GERD symptoms chronically. She reports she is also taking Carafate at this time per GI. Reports in past that it keeps her symptoms controlled. Follows with GI specialist and has upcoming upper endoscopy planned. Depression: Currently on Celexa and Wellbutrin (also for smoking) for management- had been taking off Abilify by past weight specialist. She reports that she has been doing pretty well on this management. Denies SI/HI. History of alcoholism. HNP/spine stenosis lumbar/ knee pain: Patient taking Lyrica- no complaints related to other than would like to have increase in pain relief if possible. Following with orthopedics. States that she has been told she has arthritis in both knees. Noted to have lumbar chronic issues. Also uses PRN Tylenol and Voltaren gel. Would like refill on PRN Lasix that she uses for severe leg swelling. Review of Systems   Constitutional: Negative for appetite change, chills, fatigue, fever and unexpected weight change. Respiratory: Negative for cough, shortness of breath and wheezing. Cardiovascular: Positive for leg swelling (intermittent). Negative for chest pain and palpitations.    Gastrointestinal: Negative for abdominal pain, constipation, diarrhea, nausea and vomiting. Genitourinary: Negative for difficulty urinating and frequency. Musculoskeletal: Positive for arthralgias and back pain. Negative for myalgias. +chronic pain but none today   Skin: Negative for rash. Neurological: Negative for dizziness and headaches. Psychiatric/Behavioral: Negative for dysphoric mood, self-injury, sleep disturbance and suicidal ideas. The patient is not nervous/anxious.           PHQ Screening   3 most recent PHQ Screens 3/27/2019   Little interest or pleasure in doing things Not at all   Feeling down, depressed, irritable, or hopeless Several days   Total Score PHQ 2 1         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    GERD (gastroesophageal reflux disease)     History of gastric bypass 2012    History of meniscal tear 2015, 2018    right in 2015, left in 2018    HNP (herniated nucleus pulposus), lumbar     patient reported    Lung nodules     resolved 2018 CT    Osteopenia 10/03/2017    Recurrent depression ([address]     Sleep apnea     on cpap    Spinal stenosis, lumbar     patient reported       Past Surgical History:   Procedure Laterality Date    HX ARTHRODESIS  01/2000    Herniated Disc, arthritis right foot 06/06, 07/07, C 6/7, C 4/6 Stenosis    HX CHOLECYSTECTOMY  09/2008    gallbladder disease    HX COLONOSCOPY  05/2009    HX GASTRIC BYPASS  2012    GASTRIC BYPASS  [facility] Gastric Bypass      HX HYSTERECTOMY  06/1994    HX MENISCECTOMY  10/2015    right repari of torn meniscus    HX MENISCECTOMY Left 03/16/2018    partial meniscectomy due to tear    HX MYOMECTOMY  06/1984    benign tumor    HX ORTHOPAEDIC  1964    orthopaedic excision Fibrosarcoma and Lymphangiogram    HX PELVIC LAPAROSCOPY  04/1984    large uterine blockage    NEUROLOGICAL PROCEDURE UNLISTED  2000, 2007    Cervical fusion       Social History     Socioeconomic History    Marital status:      Spouse name: Not on file    Number of children: Not on file    Years of education: Not on file    Highest education level: Not on file   Occupational History    Not on file   Social Needs    Financial resource strain: Not on file    Food insecurity:     Worry: Not on file     Inability: Not on file    Transportation needs:     Medical: Not on file     Non-medical: Not on file   Tobacco Use    Smoking status: Current Every Day Smoker     Packs/day: 1.00     Years: 45.00     Pack years: 45.00    Smokeless tobacco: Never Used   Substance and Sexual Activity    Alcohol use: No     Comment: quit 1980s- was heavy etoh    Drug use: No     Comment: marijuana, cocaine 30 yrs ago    Sexual activity: Never   Lifestyle    Physical activity:     Days per week: Not on file     Minutes per session: Not on file    Stress: Not on file   Relationships    Social connections:     Talks on phone: Not on file     Gets together: Not on file     Attends [employer] service: Not on file     Active member of club or organization: Not on file     Attends meetings of clubs or organizations: Not on file     Relationship status: Not on file    Intimate partner violence:     Fear of current or ex partner: Not on file     Emotionally abused: Not on file     Physically abused: Not on file     Forced sexual activity: Not on file   Other Topics Concern    Not on file   Social History Narrative    Not on file       Family History   Problem Relation Age of Onset    Hypertension Mother     Depression Mother     Cancer Mother     Ovarian Cancer Mother     Breast Problems Mother     Cancer Father     Cancer Sister     Depression Sister     Depression Brother     Stroke Neg Hx     Heart Attack Neg Hx        Allergies   Allergen Reactions    Adhesive Tape-Silicones Swelling     REALLY BAD SWELLING AND SORE APPEAR    Alcohol Other (comments)     PT STATES SHE IS AN ALCOHOLIC    Lactose Other (comments)     PT STATES IT MAKES HER STOMACH HURT    Percodan [Oxycodone Hcl-Oxycodone-Asa] Itching and Other (comments)     crying    Nsaids (Non-Steroidal Anti-Inflammatory Drug) Other (comments)     PT NOT ABLE TO TAKE DUE TO GASTRIC BYPASS       Current Outpatient Medications   Medication Sig Dispense Refill    sucralfate (CARAFATE) 1 gram tablet Take 1 g by mouth two (2) times a day.  acetaminophen (TYLENOL EXTRA STRENGTH) 500 mg tablet Take  by mouth every six (6) hours as needed for Pain.  citalopram (CELEXA) 20 mg tablet Take 1 Tab by mouth daily. 90 Tab 3    budesonide-formoterol (SYMBICORT) 160-4.5 mcg/actuation HFAA Take 2 Puffs by inhalation two (2) times a day. 6 Inhaler 3    pregabalin (LYRICA) 150 mg capsule Take 1 Cap by mouth two (2) times a day. Max Daily Amount: 300 mg. 180 Cap 1    albuterol (PROVENTIL HFA, VENTOLIN HFA, PROAIR HFA) 90 mcg/actuation inhaler Take 2 Puffs by inhalation every four (4) hours as needed for Wheezing or Shortness of Breath. 3 Inhaler 4    diclofenac (VOLTAREN) 1 % gel Apply 2 g to affected area every six (6) hours as needed for Pain. 100 g 11    DEXILANT 60 mg CpDB capsule (delayed release) TAKE 1 CAPSULE BY MOUTH DAILY 90 Cap 3    buPROPion SR (WELLBUTRIN SR) 150 mg SR tablet TAKE 1 TABLET BY MOUTH TWICE DAILY 180 Tab 3    fluticasone (FLONASE) 50 mcg/actuation nasal spray SHAKE LIQUID AND USE 2 SPRAYS IN EACH NOSTRIL DAILY 3 Bottle 3    b complex vitamins tablet Take 1 Tab by mouth daily.  calcium-cholecalciferol, d3, (CALCIUM 600 + D) 600-125 mg-unit tab Take 1,065 mg by mouth nightly. Indications: TAKES 2 AT BEDTIME      omega 3-dha-epa-fish oil (FISH OIL) 100-160-1,000 mg cap Take 1,065 mg by mouth daily.  ferrous sulfate ER (IRON) 160 mg (50 mg iron) TbER tablet Take 1 Tab by mouth daily.  Indications: PT TAKES 40 MG      multivitamin (ONE A DAY) tablet Take 1 Tab by mouth daily.  varenicline (CHANTIX) 1 mg tablet Take 1 mg by mouth two (2) times daily (after meals).  amoxicillin 500 mg tab Take 500 mg by mouth three (3) times daily. 30 Tab 0    guaiFENesin (ROBITUSSIN) 100 mg/5 mL liquid Take 10 mL by mouth four (4) times daily as needed for Cough. 1 Bottle 0           Advance Care Planning:   Patient was offered the opportunity to discuss advance care planning NO   Does patient have an Advance Directive: If no, did you provide information on [facility]? Patient Care Team:  Patient Care Team:  Chanelle Rodriguez NP as PCP - General (Nurse Practitioner)  Jaye May DO (Orthopedic Surgery)  Patricio Kayser, NP (Neurology)  Emily Addison MD ([address] [place] [address])  [place], [place] as Physician Assistant (Psychiatry)        LABS:  None new to review    RADIOLOGY:  None new to review      Physical Exam   Constitutional: She is oriented to person, place, and time. She appears well-developed and well-nourished. No distress. Neck: Normal range of motion. Neck supple. Cardiovascular: Normal rate, regular rhythm and normal heart sounds. No murmur heard. Pulmonary/Chest: Effort normal and breath sounds normal. No respiratory distress. Abdominal: Soft. Bowel sounds are normal. There is no tenderness. Musculoskeletal: She exhibits no edema. -full musculoskeletal exam deferred (no pain today)   Neurological: She is alert and oriented to person, place, and time. She exhibits normal muscle tone. Coordination and gait normal.   Skin: Skin is warm and dry. Psychiatric: She has a normal mood and affect.  Her behavior is normal. Thought content normal.         Vitals:    07/02/19 0741   BP: 127/78   Pulse: 94   Resp: 20   Temp: 96.5 °F (35.8 °C)   TempSrc: Oral   SpO2: 97%   Weight: 266 lb (120.7 kg)   Height: 5' 5\" (1.651 m)   PainSc:   0 - No pain       Wt Readings from Last 3 Encounters:   07/02/19 266 lb (120.7 kg)   05/17/19 267 lb (121.1 kg)   05/13/19 270 lb (122.5 kg)       Assessment and Plan    Chronic obstructive pulmonary disease, unspecified COPD type (HCC)/ Sleep apnea, unspecified type  *Continue with current inhalers. Continue with sleep specialist    Cigarette nicotine dependence with nicotine-induced disorder  *She would like to start Chantix- believes she has been on in past- start titration. Advised on SE and predicted effectiveness.   - varenicline (CHANTIX) 1 mg tablet; Take 1 Tab by mouth two (2) times daily (after meals). Dispense: 60 Tab; Refill: 2  - varenicline (CHANTIX STARTER RICK) 0.5 mg (11)- 1 mg (42) DsPk; Take as directed  Dispense: 1 Dose Pack; Refill: 0    Gastroesophageal reflux disease, esophagitis presence not specified  *Continue PPI. Continue with GI. Upcoming EGD planned    Recurrent depression (HCC)/ Alcoholism in remission ([facility]  *Stable. Continue current medications    HNP (herniated nucleus pulposus), lumbar/ Spinal stenosis, unspecified spinal region/ Chronic pain of both knees  *Continue with orthopedics PRN. Continue with current management    Leg swelling  *Educated patient on effects of unnecessary diuretic use. Highly advised against using often and instead wearing compression stockings, DASH diet, and weight loss. - furosemide (LASIX) 20 mg tablet; Take 1 Tab by mouth daily as needed (severe leg swelling). Dispense: 90 Tab; Refill: 1      *Plan of care reviewed with patient. Patient in agreement with plan and expresses understanding. All questions answered and patient encouraged to call or RTO if further questions or concerns. Follow-up and Dispositions    · Return in about 2 months (around 9/2/2019) for nurse visit- Lyrica Rx pickup. 9 months provider visit routine care- 30 mins. Ness Khalil

## 2019-07-28 ENCOUNTER — ANESTHESIA EVENT (OUTPATIENT)
Dept: ENDOSCOPY | Age: 59
End: 2019-07-28
Payer: MEDICARE

## 2019-07-29 ENCOUNTER — ANESTHESIA (OUTPATIENT)
Dept: ENDOSCOPY | Age: 59
End: 2019-07-29
Payer: MEDICARE

## 2019-07-29 ENCOUNTER — HOSPITAL ENCOUNTER (OUTPATIENT)
Age: 59
Setting detail: OUTPATIENT SURGERY
Discharge: HOME | End: 2019-07-29
Attending: INTERNAL MEDICINE | Admitting: INTERNAL MEDICINE
Payer: MEDICARE

## 2019-07-29 VITALS
SYSTOLIC BLOOD PRESSURE: 113 MMHG | DIASTOLIC BLOOD PRESSURE: 67 MMHG | RESPIRATION RATE: 20 BRPM | HEART RATE: 76 BPM | WEIGHT: 268.6 LBS | BODY MASS INDEX: 44.75 KG/M2 | HEIGHT: 65 IN | TEMPERATURE: 97.5 F | OXYGEN SATURATION: 99 %

## 2019-07-29 LAB
AMPHET UR QL SCN: NEGATIVE
BARBITURATES UR QL SCN: NEGATIVE
BENZODIAZ UR QL: NEGATIVE
CANNABINOIDS UR QL SCN: NEGATIVE
COCAINE UR QL SCN: NEGATIVE
HDSCOM,HDSCOM: NORMAL
METHADONE UR QL: NEGATIVE
OPIATES UR QL: NEGATIVE
PCP UR QL: NEGATIVE

## 2019-07-29 PROCEDURE — 77030018846 HC SOL IRR STRL H20 ICUM -A: Performed by: INTERNAL MEDICINE

## 2019-07-29 PROCEDURE — 77030019988 HC FCPS ENDOSC DISP BSC -B: Performed by: INTERNAL MEDICINE

## 2019-07-29 PROCEDURE — 76060000031 HC ANESTHESIA FIRST 0.5 HR: Performed by: INTERNAL MEDICINE

## 2019-07-29 PROCEDURE — 77030008565 HC TBNG SUC IRR ERBE -B: Performed by: INTERNAL MEDICINE

## 2019-07-29 PROCEDURE — 76040000019: Performed by: INTERNAL MEDICINE

## 2019-07-29 PROCEDURE — 88342 IMHCHEM/IMCYTCHM 1ST ANTB: CPT

## 2019-07-29 PROCEDURE — 88305 TISSUE EXAM BY PATHOLOGIST: CPT

## 2019-07-29 PROCEDURE — 74011250636 HC RX REV CODE- 250/636

## 2019-07-29 PROCEDURE — 80307 DRUG TEST PRSMV CHEM ANLYZR: CPT

## 2019-07-29 PROCEDURE — 74011250636 HC RX REV CODE- 250/636: Performed by: NURSE ANESTHETIST, CERTIFIED REGISTERED

## 2019-07-29 PROCEDURE — 74011000250 HC RX REV CODE- 250

## 2019-07-29 PROCEDURE — 74011000250 HC RX REV CODE- 250: Performed by: NURSE ANESTHETIST, CERTIFIED REGISTERED

## 2019-07-29 RX ORDER — LIDOCAINE HYDROCHLORIDE 10 MG/ML
0.1 INJECTION, SOLUTION EPIDURAL; INFILTRATION; INTRACAUDAL; PERINEURAL AS NEEDED
Status: DISCONTINUED | OUTPATIENT
Start: 2019-07-29 | End: 2019-07-29 | Stop reason: HOSPADM

## 2019-07-29 RX ORDER — GLYCOPYRROLATE 0.2 MG/ML
INJECTION INTRAMUSCULAR; INTRAVENOUS AS NEEDED
Status: DISCONTINUED | OUTPATIENT
Start: 2019-07-29 | End: 2019-07-29 | Stop reason: HOSPADM

## 2019-07-29 RX ORDER — ONDANSETRON 2 MG/ML
4 INJECTION INTRAMUSCULAR; INTRAVENOUS ONCE
Status: DISCONTINUED | OUTPATIENT
Start: 2019-07-29 | End: 2019-07-29 | Stop reason: HOSPADM

## 2019-07-29 RX ORDER — SODIUM CHLORIDE, SODIUM LACTATE, POTASSIUM CHLORIDE, CALCIUM CHLORIDE 600; 310; 30; 20 MG/100ML; MG/100ML; MG/100ML; MG/100ML
50 INJECTION, SOLUTION INTRAVENOUS CONTINUOUS
Status: DISCONTINUED | OUTPATIENT
Start: 2019-07-29 | End: 2019-07-29 | Stop reason: HOSPADM

## 2019-07-29 RX ORDER — PROPOFOL 10 MG/ML
INJECTION, EMULSION INTRAVENOUS AS NEEDED
Status: DISCONTINUED | OUTPATIENT
Start: 2019-07-29 | End: 2019-07-29 | Stop reason: HOSPADM

## 2019-07-29 RX ORDER — LIDOCAINE HYDROCHLORIDE 20 MG/ML
INJECTION, SOLUTION EPIDURAL; INFILTRATION; INTRACAUDAL; PERINEURAL AS NEEDED
Status: DISCONTINUED | OUTPATIENT
Start: 2019-07-29 | End: 2019-07-29 | Stop reason: HOSPADM

## 2019-07-29 RX ORDER — SODIUM CHLORIDE 0.9 % (FLUSH) 0.9 %
5-40 SYRINGE (ML) INJECTION EVERY 8 HOURS
Status: DISCONTINUED | OUTPATIENT
Start: 2019-07-29 | End: 2019-07-29 | Stop reason: HOSPADM

## 2019-07-29 RX ORDER — SODIUM CHLORIDE 0.9 % (FLUSH) 0.9 %
5-40 SYRINGE (ML) INJECTION AS NEEDED
Status: DISCONTINUED | OUTPATIENT
Start: 2019-07-29 | End: 2019-07-29 | Stop reason: HOSPADM

## 2019-07-29 RX ADMIN — LIDOCAINE HYDROCHLORIDE 100 MG: 20 INJECTION, SOLUTION EPIDURAL; INFILTRATION; INTRACAUDAL; PERINEURAL at 08:00

## 2019-07-29 RX ADMIN — PROPOFOL 50 MG: 10 INJECTION, EMULSION INTRAVENOUS at 08:07

## 2019-07-29 RX ADMIN — PROPOFOL 50 MG: 10 INJECTION, EMULSION INTRAVENOUS at 08:04

## 2019-07-29 RX ADMIN — SODIUM CHLORIDE, SODIUM LACTATE, POTASSIUM CHLORIDE, AND CALCIUM CHLORIDE 50 ML/HR: 600; 310; 30; 20 INJECTION, SOLUTION INTRAVENOUS at 07:11

## 2019-07-29 RX ADMIN — GLYCOPYRROLATE 0.2 MG: 0.2 INJECTION INTRAMUSCULAR; INTRAVENOUS at 07:43

## 2019-07-29 RX ADMIN — PROPOFOL 100 MG: 10 INJECTION, EMULSION INTRAVENOUS at 08:00

## 2019-07-29 RX ADMIN — FAMOTIDINE 20 MG: 10 INJECTION INTRAVENOUS at 07:11

## 2019-07-29 NOTE — DISCHARGE INSTRUCTIONS
Esophageal Dilation: What to Expect at [address] [place] [address]  After you have esophageal dilation, you will stay at the hospital or surgery center for 1 to 2 hours. This will allow the medicine to wear off. You will be able to go home after your doctor or nurse checks to make sure you are not having any problems. This care sheet gives you a general idea about how long it will take for you to recover. But each person recovers at a different pace. Follow the steps below to get better as quickly as possible. How can you care for yourself at home? Activity    · Rest as much as you need to after you go home.     · You should be able to go back to your usual activities the day after the procedure. Diet    · Follow your doctor's directions for eating after the procedure.     · Drink plenty of fluids (unless your doctor has told you not to). Medicines    · Your doctor will tell you if and when you can restart your medicines. He or she will also give you instructions about taking any new medicines.     · If you take blood thinners, such as warfarin (Coumadin), clopidogrel (Plavix), or aspirin, be sure to talk to your doctor. He or she will tell you if and when to start taking those medicines again. Make sure that you understand exactly what your doctor wants you to do.     · If you have a sore throat the day after the procedure, use an over-the-counter spray to numb your throat. Sucking on throat lozenges and gargling with warm salt water may also help relieve your symptoms. Follow-up care is a key part of your treatment and safety. Be sure to make and go to all appointments, and call your doctor if you are having problems. It's also a good idea to know your test results and keep a list of the medicines you take. When should you call for help? Call 911 anytime you think you may need emergency care.  For example, call if:    · You passed out (lost consciousness).     · You have trouble breathing.     · Your stools are maroon or very bloody    Call your doctor now or seek immediate medical care if:    · You have new or worse belly pain.     · You have a fever.     · You have new or more blood in your stools.     · You are sick to your stomach and cannot drink fluids.     · You cannot pass stools or gas.     · You have pain that does not get better after you take pain medicine.    Watch closely for changes in your health, and be sure to contact your doctor if:    · Your throat still hurts after a day or two.     · You do not get better as expected. Where can you learn more? Go to http://steve-anand.info/. Enter U439 in the search box to learn more about \"Esophageal Dilation: What to Expect at Home. \"  Current as of: November 7, 2018  Content Version: 12.1  © 1791-2511 [employer]. Care instructions adapted under license by [employer] (which disclaims liability or warranty for this information). If you have questions about a medical condition or this instruction, always ask your healthcare professional. [place] [address] any warranty or liability for your use of this information. DISCHARGE SUMMARY from Nurse    PATIENT INSTRUCTIONS:    After general anesthesia or intravenous sedation, for 24 hours or while taking prescription Narcotics:  · Limit your activities  · Do not drive and operate hazardous machinery  · Do not make important personal or business decisions  · Do  not drink alcoholic beverages  · If you have not urinated within 8 hours after discharge, please contact your surgeon on call.     Report the following to your surgeon:  · Excessive pain, swelling, redness or odor of or around the surgical area  · Temperature over 100.5  · Nausea and vomiting lasting longer than 4 hours or if unable to take medications  · Any signs of decreased circulation or nerve impairment to extremity: change in color, persistent  numbness, tingling, coldness or increase pain  · Any questions    *  Please give a list of your current medications to your Primary Care Provider. *  Please update this list whenever your medications are discontinued, doses are      changed, or new medications (including over-the-counter products) are added. *  Please carry medication information at all times in case of emergency situations. These are general instructions for a healthy lifestyle:    No smoking/ No tobacco products/ Avoid exposure to second hand smoke  Surgeon General's Warning:  Quitting smoking now greatly reduces serious risk to your health. Obesity, smoking, and sedentary lifestyle greatly increases your risk for illness    A healthy diet, regular physical exercise & weight monitoring are important for maintaining a healthy lifestyle    You may be retaining fluid if you have a history of heart failure or if you experience any of the following symptoms:  Weight gain of 3 pounds or more overnight or 5 pounds in a week, increased swelling in our hands or feet or shortness of breath while lying flat in bed. Please call your doctor as soon as you notice any of these symptoms; do not wait until your next office visit. The discharge information has been reviewed with the patient. The patient verbalized understanding. Discharge medications reviewed with the patient and appropriate educational materials and side effects teaching were provided.   ___________________________________________________________________________________________________________________________________

## 2019-07-29 NOTE — ANESTHESIA POSTPROCEDURE EVALUATION
Procedure(s):  UPPER ENDOSCOPY with bx's and diation. MAC    Anesthesia Post Evaluation      Multimodal analgesia: multimodal analgesia used between 6 hours prior to anesthesia start to PACU discharge  Patient location during evaluation: PACU  Patient participation: complete - patient participated  Level of consciousness: awake  Pain management: adequate  Airway patency: patent  Anesthetic complications: no  Cardiovascular status: acceptable  Respiratory status: acceptable  Hydration status: acceptable  Post anesthesia nausea and vomiting:  controlled      Vitals Value Taken Time   /60 7/29/2019  8:23 AM   Temp 36.5 °C (97.7 °F) 7/29/2019  8:14 AM   Pulse 77 7/29/2019  8:29 AM   Resp 17 7/29/2019  8:29 AM   SpO2 100 % 7/29/2019  8:29 AM   Vitals shown include unvalidated device data.

## 2019-07-29 NOTE — ANESTHESIA PREPROCEDURE EVALUATION
Anesthetic History   No history of anesthetic complications            Review of Systems / Medical History  Patient summary reviewed and pertinent labs reviewed    Pulmonary    COPD: mild    Sleep apnea: CPAP  Smoker         Neuro/Psych         Psychiatric history (Depression)     Cardiovascular                  Exercise tolerance: >4 METS     GI/Hepatic/Renal     GERD: well controlled           Endo/Other        Morbid obesity     Other Findings   Comments: Documentation of current medication  Current medications obtained, documented and obtained? YES      Risk Factors for Postoperative nausea/vomiting:       History of postoperative nausea/vomiting? NO       Female? YES       Motion sickness? NO       Intended opioid administration for postoperative analgesia? YES      Smoking Abstinence:  Current Smoker? YES  Elective Surgery? YES  Seen preoperatively by anesthesiologist or proxy prior to day of surgery? YES  Pt abstained from smoking 24 hours prior to anesthesia?  NO    Preventive care/screening for High Blood Pressure:  Aged 18 years and older: YES  Screened for high blood pressure: YES  Patients with high blood pressure referred to primary care provider   for BP management: YES                 Physical Exam    Airway  Mallampati: II  TM Distance: 4 - 6 cm  Neck ROM: normal range of motion   Mouth opening: Normal     Cardiovascular  Regular rate and rhythm,  S1 and S2 normal,  no murmur, click, rub, or gallop             Dental  No notable dental hx       Pulmonary  Breath sounds clear to auscultation               Abdominal  GI exam deferred       Other Findings            Anesthetic Plan    ASA: 3  Anesthesia type: MAC            Anesthetic plan and risks discussed with: Patient

## 2019-07-29 NOTE — H&P
[facility]    [email or web address]      Impression:   1.gerd  Nausea   luq pan  Dysphagia  Non cardiac chest pain      Plan:   egd and dilate and/or Bx  1. Chief Complaint:   Nccp, LUQ pain    HPI:  Mandie Parry is a 62 y.o. female who is being seen on consult for  The above. Minimal relief w/ PPI, Sxs better w/ carafate , see notes.     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    GERD (gastroesophageal reflux disease)     History of gastric bypass 2012    History of meniscal tear 2015, 2018    right in 2015, left in 2018    HNP (herniated nucleus pulposus), lumbar     patient reported    Lung nodules     resolved 2018 CT    Osteopenia 10/03/2017    Recurrent depression ([address]     Sleep apnea     on cpap    Spinal stenosis, lumbar     patient reported       PSH:   Past Surgical History:   Procedure Laterality Date    HX ARTHRODESIS  01/2000    Herniated Disc, arthritis right foot 06/06, 07/07, C 6/7, C 4/6 Stenosis    HX CHOLECYSTECTOMY  09/2008    gallbladder disease    HX COLONOSCOPY  05/2009    HX GASTRIC BYPASS  2012    GASTRIC BYPASS  [facility] Gastric Bypass      HX HYSTERECTOMY  06/1994    HX MENISCECTOMY  10/2015    right repari of torn meniscus    HX MENISCECTOMY Left 03/16/2018    partial meniscectomy due to tear    HX MYOMECTOMY  06/1984    benign tumor    HX ORTHOPAEDIC  1964    orthopaedic excision Fibrosarcoma and Lymphangiogram    HX PELVIC LAPAROSCOPY  04/1984    large uterine blockage    NEUROLOGICAL PROCEDURE UNLISTED  2000, 2007    Cervical fusion       Social HX:   Social History     Socioeconomic History    Marital status:      Spouse name: Not on file    Number of children: Not on file    Years of education: Not on file    Highest education level: Not on file   Occupational History    Not on file   Social Needs    Financial resource strain: Not on file    Food insecurity:     Worry: Not on file     Inability: Not on file    Transportation needs:     Medical: Not on file     Non-medical: Not on file   Tobacco Use    Smoking status: Current Every Day Smoker     Packs/day: 0.50     Years: 45.00     Pack years: 22.50    Smokeless tobacco: Never Used   Substance and Sexual Activity    Alcohol use: No     Comment: quit 1980s- was heavy etoh    Drug use: Not Currently     Comment: marijuana, cocaine 30 yrs ago    Sexual activity: Never   Lifestyle    Physical activity:     Days per week: Not on file     Minutes per session: Not on file    Stress: Not on file   Relationships    Social connections:     Talks on phone: Not on file     Gets together: Not on file     Attends Taoist service: Not on file     Active member of club or organization: Not on file     Attends meetings of clubs or organizations: Not on file     Relationship status: Not on file    Intimate partner violence:     Fear of current or ex partner: Not on file     Emotionally abused: Not on file     Physically abused: Not on file     Forced sexual activity: Not on file   Other Topics Concern    Not on file   Social History Narrative    Not on file       FHX:   Family History   Problem Relation Age of Onset    Hypertension Mother     Depression Mother     Cancer Mother     Ovarian Cancer Mother     Breast Problems Mother     Cancer Father     Cancer Sister     Depression Sister     Depression Brother     Stroke Neg Hx     Heart Attack Neg Hx        Allergy:   Allergies   Allergen Reactions    Adhesive Tape-Silicones Swelling     REALLY BAD SWELLING AND SORE APPEAR    Alcohol Other (comments)     PT STATES SHE IS AN ALCOHOLIC    Lactose Other (comments)     PT STATES IT MAKES HER STOMACH HURT    Percodan [Oxycodone Hcl-Oxycodone-Asa] Itching and Other (comments)     crying    Nsaids (Non-Steroidal Anti-Inflammatory Drug) Other (comments)     PT NOT ABLE TO TAKE DUE TO GASTRIC BYPASS       Home Medications:     Medications Prior to Admission   Medication Sig    sucralfate (CARAFATE) 1 gram tablet Take 1 g by mouth two (2) times a day.  [START ON 7/31/2019] varenicline (CHANTIX) 1 mg tablet Take 1 Tab by mouth two (2) times daily (after meals).  furosemide (LASIX) 20 mg tablet Take 1 Tab by mouth daily as needed (severe leg swelling).  budesonide-formoterol (SYMBICORT) 160-4.5 mcg/actuation HFAA Take 2 Puffs by inhalation two (2) times a day.  acetaminophen (TYLENOL EXTRA STRENGTH) 500 mg tablet Take  by mouth every six (6) hours as needed for Pain.  citalopram (CELEXA) 20 mg tablet Take 1 Tab by mouth daily.  pregabalin (LYRICA) 150 mg capsule Take 1 Cap by mouth two (2) times a day. Max Daily Amount: 300 mg.    albuterol (PROVENTIL HFA, VENTOLIN HFA, PROAIR HFA) 90 mcg/actuation inhaler Take 2 Puffs by inhalation every four (4) hours as needed for Wheezing or Shortness of Breath.  DEXILANT 60 mg CpDB capsule (delayed release) TAKE 1 CAPSULE BY MOUTH DAILY    buPROPion SR (WELLBUTRIN SR) 150 mg SR tablet TAKE 1 TABLET BY MOUTH TWICE DAILY    fluticasone (FLONASE) 50 mcg/actuation nasal spray SHAKE LIQUID AND USE 2 SPRAYS IN EACH NOSTRIL DAILY    b complex vitamins tablet Take 1 Tab by mouth daily.  calcium-cholecalciferol, d3, (CALCIUM 600 + D) 600-125 mg-unit tab Take 1,065 mg by mouth nightly. Indications: TAKES 2 AT BEDTIME    omega 3-dha-epa-fish oil (FISH OIL) 100-160-1,000 mg cap Take 1,065 mg by mouth daily.  ferrous sulfate ER (IRON) 160 mg (50 mg iron) TbER tablet Take 1 Tab by mouth daily. Indications: PT TAKES 40 MG    multivitamin (ONE A DAY) tablet Take 1 Tab by mouth daily.  diclofenac (VOLTAREN) 1 % gel Apply 2 g to affected area every six (6) hours as needed for Pain.        Review of Systems:     Constitutional: No fevers, chills, weight loss, fatigue. Skin: No rashes, pruritis, jaundice, ulcerations, erythema. HENT: No headaches, nosebleeds, sinus pressure, rhinorrhea, sore throat. Eyes: No visual changes, blurred vision, eye pain, photophobia, jaundice. Cardiovascular: No chest pain, heart palpitations. Respiratory: No cough, SOB, wheezing, chest discomfort, orthopnea. Gastrointestinal: nausea   Genitourinary: No dysuria, bleeding, discharge, pyuria. Musculoskeletal: No weakness, arthralgias, wasting. Endo: No sweats. Heme: No bruising, easy bleeding. Allergies: As noted. Neurological: Cranial nerves intact. Alert and oriented. Gait not assessed. Psychiatric:  No anxiety, depression, hallucinations. Visit Vitals  /74   Pulse 69   Temp 97.8 °F (36.6 °C)   Resp 20   Ht 5' 5\" (1.651 m)   Wt 121.8 kg (268 lb 9.6 oz)   SpO2 96%   Breastfeeding? No   BMI 44.70 kg/m²       Physical Assessment:     constitutional: appearance:obese   skin: inspection: no rashes, ulcers, icterus or other lesions; no clubbing or telangiectasias. palpation: no induration or subcutaneos nodules. eyes: inspection: normal conjunctivae and lids; no jaundice pupils: symmetrical, normoreactive to light, normal accommodation and size. ENMT: mouth: normal oral mucosa,lips and gums; good dentition. oropharynx: normal tongue, hard and soft palate; posterior pharynx without erithema, exudate or lesions. neck: thyroid: normal size, consistency and position; no masses or tenderness. respiratory: effort: normal chest excursion; no intercostal retraction or accessory muscle use. cardiovascular: abdominal aorta: normal size and position; no bruits. palpation: PMI of normal size and position; normal rhythm; no thrill or murmurs. abdominal: abdomen: normal consistency; no tenderness or masses. hernias: no hernias appreciated. liver: normal size and consistency. spleen: not palpable. rectal: hemoccult/guaiac: not performed. musculoskeletal: digits and nails: no clubbing, cyanosis, petechiae or other inflammatory conditions. gait: normal gait and station head and neck: normal range of motion; no pain, crepitation or contracture. spine/ribs/pelvis: normal range of motion; no pain, deformity or contracture. lymphatic: axilae: not palpable. groin: not palpable. neck: within normal limits. other: not palpable. neurologic: cranial nerves: II-XII normal.   psychiatric: judgement/insight: within normal limits. memory: within normal limits for recent and remote events. mood and affect: no evidence of depression, anxiety or agitation. orientation: oriented to time, space and person. Basic Metabolic Profile   No results for input(s): NA, K, CL, CO2, BUN, GLU, CA, MG, PHOS in the last 72 hours. No lab exists for component: CREAT      CBC w/Diff    No results for input(s): WBC, RBC, HGB, HCT, MCV, MCH, MCHC, RDW, PLT, HGBEXT, HCTEXT, PLTEXT in the last 72 hours. No lab exists for component: MPV No results for input(s): GRANS, LYMPH, EOS, PRO, MYELO, METAS, BLAST in the last 72 hours. No lab exists for component: MONO, BASO     Hepatic Function   No results for input(s): ALB, TP, TBILI, GPT, SGOT, AP, AML, LPSE in the last 72 hours. No lab exists for component: Marilia Cool MD, M.D. [facility] [place] [id number], [address] [facility]  [email or web address] [facility]

## 2019-09-13 ENCOUNTER — DOCUMENTATION ONLY (OUTPATIENT)
Dept: FAMILY MEDICINE CLINIC | Age: 59
End: 2019-09-13

## 2019-09-13 NOTE — PROGRESS NOTES
9/13/2019  8:29 AM    Chief Complaint   Patient presents with    Prior Auth     Symbicort       Noted PA needed for Symbicort. PA completed at this time via CoverMyMeds. She has been on Symbicort since 12/2017 and is stable. Previously not doing well on Flovent and Albuterol regimen.  Awaiting determination

## 2019-09-19 NOTE — PROGRESS NOTES
9/19/2019  9:24 AM    Chief Complaint   Patient presents with    Prior Auth     Symbicort       Reviewed CoverMyMeds determination. Medication is covered so no PA was needed.

## 2019-09-27 ENCOUNTER — OFFICE VISIT (OUTPATIENT)
Dept: FAMILY MEDICINE CLINIC | Age: 59
End: 2019-09-27

## 2019-09-27 VITALS
SYSTOLIC BLOOD PRESSURE: 130 MMHG | RESPIRATION RATE: 20 BRPM | HEART RATE: 73 BPM | HEIGHT: 65 IN | DIASTOLIC BLOOD PRESSURE: 76 MMHG | BODY MASS INDEX: 45.65 KG/M2 | TEMPERATURE: 98 F | OXYGEN SATURATION: 95 % | WEIGHT: 274 LBS

## 2019-09-27 DIAGNOSIS — M25.562 CHRONIC PAIN OF BOTH KNEES: ICD-10-CM

## 2019-09-27 DIAGNOSIS — Z71.89 ACP (ADVANCE CARE PLANNING): ICD-10-CM

## 2019-09-27 DIAGNOSIS — M48.00 SPINAL STENOSIS, UNSPECIFIED SPINAL REGION: ICD-10-CM

## 2019-09-27 DIAGNOSIS — F10.21 ALCOHOLISM IN REMISSION (HCC): ICD-10-CM

## 2019-09-27 DIAGNOSIS — M85.80 OSTEOPENIA, UNSPECIFIED LOCATION: ICD-10-CM

## 2019-09-27 DIAGNOSIS — G89.29 CHRONIC PAIN OF BOTH KNEES: ICD-10-CM

## 2019-09-27 DIAGNOSIS — Z12.31 SCREENING MAMMOGRAM, ENCOUNTER FOR: ICD-10-CM

## 2019-09-27 DIAGNOSIS — Z23 ENCOUNTER FOR IMMUNIZATION: ICD-10-CM

## 2019-09-27 DIAGNOSIS — F17.211 CIGARETTE NICOTINE DEPENDENCE IN REMISSION: ICD-10-CM

## 2019-09-27 DIAGNOSIS — Z12.2 ENCOUNTER FOR SCREENING FOR LUNG CANCER: ICD-10-CM

## 2019-09-27 DIAGNOSIS — F33.9 RECURRENT DEPRESSION (HCC): ICD-10-CM

## 2019-09-27 DIAGNOSIS — Z00.00 ENCOUNTER FOR MEDICARE ANNUAL WELLNESS EXAM: Primary | ICD-10-CM

## 2019-09-27 DIAGNOSIS — M25.561 CHRONIC PAIN OF BOTH KNEES: ICD-10-CM

## 2019-09-27 DIAGNOSIS — Z11.1 ENCOUNTER FOR PPD TEST: ICD-10-CM

## 2019-09-27 DIAGNOSIS — M51.26 HNP (HERNIATED NUCLEUS PULPOSUS), LUMBAR: ICD-10-CM

## 2019-09-27 LAB
MM INDURATION POC: 0 MM (ref 0–5)
PPD POC: NEGATIVE NEGATIVE

## 2019-09-27 RX ORDER — PREGABALIN 150 MG/1
150 CAPSULE ORAL 2 TIMES DAILY
Qty: 180 CAP | Refills: 1 | Status: SHIPPED | OUTPATIENT
Start: 2019-09-27 | End: 2020-01-15 | Stop reason: SDUPTHER

## 2019-09-27 RX ORDER — VARENICLINE TARTRATE 1 MG/1
1 TABLET, FILM COATED ORAL
Qty: 180 TAB | Refills: 0 | Status: ON HOLD | OUTPATIENT
Start: 2019-09-27 | End: 2020-02-20 | Stop reason: SDUPTHER

## 2019-09-27 NOTE — PROGRESS NOTES
Shania Rao is a 62 y.o. female who presents for routine immunizations. She denies any symptoms , reactions or allergies that would exclude them from being immunized today. Risks and adverse reactions were discussed and the VIS was given to them. All questions were addressed. She was observed for 15 min post injection. There were no reactions observed. Narciso Cosby LPN      PPD Placement note  Shania Rao, 62 y.o. female is here today for placement of PPD test  Reason for PPD test: Employment  Pt taken PPD test before: yes  Verified in allergy area and with patient that they are not allergic to the products PPD is made of (Phenol or Tween). yes   Is patient taking any oral or IV steroid medication now or have they taken it in the last month? no  Has the patient ever received the BCG vaccine?: no  Has the patient been in recent contact with anyone known or suspected of having active TB disease?: no       Date of exposure (if applicable): n/a       Name of person they were exposed to (if applicable): n/a  Patient's Country of origin?: n/a  O: Alert and oriented in NAD. P:  PPD placed on 9/27/2019. Patient advised to return for reading within 48-72 hours.

## 2019-09-27 NOTE — PATIENT INSTRUCTIONS
Preventive Services Limitations Recommendation Preventative Services Limitations Recommendation Glaucoma Screening (no USPSTF recommendation) Diabetes mellitus, family history, , age 48 or over,  American, age 72 or over Recommended Periodontal Disease- Dentistry Services *May not be covered under Medicare Recommended 
  
  
Skin Cancer Screening Exam every year *May not be covered under Medicare Self checks encouraged Hearing Impairment Pure Tone Test every 3 years *May not be covered under Medicare No hearing loss noted COPD and Lung Cancer Screening CT chest or chest xray yearly as deemed necessary *May not be covered under Medicare Diagnosed with COPD  
  
CT low dose negative 10/2018 
  
Reordered Counseling to Prevent Tobacco Use 
- Counseling greater than 3 and up to 10 minutes - Counseling greater than 10 minutes Patients must be asymptomatic of tobacco-related conditions to receive as preventive service 
  
Up to 8 sessions/year Working on cessation Alcohol Misuse Counseling 4 brief face to face counseling sessions/year History of alcoholism  
  
Sober for the second time for 6 years Obesity Screening and Counseling BMI of 30 or more. Weekly visits for first month, then biweekly for months 2-6, then every month for months 7-12 if you lose 6.6 lbs in months 1-6 Body mass index is 45.6 kg/m². Screening for STIs and Counseling Annually screenings- 2 face to face counseling sessions/year Declines Hep C negative 12/2017 Human Immunodeficiency Virus (HIV) Screening (annually for increased risk patients) HIV-1 and HIV-2 by EIA, MAO, rapid antibody test, or oral mucosa transudate Tests covered annually for patients at increased risk.  Pregnant patients may receive up to 3 test during pregnancy. Declines Diabetes Screening Tests (at least every 3 years, Medicare covers annually or at 6-month intervals for prediabetic patients) 
  
 Fasting blood sugar (FBS) or glucose tolerance test (GTT) Diagnosed with one of the following: 
-Hypertension, Dyslipidemia, obesity, previous impaired FBS or GTT 
Or any two of the following: overweight, FH of diabetes, age ? 72, history of gestational diabetes, birth of baby weighing more than 9 pounds Glucose 94 [id number] Cardiovascular Screening Blood Tests (every 5 years) Total cholesterol, HDL, Triglycerides Order as a panel if possible LDL 60 3/2019 Medical Nutrition Therapy (MNT) (for diabetes or renal disease not recommended schedule) Requires referral by treating physician for patient with diabetes or renal disease. Up to 3 hours for initial year and 2 hours in subsequent years. Not indicated Diabetes Self-Management Training (DSMT) (no USPSTF recommendation) Requires referral by treating physician for patient with diabetes or renal disease. 10 hours of initial DSMT session of no less than 30 minutes each in a continuous 12-month period.  2 hours of follow-up DSMT in subsequent years. Not indicated Behavioral Therapy for Cardiovascular Disease Annually LDL controlled, not diabetic, BP controlled Continue to stop smoking and on weight loss Ultrasound Screening for Abdominal Aortic Aneurysm (AAA) (once) Patient must be referred through IPPE. Criteria: 
- Men who are 73-68 years old and smoked >100 cigarettes. -Anyone with FH of AAA 
-Or recommended for screening by USPSTF Not indicated Prostate Cancer Screening (annually up to age 76) - Digital rectal exam (MICHAEL) - Prostate specific antigen (PSA) Annually (age 48 or over), MICHAEL not paid separately when covered E/M service is provided on same date N/A Colorectal Cancer Screening -Fecal occult blood test (annual) -Flexible sigmoidoscopy (5y) 
-Screening colonoscopy (10y) -Barium Enema Colonoscopy 9/2016 repeat in about 5 years Bone Mass Measurement 
(age 72 & older, biennial) Requires diagnosis related to osteoporosis or estrogen deficiency. History of long-term glucocorticoid tx or baseline is needed. DEXA osteopenia 10/2017 Take Calcium and Vit D 
  
Repeat ordered Screening Mammography (biennial age 54-69) Annually (age 36 or over) Mammo 10/2018 normal 
  
Ordered for yearly screening Screening Pap Tests and Pelvic Examination (up to age 79 and after 79 if unknown history or abnormal study last 10 years) Every 24 months except high risk Not indicated for PAP testing due to history hysterectomy Hepatitis B Vaccinations (if medium/high risk) Medium/high risk factors:  End-stage renal disease, Hemophiliacs who received Factor VIII or IX concentrates, Clients of institutions for the mentally retarded, Persons who live in the same house as a HepB virus carrier, Homosexual men, Illicit injectable drug abusers. Not indicated Seasonal Influenza Vaccination (annually)   Given today Pneumococcal Vaccination (once after 65)   PCV 13 and PPSV 23 given in the past 
  
Boosters indicated at age 72 years 
  Herpes Zoster (Shingles) Vaccination (once after age 61) [de-identified] to persons at age 48 years in specific conditions *May not be covered by Medicare 11/2018 done Tetanus Vaccine Td booster every 10 years- Tdap if exposed to children under 1 year) *May not be covered by Medicare Unsure of last booster No medical indication

## 2019-10-01 NOTE — PROGRESS NOTES
9/27/19  5:40 PM    Chief Complaint   Patient presents with    Annual Wellness Visit       This is a Subsequent Medicare Annual Wellness Visit providing Personalized Prevention Plan Services (PPPS) (Performed 12 months after initial AWV and PPPS )    I have reviewed the patient's medical history in detail and updated the computerized patient record. 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    GERD (gastroesophageal reflux disease)     History of gastric bypass 2012    History of meniscal tear 2015, 2018    right in 2015, left in 2018    HNP (herniated nucleus pulposus), lumbar     patient reported    Lung nodules     resolved 2018 CT    Osteopenia 10/03/2017    Recurrent depression ([facility]     Sleep apnea     on cpap    Spinal stenosis, lumbar     patient reported      Past Surgical History:   Procedure Laterality Date    HX ARTHRODESIS  01/2000    Herniated Disc, arthritis right foot 06/06, 07/07, C 6/7, C 4/6 Stenosis    HX CHOLECYSTECTOMY  09/2008    gallbladder disease    HX COLONOSCOPY  05/2009    HX GASTRIC BYPASS  2012    GASTRIC BYPASS  [facility] Gastric Bypass      HX HYSTERECTOMY  06/1994    HX MENISCECTOMY  10/2015    right repari of torn meniscus    HX MENISCECTOMY Left 03/16/2018    partial meniscectomy due to tear    HX MYOMECTOMY  06/1984    benign tumor    HX ORTHOPAEDIC  1964    orthopaedic excision Fibrosarcoma and Lymphangiogram    HX PELVIC LAPAROSCOPY  04/1984    large uterine blockage    NEUROLOGICAL PROCEDURE UNLISTED  2000, 2007    Cervical fusion     Current Outpatient Medications   Medication Sig Dispense Refill    pregabalin (LYRICA) 150 mg capsule Take 1 Cap by mouth two (2) times a day.  Max Daily Amount: 300 mg. 180 Cap 1    varenicline (CHANTIX) 1 mg tablet Take 1 Tab by mouth two (2) times daily (after meals). 180 Tab 0    sucralfate (CARAFATE) 1 gram tablet Take 1 g by mouth two (2) times a day.  furosemide (LASIX) 20 mg tablet Take 1 Tab by mouth daily as needed (severe leg swelling). 90 Tab 1    budesonide-formoterol (SYMBICORT) 160-4.5 mcg/actuation HFAA Take 2 Puffs by inhalation two (2) times a day. 6 Inhaler 2    acetaminophen (TYLENOL EXTRA STRENGTH) 500 mg tablet Take  by mouth every six (6) hours as needed for Pain.  citalopram (CELEXA) 20 mg tablet Take 1 Tab by mouth daily. 90 Tab 3    albuterol (PROVENTIL HFA, VENTOLIN HFA, PROAIR HFA) 90 mcg/actuation inhaler Take 2 Puffs by inhalation every four (4) hours as needed for Wheezing or Shortness of Breath. 3 Inhaler 4    diclofenac (VOLTAREN) 1 % gel Apply 2 g to affected area every six (6) hours as needed for Pain. 100 g 11    DEXILANT 60 mg CpDB capsule (delayed release) TAKE 1 CAPSULE BY MOUTH DAILY 90 Cap 3    buPROPion SR (WELLBUTRIN SR) 150 mg SR tablet TAKE 1 TABLET BY MOUTH TWICE DAILY 180 Tab 3    fluticasone (FLONASE) 50 mcg/actuation nasal spray SHAKE LIQUID AND USE 2 SPRAYS IN EACH NOSTRIL DAILY 3 Bottle 3    b complex vitamins tablet Take 1 Tab by mouth daily.  calcium-cholecalciferol, d3, (CALCIUM 600 + D) 600-125 mg-unit tab Take 1,065 mg by mouth nightly. Indications: TAKES 2 AT BEDTIME      omega 3-dha-epa-fish oil (FISH OIL) 100-160-1,000 mg cap Take 1,065 mg by mouth daily.  ferrous sulfate ER (IRON) 160 mg (50 mg iron) TbER tablet Take 1 Tab by mouth daily. Indications: PT TAKES 40 MG      multivitamin (ONE A DAY) tablet Take 1 Tab by mouth daily.        Allergies   Allergen Reactions    Adhesive Tape-Silicones Swelling     REALLY BAD SWELLING AND SORE APPEAR    Alcohol Other (comments)     PT STATES SHE IS AN ALCOHOLIC    Lactose Other (comments)     PT STATES IT MAKES HER STOMACH HURT    Percodan [Oxycodone Hcl-Oxycodone-Asa] Itching and Other (comments)     crying    Nsaids (Non-Steroidal Anti-Inflammatory Drug) Other (comments)     PT NOT ABLE TO TAKE DUE TO GASTRIC BYPASS     Family History   Problem Relation Age of Onset    Hypertension Mother     Depression Mother     Cancer Mother     Ovarian Cancer Mother     Breast Problems Mother     Cancer Father    [facility] Cancer Sister     Depression Sister     Depression Brother     Stroke Neg Hx     Heart Attack Neg Hx      Social History     Tobacco Use    Smoking status: Current Every Day Smoker     Packs/day: 0.50     Years: 45.00     Pack years: 22.50    Smokeless tobacco: Never Used   Substance Use Topics    Alcohol use: No     Comment: quit 1980s- was heavy etoh     Patient Active Problem List   Diagnosis Code    Chronic obstructive pulmonary disease (HCC) J44.9    Recurrent depression ([facility] F33.9    Fibrosarcoma ([facility] C49.9    Alcoholism in remission ([facility] F10.21    Sleep apnea G47.30    History of gastric bypass Z98.84    Obesity, morbid ([facility] E66.01    Osteopenia M85.80    GERD (gastroesophageal reflux disease) K21.9    Chronic pain of both knees M25.561, M25.562, G89.29       Depression Risk Factor Screening:     3 most recent PHQ Screens 9/27/2019   Little interest or pleasure in doing things Not at all   Feeling down, depressed, irritable, or hopeless Not at all   Total Score PHQ 2 0       Alcohol Risk Factor Screening: You do not drink alcohol or very rarely. Functional Ability and Level of Safety:     Hearing Loss   Hearing is good. Activities of Daily Living   Self-care. Requires assistance with: no ADLs    Fall Risk     Fall Risk Assessment, last 12 mths 9/14/2017   Able to walk? Yes   Fall in past 12 months?  No       Abuse Screen   Patient is not abused  Denies financial, physical, or verbal abuse    Review of Systems   Constitutional: negative for fevers, chills and weight loss  Respiratory: negative for cough, sputum, wheezing or dyspnea on exertion  Cardiovascular: negative for chest pain, palpitations, syncope, lower extremity edema  Gastrointestinal: negative for nausea, vomiting and change in bowel habits  Neurological: negative for headaches, dizziness and weakness      Labwork/Imaging     Lab Results   Component Value Date/Time    Hemoglobin A1c, External 5.4 10/24/2018       Lab Results   Component Value Date/Time    Glucose 124 (H) 05/13/2019 07:45 PM       Lab Results   Component Value Date/Time    Cholesterol, total 135 03/27/2019 08:41 AM    HDL Cholesterol 61 (H) 03/27/2019 08:41 AM    LDL, calculated 60.8 03/27/2019 08:41 AM    VLDL, calculated 13.2 03/27/2019 08:41 AM    Triglyceride 66 03/27/2019 08:41 AM    CHOL/HDL Ratio 2.2 03/27/2019 08:41 AM         Physical Examination     Evaluation of Cognitive Function:  Mood/affect:  neutral  Appearance: age appropriate and casually dressed  Family member/caregiver input: none    Blood pressure 130/76, pulse 73, temperature 98 °F (36.7 °C), temperature source Oral, resp. rate 20, height 5' 5\" (1.651 m), weight 274 lb (124.3 kg), SpO2 95 %. Body mass index is 45.6 kg/m². General appearance: alert, cooperative, no distress  Lungs: clear to auscultation bilaterally  Heart: regular rate and rhythm, S1, S2 normal  Abdomen: soft, non-tender.  Bowel sounds normal. No masses,  no organomegaly  Extremities: extremities normal, atraumatic, no cyanosis or edema      Patient Care Team:  Rolanda Magaña NP as PCP - General (Nurse Practitioner)  Feliciano Morel DO (Orthopedic Surgery)  Mahi Blanco NP (Neurology)  Gwyn Willoughby MD ([address] [place] [address])  Ranulfo Lainez as Physician Assistant (Psychiatry)  Jenna Solorzano MD (Gastroenterology)  Yared Magana MD (Cardiology)    Advice/Referrals/Counseling   Education and counseling provided:  Are appropriate based on today's review and evaluation  End-of-Life planning (with patient's consent)  Influenza Vaccine  Screening Mammography  Bone mass measurement (DEXA)  Screening for glaucoma  Smoking cessation/Lung cancer screening    Assessment/Plan       Encounter for Medicare annual wellness exam  *Medicare wellness completed. Education and counseling provided, recommendations made- see Medicare chart in patient instructions and see below.   -Recurrent depression ([facility]  -Alcoholism in remission ([facility]    Screening mammogram, encounter for  - [facility] MAMMO BI SCREENING INCL CAD; Future    Osteopenia, unspecified location  - DEXA BONE DENSITY STUDY AXIAL; Future    Encounter for screening for lung cancer  - CT LOW DOSE LUNG CANCER SCREENING; Future    Cigarette nicotine dependence in remission  - varenicline (CHANTIX) 1 mg tablet; Take 1 Tab by mouth two (2) times daily (after meals). Dispense: 180 Tab; Refill: 0  - CT LOW DOSE LUNG CANCER SCREENING; Future    HNP (herniated nucleus pulposus), lumbar/ Spinal stenosis, unspecified spinal region  *Refilled. Last chronic condition routine care 7/2019  - pregabalin (LYRICA) 150 mg capsule; Take 1 Cap by mouth two (2) times a day. Max Daily Amount: 300 mg. Dispense: 180 Cap; Refill: 1    Chronic pain of both knees  - REFERRAL TO ORTHOPEDICS    Encounter for immunization  - ADMIN INFLUENZA VIRUS VAC  - INFLUENZA VIRUS VAC QUAD,SPLIT,PRESV FREE SYRINGE IM    Encounter for PPD test  *Patient requested  - AMB POC TUBERCULOSIS, INTRADERMAL (SKIN TEST)    ACP (advance care planning)  *Time constraints today limited ACP discussion      *Plan of care reviewed with patient. Patient in agreement with plan and expresses understanding. All questions answered and patient encouraged to call or RTO if further questions or concerns. Follow-up and Dispositions    · Return in about 6 months (around 3/27/2020) for chronic disease routine care- 30 min, Yearly Medicare Wellness-  done today. Ivy Harrison

## 2019-10-08 ENCOUNTER — CLINICAL SUPPORT (OUTPATIENT)
Dept: FAMILY MEDICINE CLINIC | Age: 59
End: 2019-10-08

## 2019-10-08 DIAGNOSIS — Z11.1 ENCOUNTER FOR PPD TEST: Primary | ICD-10-CM

## 2019-10-08 LAB
MM INDURATION POC: 0 MM (ref 0–5)
PPD POC: NEGATIVE NEGATIVE

## 2019-10-08 NOTE — PROGRESS NOTES
PPD Placement note  Dawna Durham, 62 y.o. female is here today for placement of PPD test  Reason for PPD test: employer request  Pt taken PPD test before: yes  Verified in allergy area and with patient that they are not allergic to the products PPD is made of (Phenol or Tween). Yes  Is patient taking any oral or IV steroid medication now or have they taken it in the last month? no  Has the patient ever received the BCG vaccine?: no  Has the patient been in recent contact with anyone known or suspected of having active TB disease?: no       Date of exposure (if applicable): n/a       Name of person they were exposed to (if applicable): n/a  Patient's Country of origin?: [place]  O: Alert and oriented in NAD. P:  PPD placed on 10/8/2019. Patient advised to return for reading within 48-72 hours.

## 2019-10-10 ENCOUNTER — CLINICAL SUPPORT (OUTPATIENT)
Dept: FAMILY MEDICINE CLINIC | Age: 59
End: 2019-10-10

## 2019-10-10 DIAGNOSIS — Z11.1 ENCOUNTER FOR PPD TEST: Primary | ICD-10-CM

## 2019-10-14 ENCOUNTER — HOSPITAL ENCOUNTER (OUTPATIENT)
Dept: BONE DENSITY | Age: 59
Discharge: HOME | End: 2019-10-14
Attending: NURSE PRACTITIONER
Payer: MEDICARE

## 2019-10-14 ENCOUNTER — HOSPITAL ENCOUNTER (OUTPATIENT)
Dept: MAMMOGRAPHY | Age: 59
Discharge: HOME | End: 2019-10-14
Attending: NURSE PRACTITIONER
Payer: MEDICARE

## 2019-10-14 DIAGNOSIS — Z12.31 SCREENING MAMMOGRAM, ENCOUNTER FOR: ICD-10-CM

## 2019-10-14 DIAGNOSIS — M85.80 OSTEOPENIA, UNSPECIFIED LOCATION: ICD-10-CM

## 2019-10-14 PROCEDURE — 77067 SCR MAMMO BI INCL CAD: CPT

## 2019-10-14 PROCEDURE — 77080 DXA BONE DENSITY AXIAL: CPT

## 2019-10-17 ENCOUNTER — PATIENT MESSAGE (OUTPATIENT)
Dept: FAMILY MEDICINE CLINIC | Age: 59
End: 2019-10-17

## 2019-10-17 ENCOUNTER — NURSE NAVIGATOR (OUTPATIENT)
Dept: OTHER | Age: 59
End: 2019-10-17

## 2019-10-17 NOTE — NURSE NAVIGATOR
Referring Provider: Guy Ruiz NP      Lung Cancer Risk Profile:   Age: 62  Gender: Female  Height: 65\"  Weight: 274    Smoking History:  Smoking Status: current use  # years smokin  # years quit: 0  Packs/day: 2 ppd for 40 yrs, then 1 ppd  Pack years: 80    Patient discussed smoking cessation with PCP: Yes, per patient report    Patient participated in shared decision making process with PCP: Unknown    Patient is currently experiencing symptoms: No, per patient report    If yes what symptoms:     Co-Morbidities:  COPD    Cancer History:  Fibrosarcoma    Additional Risk Factors:     Father & aunt with lung cancer, exposure to second hand smoke      Patient's smoking history discussed via phone. Patient meets LDCT lung cancer screening criteria.  Call transferred to central scheduling to schedule exam.      MIGUEL McnallyN, RN, [id number] [facility] Nurse Navigator

## 2019-11-03 DIAGNOSIS — J44.9 CHRONIC OBSTRUCTIVE PULMONARY DISEASE, UNSPECIFIED COPD TYPE (HCC): ICD-10-CM

## 2019-11-05 RX ORDER — BUDESONIDE AND FORMOTEROL FUMARATE DIHYDRATE 160; 4.5 UG/1; UG/1
AEROSOL RESPIRATORY (INHALATION)
Qty: 6 INHALER | Refills: 2 | Status: SHIPPED | OUTPATIENT
Start: 2019-11-05 | End: 2020-07-20 | Stop reason: SDUPTHER

## 2019-11-11 ENCOUNTER — OFFICE VISIT (OUTPATIENT)
Dept: ORTHOPEDIC SURGERY | Age: 59
End: 2019-11-11

## 2019-11-11 VITALS
SYSTOLIC BLOOD PRESSURE: 138 MMHG | DIASTOLIC BLOOD PRESSURE: 74 MMHG | BODY MASS INDEX: 45.02 KG/M2 | OXYGEN SATURATION: 88 % | TEMPERATURE: 95.7 F | HEIGHT: 65 IN | HEART RATE: 78 BPM | RESPIRATION RATE: 16 BRPM | WEIGHT: 270.2 LBS

## 2019-11-11 DIAGNOSIS — M72.2 PLANTAR FASCIITIS OF LEFT FOOT: Primary | ICD-10-CM

## 2019-11-11 DIAGNOSIS — M79.672 LEFT FOOT PAIN: ICD-10-CM

## 2019-11-11 RX ORDER — TRIAMCINOLONE ACETONIDE 40 MG/ML
40 INJECTION, SUSPENSION INTRA-ARTICULAR; INTRAMUSCULAR ONCE
Qty: 1 ML | Refills: 0
Start: 2019-11-11 | End: 2019-11-11

## 2019-11-11 NOTE — PROGRESS NOTES
1. Have you been to the ER, urgent care clinic since your last visit? Hospitalized since your last visit? No    2. Have you seen or consulted any other health care providers outside of the [address] [place] [address] since your last visit? Include any pap smears or colon screening.  No

## 2019-11-11 NOTE — PROGRESS NOTES
AMBULATORY PROGRESS NOTE      Patient: Irving Guardado             MRN: [id number]     SSN: [id number] Body mass index is 44.96 kg/m². YOB: 1960     AGE: 62 y.o. SEX: female    PCP: Author Zuleyma MD     IMPRESSION/DIAGNOSIS AND TREATMENT PLAN     DIAGNOSES  1. Plantar fasciitis of left foot    2. Left foot pain        Orders Placed This Encounter    [30129] Foot Min 3V      Sunshine Sharma understands her diagnoses and the proposed plan. Plan:    1) DME Order: Dorsal night splint. 2) HEP with plantar fascial stretches. 3) Cortisone injection to the left plantar fascia: 1 mL Kenalog and 3 mL Lidocaine. 4) Use cryotherapy as directed after injection. 5) Continue activity modification as directed. 6) Anticipate referral to PT if condition does not improve. RTO - 4 weeks     HPI AND EXAMINATION     Sunshine Sharma IS A 62 y.o. female who presents to my outpatient office complaining of left foot pain. Ms. Almas Yancey reports that she has been experiencing constant pain in her left heel. She states that she experiences pain first thing in the morning as soon as she begins walking. She finds that her pain does ease up, until she gets to work. Then, she finds that her pain worsens as the day progresses. The patient denies any recent falls, twists, or trauma. She has not found that Tylenol or Ibuprofen helps with her pain. She notes that she has a midfoot fusion 10 years ago to her left foot and surgery to remove a fibrosarcoma from her left foot when she was 8years old. The patient has h/o fibrosarcoma in her left foot when she was 4. She also has h/o gastric bypass and cannot take NSAIDs. 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      Visit Vitals  /74   Pulse 78   Temp 95.7 °F (35.4 °C) (Oral)   Resp 16   Ht 5' 5\" (1.651 m)   Wt 270 lb 3.2 oz (122.6 kg)   SpO2 (!) 88%   BMI 44.96 kg/m²     Appearance: Alert, well appearing and pleasant patient who is in no distress, oriented to person, place/time, and who follows commands. Psychiatric: Affect and mood are appropriate. Cardiovascular/Peripheral Vascular: Normal Pulses to each hand and foot  Musculoskeletal:  LOCATION:  Left FOOT/ANKLE  Integumentary: No rashes, skin patches, wounds, or abrasions to the right or left legs       Warm and normal color. No regions of expressible drainage. Palpable fullness or mass is not present        Well healed linear and transverse surgical scars over the dorsomedial foot. Gait: Limp with gait is present       Tenderness: mild PLANTAR FASCIA REGION, with no NODULARITIES      Motor/Strength/Tone Exam: WNL      Sensory Exam:   Intact Normal Sensation to ankle/foot      Stability Testing: No anterolateral or varus instability of the Ankle or Subtalar Joints               No peroneal tendon instability noted      ROM: Normal ROM noted to ankle/foot      Contractures: No Achilles or Gastrocnemius Contractures      Calf tenderness: Absent for calf or gastrocnemius muscle regions       Soft, supple, non tender, non taut lower extremity compartments       Alignment: Neutral Hindfoot  Wounds/Abrasions:   None present  Extremities:   No embolic phenomena to the toes or hands         No significant edema to the foot and or toes.         Lower extremities are warm and appear well perfused    DVT: No evidence of DVT seen on examination at this time    No calf swelling, no tenderness to calf muscles  Lymphatic:  No Evidence of Lymphedema  Vascular: Medial Border of Tibia Region: Edema is not present        Pulses: Dorsalis Pedis &  Posterior Tibial Pulses : Palpable yes        Varicosities Lower Limbs :  None    Neuro: Negative bilateral Straight leg raise (seated position)   no Tinel's at PM NV Bundle Tarsal Canal   no Proximal Tarsal Tunnel Tenderness    no Distal Tarsal Tunnel Tenderness    See Musculoskeletal section for pertinent individual extremity examination    No abnormal hand/wrist, foot/ankle, or facial/neck tremors. 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    GERD (gastroesophageal reflux disease)     History of gastric bypass 2012    History of meniscal tear 2015, 2018    right in 2015, left in 2018    HNP (herniated nucleus pulposus), lumbar     patient reported    Lung nodules     resolved 2018 CT    Osteopenia 10/03/2017    Recurrent depression ([facility]     Sleep apnea     on cpap    Spinal stenosis, lumbar     patient reported     Current Outpatient Medications   Medication Sig    SYMBICORT 160-4.5 mcg/actuation HFAA INHALE 2 PUFFS BY MOUTH TWICE DAILY    pregabalin (LYRICA) 150 mg capsule Take 1 Cap by mouth two (2) times a day. Max Daily Amount: 300 mg.  varenicline (CHANTIX) 1 mg tablet Take 1 Tab by mouth two (2) times daily (after meals).  sucralfate (CARAFATE) 1 gram tablet Take 1 g by mouth two (2) times a day.  furosemide (LASIX) 20 mg tablet Take 1 Tab by mouth daily as needed (severe leg swelling).  acetaminophen (TYLENOL EXTRA STRENGTH) 500 mg tablet Take  by mouth every six (6) hours as needed for Pain.  citalopram (CELEXA) 20 mg tablet Take 1 Tab by mouth daily.  albuterol (PROVENTIL HFA, VENTOLIN HFA, PROAIR HFA) 90 mcg/actuation inhaler Take 2 Puffs by inhalation every four (4) hours as needed for Wheezing or Shortness of Breath.  diclofenac (VOLTAREN) 1 % gel Apply 2 g to affected area every six (6) hours as needed for Pain.     DEXILANT 60 mg CpDB capsule (delayed release) TAKE 1 CAPSULE BY MOUTH DAILY    buPROPion SR (WELLBUTRIN SR) 150 mg SR tablet TAKE 1 TABLET BY MOUTH TWICE DAILY    fluticasone (FLONASE) 50 mcg/actuation nasal spray SHAKE LIQUID AND USE 2 SPRAYS IN EACH NOSTRIL DAILY    b complex vitamins tablet Take 1 Tab by mouth daily.  calcium-cholecalciferol, d3, (CALCIUM 600 + D) 600-125 mg-unit tab Take 1,065 mg by mouth nightly. Indications: TAKES 2 AT BEDTIME    omega 3-dha-epa-fish oil (FISH OIL) 100-160-1,000 mg cap Take 1,065 mg by mouth daily.  ferrous sulfate ER (IRON) 160 mg (50 mg iron) TbER tablet Take 1 Tab by mouth daily. Indications: PT TAKES 40 MG    multivitamin (ONE A DAY) tablet Take 1 Tab by mouth daily. No current facility-administered medications for this visit.       Allergies   Allergen Reactions    Adhesive Tape-Silicones Swelling     REALLY BAD SWELLING AND SORE APPEAR    Alcohol Other (comments)     PT STATES SHE IS AN ALCOHOLIC    Lactose Other (comments)     PT STATES IT MAKES HER STOMACH HURT    Percodan [Oxycodone Hcl-Oxycodone-Asa] Itching and Other (comments)     crying    Nsaids (Non-Steroidal Anti-Inflammatory Drug) Other (comments)     PT NOT ABLE TO TAKE DUE TO GASTRIC BYPASS     Past Surgical History:   Procedure Laterality Date    HX ARTHRODESIS  01/2000    Herniated Disc, arthritis right foot 06/06, 07/07, C 6/7, C 4/6 Stenosis    HX CHOLECYSTECTOMY  09/2008    gallbladder disease    HX COLONOSCOPY  05/2009    HX GASTRIC BYPASS  2012    GASTRIC BYPASS  [place] Gastric Bypass      HX HYSTERECTOMY  06/1994    HX MENISCECTOMY  10/2015    right repari of torn meniscus    HX MENISCECTOMY Left 03/16/2018    partial meniscectomy due to tear    HX MYOMECTOMY  06/1984    benign tumor    HX ORTHOPAEDIC  1964    orthopaedic excision Fibrosarcoma and Lymphangiogram    HX PELVIC LAPAROSCOPY  04/1984    large uterine blockage    NEUROLOGICAL PROCEDURE UNLISTED  2000, 2007    Cervical fusion     Social History     Occupational History    Not on file   Tobacco Use    Smoking status: Current Every Day Smoker     Packs/day: 0.50     Years: 45.00     Pack years: 22.50    Smokeless tobacco: Never Used   Substance and Sexual Activity    Alcohol use: No     Comment: quit 1980s- was heavy etoh    Drug use: Not Currently     Comment: marijuana, cocaine 30 yrs ago    Sexual activity: Never     Family History   Problem Relation Age of Onset    Hypertension Mother     Depression Mother     Cancer Mother     Ovarian Cancer Mother     Breast Problems Mother     Cancer Father     Cancer Sister     Depression Sister     Depression Brother     Stroke Neg Hx     Heart Attack Neg Hx         REVIEW OF SYSTEMS : 11/11/2019  ALL BELOW ARE Negative except : SEE HPI     Constitutional: Negative for fever, chills and weight loss. Neg Weight Loss  Cardiovascular: Negative for chest pain, claudication and leg swelling. SOB, HOBSON   Gastrointestinal/Urological: Negative for  pain, N/V/D/C, Blood in stool or urine,dysuria                         Hematuria, Incontinence, pelvic pain  Musculoskeletal: see HPI. Neurological: Negative for dizziness and weakness, headaches,Visual Changes             Confusion,  Or Seizures,   Psychiatric/Behavioral: Negative for depression, memory loss and substance abuse. Extremities:  Negative for hair changes, rash or skin lesion changes. Hematologic: Negative for Bleeding problems, bruising, pallor or swollen lymph nodes. Peripheral Vascular: No calf pain, vascular vein tenderness to calf pain              No calf throbbing, posterior knee throbbing pain     DIAGNOSTIC IMAGING     FOOT X RAYS 3 VIEWS Left   11/11/2019    NON WEIGHT BEARING    X RAYS AT [address] [place] [address]  11/11/2019      Bones: No fractures or dislocations. No focal osteolytic or osteoblastic process     Bone Spurs: No significant bone spurs  Foot Alignment: WNL  Joint Condition: No Significant OA  Soft Tissues: Normal, No radiopaque foreign body and No abnormal calcific densities to soft tissues   No ankle joint effusion in lateral projection. Mineralization: Suggests  no Osteopenia    I have personally reviewed the results of the above study.  The interpretation of this study is my professional opinion    Written by Javier Sneed, as dictated by Dr. Leny Traylor. I, Dr. Leny Traylor, confirm that all documentation is accurate.     Almas Espinosa MD  11/11/2019  12:18 PM

## 2019-11-11 NOTE — PATIENT INSTRUCTIONS
Plantar Fasciitis: Exercises  Introduction  Here are some examples of exercises for you to try. The exercises may be suggested for a condition or for rehabilitation. Start each exercise slowly. Ease off the exercises if you start to have pain. You will be told when to start these exercises and which ones will work best for you. How to do the exercises  Towel stretch    1. Sit with your legs extended and knees straight. 2. Place a towel around your foot just under the toes. 3. Hold each end of the towel in each hand, with your hands above your knees. 4. Pull back with the towel so that your foot stretches toward you. 5. Hold the position for at least 15 to 30 seconds. 6. Repeat 2 to 4 times a session, up to 5 sessions a day. Calf stretch    1. Stand facing a wall with your hands on the wall at about eye level. Put the leg you want to stretch about a step behind your other leg. 2. Keeping your back heel on the floor, bend your front knee until you feel a stretch in the back leg. 3. Hold the stretch for 15 to 30 seconds. Repeat 2 to 4 times. Plantar fascia and calf stretch    1. Stand on a step as shown above. Be sure to hold on to the banister. 2. Slowly let your heels down over the edge of the step as you relax your calf muscles. You should feel a gentle stretch across the bottom of your foot and up the back of your leg to your knee. 3. Hold the stretch about 15 to 30 seconds, and then tighten your calf muscle a little to bring your heel back up to the level of the step. Repeat 2 to 4 times. Towel curls    1. While sitting, place your foot on a towel on the floor and scrunch the towel toward you with your toes. 2. Then, also using your toes, push the towel away from you. [place] pickups    1. Put marbles on the floor next to a cup.  2. Using your toes, try to lift the marbles up from the floor and put them in the cup. Follow-up care is a key part of your treatment and safety.  Be sure to make and go to all appointments, and call your doctor if you are having problems. It's also a good idea to know your test results and keep a list of the medicines you take. Where can you learn more? Go to http://steve-anadn.info/. Krystal Loja in the search box to learn more about \"Plantar Fasciitis: Exercises. \"  Current as of: June 26, 2019  Content Version: 12.2  © 1817-8396 [employer]. Care instructions adapted under license by [employer] (which disclaims liability or warranty for this information). If you have questions about a medical condition or this instruction, always ask your healthcare professional. [address] any warranty or liability for your use of this information.

## 2019-11-16 ENCOUNTER — HOSPITAL ENCOUNTER (OUTPATIENT)
Dept: CT IMAGING | Age: 59
Discharge: HOME | End: 2019-11-16
Attending: NURSE PRACTITIONER
Payer: MEDICARE

## 2019-11-16 DIAGNOSIS — Z12.2 ENCOUNTER FOR SCREENING FOR LUNG CANCER: ICD-10-CM

## 2019-11-16 DIAGNOSIS — F17.211 CIGARETTE NICOTINE DEPENDENCE IN REMISSION: ICD-10-CM

## 2019-11-16 PROCEDURE — G0297 LDCT FOR LUNG CA SCREEN: HCPCS

## 2019-12-03 ENCOUNTER — DOCUMENTATION ONLY (OUTPATIENT)
Dept: ORTHOPEDIC SURGERY | Age: 59
End: 2019-12-03

## 2019-12-03 NOTE — PROGRESS NOTES
Dali Holliday from [facility] dropped form off at [facility] location on behalf of the patient Leodan Trinity (Detailed Written Order), David Davila can be reached at [phone]

## 2019-12-04 NOTE — PROGRESS NOTES
Alida splint form completed, copy to scanning and rep advised she may  at [facility] location.

## 2019-12-10 ENCOUNTER — OFFICE VISIT (OUTPATIENT)
Dept: ORTHOPEDIC SURGERY | Age: 59
End: 2019-12-10

## 2019-12-10 VITALS
SYSTOLIC BLOOD PRESSURE: 133 MMHG | OXYGEN SATURATION: 95 % | HEART RATE: 83 BPM | HEIGHT: 65 IN | RESPIRATION RATE: 14 BRPM | DIASTOLIC BLOOD PRESSURE: 70 MMHG | BODY MASS INDEX: 44.98 KG/M2 | TEMPERATURE: 97.4 F | WEIGHT: 270 LBS

## 2019-12-10 DIAGNOSIS — M72.2 PLANTAR FASCIITIS OF LEFT FOOT: Primary | ICD-10-CM

## 2019-12-10 NOTE — PROGRESS NOTES
1. Have you been to the ER, urgent care clinic since your last visit? Hospitalized since your last visit? No    2. Have you seen or consulted any other health care providers outside of the [address] [place] [address] since your last visit? Include any pap smears or colon screening.  No

## 2020-01-15 ENCOUNTER — OFFICE VISIT (OUTPATIENT)
Dept: FAMILY MEDICINE CLINIC | Age: 60
End: 2020-01-15

## 2020-01-15 ENCOUNTER — TELEPHONE (OUTPATIENT)
Dept: FAMILY MEDICINE CLINIC | Age: 60
End: 2020-01-15

## 2020-01-15 VITALS
HEIGHT: 65 IN | SYSTOLIC BLOOD PRESSURE: 136 MMHG | BODY MASS INDEX: 43.35 KG/M2 | DIASTOLIC BLOOD PRESSURE: 78 MMHG | OXYGEN SATURATION: 95 % | WEIGHT: 260.2 LBS | HEART RATE: 67 BPM | RESPIRATION RATE: 16 BRPM | TEMPERATURE: 96.7 F

## 2020-01-15 DIAGNOSIS — R11.0 NAUSEA: ICD-10-CM

## 2020-01-15 DIAGNOSIS — G44.52 NEW DAILY PERSISTENT HEADACHE: Primary | ICD-10-CM

## 2020-01-15 DIAGNOSIS — K59.00 CONSTIPATION, UNSPECIFIED CONSTIPATION TYPE: ICD-10-CM

## 2020-01-15 DIAGNOSIS — M51.26 HNP (HERNIATED NUCLEUS PULPOSUS), LUMBAR: ICD-10-CM

## 2020-01-15 DIAGNOSIS — M48.00 SPINAL STENOSIS, UNSPECIFIED SPINAL REGION: ICD-10-CM

## 2020-01-15 RX ORDER — BUTALBITAL, ACETAMINOPHEN AND CAFFEINE 50; 325; 40 MG/1; MG/1; MG/1
1 TABLET ORAL
Qty: 30 TAB | Refills: 0 | Status: ON HOLD | OUTPATIENT
Start: 2020-01-15 | End: 2020-02-18 | Stop reason: ALTCHOICE

## 2020-01-15 RX ORDER — PREGABALIN 150 MG/1
150 CAPSULE ORAL 2 TIMES DAILY
Qty: 180 CAP | Refills: 1 | Status: ON HOLD | OUTPATIENT
Start: 2020-01-15 | End: 2020-02-18 | Stop reason: SDUPTHER

## 2020-01-15 RX ORDER — ONDANSETRON 4 MG/1
4 TABLET, ORALLY DISINTEGRATING ORAL
Qty: 30 TAB | Refills: 0 | Status: SHIPPED | OUTPATIENT
Start: 2020-01-15 | End: 2020-02-05 | Stop reason: ALTCHOICE

## 2020-01-15 NOTE — PROGRESS NOTES
PROBLEM/SICK OFFICE NOTE (SOAP)    1/15/2020  12:53 PM    SUBJECTIVE:    Chief Complaint   Patient presents with    Headache     x 5 days getting worse    Dizziness    Nausea       HPI:  Margareth Dasilva is a 62 y.o. female presenting today for office visit. She is a former patient of Krystle Rondon, establishing care with me today. Here today for sick visit. Complaints today of headache accompanied by dizziness and nausea. Worse with position (bending down), laughing, and turning side to side. Thinks the dizziness and lightheadedness came first before pain set in. Some vision changes- more difficult for near sighted vision. Light sensitivity present as well. This has never happened before, new headache onset over age 46. This started last week and has been intractable since then. Located across bilateral temples and into crown of head. More painful on right side. Tender to touch on temples. Aggravating factors include position, prssure,and bright lights. Relieving factors are putting physical pressure on head (pressing on head with arms). Turning lights down helps. No medicine seems to work- has tried tylenol and extra strength right now. Unable to take NSAIDs due gastric bypass history. Timing of headache is all day, every day, she has even been waking up with headache. Severity is a 6-7/10. Also been very constipated. Has been about 4 days- taking miralax for 3 days with no relief. She does have some loose/watery stool leakage throughout the day. Has not tried a stool softener. Review of Systems:  Review of Systems   Constitutional: Negative for chills and fever. Gastrointestinal: Positive for abdominal distention and constipation. Negative for abdominal pain, anal bleeding, blood in stool, diarrhea, nausea, rectal pain and vomiting. Leakage of loose watery stool   Musculoskeletal: Negative for neck pain and neck stiffness. Skin: Negative for rash.    Neurological: Positive for dizziness and headaches. Negative for tremors, seizures, syncope, facial asymmetry, speech difficulty, weakness, light-headedness and numbness.          Depression- PHQ Screening   3 most recent PHQ Screens 1/15/2020   Little interest or pleasure in doing things Not at all   Feeling down, depressed, irritable, or hopeless Not at all   Total Score PHQ 2 0   Trouble falling or staying asleep, or sleeping too much Not at all   Feeling tired or having little energy Several days   Poor appetite, weight loss, or overeating Not at all   Feeling bad about yourself - or that you are a failure or have let yourself or your family down Not at all   Trouble concentrating on things such as school, work, reading, or watching TV Not at all   Moving or speaking so slowly that other people could have noticed; or the opposite being so fidgety that others notice Not at all   Thoughts of being better off dead, or hurting yourself in some way Not at all   PHQ 9 Score 1         History  Past Medical History:   Diagnosis Date    [facility]     Asthma     Chronic obstructive pulmonary disease ([address]     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    GERD (gastroesophageal reflux disease)     History of gastric bypass 2012    History of meniscal tear 2015, 2018    right in 2015, left in 2018    HNP (herniated nucleus pulposus), lumbar     patient reported    Lung nodules     resolved 2018 CT    Osteopenia 10/03/2017    Recurrent depression ([address]     Sleep apnea     on cpap    Spinal stenosis, lumbar     patient reported       Past Surgical History:   Procedure Laterality Date    HX ARTHRODESIS  01/2000    Herniated Disc, arthritis right foot 06/06, 07/07, C 6/7, C 4/6 Stenosis    HX CHOLECYSTECTOMY  09/2008    gallbladder disease    HX COLONOSCOPY  05/2009    HX GASTRIC BYPASS  2012    GASTRIC BYPASS  [facility] Gastric Bypass      HX HYSTERECTOMY  06/1994    HX MENISCECTOMY  10/2015 right repari of torn meniscus    HX MENISCECTOMY Left 03/16/2018    partial meniscectomy due to tear    HX MYOMECTOMY  06/1984    benign tumor    HX ORTHOPAEDIC  1964    orthopaedic excision Fibrosarcoma and Lymphangiogram    HX PELVIC LAPAROSCOPY  04/1984    large uterine blockage    NEUROLOGICAL PROCEDURE UNLISTED  2000, 2007    Cervical fusion       Social History     Socioeconomic History    Marital status:      Spouse name: Not on file    Number of children: Not on file    Years of education: Not on file    Highest education level: Not on file   Occupational History    Not on file   Social Needs    Financial resource strain: Not on file    Food insecurity:     Worry: Not on file     Inability: Not on file    Transportation needs:     Medical: Not on file     Non-medical: Not on file   Tobacco Use    Smoking status: Current Every Day Smoker     Packs/day: 0.50     Years: 45.00     Pack years: 22.50    Smokeless tobacco: Never Used   Substance and Sexual Activity    Alcohol use: No     Comment: quit 1980s- was heavy etoh    Drug use: Not Currently     Comment: marijuana, cocaine 30 yrs ago    Sexual activity: Never   Lifestyle    Physical activity:     Days per week: Not on file     Minutes per session: Not on file    Stress: Not on file   Relationships    Social connections:     Talks on phone: Not on file     Gets together: Not on file     Attends [employer] service: Not on file     Active member of club or organization: Not on file     Attends meetings of clubs or organizations: Not on file     Relationship status: Not on file    Intimate partner violence:     Fear of current or ex partner: Not on file     Emotionally abused: Not on file     Physically abused: Not on file     Forced sexual activity: Not on file   Other Topics Concern    Not on file   Social History Narrative    Not on file       Allergies   Allergen Reactions    Adhesive Tape-Silicones Swelling     REALLY BAD SWELLING AND SORE APPEAR    Alcohol Other (comments)     PT STATES SHE IS AN ALCOHOLIC    Lactose Other (comments)     PT STATES IT MAKES HER STOMACH HURT    Percodan [Oxycodone Hcl-Oxycodone-Asa] Itching and Other (comments)     crying    Nsaids (Non-Steroidal Anti-Inflammatory Drug) Other (comments)     PT NOT ABLE TO TAKE DUE TO GASTRIC BYPASS       Current Outpatient Medications   Medication Sig Dispense Refill    SYMBICORT 160-4.5 mcg/actuation HFAA INHALE 2 PUFFS BY MOUTH TWICE DAILY 6 Inhaler 2    pregabalin (LYRICA) 150 mg capsule Take 1 Cap by mouth two (2) times a day. Max Daily Amount: 300 mg. 180 Cap 1    varenicline (CHANTIX) 1 mg tablet Take 1 Tab by mouth two (2) times daily (after meals). 180 Tab 0    sucralfate (CARAFATE) 1 gram tablet Take 1 g by mouth two (2) times a day.  furosemide (LASIX) 20 mg tablet Take 1 Tab by mouth daily as needed (severe leg swelling). 90 Tab 1    acetaminophen (TYLENOL EXTRA STRENGTH) 500 mg tablet Take  by mouth every six (6) hours as needed for Pain.  citalopram (CELEXA) 20 mg tablet Take 1 Tab by mouth daily. 90 Tab 3    albuterol (PROVENTIL HFA, VENTOLIN HFA, PROAIR HFA) 90 mcg/actuation inhaler Take 2 Puffs by inhalation every four (4) hours as needed for Wheezing or Shortness of Breath. 3 Inhaler 4    diclofenac (VOLTAREN) 1 % gel Apply 2 g to affected area every six (6) hours as needed for Pain. 100 g 11    DEXILANT 60 mg CpDB capsule (delayed release) TAKE 1 CAPSULE BY MOUTH DAILY 90 Cap 3    buPROPion SR (WELLBUTRIN SR) 150 mg SR tablet TAKE 1 TABLET BY MOUTH TWICE DAILY 180 Tab 3    fluticasone (FLONASE) 50 mcg/actuation nasal spray SHAKE LIQUID AND USE 2 SPRAYS IN EACH NOSTRIL DAILY 3 Bottle 3    b complex vitamins tablet Take 1 Tab by mouth daily.  calcium-cholecalciferol, d3, (CALCIUM 600 + D) 600-125 mg-unit tab Take 1,065 mg by mouth nightly.  Indications: TAKES 2 AT BEDTIME      omega 3-dha-epa-fish oil (FISH OIL) 100-160-1,000 mg cap Take 1,065 mg by mouth daily.  ferrous sulfate ER (IRON) 160 mg (50 mg iron) TbER tablet Take 1 Tab by mouth daily. Indications: PT TAKES 40 MG      multivitamin (ONE A DAY) tablet Take 1 Tab by mouth daily. Patient Care Team:  Patient Care Team:  Jhoan Ugarte NP as PCP - General (Nurse Practitioner)  Gerri Bright [facility]  Gil Noriega DO (Orthopedic Surgery)  Desiree Pantoja NP (Neurology)  Erving Lennox, MD ([address] [place] [address])  Mey Meza, [facility] Дм[facility]oret[facility] as Physician Assistant (Psychiatry)  Rolando Chopra MD (Gastroenterology)  Lexie Gallagher MD (Cardiology)        OBJECTIVE:    Vitals:    01/15/20 1241   BP: 136/78   Pulse: 67   Resp: 16   Temp: 96.7 °F (35.9 °C)   SpO2: 95%   Weight: 260 lb 3.2 oz (118 kg)   Height: 5' 5\" (1.651 m)   PainSc:   7   PainLoc: Head       Physical Exam  Vitals signs and nursing note reviewed. Constitutional:       General: She is not in acute distress. HENT:      Right Ear: Tympanic membrane, ear canal and external ear normal.      Left Ear: Tympanic membrane and external ear normal.      Nose: No congestion. Mouth/Throat:      Mouth: Mucous membranes are moist.      Pharynx: No oropharyngeal exudate or posterior oropharyngeal erythema. Eyes:      General: Lids are normal. Vision grossly intact. Gaze aligned appropriately. Right eye: No discharge. Left eye: No discharge. Extraocular Movements: Extraocular movements intact. Pupils: Pupils are equal, round, and reactive to light. Visual Fields: Right eye visual fields normal and left eye visual fields normal.   Neck:      Musculoskeletal: Normal range of motion. No neck rigidity. Cardiovascular:      Rate and Rhythm: Normal rate and regular rhythm. Heart sounds: No murmur. Pulmonary:      Effort: Pulmonary effort is normal.      Breath sounds: Normal breath sounds.  No wheezing or rhonchi. Musculoskeletal: Normal range of motion. Skin:     General: Skin is warm and dry. Neurological:      Mental Status: She is alert and oriented to person, place, and time. Cranial Nerves: No cranial nerve deficit. Sensory: No sensory deficit. Motor: No weakness. Gait: Gait normal.   Psychiatric:         Mood and Affect: Mood normal.         Behavior: Behavior normal.             Assessment & Plan:    New daily persistent headache  Unable to try aspirin or NSAID containing products. Would like to avoid triptans due to concurrent use of SSRIs. Will trial fioricet for relief. Also concerned with new onset over age 46 for daily peristent headache, and headache worse with positional changes so will obtain imaging.    - butalbital-acetaminophen-caffeine (FIORICET, ESGIC) -40 mg per tablet; Take 1 Tab by mouth every six (6) hours as needed for Headache or Migraine. Dispense: 30 Tab; Refill: 0  - CT HEAD WO CONT; Future    Nausea  Will try zofran for nausea    - ondansetron (ZOFRAN ODT) 4 mg disintegrating tablet; Take 1 Tab by mouth every eight (8) hours as needed for Nausea or Nausea or Vomiting. Dispense: 30 Tab; Refill: 0    Constipation, unspecified constipation type  Will try stool softener, advised to increase on water intake    - docusate sodium (COLACE) 50 mg capsule; Take 1 Cap by mouth two (2) times a day for 90 days. Dispense: 60 Cap; Refill: 2    HNP (herniated nucleus pulposus), lumbar/Spinal stenosis, unspecified spinal region  Refilled per patient request    - pregabalin (LYRICA) 150 mg capsule; Take 1 Cap by mouth two (2) times a day. Max Daily Amount: 300 mg. Dispense: 180 Cap;  Refill: 1        Orders Placed This Encounter    CT HEAD WO CONT     Standing Status:   Future     Standing Expiration Date:   2/15/2021     Order Specific Question:   Reason for Exam     Answer:   nonintractable headache    butalbital-acetaminophen-caffeine (FIORICET, Larry 62) -70 mg per tablet     Sig: Take 1 Tab by mouth every six (6) hours as needed for Headache or Migraine. Dispense:  30 Tab     Refill:  0    docusate sodium (COLACE) 50 mg capsule     Sig: Take 1 Cap by mouth two (2) times a day for 90 days. Dispense:  60 Cap     Refill:  2    pregabalin (LYRICA) 150 mg capsule     Sig: Take 1 Cap by mouth two (2) times a day. Max Daily Amount: 300 mg. Dispense:  180 Cap     Refill:  1    ondansetron (ZOFRAN ODT) 4 mg disintegrating tablet     Sig: Take 1 Tab by mouth every eight (8) hours as needed for Nausea or Nausea or Vomiting. Dispense:  30 Tab     Refill:  0            Follow-up and Dispositions    · Return if symptoms worsen or fail to improve. Plan of care reviewed with patient. Patient in agreement with plan and expresses understanding. I have discussed when to anticipate results and how results will be communicated, if applicable. Anticipatory guidance given and questions answered, patient encouraged to call or RTO if further questions or concerns.     Korin Liang NP  01/15/20

## 2020-01-15 NOTE — PROGRESS NOTES
Marilee Pereira presents today for   Chief Complaint   Patient presents with    Headache     x 5 days getting worse    Dizziness    Nausea       Is someone accompanying this pt? no    Is the patient using any DME equipment during OV? no    Depression Screening:  3 most recent PHQ Screens 9/27/2019   Little interest or pleasure in doing things Not at all   Feeling down, depressed, irritable, or hopeless Not at all   Total Score PHQ 2 0       Learning Assessment:  Learning Assessment 9/12/2018   PRIMARY LEARNER Patient   HIGHEST LEVEL OF EDUCATION - PRIMARY LEARNER  4 YEARS OF COLLEGE   BARRIERS PRIMARY LEARNER NONE   CO-LEARNER CAREGIVER No   PRIMARY LANGUAGE ENGLISH   LEARNER PREFERENCE PRIMARY DEMONSTRATION   ANSWERED BY patient   RELATIONSHIP SELF       Abuse Screening:  Abuse Screening Questionnaire 9/27/2019   Do you ever feel afraid of your partner? N   Are you in a relationship with someone who physically or mentally threatens you? N   Is it safe for you to go home? Y       Fall Risk  Fall Risk Assessment, last 12 mths 9/14/2017   Able to walk? Yes   Fall in past 12 months? No       Health Maintenance reviewed and discussed and ordered per Provider. Health Maintenance Due   Topic Date Due    DTaP/Tdap/Td series (2 - Td) 02/10/2019   . Coordination of Care:  1. Have you been to the ER, urgent care clinic since your last visit? Hospitalized since your last visit? no    2. Have you seen or consulted any other health care providers outside of the [address] [place] [address] since your last visit? Include any pap smears or colon screening.  no

## 2020-01-15 NOTE — LETTER
1/15/2020 1:24 PM 
 
Ms. Leanna Dunlap [id number] [address] [id number] To whom it may concern- 
 
Please excuse Ms. Melvina Rodrigez from work. She was seen in our office today for headache and requires further testing. Please excuse through the end of this week. Sincerely, Armand Thompson NP

## 2020-01-21 ENCOUNTER — HOSPITAL ENCOUNTER (OUTPATIENT)
Dept: CT IMAGING | Age: 60
Discharge: HOME | End: 2020-01-21
Attending: NURSE PRACTITIONER
Payer: MEDICARE

## 2020-01-21 DIAGNOSIS — G44.52 NEW DAILY PERSISTENT HEADACHE: ICD-10-CM

## 2020-01-21 PROCEDURE — 70450 CT HEAD/BRAIN W/O DYE: CPT

## 2020-01-22 ENCOUNTER — TELEPHONE (OUTPATIENT)
Dept: FAMILY MEDICINE CLINIC | Age: 60
End: 2020-01-22

## 2020-01-22 DIAGNOSIS — G44.52 NEW DAILY PERSISTENT HEADACHE: Primary | ICD-10-CM

## 2020-01-22 NOTE — TELEPHONE ENCOUNTER
Called patient, no answer. Voicemail left asking to return call. I'd like to talk to patient about headache and see if it is still intractable, if any relief with fiorecet. Options are limited for abortive therapy at this point- we could try triptans but typically most effective if used at first sign of headache. Also expressed in message she can go to ER for further evaluation and possibly attain abortive treatment there through IV. Will also set up with referral to neurology as this is a new headache which is intractable.

## 2020-01-22 NOTE — TELEPHONE ENCOUNTER
----- Message from Van Gallo sent at 1/22/2020 10:19 AM EST -----  Patient advises that the medications you gave her are not working and wants to know now what to do. Please call and advise. Patient was made aware of CT results. Thanks  ----- Message -----  From: Zeus Padgett NP  Sent: 1/22/2020   9:46 AM EST  To: Van Gallo    Please let Ms Cristino Anthony know the radiologist has reviewed her CT and no abnormalities seen which could explain her headaches. No intracranial hemorrhage, mass or structural shift seen.

## 2020-01-22 NOTE — TELEPHONE ENCOUNTER
After obtaining identifiers patient was advised of CT Results. Patient advised that she saw the results on MyChart and stated the headaches are still there and the medications given are not working.   Please call and advise

## 2020-01-22 NOTE — PROGRESS NOTES
Please let Ms Mikhail Sagastume know the radiologist has reviewed her CT and no abnormalities seen which could explain her headaches. No intracranial hemorrhage, mass or structural shift seen.

## 2020-02-03 DIAGNOSIS — J30.9 ALLERGIC RHINITIS: ICD-10-CM

## 2020-02-05 ENCOUNTER — OFFICE VISIT (OUTPATIENT)
Dept: NEUROLOGY | Age: 60
End: 2020-02-05

## 2020-02-05 ENCOUNTER — HOSPITAL ENCOUNTER (OUTPATIENT)
Dept: LAB | Age: 60
Discharge: HOME | End: 2020-02-05

## 2020-02-05 VITALS
BODY MASS INDEX: 43.82 KG/M2 | SYSTOLIC BLOOD PRESSURE: 135 MMHG | HEIGHT: 65 IN | DIASTOLIC BLOOD PRESSURE: 90 MMHG | OXYGEN SATURATION: 94 % | HEART RATE: 72 BPM | TEMPERATURE: 96.4 F | WEIGHT: 263 LBS

## 2020-02-05 DIAGNOSIS — H81.13 BENIGN PAROXYSMAL POSITIONAL VERTIGO DUE TO BILATERAL VESTIBULAR DISORDER: ICD-10-CM

## 2020-02-05 DIAGNOSIS — R51.9 NEW ONSET HEADACHE: Primary | ICD-10-CM

## 2020-02-05 LAB — XX-LABCORP SPECIMEN COL,LCBCF: NORMAL

## 2020-02-05 PROCEDURE — 99001 SPECIMEN HANDLING PT-LAB: CPT

## 2020-02-05 RX ORDER — MECLIZINE HCL 12.5 MG 12.5 MG/1
12.5 TABLET ORAL
Qty: 30 TAB | Refills: 0 | Status: SHIPPED | OUTPATIENT
Start: 2020-02-05 | End: 2020-02-15

## 2020-02-05 NOTE — PROGRESS NOTES
Leobardo Hammond is a 62 y.o. female . presents for Headache; Dizziness; and Follow-up   . A 62years old female patient with medical history of depression, obstructive sleep apnea, COPD came for evaluation of headache of 2 weeks duration. The headache is bilateral, over the temporal areas and behind the eyes, right more than the left, sharp and occasionally aching, and persistent. No jaw claudication. Headache wakes her up from sleep. Also headache tends to get worse with coughing, bending over, and early morning. Has associated nausea but no vomiting. She has photophobia but no phonophobia. She also has a spinning sensation intermittently when moving her head from side-to-side especially when moving her head from left to the right. It lasts for about a couple of minutes. No worsening nausea or vomiting. Feels off balance during the episode. No blurring of vision or diplopia. No passing out spell. She can have the spinning sensation even when turning in bed. She did not have any previous episodes of chronic headache or vertigo. She feels that her hands are weak and has difficulty lifting mild weight like a cup of water. No weakness of the lower extremities. No numbness. No difficulty walking in between episodes. She has no new earache or discharge. She complains of some difficulty hearing on the right side with occasional tinnitus. She has some stress; she started teaching recently. No recent febrile illness. She has loss of sleep. She has obstructive sleep apnea and using her CPAP. No recent trauma. Resting scan of her head on January 15, 2020 which did not show any acute lesions. Review of Systems   Constitutional: Positive for malaise/fatigue and weight loss (15 LBs). Negative for chills, diaphoresis and fever. HENT: Positive for hearing loss (more on the right) and tinnitus (sometimes). Negative for ear pain. Eyes: Negative for blurred vision and double vision. Respiratory: Positive for cough (intermittently), hemoptysis (mild), shortness of breath and wheezing. Negative for sputum production. Cardiovascular: Positive for chest pain (anterior, difficulty turning over in bed). Negative for palpitations and leg swelling. Gastrointestinal: Positive for nausea. Negative for heartburn and vomiting. Genitourinary: Positive for frequency. Negative for dysuria, hematuria and urgency. Musculoskeletal: Positive for back pain, joint pain, myalgias and neck pain. Skin: Negative for itching and rash. Neurological: Positive for dizziness, focal weakness (hands) and headaches. Negative for tingling, tremors, sensory change, speech change and seizures. Endo/Heme/Allergies: Does not bruise/bleed easily. Psychiatric/Behavioral: Positive for depression and memory loss. Negative for hallucinations and suicidal ideas. The patient has insomnia.         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    GERD (gastroesophageal reflux disease)     History of gastric bypass 2012    History of meniscal tear 2015, 2018    right in 2015, left in 2018    HNP (herniated nucleus pulposus), lumbar     patient reported    Lung nodules     resolved 2018 CT    Osteopenia 10/03/2017    Recurrent depression ([address]     Sleep apnea     on cpap    Spinal stenosis, lumbar     patient reported       Past Surgical History:   Procedure Laterality Date    HX ARTHRODESIS  01/2000    Herniated Disc, arthritis right foot 06/06, 07/07, C 6/7, C 4/6 Stenosis    HX CHOLECYSTECTOMY  09/2008    gallbladder disease    HX COLONOSCOPY  05/2009    HX GASTRIC BYPASS  2012    GASTRIC BYPASS  [facility] Gastric Bypass      HX HYSTERECTOMY  06/1994    HX MENISCECTOMY  10/2015    right repari of torn meniscus    HX MENISCECTOMY Left 03/16/2018    partial meniscectomy due to tear    HX MYOMECTOMY  06/1984    benign tumor    HX ORTHOPAEDIC  1964    orthopaedic excision Fibrosarcoma and Lymphangiogram    HX PELVIC LAPAROSCOPY  04/1984    large uterine blockage    NEUROLOGICAL PROCEDURE UNLISTED  2000, 2007    Cervical fusion        Family History   Problem Relation Age of Onset    Hypertension Mother     Depression Mother     Cancer Mother     Ovarian Cancer Mother     Breast Problems Mother     Cancer Father     Cancer Sister     Depression Sister     Depression Brother     Stroke Neg Hx     Heart Attack Neg Hx         Social History     Socioeconomic History    Marital status:      Spouse name: Not on file    Number of children: Not on file    Years of education: Not on file    Highest education level: Not on file   Occupational History    Not on file   Social Needs    Financial resource strain: Not on file    Food insecurity:     Worry: Not on file     Inability: Not on file    Transportation needs:     Medical: Not on file     Non-medical: Not on file   Tobacco Use    Smoking status: Current Every Day Smoker     Packs/day: 0.50     Years: 45.00     Pack years: 22.50    Smokeless tobacco: Never Used   Substance and Sexual Activity    Alcohol use: No     Comment: quit 1980s- was heavy etoh    Drug use: Not Currently     Comment: marijuana, cocaine 30 yrs ago    Sexual activity: Never   Lifestyle    Physical activity:     Days per week: Not on file     Minutes per session: Not on file    Stress: Not on file   Relationships    Social connections:     Talks on phone: Not on file     Gets together: Not on file     Attends Restorationist service: Not on file     Active member of club or organization: Not on file     Attends meetings of clubs or organizations: Not on file     Relationship status: Not on file    Intimate partner violence:     Fear of current or ex partner: Not on file     Emotionally abused: Not on file     Physically abused: Not on file     Forced sexual activity: Not on file   Other Topics Concern    Not on file   Social History Narrative    Not on file        Allergies   Allergen Reactions    Adhesive Tape-Silicones Swelling     REALLY BAD SWELLING AND SORE APPEAR    Alcohol Other (comments)     PT STATES SHE IS AN ALCOHOLIC    Lactose Other (comments)     PT STATES IT MAKES HER STOMACH HURT    Percodan [Oxycodone Hcl-Oxycodone-Asa] Itching and Other (comments)     crying    Nsaids (Non-Steroidal Anti-Inflammatory Drug) Other (comments)     PT NOT ABLE TO TAKE DUE TO GASTRIC BYPASS         Current Outpatient Medications   Medication Sig Dispense Refill    butalbital-acetaminophen-caffeine (FIORICET, ESGIC) -40 mg per tablet Take 1 Tab by mouth every six (6) hours as needed for Headache or Migraine. 30 Tab 0    docusate sodium (COLACE) 50 mg capsule Take 1 Cap by mouth two (2) times a day for 90 days. 60 Cap 2    pregabalin (LYRICA) 150 mg capsule Take 1 Cap by mouth two (2) times a day. Max Daily Amount: 300 mg. 180 Cap 1    SYMBICORT 160-4.5 mcg/actuation HFAA INHALE 2 PUFFS BY MOUTH TWICE DAILY 6 Inhaler 2    varenicline (CHANTIX) 1 mg tablet Take 1 Tab by mouth two (2) times daily (after meals). 180 Tab 0    furosemide (LASIX) 20 mg tablet Take 1 Tab by mouth daily as needed (severe leg swelling). 90 Tab 1    albuterol (PROVENTIL HFA, VENTOLIN HFA, PROAIR HFA) 90 mcg/actuation inhaler Take 2 Puffs by inhalation every four (4) hours as needed for Wheezing or Shortness of Breath. 3 Inhaler 4    diclofenac (VOLTAREN) 1 % gel Apply 2 g to affected area every six (6) hours as needed for Pain.  100 g 11    DEXILANT 60 mg CpDB capsule (delayed release) TAKE 1 CAPSULE BY MOUTH DAILY 90 Cap 3    buPROPion SR (WELLBUTRIN SR) 150 mg SR tablet TAKE 1 TABLET BY MOUTH TWICE DAILY 180 Tab 3    fluticasone (FLONASE) 50 mcg/actuation nasal spray SHAKE LIQUID AND USE 2 SPRAYS IN EACH NOSTRIL DAILY 3 Bottle 3    b complex vitamins tablet Take 1 Tab by mouth daily.  calcium-cholecalciferol, d3, (CALCIUM 600 + D) 600-125 mg-unit tab Take 1,065 mg by mouth nightly. Indications: TAKES 2 AT BEDTIME      omega 3-dha-epa-fish oil (FISH OIL) 100-160-1,000 mg cap Take 1,065 mg by mouth daily.  ferrous sulfate ER (IRON) 160 mg (50 mg iron) TbER tablet Take 1 Tab by mouth daily. Indications: PT TAKES 40 MG      multivitamin (ONE A DAY) tablet Take 1 Tab by mouth daily.  sucralfate (CARAFATE) 1 gram tablet Take 1 g by mouth two (2) times a day.  acetaminophen (TYLENOL EXTRA STRENGTH) 500 mg tablet Take  by mouth every six (6) hours as needed for Pain.  citalopram (CELEXA) 20 mg tablet Take 1 Tab by mouth daily. 90 Tab 3         Physical Exam  Constitutional:       Appearance: Normal appearance. HENT:      Head: Normocephalic and atraumatic. Comments: Mild tenderness over the temporal area; no redness or swelling     Mouth/Throat:      Mouth: Mucous membranes are moist.      Pharynx: No oropharyngeal exudate. Eyes:      Extraocular Movements: Extraocular movements intact. Pupils: Pupils are equal, round, and reactive to light. Neck:      Musculoskeletal: Normal range of motion and neck supple. Cardiovascular:      Rate and Rhythm: Normal rate and regular rhythm. Heart sounds: No murmur. No gallop. Pulmonary:      Effort: Pulmonary effort is normal. No respiratory distress. Breath sounds: No wheezing or rales. Musculoskeletal:      Right lower leg: Edema present. Left lower leg: Edema present. Comments: Limitation of bilateral knee movement. Neurological:      Mental Status: She is alert.       Comments: Mental status: Awake, alert, oriented x3, follows simple, complex and inverted commands, no neglect, no extinction to DSS or VSS, immediate recall 3/3 and delayed recall 3/3  Speech and languge: fluent, coherent, and comprehension intact  CN: Funduscopy showed clear disc margin; VFF, EOMI, PERRLA, face sensation intact , no facial asymmetry noted, palate elevation symmetric bilat, SS+SCM 5/5 bilat, tongue midline  Motor: no pronator drift, tone normal throughout, strength 5/5 throughout  Sensory: intact to light touch and pinprick throughout except over the RUE. Coordination: FNF, HS accurate w/o dysmetria  DTR: 2+ throughout, toes downgoing BL  Gait: normal.             No visits with results within 3 Month(s) from this visit. Latest known visit with results is:   Clinical Support on 10/08/2019   Component Date Value Ref Range Status    PPD 10/08/2019 Negative  Negative Final    mm Induration 10/08/2019 0  0 - 5 mm Final             ICD-10-CM ICD-9-CM    1. New onset headache R51 784.0 SED RATE (ESR)      MRI BRAIN W WO CONT   2. Benign paroxysmal positional vertigo due to bilateral vestibular disorder H81.13 386.11 meclizine (ANTIVERT) 12.5 mg tablet      MRI BRAIN W WO CONT       62years old female patient with above medical problems came for evaluation of duration. Patient has no obvious focal neuro deficits. The headache is persistent. Some worsening at night and with coughing. Has some tenderness over the temporal areas more on the right side. No constitutional symptoms. No recent trauma. She has a spinning sensation possibly from BPPV. Recent CT scan of the head is negative for an acute change. For the new onset headache, I have ordered MRI of the brain with contrast.  In addition I have ordered ESR to rule out the possibility of giant cell arteritis. Patient has some new stresses and depression. Cannot rule out the possibility of tension type headache. We will continue with the Fioricet as needed. In addition I have ordered meclizine for the spinning sensation to be taken as needed, 12.5 mg. We will see him in 3 months time but will call her with the results of the MRI and ESR.

## 2020-02-05 NOTE — LETTER
2/5/20 Patient: Edilberto Denton YOB: 1960 Date of Visit: 2/5/2020 [facility] [address] [address] [place] [address] VIA In Basket Dear Felicia Pascual NP, Thank you for referring Ms. Barak Fernandes to [address] [place] [address] for evaluation. My notes for this consultation are attached. If you have questions, please do not hesitate to call me. I look forward to following your patient along with you. Sincerely, Lance Boykin MD

## 2020-02-05 NOTE — PATIENT INSTRUCTIONS
Epley Maneuver at Home for Vertigo: Exercises  Introduction  Vertigo is a spinning or whirling sensation when you move your head. Your doctor may have moved you in different positions to help your vertigo get better faster. This is called the Epley maneuver. Your doctor also may have asked you to do these exercises at home. Do the exercises as often as your doctor recommends. If your vertigo is getting worse, your doctor may have you change the exercise or stop it. Step 1  Step 1    1. Sit on the edge of a bed or sofa. Step 2    1. Turn your head 45 degrees in the direction your doctor told you to. This should be toward the ear that causes the most vertigo for you. In this picture, the woman is turning toward her left ear. Step 3    1. Tilt yourself backward until you are lying on your back. Your head should still be at a 45-degree turn. Your head should be about midway between looking straight ahead and looking out to your side. Hold for 30 seconds. If you have vertigo, stay in this position until it stops. Step 4    1. Turn your head 90 degrees toward the ear that has the least vertigo. In this picture, the woman is turning to the right because she has vertigo on her left side. The point of your chin should be raised and over your shoulder. Hold for 30 seconds. Step 5    1. Roll onto the side with the least vertigo. You should now be looking at the floor. Hold for 30 seconds. Follow-up care is a key part of your treatment and safety. Be sure to make and go to all appointments, and call your doctor if you are having problems. It's also a good idea to know your test results and keep a list of the medicines you take. Where can you learn more? Go to http://steve-anand.info/. Enter [id number] in the search box to learn more about \"Epley Maneuver at Home for Vertigo: Exercises. \"  Current as of: March 28, 2019  Content Version: 12.2  © 5191-6904 [employer], [facility].  Care instructions adapted under license by [employer] (which disclaims liability or warranty for this information). If you have questions about a medical condition or this instruction, always ask your healthcare professional. [address] any warranty or liability for your use of this information.

## 2020-02-06 LAB — ERYTHROCYTE [SEDIMENTATION RATE] IN BLOOD BY WESTERGREN METHOD: 22 MM/HR (ref 0–40)

## 2020-02-06 RX ORDER — FLUTICASONE PROPIONATE 50 MCG
SPRAY, SUSPENSION (ML) NASAL
Qty: 48 G | Refills: 5 | Status: SHIPPED | OUTPATIENT
Start: 2020-02-06 | End: 2021-02-10 | Stop reason: SDUPTHER

## 2020-02-06 NOTE — TELEPHONE ENCOUNTER
Requested Prescriptions     Pending Prescriptions Disp Refills    fluticasone propionate (FLONASE) 50 mcg/actuation nasal spray [Pharmacy Med Name: FLUTICASONE 50MCG NASAL SP (120) RX] 48 g      Sig: SHAKE LIQUID AND USE 2 SPRAYS IN EACH NOSTRIL DAILY

## 2020-02-14 ENCOUNTER — HOSPITAL ENCOUNTER (OUTPATIENT)
Age: 60
Discharge: HOME | End: 2020-02-14
Attending: STUDENT IN AN ORGANIZED HEALTH CARE EDUCATION/TRAINING PROGRAM
Payer: MEDICARE

## 2020-02-14 DIAGNOSIS — H81.13 BENIGN PAROXYSMAL POSITIONAL VERTIGO DUE TO BILATERAL VESTIBULAR DISORDER: ICD-10-CM

## 2020-02-14 DIAGNOSIS — R51.9 NEW ONSET HEADACHE: ICD-10-CM

## 2020-02-14 LAB — CREAT UR-MCNC: 1 MG/DL (ref 0.6–1.3)

## 2020-02-14 PROCEDURE — 70553 MRI BRAIN STEM W/O & W/DYE: CPT

## 2020-02-14 PROCEDURE — 74011636320 HC RX REV CODE- 636/320: Performed by: STUDENT IN AN ORGANIZED HEALTH CARE EDUCATION/TRAINING PROGRAM

## 2020-02-14 PROCEDURE — 82565 ASSAY OF CREATININE: CPT

## 2020-02-14 PROCEDURE — A9575 INJ GADOTERATE MEGLUMI 0.1ML: HCPCS | Performed by: STUDENT IN AN ORGANIZED HEALTH CARE EDUCATION/TRAINING PROGRAM

## 2020-02-14 RX ADMIN — GADOTERATE MEGLUMINE 23 ML: 376.9 INJECTION INTRAVENOUS at 18:00

## 2020-02-16 ENCOUNTER — HOSPITAL ENCOUNTER (EMERGENCY)
Age: 60
Discharge: STILL ADMITTED OR ADMINISTRATIVE | End: 2020-02-16
Attending: EMERGENCY MEDICINE
Payer: MEDICARE

## 2020-02-16 ENCOUNTER — ANESTHESIA EVENT (OUTPATIENT)
Dept: ENDOSCOPY | Age: 60
End: 2020-02-16
Payer: MEDICARE

## 2020-02-16 ENCOUNTER — HOSPITAL ENCOUNTER (EMERGENCY)
Age: 60
Discharge: HOME | End: 2020-02-16
Attending: EMERGENCY MEDICINE
Payer: MEDICARE

## 2020-02-16 ENCOUNTER — APPOINTMENT (OUTPATIENT)
Dept: GENERAL RADIOLOGY | Age: 60
End: 2020-02-16
Attending: EMERGENCY MEDICINE
Payer: MEDICARE

## 2020-02-16 ENCOUNTER — HOSPITAL ENCOUNTER (EMERGENCY)
Age: 60
Discharge: HOME | End: 2020-02-16
Attending: EMERGENCY MEDICINE | Admitting: EMERGENCY MEDICINE
Payer: MEDICARE

## 2020-02-16 ENCOUNTER — APPOINTMENT (OUTPATIENT)
Dept: CT IMAGING | Age: 60
End: 2020-02-16
Attending: EMERGENCY MEDICINE
Payer: MEDICARE

## 2020-02-16 ENCOUNTER — ANESTHESIA (OUTPATIENT)
Dept: ENDOSCOPY | Age: 60
End: 2020-02-16
Payer: MEDICARE

## 2020-02-16 VITALS
RESPIRATION RATE: 14 BRPM | SYSTOLIC BLOOD PRESSURE: 164 MMHG | DIASTOLIC BLOOD PRESSURE: 81 MMHG | OXYGEN SATURATION: 91 % | HEART RATE: 93 BPM

## 2020-02-16 VITALS
BODY MASS INDEX: 43.6 KG/M2 | HEART RATE: 74 BPM | DIASTOLIC BLOOD PRESSURE: 67 MMHG | OXYGEN SATURATION: 95 % | TEMPERATURE: 98.7 F | SYSTOLIC BLOOD PRESSURE: 124 MMHG | RESPIRATION RATE: 15 BRPM | WEIGHT: 262 LBS

## 2020-02-16 VITALS — SYSTOLIC BLOOD PRESSURE: 116 MMHG | HEART RATE: 92 BPM | OXYGEN SATURATION: 98 % | DIASTOLIC BLOOD PRESSURE: 93 MMHG

## 2020-02-16 DIAGNOSIS — R11.2 NON-INTRACTABLE VOMITING WITH NAUSEA, UNSPECIFIED VOMITING TYPE: Primary | ICD-10-CM

## 2020-02-16 DIAGNOSIS — R11.2 NAUSEA AND VOMITING, INTRACTABILITY OF VOMITING NOT SPECIFIED, UNSPECIFIED VOMITING TYPE: ICD-10-CM

## 2020-02-16 DIAGNOSIS — R10.13 ABDOMINAL PAIN, EPIGASTRIC: ICD-10-CM

## 2020-02-16 DIAGNOSIS — T18.128A FOOD IMPACTION OF ESOPHAGUS, INITIAL ENCOUNTER: Primary | ICD-10-CM

## 2020-02-16 DIAGNOSIS — R10.13 ABDOMINAL PAIN, EPIGASTRIC: Primary | ICD-10-CM

## 2020-02-16 LAB
ALBUMIN SERPL-MCNC: 3.3 G/DL (ref 3.4–5)
ALBUMIN/GLOB SERPL: 0.8 {RATIO} (ref 0.8–1.7)
ALP SERPL-CCNC: 98 U/L (ref 45–117)
ALT SERPL-CCNC: 23 U/L (ref 13–56)
ANION GAP SERPL CALC-SCNC: 4 MMOL/L (ref 3–18)
AST SERPL-CCNC: 12 U/L (ref 10–38)
ATRIAL RATE: 64 BPM
BASOPHILS # BLD: 0 K/UL (ref 0–0.1)
BASOPHILS NFR BLD: 0 % (ref 0–2)
BILIRUB SERPL-MCNC: 0.3 MG/DL (ref 0.2–1)
BUN SERPL-MCNC: 7 MG/DL (ref 7–18)
BUN/CREAT SERPL: 8 (ref 12–20)
CALCIUM SERPL-MCNC: 8.7 MG/DL (ref 8.5–10.1)
CALCULATED P AXIS, ECG09: 23 DEGREES
CALCULATED R AXIS, ECG10: -4 DEGREES
CALCULATED T AXIS, ECG11: 18 DEGREES
CHLORIDE SERPL-SCNC: 106 MMOL/L (ref 100–111)
CO2 SERPL-SCNC: 30 MMOL/L (ref 21–32)
CREAT SERPL-MCNC: 0.89 MG/DL (ref 0.6–1.3)
DIAGNOSIS, 93000: NORMAL
DIFFERENTIAL METHOD BLD: ABNORMAL
EOSINOPHIL # BLD: 0.1 K/UL (ref 0–0.4)
EOSINOPHIL NFR BLD: 1 % (ref 0–5)
ERYTHROCYTE [DISTWIDTH] IN BLOOD BY AUTOMATED COUNT: 14.1 % (ref 11.6–14.5)
GLOBULIN SER CALC-MCNC: 4.1 G/DL (ref 2–4)
GLUCOSE SERPL-MCNC: 111 MG/DL (ref 74–99)
HCT VFR BLD AUTO: 45.5 % (ref 35–45)
HGB BLD-MCNC: 15.2 G/DL (ref 12–16)
LIPASE SERPL-CCNC: 146 U/L (ref 73–393)
LYMPHOCYTES # BLD: 1.7 K/UL (ref 0.9–3.6)
LYMPHOCYTES NFR BLD: 13 % (ref 21–52)
MCH RBC QN AUTO: 29.5 PG (ref 24–34)
MCHC RBC AUTO-ENTMCNC: 33.4 G/DL (ref 31–37)
MCV RBC AUTO: 88.3 FL (ref 74–97)
MONOCYTES # BLD: 0.5 K/UL (ref 0.05–1.2)
MONOCYTES NFR BLD: 4 % (ref 3–10)
NEUTS SEG # BLD: 11.1 K/UL (ref 1.8–8)
NEUTS SEG NFR BLD: 82 % (ref 40–73)
P-R INTERVAL, ECG05: 176 MS
PLATELET # BLD AUTO: 332 K/UL (ref 135–420)
PMV BLD AUTO: 9.9 FL (ref 9.2–11.8)
POTASSIUM SERPL-SCNC: 4.2 MMOL/L (ref 3.5–5.5)
PROT SERPL-MCNC: 7.4 G/DL (ref 6.4–8.2)
Q-T INTERVAL, ECG07: 418 MS
QRS DURATION, ECG06: 80 MS
QTC CALCULATION (BEZET), ECG08: 431 MS
RBC # BLD AUTO: 5.15 M/UL (ref 4.2–5.3)
SODIUM SERPL-SCNC: 140 MMOL/L (ref 136–145)
TROPONIN I SERPL-MCNC: <0.02 NG/ML (ref 0–0.04)
VENTRICULAR RATE, ECG03: 64 BPM
WBC # BLD AUTO: 13.3 K/UL (ref 4.6–13.2)

## 2020-02-16 PROCEDURE — 74011250636 HC RX REV CODE- 250/636: Performed by: NURSE ANESTHETIST, CERTIFIED REGISTERED

## 2020-02-16 PROCEDURE — 77030008477 HC STYL SATN SLP COVD -A: Performed by: NURSE ANESTHETIST, CERTIFIED REGISTERED

## 2020-02-16 PROCEDURE — 96375 TX/PRO/DX INJ NEW DRUG ADDON: CPT

## 2020-02-16 PROCEDURE — 74177 CT ABD & PELVIS W/CONTRAST: CPT

## 2020-02-16 PROCEDURE — 99284 EMERGENCY DEPT VISIT MOD MDM: CPT

## 2020-02-16 PROCEDURE — 96374 THER/PROPH/DIAG INJ IV PUSH: CPT

## 2020-02-16 PROCEDURE — 80053 COMPREHEN METABOLIC PANEL: CPT

## 2020-02-16 PROCEDURE — 84484 ASSAY OF TROPONIN QUANT: CPT

## 2020-02-16 PROCEDURE — 93005 ELECTROCARDIOGRAM TRACING: CPT

## 2020-02-16 PROCEDURE — 77030007289 HC DEV ROTH NET RETRV STRC -B: Performed by: INTERNAL MEDICINE

## 2020-02-16 PROCEDURE — 74011250636 HC RX REV CODE- 250/636: Performed by: EMERGENCY MEDICINE

## 2020-02-16 PROCEDURE — 77030031493 HC DEV ENDOSC GRSP RAPT STRC -B: Performed by: INTERNAL MEDICINE

## 2020-02-16 PROCEDURE — 77030008683 HC TU ET CUF COVD -A: Performed by: NURSE ANESTHETIST, CERTIFIED REGISTERED

## 2020-02-16 PROCEDURE — 74011000250 HC RX REV CODE- 250: Performed by: NURSE ANESTHETIST, CERTIFIED REGISTERED

## 2020-02-16 PROCEDURE — 85025 COMPLETE CBC W/AUTO DIFF WBC: CPT

## 2020-02-16 PROCEDURE — 76040000007: Performed by: INTERNAL MEDICINE

## 2020-02-16 PROCEDURE — 76060000032 HC ANESTHESIA 0.5 TO 1 HR: Performed by: INTERNAL MEDICINE

## 2020-02-16 PROCEDURE — 77030037186 HC VLV ENDOSC STRL DEFENDO DISP MVAT -A: Performed by: INTERNAL MEDICINE

## 2020-02-16 PROCEDURE — 96376 TX/PRO/DX INJ SAME DRUG ADON: CPT

## 2020-02-16 PROCEDURE — 74011636320 HC RX REV CODE- 636/320: Performed by: EMERGENCY MEDICINE

## 2020-02-16 PROCEDURE — 99281 EMR DPT VST MAYX REQ PHY/QHP: CPT

## 2020-02-16 PROCEDURE — 71045 X-RAY EXAM CHEST 1 VIEW: CPT

## 2020-02-16 PROCEDURE — 83690 ASSAY OF LIPASE: CPT

## 2020-02-16 RX ORDER — SUCCINYLCHOLINE CHLORIDE 100 MG/5ML
SYRINGE (ML) INTRAVENOUS AS NEEDED
Status: DISCONTINUED | OUTPATIENT
Start: 2020-02-16 | End: 2020-02-16 | Stop reason: HOSPADM

## 2020-02-16 RX ORDER — DEXAMETHASONE SODIUM PHOSPHATE 4 MG/ML
INJECTION, SOLUTION INTRA-ARTICULAR; INTRALESIONAL; INTRAMUSCULAR; INTRAVENOUS; SOFT TISSUE AS NEEDED
Status: DISCONTINUED | OUTPATIENT
Start: 2020-02-16 | End: 2020-02-16 | Stop reason: HOSPADM

## 2020-02-16 RX ORDER — NALOXONE HYDROCHLORIDE 0.4 MG/ML
0.2 INJECTION, SOLUTION INTRAMUSCULAR; INTRAVENOUS; SUBCUTANEOUS AS NEEDED
Status: CANCELLED | OUTPATIENT
Start: 2020-02-16

## 2020-02-16 RX ORDER — ESMOLOL HYDROCHLORIDE 10 MG/ML
INJECTION INTRAVENOUS AS NEEDED
Status: DISCONTINUED | OUTPATIENT
Start: 2020-02-16 | End: 2020-02-16 | Stop reason: HOSPADM

## 2020-02-16 RX ORDER — ONDANSETRON 2 MG/ML
4 INJECTION INTRAMUSCULAR; INTRAVENOUS
Status: COMPLETED | OUTPATIENT
Start: 2020-02-16 | End: 2020-02-16

## 2020-02-16 RX ORDER — SODIUM CHLORIDE, SODIUM LACTATE, POTASSIUM CHLORIDE, CALCIUM CHLORIDE 600; 310; 30; 20 MG/100ML; MG/100ML; MG/100ML; MG/100ML
100 INJECTION, SOLUTION INTRAVENOUS CONTINUOUS
Status: CANCELLED | OUTPATIENT
Start: 2020-02-16

## 2020-02-16 RX ORDER — ONDANSETRON 2 MG/ML
INJECTION INTRAMUSCULAR; INTRAVENOUS AS NEEDED
Status: DISCONTINUED | OUTPATIENT
Start: 2020-02-16 | End: 2020-02-16 | Stop reason: HOSPADM

## 2020-02-16 RX ORDER — LIDOCAINE HYDROCHLORIDE 20 MG/ML
INJECTION, SOLUTION EPIDURAL; INFILTRATION; INTRACAUDAL; PERINEURAL AS NEEDED
Status: DISCONTINUED | OUTPATIENT
Start: 2020-02-16 | End: 2020-02-16 | Stop reason: HOSPADM

## 2020-02-16 RX ORDER — PROPOFOL 10 MG/ML
INJECTION, EMULSION INTRAVENOUS AS NEEDED
Status: DISCONTINUED | OUTPATIENT
Start: 2020-02-16 | End: 2020-02-16 | Stop reason: HOSPADM

## 2020-02-16 RX ORDER — MORPHINE SULFATE 4 MG/ML
4 INJECTION, SOLUTION INTRAMUSCULAR; INTRAVENOUS ONCE
Status: COMPLETED | OUTPATIENT
Start: 2020-02-16 | End: 2020-02-16

## 2020-02-16 RX ORDER — MORPHINE SULFATE 4 MG/ML
4 INJECTION, SOLUTION INTRAMUSCULAR; INTRAVENOUS
Status: COMPLETED | OUTPATIENT
Start: 2020-02-16 | End: 2020-02-16

## 2020-02-16 RX ORDER — SODIUM CHLORIDE, SODIUM LACTATE, POTASSIUM CHLORIDE, CALCIUM CHLORIDE 600; 310; 30; 20 MG/100ML; MG/100ML; MG/100ML; MG/100ML
INJECTION, SOLUTION INTRAVENOUS
Status: DISCONTINUED | OUTPATIENT
Start: 2020-02-16 | End: 2020-02-16 | Stop reason: HOSPADM

## 2020-02-16 RX ORDER — MIDAZOLAM HYDROCHLORIDE 1 MG/ML
2 INJECTION, SOLUTION INTRAMUSCULAR; INTRAVENOUS ONCE
Status: COMPLETED | OUTPATIENT
Start: 2020-02-16 | End: 2020-02-16

## 2020-02-16 RX ORDER — ONDANSETRON 2 MG/ML
4 INJECTION INTRAMUSCULAR; INTRAVENOUS
Status: CANCELLED | OUTPATIENT
Start: 2020-02-16 | End: 2020-02-17

## 2020-02-16 RX ORDER — FENTANYL CITRATE 50 UG/ML
INJECTION, SOLUTION INTRAMUSCULAR; INTRAVENOUS AS NEEDED
Status: DISCONTINUED | OUTPATIENT
Start: 2020-02-16 | End: 2020-02-16 | Stop reason: HOSPADM

## 2020-02-16 RX ORDER — FAMOTIDINE 10 MG/ML
20 INJECTION INTRAVENOUS
Status: COMPLETED | OUTPATIENT
Start: 2020-02-16 | End: 2020-02-16

## 2020-02-16 RX ORDER — MIDAZOLAM HYDROCHLORIDE 1 MG/ML
INJECTION, SOLUTION INTRAMUSCULAR; INTRAVENOUS AS NEEDED
Status: DISCONTINUED | OUTPATIENT
Start: 2020-02-16 | End: 2020-02-16 | Stop reason: HOSPADM

## 2020-02-16 RX ADMIN — ONDANSETRON 4 MG: 2 INJECTION INTRAMUSCULAR; INTRAVENOUS at 06:29

## 2020-02-16 RX ADMIN — MORPHINE SULFATE 4 MG: 4 INJECTION, SOLUTION INTRAMUSCULAR; INTRAVENOUS at 08:29

## 2020-02-16 RX ADMIN — FAMOTIDINE 20 MG: 10 INJECTION, SOLUTION INTRAVENOUS at 05:34

## 2020-02-16 RX ADMIN — PROPOFOL 150 MG: 10 INJECTION, EMULSION INTRAVENOUS at 16:19

## 2020-02-16 RX ADMIN — SODIUM CHLORIDE, SODIUM LACTATE, POTASSIUM CHLORIDE, AND CALCIUM CHLORIDE: 600; 310; 30; 20 INJECTION, SOLUTION INTRAVENOUS at 16:26

## 2020-02-16 RX ADMIN — MIDAZOLAM 2 MG: 1 INJECTION INTRAMUSCULAR; INTRAVENOUS at 12:37

## 2020-02-16 RX ADMIN — MORPHINE SULFATE 4 MG: 4 INJECTION, SOLUTION INTRAMUSCULAR; INTRAVENOUS at 05:34

## 2020-02-16 RX ADMIN — MORPHINE SULFATE 4 MG: 4 INJECTION, SOLUTION INTRAMUSCULAR; INTRAVENOUS at 15:03

## 2020-02-16 RX ADMIN — Medication 160 MG: at 16:19

## 2020-02-16 RX ADMIN — DIATRIZOATE MEGLUMINE AND DIATRIZOATE SODIUM 30 ML: 600; 100 SOLUTION ORAL; RECTAL at 06:29

## 2020-02-16 RX ADMIN — FENTANYL CITRATE 50 MCG: 50 INJECTION, SOLUTION INTRAMUSCULAR; INTRAVENOUS at 16:30

## 2020-02-16 RX ADMIN — MIDAZOLAM 2 MG: 1 INJECTION INTRAMUSCULAR; INTRAVENOUS at 16:17

## 2020-02-16 RX ADMIN — LIDOCAINE HYDROCHLORIDE 100 MG: 20 INJECTION, SOLUTION INTRAVENOUS at 16:19

## 2020-02-16 RX ADMIN — ONDANSETRON 4 MG: 2 INJECTION INTRAMUSCULAR; INTRAVENOUS at 05:34

## 2020-02-16 RX ADMIN — IOPAMIDOL 100 ML: 612 INJECTION, SOLUTION INTRAVENOUS at 06:03

## 2020-02-16 RX ADMIN — ONDANSETRON 4 MG: 2 SOLUTION INTRAMUSCULAR; INTRAVENOUS at 16:26

## 2020-02-16 RX ADMIN — DEXAMETHASONE SODIUM PHOSPHATE 4 MG: 4 INJECTION, SOLUTION INTRA-ARTICULAR; INTRALESIONAL; INTRAMUSCULAR; INTRAVENOUS; SOFT TISSUE at 16:26

## 2020-02-16 RX ADMIN — FENTANYL CITRATE 50 MCG: 50 INJECTION, SOLUTION INTRAMUSCULAR; INTRAVENOUS at 16:19

## 2020-02-16 RX ADMIN — SODIUM CHLORIDE 1000 ML: 900 INJECTION, SOLUTION INTRAVENOUS at 12:42

## 2020-02-16 RX ADMIN — DIATRIZOATE MEGLUMINE AND DIATRIZOATE SODIUM 30 ML: 600; 100 SOLUTION ORAL; RECTAL at 05:58

## 2020-02-16 RX ADMIN — ESMOLOL HYDROCHLORIDE 10 MG: 10 INJECTION INTRAVENOUS at 17:03

## 2020-02-16 NOTE — ED TRIAGE NOTES
Patient arriving from [facility] Emergency department with a possible obstruction.

## 2020-02-16 NOTE — CONSULTS
Gastroenterology Consult    Patient: Aster Masterson MRN: [id number]  SSN: [id number]    YOB: 1960  Age: 62 y.o. Sex: female        Assessment:   1. Small bowel obstruction:  She has what appears to be a food bolus impaction at the J-J anastomosis causing proximal dilation. I recommend push enteroscopy to attempt to alleviate the obstruction endoscopically. The risks, benefits and alternatives were discussed including risks of iv sedation, bleeding, perforation. I discussed with her that we may not be able to reach it depending on the food in the way or that we may not be able to relieve the obstruction endoscopically. She wishes to proceed. Plan:   1. Enteroscopy today. Subjective:      Aster Masterson is a 62 y.o. female who is being seen for bowel obstruction. Mrs. Elfreda Bamberger has history of gastric bypass in the past.  She has not had complications prior to this. She presents with nausea and vomiting and associated abdominal pain. This has been ongoing for the past couple days. She has had no hematemesis. No nausea or vomiting prior to this onset. She reports diffuse left sided abdominal pain. She has no change in bowel movements including no diarrhea or constipation. No blood in stool and no melena. No recent weight loss. She reports no NSAID use. She reports having negative EGD in the past year. During her evaluation she had a CT scan that noted probable food bolus impaction at the distal J-J anastomosis with associated dilation of the small bowel proximally. She has not had this happen in the past.  She has no other complaints. .    Past Medical History  GERD  Obesity  Depression  AYE  COPD  Fibrosarcoma    Past Surgical History  Gastric bypass  EGD  Colonsocopy    Family History  No family history of any chronic GI illness. Social History  No alcohol or drug use. Medications  No current facility-administered medications for this encounter.       Current Outpatient Medications   Medication Sig    fluticasone propionate (FLONASE) 50 mcg/actuation nasal spray SHAKE LIQUID AND USE 2 SPRAYS IN EACH NOSTRIL DAILY    butalbital-acetaminophen-caffeine (FIORICET, ESGIC) -40 mg per tablet Take 1 Tab by mouth every six (6) hours as needed for Headache or Migraine.  docusate sodium (COLACE) 50 mg capsule Take 1 Cap by mouth two (2) times a day for 90 days.  pregabalin (LYRICA) 150 mg capsule Take 1 Cap by mouth two (2) times a day. Max Daily Amount: 300 mg.    SYMBICORT 160-4.5 mcg/actuation HFAA INHALE 2 PUFFS BY MOUTH TWICE DAILY    varenicline (CHANTIX) 1 mg tablet Take 1 Tab by mouth two (2) times daily (after meals).  sucralfate (CARAFATE) 1 gram tablet Take 1 g by mouth two (2) times a day.  furosemide (LASIX) 20 mg tablet Take 1 Tab by mouth daily as needed (severe leg swelling).  acetaminophen (TYLENOL EXTRA STRENGTH) 500 mg tablet Take  by mouth every six (6) hours as needed for Pain.  albuterol (PROVENTIL HFA, VENTOLIN HFA, PROAIR HFA) 90 mcg/actuation inhaler Take 2 Puffs by inhalation every four (4) hours as needed for Wheezing or Shortness of Breath.  diclofenac (VOLTAREN) 1 % gel Apply 2 g to affected area every six (6) hours as needed for Pain.  DEXILANT 60 mg CpDB capsule (delayed release) TAKE 1 CAPSULE BY MOUTH DAILY    buPROPion SR (WELLBUTRIN SR) 150 mg SR tablet TAKE 1 TABLET BY MOUTH TWICE DAILY    b complex vitamins tablet Take 1 Tab by mouth daily.  calcium-cholecalciferol, d3, (CALCIUM 600 + D) 600-125 mg-unit tab Take 1,065 mg by mouth nightly. Indications: TAKES 2 AT BEDTIME    omega 3-dha-epa-fish oil (FISH OIL) 100-160-1,000 mg cap Take 1,065 mg by mouth daily.  ferrous sulfate ER (IRON) 160 mg (50 mg iron) TbER tablet Take 1 Tab by mouth daily. Indications: PT TAKES 40 MG    multivitamin (ONE A DAY) tablet Take 1 Tab by mouth daily.         Hospital Problems  Date Reviewed: 1/15/2020    None Allergies   Allergen Reactions    Adhesive Tape-Silicones Swelling     REALLY BAD SWELLING AND SORE APPEAR    Alcohol Other (comments)     PT STATES SHE IS AN ALCOHOLIC    Lactose Other (comments)     PT STATES IT MAKES HER STOMACH HURT    Percodan [Oxycodone Hcl-Oxycodone-Asa] Itching and Other (comments)     crying    Nsaids (Non-Steroidal Anti-Inflammatory Drug) Other (comments)     PT NOT ABLE TO TAKE DUE TO GASTRIC BYPASS       Review of Systems:  A comprehensive review of systems was negative except for that written in the History of Present Illness. Objective:     Physical Exam:  Visit Vitals  /58   Pulse 73   Resp 22   SpO2 96%     General appearance: alert, cooperative, no distress, appears stated age  Lungs: clear to auscultation bilaterally  Heart: regular rate and rhythm, S1, S2 normal, no murmur, click, rub or gallop  Abdomen: soft, non-tender.  Bowel sounds normal. No masses,  no organomegaly    Recent Results (from the past 24 hour(s))   EKG, 12 LEAD, INITIAL    Collection Time: 02/16/20  4:59 AM   Result Value Ref Range    Ventricular Rate 64 BPM    Atrial Rate 64 BPM    P-R Interval 176 ms    QRS Duration 80 ms    Q-T Interval 418 ms    QTC Calculation (Bezet) 431 ms    Calculated P Axis 23 degrees    Calculated R Axis -4 degrees    Calculated T Axis 18 degrees    Diagnosis       Normal sinus rhythm  Normal ECG  When compared with ECG of 06-MAR-2018 10:04,  No significant change was found  Confirmed by David Choi ([id number]) on 2/16/2020 7:40:15 AM     CBC WITH AUTOMATED DIFF    Collection Time: 02/16/20  5:20 AM   Result Value Ref Range    WBC 13.3 (H) 4.6 - 13.2 K/uL    RBC 5.15 4.20 - 5.30 M/uL    HGB 15.2 12.0 - 16.0 g/dL    HCT 45.5 (H) 35.0 - 45.0 %    MCV 88.3 74.0 - 97.0 FL    MCH 29.5 24.0 - 34.0 PG    MCHC 33.4 31.0 - 37.0 g/dL    RDW 14.1 11.6 - 14.5 %    PLATELET 235 279 - 884 K/uL    MPV 9.9 9.2 - 11.8 FL    NEUTROPHILS 82 (H) 40 - 73 %    LYMPHOCYTES 13 (L) 21 - 52 % MONOCYTES 4 3 - 10 %    EOSINOPHILS 1 0 - 5 %    BASOPHILS 0 0 - 2 %    ABS. NEUTROPHILS 11.1 (H) 1.8 - 8.0 K/UL    ABS. LYMPHOCYTES 1.7 0.9 - 3.6 K/UL    ABS. MONOCYTES 0.5 0.05 - 1.2 K/UL    ABS. EOSINOPHILS 0.1 0.0 - 0.4 K/UL    ABS. BASOPHILS 0.0 0.0 - 0.1 K/UL    DF AUTOMATED     METABOLIC PANEL, COMPREHENSIVE    Collection Time: 02/16/20  5:20 AM   Result Value Ref Range    Sodium 140 136 - 145 mmol/L    Potassium 4.2 3.5 - 5.5 mmol/L    Chloride 106 100 - 111 mmol/L    CO2 30 21 - 32 mmol/L    Anion gap 4 3.0 - 18 mmol/L    Glucose 111 (H) 74 - 99 mg/dL    BUN 7 7.0 - 18 MG/DL    Creatinine 0.89 0.6 - 1.3 MG/DL    BUN/Creatinine ratio 8 (L) 12 - 20      GFR est AA >60 >60 ml/min/1.73m2    GFR est non-AA >60 >60 ml/min/1.73m2    Calcium 8.7 8.5 - 10.1 MG/DL    Bilirubin, total 0.3 0.2 - 1.0 MG/DL    ALT (SGPT) 23 13 - 56 U/L    AST (SGOT) 12 10 - 38 U/L    Alk.  phosphatase 98 45 - 117 U/L    Protein, total 7.4 6.4 - 8.2 g/dL    Albumin 3.3 (L) 3.4 - 5.0 g/dL    Globulin 4.1 (H) 2.0 - 4.0 g/dL    A-G Ratio 0.8 0.8 - 1.7     TROPONIN I    Collection Time: 02/16/20  5:20 AM   Result Value Ref Range    Troponin-I, QT <0.02 0.0 - 0.045 NG/ML   LIPASE    Collection Time: 02/16/20  5:20 AM   Result Value Ref Range    Lipase 146 73 - 393 U/L         Signed By: Bladimir White MD     February 16, 2020

## 2020-02-16 NOTE — ANESTHESIA PREPROCEDURE EVALUATION
Relevant Problems   No relevant active problems       Anesthetic History   No history of anesthetic complications            Review of Systems / Medical History  Patient summary reviewed and pertinent labs reviewed    Pulmonary    COPD: moderate    Sleep apnea: CPAP           Neuro/Psych   Within defined limits           Cardiovascular  Within defined limits                Exercise tolerance: >4 METS     GI/Hepatic/Renal     GERD           Endo/Other        Morbid obesity     Other Findings   Comments:                  Physical Exam    Airway  Mallampati: III  TM Distance: 4 - 6 cm  Neck ROM: normal range of motion   Mouth opening: Normal     Cardiovascular  Regular rate and rhythm,  S1 and S2 normal,  no murmur, click, rub, or gallop  Rhythm: regular  Rate: normal         Dental  No notable dental hx       Pulmonary  Breath sounds clear to auscultation               Abdominal  GI exam deferred       Other Findings            Anesthetic Plan    ASA: 3, emergent  Anesthesia type: general          Induction: Intravenous and RSI  Anesthetic plan and risks discussed with: Patient

## 2020-02-16 NOTE — ED PROVIDER NOTES
[facility] EMERGENCY DEPT      1:41 PM    Date: 2/16/2020  Patient Name: Suha Cortes    History of Presenting Illness     Chief Complaint   Patient presents with    Abdominal Pain       62 y.o. female with noted past medical history who presents to the emergency department history of multiple medical problems as below including gastric bypass 10 years ago. Patient is presenting with epigastric abdominal pain that had been intermittent since yesterday evening. Pain associated with nausea and vomiting. Denies chest pain or shortness of breath. Nausea of diarrhea. Pain is not related to eating. The patient is received a transfer from Dr. Dilia Rodriguez at [facility]. This has been coordinated with the transfer center for Dr. Patrizia Gutierres, surgery to accept the patient on admission. Upon arrival to Dr. Jordy Bolanos was paged to evaluate the patient in the emergency department. Patient denies any other associated signs or symptoms. Patient denies any other complaints. Nursing notes regarding the HPI and triage nursing notes were reviewed. Prior medical records were reviewed. Current Outpatient Medications   Medication Sig Dispense Refill    fluticasone propionate (FLONASE) 50 mcg/actuation nasal spray SHAKE LIQUID AND USE 2 SPRAYS IN EACH NOSTRIL DAILY 48 g 5    butalbital-acetaminophen-caffeine (FIORICET, ESGIC) -40 mg per tablet Take 1 Tab by mouth every six (6) hours as needed for Headache or Migraine. 30 Tab 0    docusate sodium (COLACE) 50 mg capsule Take 1 Cap by mouth two (2) times a day for 90 days. 60 Cap 2    pregabalin (LYRICA) 150 mg capsule Take 1 Cap by mouth two (2) times a day. Max Daily Amount: 300 mg. 180 Cap 1    SYMBICORT 160-4.5 mcg/actuation HFAA INHALE 2 PUFFS BY MOUTH TWICE DAILY 6 Inhaler 2    varenicline (CHANTIX) 1 mg tablet Take 1 Tab by mouth two (2) times daily (after meals).  180 Tab 0    sucralfate (CARAFATE) 1 gram tablet Take 1 g by mouth two (2) times a day.  furosemide (LASIX) 20 mg tablet Take 1 Tab by mouth daily as needed (severe leg swelling). 90 Tab 1    acetaminophen (TYLENOL EXTRA STRENGTH) 500 mg tablet Take  by mouth every six (6) hours as needed for Pain.  albuterol (PROVENTIL HFA, VENTOLIN HFA, PROAIR HFA) 90 mcg/actuation inhaler Take 2 Puffs by inhalation every four (4) hours as needed for Wheezing or Shortness of Breath. 3 Inhaler 4    diclofenac (VOLTAREN) 1 % gel Apply 2 g to affected area every six (6) hours as needed for Pain. 100 g 11    DEXILANT 60 mg CpDB capsule (delayed release) TAKE 1 CAPSULE BY MOUTH DAILY 90 Cap 3    buPROPion SR (WELLBUTRIN SR) 150 mg SR tablet TAKE 1 TABLET BY MOUTH TWICE DAILY 180 Tab 3    b complex vitamins tablet Take 1 Tab by mouth daily.  calcium-cholecalciferol, d3, (CALCIUM 600 + D) 600-125 mg-unit tab Take 1,065 mg by mouth nightly. Indications: TAKES 2 AT BEDTIME      omega 3-dha-epa-fish oil (FISH OIL) 100-160-1,000 mg cap Take 1,065 mg by mouth daily.  ferrous sulfate ER (IRON) 160 mg (50 mg iron) TbER tablet Take 1 Tab by mouth daily. Indications: PT TAKES 40 MG      multivitamin (ONE A DAY) tablet Take 1 Tab by mouth daily.          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    GERD (gastroesophageal reflux disease)     History of gastric bypass 2012    History of meniscal tear 2015, 2018    right in 2015, left in 2018    HNP (herniated nucleus pulposus), lumbar     patient reported    Lung nodules     resolved 2018 CT    Osteopenia 10/03/2017    Recurrent depression ([address]     Sleep apnea     on cpap    Spinal stenosis, lumbar     patient reported       Past Surgical History:  Past Surgical History:   Procedure Laterality Date    HX ARTHRODESIS  01/2000 Herniated Disc, arthritis right foot 06/06, 07/07, C 6/7, C 4/6 Stenosis    HX CHOLECYSTECTOMY  09/2008    gallbladder disease    HX COLONOSCOPY  05/2009    HX GASTRIC BYPASS  2012    GASTRIC BYPASS  [facility] Gastric Bypass      HX HYSTERECTOMY  06/1994    HX MENISCECTOMY  10/2015    right repari of torn meniscus    HX MENISCECTOMY Left 03/16/2018    partial meniscectomy due to tear    HX MYOMECTOMY  06/1984    benign tumor    HX ORTHOPAEDIC  1964    orthopaedic excision Fibrosarcoma and Lymphangiogram    HX PELVIC LAPAROSCOPY  04/1984    large uterine blockage    NEUROLOGICAL PROCEDURE UNLISTED  2000, 2007    Cervical fusion       Family History:  Family History   Problem Relation Age of Onset    Hypertension Mother     Depression Mother     Cancer Mother     Ovarian Cancer Mother     Breast Problems Mother     Cancer Father    [facility] Cancer Sister     Depression Sister     Depression Brother     Stroke Neg Hx     Heart Attack Neg Hx        Social History:  Social History     Tobacco Use    Smoking status: Current Every Day Smoker     Packs/day: 0.50     Years: 45.00     Pack years: 22.50    Smokeless tobacco: Never Used   Substance Use Topics    Alcohol use: No     Comment: quit 1980s- was heavy etoh    Drug use: Not Currently     Comment: marijuana, cocaine 30 yrs ago       Allergies: Allergies   Allergen Reactions    Adhesive Tape-Silicones Swelling     REALLY BAD SWELLING AND SORE APPEAR    Alcohol Other (comments)     PT STATES SHE IS AN ALCOHOLIC    Lactose Other (comments)     PT STATES IT MAKES HER STOMACH HURT    Percodan [Oxycodone Hcl-Oxycodone-Asa] Itching and Other (comments)     crying    Nsaids (Non-Steroidal Anti-Inflammatory Drug) Other (comments)     PT NOT ABLE TO TAKE DUE TO GASTRIC BYPASS       Patient's primary care provider (as noted in EPIC):  Lissette Caraballo NP    Review of Systems   Constitutional: Negative for diaphoresis. HENT: Negative for congestion. Eyes: Negative for discharge. Respiratory: Negative for stridor. Cardiovascular: Negative for palpitations. Gastrointestinal: Positive for abdominal pain, nausea and vomiting. Negative for diarrhea. Endocrine: Negative for heat intolerance. Genitourinary: Negative for flank pain. Musculoskeletal: Negative for back pain. Neurological: Negative for weakness. Psychiatric/Behavioral: Negative for hallucinations. All other systems reviewed and are negative. There were no vitals taken for this visit. PHYSICAL EXAM:  CONSTITUTIONAL:  Alert, in no apparent distress;  well developed;  well nourished. HEAD:  Normocephalic, atraumatic. EYES:  EOMI. Non-icteric sclera. Normal conjunctiva. ENTM:  Nose:  no rhinorrhea. Throat:  no erythema or exudate, mucous membranes moist.  NECK:  No JVD. Supple  RESPIRATORY:  Chest clear, equal breath sounds, good air movement. CARDIOVASCULAR:  Regular rate and rhythm. No murmurs, rubs, or gallops. GI:  Normal bowel sounds, abdomen soft and non-tender. No rebound or guarding. BACK:  Non-tender. UPPER EXT:  Normal inspection. LOWER EXT:  No edema, no calf tenderness. Distal pulses intact. NEURO:  Moves all four extremities, and grossly normal motor exam.  SKIN:  No rashes;  Normal for age. PSYCH:  Alert and normal affect. DIFFERENTIAL DIAGNOSES/ MEDICAL DECISION MAKING:  Gastritis, gerd, peptic ulcer disease, cholecystitis, pancreatitis, gastroenteritis, constipation related pain, atypical appendicitis pain, obstruction, urinary tract infection, appendicitis, diverticulitis, constipation related pain, versus combination of the above and/or numerous other processes/ etiologies.     Diagnostic Study Results     Abnormal lab results from this emergency department encounter:  Labs Reviewed - No data to display    Lab values for this patient within approximately the last 12 hours:  Recent Results (from the past 12 hour(s))   EKG, 12 LEAD, INITIAL Collection Time: 02/16/20  4:59 AM   Result Value Ref Range    Ventricular Rate 64 BPM    Atrial Rate 64 BPM    P-R Interval 176 ms    QRS Duration 80 ms    Q-T Interval 418 ms    QTC Calculation (Bezet) 431 ms    Calculated P Axis 23 degrees    Calculated R Axis -4 degrees    Calculated T Axis 18 degrees    Diagnosis       Normal sinus rhythm  Normal ECG  When compared with ECG of 06-MAR-2018 10:04,  No significant change was found  Confirmed by Nolia Sandhoff ([id number]) on 2/16/2020 7:40:15 AM     CBC WITH AUTOMATED DIFF    Collection Time: 02/16/20  5:20 AM   Result Value Ref Range    WBC 13.3 (H) 4.6 - 13.2 K/uL    RBC 5.15 4.20 - 5.30 M/uL    HGB 15.2 12.0 - 16.0 g/dL    HCT 45.5 (H) 35.0 - 45.0 %    MCV 88.3 74.0 - 97.0 FL    MCH 29.5 24.0 - 34.0 PG    MCHC 33.4 31.0 - 37.0 g/dL    RDW 14.1 11.6 - 14.5 %    PLATELET 900 942 - 915 K/uL    MPV 9.9 9.2 - 11.8 FL    NEUTROPHILS 82 (H) 40 - 73 %    LYMPHOCYTES 13 (L) 21 - 52 %    MONOCYTES 4 3 - 10 %    EOSINOPHILS 1 0 - 5 %    BASOPHILS 0 0 - 2 %    ABS. NEUTROPHILS 11.1 (H) 1.8 - 8.0 K/UL    ABS. LYMPHOCYTES 1.7 0.9 - 3.6 K/UL    ABS. MONOCYTES 0.5 0.05 - 1.2 K/UL    ABS. EOSINOPHILS 0.1 0.0 - 0.4 K/UL    ABS. BASOPHILS 0.0 0.0 - 0.1 K/UL    DF AUTOMATED     METABOLIC PANEL, COMPREHENSIVE    Collection Time: 02/16/20  5:20 AM   Result Value Ref Range    Sodium 140 136 - 145 mmol/L    Potassium 4.2 3.5 - 5.5 mmol/L    Chloride 106 100 - 111 mmol/L    CO2 30 21 - 32 mmol/L    Anion gap 4 3.0 - 18 mmol/L    Glucose 111 (H) 74 - 99 mg/dL    BUN 7 7.0 - 18 MG/DL    Creatinine 0.89 0.6 - 1.3 MG/DL    BUN/Creatinine ratio 8 (L) 12 - 20      GFR est AA >60 >60 ml/min/1.73m2    GFR est non-AA >60 >60 ml/min/1.73m2    Calcium 8.7 8.5 - 10.1 MG/DL    Bilirubin, total 0.3 0.2 - 1.0 MG/DL    ALT (SGPT) 23 13 - 56 U/L    AST (SGOT) 12 10 - 38 U/L    Alk.  phosphatase 98 45 - 117 U/L    Protein, total 7.4 6.4 - 8.2 g/dL    Albumin 3.3 (L) 3.4 - 5.0 g/dL    Globulin 4.1 (H) 2.0 - 4.0 g/dL    A-G Ratio 0.8 0.8 - 1.7     TROPONIN I    Collection Time: 02/16/20  5:20 AM   Result Value Ref Range    Troponin-I, QT <0.02 0.0 - 0.045 NG/ML   LIPASE    Collection Time: 02/16/20  5:20 AM   Result Value Ref Range    Lipase 146 73 - 393 U/L       Radiologist and cardiologist interpretations if available at time of this note:  Ct Abd Pelv W Cont    Result Date: 2/16/2020  CT SCAN OF THE ABDOMEN AND PELVIS, WITH CONTRAST INDICATION: Epigastric pain with sudden severe onset yesterday. Vomiting. History of gastric bypass surgery. COMPARISON: None TECHNIQUE: Following intravenous administration of 100 mL Isovue-300  low osmolar contrast, and attempted administration of oral contrast/gastroview--some of which was vomited, serial axial CT images through the abdomen and pelvis were obtained using helical technique. Additional coronal and sagittal reformation images were also performed. All CT scans at this facility are performed using dose optimization technique as appropriate to the performed exam, to include automated exposure control, adjustment of the mA and/or kV according to patient's size (Including appropriate matching for site-specific examinations), or use of iterative reconstruction technique. FINDINGS: Visualized portions of lung bases demonstrate mild streaky dependent densities, likely atelectasis. Cholecystectomy. Mild intrahepatic and extrahepatic biliary ductal dilatation, may reflect chronic \"reservoir effect\" after cholecystectomy. Clinical correlation might be helpful regarding suspicion for biliary obstruction, such as obstructive pattern of hepatic enzymes, and MRCP could be considered for further assessment if clinically warranted. The liver and pancreas otherwise, spleen, and adrenal glands appear unremarkable. The kidneys enhance symmetrically. No evidence of hydronephrosis or perinephric stranding.  1 cm hypodensity right kidney lower pole laterally, too small to be specifically characterized, likely a cyst. The abdominal aorta enhances normally without evidence of abdominal aortic aneurysm. Moderate volume of free fluid in the deep posterior pelvis. Hysterectomy. Small fat-containing periumbilical hernia. Mildly prominent right retroperitoneal lymph node in the fat lateral to the IVC and inferomedial to the kidney measures 2.1 x 1.1 cm (series 2 axial image 53), nonspecific. Otherwise a few scattered small lymph nodes are seen without other evidence of pathologic lymph node enlargement. Postsurgical changes of gastric bypass surgery. A somewhat dilated loop of small bowel, approximately 4.2 cm in caliber, extends from gastrojejunal anastomosis inferiorly, containing debris close to the level of the anastomosis and contrast farther inferiorly, before looping back cephalad and traversing across the anterior abdomen to the the right abdomen, appearing to terminate between the liver, stomach, and cholecystectomy clips, suspect afferent limb. The loop becomes nondistended adjacent to the liver where it appears mildly thick-walled, assessment for mural thickening at this level limited by nondistention. There is a small amount of stranding/fluid around this jejunal loop in the left upper quadrant. Taken together with the patient's clinical presentation, findings are concerning for a late afferent limb complication from gastric bypass surgery, possibly afferent limb syndrome,  early or partial afferent limb obstruction such as could be seen due to stricture, internal hernia, adhesions. Bariatric surgical consultation is recommended today. The SMV/SMA maintain usual orientation. No other abnormal dilatation of bowel is seen to increase suspicion for bowel obstruction farther distally. The appendix is not clearly identified. No pericecal region inflammatory changes are seen. On review of bone windows, no acute fractures or destructive bone lesions are seen.      Impression: Abnormal dilatation of rather lengthy segment of jejunum from gastrojejunal anastomosis containing debris and contrast, approximately 4.2 cm in caliber, favored to represent afferent limb demonstrating rather tortuous and lengthy course. Small free fluid/stranding adjacent to the dilated loop in the left upper quadrant. Moderate volume of additional free fluid in the deep posterior pelvis. Findings are concerning for late afferent limb complication of gastric bypass surgery such as afferent limb syndrome, partial/early afferent limb obstruction cannot be excluded such as could be seen due to stricture, internal hernia, adhesions. STAT bariatric surgery consultation is recommended. Biliary ductal dilatation status post cholecystectomy as described. Small right renal hypodensity, too small to be specifically characterized, statistically most likely a cyst. Mildly prominent right retroperitoneal lymph node as described. No other pathological enlargement is seen. Hysterectomy. Small fat-containing periumbilical hernia. I have telephoned a wet reading directly to  Dr. Amarilys Tate  at 10:20 AM on 2/16/2020. Xr Chest Port    Result Date: 2/16/2020  AP CHEST, PORTABLE CPT CODE: 24213 INDICATION: Above. Intermittent epigastric pain. Nausea and vomiting. COMPARISON: 5/13/2019 PA and lateral. TECHNIQUE: Portable AP chest radiograph is reviewed. FINDINGS: No evidence of focal pulmonary consolidation or pulmonary edema. No evidence of pneumothorax. The costophrenic sulci appear sharp. The cardiac silhouette is top normal in size  for technique. EKG leads/wires overlie the patient. Cervical spine fusion plate/screws again noted. No acute osseous abnormalities identified. IMPRESSION: No evidence of acute pulmonary disease. Medication(s) ordered for patient during this emergency visit encounter:  Medications - No data to display    Medical Decision Making     I am the first provider for this patient.     I reviewed the vital signs, available nursing notes, past medical history, past surgical history, family history and social history. Vital Signs:  Reviewed the patient's vital signs. Admit to Surgery    The patient was presented to the accepting surgeon, Dr. Tiffanie Hawley. As the emergency physician, I wrote courtesy admission orders for the accepting surgeon. I subsequently placed the noted hospitalist on the treatment team.         Dictation disclaimer:  Please note that this dictation was completed with [facility], the computer voice recognition software. Quite often unanticipated grammatical, syntax, homophones, and other interpretive errors are inadvertently transcribed by the computer software. Please disregard these errors. Please excuse any errors that have escaped final proofreading. Coding Diagnoses     Clinical Impression:   1. Non-intractable vomiting with nausea, unspecified vomiting type    2. Abdominal pain, epigastric        Disposition     Disposition:  Admit. JAZ Leyva Board Certified Emergency Physician    Provider Attestation:  If a scribe was utilized in generation of this patient record, I personally performed the services described in the documentation, reviewed the documentation, as recorded by the scribe in my presence, and it accurately records the patient's history of presenting illness, review of systems, patient physical examination, and procedures performed by me as the attending physician. JAZ Leyva Board Certified Emergency Physician  2/16/2020.  1:43 PM

## 2020-02-16 NOTE — PROCEDURES
Endoscopy Procedure Note    Patient: Elroy Bray MRN: [id number]  SSN: [id number]    YOB: 1960  Age: 62 y.o. Sex: female      Date/Time:  2/16/2020 5:01 PM    Esophagogastroduodenoscopy (EGD) Procedure Note    Procedure: Esophagogastroduodenoscopy with enteroscopy    IMPRESSION:   1. Food impaction of the sindi limb at the J-J anastomosis. Impaction relieved endoscopically. 2. Otherwise normal post Sindi-en-y gastric bypass anatomy      RECOMMENDATIONS:  1. Resume soft diet today and advance as tolerated. 2. Can discharge to home from GI standpoint. Indication: Intestinal food impaction  :  Isreal Brennan MD  Assistants: Endoscopy Technician-1: Elmer ROSAS  Endoscopy RN-1: Komal Bull RN    Referring Provider:   Rula Sanz NP  History: The history and physical exam were reviewed and updated. Endoscope: Olympus PCF-190 pediatric colonoscope  Extent of Exam: mid-jejunum  ASA: ASA 3 - Patient with moderate systemic disease with functional limitations  Anethesia/Sedation:  MAC anesthesia    Description of the procedure: The procedure was discussed with the patient including risks, benefits, alternatives including risks of iv sedation, bleeding, perforation and aspiration. A safety timeout was performed. The patient was placed in the left lateral decubitus position. A bite block was placed. The patient was given incremental doses of intravenous sedation until moderate sedation was achieved. The patients vital signs were monitored at all times including heart rate/rhythm, blood pressure and oxygen saturation. The endoscope was then passed under direct visualization to the mid-jejunum. The endoscope was then slowly withdrawn while visualizing the mucosa. In the stomach a retroflexion was performed and gastric fundus and cardia visualized. The endoscope was then slowly withdrawn. The patient was then transferred to recovery in stable condition. Findings:    Esophagus: The esophageal mucosa was normal with no ulceration, mass or stricture. There was no evidence of Swift's esophagus or reflux esophagitis. Stomach: There was anatomy consistent with sindi-en-y gastric bypass. No ulceration, mass, stricture. Jejunum:  The distal j-j anastomosis was noted to have large amount of soft vegetable material impacted in the sindi limb orifice. The material was removed with forceps and thomas net. Both   lumens appeared to widely patent with no further obstruction. No stricture or ulceration noted. Therapies:  None    Specimens: * No specimens in log *            Complications:   None; patient tolerated the procedure well.     EBL:None    Discharge disposition:  Home in the [employer] [facility] [employer] when able to ambulate    Monie Dill MD  February 16, 2020  5:01 PM

## 2020-02-16 NOTE — ED PROVIDER NOTES
EMERGENCY DEPARTMENT HISTORY AND PHYSICAL EXAM    6:08 AM  Date: 2/16/2020  Patient Name: Cheryl Berger    History of Presenting Illness     Chief Complaint   Patient presents with    Epigastric Pain        History Provided By: Patient    HPI: Cheryl Berger is a 62 y.o. female with history of multiple medical problems as below including gastric bypass 10 years ago. Patient is presenting with epigastric abdominal pain that had been intermittent since yesterday evening. Pain associated with nausea and vomiting. Denies chest pain or shortness of breath. Nausea of diarrhea. Pain is not related to eating. 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    GERD (gastroesophageal reflux disease)     History of gastric bypass 2012    History of meniscal tear 2015, 2018    right in 2015, left in 2018    HNP (herniated nucleus pulposus), lumbar     patient reported    Lung nodules     resolved 2018 CT    Osteopenia 10/03/2017    Recurrent depression ([address]     Sleep apnea     on cpap    Spinal stenosis, lumbar     patient reported       Past Surgical History:  Past Surgical History:   Procedure Laterality Date    HX ARTHRODESIS  01/2000    Herniated Disc, arthritis right foot 06/06, 07/07, C 6/7, C 4/6 Stenosis    HX CHOLECYSTECTOMY  09/2008    gallbladder disease    HX COLONOSCOPY  05/2009    HX GASTRIC BYPASS  2012    GASTRIC BYPASS  [facility] Gastric Bypass      HX HYSTERECTOMY  06/1994    HX MENISCECTOMY  10/2015    right repari of torn meniscus    HX MENISCECTOMY Left 03/16/2018    partial meniscectomy due to tear    HX MYOMECTOMY  06/1984    benign tumor    HX ORTHOPAEDIC  1964    orthopaedic excision Fibrosarcoma and Lymphangiogram    HX PELVIC LAPAROSCOPY  04/1984    large uterine blockage    NEUROLOGICAL PROCEDURE UNLISTED  2000, 2007    Cervical fusion       Family History:  Family History   Problem Relation Age of Onset    Hypertension Mother     Depression Mother     Cancer Mother     Ovarian Cancer Mother     Breast Problems Mother     Cancer Father    [facility] Cancer Sister     Depression Sister     Depression Brother     Stroke Neg Hx     Heart Attack Neg Hx        Social History:  Social History     Tobacco Use    Smoking status: Current Every Day Smoker     Packs/day: 0.50     Years: 45.00     Pack years: 22.50    Smokeless tobacco: Never Used   Substance Use Topics    Alcohol use: No     Comment: quit 1980s- was heavy etoh    Drug use: Not Currently     Comment: marijuana, cocaine 30 yrs ago       Allergies: Allergies   Allergen Reactions    Adhesive Tape-Silicones Swelling     REALLY BAD SWELLING AND SORE APPEAR    Alcohol Other (comments)     PT STATES SHE IS AN ALCOHOLIC    Lactose Other (comments)     PT STATES IT MAKES HER STOMACH HURT    Percodan [Oxycodone Hcl-Oxycodone-Asa] Itching and Other (comments)     crying    Nsaids (Non-Steroidal Anti-Inflammatory Drug) Other (comments)     PT NOT ABLE TO TAKE DUE TO GASTRIC BYPASS       Review of Systems   Review of Systems   Gastrointestinal: Positive for abdominal pain, nausea and vomiting. All other systems reviewed and are negative. Physical Exam     Patient Vitals for the past 12 hrs:   Temp Pulse Resp BP SpO2   02/16/20 0440 98.7 °F (37.1 °C) 67 16 131/84 98 %       Physical Exam  Vitals signs and nursing note reviewed. Constitutional:       Appearance: Normal appearance. HENT:      Head: Normocephalic and atraumatic. Nose: Nose normal.      Mouth/Throat:      Mouth: Mucous membranes are moist.   Eyes:      Extraocular Movements: Extraocular movements intact. Neck:      Musculoskeletal: Neck supple.    Cardiovascular:      Rate and Rhythm: Normal rate. Pulmonary:      Effort: Pulmonary effort is normal. No respiratory distress. Abdominal:      General: There is no distension. Palpations: Abdomen is soft. Tenderness: There is abdominal tenderness in the epigastric area. Musculoskeletal: Normal range of motion. Skin:     General: Skin is warm and dry. Neurological:      General: No focal deficit present. Mental Status: She is alert and oriented to person, place, and time. Psychiatric:         Mood and Affect: Mood normal.         Behavior: Behavior normal.         Diagnostic Study Results     Labs -  Recent Results (from the past 12 hour(s))   EKG, 12 LEAD, INITIAL    Collection Time: 02/16/20  4:59 AM   Result Value Ref Range    Ventricular Rate 64 BPM    Atrial Rate 64 BPM    P-R Interval 176 ms    QRS Duration 80 ms    Q-T Interval 418 ms    QTC Calculation (Bezet) 431 ms    Calculated P Axis 23 degrees    Calculated R Axis -4 degrees    Calculated T Axis 18 degrees    Diagnosis       Normal sinus rhythm  Normal ECG  When compared with ECG of 06-MAR-2018 10:04,  No significant change was found     CBC WITH AUTOMATED DIFF    Collection Time: 02/16/20  5:20 AM   Result Value Ref Range    WBC 13.3 (H) 4.6 - 13.2 K/uL    RBC 5.15 4.20 - 5.30 M/uL    HGB 15.2 12.0 - 16.0 g/dL    HCT 45.5 (H) 35.0 - 45.0 %    MCV 88.3 74.0 - 97.0 FL    MCH 29.5 24.0 - 34.0 PG    MCHC 33.4 31.0 - 37.0 g/dL    RDW 14.1 11.6 - 14.5 %    PLATELET 241 625 - 753 K/uL    MPV 9.9 9.2 - 11.8 FL    NEUTROPHILS 82 (H) 40 - 73 %    LYMPHOCYTES 13 (L) 21 - 52 %    MONOCYTES 4 3 - 10 %    EOSINOPHILS 1 0 - 5 %    BASOPHILS 0 0 - 2 %    ABS. NEUTROPHILS 11.1 (H) 1.8 - 8.0 K/UL    ABS. LYMPHOCYTES 1.7 0.9 - 3.6 K/UL    ABS. MONOCYTES 0.5 0.05 - 1.2 K/UL    ABS. EOSINOPHILS 0.1 0.0 - 0.4 K/UL    ABS.  BASOPHILS 0.0 0.0 - 0.1 K/UL    DF AUTOMATED     METABOLIC PANEL, COMPREHENSIVE    Collection Time: 02/16/20  5:20 AM   Result Value Ref Range    Sodium 140 136 - 145 mmol/L    Potassium 4.2 3.5 - 5.5 mmol/L    Chloride 106 100 - 111 mmol/L    CO2 30 21 - 32 mmol/L    Anion gap 4 3.0 - 18 mmol/L    Glucose 111 (H) 74 - 99 mg/dL    BUN 7 7.0 - 18 MG/DL    Creatinine 0.89 0.6 - 1.3 MG/DL    BUN/Creatinine ratio 8 (L) 12 - 20      GFR est AA >60 >60 ml/min/1.73m2    GFR est non-AA >60 >60 ml/min/1.73m2    Calcium 8.7 8.5 - 10.1 MG/DL    Bilirubin, total 0.3 0.2 - 1.0 MG/DL    ALT (SGPT) 23 13 - 56 U/L    AST (SGOT) 12 10 - 38 U/L    Alk. phosphatase 98 45 - 117 U/L    Protein, total 7.4 6.4 - 8.2 g/dL    Albumin 3.3 (L) 3.4 - 5.0 g/dL    Globulin 4.1 (H) 2.0 - 4.0 g/dL    A-G Ratio 0.8 0.8 - 1.7     TROPONIN I    Collection Time: 02/16/20  5:20 AM   Result Value Ref Range    Troponin-I, QT <0.02 0.0 - 0.045 NG/ML   LIPASE    Collection Time: 02/16/20  5:20 AM   Result Value Ref Range    Lipase 146 73 - 393 U/L       Radiologic Studies -   No results found. Medical Decision Making     ED Course: Progress Notes, Reevaluation, and Consults:    6:08 AM Initial assessment performed. The patients presenting problems have been discussed, and they/their family are in agreement with the care plan formulated and outlined with them. I have encouraged them to ask questions as they arise throughout their visit. Provider Notes (Medical Decision Making): 68-year-old female presenting with epigastric pain, nausea, vomiting. Moderate tenderness over the epigastric area but abdomen is otherwise soft. Screening labs, EKG and abdominal CT to evaluate for gastric volvulus versus leak. Will treat symptomatically meanwhile. Procedures:     Critical Care Time:     Vital Signs-Reviewed the patient's vital signs. Reviewed pt's pulse ox reading. EKG: Interpreted by the EP.    Time Interpreted:    Rate:    Rhythm:    Interpretation:   Comparison:     Records Reviewed: Nursing Notes (Time of Review: 6:08 AM)  -I am the first provider for this patient.  -I reviewed the vital signs, available nursing notes, past medical history, past surgical history, family history and social history. Current Facility-Administered Medications   Medication Dose Route Frequency Provider Last Rate Last Dose    iopamidoL (ISOVUE 300) 61 % contrast injection  mL   mL IntraVENous RAD ONCE Karina Reed MD         Current Outpatient Medications   Medication Sig Dispense Refill    fluticasone propionate (FLONASE) 50 mcg/actuation nasal spray SHAKE LIQUID AND USE 2 SPRAYS IN EACH NOSTRIL DAILY 48 g 5    butalbital-acetaminophen-caffeine (FIORICET, ESGIC) -40 mg per tablet Take 1 Tab by mouth every six (6) hours as needed for Headache or Migraine. 30 Tab 0    docusate sodium (COLACE) 50 mg capsule Take 1 Cap by mouth two (2) times a day for 90 days. 60 Cap 2    pregabalin (LYRICA) 150 mg capsule Take 1 Cap by mouth two (2) times a day. Max Daily Amount: 300 mg. 180 Cap 1    SYMBICORT 160-4.5 mcg/actuation HFAA INHALE 2 PUFFS BY MOUTH TWICE DAILY 6 Inhaler 2    varenicline (CHANTIX) 1 mg tablet Take 1 Tab by mouth two (2) times daily (after meals). 180 Tab 0    sucralfate (CARAFATE) 1 gram tablet Take 1 g by mouth two (2) times a day.  furosemide (LASIX) 20 mg tablet Take 1 Tab by mouth daily as needed (severe leg swelling). 90 Tab 1    acetaminophen (TYLENOL EXTRA STRENGTH) 500 mg tablet Take  by mouth every six (6) hours as needed for Pain.  albuterol (PROVENTIL HFA, VENTOLIN HFA, PROAIR HFA) 90 mcg/actuation inhaler Take 2 Puffs by inhalation every four (4) hours as needed for Wheezing or Shortness of Breath. 3 Inhaler 4    diclofenac (VOLTAREN) 1 % gel Apply 2 g to affected area every six (6) hours as needed for Pain. 100 g 11    DEXILANT 60 mg CpDB capsule (delayed release) TAKE 1 CAPSULE BY MOUTH DAILY 90 Cap 3    buPROPion SR (WELLBUTRIN SR) 150 mg SR tablet TAKE 1 TABLET BY MOUTH TWICE DAILY 180 Tab 3    b complex vitamins tablet Take 1 Tab by mouth daily.  calcium-cholecalciferol, d3, (CALCIUM 600 + D) 600-125 mg-unit tab Take 1,065 mg by mouth nightly. Indications: TAKES 2 AT BEDTIME      omega 3-dha-epa-fish oil (FISH OIL) 100-160-1,000 mg cap Take 1,065 mg by mouth daily.  ferrous sulfate ER (IRON) 160 mg (50 mg iron) TbER tablet Take 1 Tab by mouth daily. Indications: PT TAKES 40 MG      multivitamin (ONE A DAY) tablet Take 1 Tab by mouth daily. Clinical Impression     Clinical Impression: No diagnosis found. Disposition: Signed out to Dr. Christin Pool pending abdominal CT    This note was dictated utilizing voice recognition software which may lead to typographical errors. I apologize in advance if the situation occurs. If questions arise please do not hesitate to contact me or call our department.     Karina Waters MD  6:08 AM

## 2020-02-16 NOTE — ED NOTES
I have reviewed discharge instructions with the patient. The patient verbalized understanding. IV out and cath tip intact.

## 2020-02-16 NOTE — CONSULTS
Surgical Consultation:     Attending: Loulou Thornton  Reason for Consultation: Vomting x 24 hours    Consultant: Na Aly. Loras Rinne, MD, FACS    Subjective:     Vic Larsen is a 62 y.o. female with a history of LGBP in [place] in 2012. She presents on transfer from [facility] ER for 20 hours of cramping and vomiting which began about 3-4 hours after eating approximately 1 cup of beef and scallions. She has never had this happen before but has noticed transient cramping with large volume intake in the past.     A work-up at [facility] showed roughly normal labs and a CT with a dilated sindi limb with debris near the jejunojejunostomy. I was called for this because she had had bariatric surgery. I accepted her transfer to this ER to evaluate. Her symptoms wax and wane every 5-15 minutes without aggravating or alleviating factors.       Patient Active Problem List    Diagnosis Date Noted    Chronic pain of both knees 09/14/2018    Obesity, morbid ([address] 12/14/2017    Osteopenia 12/14/2017    GERD (gastroesophageal reflux disease) 12/14/2017    Sleep apnea 09/14/2017    History of gastric bypass 09/14/2017    Chronic obstructive pulmonary disease ([address]     Recurrent depression ([address]     Alcoholism in remission ([address]     Fibrosarcoma ([address] 01/01/1963     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    GERD (gastroesophageal reflux disease)     History of gastric bypass 2012    History of meniscal tear 2015, 2018    right in 2015, left in 2018    HNP (herniated nucleus pulposus), lumbar     patient reported    Lung nodules     resolved 2018 CT    Osteopenia 10/03/2017    Recurrent depression ([address]     Sleep apnea     on cpap    Spinal stenosis, lumbar     patient reported      Past Surgical History:   Procedure Laterality Date    HX ARTHRODESIS  01/2000    Herniated Disc, arthritis right foot 06/06, 07/07, C 6/7, C 4/6 Stenosis    HX CHOLECYSTECTOMY  09/2008    gallbladder disease    HX COLONOSCOPY  05/2009    HX GASTRIC BYPASS  2012    GASTRIC BYPASS  [facility] Gastric Bypass      HX HYSTERECTOMY  06/1994    HX MENISCECTOMY  10/2015    right repari of torn meniscus    HX MENISCECTOMY Left 03/16/2018    partial meniscectomy due to tear    HX MYOMECTOMY  06/1984    benign tumor    HX ORTHOPAEDIC  1964    orthopaedic excision Fibrosarcoma and Lymphangiogram    HX PELVIC LAPAROSCOPY  04/1984    large uterine blockage    NEUROLOGICAL PROCEDURE UNLISTED  2000, 2007    Cervical fusion      Social History     Tobacco Use    Smoking status: Current Every Day Smoker     Packs/day: 0.50     Years: 45.00     Pack years: 22.50    Smokeless tobacco: Never Used   Substance Use Topics    Alcohol use: No     Comment: quit 1980s- was heavy etoh      Family History   Problem Relation Age of Onset    Hypertension Mother     Depression Mother     Cancer Mother     Ovarian Cancer Mother     Breast Problems Mother     Cancer Father     Cancer Sister     Depression Sister     Depression Brother     Stroke Neg Hx     Heart Attack Neg Hx       No current facility-administered medications for this encounter. Current Outpatient Medications   Medication Sig    fluticasone propionate (FLONASE) 50 mcg/actuation nasal spray SHAKE LIQUID AND USE 2 SPRAYS IN EACH NOSTRIL DAILY    butalbital-acetaminophen-caffeine (FIORICET, ESGIC) -40 mg per tablet Take 1 Tab by mouth every six (6) hours as needed for Headache or Migraine.  docusate sodium (COLACE) 50 mg capsule Take 1 Cap by mouth two (2) times a day for 90 days.  pregabalin (LYRICA) 150 mg capsule Take 1 Cap by mouth two (2) times a day. Max Daily Amount: 300 mg.    SYMBICORT 160-4.5 mcg/actuation HFAA INHALE 2 PUFFS BY MOUTH TWICE DAILY    varenicline (CHANTIX) 1 mg tablet Take 1 Tab by mouth two (2) times daily (after meals).     sucralfate (CARAFATE) 1 gram tablet Take 1 g by mouth two (2) times a day.  furosemide (LASIX) 20 mg tablet Take 1 Tab by mouth daily as needed (severe leg swelling).  acetaminophen (TYLENOL EXTRA STRENGTH) 500 mg tablet Take  by mouth every six (6) hours as needed for Pain.  albuterol (PROVENTIL HFA, VENTOLIN HFA, PROAIR HFA) 90 mcg/actuation inhaler Take 2 Puffs by inhalation every four (4) hours as needed for Wheezing or Shortness of Breath.  diclofenac (VOLTAREN) 1 % gel Apply 2 g to affected area every six (6) hours as needed for Pain.  DEXILANT 60 mg CpDB capsule (delayed release) TAKE 1 CAPSULE BY MOUTH DAILY    buPROPion SR (WELLBUTRIN SR) 150 mg SR tablet TAKE 1 TABLET BY MOUTH TWICE DAILY    b complex vitamins tablet Take 1 Tab by mouth daily.  calcium-cholecalciferol, d3, (CALCIUM 600 + D) 600-125 mg-unit tab Take 1,065 mg by mouth nightly. Indications: TAKES 2 AT BEDTIME    omega 3-dha-epa-fish oil (FISH OIL) 100-160-1,000 mg cap Take 1,065 mg by mouth daily.  ferrous sulfate ER (IRON) 160 mg (50 mg iron) TbER tablet Take 1 Tab by mouth daily. Indications: PT TAKES 40 MG    multivitamin (ONE A DAY) tablet Take 1 Tab by mouth daily.       Allergies   Allergen Reactions    Adhesive Tape-Silicones Swelling     REALLY BAD SWELLING AND SORE APPEAR    Alcohol Other (comments)     PT STATES SHE IS AN ALCOHOLIC    Lactose Other (comments)     PT STATES IT MAKES HER STOMACH HURT    Percodan [Oxycodone Hcl-Oxycodone-Asa] Itching and Other (comments)     crying    Nsaids (Non-Steroidal Anti-Inflammatory Drug) Other (comments)     PT NOT ABLE TO TAKE DUE TO GASTRIC BYPASS        Review of Systems:  Positive in BOLD    CONST: Fever, weight loss, fatigue or chills  GI: Nausea, vomiting, abdominal pain, change in bowel habits, hematochezia, melena, and GERD, fecal incontinence  INTEG: Dermatitis, abnormal moles  HEENT: Recent changes in vision, vertigo, epistaxis, dysphagia and hoarseness  CV: Chest pain, palpitations, HTN, edema and varicosities  RESP: Cough, shortness of breath, wheezing, hemoptysis, snoring and reactive airway disease  : Hematuria, dysuria, frequency, urgency, nocturia and stress urinary incontinence   MS: Weakness, joint pain and arthritis  ENDO: Diabetes, thyroid disease, polyuria, polydipsia, polyphagia, poor wound healing, heat intolerance, cold intolerance  LYMPH/HEME: Anemia, bruising and history of blood transfusions  NEURO: Dizziness, headache, fainting, seizures and stroke  PSYCH: Anxiety and depression    Objective:     Visit Vitals  /61   Pulse 70   Resp 22   SpO2 97%       Physical Exam:      GENERAL: alert, cooperative, no distress, appears stated age  EYE:conjunctivae and sclerae normal, pupils equal, round, reactive to light, extraocular movements intact without nystagmus  THROAT & NECK: no erythema or exudates noted and neck supple and symmetrical; no palpable masses  LUNG: clear to auscultation bilaterally  HEART: Regular rate and rhythm  ABDOMEN:abdomen is soft without significant tenderness, masses, organomegaly or guarding  EXTREMITIES:  extremities normal, atraumatic, no cyanosis or edema  SKIN: Normal.    Imaging and Lab Review:   Findings of CT of the abdomen: I have directly viewed these images. The described dilated portions of the small bowel appear to be EFFERENT limb, not afferent limb. There is food debris just proximal to an obvious jejunojejunostomy. The dilated bowel proceeds from this point proximally to the gastrojejunostomy. There is no dilation of the duodenum nor the stomach as would be expected in afferent limb syndrome. Abnormal dilatation of rather lengthy segment of jejunum from gastrojejunal  anastomosis containing debris and contrast, approximately 4.2 cm in caliber,  favored to represent afferent limb demonstrating rather tortuous and lengthy  course.  Small free fluid/stranding adjacent to the dilated loop in the left  upper quadrant. Moderate volume of additional free fluid in the deep posterior  pelvis. Findings are concerning for late afferent limb complication of gastric  bypass surgery such as afferent limb syndrome, partial/early afferent limb  obstruction cannot be excluded such as could be seen due to stricture, internal  hernia, adhesions. STAT bariatric surgery consultation is recommended.     Biliary ductal dilatation status post cholecystectomy as described.     Small right renal hypodensity, too small to be specifically characterized,  statistically most likely a cyst.     Mildly prominent right retroperitoneal lymph node as described. No other  pathological enlargement is seen.     Hysterectomy.     Small fat-containing periumbilical hernia. Recent Results (from the past 24 hour(s))   EKG, 12 LEAD, INITIAL    Collection Time: 02/16/20  4:59 AM   Result Value Ref Range    Ventricular Rate 64 BPM    Atrial Rate 64 BPM    P-R Interval 176 ms    QRS Duration 80 ms    Q-T Interval 418 ms    QTC Calculation (Bezet) 431 ms    Calculated P Axis 23 degrees    Calculated R Axis -4 degrees    Calculated T Axis 18 degrees    Diagnosis       Normal sinus rhythm  Normal ECG  When compared with ECG of 06-MAR-2018 10:04,  No significant change was found  Confirmed by Angela Bearden ([id number]) on 2/16/2020 7:40:15 AM     CBC WITH AUTOMATED DIFF    Collection Time: 02/16/20  5:20 AM   Result Value Ref Range    WBC 13.3 (H) 4.6 - 13.2 K/uL    RBC 5.15 4.20 - 5.30 M/uL    HGB 15.2 12.0 - 16.0 g/dL    HCT 45.5 (H) 35.0 - 45.0 %    MCV 88.3 74.0 - 97.0 FL    MCH 29.5 24.0 - 34.0 PG    MCHC 33.4 31.0 - 37.0 g/dL    RDW 14.1 11.6 - 14.5 %    PLATELET 740 112 - 589 K/uL    MPV 9.9 9.2 - 11.8 FL    NEUTROPHILS 82 (H) 40 - 73 %    LYMPHOCYTES 13 (L) 21 - 52 %    MONOCYTES 4 3 - 10 %    EOSINOPHILS 1 0 - 5 %    BASOPHILS 0 0 - 2 %    ABS. NEUTROPHILS 11.1 (H) 1.8 - 8.0 K/UL    ABS. LYMPHOCYTES 1.7 0.9 - 3.6 K/UL    ABS.  MONOCYTES 0.5 0.05 - 1.2 K/UL    ABS. EOSINOPHILS 0.1 0.0 - 0.4 K/UL    ABS. BASOPHILS 0.0 0.0 - 0.1 K/UL    DF AUTOMATED     METABOLIC PANEL, COMPREHENSIVE    Collection Time: 02/16/20  5:20 AM   Result Value Ref Range    Sodium 140 136 - 145 mmol/L    Potassium 4.2 3.5 - 5.5 mmol/L    Chloride 106 100 - 111 mmol/L    CO2 30 21 - 32 mmol/L    Anion gap 4 3.0 - 18 mmol/L    Glucose 111 (H) 74 - 99 mg/dL    BUN 7 7.0 - 18 MG/DL    Creatinine 0.89 0.6 - 1.3 MG/DL    BUN/Creatinine ratio 8 (L) 12 - 20      GFR est AA >60 >60 ml/min/1.73m2    GFR est non-AA >60 >60 ml/min/1.73m2    Calcium 8.7 8.5 - 10.1 MG/DL    Bilirubin, total 0.3 0.2 - 1.0 MG/DL    ALT (SGPT) 23 13 - 56 U/L    AST (SGOT) 12 10 - 38 U/L    Alk. phosphatase 98 45 - 117 U/L    Protein, total 7.4 6.4 - 8.2 g/dL    Albumin 3.3 (L) 3.4 - 5.0 g/dL    Globulin 4.1 (H) 2.0 - 4.0 g/dL    A-G Ratio 0.8 0.8 - 1.7     TROPONIN I    Collection Time: 02/16/20  5:20 AM   Result Value Ref Range    Troponin-I, QT <0.02 0.0 - 0.045 NG/ML   LIPASE    Collection Time: 02/16/20  5:20 AM   Result Value Ref Range    Lipase 146 73 - 393 U/L       images and reports reviewed    Assessment:   1. Acute food impaction near the jejunojejunostomy - she is not in extremis nor showing evidence of ischemia  2. 12 years after LGBP  3. Active smoking  4. Active NSAID use. Plan:     1. I have contacted Dr. Roby Musa who will attempt endoscopic disimpaction. If he is able to completely disimpact and he believes she can discharge, she may do so from my standpoint. 2. If Dr. Shashi Hernandez thinks the patient needs admission after endoscopy, she can be admitted to hospitalists and I will happily consult for any bariatric concerns  3. If Dr. Shashi Hernandez is unable to disimpact, recommend observe overnight while NPO then repeat UGI in AM prior to considering surgical bezoar extraction. 4. Stop smoking  5. Stop NSAID use.      Signed By: Smitha Waddell MD     February 16, 2020

## 2020-02-16 NOTE — ANESTHESIA POSTPROCEDURE EVALUATION
Procedure(s):  ESOPHAGOGASTRODUODENOSCOPY (EGD). general    Anesthesia Post Evaluation      Multimodal analgesia: multimodal analgesia not used between 6 hours prior to anesthesia start to PACU discharge  Patient location during evaluation: bedside  Patient participation: complete - patient participated  Level of consciousness: awake  Pain management: adequate  Airway patency: patent  Anesthetic complications: no  Cardiovascular status: stable  Respiratory status: acceptable  Hydration status: acceptable  Post anesthesia nausea and vomiting:  controlled      No vitals data found for the desired time range.

## 2020-02-16 NOTE — ED NOTES
RN informed Dr. Trevor Malin of pt vomiting contrast. Dr. Trevor Malin ordered zofran and more contrast.

## 2020-02-16 NOTE — ED NOTES
Vitals:  No data found. Medications ordered:   Medications - No data to display      Lab findings:  No results found for this or any previous visit (from the past 12 hour(s)). X-Ray, CT or other radiology findings or impressions:  No orders to display       Progress notes, Consult notes or additional Procedure notes:   Did receive a phone call from Dr. Scarlet Prader, he states that the patient's EGD resolved the problem. The patient can be discharged from his standpoint. Reevaluation of patient:   Patient returned from the endoscopy suite following procedure    Patient on my examination is feeling well at this time. She states that she feels much better after the procedure. She is not having any complaints this time. She will be discharged home at this time. Disposition:  Diagnosis:   1. Food impaction of esophagus, initial encounter    2. Abdominal pain, epigastric        Disposition: Discharge    Follow-up Information     Follow up With Specialties Details Why Contact Info    Starr Prince NP Nurse Practitioner Call in 1 day  [address]  [place] [address]  [phone]              Patient's Medications   Start Taking    No medications on file   Continue Taking    ACETAMINOPHEN (TYLENOL EXTRA STRENGTH) 500 MG TABLET    Take  by mouth every six (6) hours as needed for Pain. ALBUTEROL (PROVENTIL HFA, VENTOLIN HFA, PROAIR HFA) 90 MCG/ACTUATION INHALER    Take 2 Puffs by inhalation every four (4) hours as needed for Wheezing or Shortness of Breath. B COMPLEX VITAMINS TABLET    Take 1 Tab by mouth daily. BUPROPION SR (WELLBUTRIN SR) 150 MG SR TABLET    TAKE 1 TABLET BY MOUTH TWICE DAILY    BUTALBITAL-ACETAMINOPHEN-CAFFEINE (FIORICET, ESGIC) -40 MG PER TABLET    Take 1 Tab by mouth every six (6) hours as needed for Headache or Migraine. CALCIUM-CHOLECALCIFEROL, D3, (CALCIUM 600 + D) 600-125 MG-UNIT TAB    Take 1,065 mg by mouth nightly.  Indications: TAKES 2 AT BEDTIME DEXILANT 60 MG CPDB CAPSULE (DELAYED RELEASE)    TAKE 1 CAPSULE BY MOUTH DAILY    DICLOFENAC (VOLTAREN) 1 % GEL    Apply 2 g to affected area every six (6) hours as needed for Pain. DOCUSATE SODIUM (COLACE) 50 MG CAPSULE    Take 1 Cap by mouth two (2) times a day for 90 days. FERROUS SULFATE ER (IRON) 160 MG (50 MG IRON) TBER TABLET    Take 1 Tab by mouth daily. Indications: PT TAKES 40 MG    FLUTICASONE PROPIONATE (FLONASE) 50 MCG/ACTUATION NASAL SPRAY    SHAKE LIQUID AND USE 2 SPRAYS IN EACH NOSTRIL DAILY    FUROSEMIDE (LASIX) 20 MG TABLET    Take 1 Tab by mouth daily as needed (severe leg swelling). MULTIVITAMIN (ONE A DAY) TABLET    Take 1 Tab by mouth daily. OMEGA 3-DHA-EPA-FISH OIL (FISH OIL) 100-160-1,000 MG CAP    Take 1,065 mg by mouth daily. PREGABALIN (LYRICA) 150 MG CAPSULE    Take 1 Cap by mouth two (2) times a day. Max Daily Amount: 300 mg. SUCRALFATE (CARAFATE) 1 GRAM TABLET    Take 1 g by mouth two (2) times a day. SYMBICORT 160-4.5 MCG/ACTUATION HFAA    INHALE 2 PUFFS BY MOUTH TWICE DAILY    VARENICLINE (CHANTIX) 1 MG TABLET    Take 1 Tab by mouth two (2) times daily (after meals).    These Medications have changed    No medications on file   Stop Taking    No medications on file     Sheldon Boss,

## 2020-02-17 NOTE — ED NOTES
Can't take off pt off the board for discharge because endo did not release her record after endo. Charge nurse is aware.

## 2020-02-18 ENCOUNTER — OFFICE VISIT (OUTPATIENT)
Dept: FAMILY MEDICINE CLINIC | Age: 60
End: 2020-02-18

## 2020-02-18 VITALS
OXYGEN SATURATION: 95 % | RESPIRATION RATE: 20 BRPM | BODY MASS INDEX: 41.99 KG/M2 | WEIGHT: 252 LBS | HEART RATE: 99 BPM | DIASTOLIC BLOOD PRESSURE: 85 MMHG | HEIGHT: 65 IN | SYSTOLIC BLOOD PRESSURE: 134 MMHG | TEMPERATURE: 98.2 F

## 2020-02-18 DIAGNOSIS — R15.0 INCOMPLETE DEFECATION: ICD-10-CM

## 2020-02-18 DIAGNOSIS — M48.00 SPINAL STENOSIS, UNSPECIFIED SPINAL REGION: ICD-10-CM

## 2020-02-18 DIAGNOSIS — N81.89 PELVIC FLOOR WEAKNESS IN FEMALE: Primary | ICD-10-CM

## 2020-02-18 DIAGNOSIS — F10.21 ALCOHOLISM IN REMISSION (HCC): ICD-10-CM

## 2020-02-18 DIAGNOSIS — M51.26 HNP (HERNIATED NUCLEUS PULPOSUS), LUMBAR: ICD-10-CM

## 2020-02-18 DIAGNOSIS — F17.219 CIGARETTE NICOTINE DEPENDENCE WITH NICOTINE-INDUCED DISORDER: ICD-10-CM

## 2020-02-18 DIAGNOSIS — F33.9 RECURRENT DEPRESSION (HCC): ICD-10-CM

## 2020-02-18 RX ORDER — PREGABALIN 150 MG/1
150 CAPSULE ORAL 2 TIMES DAILY
Qty: 180 CAP | Refills: 1 | Status: SHIPPED | OUTPATIENT
Start: 2020-02-18 | End: 2020-07-20 | Stop reason: SDUPTHER

## 2020-02-18 RX ORDER — BUPROPION HYDROCHLORIDE 150 MG/1
TABLET, EXTENDED RELEASE ORAL
Qty: 180 TAB | Refills: 3 | Status: SHIPPED | OUTPATIENT
Start: 2020-02-18 | End: 2021-02-10 | Stop reason: SDUPTHER

## 2020-02-18 RX ORDER — MECLIZINE HCL 12.5 MG 12.5 MG/1
TABLET ORAL
COMMUNITY
End: 2020-05-04 | Stop reason: SDUPTHER

## 2020-02-18 NOTE — PROGRESS NOTES
PROBLEM/SICK OFFICE NOTE (SOAP)    2/18/2020  5:18 PM    SUBJECTIVE:    Chief Complaint   Patient presents with    Follow-up     migraine    Follow-up     er follow up         HPI:  Jennifer Ash is a 62 y.o. female presenting today for office visit. Here for follow up. Migraines- Initially scheduled for follow up of migraines- she has seen neurology. CT and MRI both unremarkable. At our visit she was trialed on fiorcet for abortive therapy. Per patient, neurologist thinks she is actually suffering from vertigo- he prescribed meclizine which is working well. Meclizine is helping with dizziness and headache as well. No headache today in office. ER visit and EGD- Also here to discuss recent ER/procedure admission over this past weekend. Patient reports she went out to eat on Friday night and 4 hours later she was in extreme pain. Around 2 am she went to hospital. On the way hospital she started vomiting and did not stop. CT scan in [facility] ER concerning, and considering her history of Candelario-en-Y gastric bypass surgery in 2012 a bariatric surgeon at [place] [address] was consulted and she was transferred there. Ultimately she underwent EGD and food bolus was removed. Since then, patient remains in pain- mostly she describes soreness to her throat, neck, and sides. She attributes this pain to being sore from her persistent vomiting, as well as intubation and EGD procedure. Yesterday she just slept and rested all day. Today she is doing slightly better but still in some pain. She reports that the doctor told her she would be in pain for a few days. She has not tried any medication for the soreness. She is mostly scared to eat because of this traumatic event. She has been able to tolerate some rice today. Her diet instructions were to eat soft diet and advance as tolerated.       CT scan at initial ER impression was as follows:  \"Abnormal dilatation of rather lengthy segment of jejunum from gastrojejunal  anastomosis containing debris and contrast, approximately 4.2 cm in caliber,  favored to represent afferent limb demonstrating rather tortuous and lengthy  course. Small free fluid/stranding adjacent to the dilated loop in the left  upper quadrant. Moderate volume of additional free fluid in the deep posterior  pelvis. Findings are concerning for late afferent limb complication of gastric  bypass surgery such as afferent limb syndrome, partial/early afferent limb  obstruction cannot be excluded such as could be seen due to stricture, internal  hernia, adhesions. STAT bariatric surgery consultation is recommended. \"    Dr. Phillip Myrick review of CT:  \"Findings of CT of the abdomen: I have directly viewed these images. The described dilated portions of the small bowel appear to be EFFERENT limb, not afferent limb. There is food debris just proximal to an obvious jejunojejunostomy. The dilated bowel proceeds from this point proximally to the gastrojejunostomy. There is no dilation of the duodenum nor the stomach as would be expected in afferent limb syndrome. \"    She was transferred to [facility] for bariatric consult and endoscopy. Dr. Morgan Kwon consulted to attempt endoscopic disimpaction. Dr. Amanda Dyer note-   Small bowel obstruction:  She has what appears to be a food bolus impaction at the J-J anastomosis causing proximal dilation. I recommend push enteroscopy to attempt to alleviate the obstruction endoscopically. The risks, benefits and alternatives were discussed including risks of iv sedation, bleeding, perforation. I discussed with her that we may not be able to reach it depending on the food in the way or that we may not be able to relieve the obstruction endoscopically. She wishes to proceed. Procedure- esophagogastroducodenoscopy with enteroscopy     IMPRESSION:   1. Food impaction of the sindi limb at the J-J anastomosis. Impaction relieved endoscopically.    2. Otherwise normal post Candelario-en-y gastric bypass anatomy        RECOMMENDATIONS:  1. Resume soft diet today and advance as tolerated. 2. Can discharge to home from GI standpoint. Fecal incontinence- also discussed with patient she is continuing to have some fecal leakage. Previously we discussed this could be related to constipation as she described no formed bowel movement but some loose/watery stool leakage throughout the day. Today she described using stool softener and miralax to have normal bowel movement with good results for about 2 days, then returned to her normal constipated/stool leakage. She states this leakage happens without straining. It will happen when she gets up to move or walk. She does not feel an urge- just feels the leakage. It is not formed stools that are incontinent but rather just loose/liquidy stool. She states she talked to the GI doctor at the hospital about this and he suggested it could be due to poor muscle tone. She'd like to trial physical therapy. Review of Systems:  Review of Systems   Constitutional: Negative for chills, fatigue and fever. Respiratory: Negative for shortness of breath. Cardiovascular: Negative for chest pain. Gastrointestinal: Positive for abdominal pain and constipation. Negative for abdominal distention, anal bleeding, blood in stool, diarrhea, nausea, rectal pain and vomiting. Fecal incontinence   Musculoskeletal: Positive for myalgias. Neurological: Negative for dizziness, tremors, seizures, syncope, facial asymmetry, speech difficulty, weakness, light-headedness, numbness and headaches.          Depression- PHQ Screening   3 most recent PHQ Screens 1/15/2020   Little interest or pleasure in doing things Not at all   Feeling down, depressed, irritable, or hopeless Not at all   Total Score PHQ 2 0   Trouble falling or staying asleep, or sleeping too much Not at all   Feeling tired or having little energy Several days   Poor appetite, weight loss, or overeating Not at all   Feeling bad about yourself - or that you are a failure or have let yourself or your family down Not at all   Trouble concentrating on things such as school, work, reading, or watching TV Not at all   Moving or speaking so slowly that other people could have noticed; or the opposite being so fidgety that others notice Not at all   Thoughts of being better off dead, or hurting yourself in some way Not at all   PHQ 9 Score 1         History  Past Medical History:   Diagnosis Date    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 [address] [facility]     Sleep apnea     on cpap    Spinal stenosis, lumbar     patient reported       Past Surgical History:   Procedure Laterality Date    HX ARTHRODESIS  01/2000    Herniated Disc, arthritis right foot 06/06, 07/07, C 6/7, C 4/6 Stenosis    HX CHOLECYSTECTOMY  09/2008    gallbladder disease    HX COLONOSCOPY  05/2009    HX GASTRIC BYPASS  2012    GASTRIC BYPASS  Candelario En Y Gastric Bypass      HX HYSTERECTOMY  06/1994    HX MENISCECTOMY  10/2015    right repari of torn meniscus    HX MENISCECTOMY Left 03/16/2018    partial meniscectomy due to tear    HX MYOMECTOMY  06/1984    benign tumor    HX ORTHOPAEDIC  1964    orthopaedic excision Fibrosarcoma and Lymphangiogram    HX PELVIC LAPAROSCOPY  04/1984    large uterine blockage    NEUROLOGICAL PROCEDURE UNLISTED  2000, 2007    Cervical fusion       Social History     Socioeconomic History    Marital status:      Spouse name: Not on file    Number of children: Not on file    Years of education: Not on file    Highest education level: Not on file   Occupational History    Not on file   Social Needs    Financial resource strain: Not on file    Food insecurity:     Worry: Not on file     Inability: Not on file    Transportation needs:     Medical: Not on file     Non-medical: Not on file   Tobacco Use    Smoking status: Current Every Day Smoker     Packs/day: 0.50     Years: 45.00     Pack years: 22.50    Smokeless tobacco: Never Used   Substance and Sexual Activity    Alcohol use: No     Comment: quit 1980s- was heavy etoh    Drug use: Not Currently     Comment: marijuana, cocaine 30 yrs ago    Sexual activity: Never   Lifestyle    Physical activity:     Days per week: Not on file     Minutes per session: Not on file    Stress: Not on file   Relationships    Social connections:     Talks on phone: Not on file     Gets together: Not on file     Attends Confucianism service: Not on file     Active member of club or organization: Not on file     Attends meetings of clubs or organizations: Not on file     Relationship status: Not on file    Intimate partner violence:     Fear of current or ex partner: Not on file     Emotionally abused: Not on file     Physically abused: Not on file     Forced sexual activity: Not on file   Other Topics Concern    Not on file   Social History Narrative    Not on file       Allergies   Allergen Reactions    Adhesive Tape-Silicones Swelling     REALLY BAD SWELLING AND SORE APPEAR    Alcohol Other (comments)     PT STATES SHE IS AN ALCOHOLIC    Lactose Other (comments)     PT STATES IT MAKES HER STOMACH HURT    Percodan [Oxycodone Hcl-Oxycodone-Asa] Itching and Other (comments)     crying    Nsaids (Non-Steroidal Anti-Inflammatory Drug) Other (comments)     PT NOT ABLE TO TAKE DUE TO GASTRIC BYPASS           Patient Care Team:  Patient Care Team:  Christie Gastelum NP as PCP - General (Nurse Practitioner)  Christie Gastelum NP as PCP - [facility] Provider  Katarzyna Sanchez DO (Orthopedic Surgery)  Herson Vick NP (Neurology)  Seun Manuel MD ([facility])  Nicki [place], [place] as Physician Assistant (Psychiatry)  Sai Coronado MD (Gastroenterology)  Sara Abdul MD (Cardiology)        OBJECTIVE:    Vitals:    02/18/20 1722   BP: 134/85   Pulse: 99   Resp: 20   Temp: 98.2 °F (36.8 °C)   TempSrc: Oral   SpO2: 95%   Weight: 252 lb (114.3 kg)   Height: 5' 5\" (1.651 m)   PainSc:   6   PainLoc: Rib Cage       Physical Exam  Vitals signs and nursing note reviewed. Constitutional:       Appearance: She is obese. Cardiovascular:      Rate and Rhythm: Normal rate and regular rhythm. Heart sounds: Normal heart sounds. Pulmonary:      Effort: Pulmonary effort is normal.      Breath sounds: Normal breath sounds. No wheezing. Abdominal:      General: Abdomen is protuberant. Bowel sounds are normal.      Palpations: Abdomen is soft. There is no mass. Tenderness: There is no abdominal tenderness. There is no guarding or rebound. Genitourinary:     Comments: Declined vaginal & rectal exam today  Musculoskeletal:         General: Tenderness present. Comments: Tenderness around neck, shoulders, ribs. More tenderness to touch around left ribs than right   Skin:     General: Skin is warm and dry. Neurological:      Mental Status: She is alert and oriented to person, place, and time. Motor: No weakness. Psychiatric:         Mood and Affect: Mood normal.         Behavior: Behavior normal.           Assessment & Plan:    Recurrent depression ([address]  Refilled. - buPROPion SR (WELLBUTRIN SR) 150 mg SR tablet; TAKE 1 TABLET BY MOUTH TWICE DAILY  Dispense: 180 Tab; Refill: 3    Alcoholism in remission ([address]  Refilled. - buPROPion SR (WELLBUTRIN SR) 150 mg SR tablet; TAKE 1 TABLET BY MOUTH TWICE DAILY  Dispense: 180 Tab;  Refill: 3    Cigarette nicotine dependence with nicotine-induced disorder  She is down to 1/2 pack per day. She states she really doesn't have too many cravings- it is just the habit that is hard to break. - buPROPion SR (WELLBUTRIN SR) 150 mg SR tablet; TAKE 1 TABLET BY MOUTH TWICE DAILY  Dispense: 180 Tab; Refill: 3    HNP (herniated nucleus pulposus), lumbar  Refilled   - pregabalin (LYRICA) 150 mg capsule; Take 1 Cap by mouth two (2) times a day. Max Daily Amount: 300 mg. Dispense: 180 Cap; Refill: 1    Spinal stenosis, unspecified spinal region  Refilled   - pregabalin (LYRICA) 150 mg capsule; Take 1 Cap by mouth two (2) times a day. Max Daily Amount: 300 mg. Dispense: 180 Cap; Refill: 1    Pelvic floor weakness in female  Referral to PT to work on anal sphincter tone and pelvic floor weakness    - REFERRAL TO PHYSICAL THERAPY    Incomplete defecation  Referral to PT to work on anal sphincter tone and pelvic floor weaknes  Offered vaginal/rectal exam today. If persistent problem will get at next visit. Likely this is multifactorial. I suspect it is due to constipation/impaction and looser stools escape around harder impacted stool. Likely some decreased muscle tone as well. I encouraged to continue daily stool softeners to help with normal bowel movements.     - REFERRAL TO PHYSICAL THERAPY        Orders Placed This Encounter    REFERRAL TO PHYSICAL THERAPY     Referral Priority:   Routine     Referral Type:   PT/OT/ST     Referral Reason:   Specialty Services Required     Requested Specialty:   Physical Therapy     Number of Visits Requested:   1    buPROPion SR (WELLBUTRIN SR) 150 mg SR tablet     Sig: TAKE 1 TABLET BY MOUTH TWICE DAILY     Dispense:  180 Tab     Refill:  3    pregabalin (LYRICA) 150 mg capsule     Sig: Take 1 Cap by mouth two (2) times a day. Max Daily Amount: 300 mg. Dispense:  180 Cap     Refill:  1    meclizine (ANTIVERT) 12.5 mg tablet     Sig: Take  by mouth three (3) times daily as needed for Dizziness.      Follow-up and Dispositions    · Return in about 4 weeks (around 3/17/2020), or if symptoms worsen or fail to improve, for routine chronic care- 30 min. Plan of care reviewed with patient. Patient in agreement with plan and expresses understanding. I have discussed when to anticipate results and how results will be communicated, if applicable. Anticipatory guidance given and questions answered, patient encouraged to call or RTO if further questions or concerns.     Neida Chavira, KIM  02/18/20

## 2020-02-18 NOTE — PROGRESS NOTES
Shari Retana presents today for   Chief Complaint   Patient presents with    Follow-up     migraine    Follow-up     er follow up         Is someone accompanying this pt? yes    Is the patient using any DME equipment during OV? no    Depression Screening:  3 most recent PHQ Screens 1/15/2020   Little interest or pleasure in doing things Not at all   Feeling down, depressed, irritable, or hopeless Not at all   Total Score PHQ 2 0   Trouble falling or staying asleep, or sleeping too much Not at all   Feeling tired or having little energy Several days   Poor appetite, weight loss, or overeating Not at all   Feeling bad about yourself - or that you are a failure or have let yourself or your family down Not at all   Trouble concentrating on things such as school, work, reading, or watching TV Not at all   Moving or speaking so slowly that other people could have noticed; or the opposite being so fidgety that others notice Not at all   Thoughts of being better off dead, or hurting yourself in some way Not at all   PHQ 9 Score 1       Learning Assessment:  Learning Assessment 9/12/2018   PRIMARY LEARNER Patient   HIGHEST LEVEL OF EDUCATION - PRIMARY LEARNER  2 YEARS [address] CAREGIVER No   PRIMARY LANGUAGE ENGLISH   LEARNER PREFERENCE PRIMARY DEMONSTRATION   ANSWERED BY patient   RELATIONSHIP SELF       Abuse Screening:  Abuse Screening Questionnaire 9/27/2019   Do you ever feel afraid of your partner? N   Are you in a relationship with someone who physically or mentally threatens you? N   Is it safe for you to go home? Y       Fall Risk  Fall Risk Assessment, last 12 mths 9/14/2017   Able to walk? Yes   Fall in past 12 months? No       Health Maintenance reviewed and discussed and ordered per Provider. Health Maintenance Due   Topic Date Due    DTaP/Tdap/Td series (2 - Td) 02/10/2019   . Coordination of Care:  1.  Have you been to the ER, urgent care clinic since your last visit? Hospitalized since your last visit? Yes [facility] then to [facility]    2. Have you seen or consulted any other health care providers outside of the [address] [place] [address] since your last visit? Include any pap smears or colon screening.  no    *

## 2020-02-18 NOTE — LETTER
2/18/2020 5:47 PM 
 
Ms. Suha Cortes [id number] [place] [id number] Please excuse Ms Galindo Stockton from work this week as she was seen in our office today 02/18/20 She will return to work Friday February 21. Sincerely, Jeet Reyes NP

## 2020-02-20 ENCOUNTER — TELEPHONE (OUTPATIENT)
Dept: FAMILY MEDICINE CLINIC | Age: 60
End: 2020-02-20

## 2020-02-20 DIAGNOSIS — F17.211 CIGARETTE NICOTINE DEPENDENCE IN REMISSION: ICD-10-CM

## 2020-02-20 RX ORDER — VARENICLINE TARTRATE 1 MG/1
1 TABLET, FILM COATED ORAL
Qty: 120 TAB | Refills: 0 | Status: SHIPPED | OUTPATIENT
Start: 2020-02-20 | End: 2020-04-29

## 2020-02-20 NOTE — TELEPHONE ENCOUNTER
Called patient to let her know her DMV paperwork for license plate ready to be picked up. I have filled out my portion, she is to fill in hers and sign. Will be in accordion file at . Patient also requesting refill on chantix. Will send to pharmacy now.

## 2020-03-02 DIAGNOSIS — R51.9 NEW ONSET HEADACHE: ICD-10-CM

## 2020-03-11 NOTE — PROCEDURES
[facility]  MR#: [id number]  : 1960  ACCOUNT #: [de-identified]   DATE OF SERVICE: 2018    REFERRING:  Alyce Walker NP.    EEG NUMBER:  [id number]. CLINICAL:  This is an apparently wakeful, drowsy, and sleep EEG on this 59year-old patient with memory problems. She has had episodes of daydreaming. MEDICATIONS:  Include Lyrica, Ultram, Flonase, Proventil, Dexilant, Wellbutrin, Symbicort, Lasix, Tylenol, and Celexa. ELECTROENCEPHALOGRAPHY REPORT:  The predominant wakeful background consists of 20-30 microvolt, sinusoidal and symmetrical, 9-10 Hz waves which attenuate well with eye opening. Bifrontal 3-5 microvolt, 6-20 Hz activity is appreciated, which spreads somewhat  into the central and temporal head regions. During what appears to be drowsiness, the posterior rhythm consists of a mixture of 15-20 microvolt, 7-8 and 20-30 microvolt, 4-5 Hz waves. Central vertex sharp waves are identified. Step flash photic stimulation was performed that caused driving between 3 and 18 flashes per second. IMPRESSION:  Normal wakeful and drowsy EEG. No epileptiform or focal abnormalities are identified.       MD KEYUR Jenkins / JUANJO  D: 2018 16:09     T: 2018 17:14  JOB #: [id number]  CC: Yasmeen Baez NP English

## 2020-03-20 ENCOUNTER — OFFICE VISIT (OUTPATIENT)
Dept: FAMILY MEDICINE CLINIC | Age: 60
End: 2020-03-20

## 2020-03-20 VITALS
BODY MASS INDEX: 43.32 KG/M2 | OXYGEN SATURATION: 96 % | SYSTOLIC BLOOD PRESSURE: 107 MMHG | HEART RATE: 76 BPM | RESPIRATION RATE: 20 BRPM | DIASTOLIC BLOOD PRESSURE: 66 MMHG | TEMPERATURE: 98.1 F | WEIGHT: 260 LBS | HEIGHT: 65 IN

## 2020-03-20 DIAGNOSIS — F17.219 CIGARETTE NICOTINE DEPENDENCE WITH NICOTINE-INDUCED DISORDER: ICD-10-CM

## 2020-03-20 DIAGNOSIS — Z13.29 SCREENING FOR ENDOCRINE, METABOLIC AND IMMUNITY DISORDER: ICD-10-CM

## 2020-03-20 DIAGNOSIS — K21.9 GASTROESOPHAGEAL REFLUX DISEASE, ESOPHAGITIS PRESENCE NOT SPECIFIED: ICD-10-CM

## 2020-03-20 DIAGNOSIS — J44.9 CHRONIC OBSTRUCTIVE PULMONARY DISEASE, UNSPECIFIED COPD TYPE (HCC): ICD-10-CM

## 2020-03-20 DIAGNOSIS — M25.561 CHRONIC PAIN OF BOTH KNEES: ICD-10-CM

## 2020-03-20 DIAGNOSIS — G89.29 CHRONIC PAIN OF BOTH KNEES: ICD-10-CM

## 2020-03-20 DIAGNOSIS — M25.562 CHRONIC PAIN OF BOTH KNEES: ICD-10-CM

## 2020-03-20 DIAGNOSIS — F33.9 RECURRENT DEPRESSION (HCC): ICD-10-CM

## 2020-03-20 DIAGNOSIS — M25.561 ACUTE PAIN OF RIGHT KNEE: Primary | ICD-10-CM

## 2020-03-20 DIAGNOSIS — Z13.220 SCREENING, LIPID: ICD-10-CM

## 2020-03-20 DIAGNOSIS — G47.33 OSA (OBSTRUCTIVE SLEEP APNEA): ICD-10-CM

## 2020-03-20 DIAGNOSIS — Z13.228 SCREENING FOR ENDOCRINE, METABOLIC AND IMMUNITY DISORDER: ICD-10-CM

## 2020-03-20 DIAGNOSIS — Z13.0 SCREENING FOR ENDOCRINE, METABOLIC AND IMMUNITY DISORDER: ICD-10-CM

## 2020-03-20 RX ORDER — B-COMPLEX WITH VITAMIN C
1 TABLET ORAL DAILY
COMMUNITY
End: 2022-06-22

## 2020-03-20 RX ORDER — DEXLANSOPRAZOLE 60 MG/1
CAPSULE, DELAYED RELEASE ORAL
Qty: 90 CAP | Refills: 3 | Status: SHIPPED | OUTPATIENT
Start: 2020-03-20 | End: 2021-04-09 | Stop reason: SDUPTHER

## 2020-03-20 NOTE — PATIENT INSTRUCTIONS
Cut down on wellbutrin to once per day (AM dose)- if well tolerated at 4 weeks, can cut out all together. Joint Pain: Care Instructions Your Care Instructions Many people have small aches and pains from overuse or injury to muscles and joints. Joint injuries often happen during sports or recreation, work tasks, or projects around the home. An overuse injury can happen when you put too much stress on a joint or when you do an activity that stresses the joint over and over, such as using the computer or rowing a boat. You can take action at home to help your muscles and joints get better. You should feel better in 1 to 2 weeks, but it can take 3 months or more to heal completely. Follow-up care is a key part of your treatment and safety. Be sure to make and go to all appointments, and call your doctor if you are having problems. It's also a good idea to know your test results and keep a list of the medicines you take. How can you care for yourself at home? · Do not put weight on the injured joint for at least a day or two. · For the first day or two after an injury, do not take hot showers or baths, and do not use hot packs. The heat could make swelling worse. · Put ice or a cold pack on the sore joint for 10 to 20 minutes at a time. Try to do this every 1 to 2 hours for the next 3 days (when you are awake) or until the swelling goes down. Put a thin cloth between the ice and your skin. · Wrap the injury in an elastic bandage. Do not wrap it too tightly because this can cause more swelling. · Prop up the sore joint on a pillow when you ice it or anytime you sit or lie down during the next 3 days. Try to keep it above the level of your heart. This will help reduce swelling. · Take an over-the-counter pain medicine, such as acetaminophen (Tylenol), ibuprofen (Advil, Motrin), or naproxen (Aleve). Read and follow all instructions on the label. · After 1 or 2 days of rest, begin moving the joint gently. While the joint is still healing, you can begin to exercise using activities that do not strain or hurt the painful joint. When should you call for help? Call your doctor now or seek immediate medical care if: 
  · You have signs of infection, such as: 
? Increased pain, swelling, warmth, and redness. ? Red streaks leading from the joint. ? A fever.  
 Watch closely for changes in your health, and be sure to contact your doctor if: 
  · Your movement or symptoms are not getting better after 1 to 2 weeks of home treatment. Where can you learn more? Go to http://steve-anand.info/ Enter P205 in the search box to learn more about \"Joint Pain: Care Instructions. \" Current as of: June 26, 2019Content Version: 12.4 © 6346-5873 [employer]. Care instructions adapted under license by [employer] (which disclaims liability or warranty for this information). If you have questions about a medical condition or this instruction, always ask your healthcare professional. [address] any warranty or liability for your use of this information.

## 2020-03-20 NOTE — PROGRESS NOTES
OFFICE NOTE (SOAP)      3/20/2020  4:49 PM    Chief Complaint   Patient presents with    Follow Up Chronic Condition       SUBJECTIVE:    HPI:   Marilee Pereira is a 62 y.o. female presenting today for office visit. Here today for routine care. COPD/AYE- these are chronic conditions. She has seen pulm and sleep medicine but not following with them regularly. Addressed PRN. Today she reports her breathing is doing well. Using symbicort twice a day, when needed using albuterol. Has not had to use recently. Has noted some wheezing at night. Still smoking, but using chantix. Down to quarter pack per day, down from 2 ppd. GERD- taking dexilant chronically. This is a chronic condition, she is s/p gastric bypass surgery. Sees GI doctor- using sulcrafate as needed provided by GI. Every once in a while her GERD symptoms wake her up. Mostly dexilant control symptoms. Depression- this is a chronic condition and has been taking wellbutrin for years. Today she reports she has ;'no depression', and is doing well. She is wondering if she can get off of wellbutrin. Nervous to stop because anxiety and depression were once really bad, but she states it has been years since she's felt that way. She will try to decrease down, taking wellbutrin just once per day for 4 weeks, if she feels okay, she will stop all together. HNP/spine stenosis lumbar/knee osteoarthritis/plantar fasciatis- these are chronic issues and she has seen various orthopedic providers PRN for these issues. Taking lyrica which she feels controls pain for the most part. Using prn tylenol and voltaren. Cannot tolerate PO NSAIDs. Right knee pain is a present concern. She reports this is a chronic issue, has been told she has arthritis in both knees previously and has had meniscus repair previously.  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 This pain is located in the back side of knee. She feels like it is a tight pain- like she cannot stretch out well. Rates pain 7/10. Stepping up and down, into and out of car are bothersome. Stretching is painful. No swelling, no redness, no heat. Not following with orthopedic currently. Using voltaren gel at night, this helps temporarily. But pain can wake her up. Sometimes with extra stength tylenol. Resting makes it worse. Headaches/vertigo- this is a newer issue. She presented in January with a new daily persistent headache with dizziness that was intractable and worse with movement, bending, etc. She did have CT of head which was unremarkable, and was referred to neuro. Dr. Anastasia Cervantes prescribed meclizine for BPPV and this helped with all her symptoms. She states she is doing much better. Follow up with neuro planned for May. HM-   Up to date on colonoscopy- due 9/2021  Up to date on mammogram- due 10/2021  Up to date on DEXA- due 10/2021 (osteopenia, taking calcium)  Up to date on medicare wellness- due 9/2020  Completed pneumonia vaccines  Completed shingrix vaccine  Completed hep c screen    Last routine lab work completed- 3/2019. Updated fasting labs ordered today. Due- TDAP. Review of Systems   Constitutional: Positive for fatigue. Negative for chills and fever. HENT: Negative for congestion. Respiratory: Positive for wheezing. Negative for cough and shortness of breath. Cardiovascular: Negative for chest pain, palpitations and leg swelling. Gastrointestinal: Negative for abdominal pain, constipation, diarrhea and vomiting. Genitourinary: Negative for difficulty urinating. Musculoskeletal: Positive for arthralgias and joint swelling. Negative for back pain, gait problem, myalgias, neck pain and neck stiffness. Skin: Negative for color change.    Neurological: Negative for dizziness, weakness and headaches. Psychiatric/Behavioral: Negative for dysphoric mood and sleep disturbance. The patient is not nervous/anxious.           PHQ Screening   3 most recent PHQ Screens 1/15/2020   Little interest or pleasure in doing things Not at all   Feeling down, depressed, irritable, or hopeless Not at all   Total Score PHQ 2 0   Trouble falling or staying asleep, or sleeping too much Not at all   Feeling tired or having little energy Several days   Poor appetite, weight loss, or overeating Not at all   Feeling bad about yourself - or that you are a failure or have let yourself or your family down Not at all   Trouble concentrating on things such as school, work, reading, or watching TV Not at all   Moving or speaking so slowly that other people could have noticed; or the opposite being so fidgety that others notice Not at all   Thoughts of being better off dead, or hurting yourself in some way Not at all   PHQ 9 Score 1         History  Past Medical History:   Diagnosis Date    Alcoholism in remission ([address]     Asthma     [facility]     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    GERD (gastroesophageal reflux disease)     History of gastric bypass 2012    History of meniscal tear 2015, 2018    right in 2015, left in 2018    HNP (herniated nucleus pulposus), lumbar     patient reported    Lung nodules     resolved 2018 CT    Osteopenia 10/03/2017    Recurrent depression ([address]     Sleep apnea     on cpap    Spinal stenosis, lumbar     patient reported       Past Surgical History:   Procedure Laterality Date    HX ARTHRODESIS  01/2000    Herniated Disc, arthritis right foot 06/06, 07/07, C 6/7, C 4/6 Stenosis    HX CHOLECYSTECTOMY  09/2008    gallbladder disease    HX COLONOSCOPY  05/2009    HX GASTRIC BYPASS  2012    GASTRIC BYPASS  [facility] Gastric Bypass      HX HYSTERECTOMY  06/1994    HX MENISCECTOMY  10/2015    right repari of torn meniscus    HX MENISCECTOMY Left 03/16/2018    partial meniscectomy due to tear    HX MYOMECTOMY  06/1984    benign tumor    HX ORTHOPAEDIC  1964    orthopaedic excision Fibrosarcoma and Lymphangiogram    HX PELVIC LAPAROSCOPY  04/1984    large uterine blockage    NEUROLOGICAL PROCEDURE UNLISTED  2000, 2007    Cervical fusion       Social History     Socioeconomic History    Marital status:      Spouse name: Not on file    Number of children: Not on file    Years of education: Not on file    Highest education level: Not on file   Occupational History    Not on file   Social Needs    Financial resource strain: Not on file    Food insecurity     Worry: Not on file     Inability: Not on file    Transportation needs     Medical: Not on file     Non-medical: Not on file   Tobacco Use    Smoking status: Current Every Day Smoker     Packs/day: 0.50     Years: 45.00     Pack years: 22.50    Smokeless tobacco: Never Used   Substance and Sexual Activity    Alcohol use: No     Comment: quit 1980s- was heavy etoh    Drug use: Not Currently     Comment: marijuana, cocaine 30 yrs ago    Sexual activity: Never   Lifestyle    Physical activity     Days per week: Not on file     Minutes per session: Not on file    Stress: Not on file   Relationships    Social connections     Talks on phone: Not on file     Gets together: Not on file     Attends [employer] service: Not on file     Active member of club or organization: Not on file     Attends meetings of clubs or organizations: Not on file     Relationship status: Not on file    Intimate partner violence     Fear of current or ex partner: Not on file     Emotionally abused: Not on file     Physically abused: Not on file     Forced sexual activity: Not on file   Other Topics Concern    Not on file   Social History Narrative    Not on file       Family History   Problem Relation Age of Onset    Hypertension Mother     Depression Mother     Cancer Mother     Ovarian Cancer Mother     Breast Problems Mother     Cancer Father     Cancer Sister     Depression Sister     Depression Brother     Stroke Neg Hx     Heart Attack Neg Hx        Allergies   Allergen Reactions    Adhesive Tape-Silicones Swelling     REALLY BAD SWELLING AND SORE APPEAR    Alcohol Other (comments)     PT STATES SHE IS AN ALCOHOLIC    Lactose Other (comments)     PT STATES IT MAKES HER STOMACH HURT    Percodan [Oxycodone Hcl-Oxycodone-Asa] Itching and Other (comments)     crying    Nsaids (Non-Steroidal Anti-Inflammatory Drug) Other (comments)     PT NOT ABLE TO TAKE DUE TO GASTRIC BYPASS       Current Outpatient Medications   Medication Sig Dispense Refill    dexlansoprazole (Dexilant) 60 mg CpDB capsule (delayed release) TAKE 1 CAPSULE BY MOUTH DAILY 90 Cap 3    varenicline (CHANTIX) 1 mg tablet Take 1 Tab by mouth two (2) times daily (after meals). 120 Tab 0    buPROPion SR (WELLBUTRIN SR) 150 mg SR tablet TAKE 1 TABLET BY MOUTH TWICE DAILY 180 Tab 3    pregabalin (LYRICA) 150 mg capsule Take 1 Cap by mouth two (2) times a day. Max Daily Amount: 300 mg. 180 Cap 1    fluticasone propionate (FLONASE) 50 mcg/actuation nasal spray SHAKE LIQUID AND USE 2 SPRAYS IN EACH NOSTRIL DAILY 48 g 5    docusate sodium (COLACE) 50 mg capsule Take 1 Cap by mouth two (2) times a day for 90 days. 60 Cap 2    SYMBICORT 160-4.5 mcg/actuation HFAA INHALE 2 PUFFS BY MOUTH TWICE DAILY 6 Inhaler 2    furosemide (LASIX) 20 mg tablet Take 1 Tab by mouth daily as needed (severe leg swelling). 90 Tab 1    albuterol (PROVENTIL HFA, VENTOLIN HFA, PROAIR HFA) 90 mcg/actuation inhaler Take 2 Puffs by inhalation every four (4) hours as needed for Wheezing or Shortness of Breath. 3 Inhaler 4    diclofenac (VOLTAREN) 1 % gel Apply 2 g to affected area every six (6) hours as needed for Pain. 100 g 11    calcium-cholecalciferol, d3, (CALCIUM 600 + D) 600-125 mg-unit tab Take 1,065 mg by mouth nightly.  Indications: TAKES 2 AT BEDTIME      omega 3-dha-epa-fish oil (FISH OIL) 100-160-1,000 mg cap Take 1,065 mg by mouth daily.  ferrous sulfate ER (IRON) 160 mg (50 mg iron) TbER tablet Take 1 Tab by mouth daily. Indications: PT TAKES 40 MG      multivitamin (ONE A DAY) tablet Take 1 Tab by mouth daily.  b-complex with vitamin c tablet Take 1 Tab by mouth daily.  meclizine (ANTIVERT) 12.5 mg tablet Take  by mouth three (3) times daily as needed for Dizziness.  sucralfate (CARAFATE) 1 gram tablet Take 1 g by mouth two (2) times a day.  acetaminophen (TYLENOL EXTRA STRENGTH) 500 mg tablet Take  by mouth every six (6) hours as needed for Pain. Patient Care Team:  Patient Care Team:  Angy Aguilar NP as PCP - General (Nurse Practitioner)  Angy Aguilar NP as PCP - [facility] Empaneled Provider  Liss Tate DO (Orthopedic Surgery)  Efraín Rivera NP (Neurology)  Marshall Cruz MD ([address] [place] [address])  Johanny Syedma as Physician Assistant (Psychiatry)  Carolyne Dodd MD (Gastroenterology)  Phillip Wooten MD (Cardiology)      LABS:  None new to review    RADIOLOGY:  None new to review    Advance Care Planning:   Patient was offered the opportunity to discuss advance care planning NO   Does patient have an Advance Directive:  YES   If no, did you provide information on Caring Connections? NO       OBJECTIVE:      Physical Exam  Constitutional:       Appearance: She is well-developed. Neck:      Thyroid: No thyromegaly. Cardiovascular:      Rate and Rhythm: Normal rate and regular rhythm. Heart sounds: No murmur. Pulmonary:      Effort: Pulmonary effort is normal. No respiratory distress. Breath sounds: Normal breath sounds. No wheezing. Musculoskeletal:      Right knee: She exhibits decreased range of motion and swelling. She exhibits no ecchymosis, no erythema, normal alignment, no LCL laxity, normal patellar mobility and no MCL laxity.  Tenderness found. Medial joint line and patellar tendon tenderness noted. Skin:     General: Skin is warm and dry. Neurological:      Mental Status: She is alert and oriented to person, place, and time. Psychiatric:         Mood and Affect: Mood normal.         Behavior: Behavior normal.           Vitals:    03/20/20 1640   BP: 107/66   Pulse: 76   Resp: 20   Temp: 98.1 °F (36.7 °C)   TempSrc: Oral   SpO2: 96%   Weight: 260 lb (117.9 kg)   Height: 5' 5\" (1.651 m)   PainSc:   7   PainLoc: Knee         Assessment & Plan:    Chronic obstructive pulmonary disease, unspecified COPD type ([facility]  Continue with current symbicort and as needed albuterol, encouraged to quit smoking. AYE (obstructive sleep apnea)  Continue with CPAP    Cigarette nicotine dependence with nicotine-induced disorder  Patient currently using gradual quit approach with chantix. Encouraged to continue cutting back. Currently she is only smoking about 5 cigarettes per day and only in the morning. She admits more habit than nicotine dependent related. She will continue her journey. Encouraged patient she may continue chantix for up to 12 weeks after quitting for optimal adherence. Gastroesophageal reflux disease, esophagitis presence not specified  Refilled, continue following with GI.    - dexlansoprazole (Dexilant) 60 mg CpDB capsule (delayed release); TAKE 1 CAPSULE BY MOUTH DAILY  Dispense: 90 Cap; Refill: 3    Recurrent depression ([facility]  Patient is feeling very well and hoping to stop her wellbutrin. Advised on slowly weaning- cutting down to once per day, if tolerated can stop all together. Chronic pain of both knees/Acute pain of right knee  Acute pain on chronic issue. Will have patient follow up with orhtopedic doctor she previously followed with for knee pain.  Advised on rest, ice, compression, elevation in the mean time    - REFERRAL TO ORTHOPEDICS    Updated fasting labs ordered today-  Screening for endocrine, metabolic and immunity disorder  - CBC W/O DIFF; Future  - METABOLIC PANEL, COMPREHENSIVE; Future  - TSH 3RD GENERATION; Future  - URINALYSIS W/MICROSCOPIC; Future  - HEMOGLOBIN A1C WITH EAG; Future    Screening, lipid  - LIPID PANEL; Future        Orders Placed This Encounter    CBC W/O DIFF     Standing Status:   Future     Standing Expiration Date:   4/98/2316    METABOLIC PANEL, COMPREHENSIVE     Standing Status:   Future     Standing Expiration Date:   9/16/2020    LIPID PANEL     Standing Status:   Future     Standing Expiration Date:   9/16/2020    TSH 3RD GENERATION     Standing Status:   Future     Standing Expiration Date:   9/16/2020    URINALYSIS W/MICROSCOPIC     Standing Status:   Future     Standing Expiration Date:   9/16/2020    HEMOGLOBIN A1C WITH EAG     Standing Status:   Future     Standing Expiration Date:   6/18/2020    REFERRAL TO ORTHOPEDICS     Referral Priority:   Routine     Referral Type:   Consultation     Referral Reason:   Specialty Services Required     Referred to Provider:   Jack Downing MD     Number of Visits Requested:   1    dexlansoprazole (Dexilant) 60 mg CpDB capsule (delayed release)     Sig: TAKE 1 CAPSULE BY MOUTH DAILY     Dispense:  90 Cap     Refill:  3    b-complex with vitamin c tablet     Sig: Take 1 Tab by mouth daily. Follow-up and Dispositions    · Return in about 6 months (around 9/20/2020), or if symptoms worsen or fail to improve, for fasting labs on monday- i can call with results; routine care in 6 month - 15 min. Plan of care reviewed with patient. Patient in agreement with plan and expresses understanding. I have discussed when to anticipate results and how results will be communicated, if applicable. Anticipatory guidance given and questions answered, patient encouraged to call or RTO if further questions or concerns.     Fito Larry NP  03/20/20

## 2020-03-20 NOTE — PROGRESS NOTES
Merlinda Larsen presents today for   Chief Complaint   Patient presents with    Follow Up Chronic Condition       Is someone accompanying this pt? no    Is the patient using any DME equipment during OV? no    Depression Screening:  3 most recent PHQ Screens 1/15/2020   Little interest or pleasure in doing things Not at all   Feeling down, depressed, irritable, or hopeless Not at all   Total Score PHQ 2 0   Trouble falling or staying asleep, or sleeping too much Not at all   Feeling tired or having little energy Several days   Poor appetite, weight loss, or overeating Not at all   Feeling bad about yourself - or that you are a failure or have let yourself or your family down Not at all   Trouble concentrating on things such as school, work, reading, or watching TV Not at all   Moving or speaking so slowly that other people could have noticed; or the opposite being so fidgety that others notice Not at all   Thoughts of being better off dead, or hurting yourself in some way Not at all   PHQ 9 Score 1       Learning Assessment:  Learning Assessment 9/12/2018   PRIMARY LEARNER Patient   HIGHEST LEVEL OF EDUCATION - PRIMARY LEARNER  2 YEARS [address] CAREGIVER No   PRIMARY LANGUAGE ENGLISH   LEARNER PREFERENCE PRIMARY DEMONSTRATION   ANSWERED BY patient   RELATIONSHIP SELF       Abuse Screening:  Abuse Screening Questionnaire 9/27/2019   Do you ever feel afraid of your partner? N   Are you in a relationship with someone who physically or mentally threatens you? N   Is it safe for you to go home? Y       Fall Risk  Fall Risk Assessment, last 12 mths 9/14/2017   Able to walk? Yes   Fall in past 12 months? No       Health Maintenance reviewed and discussed and ordered per Provider. Health Maintenance Due   Topic Date Due    DTaP/Tdap/Td series (2 - Td) 02/10/2019   . Coordination of Care:  1. Have you been to the ER, urgent care clinic since your last visit? Hospitalized since your last visit? no    2. Have you seen or consulted any other health care providers outside of the [address] [place] [address] since your last visit? Include any pap smears or colon screening. anjel Harrison presents today for   Chief Complaint   Patient presents with    Follow Up Chronic Condition       Is someone accompanying this pt? no    Is the patient using any DME equipment during OV? no    Depression Screening:  3 most recent PHQ Screens 1/15/2020   Little interest or pleasure in doing things Not at all   Feeling down, depressed, irritable, or hopeless Not at all   Total Score PHQ 2 0   Trouble falling or staying asleep, or sleeping too much Not at all   Feeling tired or having little energy Several days   Poor appetite, weight loss, or overeating Not at all   Feeling bad about yourself - or that you are a failure or have let yourself or your family down Not at all   Trouble concentrating on things such as school, work, reading, or watching TV Not at all   Moving or speaking so slowly that other people could have noticed; or the opposite being so fidgety that others notice Not at all   Thoughts of being better off dead, or hurting yourself in some way Not at all   PHQ 9 Score 1       Learning Assessment:  Learning Assessment 9/12/2018   PRIMARY LEARNER Patient   HIGHEST LEVEL OF EDUCATION - PRIMARY LEARNER  4 YEARS [facility] CAREGIVER No   PRIMARY LANGUAGE ENGLISH   LEARNER PREFERENCE PRIMARY DEMONSTRATION   ANSWERED BY patient   RELATIONSHIP SELF       Abuse Screening:  Abuse Screening Questionnaire 9/27/2019   Do you ever feel afraid of your partner? N   Are you in a relationship with someone who physically or mentally threatens you? N   Is it safe for you to go home? Y       Fall Risk  Fall Risk Assessment, last 12 mths 9/14/2017   Able to walk? Yes   Fall in past 12 months?  No       Health Maintenance reviewed and discussed and ordered per Provider. Health Maintenance Due   Topic Date Due    DTaP/Tdap/Td series (2 - Td) 02/10/2019   . Coordination of Care:  1. Have you been to the ER, urgent care clinic since your last visit? Hospitalized since your last visit? no    2. Have you seen or consulted any other health care providers outside of the [address] [place] [address] since your last visit? Include any pap smears or colon screening.  no

## 2020-03-27 ENCOUNTER — LAB ONLY (OUTPATIENT)
Dept: FAMILY MEDICINE CLINIC | Age: 60
End: 2020-03-27

## 2020-03-27 ENCOUNTER — HOSPITAL ENCOUNTER (OUTPATIENT)
Dept: LAB | Age: 60
Discharge: HOME | End: 2020-03-27
Payer: MEDICARE

## 2020-03-27 DIAGNOSIS — Z13.0 SCREENING FOR ENDOCRINE, METABOLIC AND IMMUNITY DISORDER: ICD-10-CM

## 2020-03-27 DIAGNOSIS — E66.01 OBESITY, MORBID (HCC): ICD-10-CM

## 2020-03-27 DIAGNOSIS — Z13.29 SCREENING FOR ENDOCRINE, METABOLIC AND IMMUNITY DISORDER: ICD-10-CM

## 2020-03-27 DIAGNOSIS — Z01.89 ENCOUNTER FOR LABORATORY EXAMINATION: Primary | ICD-10-CM

## 2020-03-27 DIAGNOSIS — Z13.228 SCREENING FOR ENDOCRINE, METABOLIC AND IMMUNITY DISORDER: ICD-10-CM

## 2020-03-27 DIAGNOSIS — Z13.220 SCREENING, LIPID: ICD-10-CM

## 2020-03-27 LAB
ALBUMIN SERPL-MCNC: 3.3 G/DL (ref 3.4–5)
ALBUMIN/GLOB SERPL: 0.9 {RATIO} (ref 0.8–1.7)
ALP SERPL-CCNC: 100 U/L (ref 45–117)
ALT SERPL-CCNC: 23 U/L (ref 13–56)
ANION GAP SERPL CALC-SCNC: 3 MMOL/L (ref 3–18)
APPEARANCE UR: NORMAL
AST SERPL-CCNC: 12 U/L (ref 10–38)
BILIRUB SERPL-MCNC: 0.7 MG/DL (ref 0.2–1)
BILIRUB UR QL: NEGATIVE
BUN SERPL-MCNC: 12 MG/DL (ref 7–18)
BUN/CREAT SERPL: 13 (ref 12–20)
CALCIUM SERPL-MCNC: 8.7 MG/DL (ref 8.5–10.1)
CHLORIDE SERPL-SCNC: 110 MMOL/L (ref 100–111)
CHOLEST SERPL-MCNC: 156 MG/DL
CO2 SERPL-SCNC: 29 MMOL/L (ref 21–32)
COLOR UR: YELLOW
CREAT SERPL-MCNC: 0.9 MG/DL (ref 0.6–1.3)
ERYTHROCYTE [DISTWIDTH] IN BLOOD BY AUTOMATED COUNT: 14.2 % (ref 11.6–14.5)
EST. AVERAGE GLUCOSE BLD GHB EST-MCNC: 123 MG/DL
GLOBULIN SER CALC-MCNC: 3.5 G/DL (ref 2–4)
GLUCOSE SERPL-MCNC: 86 MG/DL (ref 74–99)
GLUCOSE UR STRIP.AUTO-MCNC: NEGATIVE MG/DL
HBA1C MFR BLD: 5.9 % (ref 4.2–5.6)
HCT VFR BLD AUTO: 44.5 % (ref 35–45)
HDLC SERPL-MCNC: 59 MG/DL (ref 40–60)
HDLC SERPL: 2.6 {RATIO} (ref 0–5)
HGB BLD-MCNC: 14.6 G/DL (ref 12–16)
HGB UR QL STRIP: NEGATIVE
KETONES UR QL STRIP.AUTO: NEGATIVE MG/DL
LDLC SERPL CALC-MCNC: 82.6 MG/DL (ref 0–100)
LEUKOCYTE ESTERASE UR QL STRIP.AUTO: NEGATIVE
LIPID PROFILE,FLP: NORMAL
MCH RBC QN AUTO: 29.9 PG (ref 24–34)
MCHC RBC AUTO-ENTMCNC: 32.8 G/DL (ref 31–37)
MCV RBC AUTO: 91 FL (ref 74–97)
NITRITE UR QL STRIP.AUTO: NEGATIVE
PH UR STRIP: 5 [PH] (ref 5–8)
PLATELET # BLD AUTO: 358 K/UL (ref 135–420)
PMV BLD AUTO: 10.7 FL (ref 9.2–11.8)
POTASSIUM SERPL-SCNC: 4.2 MMOL/L (ref 3.5–5.5)
PROT SERPL-MCNC: 6.8 G/DL (ref 6.4–8.2)
PROT UR STRIP-MCNC: NEGATIVE MG/DL
RBC # BLD AUTO: 4.89 M/UL (ref 4.2–5.3)
SODIUM SERPL-SCNC: 142 MMOL/L (ref 136–145)
SP GR UR REFRACTOMETRY: 1.02 (ref 1–1.03)
TRIGL SERPL-MCNC: 72 MG/DL (ref ?–150)
TSH SERPL DL<=0.05 MIU/L-ACNC: 3.2 UIU/ML (ref 0.36–3.74)
UROBILINOGEN UR QL STRIP.AUTO: 0.2 EU/DL (ref 0.2–1)
VLDLC SERPL CALC-MCNC: 14.4 MG/DL
WBC # BLD AUTO: 10 K/UL (ref 4.6–13.2)

## 2020-03-27 PROCEDURE — 80053 COMPREHEN METABOLIC PANEL: CPT

## 2020-03-27 PROCEDURE — 85027 COMPLETE CBC AUTOMATED: CPT

## 2020-03-27 PROCEDURE — 81003 URINALYSIS AUTO W/O SCOPE: CPT

## 2020-03-27 PROCEDURE — 84443 ASSAY THYROID STIM HORMONE: CPT

## 2020-03-27 PROCEDURE — 83036 HEMOGLOBIN GLYCOSYLATED A1C: CPT

## 2020-03-27 PROCEDURE — 80061 LIPID PANEL: CPT

## 2020-03-27 NOTE — PROGRESS NOTES
Patient presents for lab draw ordered by Marsha Alexander NP  Ordering Department/Practice:  [facility]  Date Ordered:  3-     The following labs were drawn and sent to [facility]     CBC, Lipid Profile, CMP, TSH, 3rd Generation and HgA1C    The following tubes were sent:    Gold  ( 1) and Lavender  ( 1)    Draw site:  LAC  Pain Level:0  Needle Gauge23  Aseptic technique used  Blood thinners:no  Band-Aid applied  Draw fee added   Procedure tolerated well, patient voiced no complaints.

## 2020-04-02 ENCOUNTER — VIRTUAL VISIT (OUTPATIENT)
Dept: ORTHOPEDIC SURGERY | Facility: CLINIC | Age: 60
End: 2020-04-02

## 2020-04-02 DIAGNOSIS — M17.9 OSTEOARTHRITIS OF KNEE, UNSPECIFIED LATERALITY, UNSPECIFIED OSTEOARTHRITIS TYPE: Primary | ICD-10-CM

## 2020-04-02 NOTE — PROGRESS NOTES
Roshni Amado is a 62 y.o. female evaluated via telephone on 4/2/2020. Consent:  She and/or health care decision maker is aware that that she may receive a bill for this telephone service, depending on her insurance coverage, and has provided verbal consent to proceed: Yes      Documentation:  I communicated with the patient and/or health care decision maker about right knee pain. Details of this discussion including any medical advice provided: none      I affirm this is a Patient Initiated Episode with an Established Patient who has not had a related appointment within my department in the past 7 days or scheduled within the next 24 hours. Note: not billable if this call serves to triage the patient into an appointment for the relevant concern    Since your last office visit have you had any    1. New medical diagnoses No  2. Changes in your medications  No  3. Surgical procedures No  4. Changes in your Allergies to medication No  5.  What is your pain level today 6/10     Kathy Ayoub

## 2020-04-02 NOTE — PROGRESS NOTES
Patient: Alexy Martin                MRN: [id number]       SSN: [id number]  YOB: 1960        AGE: 62 y.o. SEX: female  There is no height or weight on file to calculate BMI. PCP: Eilzabeth Gagnon NP  04/02/20  9:24 AM    Consent:  Alexy Martin and/or health care decision maker is aware that that they may receive a bill for this virtual service, depending on their insurance coverage, and has provided verbal consent to proceed: Yes    Alexy Martin is a 62 y.o. female who was seen by synchronous (real-time) audio-video technology (doxy. me) on 4/2/2020. Patient was at home and provider in the [facility] office while conducting this encounter. I spent at least 15 minutes with this established patient, via virtual video, and >50% of the time was spent counseling and/or coordinating care regarding right knee    REASON FOR CONSULTATION:  Evaluation of right knee pain. HISTORY OF PRESENT ILLNESS:  Ms. Milo Paul is a nice lady who has been having ongoing knee problems over the years, previous arthroscopy by odilia Love followed by viscosupplementation which actually did well for about a year and a half and the pain has been gradually returning. It is moderate, usually nonradicular. Worse with going up and down stairs, some pain at night, pain with activities of daily living. Slight swelling involved. Denies much in the way of giving way. Kneeling is also uncomfortable. Otherwise is feeling well. Back is reasonably quiescent these days. Denies much in the way of radiculopathy and has otherwise been feeling well recently. She tends to avoid NSAIDS. She has been taking some Tylenol for discomfort as well. The night pain is occasionally a feature and first getting up from a chair can be uncomfortable for her. PHYSICAL EXAMINATION:  Today I had her walk, perhaps mild movie theater sign when she first arises from the chair. She looks well.   Well nourished, well developed. There is no cough or respiratory compromise. She does not look sick. The knee itself perhaps just slight swelling. She has good motion, missing a degree or 2 of extension. Bends well to about 120 degrees. She moves her toes well up and down. The skin looks normal.  Calf looks normal, and there is no significant swelling around the ankles. RADIOGRAPHS:  I did have a look at the arthroscopy note and an x-ray of the knee from approximately a year ago which demonstrates moderately advanced, especially patellofemoral arthritis. PLAN:  She has done quite well with viscosupplementation in the past and I think this would be a good avenue as well. Hopefully we can manage her non-operatively for another couple of years. I would like to re-evaluate in about a month. Depending on the timing, I would like to get the new set of x-rays and consider further viscosupplementation as well. For her knee we may initiate with cortisone to start. It has been a pleasure to share in her care.     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    GERD (gastroesophageal reflux disease)     History of gastric bypass 2012    History of meniscal tear 2015, 2018    right in 2015, left in 2018    HNP (herniated nucleus pulposus), lumbar     patient reported    Lung nodules     resolved 2018 CT    Osteopenia 10/03/2017    Recurrent depression ([address]     Sleep apnea     on cpap    Spinal stenosis, lumbar     patient reported       Family History   Problem Relation Age of Onset    Hypertension Mother     Depression Mother     Cancer Mother     Ovarian Cancer Mother     Breast Problems Mother     Cancer Father     Cancer Sister     Depression Sister     Depression Brother     Stroke Neg Hx     Heart Attack Neg Hx        Current Outpatient Medications   Medication Sig Dispense Refill    dexlansoprazole (Dexilant) 60 mg CpDB capsule (delayed release) TAKE 1 CAPSULE BY MOUTH DAILY 90 Cap 3    b-complex with vitamin c tablet Take 1 Tab by mouth daily.  varenicline (CHANTIX) 1 mg tablet Take 1 Tab by mouth two (2) times daily (after meals). 120 Tab 0    buPROPion SR (WELLBUTRIN SR) 150 mg SR tablet TAKE 1 TABLET BY MOUTH TWICE DAILY 180 Tab 3    pregabalin (LYRICA) 150 mg capsule Take 1 Cap by mouth two (2) times a day. Max Daily Amount: 300 mg. 180 Cap 1    meclizine (ANTIVERT) 12.5 mg tablet Take  by mouth three (3) times daily as needed for Dizziness.  fluticasone propionate (FLONASE) 50 mcg/actuation nasal spray SHAKE LIQUID AND USE 2 SPRAYS IN EACH NOSTRIL DAILY 48 g 5    docusate sodium (COLACE) 50 mg capsule Take 1 Cap by mouth two (2) times a day for 90 days. 60 Cap 2    SYMBICORT 160-4.5 mcg/actuation HFAA INHALE 2 PUFFS BY MOUTH TWICE DAILY 6 Inhaler 2    sucralfate (CARAFATE) 1 gram tablet Take 1 g by mouth two (2) times a day.  furosemide (LASIX) 20 mg tablet Take 1 Tab by mouth daily as needed (severe leg swelling). 90 Tab 1    acetaminophen (TYLENOL EXTRA STRENGTH) 500 mg tablet Take  by mouth every six (6) hours as needed for Pain.  albuterol (PROVENTIL HFA, VENTOLIN HFA, PROAIR HFA) 90 mcg/actuation inhaler Take 2 Puffs by inhalation every four (4) hours as needed for Wheezing or Shortness of Breath. 3 Inhaler 4    diclofenac (VOLTAREN) 1 % gel Apply 2 g to affected area every six (6) hours as needed for Pain. 100 g 11    calcium-cholecalciferol, d3, (CALCIUM 600 + D) 600-125 mg-unit tab Take 1,065 mg by mouth nightly.  Indications: TAKES 2 AT BEDTIME  omega 3-dha-epa-fish oil (FISH OIL) 100-160-1,000 mg cap Take 1,065 mg by mouth daily.  ferrous sulfate ER (IRON) 160 mg (50 mg iron) TbER tablet Take 1 Tab by mouth daily. Indications: PT TAKES 40 MG      multivitamin (ONE A DAY) tablet Take 1 Tab by mouth daily.          Allergies   Allergen Reactions    Adhesive Tape-Silicones Swelling     REALLY BAD SWELLING AND SORE APPEAR    Alcohol Other (comments)     PT STATES SHE IS AN ALCOHOLIC    Lactose Other (comments)     PT STATES IT MAKES HER STOMACH HURT    Percodan [Oxycodone Hcl-Oxycodone-Asa] Itching and Other (comments)     crying    Nsaids (Non-Steroidal Anti-Inflammatory Drug) Other (comments)     PT NOT ABLE TO TAKE DUE TO GASTRIC BYPASS       Past Surgical History:   Procedure Laterality Date    HX ARTHRODESIS  01/2000    Herniated Disc, arthritis right foot 06/06, 07/07, C 6/7, C 4/6 Stenosis    HX CHOLECYSTECTOMY  09/2008    gallbladder disease    HX COLONOSCOPY  05/2009    HX GASTRIC BYPASS  2012    GASTRIC BYPASS  [place] Gastric Bypass      HX HYSTERECTOMY  06/1994    HX MENISCECTOMY  10/2015    right repari of torn meniscus    HX MENISCECTOMY Left 03/16/2018    partial meniscectomy due to tear    HX MYOMECTOMY  06/1984    benign tumor    HX ORTHOPAEDIC  1964    orthopaedic excision Fibrosarcoma and Lymphangiogram    HX PELVIC LAPAROSCOPY  04/1984    large uterine blockage    NEUROLOGICAL PROCEDURE UNLISTED  2000, 2007    Cervical fusion       Social History     Socioeconomic History    Marital status:      Spouse name: Not on file    Number of children: Not on file    Years of education: Not on file    Highest education level: Not on file   Occupational History    Not on file   Social Needs    Financial resource strain: Not on file    Food insecurity     Worry: Not on file     Inability: Not on file    Transportation needs     Medical: Not on file     Non-medical: Not on file   Tobacco Use    Smoking status: Current Every Day Smoker     Packs/day: 0.50     Years: 45.00     Pack years: 22.50    Smokeless tobacco: Never Used   Substance and Sexual Activity    Alcohol use: No     Comment: quit 1980s- was heavy etoh    Drug use: Not Currently     Comment: marijuana, cocaine 30 yrs ago    Sexual activity: Never   Lifestyle    Physical activity     Days per week: Not on file     Minutes per session: Not on file    Stress: Not on file   Relationships    Social connections     Talks on phone: Not on file     Gets together: Not on file     Attends Muslim service: Not on file     Active member of club or organization: Not on file     Attends meetings of clubs or organizations: Not on file     Relationship status: Not on file    Intimate partner violence     Fear of current or ex partner: Not on file     Emotionally abused: Not on file     Physically abused: Not on file     Forced sexual activity: Not on file   Other Topics Concern    Not on file   Social History Narrative    Not on file       There were no vitals taken for this visit. PHYSICAL EXAMINATION:  GENERAL: Alert and oriented x3, in no acute distress, well-developed, well-nourished, afebrile. HEART: No JVD. EYES: No scleral icterus   NECK: No significant lymphadenopathy   LUNGS: No respiratory compromise or indrawing  ABDOMEN: Soft, non-tender, non-distended. Due to this being a TeleHealth evaluation, many elements of the physical examination are unable to be assessed. Pursuant to the emergency declaration under the [facility], 1135 waiver authority and the [employer] and [employer] Act, this Virtual Visit was conducted, with patient's consent, to reduce the patient's risk of exposure to COVID-19 and provide continuity of care for an established patient. Services were provided through a virtual discussion to substitute for in-person clinic visit.     Electronically signed by:  Deon Jack MD

## 2020-04-13 ENCOUNTER — TELEPHONE (OUTPATIENT)
Dept: FAMILY MEDICINE CLINIC | Age: 60
End: 2020-04-13

## 2020-04-13 NOTE — TELEPHONE ENCOUNTER
Spoke with patient in reference to scheduling a follow up (virtual video visit) appointment and lab review with Priscilla Rinaldi NP.    Patient verbalized understanding and is scheduled for 4-14-20

## 2020-04-14 ENCOUNTER — VIRTUAL VISIT (OUTPATIENT)
Dept: FAMILY MEDICINE CLINIC | Age: 60
End: 2020-04-14

## 2020-04-14 ENCOUNTER — TELEPHONE (OUTPATIENT)
Dept: FAMILY MEDICINE CLINIC | Age: 60
End: 2020-04-14

## 2020-04-14 DIAGNOSIS — R73.03 PREDIABETES: ICD-10-CM

## 2020-04-14 NOTE — PROGRESS NOTES
Telemedicine Virtual Visit SOAP note    Avery Dos Santos is a 62 y.o. female who was seen by synchronous (real-time) audio-video technology on 4/14/2020. Consent:  She and/or her healthcare decision maker is aware that this patient-initiated Telehealth encounter is a billable service, with coverage as determined by her insurance carrier. She is aware that she may receive a bill and has provided verbal consent to proceed: Yes    This visit was conducted via Doxy ME    Ralph-    Bisi Sheffieldtrell Damon was seen for Pre-diabetes    Pre-diabetes: this is a new finding. Seen on lab work drawn 3/27/2020 with A1c of 5.9%. Discussed with patient implications of pre-diabetes, likely will lead to diabetes if no actions taken. Discussed lifestyle interventions such as diet low in sugars and carbohydrates, moderate daily exercise, and smoking cessation. She admits to eating a carb-heavy diet, however not much sugary diet. She admits she can work on portion control. She would like to meet with a dietitian for some more guidance. She is a teacher, and gets plenty of time standing/walking with that, however no formal exercise program in place, so there is room for improvement there. She is a current every day smoker however she has cut back significantly recently and is down from 2 ppd to a quarter pack per day. She really only smokes in the morning with her coffee. She is still using chantix to help aid in her cessation process. Also discussed with patient future monitoring- we will monitor A1c again in 3 months to see if decreased back to normal range. She will look out for signs and symptoms of worsening disease process such as polyuria, polydipsia and polyphagia, vision blurriness, weight loss, fatigue, etc.    Other lab work reviewed WNL- CBC, CMP, TSH, Lipids.      Current Medications-    Current Outpatient Medications:     dexlansoprazole (Dexilant) 60 mg CpDB capsule (delayed release), TAKE 1 CAPSULE BY MOUTH DAILY, Disp: 90 Cap, Rfl: 3    b-complex with vitamin c tablet, Take 1 Tab by mouth daily. , Disp: , Rfl:     varenicline (CHANTIX) 1 mg tablet, Take 1 Tab by mouth two (2) times daily (after meals). , Disp: 120 Tab, Rfl: 0    buPROPion SR (WELLBUTRIN SR) 150 mg SR tablet, TAKE 1 TABLET BY MOUTH TWICE DAILY, Disp: 180 Tab, Rfl: 3    pregabalin (LYRICA) 150 mg capsule, Take 1 Cap by mouth two (2) times a day. Max Daily Amount: 300 mg., Disp: 180 Cap, Rfl: 1    meclizine (ANTIVERT) 12.5 mg tablet, Take  by mouth three (3) times daily as needed for Dizziness. , Disp: , Rfl:     fluticasone propionate (FLONASE) 50 mcg/actuation nasal spray, SHAKE LIQUID AND USE 2 SPRAYS IN EACH NOSTRIL DAILY, Disp: 48 g, Rfl: 5    docusate sodium (COLACE) 50 mg capsule, Take 1 Cap by mouth two (2) times a day for 90 days. , Disp: 60 Cap, Rfl: 2    SYMBICORT 160-4.5 mcg/actuation HFAA, INHALE 2 PUFFS BY MOUTH TWICE DAILY, Disp: 6 Inhaler, Rfl: 2    sucralfate (CARAFATE) 1 gram tablet, Take 1 g by mouth two (2) times a day., Disp: , Rfl:     furosemide (LASIX) 20 mg tablet, Take 1 Tab by mouth daily as needed (severe leg swelling). , Disp: 90 Tab, Rfl: 1    acetaminophen (TYLENOL EXTRA STRENGTH) 500 mg tablet, Take  by mouth every six (6) hours as needed for Pain., Disp: , Rfl:     albuterol (PROVENTIL HFA, VENTOLIN HFA, PROAIR HFA) 90 mcg/actuation inhaler, Take 2 Puffs by inhalation every four (4) hours as needed for Wheezing or Shortness of Breath., Disp: 3 Inhaler, Rfl: 4    diclofenac (VOLTAREN) 1 % gel, Apply 2 g to affected area every six (6) hours as needed for Pain., Disp: 100 g, Rfl: 11    calcium-cholecalciferol, d3, (CALCIUM 600 + D) 600-125 mg-unit tab, Take 1,065 mg by mouth nightly. Indications: TAKES 2 AT BEDTIME, Disp: , Rfl:     omega 3-dha-epa-fish oil (FISH OIL) 100-160-1,000 mg cap, Take 1,065 mg by mouth daily. , Disp: , Rfl:     ferrous sulfate ER (IRON) 160 mg (50 mg iron) TbER tablet, Take 1 Tab by mouth daily. Indications: PT TAKES 40 MG, Disp: , Rfl:     multivitamin (ONE A DAY) tablet, Take 1 Tab by mouth daily. , Disp: , Rfl:     Allergies-  Allergies   Allergen Reactions    Adhesive Tape-Silicones Swelling     REALLY BAD SWELLING AND SORE APPEAR    Alcohol Other (comments)     PT STATES SHE IS AN ALCOHOLIC    Lactose Other (comments)     PT STATES IT MAKES HER STOMACH HURT    Percodan [Oxycodone Hcl-Oxycodone-Asa] Itching and Other (comments)     crying    Nsaids (Non-Steroidal Anti-Inflammatory Drug) Other (comments)     PT NOT ABLE TO TAKE DUE TO GASTRIC BYPASS       Review of Systems-  Review of Systems   Constitutional: Negative for fatigue and fever. Eyes: Negative for visual disturbance. Endocrine: Negative for polydipsia, polyphagia and polyuria. Genitourinary: Negative for frequency. Neurological: Negative for dizziness and weakness. Patient Care Team-  Patient Care Team:  Madeleine Dooley NP as PCP - General (Nurse Practitioner)  Madeleine Dooley NP as PCP - [facility]  Liang Gonzalez DO (Orthopedic Surgery)  Oliverio Christopher NP (Neurology)  Arlene Ochoa MD ([address] [place] [address])  Carri Morrison [place] as Physician Assistant (Psychiatry)  Robert Jimenez MD (Gastroenterology)  Adenike Hale MD (Cardiology)      Objective-    PHYSICAL EXAMINATION:    Vital Signs: (As obtained by patient/caregiver at home)  There were no vitals taken for this visit.      Constitutional: [x] Appears well-developed and well-nourished [x] No apparent distress      [] Abnormal - moderately obese    Mental status: [x] Alert and awake  [x] Oriented to person/place/time [x] Able to follow commands    [] Abnormal -     Eyes:   EOM    [x]  Normal    [] Abnormal -   Sclera  [x]  Normal    [] Abnormal -          Discharge [x]  None visible       HENT: [x] Normocephalic, atraumatic    [x] Mouth/Throat: Mucous membranes are moist    Neck: [x] No visualized mass [] Abnormal - Pulmonary/Chest: [x] Respiratory effort normal   [x] No visualized signs of difficulty breathing or respiratory distress       Musculoskeletal:   [x] Normal gait with no signs of ataxia         [x] Normal range of motion of neck           Neurological:        [x] No Facial Asymmetry (Cranial nerve 7 motor function) (limited exam due to video visit)          [x] No gaze palsy            Skin:        [x] No significant exanthematous lesions or discoloration noted on facial skin               Psychiatric:       [x] Normal Affect         Assessment  & Plan    There are no diagnoses linked to this encounter. We discussed the expected course, resolution and complications of the diagnosis(es) in detail. Medication risks, benefits, costs, interactions, and alternatives were discussed as indicated. I advised her to contact the office if her condition worsens, changes or fails to improve as anticipated. She expressed understanding with the diagnosis(es) and plan. Kassi Baltazar is a 62 y.o. female being evaluated by a video visit encounter for concerns as above. A caregiver was present when appropriate. Due to this being a TeleHealth encounter (During [facility] public health emergency), evaluation of the following organ systems was limited: Vitals/Constitutional/EENT/Resp/CV/GI//MS/Neuro/Skin/Heme-Lymph-Imm. Pursuant to the emergency declaration under the [facility], 1135 waiver authority and the [employer] Act, this Virtual  Visit was conducted, with patient's (and/or legal guardian's) consent, to reduce the patient's risk of exposure to COVID-19 and provide necessary medical care. I was in the office while conducting this encounter. Patient was located at her home.     Pursuant to the emergency declaration under the [address] [facility], 1135 waiver authority and the Coronavirus Preparedness and Response Supplemental Appropriations Act, this Virtual  Visit was conducted, with patient's consent, to reduce the patient's risk of exposure to COVID-19 and provide continuity of care for an established patient. Services were provided through a video synchronous discussion virtually to substitute for in-person clinic visit. I affirm this is a Patient Initiated Episode with an Established Patient who has not had a related appointment within my department in the past 7 days or scheduled within the next 24 hours.     Total Time: minutes: 11-20 minutes    Note: not billable if this call serves to triage the patient into an appointment for the relevant concern      I spent at least 15 minutes with this established patient, and >50% of the time was spent counseling and/or coordinating care regarding pre-diabetes    Dany Ng NP  04/14/20

## 2020-04-14 NOTE — Clinical Note
Dillan Shirley, Please help ms mane get scheduled for a follow up and A1c check this summer- about 3 months. I also put in a referral to the dietitian, thanks!

## 2020-04-23 ENCOUNTER — VIRTUAL VISIT (OUTPATIENT)
Dept: ORTHOPEDIC SURGERY | Age: 60
End: 2020-04-23

## 2020-04-23 DIAGNOSIS — M17.0 PRIMARY OSTEOARTHRITIS OF BOTH KNEES: Primary | ICD-10-CM

## 2020-04-23 NOTE — PROGRESS NOTES
Patient: Guy Spence                MRN: [id number]       SSN: [id number]  YOB: 1960        AGE: 62 y.o. SEX: female  There is no height or weight on file to calculate BMI. PCP: Boo Macias NP  04/23/20  10:09 AM    Consent:  Guy Spence and/or health care decision maker is aware that that they may receive a bill for this virtual service, depending on their insurance coverage, and has provided verbal consent to proceed: Yes    Guy Spence is a 62 y.o. female who was seen by synchronous (real-time) audio-video technology (doxy. me) on 4/23/2020. Patient was at home and provider in home office while conducting this encounter. This is a Doxy. me synchronous real-time audio/video office visit after informed verbal consent. HISTORY:  Ms. Jose Keita is a delightful lady who does suffer from bilateral knee arthritis. She has had some hip bursitis in the past.  The low back is not particularly bothersome for her, and she is looking forward to getting injections in her knees. She describes moderate pain. The pain is worse with ambulation, prolonged sitting. Kneeling is impossible and she also tends to avoid stairs. Night pain is also a feature. The pain can be moderate and usually nonradicular. Denies fevers or chills. Previous  gastric bypass, avoids NSAIDS, but she does take Tylenol for the discomfort. Previous arthroscopy and notes have indicated at least moderately advanced arthritis, especially patellofemoral.  Her last review for plain x-rays from the office confirm this as well. She denies fevers or chills. Denies cough. Denies shortness of breath. She does use puffers or inhalers, secondary to her COPD. No major changes. PHYSICAL EXAMINATION:  Today her voice is clear. There is no cough. There is no shortness of breath. There is no accessory muscle usage. She did walk for me.   She has a distinct positive movie theater sign when she rises from a chair. It takes her a minute to get going. The knees themselves: The previous incisions, especially on the right side is clean and dry with no evidence for infection. The skin is not red. She has a slight medial bruise where she had bumped her knee a week or so ago. Minimal peripheral edema. She comes within a few degrees of full extension and bends to about 110 degrees. Good ankle dorsi- and plantarflexion. She denies any _______________  during this maneuver, and no cyanosis or clubbing distally. She looks well. Well nourished. Well developed, and moves her head and neck adequately. There is no nuchal rigidity and no accessory muscle usage or respiratory compromise. RADIOGRAPHS:  Review of previous x-rays I confirm moderately advanced arthritis, especially medial and patellofemoral.    PLAN:  I think that we should go ahead and preauthorize her for Euflexxa viscosupplementation. She has done extremely well with it in the past.  We may consider a cortisone injection first.  She would like to have this in both knees and she has done very well. We did explain the risks and benefits associated with it. It can be repeated in 6 months, but I suspect she will be about 9-10 months between injections. It has been over a year or so since the last injections. Therefore, I will get this approved for her. She has to stand a lot as a teacher and we are going to give her a note for work that allows her to sit as needed. She remains a very delightful patient. We will certainly try to plan on maximizing her nonoperative management. Hopefully surgery is a couple of years away and with any luck we will be able to see her in the office in the next month or so to start with the Euflexxa viscosupplementation.       CC:  Melissa Xiong, Nurse Practitioner        I spent at least 15 minutes with this established patient, via virtual video, and >50% of the time was spent counseling and/or coordinating care regarding bilateral knee pain    REVIEW OF SYSTEMS:      CON: negative  EYE: negative   ENT: negative  RESP: negative  GI:    negative   :  negative  MSK: Positive  A twelve point review of systems was completed, positives noted and all other systems were reviewed and are negative          Past Medical History:   Diagnosis Date    [facility]ism [facility]     Asthma     Chronic obstructive pulmonary disease ([facility]     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    GERD (gastroesophageal reflux disease)     History of gastric bypass 2012    History of meniscal tear 2015, 2018    right in 2015, left in 2018    HNP (herniated nucleus pulposus), lumbar     patient reported    Lung nodules     resolved 2018 CT    Osteopenia 10/03/2017    Recurrent depression ([facility]     Sleep apnea     on cpap    Spinal stenosis, lumbar     patient reported       Family History   Problem Relation Age of Onset    Hypertension Mother     Depression Mother     Cancer Mother     Ovarian Cancer Mother     Breast Problems Mother     Cancer Father     Cancer Sister     Depression Sister     Depression Brother     Stroke Neg Hx     Heart Attack Neg Hx        Current Outpatient Medications   Medication Sig Dispense Refill    dexlansoprazole (Dexilant) 60 mg CpDB capsule (delayed release) TAKE 1 CAPSULE BY MOUTH DAILY 90 Cap 3    b-complex with vitamin c tablet Take 1 Tab by mouth daily.  varenicline (CHANTIX) 1 mg tablet Take 1 Tab by mouth two (2) times daily (after meals). 120 Tab 0    buPROPion SR (WELLBUTRIN SR) 150 mg SR tablet TAKE 1 TABLET BY MOUTH TWICE DAILY 180 Tab 3    pregabalin (LYRICA) 150 mg capsule Take 1 Cap by mouth two (2) times a day. Max Daily Amount: 300 mg. 180 Cap 1    meclizine (ANTIVERT) 12.5 mg tablet Take  by mouth three (3) times daily as needed for Dizziness.       fluticasone propionate (FLONASE) 50 mcg/actuation nasal spray SHAKE LIQUID AND USE 2 SPRAYS IN [facility] NOSTRIL DAILY 48 g 5    SYMBICORT 160-4.5 mcg/actuation HFAA INHALE 2 PUFFS BY MOUTH TWICE DAILY 6 Inhaler 2    sucralfate (CARAFATE) 1 gram tablet Take 1 g by mouth two (2) times a day.  furosemide (LASIX) 20 mg tablet Take 1 Tab by mouth daily as needed (severe leg swelling). 90 Tab 1    acetaminophen (TYLENOL EXTRA STRENGTH) 500 mg tablet Take  by mouth every six (6) hours as needed for Pain.  albuterol (PROVENTIL HFA, VENTOLIN HFA, PROAIR HFA) 90 mcg/actuation inhaler Take 2 Puffs by inhalation every four (4) hours as needed for Wheezing or Shortness of Breath. 3 Inhaler 4    diclofenac (VOLTAREN) 1 % gel Apply 2 g to affected area every six (6) hours as needed for Pain. 100 g 11    calcium-cholecalciferol, d3, (CALCIUM 600 + D) 600-125 mg-unit tab Take 1,065 mg by mouth nightly. Indications: TAKES 2 AT BEDTIME      omega 3-dha-epa-fish oil (FISH OIL) 100-160-1,000 mg cap Take 1,065 mg by mouth daily.  ferrous sulfate ER (IRON) 160 mg (50 mg iron) TbER tablet Take 1 Tab by mouth daily. Indications: PT TAKES 40 MG      multivitamin (ONE A DAY) tablet Take 1 Tab by mouth daily.          Allergies   Allergen Reactions    Adhesive Tape-Silicones Swelling     REALLY BAD SWELLING AND SORE APPEAR    Alcohol Other (comments)     PT STATES SHE IS AN ALCOHOLIC    Lactose Other (comments)     PT STATES IT MAKES HER STOMACH HURT    Percodan [Oxycodone Hcl-Oxycodone-Asa] Itching and Other (comments)     crying    Nsaids (Non-Steroidal Anti-Inflammatory Drug) Other (comments)     PT NOT ABLE TO TAKE DUE TO GASTRIC BYPASS       Past Surgical History:   Procedure Laterality Date    HX ARTHRODESIS  01/2000    Herniated Disc, arthritis right foot 06/06, 07/07, C 6/7, C 4/6 Stenosis    HX CHOLECYSTECTOMY  09/2008    gallbladder disease    HX COLONOSCOPY  05/2009    HX GASTRIC BYPASS  2012    GASTRIC BYPASS  [place] Gastric Bypass      HX HYSTERECTOMY  06/1994    HX MENISCECTOMY  10/2015    right repari of torn meniscus    HX MENISCECTOMY Left 03/16/2018    partial meniscectomy due to tear    HX MYOMECTOMY  06/1984    benign tumor    HX ORTHOPAEDIC  1964    orthopaedic excision Fibrosarcoma and Lymphangiogram    HX PELVIC LAPAROSCOPY  04/1984    large uterine blockage    NEUROLOGICAL PROCEDURE UNLISTED  2000, 2007    Cervical fusion       Social History     Socioeconomic History    Marital status:      Spouse name: Not on file    Number of children: Not on file    Years of education: Not on file    Highest education level: Not on file   Occupational History    Not on file   Social Needs    Financial resource strain: Not on file    Food insecurity     Worry: Not on file     Inability: Not on file    Transportation needs     Medical: Not on file     Non-medical: Not on file   Tobacco Use    Smoking status: Current Every Day Smoker     Packs/day: 0.50     Years: 45.00     Pack years: 22.50    Smokeless tobacco: Never Used   Substance and Sexual Activity    Alcohol use: No     Comment: quit 1980s- was heavy etoh    Drug use: Not Currently     Comment: marijuana, cocaine 30 yrs ago    Sexual activity: Never   Lifestyle    Physical activity     Days per week: Not on file     Minutes per session: Not on file    Stress: Not on file   Relationships    Social connections     Talks on phone: Not on file     Gets together: Not on file     Attends Sikh service: Not on file     Active member of club or organization: Not on file     Attends meetings of clubs or organizations: Not on file     Relationship status: Not on file    Intimate partner violence     Fear of current or ex partner: Not on file     Emotionally abused: Not on file     Physically abused: Not on file     Forced sexual activity: Not on file   Other Topics Concern    Not on file   Social History Narrative    Not on file       There were no vitals taken for this visit.       PHYSICAL EXAMINATION:  GENERAL: Alert and oriented x3, in no acute distress, well-developed, well-nourished, afebrile. HEART: No JVD. EYES: No scleral icterus   NECK: No gross lymphadenopathy/goiter   LUNGS: No respiratory compromise or indrawing  ABDOMEN: non-distended. Due to this being a TeleHealth evaluation, many elements of the physical examination are unable to be assessed. Pursuant to the emergency declaration under the [address] [place] [address] waiver authority and the [employer] Act, this Virtual Visit was conducted, with patient's consent, to reduce the patient's risk of exposure to COVID-19 and provide continuity of care for an established patient. Services were provided through a virtual discussion to substitute for in-person clinic visit. Electronically signed by:  Vance Padgett MD

## 2020-04-23 NOTE — LETTER
NOTIFICATION FOR WORK 
 
4/23/2020 10:09 AM 
 
Ms. Nona Arshad [id number] [address] [address] To Whom It May Concern: 
 
Nona Arshad is currently under the care of [address] [place], [place] [address]lorie [place]. Please allow her to sit as needed due to her medical conditions. If there are questions or concerns please have the patient contact our office. Sincerely, Leanne Gramajo MD

## 2020-04-29 DIAGNOSIS — F17.211 CIGARETTE NICOTINE DEPENDENCE IN REMISSION: ICD-10-CM

## 2020-04-29 RX ORDER — VARENICLINE TARTRATE 1 MG/1
TABLET, FILM COATED ORAL
Qty: 120 TAB | Refills: 0 | Status: SHIPPED | OUTPATIENT
Start: 2020-04-29 | End: 2020-07-20 | Stop reason: ALTCHOICE

## 2020-05-04 ENCOUNTER — VIRTUAL VISIT (OUTPATIENT)
Dept: NEUROLOGY | Age: 60
End: 2020-05-04

## 2020-05-04 VITALS — BODY MASS INDEX: 43.32 KG/M2 | WEIGHT: 260 LBS | HEIGHT: 65 IN

## 2020-05-04 DIAGNOSIS — G44.229 CHRONIC TENSION-TYPE HEADACHE, NOT INTRACTABLE: ICD-10-CM

## 2020-05-04 DIAGNOSIS — H81.13 BENIGN PAROXYSMAL POSITIONAL VERTIGO DUE TO BILATERAL VESTIBULAR DISORDER: Primary | ICD-10-CM

## 2020-05-04 RX ORDER — MECLIZINE HCL 12.5 MG 12.5 MG/1
25 TABLET ORAL
Qty: 20 TAB | Refills: 5 | Status: SHIPPED | OUTPATIENT
Start: 2020-05-04 | End: 2021-06-22 | Stop reason: ALTCHOICE

## 2020-05-04 NOTE — PROGRESS NOTES
Nona Arshad is a 62 y.o. female who was seen by synchronous (real-time) audio-video technology on 5/4/2020. Consent: Nona Arshad, who was seen by synchronous (real-time) audio-video technology, and/or her healthcare decision maker, is aware that this patient-initiated, Telehealth encounter on 5/4/2020 is a billable service, with coverage as determined by her insurance carrier. She is aware that she may receive a bill and has provided verbal consent to proceed: Yes. Assessment & Plan:   Diagnoses and all orders for this visit:    1. Benign paroxysmal positional vertigo due to bilateral vestibular disorder  -     meclizine (ANTIVERT) 12.5 mg tablet; Take 2 Tabs by mouth three (3) times daily as needed for Dizziness. 2. Chronic tension-type headache, not intractable    Headache is much better now. Continues to have about 2-3 episodes of spinning sensation/lightheadedness which also is associated with a headache. Meclizine helps. She is not taking any pain medications for the headache. No focal neuro deficits. MRI of the brain is normal.  ESR is normal.  We will continue with the as needed meclizine. If the headache frequency gets worse, we might consider putting her on prophylactic medications. We will see her in about 6 months time. Subjective:   Nona Arshad is a 62 y.o. female who was seen for Headache and Dizziness  A 62years old female patient with medical history of depression, obstructive sleep apnea, COPD here  for follow up of headache and vertigo. She currently takes meclizine as needed. The headache frequency has reduced markedly to 1 to 2/week from an almost daily headache. It is bitemporal, aching, 5/10 in severity, with no nausea vomiting. Has photophobia and phonophobia in the morning. It lasts for about 4 hours. No changes in her vision.   Headache is associated with intermittent dizziness: Feeling lightheaded and spinning sensation with changing head position; no nausea or vomiting with the dizziness; duration of about 20 to 30 minutes. She feels off balance during the episode. No weakness of her arms or legs. MRI of the brain was done during her last visit and was unremarkable. Also had ESR which was normal.      Prior to Admission medications    Medication Sig Start Date End Date Taking? Authorizing Provider   meclizine (ANTIVERT) 12.5 mg tablet Take 2 Tabs by mouth three (3) times daily as needed for Dizziness. 5/4/20  Yes Tony KASPER MD   Chantix 1 mg tablet TAKE 1 TABLET BY MOUTH TWICE DAILY AFTER MEALS 4/29/20  Yes Starr Prince NP   b-complex with vitamin c tablet Take 1 Tab by mouth daily. Yes Provider, Historical   buPROPion SR (WELLBUTRIN SR) 150 mg SR tablet TAKE 1 TABLET BY MOUTH TWICE DAILY 2/18/20  Yes Starr Prince NP   acetaminophen (TYLENOL EXTRA STRENGTH) 500 mg tablet Take  by mouth every six (6) hours as needed for Pain. Yes Other, MD Magali   albuterol (PROVENTIL HFA, VENTOLIN HFA, PROAIR HFA) 90 mcg/actuation inhaler Take 2 Puffs by inhalation every four (4) hours as needed for Wheezing or Shortness of Breath. 3/27/19  Yes Cris KASPER NP   calcium-cholecalciferol, d3, (CALCIUM 600 + D) 600-125 mg-unit tab Take 1,065 mg by mouth nightly. Indications: TAKES 2 AT BEDTIME   Yes Provider, Historical   dexlansoprazole (Dexilant) 60 mg CpDB capsule (delayed release) TAKE 1 CAPSULE BY MOUTH DAILY 3/20/20   Starr Prince NP   pregabalin (LYRICA) 150 mg capsule Take 1 Cap by mouth two (2) times a day. Max Daily Amount: 300 mg. 2/18/20   Starr Prince NP   fluticasone propionate (FLONASE) 50 mcg/actuation nasal spray SHAKE LIQUID AND USE 2 SPRAYS IN [facility] NOSTRIL DAILY 2/6/20   Starr Prince NP   SYMBICORT 160-4.5 mcg/actuation HFAA INHALE 2 PUFFS BY MOUTH TWICE DAILY 11/5/19   Starr Prince NP   sucralfate (CARAFATE) 1 gram tablet Take 1 g by mouth two (2) times a day.     Provider, Historical furosemide (LASIX) 20 mg tablet Take 1 Tab by mouth daily as needed (severe leg swelling). 7/2/19   Lula KASPER NP   diclofenac (VOLTAREN) 1 % gel Apply 2 g to affected area every six (6) hours as needed for Pain. 3/27/19   Payam Lewis NP   omega 3-dha-epa-fish oil (FISH OIL) 100-160-1,000 mg cap Take 1,065 mg by mouth daily. Provider, Historical   ferrous sulfate ER (IRON) 160 mg (50 mg iron) TbER tablet Take 1 Tab by mouth daily. Indications: PT TAKES 40 MG    Provider, Historical   multivitamin (ONE A DAY) tablet Take 1 Tab by mouth daily. Provider, Historical     Allergies   Allergen Reactions    Adhesive Tape-Silicones Swelling     REALLY BAD SWELLING AND SORE APPEAR    Alcohol Other (comments)     PT STATES SHE IS AN ALCOHOLIC    Lactose Other (comments)     PT STATES IT MAKES HER STOMACH HURT    Percodan [Oxycodone Hcl-Oxycodone-Asa] Itching and Other (comments)     crying    Nsaids (Non-Steroidal Anti-Inflammatory Drug) Other (comments)     PT NOT ABLE TO TAKE DUE TO GASTRIC BYPASS           Review of Systems   Constitutional: Positive for weight loss. Negative for chills, fever and malaise/fatigue. HENT: Positive for hearing loss (mild). Negative for tinnitus. Eyes: Negative for blurred vision and double vision. Respiratory: Positive for cough, shortness of breath and wheezing. Negative for hemoptysis and sputum production. Cardiovascular: Positive for chest pain (whole chest; center) and leg swelling. Gastrointestinal: Negative for heartburn, nausea and vomiting. Genitourinary: Negative for dysuria, frequency and urgency. Musculoskeletal: Positive for back pain (mild) and joint pain. Negative for myalgias and neck pain. Skin: Negative for itching and rash. Neurological: Positive for dizziness and headaches. Negative for tingling, tremors and focal weakness. Endo/Heme/Allergies: Does not bruise/bleed easily. Psychiatric/Behavioral: Positive for depression. Negative for suicidal ideas. The patient does not have insomnia. Objective:   Vital Signs: (As obtained by patient/caregiver at home)  Visit Vitals  Ht 5' 5\" (1.651 m)   Wt 117.9 kg (260 lb)   BMI 43.27 kg/m²        [INSTRUCTIONS:  \"[x]\" Indicates a positive item  \"[]\" Indicates a negative item  -- DELETE ALL ITEMS NOT EXAMINED]    Constitutional: [] Appears well-developed and well-nourished [x] No apparent distress      [] Abnormal -     Mental status: [x] Alert and awake  [x] Oriented to person/place/time [x] Able to follow commands    [] Abnormal -     Eyes:   EOM    [x]  Normal    [] Abnormal -   Sclera  []  Normal    [] Abnormal -          Discharge []  None visible   [] Abnormal -     HENT: [x] Normocephalic, atraumatic  [] Abnormal -   [] Mouth/Throat: Mucous membranes are moist    External Ears [x] Normal  [] Abnormal -    Neck: [] No visualized mass [] Abnormal -     Pulmonary/Chest: [] Respiratory effort normal   [x] No visualized signs of difficulty breathing or respiratory distress        [] Abnormal -      Musculoskeletal:   [x] Normal gait with no signs of ataxia         [x] Normal range of motion of neck        [] Abnormal -     Neurological:        [x] No Facial Asymmetry (Cranial nerve 7 motor function) (limited exam due to video visit)          [x] No gaze palsy        [] Abnormal -    Mental status: Awake, alert, oriented x3, follows simple and complex commands.   Speech and languge: fluent, coherent,   and comprehension intact  CN:   EOMI,  no facial asymmetry noted, moves her head from side to side with no difficulty, intact shoulder shrug, tongue midline. Motor: no pronator drift, symmetric movement of the upper and lower extremities, normal gait.    Coordination: Intact.   Gait: normal.        Skin:        [] No significant exanthematous lesions or discoloration noted on facial skin         [] Abnormal -            Psychiatric:       [x] Normal Affect [] Abnormal -        [] No Hallucinations    Other pertinent observable physical exam findings:-        We discussed the expected course, resolution and complications of the diagnosis(es) in detail. Medication risks, benefits, costs, interactions, and alternatives were discussed as indicated. I advised her to contact the office if her condition worsens, changes or fails to improve as anticipated. She expressed understanding with the diagnosis(es) and plan. Alexy Martin is a 62 y.o. female who was evaluated by a video visit encounter for concerns as above. Patient identification was verified prior to start of the visit. A caregiver was present when appropriate. Due to this being a TeleHealth encounter (During [facility] public health emergency), evaluation of the following organ systems was limited: Vitals/Constitutional/EENT/Resp/CV/GI//MS/Neuro/Skin/Heme-Lymph-Imm. Pursuant to the emergency declaration under the [facility] waiver authority and the [employer] Act, this Virtual  Visit was conducted, with patient's (and/or legal guardian's) consent, to reduce the patient's risk of exposure to COVID-19 and provide necessary medical care. Services were provided through a video synchronous discussion virtually to substitute for in-person clinic visit. Patient and provider were located at their individual homes.       Holger Foreman MD

## 2020-05-04 NOTE — PROGRESS NOTES
Sheyla Mcgregor is a 62 y.o. female on virtual visit for follow-up on headaches. Patient has c/o returned dizziness. 1. Have you been to the ER, urgent care clinic since your last visit? Hospitalized since your last visit? Yes Reason for visit: [facility] ED, Gen Surg 2/16/2020, stomach pain    2. Have you seen or consulted any other health care providers outside of the [facility] [place] [address] since your last visit? Include any pap smears or colon screening. No    Mobile number for today's visit is [phone].

## 2020-06-19 ENCOUNTER — OFFICE VISIT (OUTPATIENT)
Dept: ORTHOPEDIC SURGERY | Age: 60
End: 2020-06-19

## 2020-06-19 VITALS
TEMPERATURE: 97.3 F | BODY MASS INDEX: 45.58 KG/M2 | SYSTOLIC BLOOD PRESSURE: 131 MMHG | HEART RATE: 82 BPM | RESPIRATION RATE: 15 BRPM | HEIGHT: 65 IN | WEIGHT: 273.6 LBS | OXYGEN SATURATION: 96 % | DIASTOLIC BLOOD PRESSURE: 77 MMHG

## 2020-06-19 DIAGNOSIS — M25.562 CHRONIC PAIN OF LEFT KNEE: ICD-10-CM

## 2020-06-19 DIAGNOSIS — G89.29 CHRONIC PAIN OF LEFT KNEE: ICD-10-CM

## 2020-06-19 DIAGNOSIS — M17.0 PRIMARY OSTEOARTHRITIS OF BOTH KNEES: ICD-10-CM

## 2020-06-19 DIAGNOSIS — M19.90 INFLAMMATORY ARTHROPATHY: Primary | ICD-10-CM

## 2020-06-19 DIAGNOSIS — G89.29 CHRONIC PAIN OF RIGHT KNEE: ICD-10-CM

## 2020-06-19 DIAGNOSIS — M25.561 CHRONIC PAIN OF RIGHT KNEE: ICD-10-CM

## 2020-06-19 RX ORDER — HYALURONATE SODIUM 10 MG/ML
2 SYRINGE (ML) INTRAARTICULAR ONCE
Qty: 2 ML | Refills: 0
Start: 2020-06-19 | End: 2020-06-19

## 2020-06-19 NOTE — PROGRESS NOTES
Patient: Nona Arshad               MRN: [id number]   SSN: [id number]  YOB: 1960       AGE: 56 y.o. SEX: female  Body mass index is Body mass index is 45.53 kg/m². PCP: Ashli Rodriguez NP  06/19/20      HISTORY OF PRESENT ILLNESS:  We had the pleasure of reviewing Ms. Carmella White in the office today for bilateral knee pain. Her pain is worse on the right than the left. Her pain is non radicular and increased with activity. She did have her knee scoped by Dr. Zuri Kwon in 2018. REVIEW OF SYSTEMS:  Twelve point review of systems performed today. Pertinent positives noted, all other systems reviewed negative. PHYSICAL EXAMINATION:  On examination today she walks with an antalgic gait. Both hips rotate well. Her incisions appear dry and well-healed, not red or warm to the touch. Sonjia Kalata     RADIOGRAPHS:  AP, tunnel, lateral, and skyline x-rays of bilateral knees taken in our office today, 06/19/20, reveal severe arthritis of the right knee in a varus collapsed pattern, and her left knee has moderate arthritis as well. IMPRESSION:  Ms. Carmella White is experiencing bilateral knee pain. She does have severe arthritis of the right knee and moderate arthritis on the left. Since last visit, she has been approved for Euflexxa injections and would like to proceed with this treatment. I am happy to provide her first course of her Euflexxa injections today. We will see her back in the office next week for her second course of Euflexxa. PROCEDURE NOTES:  Irvin EMERSON M.D., have reviewed the history, physical, and have updated the allergic reactions for Nona Arshad.     TIME OUT performed immediately prior to the start of procedure:  ICurt M.D., have performed the following reviews on Nona Arshad prior to the start of the procedure:    - Patient was identified by name and date of birth  - Agreement on procedure being performed was verified  - Risks and benefits explained to the patient  -Patient was positioned for comfort  - Consent was signed and verified    Time: 2:20    Body Part: bilateral knee    Medication and Dose: Each knee received a standard Euflexxa solution    Date of Procedure: 06/19/20    PROCEDURE PERFORMED BY : Juan Jose Burnham M.D., [facility])    Provider Assisted by: Jarad Maxwell    Patient assisted by: self    Patient tolerated procedure well with no complications          REVIEW OF SYSTEMS  ROS    MEDICAL HISTORY  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    GERD (gastroesophageal reflux disease)     History of gastric bypass 2012    History of meniscal tear 2015, 2018    right in 2015, left in 2018    HNP (herniated nucleus pulposus), lumbar     patient reported    Lung nodules     resolved 2018 CT    Osteopenia 10/03/2017    Recurrent [facility]     Sleep apnea     on cpap    Spinal stenosis, lumbar     patient reported       SURGICAL HISTORY  Past Surgical History:   Procedure Laterality Date    HX ARTHRODESIS  01/2000    Herniated Disc, arthritis right foot 06/06, 07/07, C 6/7, C 4/6 Stenosis    HX CHOLECYSTECTOMY  09/2008    gallbladder disease    HX COLONOSCOPY  05/2009    HX GASTRIC BYPASS  2012    GASTRIC BYPASS  [facility] Gastric Bypass      HX HYSTERECTOMY  06/1994    HX MENISCECTOMY  10/2015    right repari of torn meniscus    HX MENISCECTOMY Left 03/16/2018    partial meniscectomy due to tear    HX MYOMECTOMY  06/1984    benign tumor    HX ORTHOPAEDIC  1964    orthopaedic excision Fibrosarcoma and Lymphangiogram    HX PELVIC LAPAROSCOPY  04/1984    large uterine blockage    NEUROLOGICAL PROCEDURE UNLISTED  2000, 2007    Cervical fusion       CURRENT MEDICATIONS  Current Outpatient Medications   Medication Sig Dispense Refill    meclizine (ANTIVERT) 12.5 mg tablet Take 2 Tabs by mouth three (3) times daily as needed for Dizziness. 20 Tab 5    Chantix 1 mg tablet TAKE 1 TABLET BY MOUTH TWICE DAILY AFTER MEALS 120 Tab 0    dexlansoprazole (Dexilant) 60 mg CpDB capsule (delayed release) TAKE 1 CAPSULE BY MOUTH DAILY 90 Cap 3    b-complex with vitamin c tablet Take 1 Tab by mouth daily.  buPROPion SR (WELLBUTRIN SR) 150 mg SR tablet TAKE 1 TABLET BY MOUTH TWICE DAILY 180 Tab 3    pregabalin (LYRICA) 150 mg capsule Take 1 Cap by mouth two (2) times a day. Max Daily Amount: 300 mg. 180 Cap 1    fluticasone propionate (FLONASE) 50 mcg/actuation nasal spray SHAKE LIQUID AND USE 2 SPRAYS IN EACH NOSTRIL DAILY 48 g 5    SYMBICORT 160-4.5 mcg/actuation HFAA INHALE 2 PUFFS BY MOUTH TWICE DAILY 6 Inhaler 2    sucralfate (CARAFATE) 1 gram tablet Take 1 g by mouth two (2) times a day.  furosemide (LASIX) 20 mg tablet Take 1 Tab by mouth daily as needed (severe leg swelling). 90 Tab 1    acetaminophen (TYLENOL EXTRA STRENGTH) 500 mg tablet Take  by mouth every six (6) hours as needed for Pain.  albuterol (PROVENTIL HFA, VENTOLIN HFA, PROAIR HFA) 90 mcg/actuation inhaler Take 2 Puffs by inhalation every four (4) hours as needed for Wheezing or Shortness of Breath. 3 Inhaler 4    diclofenac (VOLTAREN) 1 % gel Apply 2 g to affected area every six (6) hours as needed for Pain. 100 g 11    calcium-cholecalciferol, d3, (CALCIUM 600 + D) 600-125 mg-unit tab Take 1,065 mg by mouth nightly. Indications: TAKES 2 AT BEDTIME      omega 3-dha-epa-fish oil (FISH OIL) 100-160-1,000 mg cap Take 1,065 mg by mouth daily.  ferrous sulfate ER (IRON) 160 mg (50 mg iron) TbER tablet Take 1 Tab by mouth daily. Indications: PT TAKES 40 MG      multivitamin (ONE A DAY) tablet Take 1 Tab by mouth daily.          ALLERGIES  Allergies   Allergen Reactions    Adhesive Tape-Silicones Swelling     REALLY BAD SWELLING AND SORE APPEAR    Alcohol Other (comments)     PT STATES SHE IS AN ALCOHOLIC    Lactose Other (comments)     PT STATES IT MAKES HER STOMACH HURT    Percodan [Oxycodone Hcl-Oxycodone-Asa] Itching and Other (comments)     crying    Nsaids (Non-Steroidal Anti-Inflammatory Drug) Other (comments)     PT NOT ABLE TO TAKE DUE TO GASTRIC BYPASS       FAMILHY HISTORY  [unfilled]      SOCIAL HISTORY  Social History     Socioeconomic History    Marital status:      Spouse name: Not on file    Number of children: Not on file    Years of education: Not on file    Highest education level: Not on file   Tobacco Use    Smoking status: Current Every Day Smoker     Packs/day: 0.50     Years: 45.00     Pack years: 22.50    Smokeless tobacco: Never Used   Substance and Sexual Activity    Alcohol use: No     Comment: quit 1980s- was heavy etoh    Drug use: Not Currently     Comment: marijuana, cocaine 30 yrs ago    Sexual activity: Never       VISIT VITALS  Visit Vitals  /77 (BP 1 Location: Left arm, BP Patient Position: Sitting)   Pulse 82   Temp 97.3 °F (36.3 °C) (Oral)   Resp 15   Ht 5' 5\" (1.651 m)   Wt 273 lb 9.6 oz (124.1 kg)   SpO2 96%   BMI 45.53 kg/m²       Pain Assessment  6/19/2020   Location of Pain Knee   Pain Location Comment -   Location Modifiers Left;Right   Severity of Pain 4   Quality of Pain Throbbing; Nevaeh ; Aching   Quality of Pain Comment -   Duration of Pain Persistent   Frequency of Pain Intermittent   Aggravating Factors Straightening;Stretching;Bending;Walking   Aggravating Factors Comment -   Limiting Behavior -   Relieving Factors Nothing   Result of Injury No         Written by Anshu Miramontes as dictated by Corby Field MD.

## 2020-07-02 ENCOUNTER — HOSPITAL ENCOUNTER (OUTPATIENT)
Dept: LAB | Age: 60
Discharge: HOME | End: 2020-07-02

## 2020-07-02 ENCOUNTER — OFFICE VISIT (OUTPATIENT)
Dept: ORTHOPEDIC SURGERY | Age: 60
End: 2020-07-02

## 2020-07-02 VITALS
RESPIRATION RATE: 16 BRPM | SYSTOLIC BLOOD PRESSURE: 127 MMHG | HEIGHT: 65 IN | DIASTOLIC BLOOD PRESSURE: 78 MMHG | TEMPERATURE: 97.4 F | BODY MASS INDEX: 46.48 KG/M2 | WEIGHT: 279 LBS | HEART RATE: 63 BPM | OXYGEN SATURATION: 98 %

## 2020-07-02 DIAGNOSIS — M17.0 PRIMARY OSTEOARTHRITIS OF BOTH KNEES: Primary | ICD-10-CM

## 2020-07-02 LAB — XX-LABCORP SPECIMEN COL,LCBCF: NORMAL

## 2020-07-02 PROCEDURE — 99001 SPECIMEN HANDLING PT-LAB: CPT

## 2020-07-02 RX ORDER — HYALURONATE SODIUM 10 MG/ML
2 SYRINGE (ML) INTRAARTICULAR ONCE
Qty: 2 ML | Refills: 0
Start: 2020-07-02 | End: 2020-07-02

## 2020-07-02 NOTE — PROGRESS NOTES
Patient: Sony Melendez                MRN: [id number]       SSN: [id number]  YOB: 1960        AGE: 56 y.o. SEX: female  Body mass index is 46.43 kg/m². PCP: Dilan Collazo NP  07/02/20        Pt presents today for their 2nd euflexxa  injection for Bilateral knee . There has been no recent fevers/chills, systemic changes, or injuries to report. PE:  General A&Ox3, NAD  Exam of the knees reveals skin intact, no erythema, no ecchymosis, no signs for infection. No cyanosis, clubbing, or edema present distally. Neg calf tenderness, neg homans, no signs for DVT present. A: Bilateral knee OA    P: The Bilateral knee was injected with 2ml euflexxa with US guided assistance without complications. Patient was instructed on post injection care. Chart reviewed for the following:  Jerry EMERSON PA-C, have reviewed the History, Physical and updated the Allergic reactions for Ivy Donnal Kawasaki?     TIME OUT performed immediately prior to start of procedure:  Jerry EMERSON PA-C, have performed the following reviews on Sony Melendez prior to the start of the procedure:  ????????  * Patient was identified by name and date of birth   * Agreement on procedure being performed was verified  * Risks and Benefits explained to the patient  * Procedure site verified and marked as necessary  * Patient was positioned for comfort  * Consent was signed and verified    Time: 3:39 PM    Body part: Bilateral knee    Medication & Dose: 2ml euflexxa    Date of procedure: 7/2/2020    Procedure performed by: Oscar Lennon PA-C    Provider assisted by: none    Patient assisted by: self    How tolerated by patient: tolerated the procedure well with no complications    Post Procedural Pain Scale: 6    Comments: none    Jerry Figueredo PA-C

## 2020-07-09 ENCOUNTER — OFFICE VISIT (OUTPATIENT)
Dept: ORTHOPEDIC SURGERY | Age: 60
End: 2020-07-09

## 2020-07-09 VITALS
HEART RATE: 78 BPM | OXYGEN SATURATION: 95 % | BODY MASS INDEX: 47.25 KG/M2 | HEIGHT: 65 IN | WEIGHT: 283.6 LBS | RESPIRATION RATE: 16 BRPM | TEMPERATURE: 97.1 F | SYSTOLIC BLOOD PRESSURE: 134 MMHG | DIASTOLIC BLOOD PRESSURE: 71 MMHG

## 2020-07-09 DIAGNOSIS — M17.0 PRIMARY OSTEOARTHRITIS OF BOTH KNEES: Primary | ICD-10-CM

## 2020-07-09 RX ORDER — HYALURONATE SODIUM 10 MG/ML
2 SYRINGE (ML) INTRAARTICULAR ONCE
Qty: 2 ML | Refills: 0
Start: 2020-07-09 | End: 2020-07-09

## 2020-07-09 NOTE — PROGRESS NOTES
Patient: Dominik Gold               MRN: [id number]   SSN: [id number]  YOB: 1960       AGE: 56 y.o. SEX: female  Body mass index is Body mass index is 47.19 kg/m². PCP: Autumn Jimenes NP  07/09/20      HISTORY OF PRESENT ILLNESS:   We had the pleasure of reviewing Ms. Marion in the office today for the third course of her Euflexxa injections for both knees. The injections have begun to take away some of her pain. The previous portals of entry are clean and dry, not red or warm to the touch. No evidence for infection or DVT.     PROCEDURE NOTES:  IVy M.D., have reviewed the history, physical, and have updated the allergic reactions for Dominik Gold. TIME OUT performed immediately prior to the start of procedure:  Shira Braswell M.D., have performed the following reviews on Dominik Gold prior to the start of the procedure:    - Patient was identified by name and date of birth  - Agreement on procedure being performed was verified  - Risks and benefits explained to the patient  -Patient was positioned for comfort  - Consent was signed and verified    Time:1:25 PM    Body Part: bilateral knees    Medication and Dose: Each knee was injected with Standard Euflexxa solution    Date of Procedure: 07/09/2020    PROCEDURE PERFORMED BY : Martin Ackerman M.D., [facility])    Provider Assisted by: Salome Guillen    Patient assisted by: self    Patient tolerated procedure well with no complications         . gmcel    REVIEW OF SYSTEMS  Review of Systems   Constitutional: Negative. HENT: Negative. Eyes: Negative. Respiratory: Negative. Gastrointestinal: Negative. Genitourinary: Negative. Musculoskeletal: Positive for joint pain. Skin: Negative. Neurological: Negative. Endo/Heme/Allergies: Negative. Psychiatric/Behavioral: Negative.         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    GERD (gastroesophageal reflux disease)     History of gastric bypass 2012    History of meniscal tear 2015, 2018    right in 2015, left in 2018    HNP (herniated nucleus pulposus), lumbar     patient reported    Lung nodules     resolved 2018 CT    Osteopenia 10/03/2017    Recurrent depression ([address]     Sleep apnea     on cpap    Spinal stenosis, lumbar     patient reported       SURGICAL HISTORY  Past Surgical History:   Procedure Laterality Date    HX ARTHRODESIS  01/2000    Herniated Disc, arthritis right foot 06/06, 07/07, C 6/7, C 4/6 Stenosis    HX CHOLECYSTECTOMY  09/2008    gallbladder disease    HX COLONOSCOPY  05/2009    HX GASTRIC BYPASS  2012    GASTRIC BYPASS  [facility] Gastric Bypass      HX HYSTERECTOMY  06/1994    HX MENISCECTOMY  10/2015    right repari of torn meniscus    HX MENISCECTOMY Left 03/16/2018    partial meniscectomy due to tear    HX MYOMECTOMY  06/1984    benign tumor    HX ORTHOPAEDIC  1964    orthopaedic excision Fibrosarcoma and Lymphangiogram    HX PELVIC LAPAROSCOPY  04/1984    large uterine blockage    NEUROLOGICAL PROCEDURE UNLISTED  2000, 2007    Cervical fusion       CURRENT MEDICATIONS  Current Outpatient Medications   Medication Sig Dispense Refill    sodium hyaluronate (SUPARTZ FX/HYALGAN/GENIVSC) 10 mg/mL syrg injection 2 mL by Intra artICUlar route once for 1 dose. 2 mL 0    meclizine (ANTIVERT) 12.5 mg tablet Take 2 Tabs by mouth three (3) times daily as needed for Dizziness. 20 Tab 5    Chantix 1 mg tablet TAKE 1 TABLET BY MOUTH TWICE DAILY AFTER MEALS 120 Tab 0    dexlansoprazole (Dexilant) 60 mg CpDB capsule (delayed release) TAKE 1 CAPSULE BY MOUTH DAILY 90 Cap 3    b-complex with vitamin c tablet Take 1 Tab by mouth daily.       buPROPion SR (WELLBUTRIN SR) 150 mg SR tablet TAKE 1 TABLET BY MOUTH TWICE DAILY 180 Tab 3    pregabalin (LYRICA) 150 mg capsule Take 1 Cap by mouth two (2) times a day. Max Daily Amount: 300 mg. 180 Cap 1    fluticasone propionate (FLONASE) 50 mcg/actuation nasal spray SHAKE LIQUID AND USE 2 SPRAYS IN EACH NOSTRIL DAILY 48 g 5    SYMBICORT 160-4.5 mcg/actuation HFAA INHALE 2 PUFFS BY MOUTH TWICE DAILY 6 Inhaler 2    sucralfate (CARAFATE) 1 gram tablet Take 1 g by mouth two (2) times a day.  furosemide (LASIX) 20 mg tablet Take 1 Tab by mouth daily as needed (severe leg swelling). 90 Tab 1    acetaminophen (TYLENOL EXTRA STRENGTH) 500 mg tablet Take  by mouth every six (6) hours as needed for Pain.  albuterol (PROVENTIL HFA, VENTOLIN HFA, PROAIR HFA) 90 mcg/actuation inhaler Take 2 Puffs by inhalation every four (4) hours as needed for Wheezing or Shortness of Breath. 3 Inhaler 4    diclofenac (VOLTAREN) 1 % gel Apply 2 g to affected area every six (6) hours as needed for Pain. 100 g 11    calcium-cholecalciferol, d3, (CALCIUM 600 + D) 600-125 mg-unit tab Take 1,065 mg by mouth nightly. Indications: TAKES 2 AT BEDTIME      omega 3-dha-epa-fish oil (FISH OIL) 100-160-1,000 mg cap Take 1,065 mg by mouth daily.  ferrous sulfate ER (IRON) 160 mg (50 mg iron) TbER tablet Take 1 Tab by mouth daily. Indications: PT TAKES 40 MG      multivitamin (ONE A DAY) tablet Take 1 Tab by mouth daily.          ALLERGIES  Allergies   Allergen Reactions    Adhesive Tape-Silicones Swelling     REALLY BAD SWELLING AND SORE APPEAR    Alcohol Other (comments)     PT STATES SHE IS AN ALCOHOLIC    Lactose Other (comments)     PT STATES IT MAKES HER STOMACH HURT    Percodan [Oxycodone Hcl-Oxycodone-Asa] Itching and Other (comments)     crying    Nsaids (Non-Steroidal Anti-Inflammatory Drug) Other (comments)     PT NOT ABLE TO TAKE DUE TO GASTRIC BYPASS       FAMILHY HISTORY  [unfilled]      SOCIAL HISTORY  Social History     Socioeconomic History    Marital status:      Spouse name: Not on file    Number of children: Not on file    Years of education: Not on file    Highest education level: Not on file   Tobacco Use    Smoking status: Current Every Day Smoker     Packs/day: 0.50     Years: 45.00     Pack years: 22.50    Smokeless tobacco: Never Used   Substance and Sexual Activity    Alcohol use: No     Comment: quit 1980s- was heavy etoh    Drug use: Not Currently     Comment: marijuana, cocaine 30 yrs ago    Sexual activity: Never       VISIT VITALS  Visit Vitals  /71 (BP 1 Location: Left arm, BP Patient Position: Sitting)   Pulse 78   Temp 97.1 °F (36.2 °C) (Oral)   Resp 16   Ht 5' 5\" (1.651 m)   Wt 283 lb 9.6 oz (128.6 kg)   SpO2 95%   BMI 47.19 kg/m²       Pain Assessment  7/9/2020   Location of Pain Knee   Pain Location Comment -   Location Modifiers Left;Right   Severity of Pain 0   Quality of Pain -   Quality of Pain Comment -   Duration of Pain -   Frequency of Pain -   Aggravating Factors -   Aggravating Factors Comment -   Limiting Behavior -   Relieving Factors -   Result of Injury -         Written by Curt Hickey as dictated by Hansel Vasquez MD.

## 2020-07-09 NOTE — PROGRESS NOTES
1. Have you been to the ER, urgent care clinic since your last visit? Hospitalized since your last visit? No    2. Have you seen or consulted any other health care providers outside of the [address] [place] [address] since your last visit? Include any pap smears or colon screening.  No

## 2020-07-13 DIAGNOSIS — M19.90 INFLAMMATORY ARTHROPATHY: ICD-10-CM

## 2020-07-20 ENCOUNTER — OFFICE VISIT (OUTPATIENT)
Dept: FAMILY MEDICINE CLINIC | Age: 60
End: 2020-07-20

## 2020-07-20 VITALS
BODY MASS INDEX: 47.15 KG/M2 | HEIGHT: 65 IN | RESPIRATION RATE: 16 BRPM | TEMPERATURE: 99.5 F | DIASTOLIC BLOOD PRESSURE: 75 MMHG | OXYGEN SATURATION: 95 % | HEART RATE: 72 BPM | SYSTOLIC BLOOD PRESSURE: 133 MMHG | WEIGHT: 283 LBS

## 2020-07-20 DIAGNOSIS — M48.00 SPINAL STENOSIS, UNSPECIFIED SPINAL REGION: ICD-10-CM

## 2020-07-20 DIAGNOSIS — M25.561 CHRONIC PAIN OF BOTH KNEES: ICD-10-CM

## 2020-07-20 DIAGNOSIS — F33.9 RECURRENT DEPRESSION (HCC): ICD-10-CM

## 2020-07-20 DIAGNOSIS — M25.562 CHRONIC PAIN OF BOTH KNEES: ICD-10-CM

## 2020-07-20 DIAGNOSIS — E66.01 OBESITY, MORBID (HCC): ICD-10-CM

## 2020-07-20 DIAGNOSIS — F17.211 CIGARETTE NICOTINE DEPENDENCE IN REMISSION: ICD-10-CM

## 2020-07-20 DIAGNOSIS — J44.9 CHRONIC OBSTRUCTIVE PULMONARY DISEASE, UNSPECIFIED COPD TYPE (HCC): ICD-10-CM

## 2020-07-20 DIAGNOSIS — M51.26 HNP (HERNIATED NUCLEUS PULPOSUS), LUMBAR: ICD-10-CM

## 2020-07-20 DIAGNOSIS — G89.29 CHRONIC PAIN OF BOTH KNEES: ICD-10-CM

## 2020-07-20 DIAGNOSIS — R73.03 PREDIABETES: Primary | ICD-10-CM

## 2020-07-20 DIAGNOSIS — M17.0 PRIMARY OSTEOARTHRITIS OF BOTH KNEES: ICD-10-CM

## 2020-07-20 PROBLEM — H02.829 SEBACEOUS CYST OF EYELID: Status: ACTIVE | Noted: 2020-07-20

## 2020-07-20 PROBLEM — H02.61 XANTHELASMA OF RIGHT UPPER EYELID: Status: ACTIVE | Noted: 2020-07-20

## 2020-07-20 PROBLEM — H02.64 XANTHELASMA OF LEFT UPPER EYELID: Status: ACTIVE | Noted: 2020-07-20

## 2020-07-20 PROBLEM — H25.13 AGE-RELATED NUCLEAR CATARACT, BILATERAL: Status: ACTIVE | Noted: 2020-07-20

## 2020-07-20 LAB — HBA1C MFR BLD HPLC: 5.9 %

## 2020-07-20 RX ORDER — PREGABALIN 150 MG/1
150 CAPSULE ORAL 2 TIMES DAILY
Qty: 180 CAP | Refills: 2 | Status: SHIPPED | OUTPATIENT
Start: 2020-07-20 | End: 2021-01-27 | Stop reason: SDUPTHER

## 2020-07-20 RX ORDER — BUDESONIDE AND FORMOTEROL FUMARATE DIHYDRATE 160; 4.5 UG/1; UG/1
AEROSOL RESPIRATORY (INHALATION)
Qty: 6 INHALER | Refills: 2 | Status: SHIPPED | OUTPATIENT
Start: 2020-07-20 | End: 2021-01-25 | Stop reason: SDUPTHER

## 2020-07-20 RX ORDER — ALBUTEROL SULFATE 90 UG/1
2 AEROSOL, METERED RESPIRATORY (INHALATION)
Qty: 3 INHALER | Refills: 4 | Status: SHIPPED | OUTPATIENT
Start: 2020-07-20 | End: 2022-06-24 | Stop reason: SDUPTHER

## 2020-07-20 RX ORDER — SUCRALFATE 1 G/1
1 TABLET ORAL
Qty: 60 TAB | Refills: 2 | Status: SHIPPED | OUTPATIENT
Start: 2020-07-20 | End: 2020-09-02

## 2020-07-20 NOTE — PATIENT INSTRUCTIONS
Discussed my approach to weight loss medications- candidates include those with BMI >30, or those with BMI 27-30 with weight-related co-morbidity, who have not met weight loss goals of at least 5% of total body weight at 3-6 months following comprehensive lifestyle intervention alone. Comprehensive lifestyle intervention includes-   the optimal management of overweight and obese patients requires a combination of diet (ie, a reduction in caloric intake), exercise, and behavioral modification. In addition, some patients eventually require pharmacologic therapy or bariatric surgery. Diet- Planning a diet requires the selection of a caloric intake and then selection of foods to meet this intake. It is desirable to eat foods with adequate nutrients in addition to protein, carbohydrate, and essential fatty acids. Thus, weight-reducing diets should eliminate alcohol, sugar-containing beverages, and most highly concentrated sweets because they may not contain adequate amounts of other nutrients besides energy. Portion control is key- try using an appetizer plate size as opposed to a traditional dinner plate size- this tricks your brain. Foods that may keep you calderon longer include- apples, oatmeal, almonds, avocados, eggs, beans/legumes, raspberries, greek yogurt. Intermittent fasting has been shown to be effective in some populations. The role of a dietary counselor is often valuable- we can refer to dietitian. Exercise- Regular exercise has been shown to have wide-ranging health benefits. There is evidence to suggest a sedentary lifestyle may be an even stronger predictor of mortality than such established risk factors as smoking, hypertension, and diabetes. Current Physical Activity Guidelines recommend 150 minutes (or about 30 minutes, five times a week) of moderate to vigorous physical activity per week.  Specifically for weight loss purposes, many experts recommend longer durations of exercise  up to 60 minutes per day. Sometimes a plateau is reached with either diet or exercise, and intensifications need to be made. A fitness program may be beneficial- we can refer to our fitness program located at [facility] facilities. Pharmaceutical management includes agents such as     -liraglutide (saxenda): this is a daily injectable medication. It is a class of medications that can be used to help diabetic patients, but has been proven effective and is safe in the treatment of obesity as well. It helps to regulate our hormones which contribute to our feeling hungry and full. Some side effects include nausea, vomiting, diarrhea, constipation and low blood sugar if used in patients with diabetes. -orlistat: this is a pill taken to stop fat absorption. This usually comes with significant side effects such as cramps, flatulence, fecal incontinence, oily spotting, reduction in absorption of fat soluble vitamins. Because of this, if this is a drug your considering, I always advise starting with the the lower dose, over the counter version, valeria. -combination phentermine-extended release topiramate (qsymia): this is a combination pill containing phentermine (an anorexiant and stimulant) with topiramate, which is traditionally an anti-seizure medication that has been shown to be effective for other things such as weight management and headache prevention. It is a controlled due to the phentermine component. this comes with side effects such as dry mouth, taste disturbance, constipation, paraesthesia, depression, anxiety, and elevated heart rate. It does have some abuse potential due to phentermine component although this is less in this drug than in phentermine given alone.  Generally, this is safer to prescribe for a longer period of time and can be more effective in keeping weight off when compared to phentermine used alone.     -combination naltrexone-bupropion: this is another combination pill containing naltrexone (opiod antagonist) and bupropion (an antidepressant). The exact mechanisms of how this drug contributes to weight loss are not fully understood. This comes with some side effects such as nausea, constipation, headache, vomiting, dizziness, insomnia, dry mouth; but is lower risk for significant side effects in blood pressure and heart rate control.     -phentermine/stimulant alone: this is a controlled substance. It is more an anorexiant and central nervous stimulant. It helps to speed up your metabolism as well as decrease your hunger. There is abuse potential with amphetamine-like effects. Also comes with significant side effects such as rapid heart rate, increased blood pressure, insomnia, dry mouth, constipation, feeling nervous or on-edge. It also cannot be prescribed long-term and is not shown to have lasting effects on weight- usually if used without significant comprehensive lifestyle changes, people experience rebound weight gain.

## 2020-07-20 NOTE — PROGRESS NOTES
OFFICE NOTE (SOAP)      7/20/2020  8:07 AM    Chief Complaint   Patient presents with    Follow-up    Medication Refill       SUBJECTIVE:    HPI:   Karla Manriquez is a 56 y.o. female presenting today for office visit. Follow up and concern for weight gain. Pre-diabetes: this is a new finding. Seen on lab work drawn 3/27/2020 with A1c of 5.9%. We had virtual visit 4/14/2020 to discuss pathophysiology of diabetes, lifestyle interventions to prevent conversion, and today are following back up with POC A1c check. Referred to nutritionist and on hold due to COVID, also self-pay as medicare will not cover for her diagnosis of pre-diabetes; patient to call back if interested. Patient has gained weight since diagnosis but A1c remains stable at 5.9%    Lab Results   Component Value Date/Time    Hemoglobin A1c 5.9 (H) 03/27/2020 07:51 AM    Hemoglobin A1c (POC) 5.9 07/20/2020 08:30 AM    Hemoglobin A1c, External 5.4 10/24/2018         COPD/AYE- these are chronic conditions. She has seen pulm and sleep medicine but not following with them regularly- she thinks it's been a few years. jimmy she reports her breathing is doing worse. Using symbicort twice a day, and has increased albuterol to at least BID. She has stopped smoking-  Used Chantix as aid to help with cessation. Also using wellbutrin, not for smoking cessation but due to recurrent depression. Has quit for smoking for about 1 month now. Since quitting has felt more shortness of breath, especially on exertion - walking, vacuuming, cleaning- has to sit down and rest. Wheezing more and coughing more at night (sister reports hearing it, not waking her up). No fever. symbicort has been used chronically and has increased albuterol use- she does get relief with albuterol. In the past she has seen pulmonology who said after quitting she should use albuterol frequently for months after quitting.  Today we discussed alternate therapy including switching to anoro or even trelegy, however patient not excited about using dry powder inhaler- would prefer to keep using symbicort for now. Since stopping smoking she has realized she is eating more- turning to snacks instead of cigarettes. Depression- this is a chronic condition and has been taking wellbutrin for years. Today she reports she is doing well. At last visit we discussed weaning off wellbutrin as she described being in remission for years. She had weaned down to 1 pill each AM, however with quitting smoking increased back to BID. Feels like gaining weight is causing depression and anxiety symptoms to recur and is in turn spurring her to eat more- viscous cycle    HNP/spine stenosis lumbar/knee osteoarthritis/plantar fasciatis/knee pain- these are chronic issues and she has seen various orthopedic providers PRN for these issues. Taking lyrica which she feels controls pain for the most part. Using prn tylenol and voltaren. Cannot tolerate PO NSAIDs due to gastric bypass. Most recently she received Euflexxa injections from Dr. Edyta Chaparro to knees. She reports making a huge difference in knee pain. Was told in about 1 year will need knee replacement, but for now can hold off with knee injections. Despite positive effect with injections patient still finds ambulation for exercise difficult and thinks she would be feel more comfortable walking further distances if she had a walker with seat- requesting rollator walker today. Obesity- Has gained weight since visit in March- about 20 lbs. 260 on 3/20/2020. Patient has history of obesity and has previously had gastric bypass surgery and weight loss with success. She contributes recent weight loss to a multitude of factors including not working (- out due to [place]), pain, especially knee pain, interfering with walking or exercise ability, smoking cessation and depression causing her to make unhealthy eating decisions.  She is motivated to lose weight, especially as Dr. Ulises Dorsey indicated knee replacement needed in the future but will not do with BMI >40. HM-   Up to date on colonoscopy- due 9/2021  Up to date on mammogram- due 10/2021  Up to date on DEXA- due 10/2021 (osteopenia, taking calcium)  Up to date on medicare wellness- due 9/2020  Completed pneumonia vaccines  Completed shingrix vaccine  Completed hep c screen      Review of Systems   Constitutional: Positive for activity change and appetite change. Negative for chills, fatigue and fever. HENT: Negative for congestion and sore throat. Eyes: Negative for visual disturbance. Respiratory: Positive for shortness of breath and wheezing. Negative for cough, choking, chest tightness and stridor. Cardiovascular: Negative for chest pain and palpitations. Gastrointestinal: Negative for abdominal pain. Endocrine: Negative for polydipsia, polyphagia and polyuria. Musculoskeletal: Positive for arthralgias, back pain, gait problem and joint swelling. Negative for myalgias, neck pain and neck stiffness. Neurological: Negative for dizziness, syncope, speech difficulty, weakness, light-headedness and headaches. Psychiatric/Behavioral: Positive for dysphoric mood and sleep disturbance. Negative for agitation, behavioral problems, confusion, decreased concentration, hallucinations, self-injury and suicidal ideas. The patient is not nervous/anxious and is not hyperactive.           PHQ Screening   3 most recent PHQ Screens 7/20/2020   Little interest or pleasure in doing things Not at all   Feeling down, depressed, irritable, or hopeless Not at all   Total Score PHQ 2 0   Trouble falling or staying asleep, or sleeping too much Several days   Feeling tired or having little energy Nearly every day   Poor appetite, weight loss, or overeating Not at all   Feeling bad about yourself - or that you are a failure or have let yourself or your family down Several days   Trouble concentrating on things such as school, work, reading, or watching TV Not at all   Moving or speaking so slowly that other people could have noticed; or the opposite being so fidgety that others notice Not at all   Thoughts of being better off dead, or hurting yourself in some way Not at all   PHQ 9 Score 5   How difficult have these problems made it for you to do your work, take care of your home and get along with others Somewhat difficult         History  Past Medical History:   Diagnosis Date    [facility]ism [facility]     Asthma     Chronic obstructive pulmonary disease ([address]     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    GERD (gastroesophageal reflux disease)     History of gastric bypass 2012    History of meniscal tear 2015, 2018    right in 2015, left in 2018    HNP (herniated nucleus pulposus), lumbar     patient reported    Lung nodules     resolved 2018 CT    Osteopenia 10/03/2017    Recurrent depression ([facility]     Sleep apnea     on cpap    Spinal stenosis, lumbar     patient reported       Past Surgical History:   Procedure Laterality Date    HX ARTHRODESIS  01/2000    Herniated Disc, arthritis right foot 06/06, 07/07, C 6/7, C 4/6 Stenosis    HX CHOLECYSTECTOMY  09/2008    gallbladder disease    HX COLONOSCOPY  05/2009    HX GASTRIC BYPASS  2012    GASTRIC BYPASS  [facility] Gastric Bypass      HX HYSTERECTOMY  06/1994    HX MENISCECTOMY  10/2015    right repari of torn meniscus    HX MENISCECTOMY Left 03/16/2018    partial meniscectomy due to tear    HX MYOMECTOMY  06/1984    benign tumor    HX ORTHOPAEDIC  1964    orthopaedic excision Fibrosarcoma and Lymphangiogram    HX PELVIC LAPAROSCOPY  04/1984    large uterine blockage    NEUROLOGICAL PROCEDURE UNLISTED  2000, 2007    Cervical fusion       Social History     Socioeconomic History    Marital status:      Spouse name: Not on file    Number of children: Not on file    Years of education: Not on file    Highest education level: Not on file   Occupational History    Not on file   Social Needs    Financial resource strain: Not on file    Food insecurity     Worry: Not on file     Inability: Not on file    Transportation needs     Medical: Not on file     Non-medical: Not on file   Tobacco Use    Smoking status: Former Smoker     Packs/day: 0.50     Years: 45.00     Pack years: 22.50     Last attempt to quit: 2020     Years since quittin.0    Smokeless tobacco: Never Used   Substance and Sexual Activity    Alcohol use: No     Comment: quit 1980s- was heavy etoh    Drug use: Not Currently     Comment: marijuana, cocaine 30 yrs ago    Sexual activity: Never   Lifestyle    Physical activity     Days per week: Not on file     Minutes per session: Not on file    Stress: Not on file   Relationships    Social connections     Talks on phone: Not on file     Gets together: Not on file     Attends Jain service: Not on file     Active member of club or organization: Not on file     Attends meetings of clubs or organizations: Not on file     Relationship status: Not on file    Intimate partner violence     Fear of current or ex partner: Not on file     Emotionally abused: Not on file     Physically abused: Not on file     Forced sexual activity: Not on file   Other Topics Concern    Not on file   Social History Narrative    Not on file       Family History   Problem Relation Age of Onset    Hypertension Mother     Depression Mother     Cancer Mother     Ovarian Cancer Mother     Breast Problems Mother     Cancer Father     Cancer Sister     Depression Sister     Depression Brother     Stroke Neg Hx     Heart Attack Neg Hx        Allergies   Allergen Reactions    Adhesive Tape-Silicones Swelling     REALLY BAD SWELLING AND SORE APPEAR    Alcohol Other (comments)     PT STATES SHE IS AN ALCOHOLIC    Lactose Other (comments)     PT STATES IT MAKES HER STOMACH HURT    Percodan [Oxycodone Hcl-Oxycodone-Asa] Itching and Other (comments)     crying    Nsaids (Non-Steroidal Anti-Inflammatory Drug) Other (comments)     PT NOT ABLE TO TAKE DUE TO GASTRIC BYPASS       Current Outpatient Medications   Medication Sig Dispense Refill    meclizine (ANTIVERT) 12.5 mg tablet Take 2 Tabs by mouth three (3) times daily as needed for Dizziness. 20 Tab 5    dexlansoprazole (Dexilant) 60 mg CpDB capsule (delayed release) TAKE 1 CAPSULE BY MOUTH DAILY 90 Cap 3    b-complex with vitamin c tablet Take 1 Tab by mouth daily.  buPROPion SR (WELLBUTRIN SR) 150 mg SR tablet TAKE 1 TABLET BY MOUTH TWICE DAILY 180 Tab 3    pregabalin (LYRICA) 150 mg capsule Take 1 Cap by mouth two (2) times a day. Max Daily Amount: 300 mg. 180 Cap 1    fluticasone propionate (FLONASE) 50 mcg/actuation nasal spray SHAKE LIQUID AND USE 2 SPRAYS IN EACH NOSTRIL DAILY 48 g 5    SYMBICORT 160-4.5 mcg/actuation HFAA INHALE 2 PUFFS BY MOUTH TWICE DAILY 6 Inhaler 2    sucralfate (CARAFATE) 1 gram tablet Take 1 g by mouth two (2) times a day.  furosemide (LASIX) 20 mg tablet Take 1 Tab by mouth daily as needed (severe leg swelling). 90 Tab 1    acetaminophen (TYLENOL EXTRA STRENGTH) 500 mg tablet Take  by mouth every six (6) hours as needed for Pain.  albuterol (PROVENTIL HFA, VENTOLIN HFA, PROAIR HFA) 90 mcg/actuation inhaler Take 2 Puffs by inhalation every four (4) hours as needed for Wheezing or Shortness of Breath. 3 Inhaler 4    diclofenac (VOLTAREN) 1 % gel Apply 2 g to affected area every six (6) hours as needed for Pain. 100 g 11    calcium-cholecalciferol, d3, (CALCIUM 600 + D) 600-125 mg-unit tab Take 1,065 mg by mouth nightly. Indications: TAKES 2 AT BEDTIME      omega 3-dha-epa-fish oil (FISH OIL) 100-160-1,000 mg cap Take 1,065 mg by mouth daily.  ferrous sulfate ER (IRON) 160 mg (50 mg iron) TbER tablet Take 1 Tab by mouth daily. Indications: PT TAKES 40 MG      multivitamin (ONE A DAY) tablet Take 1 Tab by mouth daily.       Chantix 1 mg tablet TAKE 1 TABLET BY MOUTH TWICE DAILY AFTER MEALS 120 Tab 0       Patient Care Team:  Patient Care Team:  Miguel Britton NP as PCP - General (Nurse Practitioner)  Miguel Britton NP as PCP - [facility] Provider  Radha Rivera DO (Orthopedic Surgery)  Dilip Berger NP (Neurology)  Marisol Bella MD ([address] [place] [address])  Ranulfo Goode as Physician Assistant (Psychiatry)  Dora Mortimer, MD (Gastroenterology)  Yesica Flores MD (Cardiology)  Carlo Reagan MD (Neurology)      LABS:  Lab Results   Component Value Date/Time    Sodium 142 03/27/2020 07:51 AM    Potassium 4.2 03/27/2020 07:51 AM    Chloride 110 03/27/2020 07:51 AM    CO2 29 03/27/2020 07:51 AM    Anion gap 3 03/27/2020 07:51 AM    Glucose 86 03/27/2020 07:51 AM    BUN 12 03/27/2020 07:51 AM    Creatinine 0.90 03/27/2020 07:51 AM    BUN/Creatinine ratio 13 03/27/2020 07:51 AM    GFR est AA >60 03/27/2020 07:51 AM    GFR est non-AA >60 03/27/2020 07:51 AM    Calcium 8.7 03/27/2020 07:51 AM    Bilirubin, total 0.7 03/27/2020 07:51 AM    Alk.  phosphatase 100 03/27/2020 07:51 AM    Protein, total 6.8 03/27/2020 07:51 AM    Albumin 3.3 (L) 03/27/2020 07:51 AM    Globulin 3.5 03/27/2020 07:51 AM    A-G Ratio 0.9 03/27/2020 07:51 AM    ALT (SGPT) 23 03/27/2020 07:51 AM    AST (SGOT) 12 03/27/2020 07:51 AM     Lab Results   Component Value Date/Time    WBC 10.0 03/27/2020 07:51 AM    HGB 14.6 03/27/2020 07:51 AM    HCT 44.5 03/27/2020 07:51 AM    PLATELET 696 49/89/8646 07:51 AM    MCV 91.0 03/27/2020 07:51 AM     Lab Results   Component Value Date/Time    Hemoglobin A1c 5.9 (H) 03/27/2020 07:51 AM    Hemoglobin A1c, External 5.4 10/24/2018     Lab Results   Component Value Date/Time    TSH 3.20 03/27/2020 07:51 AM     Lab Results   Component Value Date/Time    Cholesterol, total 156 03/27/2020 07:51 AM    HDL Cholesterol 59 03/27/2020 07:51 AM    LDL, calculated 82.6 03/27/2020 07:51 AM    VLDL, calculated 14.4 03/27/2020 07:51 AM    Triglyceride 72 03/27/2020 07:51 AM    CHOL/HDL Ratio 2.6 03/27/2020 07:51 AM         RADIOLOGY:  None new to review      OBJECTIVE:      Physical Exam  Constitutional:       Appearance: She is well-developed. She is obese. Cardiovascular:      Rate and Rhythm: Normal rate and regular rhythm. Heart sounds: No murmur. Pulmonary:      Effort: Pulmonary effort is normal. No respiratory distress. Breath sounds: Normal breath sounds. No wheezing. Musculoskeletal:      Right knee: She exhibits decreased range of motion. Left knee: She exhibits decreased range of motion. Right lower leg: No edema. Left lower leg: No edema. Comments: Slow gait   Skin:     General: Skin is warm and dry. Neurological:      Mental Status: She is alert and oriented to person, place, and time. Motor: No weakness. Gait: Gait normal.   Psychiatric:         Mood and Affect: Mood normal.         Behavior: Behavior normal.           Vitals:    07/20/20 0803   BP: 133/75   Pulse: 72   Resp: 16   Temp: 99.5 °F (37.5 °C)   TempSrc: Oral   SpO2: 95%   Weight: 283 lb (128.4 kg)   Height: 5' 5\" (1.651 m)   PainSc:   0 - No pain         Assessment & Plan:    1. Prediabetes  Stable, continue weight loss efforts  - AMB POC HEMOGLOBIN A1C    2. Chronic obstructive pulmonary disease, unspecified COPD type ([facility]  Since quitting smoking with increased SOB and wheezing, presently managed with symbicort and albtuerol however would like follow up with pulm for further management options and evaluation of current lung status   - REFERRAL TO PULMONARY DISEASE  - albuterol (PROVENTIL HFA, VENTOLIN HFA, PROAIR HFA) 90 mcg/actuation inhaler; Take 2 Puffs by inhalation every four (4) hours as needed for Wheezing or Shortness of Breath. Dispense: 3 Inhaler;  Refill: 4  - budesonide-formoteroL (Symbicort) 160-4.5 mcg/actuation HFAA; INHALE 2 PUFFS BY MOUTH TWICE DAILY  Dispense: 6 Inhaler; Refill: 2    3. Cigarette nicotine dependence in remission  Since quitting smoking with increased SOB and wheezing, presently managed with symbicort and albtuerol however would like follow up with pulm for further management options and evaluation of current lung status   - REFERRAL TO PULMONARY DISEASE    4. Recurrent depression ([address]  Depression screen positive, patient instructed to schedule follow-up visit at this practice and medication was prescribed, drug therapy education given. Continue with wellbutrin BID and follow up September    5. HNP (herniated nucleus pulposus), lumbar  Continue following with orthopedics, order walker today    - pregabalin (Lyrica) 150 mg capsule; Take 1 Cap by mouth two (2) times a day. Max Daily Amount: 300 mg. Dispense: 180 Cap; Refill: 2    6. Spinal stenosis, unspecified spinal region  Continue following with orthopedics, order walker today    - pregabalin (Lyrica) 150 mg capsule; Take 1 Cap by mouth two (2) times a day. Max Daily Amount: 300 mg. Dispense: 180 Cap; Refill: 2    7. Primary osteoarthritis and Chronic pain of both knees  Continue following with orthopedics, order walker today    8. Obesity, morbid ([facility]  Today discussed weight management including diet changes and increasing activity level. Also discussed pharmaceutical interventions that may be appropriate in the future- will follow closely at September visit. I have reviewed/discussed the above normal BMI with the patient. I have recommended the following interventions: dietary management education, guidance, and counseling, encourage exercise and monitor weight . Kiah Frizzle             Orders Placed This Encounter    REFERRAL TO PULMONARY DISEASE     Referral Priority:   Routine     Referral Type:   Consultation     Referral Reason:   Specialty Services Required     Number of Visits Requested:   1    AMB POC HEMOGLOBIN A1C    sucralfate (Carafate) 1 gram tablet     Sig: Take 1 Tab by mouth four (4) times daily as needed (indigestion). Dispense:  60 Tab     Refill:  2    albuterol (PROVENTIL HFA, VENTOLIN HFA, PROAIR HFA) 90 mcg/actuation inhaler     Sig: Take 2 Puffs by inhalation every four (4) hours as needed for Wheezing or Shortness of Breath. Dispense:  3 Inhaler     Refill:  4    budesonide-formoteroL (Symbicort) 160-4.5 mcg/actuation HFAA     Sig: INHALE 2 PUFFS BY MOUTH TWICE DAILY     Dispense:  6 Inhaler     Refill:  2    pregabalin (Lyrica) 150 mg capsule     Sig: Take 1 Cap by mouth two (2) times a day. Max Daily Amount: 300 mg. Dispense:  180 Cap     Refill:  2       Follow-up and Dispositions    · Return if symptoms worsen or fail to improve, for keep september medicare visit. Plan of care reviewed with patient. Patient in agreement with plan and expresses understanding. I have discussed when to anticipate results and how results will be communicated, if applicable. Anticipatory guidance given and questions answered, patient encouraged to call or RTO if further questions or concerns.     Aleida Goodwin NP  07/20/20

## 2020-07-22 ENCOUNTER — TELEPHONE (OUTPATIENT)
Dept: FAMILY MEDICINE CLINIC | Age: 60
End: 2020-07-22

## 2020-07-22 NOTE — TELEPHONE ENCOUNTER
Crow Ortiz from [facility] called to inform that they received a fax regarding this patient but his insurance is not acceptable. May need to send the fax to [address] [place] [address].

## 2020-07-27 ENCOUNTER — TELEPHONE (OUTPATIENT)
Dept: FAMILY MEDICINE CLINIC | Age: 60
End: 2020-07-27

## 2020-07-27 DIAGNOSIS — E66.01 OBESITY, MORBID (HCC): Primary | ICD-10-CM

## 2020-07-27 NOTE — TELEPHONE ENCOUNTER
Pt would like a prescription for saxenda, adviseed that she talked about it before with Cecilia Yo.  Please contact pt for any questions or clarificationon

## 2020-07-28 RX ORDER — PEN NEEDLE, DIABETIC 32 GX 1/6"
NEEDLE, DISPOSABLE MISCELLANEOUS
Qty: 100 PEN NEEDLE | Refills: 2 | Status: SHIPPED | OUTPATIENT
Start: 2020-07-28 | End: 2020-09-12

## 2020-07-28 NOTE — TELEPHONE ENCOUNTER
Called patient- she confirms she spoke with company and is set to use prescription saving card. Will send to pharmacy. Patient to call to schedule first dose to be given with office visit.

## 2020-08-03 ENCOUNTER — TELEPHONE (OUTPATIENT)
Dept: FAMILY MEDICINE CLINIC | Age: 60
End: 2020-08-03

## 2020-08-03 NOTE — TELEPHONE ENCOUNTER
Spoke to patient and advised medication (Deb Wilson) was not approved by insurance and the discount card only took $200.00 off the $1500.00 cost.  Patient was unable to get this medication but has a follow up appointment with the provider and will discuss options at that time.

## 2020-09-01 NOTE — TELEPHONE ENCOUNTER
Requested Prescriptions     Pending Prescriptions Disp Refills    sucralfate (CARAFATE) 1 gram tablet [Pharmacy Med Name: SUCRALFATE 1GM TABLETS] 60 Tab 2     Sig: TAKE 1 TABLET BY MOUTH FOUR TIMES DAILY AS NEEDED FOR INDIGESTION

## 2020-09-02 RX ORDER — SUCRALFATE 1 G/1
TABLET ORAL
Qty: 60 TAB | Refills: 2 | Status: SHIPPED | OUTPATIENT
Start: 2020-09-02 | End: 2020-10-29 | Stop reason: SDUPTHER

## 2020-09-11 ENCOUNTER — OFFICE VISIT (OUTPATIENT)
Dept: FAMILY MEDICINE CLINIC | Age: 60
End: 2020-09-11

## 2020-09-11 VITALS
WEIGHT: 293 LBS | HEART RATE: 64 BPM | SYSTOLIC BLOOD PRESSURE: 134 MMHG | OXYGEN SATURATION: 97 % | TEMPERATURE: 98.7 F | DIASTOLIC BLOOD PRESSURE: 86 MMHG | HEIGHT: 65 IN | BODY MASS INDEX: 48.82 KG/M2 | RESPIRATION RATE: 18 BRPM

## 2020-09-11 DIAGNOSIS — F33.9 RECURRENT DEPRESSION (HCC): ICD-10-CM

## 2020-09-11 DIAGNOSIS — E66.01 OBESITY, MORBID (HCC): ICD-10-CM

## 2020-09-11 DIAGNOSIS — Z23 ENCOUNTER FOR IMMUNIZATION: ICD-10-CM

## 2020-09-11 DIAGNOSIS — Z13.39 SCREENING FOR ALCOHOLISM: ICD-10-CM

## 2020-09-11 DIAGNOSIS — K59.09 CHRONIC CONSTIPATION: ICD-10-CM

## 2020-09-11 DIAGNOSIS — Z13.31 SCREENING FOR DEPRESSION: ICD-10-CM

## 2020-09-11 DIAGNOSIS — Z00.00 MEDICARE ANNUAL WELLNESS VISIT, SUBSEQUENT: Primary | ICD-10-CM

## 2020-09-11 RX ORDER — CITALOPRAM 20 MG/1
20 TABLET, FILM COATED ORAL DAILY
Qty: 30 TAB | Refills: 0 | Status: SHIPPED | OUTPATIENT
Start: 2020-09-11 | End: 2020-10-07 | Stop reason: SDUPTHER

## 2020-09-11 NOTE — PATIENT INSTRUCTIONS
Medicare Wellness Visit, Female The best way to live healthy is to have a lifestyle where you eat a well-balanced diet, exercise regularly, limit alcohol use, and quit all forms of tobacco/nicotine, if applicable. Regular preventive services are another way to keep healthy. Preventive services (vaccines, screening tests, monitoring & exams) can help personalize your care plan, which helps you manage your own care. Screening tests can find health problems at the earliest stages, when they are easiest to treat. [facility] follows the current, evidence-based guidelines published by the [facility]reg[facility] ([facility]) when recommending preventive services for our patients. Because we follow these guidelines, sometimes recommendations change over time as research supports it. (For example, mammograms used to be recommended annually. Even though Medicare will still pay for an annual mammogram, the newer guidelines recommend a mammogram every two years for women of average risk). Of course, you and your doctor may decide to screen more often for some diseases, based on your risk and your co-morbidities (chronic disease you are already diagnosed with). Preventive services for you include: - Medicare offers their members a free annual wellness visit, which is time for you and your primary care provider to discuss and plan for your preventive service needs. Take advantage of this benefit every year! 
-All adults over the age of 72 should receive the recommended pneumonia vaccines. Current USPSTF guidelines recommend a series of two vaccines for the best pneumonia protection.  
-All adults should have a flu vaccine yearly and a tetanus vaccine every 10 years.  
-All adults age 48 and older should receive the shingles vaccines (series of two vaccines). -All adults age 38-68 who are overweight should have a diabetes screening test once every three years. -All adults born between [birth year] and 1967 should be screened once for Hepatitis C. 
-Other screening tests and preventive services for persons with diabetes include: an eye exam to screen for diabetic retinopathy, a kidney function test, a foot exam, and stricter control over your cholesterol.  
-Cardiovascular screening for adults with routine risk involves an electrocardiogram (ECG) at intervals determined by your doctor.  
-Colorectal cancer screenings should be done for adults age 54-65 with no increased risk factors for colorectal cancer. There are a number of acceptable methods of screening for this type of cancer. Each test has its own benefits and drawbacks. Discuss with your doctor what is most appropriate for you during your annual wellness visit. The different tests include: colonoscopy (considered the best screening method), a fecal occult blood test, a fecal DNA test, and sigmoidoscopy. 
 
-A bone mass density test is recommended when a woman turns 65 to screen for osteoporosis. This test is only recommended one time, as a screening. Some providers will use this same test as a disease monitoring tool if you already have osteoporosis. -Breast cancer screenings are recommended every other year for women of normal risk, age 54-69. 
-Cervical cancer screenings for women over age 72 are only recommended with certain risk factors. Here is a list of your current Health Maintenance items (your personalized list of preventive services) with a due date: 
Health Maintenance Due Topic Date Due  
 DTaP/Tdap/Td  (2 - Td) 02/10/2019  Yearly Flu Vaccine (1) 09/01/2020 [employer] Annual Well Visit  09/27/2020

## 2020-09-11 NOTE — PROGRESS NOTES
Medicare Annual Wellness Exam (AWV) SOAP note      9/11/2020  5:17 PM    SUBJECTIVE:    Juana Jain is a 56y.o. year old black female presenting today a Medicare Annual Wellness Visit (subsequently performed every year after initial AWV- ). Current concerns include weight gain and depression, chronic constipation    Weight gain/depression-  Patient notes significant weight gain since quitting smoking. She has history of gastric bypass surgery, sindi en y 8 years ago in [facility] [place] She is almost back up to pre-surgery weight (315 lbs). She reports overeating. She has difficulty inreasing exercise due to chronic knee pain- she needs knee replacement but orhto will not operate until weight is lost.  She was excited to try saxenda as weight loss tool, but unable to afford with patient assistance. Wt Readings from Last 3 Encounters:   09/11/20 294 lb (133.4 kg)   07/20/20 283 lb (128.4 kg)   07/09/20 283 lb 9.6 oz (128.6 kg)       She is distressed due to this weight gain and reports pretty significant depression. She reports frustration at weight gain and feels like she is a failure. She previously was managed with celexa for depression, at one point years ago wellbutrin added to therapy to aid in smoking cessation, which she is still taking. At some point a few years ago weaned off of celexa. She is hoping to get back on this medication again today. She denies SI/HI. Chronic constipation-  Patient reports daily constipation and difficulty with bowel movements. She reports this as a chronic problem. Previously has tried increasing fiber, using daily metamucil, diet changes, stool softeners, and gentle osmotic laxatives. The only thing that seems to manage constipation is what she is taking now, dulcolax, which is stimulant laxative. She reports she recently heard she should not be relying on stimulant laxative this much and is inquiring about other options. Will trial linzess.       History:  Past Medical History:   Diagnosis Date    [facility]     Asthma     Chronic obstructive pulmonary disease (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     Sleep apnea     on cpap    Spinal stenosis, lumbar     patient reported    Xanthelasma of left upper eyelid 7/20/2020     Past Surgical History:   Procedure Laterality Date    HX ARTHRODESIS  01/2000    Herniated Disc, arthritis right foot 06/06, 07/07, C 6/7, C 4/6 Stenosis    HX CHOLECYSTECTOMY  09/2008    gallbladder disease    HX COLONOSCOPY  05/2009    HX GASTRIC BYPASS  2012    GASTRIC BYPASS  [facility] Gastric Bypass      HX HYSTERECTOMY  06/1994    HX MENISCECTOMY  10/2015    right repari of torn meniscus    HX MENISCECTOMY Left 03/16/2018    partial meniscectomy due to tear    HX MYOMECTOMY  06/1984    benign tumor    HX ORTHOPAEDIC  1964    orthopaedic excision Fibrosarcoma and Lymphangiogram    HX PELVIC LAPAROSCOPY  04/1984    large uterine blockage    NEUROLOGICAL PROCEDURE UNLISTED  2000, 2007    Cervical fusion     Allergies   Allergen Reactions    Adhesive Tape-Silicones Swelling     REALLY BAD SWELLING AND SORE APPEAR    Alcohol Other (comments)     PT STATES SHE IS AN ALCOHOLIC    Lactose Other (comments)     PT STATES IT MAKES HER STOMACH HURT    Percodan [Oxycodone Hcl-Oxycodone-Asa] Itching and Other (comments)     crying    Nsaids (Non-Steroidal Anti-Inflammatory Drug) Other (comments)     PT NOT ABLE TO TAKE DUE TO GASTRIC BYPASS     Social History     Tobacco Use    Smoking status: Former Smoker     Packs/day: 0.50     Years: 45.00     Pack years: 22.50     Last attempt to quit: 2020     Years since quittin.2    Smokeless tobacco: Never Used   Substance Use Topics    Alcohol use: No     Comment: quit 1980s- was heavy etoh     Family History   Problem Relation Age of Onset    Hypertension Mother     Depression Mother     Cancer Mother     Ovarian Cancer Mother     Breast Problems Mother     Cancer Father    [facility] Cancer Sister     Depression Sister     Depression Brother     Stroke Neg Hx     Heart Attack Neg Hx        Current Medications:  Current Outpatient Medications   Medication Sig Dispense Refill    linaCLOtide (Linzess) 72 mcg cap capsule Take 1 Cap by mouth Daily (before breakfast). 30 Cap 0    citalopram (CELEXA) 20 mg tablet Take 1 Tab by mouth daily. 30 Tab 0    sucralfate (CARAFATE) 1 gram tablet TAKE 1 TABLET BY MOUTH FOUR TIMES DAILY AS NEEDED FOR INDIGESTION 60 Tab 2    albuterol (PROVENTIL HFA, VENTOLIN HFA, PROAIR HFA) 90 mcg/actuation inhaler Take 2 Puffs by inhalation every four (4) hours as needed for Wheezing or Shortness of Breath. 3 Inhaler 4    budesonide-formoteroL (Symbicort) 160-4.5 mcg/actuation HFAA INHALE 2 PUFFS BY MOUTH TWICE DAILY 6 Inhaler 2    pregabalin (Lyrica) 150 mg capsule Take 1 Cap by mouth two (2) times a day. Max Daily Amount: 300 mg. 180 Cap 2    meclizine (ANTIVERT) 12.5 mg tablet Take 2 Tabs by mouth three (3) times daily as needed for Dizziness. 20 Tab 5    dexlansoprazole (Dexilant) 60 mg CpDB capsule (delayed release) TAKE 1 CAPSULE BY MOUTH DAILY 90 Cap 3    b-complex with vitamin c tablet Take 1 Tab by mouth daily.  buPROPion SR (WELLBUTRIN SR) 150 mg SR tablet TAKE 1 TABLET BY MOUTH TWICE DAILY 180 Tab 3    fluticasone propionate (FLONASE) 50 mcg/actuation nasal spray SHAKE LIQUID AND USE 2 SPRAYS IN EACH NOSTRIL DAILY 48 g 5    furosemide (LASIX) 20 mg tablet Take 1 Tab by mouth daily as needed (severe leg swelling).  90 Tab 1    acetaminophen (TYLENOL EXTRA STRENGTH) 500 mg tablet Take  by mouth every six (6) hours as needed for Pain.  diclofenac (VOLTAREN) 1 % gel Apply 2 g to affected area every six (6) hours as needed for Pain. 100 g 11    calcium-cholecalciferol, d3, (CALCIUM 600 + D) 600-125 mg-unit tab Take 1,065 mg by mouth nightly. Indications: TAKES 2 AT BEDTIME      omega 3-dha-epa-fish oil (FISH OIL) 100-160-1,000 mg cap Take 1,065 mg by mouth daily.  ferrous sulfate ER (IRON) 160 mg (50 mg iron) TbER tablet Take 1 Tab by mouth daily. Indications: PT TAKES 40 MG      multivitamin (ONE A DAY) tablet Take 1 Tab by mouth daily.          Patient Care Team:  Patient Care Team:  Becky Gordon NP as PCP - General (Nurse Practitioner)  Becky Gordon NP as PCP - [facility]  Jagjit Colvin DO (Orthopedic Surgery)  Nena Mansfield NP (Neurology)  Melvina Montiel MD ([address] [place] [address])  Claudia Conley [place] as Physician Assistant (Psychiatry)  Manuela Flanagan MD (Gastroenterology)  Montrell Fuchs MD (Cardiology)  Eric Munoz MD (Neurology)    Depression risk factor summary:      3 most recent PHQ Screens 9/12/2020   Little interest or pleasure in doing things Nearly every day   Feeling down, depressed, irritable, or hopeless Nearly every day   Total Score PHQ 2 6   Trouble falling or staying asleep, or sleeping too much Not at all   Feeling tired or having little energy More than half the days   Poor appetite, weight loss, or overeating Nearly every day   Feeling bad about yourself - or that you are a failure or have let yourself or your family down Nearly every day   Trouble concentrating on things such as school, work, reading, or watching TV Several days   Moving or speaking so slowly that other people could have noticed; or the opposite being so fidgety that others notice More than half the days   Thoughts of being better off dead, or hurting yourself in some way Not at all   PHQ 9 Score 17   How difficult have these problems made it for you to do your work, take care of your home and get along with others -        Taking wellbutrin 150 mg BID now. Has noted depression creeping back in especially with weight gain. Previously also on celexa and would like to restart    Alcohol screening:   Do you average more than 1 drink per night or more than 7 drinks a week:  No    On any one occasion in the past three months have you have had more than 3 drinks containing alcohol:  No    Denies all alcohol use    Hearing/Vision Loss    Reported vision exam today- follows with [facility]    Activities of Daily Living   Patient does total self care    Fall Risk     Fall Risk Assessment, last 12 mths 9/11/2020   Able to walk? Yes   Fall in past 12 months? No       Timed get up and go: <15 sec    Abuse Screen   Patient is not abused    Denies financial, physical, or verbal abuse    Adult Nutrition Screen   noted weight gain    Review of Systems   Constitutional: negative for fevers, chills and weight loss  + weight gain + depression  Respiratory: negative for cough, wheezing or dyspnea on exertion  Cardiovascular: negative for chest pain, palpitations  Gastrointestinal: negative for nausea, vomiting and change in bowel habits  Musculoskeletal:negative for myalgias and muscle weakness      Advanced Care Planning:  Advance Care Planning 3/16/2018   Patient's [place] is: Named in scanned ACP document   Primary Decision Maker Name Rufino Weber   Primary Decision Maker Phone Number [phone]   Primary Decision Maker Relationship to Patient Sibling   Secondary Decision Maker Name Clara Montes   Secondary Decision Maker Relationship to Patient Adult child   Confirm Advance Directive Yes, on file   Patient Would Like to Complete Advance Directive -     Patient would like to make changes to ACP noted above- she was counseled on this and given blank AD and 5 wishes- to complete and bring back into office.        Physical Exam     Vitals:    09/11/20 1744   BP: 134/86   Pulse: 64   Resp: 18   Temp: 98.7 °F (37.1 °C)   TempSrc: Oral   SpO2: 97%   Weight: 294 lb (133.4 kg)   Height: 5' 5\" (1.651 m)   PainSc:   0 - No pain     Body mass index is 48.92 kg/m². Visual Acuity: 20/20 corrected    General appearance: alert, cooperative, no distress. She is morbidly obese. Lungs: clear to auscultation bilaterally  Heart: regular rate and rhythm, S1, S2 normal, no murmur, click, rub or gallop  Abdomen: soft, non-tender. Bowel sounds normal. No masses,  no organomegaly  Extremities: extremities normal, atraumatic, no cyanosis or edema      Assessment/Plan:   1. Medicare annual wellness visit, subsequent   done today    2. Screening for alcoholism  Denies ETOH use    3. Screening for depression  On the basis of positive PHQ-9 screening (PHQ 9 Score: 17), patient instructed to schedule a follow-up visit at this practice. Patient will follow-up in 4 week. - DEPRESSION SCREEN ANNUAL    4. Encounter for immunization  Unfortunately MA in office today to administer vaccines, patient to return for nurse visit    - TETANUS, DIPHTHERIA TOXOIDS AND ACELLULAR PERTUSSIS VACCINE (TDAP), IN INDIVIDS. >=7, IM; Future  - ADMIN INFLUENZA VIRUS VAC; Future    5. Obesity, morbid ([address]  Patient wants to consult with bariatric surgeon again, especially for assistance with meal planning and rigid program for weight loss she feels she will benefit from  - REFERRAL TO BARIATRIC SURGERY    6. Recurrent depression ([address]  Add SSRI for depression  - citalopram (CELEXA) 20 mg tablet; Take 1 Tab by mouth daily. Dispense: 30 Tab; Refill: 0    7. Chronic constipation  Begin linzess for constipation     - linaCLOtide (Linzess) 72 mcg cap capsule; Take 1 Cap by mouth Daily (before breakfast). Dispense: 30 Cap; Refill: 0      Education and counseling provided during course of the visit. Chart with preventative services/recommendations given to patient in after visit summary.      Are appropriate based on today's review and evaluation      Follow-up and Dispositions    · Return in about 1 month (around 10/11/2020), or if symptoms worsen or fail to improve, for nurse visit for vaccines, 1 month in office with KIM Murry -1 yr medicare wellness visit. Plan of care reviewed with patient. Patient in agreement with plan and expresses understanding. I have discussed when to anticipate results and how results will be communicated, if applicable. Anticipatory guidance given and questions answered, patient encouraged to call or RTO if further questions or concerns.     Delmy Conway NP  09/11/20

## 2020-09-18 ENCOUNTER — CLINICAL SUPPORT (OUTPATIENT)
Dept: FAMILY MEDICINE CLINIC | Age: 60
End: 2020-09-18

## 2020-09-18 DIAGNOSIS — Z23 ENCOUNTER FOR IMMUNIZATION: Primary | ICD-10-CM

## 2020-10-07 ENCOUNTER — VIRTUAL VISIT (OUTPATIENT)
Dept: FAMILY MEDICINE CLINIC | Age: 60
End: 2020-10-07
Payer: MEDICARE

## 2020-10-07 DIAGNOSIS — K59.09 CHRONIC CONSTIPATION: ICD-10-CM

## 2020-10-07 DIAGNOSIS — F33.9 RECURRENT DEPRESSION (HCC): ICD-10-CM

## 2020-10-07 PROCEDURE — 99213 OFFICE O/P EST LOW 20 MIN: CPT | Performed by: NURSE PRACTITIONER

## 2020-10-07 RX ORDER — CITALOPRAM 20 MG/1
20 TABLET, FILM COATED ORAL DAILY
Qty: 90 TAB | Refills: 0 | Status: SHIPPED | OUTPATIENT
Start: 2020-10-07 | End: 2021-01-09

## 2020-10-07 NOTE — PROGRESS NOTES
Angel Serra is a 56 y.o. female who was seen by synchronous (real-time) audio-video technology on 10/7/2020 for No chief complaint on file. Assessment & Plan:   Diagnoses and all orders for this visit:    1. Recurrent depression (HCC)  -     citalopram (CELEXA) 20 mg tablet; Take 1 Tab by mouth daily. 2. Chronic constipation  -     linaCLOtide (Linzess) 72 mcg cap capsule; Take 1 Cap by mouth Daily (before breakfast). I spent at least 15 minutes on this visit with this established patient. 712  Subjective:     Recurrent Depression:    Patient was seen and screened for depression with previous PCP Lan Bonilla. Patient PHQ9 score was 17. Today patient PHQ 9 score is 14. Patient states even though she still have some depression it have gotten a lot better since her last visit a month ago. Patient would like to continue using the Celexa. Patient denies any abnormal symptoms for the celexa. Patient will follow up in 3 months. Constipation:  Patient had a history of Chronic constipation and have been using Linzess which help have been helping her a great deal.  Patient states she have here recently been having daily bowel movements since taking the Linzess. Prior to Admission medications    Medication Sig Start Date End Date Taking? Authorizing Provider   citalopram (CELEXA) 20 mg tablet Take 1 Tab by mouth daily. 10/7/20  Yes Peyton Jimenez NP   linaCLOtide (Linzess) 72 mcg cap capsule Take 1 Cap by mouth Daily (before breakfast). 10/7/20  Yes Peyton Jimenez NP   linaCLOtide (Linzess) 72 mcg cap capsule Take 1 Cap by mouth Daily (before breakfast). 9/11/20 10/7/20  Tessa Martin NP   citalopram (CELEXA) 20 mg tablet Take 1 Tab by mouth daily.  9/11/20 10/7/20  Tessa Martin NP   sucralfate (CARAFATE) 1 gram tablet TAKE 1 TABLET BY MOUTH FOUR TIMES DAILY AS NEEDED FOR INDIGESTION 9/2/20   Екатерина Durbin MD   albuterol (PROVENTIL HFA, VENTOLIN HFA, PROAIR HFA) 90 mcg/actuation inhaler Take 2 Puffs by inhalation every four (4) hours as needed for Wheezing or Shortness of Breath. 7/20/20   Salina Rajan NP   budesonide-formoteroL (Symbicort) 160-4.5 mcg/actuation HFAA INHALE 2 PUFFS BY MOUTH TWICE DAILY 7/20/20   Salina Rajan NP   pregabalin (Lyrica) 150 mg capsule Take 1 Cap by mouth two (2) times a day. Max Daily Amount: 300 mg. 7/20/20   Salina Rajan NP   meclizine (ANTIVERT) 12.5 mg tablet Take 2 Tabs by mouth three (3) times daily as needed for Dizziness. 5/4/20   Krysten KASPER MD   dexlansoprazole (Dexilant) 60 mg CpDB capsule (delayed release) TAKE 1 CAPSULE BY MOUTH DAILY 3/20/20   Salina Rajan NP   b-complex with vitamin c tablet Take 1 Tab by mouth daily. Provider, Historical   buPROPion SR [facility] SR) 150 mg SR tablet TAKE 1 TABLET BY MOUTH TWICE DAILY 2/18/20   Salina Rajan NP   fluticasone propionate (FLONASE) 50 mcg/actuation nasal spray SHAKE LIQUID AND USE 2 SPRAYS IN [facility] NOSTRIL DAILY 2/6/20   Salina Rajan NP   furosemide (LASIX) 20 mg tablet Take 1 Tab by mouth daily as needed (severe leg swelling). 7/2/19   Pia KASPER NP   acetaminophen (TYLENOL EXTRA STRENGTH) 500 mg tablet Take  by mouth every six (6) hours as needed for Pain. Other, MD Magali   diclofenac (VOLTAREN) 1 % gel Apply 2 g to affected area every six (6) hours as needed for Pain. 3/27/19   Pia KASPER NP   calcium-cholecalciferol, d3, (CALCIUM 600 + D) 600-125 mg-unit tab Take 1,065 mg by mouth nightly. Indications: TAKES 2 AT BEDTIME    Provider, Historical   omega 3-dha-epa-fish oil (FISH OIL) 100-160-1,000 mg cap Take 1,065 mg by mouth daily. Provider, Historical   ferrous sulfate ER (IRON) 160 mg (50 mg iron) TbER tablet Take 1 Tab by mouth daily. Indications: PT TAKES 40 MG    Provider, Historical   multivitamin (ONE A DAY) tablet Take 1 Tab by mouth daily.     Provider, Historical     Patient Active Problem List   Diagnosis Code    Chronic obstructive pulmonary disease (HCC) J44.9    Recurrent depression ([facility] F33.9    Fibrosarcoma ([facility] C49.9    [facility] F10.21    Sleep apnea G47.30    History of gastric bypass Z98.84    Obesity, morbid ([facility] E66.01    Osteopenia M85.80    GERD (gastroesophageal reflux disease) K21.9    Chronic pain of both knees M25.561, M25.562, G89.29    Prediabetes R73.03    Age-related nuclear cataract, bilateral H25.13    Xanthelasma of left upper eyelid H02.64    Xanthelasma of right upper eyelid H02.61    Sebaceous cyst of eyelid H02.829     Current Outpatient Medications   Medication Sig Dispense Refill    citalopram (CELEXA) 20 mg tablet Take 1 Tab by mouth daily. 90 Tab 0    linaCLOtide (Linzess) 72 mcg cap capsule Take 1 Cap by mouth Daily (before breakfast). 30 Cap 0    sucralfate (CARAFATE) 1 gram tablet TAKE 1 TABLET BY MOUTH FOUR TIMES DAILY AS NEEDED FOR INDIGESTION 60 Tab 2    albuterol (PROVENTIL HFA, VENTOLIN HFA, PROAIR HFA) 90 mcg/actuation inhaler Take 2 Puffs by inhalation every four (4) hours as needed for Wheezing or Shortness of Breath. 3 Inhaler 4    budesonide-formoteroL (Symbicort) 160-4.5 mcg/actuation HFAA INHALE 2 PUFFS BY MOUTH TWICE DAILY 6 Inhaler 2    pregabalin (Lyrica) 150 mg capsule Take 1 Cap by mouth two (2) times a day. Max Daily Amount: 300 mg. 180 Cap 2    meclizine (ANTIVERT) 12.5 mg tablet Take 2 Tabs by mouth three (3) times daily as needed for Dizziness. 20 Tab 5    dexlansoprazole (Dexilant) 60 mg CpDB capsule (delayed release) TAKE 1 CAPSULE BY MOUTH DAILY 90 Cap 3    b-complex with vitamin c tablet Take 1 Tab by mouth daily.       buPROPion SR (WELLBUTRIN SR) 150 mg SR tablet TAKE 1 TABLET BY MOUTH TWICE DAILY 180 Tab 3    fluticasone propionate (FLONASE) 50 mcg/actuation nasal spray SHAKE LIQUID AND USE 2 SPRAYS IN EACH NOSTRIL DAILY 48 g 5    furosemide (LASIX) 20 mg tablet Take 1 Tab by mouth daily as needed (severe leg swelling). 90 Tab 1    acetaminophen (TYLENOL EXTRA STRENGTH) 500 mg tablet Take  by mouth every six (6) hours as needed for Pain.  diclofenac (VOLTAREN) 1 % gel Apply 2 g to affected area every six (6) hours as needed for Pain. 100 g 11    calcium-cholecalciferol, d3, (CALCIUM 600 + D) 600-125 mg-unit tab Take 1,065 mg by mouth nightly. Indications: TAKES 2 AT BEDTIME      omega 3-dha-epa-fish oil (FISH OIL) 100-160-1,000 mg cap Take 1,065 mg by mouth daily.  ferrous sulfate ER (IRON) 160 mg (50 mg iron) TbER tablet Take 1 Tab by mouth daily. Indications: PT TAKES 40 MG      multivitamin (ONE A DAY) tablet Take 1 Tab by mouth daily.        Allergies   Allergen Reactions    Adhesive Tape-Silicones Swelling     REALLY BAD SWELLING AND SORE APPEAR    Alcohol Other (comments)     PT STATES SHE IS AN ALCOHOLIC    Lactose Other (comments)     PT STATES IT MAKES HER STOMACH HURT    Percodan [Oxycodone Hcl-Oxycodone-Asa] Itching and Other (comments)     crying    Nsaids (Non-Steroidal Anti-Inflammatory Drug) Other (comments)     PT NOT ABLE TO TAKE DUE TO GASTRIC BYPASS     Past Medical History:   Diagnosis Date    Alcoholism in remission ([address]     Asthma     Chronic obstructive pulmonary disease (HCC)     Fibrosarcoma (HCC) 1963    connective tissue cancer    GERD (gastroesophageal reflux disease)     History of gastric bypass 2012    History of meniscal tear 2015, 2018    right in 2015, left in 2018    HNP (herniated nucleus pulposus), lumbar     patient reported    Lung nodules     resolved 2018 CT    Osteopenia 10/03/2017    Recurrent depression ([address]     Sleep apnea     on cpap    Spinal stenosis, lumbar     patient reported    Xanthelasma of left upper eyelid 7/20/2020     Past Surgical History:   Procedure Laterality Date    HX ARTHRODESIS  01/2000    Herniated Disc, arthritis right foot 06/06, 07/07, C 6/7, C 4/6 Stenosis    HX CHOLECYSTECTOMY  09/2008 gallbladder disease    HX COLONOSCOPY  2009    HX GASTRIC BYPASS  2012    GASTRIC BYPASS  [facility] Gastric Bypass      HX HYSTERECTOMY  1994    HX MENISCECTOMY  10/2015    right repari of torn meniscus    HX MENISCECTOMY Left 2018    partial meniscectomy due to tear    HX MYOMECTOMY  1984    benign tumor    HX ORTHOPAEDIC  1964    orthopaedic excision Fibrosarcoma and Lymphangiogram    HX PELVIC LAPAROSCOPY  1984    large uterine blockage    NEUROLOGICAL PROCEDURE UNLISTED  ,     Cervical fusion     Family History   Problem Relation Age of Onset    Hypertension Mother     Depression Mother     Cancer Mother     Ovarian Cancer Mother     Breast Problems Mother     Cancer Father     Cancer Sister     Depression Sister     Depression Brother     Stroke Neg Hx     Heart Attack Neg Hx      Social History     Tobacco Use    Smoking status: Former Smoker     Packs/day: 0.50     Years: 45.00     Pack years: 22.50     Last attempt to quit: 2020     Years since quittin.2    Smokeless tobacco: Never Used   Substance Use Topics    Alcohol use: No     Comment: quit 1980s- was heavy etoh       Review of Systems   Constitutional: Negative. HENT: Negative. Respiratory: Negative. Cardiovascular: Negative. Musculoskeletal: Negative. Neurological: Negative. Objective:   No flowsheet data found. General: alert, cooperative, no distress   Mental  status: normal mood, behavior, speech, dress, motor activity, and thought processes, able to follow commands   HENT: NCAT   Neck: no visualized mass   Resp: no respiratory distress   Neuro: no gross deficits   Skin: no discoloration or lesions of concern on visible areas   Psychiatric: normal affect, consistent with stated mood, no evidence of hallucinations           We discussed the expected course, resolution and complications of the diagnosis(es) in detail.   Medication risks, benefits, costs, interactions, and alternatives were discussed as indicated. I advised her to contact the office if her condition worsens, changes or fails to improve as anticipated. She expressed understanding with the diagnosis(es) and plan. Russ Jack, who was evaluated through a patient-initiated, synchronous (real-time) audio-video encounter, and/or her healthcare decision maker, is aware that it is a billable service, with coverage as determined by her insurance carrier. She provided verbal consent to proceed: Yes, and patient identification was verified. It was conducted pursuant to the emergency declaration under the [address] [place] [address], [address] [place] [address] authority and the [employer] Act. A caregiver was present when appropriate. Ability to conduct physical exam was limited. I was in the office. The patient was at home.       Basilio Guerrier NP

## 2020-10-29 NOTE — TELEPHONE ENCOUNTER
Requested Prescriptions     Pending Prescriptions Disp Refills    sucralfate (CARAFATE) 1 gram tablet 60 Tab 2

## 2020-10-29 NOTE — TELEPHONE ENCOUNTER
Please see refill request    Last seen on 10-7-2020  (virtual)    Last filled  9-2-2020   #30 X 2    Thank you

## 2020-10-31 ENCOUNTER — HOSPITAL ENCOUNTER (EMERGENCY)
Age: 60
Discharge: HOME | End: 2020-10-31
Attending: EMERGENCY MEDICINE
Payer: MEDICARE

## 2020-10-31 VITALS
SYSTOLIC BLOOD PRESSURE: 145 MMHG | HEIGHT: 65 IN | BODY MASS INDEX: 48.82 KG/M2 | HEART RATE: 88 BPM | RESPIRATION RATE: 18 BRPM | DIASTOLIC BLOOD PRESSURE: 65 MMHG | TEMPERATURE: 98.6 F | OXYGEN SATURATION: 100 % | WEIGHT: 293 LBS

## 2020-10-31 DIAGNOSIS — M54.50 ACUTE RIGHT-SIDED LOW BACK PAIN WITHOUT SCIATICA: Primary | ICD-10-CM

## 2020-10-31 LAB
APPEARANCE UR: ABNORMAL
BACTERIA URNS QL MICRO: ABNORMAL /HPF
BILIRUB UR QL: NEGATIVE
COLOR UR: YELLOW
EPITH CASTS URNS QL MICRO: ABNORMAL /LPF (ref 0–5)
GLUCOSE UR STRIP.AUTO-MCNC: NEGATIVE MG/DL
HGB UR QL STRIP: NEGATIVE
KETONES UR QL STRIP.AUTO: NEGATIVE MG/DL
LEUKOCYTE ESTERASE UR QL STRIP.AUTO: ABNORMAL
NITRITE UR QL STRIP.AUTO: NEGATIVE
PH UR STRIP: 8 [PH] (ref 5–8)
PROT UR STRIP-MCNC: NEGATIVE MG/DL
RBC #/AREA URNS HPF: ABNORMAL /HPF (ref 0–5)
SP GR UR REFRACTOMETRY: 1.01 (ref 1–1.03)
UROBILINOGEN UR QL STRIP.AUTO: 1 EU/DL (ref 0.2–1)
WBC URNS QL MICRO: ABNORMAL /HPF (ref 0–4)

## 2020-10-31 PROCEDURE — 81001 URINALYSIS AUTO W/SCOPE: CPT

## 2020-10-31 PROCEDURE — 74011250637 HC RX REV CODE- 250/637: Performed by: EMERGENCY MEDICINE

## 2020-10-31 PROCEDURE — 99283 EMERGENCY DEPT VISIT LOW MDM: CPT

## 2020-10-31 RX ORDER — HYDROCODONE BITARTRATE AND ACETAMINOPHEN 5; 325 MG/1; MG/1
1 TABLET ORAL
Qty: 12 TAB | Refills: 0 | Status: SHIPPED | OUTPATIENT
Start: 2020-10-31 | End: 2020-11-03

## 2020-10-31 RX ORDER — CYCLOBENZAPRINE HCL 10 MG
10 TABLET ORAL
Qty: 15 TAB | Refills: 0 | Status: SHIPPED | OUTPATIENT
Start: 2020-10-31 | End: 2021-05-05

## 2020-10-31 RX ORDER — ACETAMINOPHEN 500 MG
1000 TABLET ORAL
Status: COMPLETED | OUTPATIENT
Start: 2020-10-31 | End: 2020-10-31

## 2020-10-31 RX ORDER — CYCLOBENZAPRINE HCL 10 MG
10 TABLET ORAL
Qty: 15 TAB | Refills: 0 | Status: SHIPPED | OUTPATIENT
Start: 2020-10-31 | End: 2020-10-31

## 2020-10-31 RX ADMIN — ACETAMINOPHEN 1000 MG: 500 TABLET ORAL at 11:10

## 2020-10-31 NOTE — ED NOTES
Reviewed discharge instructions with patient.   All questions answered  Arm band removed and shredded

## 2020-10-31 NOTE — ED NOTES
Patient to restroom to attempt to provide urine sample again. Also asking for Tylenol, Dr. Jonnie Sainz aware.  Report given to [facility]

## 2020-10-31 NOTE — ED PROVIDER NOTES
HPI patient complains of a 5-day history of dysuria and frequency associated with some right lower back and flank pain. Patient describes the back and flank pain as a dull aching sensation is nonradiating. She denies any leg pain. She denies any change in bowel habits. She denies any abdominal pain or nausea vomiting. She says she is had urinary tract infections many years ago and this feels somewhat similar. No other complaints given at this time.     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    GERD (gastroesophageal reflux disease)     History of gastric bypass 2012    History of meniscal tear 2015, 2018    right in 2015, left in 2018    HNP (herniated nucleus pulposus), lumbar     patient reported    Lung nodules     resolved 2018 CT    Osteopenia 10/03/2017    Recurrent depression ([address]     Sleep apnea     on cpap    Spinal stenosis, lumbar     patient reported    Xanthelasma of left upper eyelid 7/20/2020       Past Surgical History:   Procedure Laterality Date    HX ARTHRODESIS  01/2000    Herniated Disc, arthritis right foot 06/06, 07/07, C 6/7, C 4/6 Stenosis    HX CHOLECYSTECTOMY  09/2008    gallbladder disease    HX COLONOSCOPY  05/2009    HX GASTRIC BYPASS  2012    GASTRIC BYPASS  [facility] Gastric Bypass      HX HYSTERECTOMY  06/1994    HX MENISCECTOMY  10/2015    right repari of torn meniscus    HX MENISCECTOMY Left 03/16/2018    partial meniscectomy due to tear    HX MYOMECTOMY  06/1984    benign tumor    HX ORTHOPAEDIC  1964    orthopaedic excision Fibrosarcoma and Lymphangiogram    HX PELVIC LAPAROSCOPY  04/1984    large uterine blockage    NEUROLOGICAL PROCEDURE UNLISTED  2000, 2007    Cervical fusion         Family History:   Problem Relation Age of Onset    Hypertension Mother     Depression Mother     Cancer Mother    [facility] [place] Ovarian Cancer Mother     Breast Problems Mother     Cancer Father     Cancer Sister     Depression Sister     Depression Brother     Stroke Neg Hx     Heart Attack Neg Hx        Social History     Socioeconomic History    Marital status:      Spouse name: Not on file    Number of children: Not on file    Years of education: Not on file    Highest education level: Not on file   Occupational History    Not on file   Social Needs    Financial resource strain: Not on file    Food insecurity     Worry: Not on file     Inability: Not on file   [employer] needs     Medical: Not on file     Non-medical: Not on file   Tobacco Use    Smoking status: Former Smoker     Packs/day: 0.50     Years: 45.00     Pack years: 22.50     Last attempt to quit: 2020     Years since quittin.3    Smokeless tobacco: Never Used   Substance and Sexual Activity    Alcohol use: No     Comment: quit 1980s- was heavy etoh    Drug use: Not Currently     Comment: marijuana, cocaine 30 yrs ago    Sexual activity: Never   Lifestyle    Physical activity     Days per week: Not on file     Minutes per session: Not on file    Stress: Not on file   Relationships    Social connections     Talks on phone: Not on file     Gets together: Not on file     Attends Holiness service: Not on file     Active member of club or organization: Not on file     Attends meetings of clubs or organizations: Not on file     Relationship status: Not on file    Intimate partner violence     Fear of current or ex partner: Not on file     Emotionally abused: Not on file     Physically abused: Not on file     Forced sexual activity: Not on file   Other Topics Concern    Not on file   Social History Narrative    Not on file         ALLERGIES: Adhesive tape-silicones; Alcohol; Lactose; Percodan [oxycodone hcl-oxycodone-asa]; and Nsaids (non-steroidal anti-inflammatory drug)    Review of Systems   Constitutional: Negative.     HENT: Negative. Respiratory: Negative. Cardiovascular: Negative. Gastrointestinal: Negative. Genitourinary: Positive for flank pain. Skin: Negative. Neurological: Negative. Psychiatric/Behavioral: Negative. Vitals:    10/31/20 0944   BP: (!) 145/65   Pulse: 88   Resp: 18   Temp: 98.6 °F (37 °C)   SpO2: 100%   Weight: 133.8 kg (295 lb)   Height: 5' 5\" (1.651 m)            Physical Exam  Vitals signs and nursing note reviewed. Constitutional:       Appearance: She is well-developed. HENT:      Head: Normocephalic and atraumatic. Eyes:      Conjunctiva/sclera: Conjunctivae normal.      Pupils: Pupils are equal, round, and reactive to light. Neck:      Musculoskeletal: Normal range of motion and neck supple. Cardiovascular:      Rate and Rhythm: Normal rate and regular rhythm. Pulmonary:      Effort: Pulmonary effort is normal.      Breath sounds: Normal breath sounds. Abdominal:      Palpations: Abdomen is soft. Tenderness: There is right CVA tenderness. Comments: Mild right CVA tenderness to palpation. No bony pain   Musculoskeletal: Normal range of motion. Skin:     General: Skin is warm and dry. Neurological:      Mental Status: She is alert and oriented to person, place, and time. MDM  Number of Diagnoses or Management Options  Diagnosis management comments: I reviewed the results of the ER evaluation with the patient and she understands and agrees with the disposition and follow-up plan.   Janusz Rosen MD 11:33 AM         Procedures

## 2020-10-31 NOTE — DISCHARGE INSTRUCTIONS
Back Pain: Care Instructions  Your Care Instructions     Back pain has many possible causes. It is often related to problems with muscles and ligaments of the back. It may also be related to problems with the nerves, discs, or bones of the back. Moving, lifting, standing, sitting, or sleeping in an awkward way can strain the back. Sometimes you don't notice the injury until later. Arthritis is another common cause of back pain. Although it may hurt a lot, back pain usually improves on its own within several weeks. Most people recover in 12 weeks or less. Using good home treatment and being careful not to stress your back can help you feel better sooner. Follow-up care is a key part of your treatment and safety. Be sure to make and go to all appointments, and call your doctor if you are having problems. It's also a good idea to know your test results and keep a list of the medicines you take. How can you care for yourself at home? · Sit or lie in positions that are most comfortable and reduce your pain. Try one of these positions when you lie down:  ? Lie on your back with your knees bent and supported by large pillows. ? Lie on the floor with your legs on the seat of a sofa or chair. ? Lie on your side with your knees and hips bent and a pillow between your legs. ? Lie on your stomach if it does not make pain worse. · Do not sit up in bed, and avoid soft couches and twisted positions. Bed rest can help relieve pain at first, but it delays healing. Avoid bed rest after the first day of back pain. · Change positions every 30 minutes. If you must sit for long periods of time, take breaks from sitting. Get up and walk around, or lie in a comfortable position. · Try using a heating pad on a low or medium setting for 15 to 20 minutes every 2 or 3 hours. Try a warm shower in place of one session with the heating pad. · You can also try an ice pack for 10 to 15 minutes every 2 to 3 hours.  Put a thin cloth between the ice pack and your skin. · Take pain medicines exactly as directed. ? If the doctor gave you a prescription medicine for pain, take it as prescribed. ? If you are not taking a prescription pain medicine, ask your doctor if you can take an over-the-counter medicine. · Take short walks several times a day. You can start with 5 to 10 minutes, 3 or 4 times a day, and work up to longer walks. Walk on level surfaces and avoid hills and stairs until your back is better. · Return to work and other activities as soon as you can. Continued rest without activity is usually not good for your back. · To prevent future back pain, do exercises to stretch and strengthen your back and stomach. Learn how to use good posture, safe lifting techniques, and proper body mechanics. When should you call for help? Call your doctor now or seek immediate medical care if:    · You have new or worsening numbness in your legs.     · You have new or worsening weakness in your legs. (This could make it hard to stand up.)     · You lose control of your bladder or bowels. Watch closely for changes in your health, and be sure to contact your doctor if:    · You have a fever, lose weight, or don't feel well.     · You do not get better as expected. Where can you learn more? Go to http://www.melo.com/  Enter I594 in the search box to learn more about \"Back Pain: Care Instructions. \"  Current as of: March 2, 2020               Content Version: 12.6  © 0259-9447 [employer]. Care instructions adapted under license by [employer] (which disclaims liability or warranty for this information). If you have questions about a medical condition or this instruction, always ask your healthcare professional. [place] [address] any warranty or liability for your use of this information.     Patient Education        Back Pain: Care Instructions  Your Care Instructions     Back pain has many possible causes. It is often related to problems with muscles and ligaments of the back. It may also be related to problems with the nerves, discs, or bones of the back. Moving, lifting, standing, sitting, or sleeping in an awkward way can strain the back. Sometimes you don't notice the injury until later. Arthritis is another common cause of back pain. Although it may hurt a lot, back pain usually improves on its own within several weeks. Most people recover in 12 weeks or less. Using good home treatment and being careful not to stress your back can help you feel better sooner. Follow-up care is a key part of your treatment and safety. Be sure to make and go to all appointments, and call your doctor if you are having problems. It's also a good idea to know your test results and keep a list of the medicines you take. How can you care for yourself at home? · Sit or lie in positions that are most comfortable and reduce your pain. Try one of these positions when you lie down:  ? Lie on your back with your knees bent and supported by large pillows. ? Lie on the floor with your legs on the seat of a sofa or chair. ? Lie on your side with your knees and hips bent and a pillow between your legs. ? Lie on your stomach if it does not make pain worse. · Do not sit up in bed, and avoid soft couches and twisted positions. Bed rest can help relieve pain at first, but it delays healing. Avoid bed rest after the first day of back pain. · Change positions every 30 minutes. If you must sit for long periods of time, take breaks from sitting. Get up and walk around, or lie in a comfortable position. · Try using a heating pad on a low or medium setting for 15 to 20 minutes every 2 or 3 hours. Try a warm shower in place of one session with the heating pad. · You can also try an ice pack for 10 to 15 minutes every 2 to 3 hours. Put a thin cloth between the ice pack and your skin.   · Take pain medicines exactly as directed. ? If the doctor gave you a prescription medicine for pain, take it as prescribed. ? If you are not taking a prescription pain medicine, ask your doctor if you can take an over-the-counter medicine. · Take short walks several times a day. You can start with 5 to 10 minutes, 3 or 4 times a day, and work up to longer walks. Walk on level surfaces and avoid hills and stairs until your back is better. · Return to work and other activities as soon as you can. Continued rest without activity is usually not good for your back. · To prevent future back pain, do exercises to stretch and strengthen your back and stomach. Learn how to use good posture, safe lifting techniques, and proper body mechanics. When should you call for help? Call your doctor now or seek immediate medical care if:    · You have new or worsening numbness in your legs.     · You have new or worsening weakness in your legs. (This could make it hard to stand up.)     · You lose control of your bladder or bowels. Watch closely for changes in your health, and be sure to contact your doctor if:    · You have a fever, lose weight, or don't feel well.     · You do not get better as expected. Where can you learn more? Go to http://www.[employer].com/  Enter I594 in the search box to learn more about \"Back Pain: Care Instructions. \"  Current as of: March 2, 2020               Content Version: 12.6  © 4525-4891 [employer]. Care instructions adapted under license by [employer] (which disclaims liability or warranty for this information). If you have questions about a medical condition or this instruction, always ask your healthcare professional. [address] any warranty or liability for your use of this information.

## 2020-10-31 NOTE — ED NOTES
Assumed care of pt  Received report from Susie  Adminstered meds for right lower back pain per orders  Urine collected, yellow, clear, and walked to lab  Call bell within reach

## 2020-11-03 RX ORDER — SUCRALFATE 1 G/1
TABLET ORAL
Qty: 60 TAB | Refills: 2 | Status: SHIPPED | OUTPATIENT
Start: 2020-11-03 | End: 2021-06-23

## 2020-11-04 ENCOUNTER — PATIENT OUTREACH (OUTPATIENT)
Dept: CASE MANAGEMENT | Age: 60
End: 2020-11-04

## 2020-11-04 NOTE — PROGRESS NOTES
Complex Case Management      Date/Time:  2020 2:05 PM    Method of communication with patient: phone    [facility] contacted the Patient by telephone to perform Ambulatory Care Coordination. Verified name and  with Patient as identifiers. Provided introduction to self, and explanation of the Ambulatory Care Manager's role. Reviewed most recent clinic visit w/ Patient who verbalized understanding. Patient given an opportunity to ask questions. Top Challenges reviewed with the patient   1. Back discomfort       Patient seen in ED on 10/31/20 for back discomfort. She reports that back pain is somewhat better now especially after she takes Flexeril which was prescribed at the time of the ED visit. Patient was cautioned not to drive while taking Flexeril. Patient reports no recent problems with constipation. States vertigo only occurs occasionally. Denies any BLE edema at this time. Upcoming appointments were reviewed with patient. Patient verbalized acknowledgement. Medications were reviewed and reconciled with patient. Patient reports taking all medications as prescribed and denies needing any refills at this time. The Patient agrees to contact the PCP office or the [facility] for questions related to their healthcare. Provided contact information for future reference. Disease Specific:   COPD    Home Health Active: No     DME Active: No     Barriers to care? None identified at this time    Advance Care Planning:   Does patient have an Advance Directive:  reviewed and current     Medication(s):   Medication reconciliation was performed with patient, who verbalizes understanding of administration of home medications. There were no barriers to obtaining medications identified at this time.     Referral to Pharm D needed: no     Current Outpatient Medications   Medication Sig    sucralfate (CARAFATE) 1 gram tablet TAKE 1 TABLET BY MOUTH FOUR TIMES DAILY AS NEEDED FOR INDIGESTION    cyclobenzaprine (FLEXERIL) 10 mg tablet Take 1 Tab by mouth three (3) times daily as needed for Muscle Spasm(s).  [START ON 11/8/2020] Linzess 72 mcg cap capsule TAKE 1 CAPSULE BY MOUTH DAILY BEFORE BREAKFAST    citalopram (CELEXA) 20 mg tablet Take 1 Tab by mouth daily.  albuterol (PROVENTIL HFA, VENTOLIN HFA, PROAIR HFA) 90 mcg/actuation inhaler Take 2 Puffs by inhalation every four (4) hours as needed for Wheezing or Shortness of Breath.  budesonide-formoteroL (Symbicort) 160-4.5 mcg/actuation HFAA INHALE 2 PUFFS BY MOUTH TWICE DAILY    pregabalin (Lyrica) 150 mg capsule Take 1 Cap by mouth two (2) times a day. Max Daily Amount: 300 mg.    meclizine (ANTIVERT) 12.5 mg tablet Take 2 Tabs by mouth three (3) times daily as needed for Dizziness.  dexlansoprazole (Dexilant) 60 mg CpDB capsule (delayed release) TAKE 1 CAPSULE BY MOUTH DAILY    b-complex with vitamin c tablet Take 1 Tab by mouth daily.  buPROPion SR (WELLBUTRIN SR) 150 mg SR tablet TAKE 1 TABLET BY MOUTH TWICE DAILY    fluticasone propionate (FLONASE) 50 mcg/actuation nasal spray SHAKE LIQUID AND USE 2 SPRAYS IN EACH NOSTRIL DAILY    furosemide (LASIX) 20 mg tablet Take 1 Tab by mouth daily as needed (severe leg swelling).  acetaminophen (TYLENOL EXTRA STRENGTH) 500 mg tablet Take  by mouth every six (6) hours as needed for Pain.  diclofenac (VOLTAREN) 1 % gel Apply 2 g to affected area every six (6) hours as needed for Pain.  calcium-cholecalciferol, d3, (CALCIUM 600 + D) 600-125 mg-unit tab Take 1,065 mg by mouth nightly. Indications: TAKES 2 AT BEDTIME    omega 3-dha-epa-fish oil (FISH OIL) 100-160-1,000 mg cap Take 1,065 mg by mouth daily.  ferrous sulfate ER (IRON) 160 mg (50 mg iron) TbER tablet Take 1 Tab by mouth daily. Indications: PT TAKES 40 MG    multivitamin (ONE A DAY) tablet Take 1 Tab by mouth daily. No current facility-administered medications for this visit.         [facility] follow up appointment(s):   Future Appointments   Date Time Provider Cesar Dang   11/12/2020  4:15 PM Ange Jimenez, KIM NEELY   9/17/2021  4:15 PM Romel Jimenez NP [facility]        Non-[facility] follow up appointment(s): n/a    Goals      Attends follow up appointments on schedule      11/4/20 Upcoming appointments were reviewed with patient. Patient verbalized acknowledgement.  Knowledge and adherence of prescribed medication (ie. action, side effects, missed dose, etc.). 11/4/20 Patient reports taking medications as prescribed and denies needing any refills at this time.

## 2020-11-08 DIAGNOSIS — K59.09 CHRONIC CONSTIPATION: ICD-10-CM

## 2020-11-10 RX ORDER — LINACLOTIDE 72 UG/1
CAPSULE, GELATIN COATED ORAL
Qty: 30 CAP | Refills: 1 | Status: SHIPPED | OUTPATIENT
Start: 2020-11-10 | End: 2021-01-14

## 2020-11-12 ENCOUNTER — VIRTUAL VISIT (OUTPATIENT)
Dept: FAMILY MEDICINE CLINIC | Age: 60
End: 2020-11-12
Payer: MEDICARE

## 2020-11-12 DIAGNOSIS — Z13.29 SCREENING FOR ENDOCRINE, METABOLIC AND IMMUNITY DISORDER: ICD-10-CM

## 2020-11-12 DIAGNOSIS — Z13.0 SCREENING FOR ENDOCRINE, METABOLIC AND IMMUNITY DISORDER: ICD-10-CM

## 2020-11-12 DIAGNOSIS — N30.00 ACUTE CYSTITIS WITHOUT HEMATURIA: Primary | ICD-10-CM

## 2020-11-12 DIAGNOSIS — Z13.228 SCREENING FOR ENDOCRINE, METABOLIC AND IMMUNITY DISORDER: ICD-10-CM

## 2020-11-12 PROCEDURE — 99213 OFFICE O/P EST LOW 20 MIN: CPT | Performed by: NURSE PRACTITIONER

## 2020-11-12 RX ORDER — NITROFURANTOIN 25; 75 MG/1; MG/1
100 CAPSULE ORAL 2 TIMES DAILY
Qty: 14 CAP | Refills: 0 | Status: SHIPPED | OUTPATIENT
Start: 2020-11-12 | End: 2021-06-22 | Stop reason: ALTCHOICE

## 2020-11-12 NOTE — PROGRESS NOTES
Reyes Obregon is a 56 y.o. female who was seen by synchronous (real-time) audio-video technology on 11/12/2020 for No chief complaint on file. Assessment & Plan:   Diagnoses and all orders for this visit:    1. Acute cystitis without hematuria  -     nitrofurantoin, macrocrystal-monohydrate, (Macrobid) 100 mg capsule; Take 1 Cap by mouth two (2) times a day. 2. Screening for endocrine, metabolic and immunity disorder  -     METABOLIC PANEL, COMPREHENSIVE; Future  -     LIPID PANEL; Future  -     CBC W/O DIFF; Future  -     TSH 3RD GENERATION; Future  -     HEMOGLOBIN A1C WITH EAG; Future        I spent at least 15 minutes on this visit with this established patient. 712  Subjective:     UTI  Patient seem in ER on 10/31/2020 for right sided low back pain with urinary urgency  and headache for 5 days. Right CVA tenderness was noted during the physical exam.  According to urinalysis she had a trace of leukocytes esterase only and patient was not prescribed a antibiotic. Patient states she still have urgency/frequency with low back pain. Will order antibiotic based off patients symptoms today and urinalysis completed by ED on  10/31/2020. Hot flashes  Patient states she have been getting terrible hot flashes that have recently started to the point she remains for over 5 min with sweating. She have had a total hysterectomy over 27 years ago and was on Estradiol but have been off of it around 17 years. She came off the estradiol because she feels like she was on it for two long. Patient denies palpitation, chest pains or anxiety. She have depression but denies anxiety. Patient would like to have a workup with a gynecologist.        Prior to Admission medications    Medication Sig Start Date End Date Taking?  Authorizing Provider   Linzess 72 mcg cap capsule TAKE 1 CAPSULE BY MOUTH DAILY BEFORE BREAKFAST 11/10/20   Peyton Jimenez, KIM   sucralfate (CARAFATE) 1 gram tablet TAKE 1 TABLET BY MOUTH FOUR TIMES DAILY AS NEEDED FOR INDIGESTION 11/3/20   Ngozi Jimenez NP   cyclobenzaprine (FLEXERIL) 10 mg tablet Take 1 Tab by mouth three (3) times daily as needed for Muscle Spasm(s). 10/31/20   Terence Bedoya MD   citalopram (CELEXA) 20 mg tablet Take 1 Tab by mouth daily. 10/7/20   Kush Jimenez NP   albuterol (PROVENTIL HFA, VENTOLIN HFA, PROAIR HFA) 90 mcg/actuation inhaler Take 2 Puffs by inhalation every four (4) hours as needed for Wheezing or Shortness of Breath. 7/20/20   Mervin Field NP   budesonide-formoteroL (Symbicort) 160-4.5 mcg/actuation HFAA INHALE 2 PUFFS BY MOUTH TWICE DAILY 7/20/20   Mervin Field NP   pregabalin (Lyrica) 150 mg capsule Take 1 Cap by mouth two (2) times a day. Max Daily Amount: 300 mg. 7/20/20   Mervin Field NP   meclizine (ANTIVERT) 12.5 mg tablet Take 2 Tabs by mouth three (3) times daily as needed for Dizziness. 5/4/20   Juliette KASPER MD   dexlansoprazole (Dexilant) 60 mg CpDB capsule (delayed release) TAKE 1 CAPSULE BY MOUTH DAILY 3/20/20   Mervin Field NP   b-complex with vitamin c tablet Take 1 Tab by mouth daily. Provider, Historical   buPROPion SR [facility] SR) 150 mg SR tablet TAKE 1 TABLET BY MOUTH TWICE DAILY 2/18/20   Mervin Field NP   fluticasone propionate (FLONASE) 50 mcg/actuation nasal spray SHAKE LIQUID AND USE 2 SPRAYS IN [facility] NOSTRIL DAILY 2/6/20   Mervin Field NP   furosemide (LASIX) 20 mg tablet Take 1 Tab by mouth daily as needed (severe leg swelling). 7/2/19   Emmanuel KASPER NP   acetaminophen (TYLENOL EXTRA STRENGTH) 500 mg tablet Take  by mouth every six (6) hours as needed for Pain. Other, MD Magali   diclofenac (VOLTAREN) 1 % gel Apply 2 g to affected area every six (6) hours as needed for Pain. 3/27/19   Emmanuel KASPER NP   calcium-cholecalciferol, d3, (CALCIUM 600 + D) 600-125 mg-unit tab Take 1,065 mg by mouth nightly.  Indications: TAKES 2 AT BEDTIME    Provider, Historical   omega 3-dha-epa-fish oil (FISH OIL) 100-160-1,000 mg cap Take 1,065 mg by mouth daily. Provider, Historical   ferrous sulfate ER (IRON) 160 mg (50 mg iron) TbER tablet Take 1 Tab by mouth daily. Indications: PT TAKES 40 MG    Provider, Historical   multivitamin (ONE A DAY) tablet Take 1 Tab by mouth daily. Provider, Historical     Patient Active Problem List   Diagnosis Code    Chronic obstructive pulmonary disease ([facility] J44.9    Recurrent depression ([facility] F33.9    Fibrosarcoma ([facility] C49.9    Alcoholism in remission ([facility] F10.21    Sleep apnea G47.30    History of gastric bypass Z98.84    Obesity, morbid ([facility] E66.01    Osteopenia M85.80    GERD (gastroesophageal reflux disease) K21.9    Chronic pain of both knees M25.561, M25.562, G89.29    Prediabetes R73.03    Age-related nuclear cataract, bilateral H25.13    Xanthelasma of left upper eyelid H02.64    Xanthelasma of right upper eyelid H02.61    Sebaceous cyst of eyelid H02.829     Patient Active Problem List    Diagnosis Date Noted    Age-related nuclear cataract, bilateral 07/20/2020    Xanthelasma of left upper eyelid 07/20/2020    Xanthelasma of right upper eyelid 07/20/2020    Sebaceous cyst of eyelid 07/20/2020    Prediabetes 04/14/2020    Chronic pain of both knees 09/14/2018    Obesity, morbid ([facility] 12/14/2017    Osteopenia 12/14/2017    GERD (gastroesophageal reflux disease) 12/14/2017    Sleep apnea 09/14/2017    History of gastric bypass 09/14/2017    Chronic obstructive pulmonary disease (HCC)     Recurrent depression ([facility]     Alcoholism in remission ([facility]     Fibrosarcoma ([facility] 01/01/1963     Current Outpatient Medications   Medication Sig Dispense Refill    nitrofurantoin, macrocrystal-monohydrate, (Macrobid) 100 mg capsule Take 1 Cap by mouth two (2) times a day.  14 Cap 0    Linzess 72 mcg cap capsule TAKE 1 CAPSULE BY MOUTH DAILY BEFORE BREAKFAST 30 Cap 1    sucralfate (CARAFATE) 1 gram tablet TAKE 1 TABLET BY MOUTH FOUR TIMES DAILY AS NEEDED FOR INDIGESTION 60 Tab 2    cyclobenzaprine (FLEXERIL) 10 mg tablet Take 1 Tab by mouth three (3) times daily as needed for Muscle Spasm(s). 15 Tab 0    citalopram (CELEXA) 20 mg tablet Take 1 Tab by mouth daily. 90 Tab 0    albuterol (PROVENTIL HFA, VENTOLIN HFA, PROAIR HFA) 90 mcg/actuation inhaler Take 2 Puffs by inhalation every four (4) hours as needed for Wheezing or Shortness of Breath. 3 Inhaler 4    budesonide-formoteroL (Symbicort) 160-4.5 mcg/actuation HFAA INHALE 2 PUFFS BY MOUTH TWICE DAILY 6 Inhaler 2    pregabalin (Lyrica) 150 mg capsule Take 1 Cap by mouth two (2) times a day. Max Daily Amount: 300 mg. 180 Cap 2    meclizine (ANTIVERT) 12.5 mg tablet Take 2 Tabs by mouth three (3) times daily as needed for Dizziness. 20 Tab 5    dexlansoprazole (Dexilant) 60 mg CpDB capsule (delayed release) TAKE 1 CAPSULE BY MOUTH DAILY 90 Cap 3    b-complex with vitamin c tablet Take 1 Tab by mouth daily.  buPROPion SR (WELLBUTRIN SR) 150 mg SR tablet TAKE 1 TABLET BY MOUTH TWICE DAILY 180 Tab 3    fluticasone propionate (FLONASE) 50 mcg/actuation nasal spray SHAKE LIQUID AND USE 2 SPRAYS IN EACH NOSTRIL DAILY 48 g 5    furosemide (LASIX) 20 mg tablet Take 1 Tab by mouth daily as needed (severe leg swelling). 90 Tab 1    acetaminophen (TYLENOL EXTRA STRENGTH) 500 mg tablet Take  by mouth every six (6) hours as needed for Pain.  diclofenac (VOLTAREN) 1 % gel Apply 2 g to affected area every six (6) hours as needed for Pain. 100 g 11    calcium-cholecalciferol, d3, (CALCIUM 600 + D) 600-125 mg-unit tab Take 1,065 mg by mouth nightly. Indications: TAKES 2 AT BEDTIME      omega 3-dha-epa-fish oil (FISH OIL) 100-160-1,000 mg cap Take 1,065 mg by mouth daily.  ferrous sulfate ER (IRON) 160 mg (50 mg iron) TbER tablet Take 1 Tab by mouth daily. Indications: PT TAKES 40 MG      multivitamin (ONE A DAY) tablet Take 1 Tab by mouth daily. 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    connective tissue cancer    GERD (gastroesophageal reflux disease)     History of gastric bypass 2012    History of meniscal tear 2015, 2018    right in 2015, left in 2018    HNP (herniated nucleus pulposus), lumbar     patient reported    Lung nodules     resolved 2018 CT    Osteopenia 10/03/2017    Recurrent depression ([facility]     Sleep apnea     on cpap    Spinal stenosis, lumbar     patient reported    Xanthelasma of left upper eyelid 7/20/2020     Past Surgical History:   Procedure Laterality Date    HX ARTHRODESIS  01/2000    Herniated Disc, arthritis right foot 06/06, 07/07, C 6/7, C 4/6 Stenosis    HX CHOLECYSTECTOMY  09/2008    gallbladder disease    HX COLONOSCOPY  05/2009    HX GASTRIC BYPASS  2012    GASTRIC BYPASS  [place] [address] Gastric Bypass      HX HYSTERECTOMY  06/1994    HX MENISCECTOMY  10/2015    right repari of torn meniscus    HX MENISCECTOMY Left 03/16/2018    partial meniscectomy due to tear    HX MYOMECTOMY  06/1984    benign tumor    HX ORTHOPAEDIC  1964    orthopaedic excision Fibrosarcoma and Lymphangiogram    HX PELVIC LAPAROSCOPY  04/1984    large uterine blockage    NEUROLOGICAL PROCEDURE UNLISTED  2000, 2007    Cervical fusion     Family History   Problem Relation Age of Onset    Hypertension Mother     Depression Mother     Cancer Mother     Ovarian Cancer Mother  Breast Problems Mother     Cancer Father     Cancer Sister     Depression Sister     Depression Brother     Stroke Neg Hx     Heart Attack Neg Hx      Social History     Tobacco Use    Smoking status: Former Smoker     Packs/day: 0.50     Years: 45.00     Pack years: 22.50     Last attempt to quit: 2020     Years since quittin.3    Smokeless tobacco: Never Used   Substance Use Topics    Alcohol use: No     Comment: quit 1980s- was heavy etoh       Review of Systems   Constitutional: Positive for malaise/fatigue. Negative for chills and fever. HENT: Negative. Eyes: Negative for blurred vision and double vision. Respiratory: Negative for cough, shortness of breath and wheezing. Cardiovascular: Negative for chest pain and palpitations. Genitourinary: Positive for frequency and urgency. Hot flashes   Musculoskeletal: Negative. Skin: Negative. Neurological: Negative for headaches. Objective:   No flowsheet data found. General: alert, cooperative, no distress   Mental  status: normal mood, behavior, speech, dress, motor activity, and thought processes, able to follow commands   HENT: NCAT   Neck: no visualized mass   Resp: no respiratory distress   Neuro: no gross deficits   Skin: no discoloration or lesions of concern on visible areas   Psychiatric: normal affect, consistent with stated mood, no evidence of hallucinations     Additional exam findings: We discussed the expected course, resolution and complications of the diagnosis(es) in detail. Medication risks, benefits, costs, interactions, and alternatives were discussed as indicated. I advised her to contact the office if her condition worsens, changes or fails to improve as anticipated. She expressed understanding with the diagnosis(es) and plan.        Russ Jack, who was evaluated through a patient-initiated, synchronous (real-time) audio-video encounter, and/or her healthcare decision maker, is aware that it is a billable service, with coverage as determined by her insurance carrier. She provided verbal consent to proceed: Yes, and patient identification was verified. It was conducted pursuant to the emergency declaration under the [address] [place] [address] authority and the [employer] Act. A caregiver was present when appropriate. Ability to conduct physical exam was limited. I was in the office. The patient was at home.       Ariana Estevez, NP

## 2020-11-16 ENCOUNTER — TELEPHONE (OUTPATIENT)
Dept: FAMILY MEDICINE CLINIC | Age: 60
End: 2020-11-16

## 2020-11-16 NOTE — TELEPHONE ENCOUNTER
Patient called to let provider know that she is being tested for COVID-19 at [place] [address]. She asked if she should come in for her labs.  I advised her to hold off until she received a (-) COVID-19 test.

## 2020-12-29 ENCOUNTER — HOSPITAL ENCOUNTER (OUTPATIENT)
Dept: LAB | Age: 60
Discharge: HOME | End: 2020-12-29
Payer: MEDICARE

## 2020-12-29 ENCOUNTER — LAB ONLY (OUTPATIENT)
Dept: FAMILY MEDICINE CLINIC | Age: 60
End: 2020-12-29
Payer: MEDICARE

## 2020-12-29 DIAGNOSIS — Z01.89 ENCOUNTER FOR LABORATORY TEST: Primary | ICD-10-CM

## 2020-12-29 DIAGNOSIS — Z13.0 SCREENING FOR ENDOCRINE, METABOLIC AND IMMUNITY DISORDER: ICD-10-CM

## 2020-12-29 DIAGNOSIS — Z13.29 SCREENING FOR ENDOCRINE, METABOLIC AND IMMUNITY DISORDER: ICD-10-CM

## 2020-12-29 DIAGNOSIS — Z13.228 SCREENING FOR ENDOCRINE, METABOLIC AND IMMUNITY DISORDER: ICD-10-CM

## 2020-12-29 LAB
25(OH)D3 SERPL-MCNC: 27 NG/ML (ref 30–100)
ALBUMIN SERPL-MCNC: 3.3 G/DL (ref 3.4–5)
ALBUMIN/GLOB SERPL: 1 {RATIO} (ref 0.8–1.7)
ALP SERPL-CCNC: 86 U/L (ref 45–117)
ALT SERPL-CCNC: 28 U/L (ref 13–56)
ANION GAP SERPL CALC-SCNC: 6 MMOL/L (ref 3–18)
AST SERPL-CCNC: 14 U/L (ref 10–38)
BILIRUB SERPL-MCNC: 0.4 MG/DL (ref 0.2–1)
BUN SERPL-MCNC: 11 MG/DL (ref 7–18)
BUN/CREAT SERPL: 12 (ref 12–20)
CALCIUM SERPL-MCNC: 8.5 MG/DL (ref 8.5–10.1)
CHLORIDE SERPL-SCNC: 109 MMOL/L (ref 100–111)
CHOLEST SERPL-MCNC: 169 MG/DL
CO2 SERPL-SCNC: 27 MMOL/L (ref 21–32)
CREAT SERPL-MCNC: 0.89 MG/DL (ref 0.6–1.3)
ERYTHROCYTE [DISTWIDTH] IN BLOOD BY AUTOMATED COUNT: 14.9 % (ref 11.6–14.5)
EST. AVERAGE GLUCOSE BLD GHB EST-MCNC: 117 MG/DL
GLOBULIN SER CALC-MCNC: 3.4 G/DL (ref 2–4)
GLUCOSE SERPL-MCNC: 74 MG/DL (ref 74–99)
HBA1C MFR BLD: 5.7 % (ref 4.2–5.6)
HCT VFR BLD AUTO: 43.4 % (ref 35–45)
HDLC SERPL-MCNC: 58 MG/DL (ref 40–60)
HDLC SERPL: 2.9 {RATIO} (ref 0–5)
HGB BLD-MCNC: 14 G/DL (ref 12–16)
LDLC SERPL CALC-MCNC: 95.6 MG/DL (ref 0–100)
LIPID PROFILE,FLP: NORMAL
MCH RBC QN AUTO: 29.2 PG (ref 24–34)
MCHC RBC AUTO-ENTMCNC: 32.3 G/DL (ref 31–37)
MCV RBC AUTO: 90.6 FL (ref 74–97)
PLATELET # BLD AUTO: 401 K/UL (ref 135–420)
PMV BLD AUTO: 10.7 FL (ref 9.2–11.8)
POTASSIUM SERPL-SCNC: 4.8 MMOL/L (ref 3.5–5.5)
PROT SERPL-MCNC: 6.7 G/DL (ref 6.4–8.2)
RBC # BLD AUTO: 4.79 M/UL (ref 4.2–5.3)
SODIUM SERPL-SCNC: 142 MMOL/L (ref 136–145)
TRIGL SERPL-MCNC: 77 MG/DL (ref ?–150)
TSH SERPL DL<=0.05 MIU/L-ACNC: 3.74 UIU/ML (ref 0.36–3.74)
VLDLC SERPL CALC-MCNC: 15.4 MG/DL
WBC # BLD AUTO: 9.3 K/UL (ref 4.6–13.2)

## 2020-12-29 PROCEDURE — 80053 COMPREHEN METABOLIC PANEL: CPT

## 2020-12-29 PROCEDURE — 85027 COMPLETE CBC AUTOMATED: CPT

## 2020-12-29 PROCEDURE — 83036 HEMOGLOBIN GLYCOSYLATED A1C: CPT

## 2020-12-29 PROCEDURE — 36415 COLL VENOUS BLD VENIPUNCTURE: CPT | Performed by: NURSE PRACTITIONER

## 2020-12-29 PROCEDURE — 80061 LIPID PANEL: CPT

## 2020-12-29 PROCEDURE — 84443 ASSAY THYROID STIM HORMONE: CPT

## 2020-12-29 PROCEDURE — 82306 VITAMIN D 25 HYDROXY: CPT

## 2020-12-29 NOTE — PROGRESS NOTES
Venipuncture to patient left forearm. Patient tolerated well at this time. No other concern noted. Labs ordered by PCP. Labs will be sent to [facility] for testing at this time

## 2021-01-09 DIAGNOSIS — F33.9 RECURRENT DEPRESSION (HCC): ICD-10-CM

## 2021-01-09 RX ORDER — CITALOPRAM 20 MG/1
TABLET, FILM COATED ORAL
Qty: 90 TAB | Refills: 0 | Status: SHIPPED | OUTPATIENT
Start: 2021-01-09 | End: 2021-04-09

## 2021-01-10 DIAGNOSIS — K59.09 CHRONIC CONSTIPATION: ICD-10-CM

## 2021-01-14 RX ORDER — LINACLOTIDE 72 UG/1
CAPSULE, GELATIN COATED ORAL
Qty: 30 CAP | Refills: 1 | Status: SHIPPED | OUTPATIENT
Start: 2021-01-14 | End: 2021-03-16

## 2021-01-25 DIAGNOSIS — J44.9 CHRONIC OBSTRUCTIVE PULMONARY DISEASE, UNSPECIFIED COPD TYPE (HCC): ICD-10-CM

## 2021-01-25 NOTE — TELEPHONE ENCOUNTER
Requested Prescriptions     Pending Prescriptions Disp Refills    budesonide-formoteroL (Symbicort) 160-4.5 mcg/actuation HFAA 6 Inhaler 2     Sig: INHALE 2 PUFFS BY MOUTH TWICE DAILY

## 2021-01-26 RX ORDER — BUDESONIDE AND FORMOTEROL FUMARATE DIHYDRATE 160; 4.5 UG/1; UG/1
AEROSOL RESPIRATORY (INHALATION)
Qty: 6 INHALER | Refills: 2 | Status: SHIPPED | OUTPATIENT
Start: 2021-01-26 | End: 2021-02-12 | Stop reason: CLARIF

## 2021-01-27 DIAGNOSIS — M48.00 SPINAL STENOSIS, UNSPECIFIED SPINAL REGION: ICD-10-CM

## 2021-01-27 DIAGNOSIS — M51.26 HNP (HERNIATED NUCLEUS PULPOSUS), LUMBAR: ICD-10-CM

## 2021-01-27 RX ORDER — PREGABALIN 150 MG/1
150 CAPSULE ORAL 2 TIMES DAILY
Qty: 180 CAP | Refills: 0 | Status: SHIPPED | OUTPATIENT
Start: 2021-01-27 | End: 2021-04-09

## 2021-01-27 NOTE — PROGRESS NOTES
Please notify patient of the following:  Please inform patient that her Vitamin D level is slightly down. I would advise her to take OTC vitamin D 2000 units daily. Also her A1C have slightly decreased to 5.7 (GREAT JOB). Other labs were reassuring and keep up the good work.

## 2021-01-29 NOTE — PROGRESS NOTES
Spoke with patient. Patient was informed of her lab results. Patient verbally stated understanding of results. Patient had no other concerns or questions at this time.

## 2021-02-10 DIAGNOSIS — J30.9 ALLERGIC RHINITIS: ICD-10-CM

## 2021-02-10 DIAGNOSIS — F10.21 ALCOHOLISM IN REMISSION (HCC): ICD-10-CM

## 2021-02-10 DIAGNOSIS — F33.9 RECURRENT DEPRESSION (HCC): ICD-10-CM

## 2021-02-10 DIAGNOSIS — F17.219 CIGARETTE NICOTINE DEPENDENCE WITH NICOTINE-INDUCED DISORDER: ICD-10-CM

## 2021-02-10 RX ORDER — FLUTICASONE PROPIONATE 50 MCG
SPRAY, SUSPENSION (ML) NASAL
Qty: 48 G | Refills: 5 | Status: SHIPPED | OUTPATIENT
Start: 2021-02-10 | End: 2022-06-01 | Stop reason: SDUPTHER

## 2021-02-10 RX ORDER — BUPROPION HYDROCHLORIDE 150 MG/1
TABLET, EXTENDED RELEASE ORAL
Qty: 180 TAB | Refills: 3 | Status: SHIPPED | OUTPATIENT
Start: 2021-02-10 | End: 2021-11-22

## 2021-02-10 NOTE — TELEPHONE ENCOUNTER
Requested Prescriptions     Pending Prescriptions Disp Refills    fluticasone propionate (FLONASE) 50 mcg/actuation nasal spray 48 g 5    buPROPion SR (WELLBUTRIN SR) 150 mg SR tablet 180 Tab 3     Sig: TAKE 1 TABLET BY MOUTH TWICE DAILY

## 2021-02-10 NOTE — TELEPHONE ENCOUNTER
Please see refill request    Patient was last seen on  11-    Last prescribed  Flonase on 2-6-2020  #48g x 5                                     Wellbutrin on 2-  #180 X 3    Thank you

## 2021-02-12 ENCOUNTER — VIRTUAL VISIT (OUTPATIENT)
Dept: FAMILY MEDICINE CLINIC | Age: 61
End: 2021-02-12
Payer: MEDICARE

## 2021-02-12 DIAGNOSIS — J44.9 CHRONIC OBSTRUCTIVE PULMONARY DISEASE, UNSPECIFIED COPD TYPE (HCC): ICD-10-CM

## 2021-02-12 DIAGNOSIS — R20.0 NUMBNESS AND TINGLING IN LEFT HAND: ICD-10-CM

## 2021-02-12 DIAGNOSIS — R20.2 NUMBNESS AND TINGLING IN LEFT HAND: ICD-10-CM

## 2021-02-12 DIAGNOSIS — M25.522 ELBOW PAIN, LEFT: ICD-10-CM

## 2021-02-12 PROCEDURE — G8427 DOCREV CUR MEDS BY ELIG CLIN: HCPCS | Performed by: NURSE PRACTITIONER

## 2021-02-12 PROCEDURE — G9717 DOC PT DX DEP/BP F/U NT REQ: HCPCS | Performed by: NURSE PRACTITIONER

## 2021-02-12 PROCEDURE — 3017F COLORECTAL CA SCREEN DOC REV: CPT | Performed by: NURSE PRACTITIONER

## 2021-02-12 PROCEDURE — 99213 OFFICE O/P EST LOW 20 MIN: CPT | Performed by: NURSE PRACTITIONER

## 2021-02-12 PROCEDURE — G9899 SCRN MAM PERF RSLTS DOC: HCPCS | Performed by: NURSE PRACTITIONER

## 2021-02-12 RX ORDER — PREDNISONE 20 MG/1
TABLET ORAL
Qty: 18 TAB | Refills: 0 | Status: SHIPPED | OUTPATIENT
Start: 2021-02-12 | End: 2021-05-05 | Stop reason: ALTCHOICE

## 2021-02-12 RX ORDER — FLUTICASONE FUROATE, UMECLIDINIUM BROMIDE AND VILANTEROL TRIFENATATE 100; 62.5; 25 UG/1; UG/1; UG/1
1 POWDER RESPIRATORY (INHALATION) DAILY
Qty: 2 INHALER | Refills: 1 | Status: SHIPPED | OUTPATIENT
Start: 2021-02-12 | End: 2021-06-09

## 2021-02-12 NOTE — PROGRESS NOTES
Patient presents today for a check up states her arthritis is really acting up and sob due to the COPD.

## 2021-02-12 NOTE — PROGRESS NOTES
Dhaval Bernard is a 56 y.o. female who was seen by synchronous (real-time) audio-video technology on 2/12/2021 for Arm Pain and COPD        Assessment & Plan:   Diagnoses and all orders for this visit:    1. Chronic obstructive pulmonary disease, unspecified COPD type ([facility]  -     REFERRAL TO PULMONARY DISEASE  -     fluticasone-umeclidinium-vilanterol (Trelegy Ellipta) 100-62.5-25 mcg inhaler; Take 1 Puff by inhalation daily. 2. Numbness and tingling in left hand  -     predniSONE (DELTASONE) 20 mg tablet; Take 3 tablets daily by mouth for 3 days, then take 2 tablets daily by mouth for 3 days, then take 1 tablet daily by mouth for 3 days. 3. Elbow pain, left  -     predniSONE (DELTASONE) 20 mg tablet; Take 3 tablets daily by mouth for 3 days, then take 2 tablets daily by mouth for 3 days, then take 1 tablet daily by mouth for 3 days. I spent at least 20 minutes on this visit with this established patient. [id number]  Subjective:     COPD  Chronic condition -patient is currently on a rescue inhaler of albuterol, and she also uses Symbicort. She has seen pulmonary in the past but is not currently following a pulmonologist at this time. In July patient came into the office had complaints of her COPD. Was discussed to switch to an alternative therapy patient declined at that time stating she would prefer to keep using Symbicort. During this time she was referred to pulmonary by previous PCP. She never went to see the specialist.  Patient states she quickly get exerted with just walking to the car, cleaning, putting on clothes or any other exertion she may do. She say she is constantly wheezing and coughing more. She feels that her COPD is worsening. Patient states she didn't see pulmonology or heard from them. Today patient is wanting to start on something else versus using the symbicort. She states she is using the symbicort twice daily.   She states she is using the albuterol inhaler nightly and feel it is working better. Discontinued symbicort and ordered [place] for patient. She would like a further workup on her lungs. Left forearm and hand numbness/tingling  Patient is having left upper arm pain and wrist and states she can't lift anything without the pain. She states she have numbness and tenderness in her fingertips in her left hand and this starts from radiating from the left elbow. Patient denies any type of injury to left arm. This have been happening for a month now. She has tried tylenol with no relief as well as  Arm brace. Will try patient on prednisone taper and if does not get better we will do xray. Prior to Admission medications    Medication Sig Start Date End Date Taking? Authorizing Provider   fluticasone propionate (FLONASE) 50 mcg/actuation nasal spray SHAKE LIQUID AND USE 2 SPRAYS IN EACH NOSTRIL DAILY 2/10/21  Yes Peyton Jimenez NP   buPROPion SR (WELLBUTRIN SR) 150 mg SR tablet TAKE 1 TABLET BY MOUTH TWICE DAILY 2/10/21  Yes Peyton Jimenez NP   pregabalin (Lyrica) 150 mg capsule Take 1 Cap by mouth two (2) times a day.  Max Daily Amount: 300 mg. 1/27/21  Yes Peyton Jimenez NP   budesonide-formoteroL (Symbicort) 160-4.5 mcg/actuation HFAA INHALE 2 PUFFS BY MOUTH TWICE DAILY 1/26/21  Yes Peyton Jimenez NP   Linzess 72 mcg cap capsule TAKE 1 CAPSULE BY MOUTH DAILY BEFORE BREAKFAST 1/14/21  Yes Peyton Jimenez NP   citalopram (CELEXA) 20 mg tablet TAKE 1 TABLET BY MOUTH DAILY 1/9/21  Yes Peyton Jimenez NP   sucralfate (CARAFATE) 1 gram tablet TAKE 1 TABLET BY MOUTH FOUR TIMES DAILY AS NEEDED FOR INDIGESTION 11/3/20  Yes Peyton Jimenez NP   albuterol (PROVENTIL HFA, VENTOLIN HFA, PROAIR HFA) 90 mcg/actuation inhaler Take 2 Puffs by inhalation every four (4) hours as needed for Wheezing or Shortness of Breath. 7/20/20  Yes Eloy Urbina NP   meclizine (ANTIVERT) 12.5 mg tablet Take 2 Tabs by mouth three (3) times daily as needed for Dizziness. 5/4/20  Yes Tony KASPER MD   dexlansoprazole (Dexilant) 60 mg CpDB capsule (delayed release) TAKE 1 CAPSULE BY MOUTH DAILY 3/20/20  Yes Starr Prince NP   b-complex with vitamin c tablet Take 1 Tab by mouth daily. Yes Provider, Historical   furosemide (LASIX) 20 mg tablet Take 1 Tab by mouth daily as needed (severe leg swelling). 7/2/19  Yes Cris KASPER NP   acetaminophen (TYLENOL EXTRA STRENGTH) 500 mg tablet Take  by mouth every six (6) hours as needed for Pain. Yes Other, MD Magali   diclofenac (VOLTAREN) 1 % gel Apply 2 g to affected area every six (6) hours as needed for Pain. 3/27/19  Yes Alexsandra Galvez NP   calcium-cholecalciferol, d3, (CALCIUM 600 + D) 600-125 mg-unit tab Take 1,065 mg by mouth nightly. Indications: TAKES 2 AT BEDTIME   Yes Provider, Historical   omega 3-dha-epa-fish oil (FISH OIL) 100-160-1,000 mg cap Take 1,065 mg by mouth daily. Yes Provider, Historical   ferrous sulfate ER (IRON) 160 mg (50 mg iron) TbER tablet Take 1 Tab by mouth daily. Indications: PT TAKES 40 MG   Yes Provider, Historical   multivitamin (ONE A DAY) tablet Take 1 Tab by mouth daily. Yes Provider, Historical   nitrofurantoin, macrocrystal-monohydrate, (Macrobid) 100 mg capsule Take 1 Cap by mouth two (2) times a day. 11/12/20   Wero Jimenez NP   cyclobenzaprine (FLEXERIL) 10 mg tablet Take 1 Tab by mouth three (3) times daily as needed for Muscle Spasm(s).  10/31/20   Papito Rice MD     Patient Active Problem List   Diagnosis Code    Chronic obstructive pulmonary disease ([facility] J44.9    Recurrent depression ([address] F33.9    Fibrosarcoma ([address] C49.9    Alcoholism in remission ([address] F10.21    Sleep apnea G47.30    History of gastric bypass Z98.84    Obesity, morbid ([address] E66.01    Osteopenia M85.80    GERD (gastroesophageal reflux disease) K21.9    Chronic pain of both knees M25.561, M25.562, G89.29    Prediabetes R73.03    Age-related nuclear cataract, bilateral H25.13    Xanthelasma of left upper eyelid H02.64    Xanthelasma of right upper eyelid H02.61    Sebaceous cyst of eyelid H02.829     Current Outpatient Medications   Medication Sig Dispense Refill    predniSONE (DELTASONE) 20 mg tablet Take 3 tablets daily by mouth for 3 days, then take 2 tablets daily by mouth for 3 days, then take 1 tablet daily by mouth for 3 days. 18 Tab 0    fluticasone-umeclidinium-vilanterol (Trelegy Ellipta) 100-62.5-25 mcg inhaler Take 1 Puff by inhalation daily. 2 Inhaler 1    fluticasone propionate (FLONASE) 50 mcg/actuation nasal spray SHAKE LIQUID AND USE 2 SPRAYS IN EACH NOSTRIL DAILY 48 g 5    buPROPion SR (WELLBUTRIN SR) 150 mg SR tablet TAKE 1 TABLET BY MOUTH TWICE DAILY 180 Tab 3    pregabalin (Lyrica) 150 mg capsule Take 1 Cap by mouth two (2) times a day. Max Daily Amount: 300 mg. 180 Cap 0    Linzess 72 mcg cap capsule TAKE 1 CAPSULE BY MOUTH DAILY BEFORE BREAKFAST 30 Cap 1    citalopram (CELEXA) 20 mg tablet TAKE 1 TABLET BY MOUTH DAILY 90 Tab 0    sucralfate (CARAFATE) 1 gram tablet TAKE 1 TABLET BY MOUTH FOUR TIMES DAILY AS NEEDED FOR INDIGESTION 60 Tab 2    albuterol (PROVENTIL HFA, VENTOLIN HFA, PROAIR HFA) 90 mcg/actuation inhaler Take 2 Puffs by inhalation every four (4) hours as needed for Wheezing or Shortness of Breath. 3 Inhaler 4    meclizine (ANTIVERT) 12.5 mg tablet Take 2 Tabs by mouth three (3) times daily as needed for Dizziness. 20 Tab 5    dexlansoprazole (Dexilant) 60 mg CpDB capsule (delayed release) TAKE 1 CAPSULE BY MOUTH DAILY 90 Cap 3    b-complex with vitamin c tablet Take 1 Tab by mouth daily.  furosemide (LASIX) 20 mg tablet Take 1 Tab by mouth daily as needed (severe leg swelling). 90 Tab 1    acetaminophen (TYLENOL EXTRA STRENGTH) 500 mg tablet Take  by mouth every six (6) hours as needed for Pain.       diclofenac (VOLTAREN) 1 % gel Apply 2 g to affected area every six (6) hours as needed for Pain. 100 g 11    calcium-cholecalciferol, d3, (CALCIUM 600 + D) 600-125 mg-unit tab Take 1,065 mg by mouth nightly. Indications: TAKES 2 AT BEDTIME      omega 3-dha-epa-fish oil (FISH OIL) 100-160-1,000 mg cap Take 1,065 mg by mouth daily.  ferrous sulfate ER (IRON) 160 mg (50 mg iron) TbER tablet Take 1 Tab by mouth daily. Indications: PT TAKES 40 MG      multivitamin (ONE A DAY) tablet Take 1 Tab by mouth daily.  nitrofurantoin, macrocrystal-monohydrate, (Macrobid) 100 mg capsule Take 1 Cap by mouth two (2) times a day. 14 Cap 0    cyclobenzaprine (FLEXERIL) 10 mg tablet Take 1 Tab by mouth three (3) times daily as needed for Muscle Spasm(s).  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 cancer    GERD (gastroesophageal reflux disease)     History of gastric bypass 2012    History of meniscal tear , 2018    right in , left in     HNP (herniated nucleus pulposus), lumbar     patient reported    Lung nodules     resolved  CT    Osteopenia 10/03/2017    Recurrent depression ([address]     Sleep apnea     on cpap    Spinal stenosis, lumbar     patient reported    Xanthelasma of left upper eyelid 2020     Family History   Problem Relation Age of Onset    Hypertension Mother     Depression Mother     Cancer Mother     Ovarian Cancer Mother     Breast Problems Mother     Cancer Father     Cancer Sister     Depression Sister     Depression Brother     Stroke Neg Hx     Heart Attack Neg Hx      Social History     Tobacco Use    Smoking status: Former Smoker     Packs/day: 0.50     Years: 45.00     Pack years: 22.50     Quit date: 2020     Years since quittin.6    Smokeless tobacco: Never Used   Substance Use Topics    Alcohol use: No     Comment: quit - was heavy etoh ROS    Objective:     Patient-Reported Vitals 2/12/2021   Patient-Reported Weight 295   Patient-Reported LMP none      General: alert, cooperative, no distress   Mental  status: normal mood, behavior, speech, dress, motor activity, and thought processes, able to follow commands   HENT: NCAT   Neck: no visualized mass   Resp: no respiratory distress   Neuro: no gross deficits   Skin: no discoloration or lesions of concern on visible areas   Psychiatric: normal affect, consistent with stated mood, no evidence of hallucinations     Additional exam findings: We discussed the expected course, resolution and complications of the diagnosis(es) in detail. Medication risks, benefits, costs, interactions, and alternatives were discussed as indicated. I advised her to contact the office if her condition worsens, changes or fails to improve as anticipated. She expressed understanding with the diagnosis(es) and plan. Roshni Amado, who was evaluated through a patient-initiated, synchronous (real-time) audio-video encounter, and/or her healthcare decision maker, is aware that it is a billable service, with coverage as determined by her insurance carrier. She provided verbal consent to proceed: Yes, and patient identification was verified. It was conducted pursuant to the emergency declaration under the [facility], [address] [place] [address] authority and the [employer] Act. A caregiver was present when appropriate. Ability to conduct physical exam was limited. I was in the office. The patient was at home.       Shareen Hodgkin, NP

## 2021-03-16 DIAGNOSIS — K59.09 CHRONIC CONSTIPATION: ICD-10-CM

## 2021-03-16 RX ORDER — LINACLOTIDE 72 UG/1
CAPSULE, GELATIN COATED ORAL
Qty: 30 CAP | Refills: 1 | Status: SHIPPED | OUTPATIENT
Start: 2021-03-16

## 2021-04-08 DIAGNOSIS — M51.26 HNP (HERNIATED NUCLEUS PULPOSUS), LUMBAR: ICD-10-CM

## 2021-04-08 DIAGNOSIS — M48.00 SPINAL STENOSIS, UNSPECIFIED SPINAL REGION: ICD-10-CM

## 2021-04-08 DIAGNOSIS — F33.9 RECURRENT DEPRESSION (HCC): ICD-10-CM

## 2021-04-09 ENCOUNTER — TELEPHONE (OUTPATIENT)
Dept: FAMILY MEDICINE CLINIC | Age: 61
End: 2021-04-09

## 2021-04-09 DIAGNOSIS — K21.9 GASTROESOPHAGEAL REFLUX DISEASE: ICD-10-CM

## 2021-04-09 RX ORDER — CITALOPRAM 20 MG/1
TABLET, FILM COATED ORAL
Qty: 90 TAB | Refills: 0 | Status: SHIPPED | OUTPATIENT
Start: 2021-04-09 | End: 2021-07-04

## 2021-04-09 RX ORDER — PREGABALIN 150 MG/1
CAPSULE ORAL
Qty: 180 CAP | Refills: 0 | Status: SHIPPED | OUTPATIENT
Start: 2021-04-09 | End: 2021-07-30

## 2021-04-09 RX ORDER — DEXLANSOPRAZOLE 60 MG/1
CAPSULE, DELAYED RELEASE ORAL
Qty: 90 CAP | Refills: 3 | Status: SHIPPED | OUTPATIENT
Start: 2021-04-09 | End: 2022-02-22

## 2021-05-05 ENCOUNTER — OFFICE VISIT (OUTPATIENT)
Dept: PULMONOLOGY | Age: 61
End: 2021-05-05
Payer: MEDICARE

## 2021-05-05 VITALS
SYSTOLIC BLOOD PRESSURE: 133 MMHG | TEMPERATURE: 98.6 F | OXYGEN SATURATION: 97 % | HEIGHT: 65 IN | BODY MASS INDEX: 48.82 KG/M2 | DIASTOLIC BLOOD PRESSURE: 72 MMHG | WEIGHT: 293 LBS | HEART RATE: 65 BPM | RESPIRATION RATE: 18 BRPM

## 2021-05-05 DIAGNOSIS — Z01.812 PRE-PROCEDURE LAB EXAM: ICD-10-CM

## 2021-05-05 DIAGNOSIS — Z98.84 HISTORY OF GASTRIC BYPASS: ICD-10-CM

## 2021-05-05 DIAGNOSIS — G47.33 OBSTRUCTIVE SLEEP APNEA SYNDROME: ICD-10-CM

## 2021-05-05 DIAGNOSIS — E66.01 OBESITY, MORBID (HCC): ICD-10-CM

## 2021-05-05 DIAGNOSIS — R07.89 OTHER CHEST PAIN: ICD-10-CM

## 2021-05-05 DIAGNOSIS — R06.02 SOB (SHORTNESS OF BREATH): Primary | ICD-10-CM

## 2021-05-05 DIAGNOSIS — J44.9 CHRONIC OBSTRUCTIVE PULMONARY DISEASE, UNSPECIFIED COPD TYPE (HCC): ICD-10-CM

## 2021-05-05 PROCEDURE — 94727 GAS DIL/WSHOT DETER LNG VOL: CPT | Performed by: INTERNAL MEDICINE

## 2021-05-05 PROCEDURE — 99204 OFFICE O/P NEW MOD 45 MIN: CPT | Performed by: INTERNAL MEDICINE

## 2021-05-05 PROCEDURE — 94618 PULMONARY STRESS TESTING: CPT | Performed by: INTERNAL MEDICINE

## 2021-05-05 PROCEDURE — 94060 EVALUATION OF WHEEZING: CPT | Performed by: INTERNAL MEDICINE

## 2021-05-05 PROCEDURE — G9899 SCRN MAM PERF RSLTS DOC: HCPCS | Performed by: INTERNAL MEDICINE

## 2021-05-05 PROCEDURE — G8427 DOCREV CUR MEDS BY ELIG CLIN: HCPCS | Performed by: INTERNAL MEDICINE

## 2021-05-05 PROCEDURE — 94729 DIFFUSING CAPACITY: CPT | Performed by: INTERNAL MEDICINE

## 2021-05-05 PROCEDURE — G9717 DOC PT DX DEP/BP F/U NT REQ: HCPCS | Performed by: INTERNAL MEDICINE

## 2021-05-05 PROCEDURE — G8417 CALC BMI ABV UP PARAM F/U: HCPCS | Performed by: INTERNAL MEDICINE

## 2021-05-05 PROCEDURE — 3017F COLORECTAL CA SCREEN DOC REV: CPT | Performed by: INTERNAL MEDICINE

## 2021-05-05 NOTE — LETTER
5/5/2021 Patient: Beverley Bautista YOB: 1960 Date of Visit: 5/5/2021 Blane Sawyer NP 
[address] [facility] [address] [place] [address] [address] [employer] Dear Blane Sawyer NP, Thank you for referring Ms. Charity Calles to [address] [place] [address] for evaluation. My notes for this consultation are attached. If you have questions, please do not hesitate to call me. I look forward to following your patient along with you.  
 
 
Sincerely, 
 
Mariah Huff MD

## 2021-05-05 NOTE — PROGRESS NOTES
[facility]  Pulmonary, Critical Care, and Sleep Medicine      Pulmonary Office Initial Consultation    Name: Juliette Cornell     : 1960     Date: 2021        Subjective:   Patient has been referred for evaluation of: COPD, obstructive sleep apnea    Patient is a 56 y.o. female who states that she has been experiencing increasing symptoms of shortness of breath over the past 1 year and more so over the past 6 months. She states that she had gone to [place] about 4 years back and got symptomatic with shortness of breath-went to an urgent care center where she was told she has COPD. However since discharge she continued to smoke until 2020 when she decided to quit smoking. She completely quit smoking in 2020 and ironically has been having more symptoms since then. SOB: With activity, walking, bending climbing stairs. Also has had some episodes at nighttime where she has hard breathing noticed by her sister    COUGH: Has intermittent cough especially at nighttime-her sister has heard her cough and comes and checks in on her    Chest Pain: Not a usual symptom however today during her 6-minute walk she complained of chest tightness      No associated wheezing, chest pain, fever, chills, night sweats . She has been experiencing chronic swelling of lower extremities for which she takes Lasix  Has history of dyspepsia, reflux. Patient has gained 60 pounds over the past year or so. She states that she had gastric bypass surgery 8 years back and lost close to 100 pounds but has gained back all the weight  She was diagnosed with sleep apnea by Dr. Waqar Mccord years back and has been on CPAP at 14 cm pressure  Has had PFTs and echocardiogram in the past  Has completed LDCT 2019.   Lung RADS 2  Comorbid conditions include- DM, HTN, AYE on CPAP at 14 cm which she states she has been using, Sleep disordered breathing, CAD, CHF, PE. DVT, previous respiratory diagnosis. Smoking status: Currently quit -June 2020 prior to that she smoked 2 packs of cigarettes per day for close to 40 years      Review of data:  I have personally reviewed all data-clinical encounters, imaging, outside test results pertinent to patient's care.   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    GERD (gastroesophageal reflux disease)     History of gastric bypass 2012    History of meniscal tear 2015, 2018    right in 2015, left in 2018    HNP (herniated nucleus pulposus), lumbar     patient reported    Lung nodules     resolved 2018 CT    Osteopenia 10/03/2017    Recurrent depression ([facility]     Sleep apnea     on cpap    Spinal stenosis, lumbar     patient reported    Xanthelasma of left upper eyelid 7/20/2020       Past Surgical History:   Procedure Laterality Date    HX ARTHRODESIS  01/2000    Herniated Disc, arthritis right foot 06/06, 07/07, C 6/7, C 4/6 Stenosis    HX CHOLECYSTECTOMY  09/2008    gallbladder disease    HX COLONOSCOPY  05/2009    HX GASTRIC BYPASS  2012    GASTRIC BYPASS  [facility] Gastric Bypass      HX HYSTERECTOMY  06/1994    HX MENISCECTOMY  10/2015    right repari of torn meniscus    HX MENISCECTOMY Left 03/16/2018    partial meniscectomy due to tear    HX MYOMECTOMY  06/1984    benign tumor    HX ORTHOPAEDIC  1964    orthopaedic excision Fibrosarcoma and Lymphangiogram    HX PELVIC LAPAROSCOPY  04/1984    large uterine blockage    NEUROLOGICAL PROCEDURE UNLISTED  2000, 2007    Cervical fusion       Social History     Socioeconomic History    Marital status:      Spouse name: Not on file    Number of children: Not on file    Years of education: Not on file    Highest education level: Not on file   Tobacco Use    Smoking status: Former Smoker     Packs/day: 0.50     Years: 45.00     Pack years: 22.50     Quit date: 2020     Years since quittin.8    Smokeless tobacco: Never Used   Substance and Sexual Activity    Alcohol use: No     Comment: quit 1980s- was heavy etoh    Drug use: Not Currently     Comment: marijuana, cocaine 30 yrs ago    Sexual activity: Never       Family History   Problem Relation Age of Onset    Hypertension Mother     Depression Mother     Cancer Mother     Ovarian Cancer Mother     Breast Problems Mother     Cancer Father     Cancer Sister     Depression Sister     Depression Brother     Stroke Neg Hx     Heart Attack Neg Hx        Allergies   Allergen Reactions    Adhesive Tape-Silicones Swelling     REALLY BAD SWELLING AND SORE APPEAR    Alcohol Other (comments)     PT STATES SHE IS AN ALCOHOLIC    Lactose Other (comments)     PT STATES IT MAKES HER STOMACH HURT    Percodan [Oxycodone Hcl-Oxycodone-Asa] Itching and Other (comments)     crying    Nsaids (Non-Steroidal Anti-Inflammatory Drug) Other (comments)     PT NOT ABLE TO TAKE DUE TO GASTRIC BYPASS     Current Outpatient Medications   Medication Sig Dispense Refill    citalopram (CELEXA) 20 mg tablet TAKE 1 TABLET BY MOUTH DAILY 90 Tab 0    pregabalin (LYRICA) 150 mg capsule TAKE 1 CAPSULE BY MOUTH TWICE DAILY. MAX DAILY AMOUNT: 300  Cap 0    dexlansoprazole (Dexilant) 60 mg CpDB capsule (delayed release) TAKE 1 CAPSULE BY MOUTH DAILY 90 Cap 3    Linzess 72 mcg cap capsule TAKE 1 CAPSULE BY MOUTH DAILY BEFORE BREAKFAST 30 Cap 1    fluticasone-umeclidinium-vilanterol (Trelegy Ellipta) 100-62.5-25 mcg inhaler Take 1 Puff by inhalation daily.  2 Inhaler 1    fluticasone propionate (FLONASE) 50 mcg/actuation nasal spray SHAKE LIQUID AND USE 2 SPRAYS IN EACH NOSTRIL DAILY 48 g 5    buPROPion SR (WELLBUTRIN SR) 150 mg SR tablet TAKE 1 TABLET BY MOUTH TWICE DAILY 180 Tab 3    sucralfate (CARAFATE) 1 gram tablet TAKE 1 TABLET BY MOUTH FOUR TIMES DAILY AS NEEDED FOR INDIGESTION 60 Tab 2    albuterol (PROVENTIL HFA, VENTOLIN HFA, PROAIR HFA) 90 mcg/actuation inhaler Take 2 Puffs by inhalation every four (4) hours as needed for Wheezing or Shortness of Breath. 3 Inhaler 4    meclizine (ANTIVERT) 12.5 mg tablet Take 2 Tabs by mouth three (3) times daily as needed for Dizziness. 20 Tab 5    b-complex with vitamin c tablet Take 1 Tab by mouth daily.  furosemide (LASIX) 20 mg tablet Take 1 Tab by mouth daily as needed (severe leg swelling). 90 Tab 1    acetaminophen (TYLENOL EXTRA STRENGTH) 500 mg tablet Take  by mouth every six (6) hours as needed for Pain.  diclofenac (VOLTAREN) 1 % gel Apply 2 g to affected area every six (6) hours as needed for Pain. 100 g 11    calcium-cholecalciferol, d3, (CALCIUM 600 + D) 600-125 mg-unit tab Take 1,065 mg by mouth nightly. Indications: TAKES 2 AT BEDTIME      omega 3-dha-epa-fish oil (FISH OIL) 100-160-1,000 mg cap Take 1,065 mg by mouth daily.  ferrous sulfate ER (IRON) 160 mg (50 mg iron) TbER tablet Take 1 Tab by mouth daily. Indications: PT TAKES 40 MG      multivitamin (ONE A DAY) tablet Take 1 Tab by mouth daily.  predniSONE (DELTASONE) 20 mg tablet Take 3 tablets daily by mouth for 3 days, then take 2 tablets daily by mouth for 3 days, then take 1 tablet daily by mouth for 3 days. 18 Tab 0    nitrofurantoin, macrocrystal-monohydrate, (Macrobid) 100 mg capsule Take 1 Cap by mouth two (2) times a day. 14 Cap 0    cyclobenzaprine (FLEXERIL) 10 mg tablet Take 1 Tab by mouth three (3) times daily as needed for Muscle Spasm(s).  15 Tab 0     Review of Systems:  HEENT: No epistaxis, no nasal drainage, no difficulty in swallowing, no redness in eyes  Respiratory: as above  Cardiovascular: no chest pain, no palpitations,+ chronic leg edema, no syncope  Gastrointestinal: no abd pain, no vomiting, no diarrhea, no bleeding symptoms  Genitourinary: No urinary symptoms or hematuria  Integument/breast: No ulcers or rashes  Musculoskeletal:Neg  Neurological: No focal weakness, no seizures, no headaches  Behvioral/Psych: No anxiety, no depression  Constitutional: No fever, no chills, no weight loss, no night sweats     Objective:     Visit Vitals  /72 (BP 1 Location: Left upper arm, BP Patient Position: Sitting, BP Cuff Size: Large adult)   Pulse 65   Temp 98.6 °F (37 °C) (Oral)   Resp 18   Ht 5' 5\" (1.651 m)   Wt 136.1 kg (300 lb)   SpO2 97% Comment: RA Rest   BMI 49.92 kg/m²        Physical Exam:   General: comfortable, no acute distress  HEENT: pupils reactive, sclera anicteric, EOM intact  Neck: No adenopathy or thyroid swelling, no lymphadenopathy or JVD, supple  CVS: S1S2 no murmurs  RS: Mod AE bilaterally, no tactile fremitus or egophony, no accessory muscle use  Abd: soft, non tender, no hepatosplenomegaly  Neuro: non focal, awake, alert  Extrm: Trace leg edema right more than left, clubbing or cyanosis  Skin: no rash    Data review:   Pertinent labs: CBC, BMP, LFT's    PFT:  2017 normal  Date FVC FEV1  FEV1/FVC [id number] TLC RV RV/TLC VC DLCO                                                     6 min walk test; patient walked a distance of 680 m without significant oxygen desaturation however heart rate increased and dyspnea index changed from 0-6 with patient complaining of some chest tightness  Polysomnogram- spilt night- Dr. Alicia Colindres  AYE.   CPAP- 14 cm prescribed    Results for orders placed or performed during the hospital encounter of 02/16/20   EKG, 12 LEAD, INITIAL   Result Value Ref Range    Ventricular Rate 64 BPM    Atrial Rate 64 BPM    P-R Interval 176 ms    QRS Duration 80 ms    Q-T Interval 418 ms    QTC Calculation (Bezet) 431 ms    Calculated P Axis 23 degrees    Calculated R Axis -4 degrees    Calculated T Axis 18 degrees    Diagnosis Normal sinus rhythm  Normal ECG  When compared with ECG of 06-MAR-2018 10:04,  No significant change was found  Confirmed by Qi Starr ([id number]) on 2/16/2020 7:40:15 AM       08/13/19   ECHO ADULT COMPLETE 08/14/2019 8/14/2019    Narrative · Left Ventricle: Normal cavity size, wall thickness, systolic function   (ejection fraction normal) and diastolic function. Estimated left   ventricular ejection fraction is 56 - 60%. No regional wall motion   abnormality noted. · Right Ventricle: Normal right ventricular size and function. · No hemodynamically significant valvular pathology. Signed by: Sheldon Manning MD     Imaging:  I have personally reviewed the patients radiographs and have reviewed the reports:  XR Results (most recent):  Results from Hospital Encounter encounter on 02/16/20   XR CHEST PORT    Narrative AP CHEST, PORTABLE    CPT CODE: 23584    INDICATION: Above. Intermittent epigastric pain. Nausea and vomiting. COMPARISON: 5/13/2019 PA and lateral.    TECHNIQUE: Portable AP chest radiograph is reviewed. FINDINGS:    No evidence of focal pulmonary consolidation or pulmonary edema. No evidence of  pneumothorax. The costophrenic sulci appear sharp. The cardiac silhouette is top normal in size  for technique. EKG leads/wires overlie the patient. Cervical spine fusion plate/screws again  noted. No acute osseous abnormalities identified. Impression IMPRESSION:     No evidence of acute pulmonary disease. CT Results (most recent):  Results from Hospital Encounter encounter on 02/16/20   CT ABD PELV W CONT    Narrative CT SCAN OF THE ABDOMEN AND PELVIS, WITH CONTRAST     INDICATION: Epigastric pain with sudden severe onset yesterday. Vomiting. History of gastric bypass surgery.     COMPARISON: None    TECHNIQUE: Following intravenous administration of 100 mL Isovue-300  low  osmolar contrast, and attempted administration of oral contrast/gastroview--some  of which was vomited, serial axial CT images through the abdomen and pelvis were  obtained using helical technique. Additional coronal and sagittal reformation  images were also performed. All CT scans at this facility are performed using dose optimization technique as  appropriate to the performed exam, to include automated exposure control,  adjustment of the mA and/or kV according to patient's size (Including  appropriate matching for site-specific examinations), or use of iterative  reconstruction technique. FINDINGS:    Visualized portions of lung bases demonstrate mild streaky dependent densities,  likely atelectasis. Cholecystectomy. Mild intrahepatic and extrahepatic biliary ductal dilatation, may reflect  chronic \"reservoir effect\" after cholecystectomy. Clinical correlation might be  helpful regarding suspicion for biliary obstruction, such as obstructive pattern  of hepatic enzymes, and MRCP could be considered for further assessment if  clinically warranted. The liver and pancreas otherwise, spleen, and adrenal glands appear  unremarkable. The kidneys enhance symmetrically. No evidence of hydronephrosis or perinephric  stranding. 1 cm hypodensity right kidney lower pole laterally, too small to be  specifically characterized, likely a cyst.    The abdominal aorta enhances normally without evidence of abdominal aortic  aneurysm. Moderate volume of free fluid in the deep posterior pelvis. Hysterectomy. Small fat-containing periumbilical hernia. Mildly prominent right retroperitoneal lymph node in the fat lateral to the IVC  and inferomedial to the kidney measures 2.1 x 1.1 cm (series 2 axial image 53),  nonspecific. Otherwise a few scattered small lymph nodes are seen without other  evidence of pathologic lymph node enlargement. Postsurgical changes of gastric bypass surgery.  A somewhat dilated loop of small  bowel, approximately 4.2 cm in caliber, extends from gastrojejunal anastomosis  inferiorly, containing debris close to the level of the anastomosis and contrast  farther inferiorly, before looping back cephalad and traversing across the  anterior abdomen to the the right abdomen, appearing to terminate between the  liver, stomach, and cholecystectomy clips, suspect afferent limb. The loop  becomes nondistended adjacent to the liver where it appears mildly thick-walled,  assessment for mural thickening at this level limited by nondistention. There is  a small amount of stranding/fluid around this jejunal loop in the left upper  quadrant. Taken together with the patient's clinical presentation, findings are  concerning for a late afferent limb complication from gastric bypass surgery,  possibly afferent limb syndrome,  early or partial afferent limb obstruction  such as could be seen due to stricture, internal hernia, adhesions. Bariatric  surgical consultation is recommended today. The SMV/SMA maintain usual orientation. No other abnormal dilatation of bowel is seen to increase suspicion for bowel  obstruction farther distally. The appendix is not clearly identified. No  pericecal region inflammatory changes are seen. On review of bone windows, no acute fractures or destructive bone lesions are  seen. Impression Impression:     Abnormal dilatation of rather lengthy segment of jejunum from gastrojejunal  anastomosis containing debris and contrast, approximately 4.2 cm in caliber,  favored to represent afferent limb demonstrating rather tortuous and lengthy  course. Small free fluid/stranding adjacent to the dilated loop in the left  upper quadrant. Moderate volume of additional free fluid in the deep posterior  pelvis.  Findings are concerning for late afferent limb complication of gastric  bypass surgery such as afferent limb syndrome, partial/early afferent limb  obstruction cannot be excluded such as could be seen due to stricture, internal  hernia, adhesions. STAT bariatric surgery consultation is recommended. Biliary ductal dilatation status post cholecystectomy as described. Small right renal hypodensity, too small to be specifically characterized,  statistically most likely a cyst.    Mildly prominent right retroperitoneal lymph node as described. No other  pathological enlargement is seen. Hysterectomy. Small fat-containing periumbilical hernia. I have telephoned a wet reading directly to  Dr. Garry Maxwell  at 10:20 AM on  2/16/2020. Terell Heath Patient Active Problem List   Diagnosis Code    Chronic obstructive pulmonary disease (HCC) J44.9    Recurrent depression ([address] F33.9    Fibrosarcoma ([address] C49.9    Alcoholism in remission ([address] F10.21    Sleep apnea G47.30    History of gastric bypass Z98.84    Obesity, morbid ([address] E66.01    Osteopenia M85.80    GERD (gastroesophageal reflux disease) K21.9    Chronic pain of both knees M25.561, M25.562, G89.29    Prediabetes R73.03    Age-related nuclear cataract, bilateral H25.13    Xanthelasma of left upper eyelid H02.64    Xanthelasma of right upper eyelid H02.61    Sebaceous cyst of eyelid H02.829       IMPRESSION:   · SOB-multifactorial with significant component from weight gain. Need to evaluate for development and progression of obstructive airways disease. PFT in 2017 was unremarkable however with history of smoking likely she has a component of COPD. She is currently on Trelegy which seems to be helping. Also need to consider pulmonary hypertension progressive with symptoms of chronic leg edema and heart rate increased with activity. Cardiac ischemic component also on should be considered in the differential diagnosis for symptoms of chest tightness  · AYE on CPAP  · Morbid obesity- BMI 49.09, h/o gastric bypass  · GERD  · H/o smoking- candidate for LDCT.  Last CT 11/2019- Lung RADs 2. 2 mm nodule      RECOMMENDATIONS:   · Discussed with patient differential diagnosis-continue Trelegy  · We will check PFTs  · Check echocardiogram and consider referral to cardiology for further evaluation-stress test  · Will obtain download from CPAP to see if CPAP pressure is adequate and make appropriate adjustments as needed  · Continue treatment for GERD  · We will follow-up after about testing completed and make further recommendations  · Order LDCT at next visit  · Maintain complete cessation of cigarette smoking  · Needs to lose weight again  · Maintain active lifestyle  · We will follow-up in 6 weeks     Health maintenance screens deferred to Primary care provider.      Tayler Michel MD    This patient has a high complexity chronic care condition

## 2021-05-05 NOTE — PROGRESS NOTES
Juana Jain presents today for   Chief Complaint   Patient presents with    Shortness of Breath    Wheezing       Is someone accompanying this pt? No    Is the patient using any DME equipment during OV? No    -DME Company NA    Depression Screening:  3 most recent PHQ Screens 2/12/2021   Little interest or pleasure in doing things Not at all   Feeling down, depressed, irritable, or hopeless Not at all   Total Score PHQ 2 0   Trouble falling or staying asleep, or sleeping too much Not at all   Feeling tired or having little energy Not at all   Poor appetite, weight loss, or overeating Not at all   Feeling bad about yourself - or that you are a failure or have let yourself or your family down Not at all   Trouble concentrating on things such as school, work, reading, or watching TV Not at all   Moving or speaking so slowly that other people could have noticed; or the opposite being so fidgety that others notice Not at all   Thoughts of being better off dead, or hurting yourself in some way Not at all   PHQ 9 Score 0   How difficult have these problems made it for you to do your work, take care of your home and get along with others Not difficult at all       Learning Assessment:  Learning Assessment 9/12/2018   PRIMARY LEARNER Patient   HIGHEST LEVEL OF EDUCATION - PRIMARY LEARNER  2 YEARS [address] CAREGIVER No   PRIMARY LANGUAGE ENGLISH   LEARNER PREFERENCE PRIMARY DEMONSTRATION   ANSWERED BY patient   RELATIONSHIP SELF       Abuse Screening:  Abuse Screening Questionnaire 9/27/2019   Do you ever feel afraid of your partner? N   Are you in a relationship with someone who physically or mentally threatens you? N   Is it safe for you to go home? Y       Fall Risk  Fall Risk Assessment, last 12 mths 9/11/2020   Able to walk? Yes   Fall in past 12 months? No         Coordination of Care:  1. Have you been to the ER, urgent care clinic since your last visit? Hospitalized since your last visit? No    2. Have you seen or consulted any other health care providers outside of the [address] [place] [address] since your last visit? Include any pap smears or colon screening. No    Patient has had both Moderna Vaccines 1/29//21 and 2/26/21 Current Flu and Pneumo Vaccines.

## 2021-05-11 ENCOUNTER — TELEPHONE (OUTPATIENT)
Dept: PULMONOLOGY | Age: 61
End: 2021-05-11

## 2021-05-11 NOTE — TELEPHONE ENCOUNTER
Pulmonary Rehab called patient and spoke to her about the program. She wants to see if her insurance will cover the program at [facility]. Will follow up with benefit information.     Thank you,  Britt Turcios

## 2021-05-25 ENCOUNTER — TELEPHONE (OUTPATIENT)
Dept: PULMONOLOGY | Age: 61
End: 2021-05-25

## 2021-05-25 NOTE — TELEPHONE ENCOUNTER
Pulmonary Rehab called patient and left a message about the program and her insurance benefits. Additional attempts at contact will be made.     Thank you,  Randy Sanchez

## 2021-05-25 NOTE — TELEPHONE ENCOUNTER
Pulmonary Rehab received a call from patient about the program. She is still interested, but said she was going on a trip for a month this summer. I advised her to wait to start her rehab until she returns to minimize the disruptions from her appointments and get the most from the program. Will follow up in early in July to check in and schedule her evaluation.      Thank you,   Geronimo Jeffries

## 2021-05-26 ENCOUNTER — HOSPITAL ENCOUNTER (OUTPATIENT)
Dept: NON INVASIVE DIAGNOSTICS | Age: 61
Discharge: HOME | End: 2021-05-26
Attending: INTERNAL MEDICINE
Payer: MEDICARE

## 2021-05-26 VITALS
SYSTOLIC BLOOD PRESSURE: 133 MMHG | WEIGHT: 293 LBS | DIASTOLIC BLOOD PRESSURE: 72 MMHG | HEIGHT: 65 IN | BODY MASS INDEX: 48.82 KG/M2

## 2021-05-26 DIAGNOSIS — R06.02 SOB (SHORTNESS OF BREATH): ICD-10-CM

## 2021-05-26 DIAGNOSIS — R07.89 OTHER CHEST PAIN: ICD-10-CM

## 2021-05-26 LAB
ECHO AO ROOT DIAM: 2.75 CM
ECHO LA AREA 4C: 12.23 CM2
ECHO LA VOL 2C: 43.09 ML (ref 22–52)
ECHO LA VOL 4C: 24.25 ML (ref 22–52)
ECHO LA VOL BP: 35.86 ML (ref 22–52)
ECHO LA VOL/BSA BIPLANE: 15.26 ML/M2 (ref 16–28)
ECHO LA VOLUME INDEX A2C: 18.34 ML/M2 (ref 16–28)
ECHO LA VOLUME INDEX A4C: 10.32 ML/M2 (ref 16–28)
ECHO LV INTERNAL DIMENSION DIASTOLIC: 4.92 CM (ref 3.9–5.3)
ECHO LV INTERNAL DIMENSION SYSTOLIC: 3.52 CM
ECHO LV IVSD: 0.96 CM (ref 0.6–0.9)
ECHO LV MASS 2D: 156.3 G (ref 67–162)
ECHO LV MASS INDEX 2D: 66.5 G/M2 (ref 43–95)
ECHO LV POSTERIOR WALL DIASTOLIC: 0.86 CM (ref 0.6–0.9)
ECHO LVOT DIAM: 1.99 CM
ECHO LVOT PEAK GRADIENT: 3.88 MMHG
ECHO LVOT PEAK VELOCITY: 98.45 CM/S
ECHO LVOT SV: 61.5 ML
ECHO LVOT VTI: 19.79 CM
ECHO MV A VELOCITY: 52.54 CM/S
ECHO MV E DECELERATION TIME (DT): 231.85 MS
ECHO MV E VELOCITY: 62.35 CM/S
ECHO MV E/A RATIO: 1.19
LVOT MG: 1.91 MMHG

## 2021-05-26 PROCEDURE — 93306 TTE W/DOPPLER COMPLETE: CPT

## 2021-06-04 ENCOUNTER — HOSPITAL ENCOUNTER (OUTPATIENT)
Dept: LAB | Age: 61
Discharge: HOME | End: 2021-06-04
Payer: MEDICARE

## 2021-06-04 DIAGNOSIS — Z01.812 PRE-PROCEDURE LAB EXAM: ICD-10-CM

## 2021-06-04 PROCEDURE — U0003 INFECTIOUS AGENT DETECTION BY NUCLEIC ACID (DNA OR RNA); SEVERE ACUTE RESPIRATORY SYNDROME CORONAVIRUS 2 (SARS-COV-2) (CORONAVIRUS DISEASE [COVID-19]), AMPLIFIED PROBE TECHNIQUE, MAKING USE OF HIGH THROUGHPUT TECHNOLOGIES AS DESCRIBED BY CMS-2020-01-R: HCPCS

## 2021-06-05 LAB — SARS-COV-2, COV2NT: NOT DETECTED

## 2021-06-09 DIAGNOSIS — J44.9 CHRONIC OBSTRUCTIVE PULMONARY DISEASE, UNSPECIFIED COPD TYPE (HCC): ICD-10-CM

## 2021-06-09 RX ORDER — FLUTICASONE FUROATE, UMECLIDINIUM BROMIDE AND VILANTEROL TRIFENATATE 100; 62.5; 25 UG/1; UG/1; UG/1
POWDER RESPIRATORY (INHALATION)
Qty: 2 INHALER | Refills: 3 | Status: SHIPPED | OUTPATIENT
Start: 2021-06-09 | End: 2022-01-17

## 2021-06-10 ENCOUNTER — OFFICE VISIT (OUTPATIENT)
Dept: PULMONOLOGY | Age: 61
End: 2021-06-10

## 2021-06-10 VITALS — HEIGHT: 65 IN | WEIGHT: 293 LBS | BODY MASS INDEX: 48.82 KG/M2

## 2021-06-10 DIAGNOSIS — R06.02 SOB (SHORTNESS OF BREATH): Primary | ICD-10-CM

## 2021-06-21 ENCOUNTER — OFFICE VISIT (OUTPATIENT)
Dept: PULMONOLOGY | Age: 61
End: 2021-06-21
Payer: MEDICARE

## 2021-06-21 VITALS
SYSTOLIC BLOOD PRESSURE: 150 MMHG | OXYGEN SATURATION: 97 % | WEIGHT: 293 LBS | DIASTOLIC BLOOD PRESSURE: 76 MMHG | BODY MASS INDEX: 48.82 KG/M2 | TEMPERATURE: 97.4 F | RESPIRATION RATE: 18 BRPM | HEIGHT: 65 IN | HEART RATE: 74 BPM

## 2021-06-21 DIAGNOSIS — G47.33 OBSTRUCTIVE SLEEP APNEA SYNDROME: Primary | ICD-10-CM

## 2021-06-21 DIAGNOSIS — E66.01 OBESITY, MORBID (HCC): ICD-10-CM

## 2021-06-21 DIAGNOSIS — Z87.891 PERSONAL HISTORY OF SMOKING: ICD-10-CM

## 2021-06-21 DIAGNOSIS — J42 CHRONIC BRONCHITIS, UNSPECIFIED CHRONIC BRONCHITIS TYPE (HCC): ICD-10-CM

## 2021-06-21 PROCEDURE — 3017F COLORECTAL CA SCREEN DOC REV: CPT | Performed by: INTERNAL MEDICINE

## 2021-06-21 PROCEDURE — G8417 CALC BMI ABV UP PARAM F/U: HCPCS | Performed by: INTERNAL MEDICINE

## 2021-06-21 PROCEDURE — G9899 SCRN MAM PERF RSLTS DOC: HCPCS | Performed by: INTERNAL MEDICINE

## 2021-06-21 PROCEDURE — G8427 DOCREV CUR MEDS BY ELIG CLIN: HCPCS | Performed by: INTERNAL MEDICINE

## 2021-06-21 PROCEDURE — 99214 OFFICE O/P EST MOD 30 MIN: CPT | Performed by: INTERNAL MEDICINE

## 2021-06-21 PROCEDURE — G9717 DOC PT DX DEP/BP F/U NT REQ: HCPCS | Performed by: INTERNAL MEDICINE

## 2021-06-21 NOTE — PROGRESS NOTES
Joann Pugh presents today for   Chief Complaint   Patient presents with    Sleep Apnea    Results     PFT       Is someone accompanying this pt? No    Is the patient using any DME equipment during OV? No    -DME Company [employer] for CPAP    Depression Screening:  3 most recent PHQ Screens 2/12/2021   Little interest or pleasure in doing things Not at all   Feeling down, depressed, irritable, or hopeless Not at all   Total Score PHQ 2 0   Trouble falling or staying asleep, or sleeping too much Not at all   Feeling tired or having little energy Not at all   Poor appetite, weight loss, or overeating Not at all   Feeling bad about yourself - or that you are a failure or have let yourself or your family down Not at all   Trouble concentrating on things such as school, work, reading, or watching TV Not at all   Moving or speaking so slowly that other people could have noticed; or the opposite being so fidgety that others notice Not at all   Thoughts of being better off dead, or hurting yourself in some way Not at all   PHQ 9 Score 0   How difficult have these problems made it for you to do your work, take care of your home and get along with others Not difficult at all       Learning Assessment:  Learning Assessment 9/12/2018   PRIMARY LEARNER Patient   HIGHEST LEVEL OF EDUCATION - PRIMARY LEARNER  4 YEARS [address] CAREGIVER No   PRIMARY LANGUAGE ENGLISH   LEARNER PREFERENCE PRIMARY DEMONSTRATION   ANSWERED BY patient   RELATIONSHIP SELF       Abuse Screening:  Abuse Screening Questionnaire 9/27/2019   Do you ever feel afraid of your partner? N   Are you in a relationship with someone who physically or mentally threatens you? N   Is it safe for you to go home? Y       Fall Risk  Fall Risk Assessment, last 12 mths 9/11/2020   Able to walk? Yes   Fall in past 12 months? No         Coordination of Care:  1.  Have you been to the ER, urgent care clinic since your last visit? Hospitalized since your last visit? No    2. Have you seen or consulted any other health care providers outside of the [address] [place] [address] since your last visit? Include any pap smears or colon screening.  No

## 2021-06-21 NOTE — PROGRESS NOTES
[facility]  Pulmonary, Critical Care, and Sleep Medicine      Pulmonary Office follow-up visit    Name: Ronen Orellana     : 1960     Date: 2021        Subjective:   Patient has been referred for evaluation of: COPD, obstructive sleep apnea    21      CPAP download from 3/9/2021 through 2021 shows use of 90 out of 90 days for average of almost 7 hours -AutoSet at 10 to 14 cm AHI of 0.8    HPI  Patient is a 59 y.o. female who states that she has been experiencing increasing symptoms of shortness of breath over the past 1 year and more so over the past 6 months. She states that she had gone to [place] about 4 years back and got symptomatic with shortness of breath-went to an urgent care center where she was told she has COPD. However since discharge she continued to smoke until 2020 when she decided to quit smoking. She completely quit smoking in 2020 and ironically has been having more symptoms since then. SOB: With activity, walking, bending climbing stairs. Also has had some episodes at nighttime where she has hard breathing noticed by her sister    COUGH: Has intermittent cough especially at nighttime-her sister has heard her cough and comes and checks in on her    Chest Pain: Not a usual symptom however today during her 6-minute walk she complained of chest tightness      No associated wheezing, chest pain, fever, chills, night sweats . She has been experiencing chronic swelling of lower extremities for which she takes Lasix  Has history of dyspepsia, reflux. Patient has gained 60 pounds over the past year or so. She states that she had gastric bypass surgery 8 years back and lost close to 100 pounds but has gained back all the weight  She was diagnosed with sleep apnea by Dr. Castillo Los years back and has been on CPAP at 14 cm pressure  Has had PFTs and echocardiogram in the past  Has completed LDCT 2019.   Lung RADS 2  Comorbid conditions include- DM, HTN, AYE on CPAP at 14 cm which she states she has been using, Sleep disordered breathing, CAD, CHF, PE. DVT, previous respiratory diagnosis. Smoking status: Currently quit -June 2020 prior to that she smoked 2 packs of cigarettes per day for close to 40 years      Review of data:  I have personally reviewed all data-clinical encounters, imaging, outside test results pertinent to patient's care.   Testing:  CXR  CT scan-LDCT  PFT  Echo  6 min walk  Overnight oximetry  Sleep studies    Serologies  Vaccinations:  Pneumococcal  Influenza    DME: First choice    PAP device-CPAP    Past Medical History:   Diagnosis Date    Alcoholism in remission ([address]     Asthma     Chronic obstructive pulmonary disease ([address]     Fibrosarcoma ([facility] [facility]    connective tissue cancer    GERD (gastroesophageal reflux disease)     History of gastric bypass 2012    History of meniscal tear 2015, 2018    right in 2015, left in 2018    HNP (herniated nucleus pulposus), lumbar     patient reported    Lung nodules     resolved 2018 CT    Osteopenia 10/03/2017    Recurrent depression ([facility] [address]     Sleep apnea     on cpap    Spinal stenosis, lumbar     patient reported    Xanthelasma of left upper eyelid 7/20/2020       Past Surgical History:   Procedure Laterality Date    HX ARTHRODESIS  01/2000    Herniated Disc, arthritis right foot 06/06, 07/07, C 6/7, C 4/6 Stenosis    HX CHOLECYSTECTOMY  09/2008    gallbladder disease    HX COLONOSCOPY  05/2009    HX GASTRIC BYPASS  2012    GASTRIC BYPASS  [facility] Gastric Bypass      HX HYSTERECTOMY  06/1994    HX MENISCECTOMY  10/2015    right repari of torn meniscus    HX MENISCECTOMY Left 03/16/2018    partial meniscectomy due to tear    HX MYOMECTOMY  06/1984    benign tumor    HX ORTHOPAEDIC  1964    orthopaedic excision Fibrosarcoma and Lymphangiogram    HX PELVIC LAPAROSCOPY  04/1984    large uterine blockage    NEUROLOGICAL PROCEDURE UNLISTED  2000, 2007 Cervical fusion     Allergies   Allergen Reactions    Adhesive Tape-Silicones Swelling     REALLY BAD SWELLING AND SORE APPEAR    Alcohol Other (comments)     PT STATES SHE IS AN ALCOHOLIC    Lactose Other (comments)     PT STATES IT MAKES HER STOMACH HURT    Percodan [Oxycodone Hcl-Oxycodone-Asa] Itching and Other (comments)     crying    Nsaids (Non-Steroidal Anti-Inflammatory Drug) Other (comments)     PT NOT ABLE TO TAKE DUE TO GASTRIC BYPASS     Current Outpatient Medications   Medication Sig Dispense Refill    Trelegy Ellipta 100-62.5-25 mcg inhaler INHALE 1 PUFF BY MOUTH DAILY 2 Inhaler 3    citalopram (CELEXA) 20 mg tablet TAKE 1 TABLET BY MOUTH DAILY 90 Tab 0    pregabalin (LYRICA) 150 mg capsule TAKE 1 CAPSULE BY MOUTH TWICE DAILY. MAX DAILY AMOUNT: 300  Cap 0    dexlansoprazole (Dexilant) 60 mg CpDB capsule (delayed release) TAKE 1 CAPSULE BY MOUTH DAILY 90 Cap 3    Linzess 72 mcg cap capsule TAKE 1 CAPSULE BY MOUTH DAILY BEFORE BREAKFAST 30 Cap 1    fluticasone propionate (FLONASE) 50 mcg/actuation nasal spray SHAKE LIQUID AND USE 2 SPRAYS IN EACH NOSTRIL DAILY 48 g 5    buPROPion SR (WELLBUTRIN SR) 150 mg SR tablet TAKE 1 TABLET BY MOUTH TWICE DAILY 180 Tab 3    nitrofurantoin, macrocrystal-monohydrate, (Macrobid) 100 mg capsule Take 1 Cap by mouth two (2) times a day. 14 Cap 0    sucralfate (CARAFATE) 1 gram tablet TAKE 1 TABLET BY MOUTH FOUR TIMES DAILY AS NEEDED FOR INDIGESTION 60 Tab 2    albuterol (PROVENTIL HFA, VENTOLIN HFA, PROAIR HFA) 90 mcg/actuation inhaler Take 2 Puffs by inhalation every four (4) hours as needed for Wheezing or Shortness of Breath. 3 Inhaler 4    meclizine (ANTIVERT) 12.5 mg tablet Take 2 Tabs by mouth three (3) times daily as needed for Dizziness. 20 Tab 5    b-complex with vitamin c tablet Take 1 Tab by mouth daily.  furosemide (LASIX) 20 mg tablet Take 1 Tab by mouth daily as needed (severe leg swelling).  90 Tab 1    acetaminophen (TYLENOL EXTRA STRENGTH) 500 mg tablet Take  by mouth every six (6) hours as needed for Pain.  diclofenac (VOLTAREN) 1 % gel Apply 2 g to affected area every six (6) hours as needed for Pain. 100 g 11    calcium-cholecalciferol, d3, (CALCIUM 600 + D) 600-125 mg-unit tab Take 1,065 mg by mouth nightly. Indications: TAKES 2 AT BEDTIME      omega 3-dha-epa-fish oil (FISH OIL) 100-160-1,000 mg cap Take 1,065 mg by mouth daily.  ferrous sulfate ER (IRON) 160 mg (50 mg iron) TbER tablet Take 1 Tab by mouth daily. Indications: PT TAKES 40 MG      multivitamin (ONE A DAY) tablet Take 1 Tab by mouth daily.        Review of Systems:  HEENT: No epistaxis, no nasal drainage, no difficulty in swallowing, no redness in eyes  Respiratory: as above  Cardiovascular: no chest pain, no palpitations,+ chronic leg edema, no syncope  Gastrointestinal: no abd pain, no vomiting, no diarrhea, no bleeding symptoms  Genitourinary: No urinary symptoms or hematuria  Integument/breast: No ulcers or rashes  Musculoskeletal:Neg  Neurological: No focal weakness, no seizures, no headaches  Behvioral/Psych: No anxiety, no depression  Constitutional: No fever, no chills, no weight loss, no night sweats     Objective:     Visit Vitals  BP (!) 167/91 (BP 1 Location: Left upper arm, BP Patient Position: Sitting, BP Cuff Size: Large adult)   Pulse 74   Temp 97.4 °F (36.3 °C) (Oral)   Resp 18   Ht 5' 5\" (1.651 m)   Wt 136.4 kg (300 lb 9.6 oz)   SpO2 97% Comment: RA Rest   BMI 50.02 kg/m²        Physical Exam:   General: comfortable, no acute distress  HEENT: pupils reactive, sclera anicteric, EOM intact  Neck: No adenopathy or thyroid swelling, no lymphadenopathy or JVD, supple  CVS: S1S2 no murmurs  RS: Mod AE bilaterally, no tactile fremitus or egophony, no accessory muscle use  Abd: soft, non tender, no hepatosplenomegaly  Neuro: non focal, awake, alert  Extrm: Trace leg edema right more than left, clubbing or cyanosis  Skin: no rash    Data review: Pertinent labs: CBC, BMP, LFT's    PFT:  2017 normal  06/10/21   Patient effort:   Good   Meets ATS criteria for interpretation     Flows:   Maximal Mid Expiratory Flow rate is reduced to 32 % predicted   Forced Expiratory Volume in one second is reduced to 72 % predicted   FEV 1% is reduced   Volumes:   Functional Residual Capacity and Residual Volume are reduced     Flow Volume Loop:   Nonspecific obstructive pattern in Flow Volume Loop     Bronchodilator:   Significant improvement with bronchodilator     Diffusion:   Abnormal Diffusion Capacity reduced to 75 % predicted and corrects for alveolar volume     Impression:   Mild to Moderate obstructive defect, Isolated reduction of Residual Volume and Functional Residual Capacity    6 min walk test; patient walked a distance of 680 m without significant oxygen desaturation however heart rate increased and dyspnea index changed from 0-6 with patient complaining of some chest tightness  Polysomnogram- spilt night- Dr. Marcelo Welch  AYE. CPAP- 14 cm prescribed    Results for orders placed or performed during the hospital encounter of 02/16/20   EKG, 12 LEAD, INITIAL   Result Value Ref Range    Ventricular Rate 64 BPM    Atrial Rate 64 BPM    P-R Interval 176 ms    QRS Duration 80 ms    Q-T Interval 418 ms    QTC Calculation (Bezet) 431 ms    Calculated P Axis 23 degrees    Calculated R Axis -4 degrees    Calculated T Axis 18 degrees    Diagnosis       Normal sinus rhythm  Normal ECG  When compared with ECG of 06-MAR-2018 10:04,  No significant change was found  Confirmed by Norma Warner ([id number]) on 2/16/2020 7:40:15 AM       08/13/19   ECHO ADULT COMPLETE 08/14/2019 8/14/2019    Narrative · Left Ventricle: Normal cavity size, wall thickness, systolic function   (ejection fraction normal) and diastolic function. Estimated left   ventricular ejection fraction is 56 - 60%. No regional wall motion   abnormality noted.   · Right Ventricle: Normal right ventricular size and function. · No hemodynamically significant valvular pathology. Signed by: Renuka Arshad MD     Imaging:  I have personally reviewed the patients radiographs and have reviewed the reports:  XR Results (most recent):  Results from Hospital Encounter encounter on 02/16/20    XR CHEST PORT    Narrative  AP CHEST, PORTABLE    CPT CODE: 61874    INDICATION: Above. Intermittent epigastric pain. Nausea and vomiting. COMPARISON: 5/13/2019 PA and lateral.    TECHNIQUE: Portable AP chest radiograph is reviewed. FINDINGS:    No evidence of focal pulmonary consolidation or pulmonary edema. No evidence of  pneumothorax. The costophrenic sulci appear sharp. The cardiac silhouette is top normal in size  for technique. EKG leads/wires overlie the patient. Cervical spine fusion plate/screws again  noted. No acute osseous abnormalities identified. Impression  IMPRESSION:    No evidence of acute pulmonary disease. CT Results (most recent):  Results from Hospital Encounter encounter on 02/16/20    CT ABD PELV W CONT    Narrative  CT SCAN OF THE ABDOMEN AND PELVIS, WITH CONTRAST    INDICATION: Epigastric pain with sudden severe onset yesterday. Vomiting. History of gastric bypass surgery. COMPARISON: None    TECHNIQUE: Following intravenous administration of 100 mL Isovue-300  low  osmolar contrast, and attempted administration of oral contrast/gastroview--some  of which was vomited, serial axial CT images through the abdomen and pelvis were  obtained using helical technique. Additional coronal and sagittal reformation  images were also performed.     All CT scans at this facility are performed using dose optimization technique as  appropriate to the performed exam, to include automated exposure control,  adjustment of the mA and/or kV according to patient's size (Including  appropriate matching for site-specific examinations), or use of iterative  reconstruction technique. FINDINGS:    Visualized portions of lung bases demonstrate mild streaky dependent densities,  likely atelectasis. Cholecystectomy. Mild intrahepatic and extrahepatic biliary ductal dilatation, may reflect  chronic \"reservoir effect\" after cholecystectomy. Clinical correlation might be  helpful regarding suspicion for biliary obstruction, such as obstructive pattern  of hepatic enzymes, and MRCP could be considered for further assessment if  clinically warranted. The liver and pancreas otherwise, spleen, and adrenal glands appear  unremarkable. The kidneys enhance symmetrically. No evidence of hydronephrosis or perinephric  stranding. 1 cm hypodensity right kidney lower pole laterally, too small to be  specifically characterized, likely a cyst.    The abdominal aorta enhances normally without evidence of abdominal aortic  aneurysm. Moderate volume of free fluid in the deep posterior pelvis. Hysterectomy. Small fat-containing periumbilical hernia. Mildly prominent right retroperitoneal lymph node in the fat lateral to the IVC  and inferomedial to the kidney measures 2.1 x 1.1 cm (series 2 axial image 53),  nonspecific. Otherwise a few scattered small lymph nodes are seen without other  evidence of pathologic lymph node enlargement. Postsurgical changes of gastric bypass surgery. A somewhat dilated loop of small  bowel, approximately 4.2 cm in caliber, extends from gastrojejunal anastomosis  inferiorly, containing debris close to the level of the anastomosis and contrast  farther inferiorly, before looping back cephalad and traversing across the  anterior abdomen to the the right abdomen, appearing to terminate between the  liver, stomach, and cholecystectomy clips, suspect afferent limb. The loop  becomes nondistended adjacent to the liver where it appears mildly thick-walled,  assessment for mural thickening at this level limited by nondistention.  There is  a small amount of stranding/fluid around this jejunal loop in the left upper  quadrant. Taken together with the patient's clinical presentation, findings are  concerning for a late afferent limb complication from gastric bypass surgery,  possibly afferent limb syndrome,  early or partial afferent limb obstruction  such as could be seen due to stricture, internal hernia, adhesions. Bariatric  surgical consultation is recommended today. The SMV/SMA maintain usual orientation. No other abnormal dilatation of bowel is seen to increase suspicion for bowel  obstruction farther distally. The appendix is not clearly identified. No  pericecal region inflammatory changes are seen. On review of bone windows, no acute fractures or destructive bone lesions are  seen. Impression  Impression:    Abnormal dilatation of rather lengthy segment of jejunum from gastrojejunal  anastomosis containing debris and contrast, approximately 4.2 cm in caliber,  favored to represent afferent limb demonstrating rather tortuous and lengthy  course. Small free fluid/stranding adjacent to the dilated loop in the left  upper quadrant. Moderate volume of additional free fluid in the deep posterior  pelvis. Findings are concerning for late afferent limb complication of gastric  bypass surgery such as afferent limb syndrome, partial/early afferent limb  obstruction cannot be excluded such as could be seen due to stricture, internal  hernia, adhesions. STAT bariatric surgery consultation is recommended. Biliary ductal dilatation status post cholecystectomy as described. Small right renal hypodensity, too small to be specifically characterized,  statistically most likely a cyst.    Mildly prominent right retroperitoneal lymph node as described. No other  pathological enlargement is seen. Hysterectomy. Small fat-containing periumbilical hernia. I have telephoned a wet reading directly to  Dr. Tahir Crump  at 10:20 AM on  2/16/2020. Lysbeth Carrel      Patient Active Problem List   Diagnosis Code    Chronic obstructive pulmonary disease (HCC) J44.9    Recurrent depression ([facility] F33.9    Fibrosarcoma ([facility] [address] C49.9    Alcoholism in remission ([facility] [address] F10.21    Sleep apnea G47.30    History of gastric bypass Z98.84    Obesity, morbid (HCC) E66.01    Osteopenia M85.80    GERD (gastroesophageal reflux disease) K21.9    Chronic pain of both knees M25.561, M25.562, G89.29    Prediabetes R73.03    Age-related nuclear cataract, bilateral H25.13    Xanthelasma of left upper eyelid H02.64    Xanthelasma of right upper eyelid H02.61    Sebaceous cyst of eyelid H02.829       IMPRESSION:   · SOB-multifactorial with significant component from weight gain. Need to evaluate for development and progression of obstructive airways disease. PFT in 2017 was unremarkable however with history of smoking likely she has a component of COPD. FEV1 72% predicted , midexpiratory flow rate 32% predicted with bronchodilator response decreased diffusion capacity which corrects for alveolar volume. She is currently on Trelegy which seems to be helping. Also need to consider pulmonary hypertension progressive with symptoms of chronic leg edema and heart rate increased with activity. Cardiac ischemic component also on should be considered in the differential diagnosis for symptoms of chest tightness  · AYE on CPAP. CPAP download with average use of 6 hours 52 minutes at AutoSet 10 to 14 cm with AHI of 1.8  · Morbid obesity- BMI 50.67 h/o gastric bypass  · GERD  · H/o smoking- candidate for LDCT.  Last CT 11/2019- Lung RADs 2. 2 mm nodule      RECOMMENDATIONS:   · Discussed with patient -results of PFT-continue Trelegy  · Check echocardiogram and consider referral to cardiology for further evaluation-stress test  · Continue current CPAP settings effective  · Continue treatment for GERD  · Order LDCT for November 2021  · Maintain complete cessation of cigarette smoking  · Needs to lose weight again  · Maintain active lifestyle  · We will follow-up in 4 months     Health maintenance screens deferred to Primary care provider.      Megan Lopez MD    This patient has a high complexity chronic care condition

## 2021-06-22 ENCOUNTER — OFFICE VISIT (OUTPATIENT)
Dept: FAMILY MEDICINE CLINIC | Age: 61
End: 2021-06-22
Payer: MEDICARE

## 2021-06-22 VITALS
RESPIRATION RATE: 24 BRPM | HEIGHT: 65 IN | BODY MASS INDEX: 48.82 KG/M2 | DIASTOLIC BLOOD PRESSURE: 70 MMHG | WEIGHT: 293 LBS | SYSTOLIC BLOOD PRESSURE: 129 MMHG | OXYGEN SATURATION: 97 % | TEMPERATURE: 97.7 F | HEART RATE: 68 BPM

## 2021-06-22 DIAGNOSIS — M25.552 LEFT HIP PAIN: Primary | ICD-10-CM

## 2021-06-22 DIAGNOSIS — J44.9 CHRONIC OBSTRUCTIVE PULMONARY DISEASE, UNSPECIFIED COPD TYPE (HCC): ICD-10-CM

## 2021-06-22 PROCEDURE — 99214 OFFICE O/P EST MOD 30 MIN: CPT | Performed by: NURSE PRACTITIONER

## 2021-06-22 PROCEDURE — G8417 CALC BMI ABV UP PARAM F/U: HCPCS | Performed by: NURSE PRACTITIONER

## 2021-06-22 PROCEDURE — G8427 DOCREV CUR MEDS BY ELIG CLIN: HCPCS | Performed by: NURSE PRACTITIONER

## 2021-06-22 PROCEDURE — G9899 SCRN MAM PERF RSLTS DOC: HCPCS | Performed by: NURSE PRACTITIONER

## 2021-06-22 PROCEDURE — G9717 DOC PT DX DEP/BP F/U NT REQ: HCPCS | Performed by: NURSE PRACTITIONER

## 2021-06-22 PROCEDURE — 3017F COLORECTAL CA SCREEN DOC REV: CPT | Performed by: NURSE PRACTITIONER

## 2021-06-22 RX ORDER — FLUTICASONE FUROATE, UMECLIDINIUM BROMIDE AND VILANTEROL TRIFENATATE 100; 62.5; 25 UG/1; UG/1; UG/1
POWDER RESPIRATORY (INHALATION)
Qty: 2 INHALER | Refills: 3 | Status: CANCELLED | OUTPATIENT
Start: 2021-06-22

## 2021-06-22 RX ORDER — DICLOFENAC SODIUM 10 MG/G
2 GEL TOPICAL
Qty: 100 G | Refills: 2 | Status: SHIPPED | OUTPATIENT
Start: 2021-06-22

## 2021-06-22 RX ORDER — PREDNISONE 20 MG/1
TABLET ORAL
Qty: 18 TABLET | Refills: 0 | Status: SHIPPED | OUTPATIENT
Start: 2021-06-22 | End: 2021-11-17

## 2021-06-22 RX ORDER — CYCLOBENZAPRINE HCL 10 MG
10 TABLET ORAL
Qty: 30 TABLET | Refills: 0 | Status: SHIPPED | OUTPATIENT
Start: 2021-06-22 | End: 2021-07-02

## 2021-06-22 NOTE — PROGRESS NOTES
OFFICE NOTE    Santosh Lucas  is a 59 y.o. female presenting today for the following:  Chief Complaint   Patient presents with    Hip Pain     x a few days  (left)      HPI     Left hip pain  Patient has a history of osteopenia, and chronic pain of both knees. She have been having left hip pain for 5  days. 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 She states she left school hardly being able to walk because her left  Buttocks was hurting. She states that also her left knee is hurting as well. She is going out of town this week and is nervous her buttocks is still going to be hurting. She denies back pain, left hip pain or trouble with urination. She denies numbness and tingling. She describes the pain as throbbing and dull ache. Will treat with flexeril and prednisone at this time. Patient is also using voltaren gel that she states works well. COPD  Patient has COPD and currently is not having any type of concern at this time. She is doing well. She takes Trelegy Ellipta inhaler. Denies side effects and take medication as prescribed. Review of Systems   Constitutional: Negative for fatigue and fever. HENT: Negative. Eyes: Negative. Respiratory: Negative for cough, chest tightness, shortness of breath and wheezing. Cardiovascular: Negative for chest pain and palpitations. Neurological: Negative for dizziness, light-headedness and headaches.          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    GERD (gastroesophageal reflux disease)     History of gastric bypass 2012    History of meniscal tear 2015, 2018    right in 2015, left in 2018    HNP (herniated nucleus pulposus), lumbar     patient reported    Lung nodules resolved 2018 CT    Osteopenia 10/03/2017    Recurrent depression ([facility]     Sleep apnea     on cpap    Spinal stenosis, lumbar     patient reported    Xanthelasma of left upper eyelid 2020       Past Surgical History:   Procedure Laterality Date    HX ARTHRODESIS  2000    Herniated Disc, arthritis right foot , , C 6/7, C 4/6 Stenosis    HX CHOLECYSTECTOMY  2008    gallbladder disease    HX COLONOSCOPY  2009    HX GASTRIC BYPASS      GASTRIC BYPASS  [facility] Gastric Bypass      HX HYSTERECTOMY  1994    HX MENISCECTOMY  10/2015    right repari of torn meniscus    HX MENISCECTOMY Left 2018    partial meniscectomy due to tear    HX MYOMECTOMY  1984    benign tumor    HX ORTHOPAEDIC  1964    orthopaedic excision Fibrosarcoma and Lymphangiogram    HX PELVIC LAPAROSCOPY  1984    large uterine blockage    NEUROLOGICAL PROCEDURE UNLISTED  ,     Cervical fusion       Social History     Socioeconomic History    Marital status:      Spouse name: Not on file    Number of children: Not on file    Years of education: Not on file    Highest education level: Not on file   Occupational History    Not on file   Tobacco Use    Smoking status: Former Smoker     Packs/day: 0.50     Years: 45.00     Pack years: 22.50     Quit date: 2020     Years since quittin.0    Smokeless tobacco: Never Used   Substance and Sexual Activity    Alcohol use: No     Comment: quit 1980s- was heavy etoh    Drug use: Not Currently     Comment: marijuana, cocaine 30 yrs ago    Sexual activity: Never   Other Topics Concern    Not on file   Social History Narrative    Not on file     Social Determinants of Health     Financial Resource Strain:     Difficulty of Paying Living Expenses:    Food Insecurity:     Worried About Running Out of Food in the Last Year:     [address] in the Last Year:    Transportation Needs:     Lack of Transportation (Medical):  Lack of Transportation (Non-Medical):    Physical Activity:     Days of Exercise per Week:     Minutes of Exercise per Session:    Stress:     Feeling of Stress :    Social Connections:     Frequency of Communication with Friends and Family:     Frequency of Social Gatherings with Friends and Family:     Attends [employer] Services:     Active Member of Clubs or Organizations:     Attends Club or Organization Meetings:     Marital Status:    Intimate Partner Violence:     Fear of Current or Ex-Partner:     Emotionally Abused:     Physically Abused:     Sexually Abused: Allergies   Allergen Reactions    Adhesive Tape-Silicones Swelling     REALLY BAD SWELLING AND SORE APPEAR    Alcohol Other (comments)     PT STATES SHE IS AN ALCOHOLIC    Lactose Other (comments)     PT STATES IT MAKES HER STOMACH HURT    Percodan [Oxycodone Hcl-Oxycodone-Asa] Itching and Other (comments)     crying    Nsaids (Non-Steroidal Anti-Inflammatory Drug) Other (comments)     PT NOT ABLE TO TAKE DUE TO GASTRIC BYPASS       Current Outpatient Medications   Medication Sig Dispense Refill    diclofenac (Voltaren) 1 % gel Apply 2 g to affected area every six (6) hours as needed for Pain. 100 g 2    cyclobenzaprine (FLEXERIL) 10 mg tablet Take 1 Tablet by mouth three (3) times daily as needed for Muscle Spasm(s) for up to 10 days. 30 Tablet 0    predniSONE (DELTASONE) 20 mg tablet Take 3 tablets daily by mouth for 3 days, then take 2 tablets daily by mouth for 3 days, then take 1 tablet daily by mouth for 3 days. 18 Tablet 0    Trelegy Ellipta 100-62.5-25 mcg inhaler INHALE 1 PUFF BY MOUTH DAILY 2 Inhaler 3    citalopram (CELEXA) 20 mg tablet TAKE 1 TABLET BY MOUTH DAILY 90 Tab 0    pregabalin (LYRICA) 150 mg capsule TAKE 1 CAPSULE BY MOUTH TWICE DAILY.  MAX DAILY AMOUNT: 300  Cap 0    dexlansoprazole (Dexilant) 60 mg CpDB capsule (delayed release) TAKE 1 CAPSULE BY MOUTH DAILY 90 Cap 3    Linzess 72 mcg cap capsule TAKE 1 CAPSULE BY MOUTH DAILY BEFORE BREAKFAST 30 Cap 1    fluticasone propionate (FLONASE) 50 mcg/actuation nasal spray SHAKE LIQUID AND USE 2 SPRAYS IN EACH NOSTRIL DAILY 48 g 5    buPROPion SR (WELLBUTRIN SR) 150 mg SR tablet TAKE 1 TABLET BY MOUTH TWICE DAILY 180 Tab 3    sucralfate (CARAFATE) 1 gram tablet TAKE 1 TABLET BY MOUTH FOUR TIMES DAILY AS NEEDED FOR INDIGESTION 60 Tab 2    albuterol (PROVENTIL HFA, VENTOLIN HFA, PROAIR HFA) 90 mcg/actuation inhaler Take 2 Puffs by inhalation every four (4) hours as needed for Wheezing or Shortness of Breath. 3 Inhaler 4    b-complex with vitamin c tablet Take 1 Tab by mouth daily.  furosemide (LASIX) 20 mg tablet Take 1 Tab by mouth daily as needed (severe leg swelling). 90 Tab 1    acetaminophen (TYLENOL EXTRA STRENGTH) 500 mg tablet Take  by mouth every six (6) hours as needed for Pain.  calcium-cholecalciferol, d3, (CALCIUM 600 + D) 600-125 mg-unit tab Take 1,065 mg by mouth nightly. Indications: TAKES 2 AT BEDTIME      omega 3-dha-epa-fish oil (FISH OIL) 100-160-1,000 mg cap Take 1,065 mg by mouth daily.  ferrous sulfate ER (IRON) 160 mg (50 mg iron) TbER tablet Take 1 Tab by mouth daily. Indications: PT TAKES 40 MG      multivitamin (ONE A DAY) tablet Take 1 Tab by mouth daily. Patient Care Team:  Patient Care Team:  Guilherme Chacko NP as PCP - General (Nurse Practitioner)  Guilherme Chacko NP as P[facility] - [facility]  Nyla Gallardo DO (Orthopedic Surgery)  Yohana Woods NP (Neurology)  Arun Cooper MD ([address] [place] [address])  Ranulfo Trejo as Physician Assistant (Psychiatry)  Jennifer Scales MD (Gastroenterology)  Reynold Barnett MD (Cardiology)  Merlin Barnett MD (Neurology)      LABS:  No results found for any visits on 06/22/21. RADIOLOGY:  No recent results      Physical Exam  Vitals and nursing note reviewed.    Constitutional:       Appearance: Normal appearance. She is well-developed. Eyes:      Pupils: Pupils are equal, round, and reactive to light. Cardiovascular:      Rate and Rhythm: Normal rate and regular rhythm. Heart sounds: Normal heart sounds. Pulmonary:      Effort: Pulmonary effort is normal. No respiratory distress. Breath sounds: Normal breath sounds. No wheezing or rales. Abdominal:      General: Bowel sounds are normal. There is no distension. Palpations: Abdomen is soft. Tenderness: There is no abdominal tenderness. There is no rebound. Musculoskeletal:         General: Tenderness (left buttocks) present. No swelling. Normal range of motion. Cervical back: Normal range of motion and neck supple. Left lower leg: No edema. Lymphadenopathy:      Cervical: No cervical adenopathy. Skin:     General: Skin is warm and dry. Neurological:      Mental Status: She is alert and oriented to person, place, and time. Deep Tendon Reflexes: Reflexes are normal and symmetric. Psychiatric:         Mood and Affect: Mood normal.           Vitals:    06/22/21 1226   BP: 129/70   Pulse: 68   Resp: 24   Temp: 97.7 °F (36.5 °C)   TempSrc: Oral   SpO2: 97%   Weight: 300 lb (136.1 kg)   Height: 5' 5\" (1.651 m)   PainSc:   9   PainLoc: Hip         Assessment and Plan    1. Left hip pain    - diclofenac (Voltaren) 1 % gel; Apply 2 g to affected area every six (6) hours as needed for Pain. Dispense: 100 g; Refill: 2  - cyclobenzaprine (FLEXERIL) 10 mg tablet; Take 1 Tablet by mouth three (3) times daily as needed for Muscle Spasm(s) for up to 10 days. Dispense: 30 Tablet; Refill: 0  - predniSONE (DELTASONE) 20 mg tablet; Take 3 tablets daily by mouth for 3 days, then take 2 tablets daily by mouth for 3 days, then take 1 tablet daily by mouth for 3 days. Dispense: 18 Tablet; Refill: 0    2.  Chronic obstructive pulmonary disease, unspecified COPD type ([facility]        MDM    Procedures      *Plan of care reviewed with patient. Patient in agreement with plan and expresses understanding. All questions answered and patient encouraged to call or RTO if further questions or concerns. Advised patient if symptoms worsen to go to nearest ER or call 911. AVS and recommendations given to patient upon discharge.

## 2021-06-22 NOTE — PROGRESS NOTES
Pari Mims presents today for   Chief Complaint   Patient presents with    Hip Pain     x a few days  (left)       Is someone accompanying this pt? no    Is the patient using any DME equipment during OV? no    Depression Screening:  3 most recent PHQ Screens 6/22/2021   Little interest or pleasure in doing things Several days   Feeling down, depressed, irritable, or hopeless Several days   Total Score PHQ 2 2   Trouble falling or staying asleep, or sleeping too much Not at all   Feeling tired or having little energy Several days   Poor appetite, weight loss, or overeating Several days   Feeling bad about yourself - or that you are a failure or have let yourself or your family down Several days   Trouble concentrating on things such as school, work, reading, or watching TV Not at all   Moving or speaking so slowly that other people could have noticed; or the opposite being so fidgety that others notice Not at all   Thoughts of being better off dead, or hurting yourself in some way Not at all   PHQ 9 Score 5   How difficult have these problems made it for you to do your work, take care of your home and get along with others Somewhat difficult       Learning Assessment:  Learning Assessment 9/12/2018   PRIMARY LEARNER Patient   HIGHEST LEVEL OF EDUCATION - PRIMARY LEARNER  4 YEARS OF COLLEGE   BARRIERS PRIMARY LEARNER NONE   CO-LEARNER CAREGIVER No   PRIMARY LANGUAGE ENGLISH   LEARNER PREFERENCE PRIMARY DEMONSTRATION   ANSWERED BY patient   RELATIONSHIP SELF       Abuse Screening:  Abuse Screening Questionnaire 9/27/2019   Do you ever feel afraid of your partner? N   Are you in a relationship with someone who physically or mentally threatens you? N   Is it safe for you to go home? Y       Fall Risk  Fall Risk Assessment, last 12 mths 9/11/2020   Able to walk? Yes   Fall in past 12 months? No       Health Maintenance reviewed and discussed and ordered per Provider.     There are no preventive care reminders to display for this patient. .      Coordination of Care:  1. Have you been to the ER, urgent care clinic since your last visit? Hospitalized since your last visit?no    2. Have you seen or consulted any other health care providers outside of the [address] [place] [address] since your last visit? Include any pap smears or colon screening.  no

## 2021-06-23 RX ORDER — SUCRALFATE 1 G/1
TABLET ORAL
Qty: 60 TABLET | Refills: 2 | Status: SHIPPED | OUTPATIENT
Start: 2021-06-23 | End: 2021-06-23

## 2021-06-23 RX ORDER — SUCRALFATE 1 G/1
TABLET ORAL
Qty: 360 TABLET | Refills: 1 | Status: SHIPPED | OUTPATIENT
Start: 2021-06-23 | End: 2021-10-01 | Stop reason: SDUPTHER

## 2021-07-04 DIAGNOSIS — F33.9 RECURRENT DEPRESSION (HCC): ICD-10-CM

## 2021-07-04 RX ORDER — CITALOPRAM 20 MG/1
TABLET, FILM COATED ORAL
Qty: 90 TABLET | Refills: 0 | Status: SHIPPED | OUTPATIENT
Start: 2021-07-04 | End: 2021-09-17 | Stop reason: SDUPTHER

## 2021-07-24 DIAGNOSIS — M48.00 SPINAL STENOSIS, UNSPECIFIED SPINAL REGION: ICD-10-CM

## 2021-07-24 DIAGNOSIS — M51.26 HNP (HERNIATED NUCLEUS PULPOSUS), LUMBAR: ICD-10-CM

## 2021-07-30 ENCOUNTER — TELEPHONE (OUTPATIENT)
Dept: ORTHOPEDIC SURGERY | Age: 61
End: 2021-07-30

## 2021-07-30 DIAGNOSIS — M17.0 PRIMARY OSTEOARTHRITIS OF BOTH KNEES: Primary | ICD-10-CM

## 2021-07-30 RX ORDER — PREGABALIN 150 MG/1
CAPSULE ORAL
Qty: 180 CAPSULE | Refills: 0 | Status: SHIPPED | OUTPATIENT
Start: 2021-07-30 | End: 2022-06-22

## 2021-07-30 NOTE — TELEPHONE ENCOUNTER
Patient called to make a follow-up appointment which is scheduled for 09/03/21. She's asking if we can order another round of euflexxa injections for both of her knees so she can get her first injection on the day of her next appointment. Please advise patient back regarding this at [phone].

## 2021-07-30 NOTE — TELEPHONE ENCOUNTER
Please notify patient. This is a narcotic medication and we have been refilling but patient is not followed by pain management or ortho for her chronic back pain/history. She also need a controlled substance on file. Will refill this time but patient need to follow ortho or I can refer to pain management going forward.

## 2021-07-30 NOTE — TELEPHONE ENCOUNTER
Called and left message for patient that medication was sent in (Lyrica) but this would be the last time due to this being a controlled substance and patient should really be seeing orthopedics for chronic pain.   If she doesn't want to go to orthopedics we can aleays do a referral to pain management

## 2021-08-03 ENCOUNTER — CLINICAL SUPPORT (OUTPATIENT)
Dept: FAMILY MEDICINE CLINIC | Age: 61
End: 2021-08-03
Payer: MEDICARE

## 2021-08-03 DIAGNOSIS — Z11.1 ENCOUNTER FOR PPD SKIN TEST READING: Primary | ICD-10-CM

## 2021-08-03 PROCEDURE — 86580 TB INTRADERMAL TEST: CPT | Performed by: NURSE PRACTITIONER

## 2021-08-03 NOTE — PROGRESS NOTES
Chief Complaint   Patient presents with    PPD Placement     1. Have you been to the ER, urgent care clinic since your last visit? Hospitalized since your last visit? No    2. Have you seen or consulted any other health care providers outside of the [address] [place] [address] since your last visit? Include any pap smears or colon screening. No      PPD Placement note  Jimbo Perkins, 59 y.o. female is here today for placement of PPD test  Reason for PPD test: employment   Pt taken PPD test before: yes  Verified in allergy area and with patient that they are not allergic to the products PPD is made of (Phenol or Tween). Yes  Is patient taking any oral or IV steroid medication now or have they taken it in the last month? no  Has the patient ever received the BCG vaccine?: no  Has the patient been in recent contact with anyone known or suspected of having active TB disease?: no       Date of exposure (if applicable): N/A       Name of person they were exposed to (if applicable): N/A  Patient's Country of origin?: [place]  O: Alert and oriented in NAD. P:  PPD placed on 8/3/2021. Patient advised to return for reading within 48-72 hours.

## 2021-08-05 ENCOUNTER — CLINICAL SUPPORT (OUTPATIENT)
Dept: FAMILY MEDICINE CLINIC | Age: 61
End: 2021-08-05

## 2021-08-05 DIAGNOSIS — Z11.1 ENCOUNTER FOR PPD SKIN TEST READING: Primary | ICD-10-CM

## 2021-08-05 LAB
MM INDURATION POC: 5 MM (ref 0–5)
PPD POC: NEGATIVE NEGATIVE

## 2021-08-05 NOTE — PROGRESS NOTES
PPD Reading Note  PPD read and results entered in [address]. Result: 5 mm induration.   Interpretation: negative reading  If test not read within 48-72 hours of initial placement, patient advised to repeat in other arm 1-3 weeks after this test.  Allergic reaction: no

## 2021-08-31 ENCOUNTER — TELEPHONE (OUTPATIENT)
Dept: FAMILY MEDICINE CLINIC | Age: 61
End: 2021-08-31

## 2021-08-31 NOTE — TELEPHONE ENCOUNTER
Advised patient that she would need to schedule an appointment to discuss with the provider.   Patient verbalized understanding

## 2021-08-31 NOTE — TELEPHONE ENCOUNTER
Pt is requesting medication (saxenda) stated that Dr Jose Luis Jimenez has prescribed this to her before.  Please contact this patient for any questions or clarification when available

## 2021-09-03 ENCOUNTER — OFFICE VISIT (OUTPATIENT)
Dept: ORTHOPEDIC SURGERY | Age: 61
End: 2021-09-03
Payer: MEDICARE

## 2021-09-03 VITALS
HEART RATE: 69 BPM | TEMPERATURE: 96.9 F | BODY MASS INDEX: 48.82 KG/M2 | OXYGEN SATURATION: 99 % | WEIGHT: 293 LBS | HEIGHT: 65 IN

## 2021-09-03 DIAGNOSIS — M25.562 CHRONIC PAIN OF LEFT KNEE: ICD-10-CM

## 2021-09-03 DIAGNOSIS — M25.561 CHRONIC PAIN OF RIGHT KNEE: ICD-10-CM

## 2021-09-03 DIAGNOSIS — G89.29 CHRONIC PAIN OF LEFT KNEE: ICD-10-CM

## 2021-09-03 DIAGNOSIS — G89.29 CHRONIC PAIN OF RIGHT KNEE: ICD-10-CM

## 2021-09-03 DIAGNOSIS — M17.0 PRIMARY OSTEOARTHRITIS OF BOTH KNEES: Primary | ICD-10-CM

## 2021-09-03 PROCEDURE — 20611 DRAIN/INJ JOINT/BURSA W/US: CPT | Performed by: PHYSICIAN ASSISTANT

## 2021-09-03 RX ORDER — GABAPENTIN 300 MG/1
300 CAPSULE ORAL
Qty: 30 CAPSULE | Refills: 0 | Status: SHIPPED | OUTPATIENT
Start: 2021-09-03 | End: 2021-11-17

## 2021-09-03 NOTE — PROGRESS NOTES
Patient: Bindu Weston                MRN: [id number]       SSN: [id number]  YOB: 1960        AGE: 59 y.o. SEX: female  There is no height or weight on file to calculate BMI. PCP: Jordan Vega NP  09/03/21        Pt presents today for their 1st euflexxa  injection for Bilateral knee . There has been no recent fevers/chills, systemic changes, or injuries to report. PE:  General A&Ox3, NAD  Exam of the knees reveals skin intact, no erythema, no ecchymosis, no signs for infection. No cyanosis, clubbing, or edema present distally. Neg calf tenderness, neg homans, no signs for DVT present. A: Bilateral knee OA    P: The Bilateral knee was injected 2ml Euflexxa with US guided assistance without complications. Patient was instructed on post injection care. Chart reviewed for the following:  Jordan EMERSON PA-C, have reviewed the History, Physical and updated the Allergic reactions for Sunshine Sunday Senthil? TIME OUT performed immediately prior to start of procedure:  Jordan EMERSON PA-C, have performed the following reviews on Bindu Weston prior to the start of the procedure:  ????????  * Patient was identified by name and date of birth   * Agreement on procedure being performed was verified  * Risks and Benefits explained to the patient  * Procedure site verified and marked as necessary  * Patient was positioned for comfort  * Consent was signed and verified    Time: 4:13 PM    Body part: Bilateral knee, intra-articular    Medication & Dose: 2ml Euflexxa    Date of procedure: 9/3/21    Procedure performed by: Kierra Fontenot PA-C    Provider assisted by: none    Patient assisted by: self    How tolerated by patient: tolerated the procedure well with no complications    Post Procedural Pain Scale: 9    Comments:  [address] using a frequency of 10MHz with a 12L-RS transducer head was used to confirm needle placement.   Ultrasound images captured using [facility] Ultrasound machine and scanned into patient's chart      Ramirez Chan PA-C

## 2021-09-10 ENCOUNTER — OFFICE VISIT (OUTPATIENT)
Dept: ORTHOPEDIC SURGERY | Age: 61
End: 2021-09-10
Payer: MEDICARE

## 2021-09-10 VITALS
HEART RATE: 60 BPM | HEIGHT: 65 IN | OXYGEN SATURATION: 99 % | WEIGHT: 293 LBS | TEMPERATURE: 97 F | BODY MASS INDEX: 48.82 KG/M2

## 2021-09-10 DIAGNOSIS — M17.0 PRIMARY OSTEOARTHRITIS OF BOTH KNEES: Primary | ICD-10-CM

## 2021-09-10 PROCEDURE — 20611 DRAIN/INJ JOINT/BURSA W/US: CPT | Performed by: ORTHOPAEDIC SURGERY

## 2021-09-10 NOTE — PROGRESS NOTES
Patient: Jovani Saleh                MRN: [id number]       SSN: [id number]  YOB: 1960        AGE: 59 y.o. SEX: female  Body mass index is 51.42 kg/m². PCP: Skip Doss NP  09/10/21        Pt presents today for their 2nd Euflexxa  injection for Bilateral knee . There has been no recent fevers/chills, systemic changes, or injuries to report. PE:  General A&Ox3, NAD  Exam of the knees reveals skin intact, no erythema, no ecchymosis, no signs for infection. No cyanosis, clubbing, or edema present distally. Neg calf tenderness, neg homans, no signs for DVT present. A: Bilateral knee OA    P: The Bilateral knee was injected 2ml Euflexxa with US guided assistance without complications. Patient was instructed on post injection care. Chart reviewed for the following:  Sherron EMERSON PA-C, have reviewed the History, Physical and updated the Allergic reactions for Sunshine Cooley? TIME OUT performed immediately prior to start of procedure:  Sherron EMERSON PA-C, have performed the following reviews on Jovani Saleh prior to the start of the procedure:  ????????  * Patient was identified by name and date of birth   * Agreement on procedure being performed was verified  * Risks and Benefits explained to the patient  * Procedure site verified and marked as necessary  * Patient was positioned for comfort  * Consent was signed and verified    Time: 4:34 PM    Body part: Bilateral knee, intra-articular    Medication & Dose: 2ml Euflexxa each knee    Date of procedure: 9/10/21    Procedure performed by: Jerlyn Essex, PA-C    Provider assisted by: none    Patient assisted by: self    How tolerated by patient: tolerated the procedure well with no complications    Post Procedural Pain Scale: 5    Comments:  [address] using a frequency of 10MHz with a 12L-RS transducer head was used to confirm needle placement.   Ultrasound images captured using [address] Ultrasound machine and scanned into patient's chart      Misael Valdez PA-C

## 2021-09-16 ENCOUNTER — OFFICE VISIT (OUTPATIENT)
Dept: ORTHOPEDIC SURGERY | Age: 61
End: 2021-09-16
Payer: MEDICARE

## 2021-09-16 VITALS — HEIGHT: 65 IN | HEART RATE: 89 BPM | TEMPERATURE: 95.6 F | BODY MASS INDEX: 51.42 KG/M2 | OXYGEN SATURATION: 99 %

## 2021-09-16 DIAGNOSIS — M17.0 PRIMARY OSTEOARTHRITIS OF BOTH KNEES: Primary | ICD-10-CM

## 2021-09-16 PROCEDURE — 20611 DRAIN/INJ JOINT/BURSA W/US: CPT | Performed by: PHYSICIAN ASSISTANT

## 2021-09-16 NOTE — PROGRESS NOTES
Patient: Bindu Weston                MRN: [id number]       SSN: [id number]  YOB: 1960        AGE: 59 y.o. SEX: female  Body mass index is 51.42 kg/m². PCP: Jordan Vega NP  09/16/21        Pt presents today for their 3rd euflexxa  injection for Bilateral knee . There has been no recent fevers/chills, systemic changes, or injuries to report. PE:  General A&Ox3, NAD  Exam of the knees reveals skin intact, no erythema, no ecchymosis, no signs for infection. No cyanosis, clubbing, or edema present distally. Neg calf tenderness, neg homans, no signs for DVT present. A: Bilateral knee OA    P: The Bilateral knee was injected 2ml Euflexxa with US guided assistance without complications. Patient was instructed on post injection care. Chart reviewed for the following:  I, Dulcy Runner, PA-C, have reviewed the History, Physical and updated the Allergic reactions for Sunshine Sunday Senthil? TIME OUT performed immediately prior to start of procedure:  I, Dulcy Runner, PA-C, have performed the following reviews on Bindu Weston prior to the start of the procedure:  ????????  * Patient was identified by name and date of birth   * Agreement on procedure being performed was verified  * Risks and Benefits explained to the patient  * Procedure site verified and marked as necessary  * Patient was positioned for comfort  * Consent was signed and verified    Time: 3:53 PM    Body part: Bilateral knee, intra-articular    Medication & Dose: 2ml Euflexxa each knee    Date of procedure: 9/16/21    Procedure performed by: Kierra Fontenot PA-C    Provider assisted by: none    Patient assisted by: self    How tolerated by patient: tolerated the procedure well with no complications    Post Procedural Pain Scale: 6    Comments:  [address] using a frequency of 10MHz with a 12L-RS transducer head was used to confirm needle placement.   Ultrasound images captured using [address] Ultrasound machine and scanned into patient's chart      Komal Dumas PA-C

## 2021-09-17 ENCOUNTER — VIRTUAL VISIT (OUTPATIENT)
Dept: FAMILY MEDICINE CLINIC | Age: 61
End: 2021-09-17
Payer: MEDICARE

## 2021-09-17 DIAGNOSIS — F33.9 RECURRENT DEPRESSION (HCC): ICD-10-CM

## 2021-09-17 DIAGNOSIS — E66.01 OBESITY, MORBID (HCC): ICD-10-CM

## 2021-09-17 DIAGNOSIS — E66.01 MORBID OBESITY WITH BMI OF 50.0-59.9, ADULT (HCC): ICD-10-CM

## 2021-09-17 PROCEDURE — 99213 OFFICE O/P EST LOW 20 MIN: CPT | Performed by: NURSE PRACTITIONER

## 2021-09-17 PROCEDURE — G8427 DOCREV CUR MEDS BY ELIG CLIN: HCPCS | Performed by: NURSE PRACTITIONER

## 2021-09-17 PROCEDURE — 3017F COLORECTAL CA SCREEN DOC REV: CPT | Performed by: NURSE PRACTITIONER

## 2021-09-17 PROCEDURE — G9717 DOC PT DX DEP/BP F/U NT REQ: HCPCS | Performed by: NURSE PRACTITIONER

## 2021-09-17 PROCEDURE — G9899 SCRN MAM PERF RSLTS DOC: HCPCS | Performed by: NURSE PRACTITIONER

## 2021-09-17 RX ORDER — CITALOPRAM 20 MG/1
20 TABLET, FILM COATED ORAL DAILY
Qty: 90 TABLET | Refills: 0 | Status: SHIPPED | OUTPATIENT
Start: 2021-09-17 | End: 2021-12-15

## 2021-09-17 NOTE — PROGRESS NOTES
Dillon Granger is a 59 y.o. female who was seen by synchronous (real-time) audio-video technology on 9/17/2021 for Annual Wellness Visit and Weight Management        Assessment & Plan:   Diagnoses and all orders for this visit:    1. Recurrent depression (HCC)  -     citalopram (CELEXA) 20 mg tablet; Take 1 Tablet by mouth daily. 2. Morbid obesity with BMI of 50.0-59.9, adult (HCC)  -     REFERRAL TO DIETITIAN  -     semaglutide, weight loss, 0.5 mg/0.5 mL pnij; 0.5 mg by SubCUTAneous route every seven (7) days for 4 doses. 3. Obesity, morbid ([facility] [address]        I spent at least 20 minutes on this visit with this established patient.   712  Subjective:     #Weight concerns  -tried weight loss center but it has a 2 yr waiting time  -due to BMI: 51 patient has been having trouble with joint pains however she does not qualify for surgery because of her weight, She ambulates with a walker   -Has tried to lose weight through gastric bypass surgery in the past however she did not follow diet plan due to losing her parents soon after her surgery and regained all the weight back   -Was at 10 before [facility] and is currently 309 lb  -Would like to try ozempic injections to help with weight loss, patient will follow up in 4 weeks for weight check and med management   -We will refer patient to dietitian, recommended [employer]      #Depression  -Patient needs refill on her citalopram  -mood has not been the best, situational depression as her son was in a life threatening situation recently,  diagnosed with prostate cancer  -would like to start therapy  -no SI/HI currently    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 Authorizing Provider   gabapentin (Neurontin) 300 mg capsule Take 1 Capsule by mouth nightly. Max Daily Amount: 300 mg. 9/3/21  Yes Rick BARNETT PA-C   pregabalin (LYRICA) 150 mg capsule TAKE 1 CAPSULE BY MOUTH TWICE DAILY. MAX DAILY AMOUNT: 300 MG 7/30/21  Yes Peyton Jimenez NP   citalopram (CELEXA) 20 mg tablet TAKE 1 TABLET BY MOUTH DAILY 7/4/21  Yes Peyton Jimenez NP   sucralfate (CARAFATE) 1 gram tablet TAKE 1 TABLET BY MOUTH FOUR TIMES DAILY FOR INDIGESTION 6/23/21  Yes Peyton Jimenez NP   diclofenac (Voltaren) 1 % gel Apply 2 g to affected area every six (6) hours as needed for Pain. 6/22/21  Yes Peyton Jimenez NP   Trelegy Ellipta 100-62.5-25 mcg inhaler INHALE 1 PUFF BY MOUTH DAILY 6/9/21  Yes Peyton Jimenez NP   dexlansoprazole (Dexilant) 60 mg CpDB capsule (delayed release) TAKE 1 CAPSULE BY MOUTH DAILY 4/9/21  Yes Peyton Jimenez NP   fluticasone propionate (FLONASE) 50 mcg/actuation nasal spray SHAKE LIQUID AND USE 2 SPRAYS IN EACH NOSTRIL DAILY 2/10/21  Yes Peyton Jimenez NP   buPROPion SR (WELLBUTRIN SR) 150 mg SR tablet TAKE 1 TABLET BY MOUTH TWICE DAILY 2/10/21  Yes Peyton Jimenez NP   albuterol (PROVENTIL HFA, VENTOLIN HFA, PROAIR HFA) 90 mcg/actuation inhaler Take 2 Puffs by inhalation every four (4) hours as needed for Wheezing or Shortness of Breath. 7/20/20  Yes Ling Beth NP   b-complex with vitamin c tablet Take 1 Tab by mouth daily.    Yes Provider, Historical   acetaminophen (TYLENOL EXTRA STRENGTH) 500 mg tablet Take  by mouth every six (6) hours as needed for Pain. Yes Other, MD Magali   calcium-cholecalciferol, d3, (CALCIUM 600 + D) 600-125 mg-unit tab Take 1,065 mg by mouth nightly. Indications: TAKES 2 AT BEDTIME   Yes Provider, Historical   omega 3-dha-epa-fish oil (FISH OIL) 100-160-1,000 mg cap Take 1,065 mg by mouth daily. Yes Provider, Historical   ferrous sulfate ER (IRON) 160 mg (50 mg iron) TbER tablet Take 1 Tab by mouth daily. Indications: PT TAKES 40 MG   Yes Provider, Historical   multivitamin (ONE A DAY) tablet Take 1 Tab by mouth daily. Yes Provider, Historical   predniSONE (DELTASONE) 20 mg tablet Take 3 tablets daily by mouth for 3 days, then take 2 tablets daily by mouth for 3 days, then take 1 tablet daily by mouth for 3 days. Patient not taking: Reported on 9/17/2021 6/22/21   Lety Jimenez NP   Linzess 72 mcg cap capsule TAKE 1 CAPSULE BY MOUTH DAILY BEFORE BREAKFAST  Patient not taking: Reported on 9/17/2021 3/16/21   Lety Jimenez NP   furosemide (LASIX) 20 mg tablet Take 1 Tab by mouth daily as needed (severe leg swelling).   Patient not taking: Reported on 9/17/2021 7/2/19   Shawn Clark NP     Patient Active Problem List   Diagnosis Code    Chronic obstructive pulmonary disease ([facility] [address] J44.9    Recurrent depression ([facility] [address] F33.9    Fibrosarcoma ([facility] [address] C49.9    Alcoholism in remission ([facility] [address] F10.21    Sleep apnea G47.30    History of gastric bypass Z98.84    Obesity, morbid ([facility] [address] E66.01    Osteopenia M85.80    GERD (gastroesophageal reflux disease) K21.9    Chronic pain of both knees M25.561, M25.562, G89.29    Prediabetes R73.03    Age-related nuclear cataract, bilateral H25.13    Xanthelasma of left upper eyelid H02.64    Xanthelasma of right upper eyelid H02.61    Sebaceous cyst of eyelid H02.829    Personal history of smoking Z87.891     Past Surgical History:   Procedure Laterality Date    HX ARTHRODESIS  01/2000    Herniated Disc, arthritis right foot 06/06, 07/07, C 6/7, C 4/6 Stenosis  HX CHOLECYSTECTOMY  2008    gallbladder disease    HX COLONOSCOPY  2009    HX GASTRIC BYPASS  2012    GASTRIC BYPASS  [place] Gastric Bypass      HX HYSTERECTOMY  1994    HX MENISCECTOMY  10/2015    right repari of torn meniscus    HX MENISCECTOMY Left 2018    partial meniscectomy due to tear    HX MYOMECTOMY  1984    benign tumor    HX ORTHOPAEDIC  1964    orthopaedic excision Fibrosarcoma and Lymphangiogram    HX PELVIC LAPAROSCOPY  1984    large uterine blockage    NEUROLOGICAL PROCEDURE UNLISTED  ,     Cervical fusion     Social History     Socioeconomic History    Marital status:      Spouse name: Not on file    Number of children: Not on file    Years of education: Not on file    Highest education level: Not on file   Occupational History    Not on file   Tobacco Use    Smoking status: Former Smoker     Packs/day: 0.50     Years: 45.00     Pack years: 22.50     Quit date: 2020     Years since quittin.2    Smokeless tobacco: Never Used   Substance and Sexual Activity    Alcohol use: No     Comment: quit 1980s- was heavy etoh    Drug use: Not Currently     Comment: marijuana, cocaine 30 yrs ago    Sexual activity: Never   Other Topics Concern    Not on file   Social History Narrative    Not on file     Social Determinants of Health     Financial Resource Strain:     Difficulty of Paying Living Expenses:    Food Insecurity:     Worried About Running Out of Food in the Last Year:     [address] in the Last Year:    Transportation Needs:     Lack of Transportation (Medical):      Lack of Transportation (Non-Medical):    Physical Activity:     Days of Exercise per Week:     Minutes of Exercise per Session:    Stress:     Feeling of Stress :    Social Connections:     Frequency of Communication with Friends and Family:     Frequency of Social Gatherings with Friends and Family:     Attends Congregation Services:     Active Member of Clubs or Organizations:     Attends Club or Organization Meetings:     Marital Status:    Intimate Partner Violence:     Fear of Current or Ex-Partner:     Emotionally Abused:     Physically Abused:     Sexually Abused:        Review of Systems   Constitutional: Negative for chills, fever and malaise/fatigue. Eyes: Negative. Respiratory: Negative for cough, shortness of breath and wheezing. Cardiovascular: Negative for chest pain, palpitations and leg swelling. Musculoskeletal: Negative. Skin: Negative. Neurological: Negative. Objective:     Patient-Reported Vitals 9/17/2021   Patient-Reported Weight 310   Patient-Reported LMP menopause      General: alert, cooperative, no distress   Mental  status: normal mood, behavior, speech, dress, motor activity, and thought processes, able to follow commands   HENT: NCAT   Neck: no visualized mass   Resp: no respiratory distress   Neuro: no gross deficits   Skin: no discoloration or lesions of concern on visible areas   Psychiatric: normal affect, consistent with stated mood, no evidence of hallucinations     Additional exam findings: We discussed the expected course, resolution and complications of the diagnosis(es) in detail. Medication risks, benefits, costs, interactions, and alternatives were discussed as indicated. I advised her to contact the office if her condition worsens, changes or fails to improve as anticipated. She expressed understanding with the diagnosis(es) and plan. Ronen Duran, who was evaluated through a patient-initiated, synchronous (real-time) audio-video encounter, and/or her healthcare decision maker, is aware that it is a billable service, with coverage as determined by her insurance carrier. She provided verbal consent to proceed: Yes, and patient identification was verified.  It was conducted pursuant to the emergency declaration under the [facility] waiver authority and the [employer] General Act. A caregiver was present when appropriate. Ability to conduct physical exam was limited. I was in the office. The patient was at home.       Zeke Dooley NP

## 2021-09-21 ENCOUNTER — TELEPHONE (OUTPATIENT)
Dept: FAMILY MEDICINE CLINIC | Age: 61
End: 2021-09-21

## 2021-09-21 NOTE — TELEPHONE ENCOUNTER
Patient called regarding weight loss injection medication. She states the one prescribed is expensive. She wants to discuss her options.

## 2021-09-23 NOTE — TELEPHONE ENCOUNTER
Please see refill request    Patient was last seen on 11-    Last filled   7-   ##180 x 2    Thank you
Requested Prescriptions     Pending Prescriptions Disp Refills    pregabalin (Lyrica) 150 mg capsule 180 Cap 2     Sig: Take 1 Cap by mouth two (2) times a day. Max Daily Amount: 300 mg.
[facility]

## 2021-09-28 ENCOUNTER — TELEPHONE (OUTPATIENT)
Dept: FAMILY MEDICINE CLINIC | Age: 61
End: 2021-09-28

## 2021-09-29 ENCOUNTER — TRANSCRIBE ORDER (OUTPATIENT)
Dept: SCHEDULING | Age: 61
End: 2021-09-29

## 2021-09-30 ENCOUNTER — TELEPHONE (OUTPATIENT)
Dept: FAMILY MEDICINE CLINIC | Age: 61
End: 2021-09-30

## 2021-09-30 DIAGNOSIS — N63.0 LUMP IN FEMALE BREAST: ICD-10-CM

## 2021-09-30 DIAGNOSIS — N64.59 OTHER SIGNS AND SYMPTOMS IN BREAST: ICD-10-CM

## 2021-09-30 DIAGNOSIS — Z12.31 ENCOUNTER FOR SCREENING MAMMOGRAM FOR MALIGNANT NEOPLASM OF BREAST: Primary | ICD-10-CM

## 2021-09-30 NOTE — TELEPHONE ENCOUNTER
Pt stated she found a lump in her breast and is now requesting a mammogram order to be placed.  Please follow up when available

## 2021-10-01 DIAGNOSIS — K21.9 GASTROESOPHAGEAL REFLUX DISEASE, UNSPECIFIED WHETHER ESOPHAGITIS PRESENT: Primary | ICD-10-CM

## 2021-10-01 RX ORDER — SUCRALFATE 1 G/1
TABLET ORAL
Qty: 360 TABLET | Refills: 0 | Status: SHIPPED | OUTPATIENT
Start: 2021-10-01 | End: 2022-07-21 | Stop reason: SDUPTHER

## 2021-10-01 NOTE — TELEPHONE ENCOUNTER
Spoke with patient and advised orders have been written and she should be hearing back from them before the end of next week.   Patient verbalized understanding

## 2021-10-04 ENCOUNTER — DOCUMENTATION ONLY (OUTPATIENT)
Dept: PULMONOLOGY | Age: 61
End: 2021-10-04

## 2021-10-04 NOTE — PROGRESS NOTES
Spoke to [place] at [facility] pulmonary rehab. Confirmed that another provider can sign order even though patient has not been seen in the office since 6/21/2021. Just need order in case O2 is needed for patient during rehab. Will have Dr. Robyn Calix. Sandie sign order for Dr. Jose Dietz.

## 2021-10-13 ENCOUNTER — OFFICE VISIT (OUTPATIENT)
Dept: FAMILY MEDICINE CLINIC | Age: 61
End: 2021-10-13
Payer: MEDICARE

## 2021-10-13 VITALS
TEMPERATURE: 99.2 F | DIASTOLIC BLOOD PRESSURE: 74 MMHG | BODY MASS INDEX: 48.82 KG/M2 | HEIGHT: 65 IN | HEART RATE: 67 BPM | OXYGEN SATURATION: 97 % | RESPIRATION RATE: 24 BRPM | WEIGHT: 293 LBS | SYSTOLIC BLOOD PRESSURE: 124 MMHG

## 2021-10-13 DIAGNOSIS — K57.92 DIVERTICULITIS: ICD-10-CM

## 2021-10-13 DIAGNOSIS — Z23 ENCOUNTER FOR IMMUNIZATION: ICD-10-CM

## 2021-10-13 DIAGNOSIS — N63.0 BREAST LUMP: ICD-10-CM

## 2021-10-13 DIAGNOSIS — Z76.89 ENCOUNTER FOR WEIGHT MANAGEMENT: ICD-10-CM

## 2021-10-13 PROCEDURE — 99214 OFFICE O/P EST MOD 30 MIN: CPT | Performed by: NURSE PRACTITIONER

## 2021-10-13 PROCEDURE — G9717 DOC PT DX DEP/BP F/U NT REQ: HCPCS | Performed by: NURSE PRACTITIONER

## 2021-10-13 PROCEDURE — G8417 CALC BMI ABV UP PARAM F/U: HCPCS | Performed by: NURSE PRACTITIONER

## 2021-10-13 PROCEDURE — G9899 SCRN MAM PERF RSLTS DOC: HCPCS | Performed by: NURSE PRACTITIONER

## 2021-10-13 PROCEDURE — 3017F COLORECTAL CA SCREEN DOC REV: CPT | Performed by: NURSE PRACTITIONER

## 2021-10-13 PROCEDURE — 90686 IIV4 VACC NO PRSV 0.5 ML IM: CPT | Performed by: NURSE PRACTITIONER

## 2021-10-13 PROCEDURE — G0008 ADMIN INFLUENZA VIRUS VAC: HCPCS | Performed by: NURSE PRACTITIONER

## 2021-10-13 PROCEDURE — G8427 DOCREV CUR MEDS BY ELIG CLIN: HCPCS | Performed by: NURSE PRACTITIONER

## 2021-10-13 RX ORDER — CIPROFLOXACIN 500 MG/1
500 TABLET ORAL 2 TIMES DAILY
Qty: 14 TABLET | Refills: 0 | Status: SHIPPED | OUTPATIENT
Start: 2021-10-13 | End: 2021-10-20

## 2021-10-13 RX ORDER — METRONIDAZOLE 500 MG/1
500 TABLET ORAL 3 TIMES DAILY
Qty: 21 TABLET | Refills: 0 | Status: SHIPPED | OUTPATIENT
Start: 2021-10-13 | End: 2021-10-20

## 2021-10-13 NOTE — PATIENT INSTRUCTIONS
Medicare Wellness Visit, Female     The best way to live healthy is to have a lifestyle where you eat a well-balanced diet, exercise regularly, limit alcohol use, and quit all forms of tobacco/nicotine, if applicable. Regular preventive services are another way to keep healthy. Preventive services (vaccines, screening tests, monitoring & exams) can help personalize your care plan, which helps you manage your own care. Screening tests can find health problems at the earliest stages, when they are easiest to treat. [facility] follows the current, evidence-based guidelines published by the [facility] ([facility]) when recommending preventive services for our patients. Because we follow these guidelines, sometimes recommendations change over time as research supports it. (For example, mammograms used to be recommended annually. Even though Medicare will still pay for an annual mammogram, the newer guidelines recommend a mammogram every two years for women of average risk). Of course, you and your doctor may decide to screen more often for some diseases, based on your risk and your co-morbidities (chronic disease you are already diagnosed with). Preventive services for you include:  - Medicare offers their members a free annual wellness visit, which is time for you and your primary care provider to discuss and plan for your preventive service needs. Take advantage of this benefit every year!  -All adults over the age of 72 should receive the recommended pneumonia vaccines. Current USPSTF guidelines recommend a series of two vaccines for the best pneumonia protection.   -All adults should have a flu vaccine yearly and a tetanus vaccine every 10 years.   -All adults age 48 and older should receive the shingles vaccines (series of two vaccines).       -All adults age 38-68 who are overweight should have a diabetes screening test once every three years.   -All adults born between [birth year] and 1965 should be screened once for Hepatitis C.  -Other screening tests and preventive services for persons with diabetes include: an eye exam to screen for diabetic retinopathy, a kidney function test, a foot exam, and stricter control over your cholesterol.   -Cardiovascular screening for adults with routine risk involves an electrocardiogram (ECG) at intervals determined by your doctor.   -Colorectal cancer screenings should be done for adults age 54-65 with no increased risk factors for colorectal cancer. There are a number of acceptable methods of screening for this type of cancer. Each test has its own benefits and drawbacks. Discuss with your doctor what is most appropriate for you during your annual wellness visit. The different tests include: colonoscopy (considered the best screening method), a fecal occult blood test, a fecal DNA test, and sigmoidoscopy.    -A bone mass density test is recommended when a woman turns 65 to screen for osteoporosis. This test is only recommended one time, as a screening. Some providers will use this same test as a disease monitoring tool if you already have osteoporosis. -Breast cancer screenings are recommended every other year for women of normal risk, age 54-69.  -Cervical cancer screenings for women over age 72 are only recommended with certain risk factors. Here is a list of your current Health Maintenance items (your personalized list of preventive services) with a due date:  Health Maintenance Due   Topic Date Due    Smoker or Former Smoker - Annual Lung Cancer Screen  11/16/2020    Yearly Flu Vaccine (1) 09/01/2021    Annual Well Visit  09/12/2021    Colorectal Screening  09/20/2021    Mammogram  10/14/2021            Body Mass Index: Care Instructions  Your Care Instructions     Body mass index (BMI) can help you see if your weight is raising your risk for health problems.  It uses a formula to compare how much you weigh with how tall you are. · A BMI lower than 18.5 is considered underweight. · A BMI between 18.5 and 24.9 is considered healthy. · A BMI between 25 and 29.9 is considered overweight. A BMI of 30 or higher is considered obese. If your BMI is in the normal range, it means that you have a lower risk for weight-related health problems. If your BMI is in the overweight or obese range, you may be at increased risk for weight-related health problems, such as high blood pressure, heart disease, stroke, arthritis or joint pain, and diabetes. If your BMI is in the underweight range, you may be at increased risk for health problems such as fatigue, lower protection (immunity) against illness, muscle loss, bone loss, hair loss, and hormone problems. BMI is just one measure of your risk for weight-related health problems. You may be at higher risk for health problems if you are not active, you eat an unhealthy diet, or you drink too much alcohol or use tobacco products. Follow-up care is a key part of your treatment and safety. Be sure to make and go to all appointments, and call your doctor if you are having problems. It's also a good idea to know your test results and keep a list of the medicines you take. How can you care for yourself at home? · Practice healthy eating habits. This includes eating plenty of fruits, vegetables, whole grains, lean protein, and low-fat dairy. · If your doctor recommends it, get more exercise. Walking is a good choice. Bit by bit, increase the amount you walk every day. Try for at least 30 minutes on most days of the week. · Do not smoke. Smoking can increase your risk for health problems. If you need help quitting, talk to your doctor about stop-smoking programs and medicines. These can increase your chances of quitting for good. · Limit alcohol to 2 drinks a day for men and 1 drink a day for women. Too much alcohol can cause health problems.   If you have a BMI higher than 25  · Your doctor may do other tests to check your risk for weight-related health problems. This may include measuring the distance around your waist. A waist measurement of more than 40 inches in men or 35 inches in women can increase the risk of weight-related health problems. · Talk with your doctor about steps you can take to stay healthy or improve your health. You may need to make lifestyle changes to lose weight and stay healthy, such as changing your diet and getting regular exercise. If you have a BMI lower than 18.5  · Your doctor may do other tests to check your risk for health problems. · Talk with your doctor about steps you can take to stay healthy or improve your health. You may need to make lifestyle changes to gain or maintain weight and stay healthy, such as getting more healthy foods in your diet and doing exercises to build muscle. Where can you learn more? Go to http://www.melo.com/  Enter S176 in the search box to learn more about \"Body Mass Index: Care Instructions. \"  Current as of: March 17, 2021               Content Version: 13.0  © 2006-2021 Healthwise[employer] Inc[employer]. Care instructions adapted under license by [employer] (which disclaims liability or warranty for this information). If you have questions about a medical condition or this instruction, always ask your healthcare professional. [place] [address] any warranty or liability for your use of this information.     [facility] [place] [id number], [address] [place] [address], [address] [place] [address],[address]  Phone: [phone]  Fax: [phone]     *Closest [employer] offering nutrition/weight loss services

## 2021-10-13 NOTE — PROGRESS NOTES
OFFICE NOTE    Bisi Damon is a 59 y.o. female presenting today for the following:  Chief Complaint   Patient presents with    Annual Wellness Visit    Breast Mass     left breast noticed 2 weeks      HPI     Lump in breast  Mammogram ordered on 9/30/2021. Mammogram is scheduled for 10/19/2021. Patient have have a history of cancer/breast cancer in her family. Lump found through self breast checks in the left breast.  The pain, drainage from left breast.    Weight management  Ordered patient semaglutide on last visit and it was denied her insurance will not pay for it. We also referred patient to dietitian on last visit. Patient states she have not heard from them gave information today. States that this time she is not wanting to anything about weight management until she have her mammogram done and know that everything is okay with that. Diverticulitis  Patient was diagnosed with diverticulitis 6 years ago. She states she for the last 3 days she have been hurting in her left flank area. She states she is also having some thick soft stool. Today patient have had 3 episodes today. She states these are the same symptoms she had on last exacerbation. Will order antibiotic today. Review of Systems   Constitutional: Negative for fatigue and fever. HENT: Negative. Eyes: Negative. Respiratory: Negative for cough, chest tightness, shortness of breath and wheezing. Cardiovascular: Negative for chest pain and palpitations. Neurological: Negative for dizziness, light-headedness and headaches.          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    GERD (gastroesophageal reflux disease)     History of gastric bypass 2012    History of meniscal tear 2015, 2018    right in 2015, left in 2018    HNP (herniated nucleus pulposus), lumbar patient reported    Lung nodules     resolved  CT    Osteopenia 10/03/2017    Recurrent depression ([address]     Sleep apnea     on cpap    Spinal stenosis, lumbar     patient reported    Xanthelasma of left upper eyelid 2020       Past Surgical History:   Procedure Laterality Date    HX ARTHRODESIS  2000    Herniated Disc, arthritis right foot , , C 6/7, C 4/6 Stenosis    HX CHOLECYSTECTOMY  2008    gallbladder disease    HX COLONOSCOPY  2009    HX GASTRIC BYPASS      GASTRIC BYPASS  [facility] Gastric Bypass      HX HYSTERECTOMY  1994    HX MENISCECTOMY  10/2015    right repari of torn meniscus    HX MENISCECTOMY Left 2018    partial meniscectomy due to tear    HX MYOMECTOMY  1984    benign tumor    HX ORTHOPAEDIC  1964    orthopaedic excision Fibrosarcoma and Lymphangiogram    HX PELVIC LAPAROSCOPY  1984    large uterine blockage    NEUROLOGICAL PROCEDURE UNLISTED  ,     Cervical fusion       Social History     Socioeconomic History    Marital status:      Spouse name: Not on file    Number of children: Not on file    Years of education: Not on file    Highest education level: Not on file   Occupational History    Not on file   Tobacco Use    Smoking status: Former Smoker     Packs/day: 0.50     Years: 45.00     Pack years: 22.50     Quit date: 2020     Years since quittin.3    Smokeless tobacco: Never Used   Substance and Sexual Activity    Alcohol use: No     Comment: quit - was heavy etoh    Drug use: Not Currently     Comment: marijuana, cocaine 30 yrs ago    Sexual activity: Never   Other Topics Concern    Not on file   Social History Narrative    Not on file     Social Determinants of Health     Financial Resource Strain:     Difficulty of Paying Living Expenses:    Food Insecurity:     Worried About Running Out of Food in the Last Year:     [address] in the Last Year:    Transportation Needs:     Lack of Transportation (Medical):  Lack of Transportation (Non-Medical):    Physical Activity:     Days of Exercise per Week:     Minutes of Exercise per Session:    Stress:     Feeling of Stress :    Social Connections:     Frequency of Communication with Friends and Family:     Frequency of Social Gatherings with Friends and Family:     Attends Mandaeism Services:     Active Member of Clubs or Organizations:     Attends Club or Organization Meetings:     Marital Status:    Intimate Partner Violence:     Fear of Current or Ex-Partner:     Emotionally Abused:     Physically Abused:     Sexually Abused: Allergies   Allergen Reactions    Adhesive Tape-Silicones Swelling     REALLY BAD SWELLING AND SORE APPEAR    Alcohol Other (comments)     PT STATES SHE IS AN ALCOHOLIC    Lactose Other (comments)     PT STATES IT MAKES HER STOMACH HURT    Percodan [Oxycodone Hcl-Oxycodone-Asa] Itching and Other (comments)     crying    Nsaids (Non-Steroidal Anti-Inflammatory Drug) Other (comments)     PT NOT ABLE TO TAKE DUE TO GASTRIC BYPASS       Current Outpatient Medications   Medication Sig Dispense Refill    ciprofloxacin HCl (CIPRO) 500 mg tablet Take 1 Tablet by mouth two (2) times a day for 7 days. 14 Tablet 0    metroNIDAZOLE (FLAGYL) 500 mg tablet Take 1 Tablet by mouth three (3) times daily for 7 days. 21 Tablet 0    sucralfate (CARAFATE) 1 gram tablet Take 1 tablet by mouth Four times a day as needed for indigestion. 360 Tablet 0    citalopram (CELEXA) 20 mg tablet Take 1 Tablet by mouth daily. 90 Tablet 0    gabapentin (Neurontin) 300 mg capsule Take 1 Capsule by mouth nightly. Max Daily Amount: 300 mg. 30 Capsule 0    pregabalin (LYRICA) 150 mg capsule TAKE 1 CAPSULE BY MOUTH TWICE DAILY. MAX DAILY AMOUNT: 300  Capsule 0    diclofenac (Voltaren) 1 % gel Apply 2 g to affected area every six (6) hours as needed for Pain.  100 g 2    Trelegy Ellipta 100-62.5-25 mcg inhaler INHALE 1 PUFF BY MOUTH DAILY 2 Inhaler 3    dexlansoprazole (Dexilant) 60 mg CpDB capsule (delayed release) TAKE 1 CAPSULE BY MOUTH DAILY 90 Cap 3    Linzess 72 mcg cap capsule TAKE 1 CAPSULE BY MOUTH DAILY BEFORE BREAKFAST 30 Cap 1    fluticasone propionate (FLONASE) 50 mcg/actuation nasal spray SHAKE LIQUID AND USE 2 SPRAYS IN EACH NOSTRIL DAILY 48 g 5    buPROPion SR (WELLBUTRIN SR) 150 mg SR tablet TAKE 1 TABLET BY MOUTH TWICE DAILY 180 Tab 3    albuterol (PROVENTIL HFA, VENTOLIN HFA, PROAIR HFA) 90 mcg/actuation inhaler Take 2 Puffs by inhalation every four (4) hours as needed for Wheezing or Shortness of Breath. 3 Inhaler 4    b-complex with vitamin c tablet Take 1 Tab by mouth daily.  acetaminophen (TYLENOL EXTRA STRENGTH) 500 mg tablet Take  by mouth every six (6) hours as needed for Pain.  calcium-cholecalciferol, d3, (CALCIUM 600 + D) 600-125 mg-unit tab Take 1,065 mg by mouth nightly. Indications: TAKES 2 AT BEDTIME      omega 3-dha-epa-fish oil (FISH OIL) 100-160-1,000 mg cap Take 1,065 mg by mouth daily.  ferrous sulfate ER (IRON) 160 mg (50 mg iron) TbER tablet Take 1 Tab by mouth daily. Indications: PT TAKES 40 MG      multivitamin (ONE A DAY) tablet Take 1 Tab by mouth daily.  predniSONE (DELTASONE) 20 mg tablet Take 3 tablets daily by mouth for 3 days, then take 2 tablets daily by mouth for 3 days, then take 1 tablet daily by mouth for 3 days. (Patient not taking: Reported on 9/17/2021) 18 Tablet 0    furosemide (LASIX) 20 mg tablet Take 1 Tab by mouth daily as needed (severe leg swelling).  (Patient not taking: Reported on 9/17/2021) 90 Tab 1         Patient Care Team:  Patient Care Team:  Marry Duong NP as PCP - General (Nurse Practitioner)  Marry Duong NP as PCP - [facility]  Troy Castanon DO (Orthopedic Surgery)  Marybeth Ash NP (Neurology)  Edu Parker MD ([address] [place] [address])  Micheal Sidhu [place] as Physician Assistant (Psychiatry)  Lakesha Louis MD (Gastroenterology)  Jesse Gunn MD (Cardiology)  Lety Sainz MD (Neurology)      LABS:  No results found for any visits on 10/13/21. RADIOLOGY:  No recent results      Physical Exam  Vitals and nursing note reviewed. Constitutional:       Appearance: Normal appearance. She is well-developed. Cardiovascular:      Rate and Rhythm: Normal rate and regular rhythm. Heart sounds: Normal heart sounds. Pulmonary:      Effort: Pulmonary effort is normal. No respiratory distress. Breath sounds: Normal breath sounds. No wheezing or rales. Abdominal:      General: Bowel sounds are normal. There is no distension. Palpations: Abdomen is soft. Tenderness: There is no abdominal tenderness. There is no rebound. Musculoskeletal:         General: Normal range of motion. Cervical back: Normal range of motion and neck supple. Lymphadenopathy:      Cervical: No cervical adenopathy. Skin:     General: Skin is warm and dry. Neurological:      Mental Status: She is alert and oriented to person, place, and time. Deep Tendon Reflexes: Reflexes are normal and symmetric. Psychiatric:         Mood and Affect: Mood normal.           Vitals:    10/13/21 1520   BP: (!) 148/80   Pulse: 67   Resp: 24   Temp: 99.2 °F (37.3 °C)   TempSrc: Temporal   SpO2: 97%   Weight: 301 lb (136.5 kg)   Height: 5' 5\" (1.651 m)   PainSc:   6   PainLoc: Neck         Assessment and Plan    1. Breast lump  Patient is scheduled for upcoming mammogram on 10/19/2021    2. Diverticulitis    - ciprofloxacin HCl (CIPRO) 500 mg tablet; Take 1 Tablet by mouth two (2) times a day for 7 days. Dispense: 14 Tablet; Refill: 0  - metroNIDAZOLE (FLAGYL) 500 mg tablet; Take 1 Tablet by mouth three (3) times daily for 7 days. Dispense: 21 Tablet; Refill: 0    3.  Encounter for immunization    - INFLUENZA VIRUS VAC QUAD,SPLIT,PRESV FREE SYRINGE IM  - Arnaldo Warren ADMIN,1 SINGLE/COMB VAC/TOXOID    4. Encounter for weight management  Patient will follow up with nutrition info given today to call office      MDM    Procedures      *Plan of care reviewed with patient. Patient in agreement with plan and expresses understanding. All questions answered and patient encouraged to call or RTO if further questions or concerns. Advised patient if symptoms worsen to go to nearest ER or call 911. AVS and recommendations given to patient upon discharge.

## 2021-10-13 NOTE — PROGRESS NOTES
After obtaining consent, and per orders of Dr. Billy Vanessa, injection of flulaval given by Johanny Sheriff. Patient instructed to remain in clinic for 20 minutes afterwards, and to report any adverse reaction to me immediately.

## 2021-10-14 NOTE — PROGRESS NOTES
After obtaining consent, and per orders of Dr. Mel Borrero, NP, injection of [facility] was given by Anthony Patterson. Patient instructed to remain in clinic for 20 minutes afterwards, and to report any adverse reaction to me immediately.

## 2021-10-19 ENCOUNTER — HOSPITAL ENCOUNTER (OUTPATIENT)
Dept: ULTRASOUND IMAGING | Age: 61
Discharge: HOME | End: 2021-10-19
Payer: MEDICARE

## 2021-10-19 ENCOUNTER — HOSPITAL ENCOUNTER (OUTPATIENT)
Dept: MAMMOGRAPHY | Age: 61
Discharge: HOME | End: 2021-10-19
Payer: MEDICARE

## 2021-10-19 DIAGNOSIS — Z12.31 ENCOUNTER FOR SCREENING MAMMOGRAM FOR MALIGNANT NEOPLASM OF BREAST: ICD-10-CM

## 2021-10-19 DIAGNOSIS — N64.59 OTHER SIGNS AND SYMPTOMS IN BREAST: ICD-10-CM

## 2021-10-19 DIAGNOSIS — N63.0 LUMP IN FEMALE BREAST: ICD-10-CM

## 2021-10-19 PROCEDURE — 77062 BREAST TOMOSYNTHESIS BI: CPT

## 2021-10-19 PROCEDURE — 76642 ULTRASOUND BREAST LIMITED: CPT

## 2021-10-22 ENCOUNTER — HOSPITAL ENCOUNTER (OUTPATIENT)
Dept: MAMMOGRAPHY | Age: 61
Discharge: HOME | End: 2021-10-22
Payer: MEDICARE

## 2021-10-22 ENCOUNTER — HOSPITAL ENCOUNTER (OUTPATIENT)
Dept: ULTRASOUND IMAGING | Age: 61
Discharge: HOME | End: 2021-10-22
Payer: MEDICARE

## 2021-10-22 DIAGNOSIS — Z12.31 ENCOUNTER FOR SCREENING MAMMOGRAM FOR MALIGNANT NEOPLASM OF BREAST: ICD-10-CM

## 2021-10-22 DIAGNOSIS — R92.8 ABNORMAL MAMMOGRAM: ICD-10-CM

## 2021-10-22 DIAGNOSIS — N63.0 LUMP OR MASS IN BREAST: ICD-10-CM

## 2021-10-22 PROCEDURE — 77030020003 US BX BREAST VAC LT 1ST LESION W/CLIP AND SPECIMEN

## 2021-10-22 PROCEDURE — 77065 DX MAMMO INCL CAD UNI: CPT

## 2021-10-22 PROCEDURE — 88305 TISSUE EXAM BY PATHOLOGIST: CPT

## 2021-10-22 PROCEDURE — 74011000250 HC RX REV CODE- 250: Performed by: NURSE PRACTITIONER

## 2021-10-22 PROCEDURE — 88360 TUMOR IMMUNOHISTOCHEM/MANUAL: CPT

## 2021-10-22 RX ORDER — LIDOCAINE HYDROCHLORIDE 10 MG/ML
5 INJECTION, SOLUTION EPIDURAL; INFILTRATION; INTRACAUDAL; PERINEURAL
Status: COMPLETED | OUTPATIENT
Start: 2021-10-22 | End: 2021-10-22

## 2021-10-22 RX ORDER — LIDOCAINE HYDROCHLORIDE AND EPINEPHRINE 10; 10 MG/ML; UG/ML
1.5 INJECTION, SOLUTION INFILTRATION; PERINEURAL
Status: COMPLETED | OUTPATIENT
Start: 2021-10-22 | End: 2021-10-22

## 2021-10-22 RX ADMIN — LIDOCAINE HYDROCHLORIDE 5 ML: 10 INJECTION, SOLUTION EPIDURAL; INFILTRATION; INTRACAUDAL; PERINEURAL at 09:06

## 2021-10-22 RX ADMIN — LIDOCAINE HYDROCHLORIDE,EPINEPHRINE BITARTRATE 15 MG: 10; .01 INJECTION, SOLUTION INFILTRATION; PERINEURAL at 09:07

## 2021-10-27 ENCOUNTER — TELEPHONE (OUTPATIENT)
Dept: FAMILY MEDICINE CLINIC | Age: 61
End: 2021-10-27

## 2021-10-27 NOTE — TELEPHONE ENCOUNTER
Received a call from Vashti Buitrago at the Surgical specialist center. Patient is positive for breast cancer (left). She have an appointment with the surgical specialist today at 3 pm were they will share and go over results with patient. She will be seeing Dr. Anne-Marie Mairano (Surgeon). No referral is needed according to Lizette Reyez.

## 2021-10-28 ENCOUNTER — NURSE NAVIGATOR (OUTPATIENT)
Dept: SURGERY | Age: 61
End: 2021-10-28

## 2021-10-28 ENCOUNTER — OFFICE VISIT (OUTPATIENT)
Dept: SURGERY | Age: 61
End: 2021-10-28
Payer: MEDICARE

## 2021-10-28 VITALS
WEIGHT: 293 LBS | HEIGHT: 65 IN | DIASTOLIC BLOOD PRESSURE: 74 MMHG | RESPIRATION RATE: 22 BRPM | BODY MASS INDEX: 48.82 KG/M2 | SYSTOLIC BLOOD PRESSURE: 139 MMHG | TEMPERATURE: 96.8 F

## 2021-10-28 DIAGNOSIS — Z17.1 MALIGNANT NEOPLASM OF UPPER-INNER QUADRANT OF LEFT BREAST IN FEMALE, ESTROGEN RECEPTOR NEGATIVE (HCC): Primary | ICD-10-CM

## 2021-10-28 DIAGNOSIS — C50.212 MALIGNANT NEOPLASM OF UPPER-INNER QUADRANT OF LEFT BREAST IN FEMALE, ESTROGEN RECEPTOR NEGATIVE (HCC): Primary | ICD-10-CM

## 2021-10-28 PROCEDURE — G9717 DOC PT DX DEP/BP F/U NT REQ: HCPCS | Performed by: SURGERY

## 2021-10-28 PROCEDURE — G8417 CALC BMI ABV UP PARAM F/U: HCPCS | Performed by: SURGERY

## 2021-10-28 PROCEDURE — 3017F COLORECTAL CA SCREEN DOC REV: CPT | Performed by: SURGERY

## 2021-10-28 PROCEDURE — G9899 SCRN MAM PERF RSLTS DOC: HCPCS | Performed by: SURGERY

## 2021-10-28 PROCEDURE — 99205 OFFICE O/P NEW HI 60 MIN: CPT | Performed by: SURGERY

## 2021-10-28 PROCEDURE — G8427 DOCREV CUR MEDS BY ELIG CLIN: HCPCS | Performed by: SURGERY

## 2021-10-28 NOTE — PROGRESS NOTES
Breast Cancer Consult      Ms. Milan Shukla is a 59year old woman who was referred for cT2N0 triple negative breast cancer, s/p core biopsy 10/19/21. The area of concern was identified as a palpable mass around September 2021. She denied nipple discharge. She denied breast pain. There is family history of breast cancer in her sister, mother and maternal aunt. Her most recent previous mammogram was 10/14/19. She has a personal history of fibrosarcoma. Her sister has HNPCC/ANTHONY mutation. Breast/GYN history:    OB History    No obstetric history on file.           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    GERD (gastroesophageal reflux disease)     History of gastric bypass 2012    History of meniscal tear 2015, 2018    right in 2015, left in 2018    HNP (herniated nucleus pulposus), lumbar     patient reported    Lung nodules     resolved 2018 CT    Osteopenia 10/03/2017    Recurrent depression ([address]     Sleep apnea     on cpap    Spinal stenosis, lumbar     patient reported    Xanthelasma of left upper eyelid 7/20/2020       Past Surgical History:   Procedure Laterality Date    HX ARTHRODESIS  01/2000    Herniated Disc, arthritis right foot 06/06, 07/07, C 6/7, C 4/6 Stenosis    HX CHOLECYSTECTOMY  09/2008    gallbladder disease    HX COLONOSCOPY  05/2009    HX GASTRIC BYPASS  2012    GASTRIC BYPASS  [facility] Gastric Bypass      HX HYSTERECTOMY  06/1994    HX MENISCECTOMY  10/2015    right repari of torn meniscus    HX MENISCECTOMY Left 03/16/2018    partial meniscectomy due to tear    HX MYOMECTOMY  06/1984    benign tumor    HX ORTHOPAEDIC  1964    orthopaedic excision Fibrosarcoma and Lymphangiogram    HX PELVIC LAPAROSCOPY  04/1984    large uterine blockage    NEUROLOGICAL PROCEDURE UNLISTED  2000, 2007    Cervical fusion Current Outpatient Medications on File Prior to Visit   Medication Sig Dispense Refill    sucralfate (CARAFATE) 1 gram tablet Take 1 tablet by mouth Four times a day as needed for indigestion. (Patient taking differently: two (2) times a day. Take 1 tablet by mouth Four times a day as needed for indigestion.) 360 Tablet 0    citalopram (CELEXA) 20 mg tablet Take 1 Tablet by mouth daily. 90 Tablet 0    gabapentin (Neurontin) 300 mg capsule Take 1 Capsule by mouth nightly. Max Daily Amount: 300 mg. (Patient taking differently: Take 300 mg by mouth nightly. PRN) 30 Capsule 0    pregabalin (LYRICA) 150 mg capsule TAKE 1 CAPSULE BY MOUTH TWICE DAILY. MAX DAILY AMOUNT: 300 MG (Patient taking differently: daily. ) 180 Capsule 0    diclofenac (Voltaren) 1 % gel Apply 2 g to affected area every six (6) hours as needed for Pain. 100 g 2    Trelegy Ellipta 100-62.5-25 mcg inhaler INHALE 1 PUFF BY MOUTH DAILY 2 Inhaler 3    dexlansoprazole (Dexilant) 60 mg CpDB capsule (delayed release) TAKE 1 CAPSULE BY MOUTH DAILY 90 Cap 3    fluticasone propionate (FLONASE) 50 mcg/actuation nasal spray SHAKE LIQUID AND USE 2 SPRAYS IN EACH NOSTRIL DAILY 48 g 5    buPROPion SR (WELLBUTRIN SR) 150 mg SR tablet TAKE 1 TABLET BY MOUTH TWICE DAILY 180 Tab 3    albuterol (PROVENTIL HFA, VENTOLIN HFA, PROAIR HFA) 90 mcg/actuation inhaler Take 2 Puffs by inhalation every four (4) hours as needed for Wheezing or Shortness of Breath. 3 Inhaler 4    b-complex with vitamin c tablet Take 1 Tab by mouth daily.  acetaminophen (TYLENOL EXTRA STRENGTH) 500 mg tablet Take  by mouth every six (6) hours as needed for Pain.  calcium-cholecalciferol, d3, (CALCIUM 600 + D) 600-125 mg-unit tab Take 1,065 mg by mouth nightly. Indications: TAKES 2 AT BEDTIME      omega 3-dha-epa-fish oil (FISH OIL) 100-160-1,000 mg cap Take 1,065 mg by mouth daily.  ferrous sulfate ER (IRON) 160 mg (50 mg iron) TbER tablet Take 1 Tab by mouth daily. Indications: PT TAKES 40 MG      multivitamin (ONE A DAY) tablet Take 1 Tab by mouth daily.  predniSONE (DELTASONE) 20 mg tablet Take 3 tablets daily by mouth for 3 days, then take 2 tablets daily by mouth for 3 days, then take 1 tablet daily by mouth for 3 days. (Patient not taking: Reported on 2021) 18 Tablet 0    Linzess 72 mcg cap capsule TAKE 1 CAPSULE BY MOUTH DAILY BEFORE BREAKFAST (Patient not taking: Reported on 10/28/2021) 30 Cap 1    furosemide (LASIX) 20 mg tablet Take 1 Tab by mouth daily as needed (severe leg swelling). (Patient not taking: Reported on 2021) 90 Tab 1     No current facility-administered medications on file prior to visit.        Allergies   Allergen Reactions    Adhesive Tape-Silicones Swelling     REALLY BAD SWELLING AND SORE APPEAR    Alcohol Other (comments)     PT STATES SHE IS AN ALCOHOLIC    Lactose Other (comments)     PT STATES IT MAKES HER STOMACH HURT    Percodan [Oxycodone Hcl-Oxycodone-Asa] Itching and Other (comments)     crying    Nsaids (Non-Steroidal Anti-Inflammatory Drug) Other (comments)     PT NOT ABLE TO TAKE DUE TO GASTRIC BYPASS       Social History     Tobacco Use    Smoking status: Former Smoker     Packs/day: 0.50     Years: 45.00     Pack years: 22.50     Quit date: 2020     Years since quittin.3    Smokeless tobacco: Never Used   Substance Use Topics    Alcohol use: No     Comment: quit 1980s- was heavy etoh    Drug use: Not Currently     Comment: marijuana, cocaine 30 yrs ago       Family History   Problem Relation Age of Onset    Hypertension Mother     Depression Mother     Cancer Mother     Ovarian Cancer Mother     Breast Problems Mother     Cancer Father     Cancer Sister     Depression Sister     Breast Cancer Sister     Depression Brother     Stroke Neg Hx     Heart Attack Neg Hx          ROS: positives are bolded  General: fevers, chills, night sweats, fatigue, weight loss, weight gain  GI: nausea, vomiting, abdominal pain, change in bowel habits, hematochezia, melena, GERD  Integ: dermatitis, abnormal moles  HEENT: visual changes, vertigo, epistaxis, dysphagia, hoarseness  Cardiac: chest pain, palpitations, HTN, edema, varicosities  Resp: cough, shortness of breath, wheezing, hemoptysis, snoring, reactive airway disease  : hematuria, dysuria, frequency, urgency, nocturia, stress urinary incontinence   MSK: weakness, joint pain, arthritis  Endocrine:  thyroid disease, polyuria, polydipsia, polyphagia, poor wound healing, heat intolerance, cold intolerance  Lymph/Heme: anemia, bruising, history of blood transfusions  Neuro: dizziness, headache, fainting, seizures, stroke  Psych: anxiety, depression    Physical Exam:  Visit Vitals  /74   Temp 96.8 °F (36 °C) (Skin)   Resp 22   Ht 5' 5\" (1.651 m)   Wt 134.7 kg (297 lb)   BMI 49.42 kg/m²       Gen:  No distress  Head: normocephalic, atraumatic  Mouth: Clear, no overt lesions, oral mucosa pink and moist  Neck: supple, no masses, no adenopathy, trachea midline  Resp: clear bilaterally  Cardio: Regular rate and rhythm  Abdomen: soft, nontender, nondistended  Extremities: warm, well-perfused  Neuro: sensation and strength grossly intact and symmetrical  Psych: alert and oriented to person, place and time  Breasts:   Right: Examined in both the supine and upright positions. There was no supraclavicular, infraclavicular, or axillary lymphadenopathy. There were no dominant masses, no skin changes, no asymmetry identified ptotic bilaterally, size discrepancy right larger than left  Left: Examined in both the supine and upright positions. There was no supraclavicular, infraclavicular, or axillary lymphadenopathy. Visible on inspection and exam firm mass upper inner, ptotic bilaterally, size discrepancy right larger than left       Imaging:  10/19/21 bilateral mammogram/left ultrasound  1. Suspicious finding.  Further evaluation with ultrasound-guided biopsy.     BI-RADS Category 4 - Suspicion for Malignancy     These findings were discussed with the patient in person. Options and  alternatives were discussed. Patient demonstrated understanding.     The lack of mammographic evidence of malignancy should not deter further work-up  of a clinically significant palpable finding. Radiodense breast tissue may  obscure an early malignancy or a palpable finding. 10-15% of breast cancers are  not detected by mammography.       Pathology:  10/22/21  A: Left breast mass, 9:00, biopsy:        Invasive carcinoma with papillary features (see comment). INVASIVE CARCINOMA OF THE BREAST: Biopsy    SPECIMEN       Procedure: Needle biopsy       Specimen Laterality: Left    TUMOR       Histologic Type: Invasive carcinoma with papillary features.       Glandular (Acinar) / Tubular Differentiation: Score 3       Nuclear Pleomorphism: Score 2       Mitotic Rate: Score 2       Overall Grade: Grade 2 (scores of 6 or 7)       Lymphovascular Invasion: Not identified   Estrogen Receptor (ER):     Negative (0%, No internal controls in sample   tested but external controls stain                       appropriately).                        Progesterone Receptor (PgR): Negative (0%, No internal controls in sample   tested but external controls stain                       appropriately).                           Her-2 (IHC Method):          Negative (score 0)     Impression:  Patient Active Problem List   Diagnosis Code    Chronic obstructive pulmonary disease (HCC) J44.9    Recurrent depression ([address] F33.9    Fibrosarcoma ([address] C49.9    Alcoholism in remission ([address] F10.21    Sleep apnea G47.30    History of gastric bypass Z98.84    Obesity, morbid ([address] E66.01    Osteopenia M85.80    GERD (gastroesophageal reflux disease) K21.9    Chronic pain of both knees M25.561, M25.562, G89.29    Prediabetes R73.03    Age-related nuclear cataract, bilateral H25.13    Xanthelasma of left upper eyelid H02.64    Xanthelasma of right upper eyelid H02.61    Sebaceous cyst of eyelid H02.829    Personal history of smoking Z87.891    Diverticulitis K57.92    Breast cancer of upper-inner quadrant of left female breast [facility]) C50.65          59year old woman who was referred for cT2N0 triple negative breast cancer, s/p core biopsy 10/19/21. We discussed that there are many options for treatment of breast cancer. Surgery, chemotherapy, radiation and hormone therapy are all tools that may be used in the treatment of breast cancer. For each patient, we determine which will be most beneficial based on her individual set of circumstances. For some patients all four treatment categories will be recommended. For others one or more of these options are not appropriate. We began by reviewing her imaging thus far as well as pathology in detail. Chemotherapy was addressed. Often chemotherapy is administered after surgery, however due to triple negative status Ms. Jeffrey Fernandez may benefit from neoadjuvant chemotherapy or treatment prior to surgery. Chemotherapy is systemic treatment aimed largely to decrease chance of spread or recurrence of cancer. It may also decrease the size of her cancer to facilitate surgery. It is administered by a medical oncologist.  I have recommended referral to medical oncology for additional information regarding chemotherapy. Chemotherapy is generally administered via port. Regarding surgery, there are two main options, lumpectomy or mastectomy. We discussed both in detail. Overall survival and distant recurrence rates are the same. The decision is generally a personal decision more so than a medical one. Lumpectomy is also known as breast conservation surgery or partial mastectomy. The goal is to remove the area of concern as well as surrounding area of uninvolved tissue (\"clear margins\").    Radiation is almost always recommended with lumpectomy to allow for acceptable local recurrence rates. Local recurrence rates are approximately 6% after lumpectomy with radiation. Risks, benefits and options were discussed in detail to include, but not limited to, bleeding, infection, risks of anesthesia, injury to surrounding structures and other unforeseen events such as stroke, heart attack or death. We discussed that lumpectomy could be combined with a bilateral reduction procedure if desired. Given the size of the tumor and the size of the breast, I believe this could be an option for Ms. Sheyla Mcgregor. I would perform the lumpectomy for the cancer portion of the operation, then the plastic surgeon would complete the mastopexy/reduction on the affected side as well as the contralateral side. This option is generally best done now at time of lumpectomy for women who have been considering mastopexy prior to diagnosis as radiation will increase the chance of complication if performed at a later date. If interested in bilateral reduction mammoplasty I would have her meet with the plastic surgeon now. Mastectomy was then addressed. With mastectomy, almost all of the breast tissue is removed. We are not able to remove 100% of the breast tissue. The risk of local recurrence is approximately 2-4% after mastectomy. The overlying skin is generally numb. Most often, the numbness is permanent. Mastectomy can be performed with or without reconstruction. The reconstruction is performed by the plastic surgeon. Commonly it is a multi-step process with placement of tissue expanders as the first step. If she is interested in reconstruction, I will refer her to plastic surgery. Often we are able to perform a nipple sparing mastectomy with reconstruction. The reconstructed breast differs in many ways from the native breast.  The goal is that in a bra or clothing, no one can tell she has had a mastectomy.   Radiation generally is not needed after mastectomy. There are some circumstances, usually based on size, margins, local extension or lymph node status, where post-mastectomy radiation is recommended. Risks, benefits and options were discussed in detail to include, but not limited to, bleeding, infection, risks of anesthesia, injury to surrounding structures and other unforeseen events such as stroke, heart attack or death. Routine screening mammogram is not recommended after mastectomy. We also discussed contralateral prophylactic mastectomy. We discussed that overall survival is not improved by removal of the unaffected breast. Some women choose to have bilateral mastectomy for personal reasons and to improve symmetry. We discussed that her risk of developing a breast cancer in that breast is not zero, even after mastectomy. As with the cancer side, with mastectomy, almost all of the breast tissue is removed. We are not able to remove 100% of the breast tissue. The risk of developing a second primary in the unaffected breast is approximately 1% after mastectomy. The overlying skin is generally numb. Most often, the numbness is permanent. Mastectomy can be performed with or without reconstruction. The reconstruction is performed by the plastic surgeon. Commonly it is a multi-step process with placement of tissue expanders as the first step. If she is interested in reconstruction, I will refer her to plastic surgery. Often we are able to perform a nipple sparing mastectomy with reconstruction. The reconstructed breast differs in many ways from the native breast.  The goal is that in a bra or clothing, no one can tell she has had a mastectomy. Risks, benefits and options were discussed in detail to include, but not limited to, bleeding, infection, risks of anesthesia, injury to surrounding structures and other unforeseen events such as stroke, heart attack or death.       As she is clinically node negative, she is a candidate for sentinel node biopsy. We discussed this procedure is a targeted sampling of the axillary lymph nodes to allow for staging of her disease. She will be injected with radioactive isotope as well as blue dye to allow for mapping and removal of the sentinel nodes. By removing only the sentinel nodes and not performing axillary node dissection, she has a decreased risk of lymphedema (arm swelling) and nerve injury. We did specifically discuss that both of these risks still exist.   Risks, benefits and options were discussed in detail to include, but not limited to, bleeding, infection, risks of anesthesia, injury to surrounding structures and other unforeseen events such as stroke, heart attack or death. If a significant amount of cancer is noted in her lymph nodes, an axillary lymph node dissection may be recommended. In this procedure more, but not all, of the lymph nodes under the arm are removed. The chance of both lymphedema and nerve injury are increased with axillary node dissection compared to sentinel node biopsy. I generally recommend referral to physical therapy and a lymphedema specialist if an axillary node dissection is performed. We discussed radiation. Radiation is a local therapy aimed to decrease the chance of local recurrence. It is administered under the direction of a radiation oncologist.  Radiation is almost always recommended with lumpectomy. It generally is not needed after mastectomy. There are some circumstances, usually based on size, margins, local extension or lymph node status, where post-mastectomy radiation is recommended. Most commonly it is administered five days a week for up to seven weeks. Most of the side effects, with the exception of fatigue, are local.    For women with implants, the risk of contracture and other complication may be higher with radiation therapy.       Anti-hormone or hormone blocking therapy is used in hormone sensitive, estrogen receptor positive breast cancer. It is generally recommended for 5-10 years. There are two categories of hormone blocking medications. Tamoxifen is a selective estrogen reuptake modulator (SERM). There are several aromatase inhibitors. These medications are generally prescribed by a medical oncologist.      We discussed genetic testing prior to proceeding with surgery. We discussed genetic testing in detail. Health insurance should not be significantly affected by the results, though life and disability insurance may be. Often these would be affected by a breast cancer diagnosis, but it may be more difficult to obtain with a known personal genetic mutation. Those pursuing genetic testing may consider obtaining or maximizing benefits prior to proceeding with genetic testing. If she is discovered to harbor a germline mutation, bilateral mastectomy with or without reconstruction will be recommended as her operation. After discussing the above, Ms. Nona Arshad prefers bilateral mastectomy, left sentinel node biopsy without reconstruction. We will schedule medical oncology. We discussed that with papillary breast cancer, chemotherapy may not be recommended, however, given that she is triple negative with rapid increase in size, I have recommended she meet with medical oncology preoperatively for consideration of neoadjuvant chemotherapy. All questions were answered. She was asked to call with any additional questions or concerns.

## 2021-10-28 NOTE — PROGRESS NOTES
Initial assessment for Tawny Wei, newly diagnosed with right breast invasive carcinoma with papillary features. .Ms. Alicia Figueroa is in the office today for surgical consult with Dr. Lacey Arriola. I reinforced education and plan of care discussed with Dr. Emre Oconnor. 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 She has a strong family history of breast cancer in two sisters, her mother, and a maternal aunt. Ms. Veronica Park reports that one of her sisters tested positive for Trejo syndrome. Request for [employer] gift card from [employer] initiated per patient request. Ms. Veronica Park is being referred to medical oncology (Dr. Rony Langston)  for evaluation for neoadjuvant chemotherapy. Due to her strong family history, she is considering a bilateral mastectomy.   Patient was assessed for the following barriers:    Communication:       Yes    No  -Ability to read/write,understand [place]       [x]      []    -Ability to talk with family/children,friends about diagnosis     [x]      []    -Other:  Comments/Referrals/Services provided:   Employment/Financial/Legal:     Yes    No  -Loss of employment/income         [x]      []    -Insurance coverage          [x]      []     -Needs help applying for Social Security/Disability/FMLA     []      [x]     -Help with Co-Pays for office visits         []      [x]    -Medication assistance/medical supplies or equipment     []      [x]    -Difficulty paying bills: utilities/housing       []      [x]    -Means to buy food                     [x]      []    -Legal issues           []      [x]    -Other:  Comments/Referrals/Services provided:   Psychosocial        Yes    No  Housing:  -Homeless           []      [x]    -Extended care needs: long term care/home care/Hospice     []      [x]    -Other:  Comments/Referrals/Services provided:   Transportation:       Yes    No  -Lack of vehicle or public transportation options      []      [x]    -Funds need for public transportation: bus/taxi      []      [x]    -Other:  Comments/Referrals/Services provided:   Support system:       Yes No  -Family members at home: spouse/significant other/children    [x]      []    -Has a support system         [x]      []    Other:  Comments/Referrals/Services provided:   Spirituality:        Yes No  -Spiritual issues          []      [x]    -Cultural concerns          []      [x]    -Other:  -Comments/Referrals/Services provided:   Sexuality:        Yes No  -Body image concerns                     []      [x]    -Relationships/significant other issues        []      [x]    -Other:  Comments/Referrals/Services provided:    Coping:        Yes    No  -Able to manage emotions         [x]      []    -Feeling fearful or anxious         []      [x]    -Interest in attending support groups        []      [x]    -Cancer related pain/control         []      [x]    -Other:  Comments/Referrals/Services provided:   Tobacco dependency:      Yes No  -Currently smokes: cigarettes/cigars        []      [x]    -Uses smokeless tobacco         []      [x]    -Other:  Comments/Referrals/Services provided:     Education/review of Disease process and Management    Yes No  -Explanation of navigator role/contact information      [x]      []    New Patient Guide for Breast Cancer provided                      [x]     []         -Pathology/Staging          []      [x]    -Diagnostic tests          []      [x]    -Genetic testing needed         [x]      []    -Treatment options/plan: surgery/chemotherapy/radiation     [x]      []    -Mediport placement as needed                              []      [x]    -Tobacco cessation as needed        []      [x]    -Need for a second opinion         []      [x]    -Importance of bringing family/friends to medical appts     [x]      []   -Other:  Patient/family verbalized understanding of treatment plan:          NCCN Distress Tool    Sunshine Gilbert  was assessed for management of distress using the NCCN Distress Thermometer for Patients tool.   Ms. Soto Heads rates her distress level a 4 on a scale of 0-10    Mild to moderate distress  Scorin-4        Yes    No    -Practical/physical issues       [x]      []      -Provide community resources      [x]      []      -Provide list of support groups      [x]      []      -Provide list of national organizations and websites               [x]      []      -Provide ongoing supportive care by oncology medical team        [x]      []                  Comments/Referrals/Services provided:

## 2021-10-28 NOTE — LETTER
10/28/2021 11:02 AM    Patient:  Phebe Apgar   YOB: 1960  Date of Visit: 10/28/2021      Ghislaine Montes NP  [address]  Via In Basket    Dear Ghislaine Montes NP,      I had the pleasure of seeing Ms. Oli Tidwell in my office today for her newly diagnosed breast cancer. I am including a copy of my office visit today. If you have questions, please do not hesitate to call me. I look forward to following Ms. Claudia Francois along with you and will keep you updated as to her progress.            Sincerely,      Rocio Pinto MD

## 2021-10-29 ENCOUNTER — NURSE NAVIGATOR (OUTPATIENT)
Dept: SURGERY | Age: 61
End: 2021-10-29

## 2021-11-02 ENCOUNTER — TELEPHONE (OUTPATIENT)
Dept: PULMONOLOGY | Age: 61
End: 2021-11-02

## 2021-11-02 NOTE — TELEPHONE ENCOUNTER
Pt called([phone]). Pt just diagnosed with breast ca and will be starting chemo and she wants to know if the chemo will be affecting her lung condition. Please check with Dr Golden Torres and call her back.

## 2021-11-03 NOTE — TELEPHONE ENCOUNTER
Patient with newly diagnosed Breast CA, starting chemo soon. Patient has questing re: whether chemo will affect her lungs. Please advise.

## 2021-11-04 ENCOUNTER — TELEPHONE (OUTPATIENT)
Dept: SURGERY | Age: 61
End: 2021-11-04

## 2021-11-04 NOTE — TELEPHONE ENCOUNTER
Richie Siddiqi at Dr. Isabel Blanca office called to inform Ms. Jaya Durant is being referred back to Dr. Vijaya Mcgarry to schedule surgery (bilateral mastectomy/left SNB with no reconstruction).

## 2021-11-04 NOTE — TELEPHONE ENCOUNTER
Spoke to Ms. Bernice Canela regarding voicemail she left inquiring about scheduling surgery with Dr. Brian Mojica. Ms. Bernice Canlea states she was seen yesterday by Dr. Owen Shoemaker and Dr. Owen Shoemaker recommended she have surgery prior to oncology treatment. I informed Ms. Bernice Canela I would discuss with Dr. Brian Mojica. Ms. Bernice Canela indicated she'd like to obtain a surgery date so she may notify her employer.

## 2021-11-09 NOTE — TELEPHONE ENCOUNTER
Spoke to patient and discussed her concerns. Patient was recently diagnosed with breast cancer-she is scheduled for bilateral mastectomy later in the month by Dr. Tor Paulson. She has discussed with oncologist-Dr. Chucho Lorenz who will plan chemotherapy after surgery. I advised patient that I can best answer her questions about effects on lung after decision has been made on neoadjuvant chemo medications. I will discuss with Dr. Chucho Lorenz and monitor for potential effects on lung function if indicated. Patient understands and will communicate back after her surgery.

## 2021-11-10 ENCOUNTER — TELEPHONE (OUTPATIENT)
Dept: SURGERY | Age: 61
End: 2021-11-10

## 2021-11-10 NOTE — TELEPHONE ENCOUNTER
Spoke to Ms. Milo Beverly to inform I'm awaiting admin confirmation of Dr. James Ernst availability and I'll contact her with further information upon receipt of confirmation. Ms. Milo Paul verbalized understanding.

## 2021-11-12 ENCOUNTER — VIRTUAL VISIT (OUTPATIENT)
Dept: SURGERY | Age: 61
End: 2021-11-12
Payer: MEDICARE

## 2021-11-12 DIAGNOSIS — C50.212 MALIGNANT NEOPLASM OF UPPER-INNER QUADRANT OF LEFT BREAST IN FEMALE, ESTROGEN RECEPTOR NEGATIVE (HCC): Primary | ICD-10-CM

## 2021-11-12 DIAGNOSIS — Z17.1 MALIGNANT NEOPLASM OF UPPER-INNER QUADRANT OF LEFT BREAST IN FEMALE, ESTROGEN RECEPTOR NEGATIVE (HCC): Primary | ICD-10-CM

## 2021-11-12 PROCEDURE — 99443 PR PHYS/QHP TELEPHONE EVALUATION 21-30 MIN: CPT | Performed by: SURGERY

## 2021-11-12 NOTE — H&P (VIEW-ONLY)
Delta Grayson is a 59 y.o. female being evaluated by a Telephone Visit (video visit) encounter to address concerns as mentioned above. A caregiver was present when appropriate. Due to this being a TeleHealth encounter (During HJTDU-19 public health emergency), evaluation was limited:   Pursuant to the emergency declaration under the [facility], [facility] authority and the [employer] Act, this Virtual Visit was conducted with patient's (and/or legal guardian's) consent, to reduce the risk of exposure to COVID-19 and provide necessary medical care. Services were provided through a telephone discussion virtually to substitute for in-person encounter . Method of telehealth service:  Phone   Verbal consent as above  Location of physician:  [facility], [address] [place] [address], [facility] [address], [facility], [facility] or     [facility], [address] [place] [address] [address], [address] [place] [address].  Other person participating in the telehealth services:  Rubi Coffman LPN, witness to Virtual Visit  Start time: 2176  Stop DIGN:4284    --Za Mark MD on 11/12/2021 at 8:54 AM    An electronic signature was used to authenticate this note. Breast Cancer Consult      Ms. Delta Grayson is a 59year old woman who was referred for cT2N0 triple negative breast cancer, s/p core biopsy 10/19/21. The area of concern was identified as a palpable mass around September 2021. She denied nipple discharge. She denied breast pain. There is family history of breast cancer in her sister, mother and maternal aunt. Her most recent previous mammogram was 10/14/19. She has a personal history of fibrosarcoma. Her sister has HNPCC/ANTHONY mutation. Genetic testing via [facility] demonstrated BRCA2 mutation. She reports having had hysterectomy with salpingoopherectomy.      Breast/GYN history:    OB History    No obstetric history on file. Past Medical History:   Diagnosis Date    Alcoholism in remission ([facility]     Asthma     Chronic obstructive pulmonary disease ([facility]     Fibrosarcoma ([facility] 1963    connective tissue cancer    GERD (gastroesophageal reflux disease)     History of gastric bypass 2012    History of meniscal tear 2015, 2018    right in 2015, left in 2018    HNP (herniated nucleus pulposus), lumbar     patient reported    Lung nodules     resolved 2018 CT    Osteopenia 10/03/2017    Recurrent depression ([facility]     Sleep apnea     on cpap    Spinal stenosis, lumbar     patient reported    Xanthelasma of left upper eyelid 7/20/2020       Past Surgical History:   Procedure Laterality Date    HX ARTHRODESIS  01/2000    Herniated Disc, arthritis right foot 06/06, 07/07, C 6/7, C 4/6 Stenosis    HX CHOLECYSTECTOMY  09/2008    gallbladder disease    HX COLONOSCOPY  05/2009    HX GASTRIC BYPASS  2012    GASTRIC BYPASS  [place] [facility] Gastric Bypass      HX HYSTERECTOMY  06/1994    HX MENISCECTOMY  10/2015    right repari of torn meniscus    HX MENISCECTOMY Left 03/16/2018    partial meniscectomy due to tear    HX MYOMECTOMY  06/1984    benign tumor    HX ORTHOPAEDIC  1964    orthopaedic excision Fibrosarcoma and Lymphangiogram    HX PELVIC LAPAROSCOPY  04/1984    large uterine blockage    NEUROLOGICAL PROCEDURE UNLISTED  2000, 2007    Cervical fusion       Current Outpatient Medications on File Prior to Visit   Medication Sig Dispense Refill    sucralfate (CARAFATE) 1 gram tablet Take 1 tablet by mouth Four times a day as needed for indigestion. (Patient taking differently: two (2) times a day. Take 1 tablet by mouth Four times a day as needed for indigestion.) 360 Tablet 0    citalopram (CELEXA) 20 mg tablet Take 1 Tablet by mouth daily. 90 Tablet 0    pregabalin (LYRICA) 150 mg capsule TAKE 1 CAPSULE BY MOUTH TWICE DAILY.  MAX DAILY AMOUNT: 300 MG (Patient taking differently: daily.) 180 Capsule 0    diclofenac (Voltaren) 1 % gel Apply 2 g to affected area every six (6) hours as needed for Pain. 100 g 2    Trelegy Ellipta 100-62.5-25 mcg inhaler INHALE 1 PUFF BY MOUTH DAILY 2 Inhaler 3    dexlansoprazole (Dexilant) 60 mg CpDB capsule (delayed release) TAKE 1 CAPSULE BY MOUTH DAILY 90 Cap 3    fluticasone propionate (FLONASE) 50 mcg/actuation nasal spray SHAKE LIQUID AND USE 2 SPRAYS IN EACH NOSTRIL DAILY 48 g 5    buPROPion SR (WELLBUTRIN SR) 150 mg SR tablet TAKE 1 TABLET BY MOUTH TWICE DAILY 180 Tab 3    albuterol (PROVENTIL HFA, VENTOLIN HFA, PROAIR HFA) 90 mcg/actuation inhaler Take 2 Puffs by inhalation every four (4) hours as needed for Wheezing or Shortness of Breath. 3 Inhaler 4    b-complex with vitamin c tablet Take 1 Tab by mouth daily.  calcium-cholecalciferol, d3, (CALCIUM 600 + D) 600-125 mg-unit tab Take 1,065 mg by mouth nightly. Indications: TAKES 2 AT BEDTIME      omega 3-dha-epa-fish oil (FISH OIL) 100-160-1,000 mg cap Take 1,065 mg by mouth daily.  ferrous sulfate ER (IRON) 160 mg (50 mg iron) TbER tablet Take 1 Tab by mouth daily. Indications: PT TAKES 40 MG      multivitamin (ONE A DAY) tablet Take 1 Tab by mouth daily.  gabapentin (Neurontin) 300 mg capsule Take 1 Capsule by mouth nightly. Max Daily Amount: 300 mg. (Patient not taking: Reported on 11/12/2021) 30 Capsule 0    predniSONE (DELTASONE) 20 mg tablet Take 3 tablets daily by mouth for 3 days, then take 2 tablets daily by mouth for 3 days, then take 1 tablet daily by mouth for 3 days. (Patient not taking: Reported on 9/17/2021) 18 Tablet 0    Linzess 72 mcg cap capsule TAKE 1 CAPSULE BY MOUTH DAILY BEFORE BREAKFAST (Patient not taking: Reported on 10/28/2021) 30 Cap 1    furosemide (LASIX) 20 mg tablet Take 1 Tab by mouth daily as needed (severe leg swelling).  (Patient not taking: Reported on 9/17/2021) 90 Tab 1    acetaminophen (TYLENOL EXTRA STRENGTH) 500 mg tablet Take  by mouth every six (6) hours as needed for Pain. No current facility-administered medications on file prior to visit.        Allergies   Allergen Reactions    Adhesive Tape-Silicones Swelling     REALLY BAD SWELLING AND SORE APPEAR    Alcohol Other (comments)     PT STATES SHE IS AN ALCOHOLIC    Lactose Other (comments)     PT STATES IT MAKES HER STOMACH HURT    Percodan [Oxycodone Hcl-Oxycodone-Asa] Itching and Other (comments)     crying    Nsaids (Non-Steroidal Anti-Inflammatory Drug) Other (comments)     PT NOT ABLE TO TAKE DUE TO GASTRIC BYPASS       Social History     Tobacco Use    Smoking status: Former Smoker     Packs/day: 0.50     Years: 45.00     Pack years: 22.50     Quit date: 2020     Years since quittin.3    Smokeless tobacco: Never Used   Substance Use Topics    Alcohol use: No     Comment: quit - was heavy etoh    Drug use: Not Currently     Comment: marijuana, cocaine 30 yrs ago       Family History   Problem Relation Age of Onset    Hypertension Mother     Depression Mother     Cancer Mother     Ovarian Cancer Mother     Breast Problems Mother     Cancer Father     Cancer Sister     Depression Sister     Breast Cancer Sister     Depression Brother     Stroke Neg Hx     Heart Attack Neg Hx          ROS: positives are bolded  General: fevers, chills, night sweats, fatigue, weight loss, weight gain  GI: nausea, vomiting, abdominal pain, change in bowel habits, hematochezia, melena, GERD  Integ: dermatitis, abnormal moles  HEENT: visual changes, vertigo, epistaxis, dysphagia, hoarseness  Cardiac: chest pain, palpitations, HTN, edema, varicosities  Resp: cough, shortness of breath, wheezing, hemoptysis, snoring, reactive airway disease  : hematuria, dysuria, frequency, urgency, nocturia, stress urinary incontinence   MSK: weakness, joint pain, arthritis  Endocrine:  thyroid disease, polyuria, polydipsia, polyphagia, poor wound healing, heat intolerance, cold intolerance  Lymph/Heme: anemia, bruising, history of blood transfusions  Neuro: dizziness, headache, fainting, seizures, stroke  Psych: anxiety, depression    Physical Exam:  There were no vitals taken for this visit. Telephone visit, previously:  Gen:  No distress  Head: normocephalic, atraumatic  Mouth: Clear, no overt lesions, oral mucosa pink and moist  Neck: supple, no masses, no adenopathy, trachea midline  Resp: clear bilaterally  Cardio: Regular rate and rhythm  Abdomen: soft, nontender, nondistended  Extremities: warm, well-perfused  Neuro: sensation and strength grossly intact and symmetrical  Psych: alert and oriented to person, place and time  Breasts:   Right: Examined in both the supine and upright positions. There was no supraclavicular, infraclavicular, or axillary lymphadenopathy. There were no dominant masses, no skin changes, no asymmetry identified ptotic bilaterally, size discrepancy right larger than left  Left: Examined in both the supine and upright positions. There was no supraclavicular, infraclavicular, or axillary lymphadenopathy. Visible on inspection and exam firm mass upper inner, ptotic bilaterally, size discrepancy right larger than left       Imaging:  10/19/21 bilateral mammogram/left ultrasound  1. Suspicious finding. Further evaluation with ultrasound-guided biopsy.     BI-RADS Category 4 - Suspicion for Malignancy     These findings were discussed with the patient in person. Options and  alternatives were discussed. Patient demonstrated understanding.     The lack of mammographic evidence of malignancy should not deter further work-up  of a clinically significant palpable finding. Radiodense breast tissue may  obscure an early malignancy or a palpable finding. 10-15% of breast cancers are  not detected by mammography.       Pathology:  10/22/21  A: Left breast mass, 9:00, biopsy:        Invasive carcinoma with papillary features (see comment).      INVASIVE CARCINOMA OF THE BREAST: Biopsy    SPECIMEN       Procedure: Needle biopsy       Specimen Laterality: Left    TUMOR       Histologic Type: Invasive carcinoma with papillary features.       Glandular (Acinar) / Tubular Differentiation: Score 3       Nuclear Pleomorphism: Score 2       Mitotic Rate: Score 2       Overall Grade: Grade 2 (scores of 6 or 7)       Lymphovascular Invasion: Not identified   Estrogen Receptor (ER):     Negative (0%, No internal controls in sample   tested but external controls stain                       appropriately).                        Progesterone Receptor (PgR): Negative (0%, No internal controls in sample   tested but external controls stain                       appropriately).                        Her-2 (IHC Method):          Negative (score 0)     Impression:  Patient Active Problem List   Diagnosis Code    Chronic obstructive pulmonary disease ([facility] J44.9    Recurrent depression ([facility] F33.9    Fibrosarcoma ([facility] C49.9    Alcoholism in remission ([facility] F10.21    Sleep apnea G47.30    History of gastric bypass Z98.84    Obesity, morbid ([facility] E66.01    Osteopenia M85.80    GERD (gastroesophageal reflux disease) K21.9    Chronic pain of both knees M25.561, M25.562, G89.29    Prediabetes R73.03    Age-related nuclear cataract, bilateral H25.13    Xanthelasma of left upper eyelid H02.64    Xanthelasma of right upper eyelid H02.61    Sebaceous cyst of eyelid H02.829    Personal history of smoking Z87.891    Diverticulitis K57.92    Breast cancer of upper-inner quadrant of left female breast [facility]) C50.65          59year old woman who was referred for cT2N0 triple negative breast cancer, s/p core biopsy 10/19/21. We reviewed her genetic testing results. I have recommended bilateral mastectomy. Mastectomy was then addressed. With mastectomy, almost all of the breast tissue is removed. We are not able to remove 100% of the breast tissue.   The risk of local recurrence is approximately 2-4% after mastectomy. The overlying skin is generally numb. Most often, the numbness is permanent. Mastectomy can be performed with or without reconstruction. The reconstruction is performed by the plastic surgeon. Commonly it is a multi-step process with placement of tissue expanders as the first step. If she is interested in reconstruction, I will refer her to plastic surgery. Often we are able to perform a nipple sparing mastectomy with reconstruction. The reconstructed breast differs in many ways from the native breast.  The goal is that in a bra or clothing, no one can tell she has had a mastectomy. Radiation generally is not needed after mastectomy. There are some circumstances, usually based on size, margins, local extension or lymph node status, where post-mastectomy radiation is recommended. Risks, benefits and options were discussed in detail to include, but not limited to, bleeding, infection, risks of anesthesia, injury to surrounding structures and other unforeseen events such as stroke, heart attack or death. Routine screening mammogram is not recommended after mastectomy. We also discussed contralateral prophylactic mastectomy. We discussed that overall survival is not improved by removal of the unaffected breast. Some women choose to have bilateral mastectomy for personal reasons and to improve symmetry. We discussed that her risk of developing a breast cancer in that breast is not zero, even after mastectomy. As with the cancer side, with mastectomy, almost all of the breast tissue is removed. We are not able to remove 100% of the breast tissue. The risk of developing a second primary in the unaffected breast is approximately 1% after mastectomy. The overlying skin is generally numb. Most often, the numbness is permanent. Mastectomy can be performed with or without reconstruction. The reconstruction is performed by the plastic surgeon.   Commonly it is a multi-step process with placement of tissue expanders as the first step. If she is interested in reconstruction, I will refer her to plastic surgery. Often we are able to perform a nipple sparing mastectomy with reconstruction. The reconstructed breast differs in many ways from the native breast.  The goal is that in a bra or clothing, no one can tell she has had a mastectomy. Risks, benefits and options were discussed in detail to include, but not limited to, bleeding, infection, risks of anesthesia, injury to surrounding structures and other unforeseen events such as stroke, heart attack or death. As she is clinically node negative, she is a candidate for sentinel node biopsy. We discussed this procedure is a targeted sampling of the axillary lymph nodes to allow for staging of her disease. She will be injected with radioactive isotope as well as blue dye to allow for mapping and removal of the sentinel nodes. By removing only the sentinel nodes and not performing axillary node dissection, she has a decreased risk of lymphedema (arm swelling) and nerve injury. We did specifically discuss that both of these risks still exist.   Risks, benefits and options were discussed in detail to include, but not limited to, bleeding, infection, risks of anesthesia, injury to surrounding structures and other unforeseen events such as stroke, heart attack or death. If a significant amount of cancer is noted in her lymph nodes, an axillary lymph node dissection may be recommended. In this procedure more, but not all, of the lymph nodes under the arm are removed. The chance of both lymphedema and nerve injury are increased with axillary node dissection compared to sentinel node biopsy. I generally recommend referral to physical therapy and a lymphedema specialist if an axillary node dissection is performed. After discussing the above, Ms. Miki Arana prefers bilateral mastectomy, left sentinel node biopsy without reconstruction. All questions were answered. She was asked to call with any additional questions or concerns.

## 2021-11-12 NOTE — PROGRESS NOTES
Sabrina Rodriguez is a 59 y.o. female being evaluated by a Telephone Visit (video visit) encounter to address concerns as mentioned above. A caregiver was present when appropriate. Due to this being a TeleHealth encounter (During TUVGC-03 public health emergency), evaluation was limited:   Pursuant to the emergency declaration under the [facility], [facility] and the [employer] Act, this Virtual Visit was conducted with patient's (and/or legal guardian's) consent, to reduce the risk of exposure to COVID-19 and provide necessary medical care. Services were provided through a telephone discussion virtually to substitute for in-person encounter . Method of telehealth service:  Phone   Verbal consent as above  Location of physician:  [facility], [address] [place] [address], [facility] [address], [facility] or     [facility] [employer] [facility], [address] [place] [address], [place], [address] [facility] [address]  Other person participating in the telehealth services:  Maria E Phipps LPN, witness to Virtual Visit  Start time: 9205  Stop TPOY:[id number]    --Kelsey Nathan MD on 11/12/2021 at 8:54 AM    An electronic signature was used to authenticate this note. Breast Cancer Consult      Ms. Sabrina Rodriguez is a 59year old woman who was referred for cT2N0 triple negative breast cancer, s/p core biopsy 10/19/21. The area of concern was identified as a palpable mass around September 2021. She denied nipple discharge. She denied breast pain. There is family history of breast cancer in her sister, mother and maternal aunt. Her most recent previous mammogram was 10/14/19. She has a personal history of fibrosarcoma. Her sister has HNPCC/ANTHONY mutation. Genetic testing via [facility] demonstrated BRCA2 mutation. She reports having had hysterectomy with salpingoopherectomy.      Breast/GYN history:    OB History    No obstetric history on file. Past Medical History:   Diagnosis Date    [facility]ism [facility] ([facility]     Asthma     Chronic obstructive pulmonary disease ([facility]     Fibrosarcoma ([facility] 1963    connective tissue cancer    GERD (gastroesophageal reflux disease)     History of gastric bypass 2012    History of meniscal tear 2015, 2018    right in 2015, left in 2018    HNP (herniated nucleus pulposus), lumbar     patient reported    Lung nodules     resolved 2018 CT    Osteopenia 10/03/2017    Recurrent depression ([facility]     Sleep apnea     on cpap    Spinal stenosis, lumbar     patient reported    Xanthelasma of left upper eyelid 7/20/2020       Past Surgical History:   Procedure Laterality Date    HX ARTHRODESIS  01/2000    Herniated Disc, arthritis right foot 06/06, 07/07, C 6/7, C 4/6 Stenosis    HX CHOLECYSTECTOMY  09/2008    gallbladder disease    HX COLONOSCOPY  05/2009    HX GASTRIC BYPASS  2012    GASTRIC BYPASS  [place] [facility] Gastric Bypass      HX HYSTERECTOMY  06/1994    HX MENISCECTOMY  10/2015    right repari of torn meniscus    HX MENISCECTOMY Left 03/16/2018    partial meniscectomy due to tear    HX MYOMECTOMY  06/1984    benign tumor    HX ORTHOPAEDIC  1964    orthopaedic excision Fibrosarcoma and Lymphangiogram    HX PELVIC LAPAROSCOPY  04/1984    large uterine blockage    NEUROLOGICAL PROCEDURE UNLISTED  2000, 2007    Cervical fusion       Current Outpatient Medications on File Prior to Visit   Medication Sig Dispense Refill    sucralfate (CARAFATE) 1 gram tablet Take 1 tablet by mouth Four times a day as needed for indigestion. (Patient taking differently: two (2) times a day. Take 1 tablet by mouth Four times a day as needed for indigestion.) 360 Tablet 0    citalopram (CELEXA) 20 mg tablet Take 1 Tablet by mouth daily. 90 Tablet 0    pregabalin (LYRICA) 150 mg capsule TAKE 1 CAPSULE BY MOUTH TWICE DAILY.  MAX DAILY AMOUNT: 300 MG (Patient taking differently: daily.) 180 Capsule 0    diclofenac (Voltaren) 1 % gel Apply 2 g to affected area every six (6) hours as needed for Pain. 100 g 2    Trelegy Ellipta 100-62.5-25 mcg inhaler INHALE 1 PUFF BY MOUTH DAILY 2 Inhaler 3    dexlansoprazole (Dexilant) 60 mg CpDB capsule (delayed release) TAKE 1 CAPSULE BY MOUTH DAILY 90 Cap 3    fluticasone propionate (FLONASE) 50 mcg/actuation nasal spray SHAKE LIQUID AND USE 2 SPRAYS IN EACH NOSTRIL DAILY 48 g 5    buPROPion SR (WELLBUTRIN SR) 150 mg SR tablet TAKE 1 TABLET BY MOUTH TWICE DAILY 180 Tab 3    albuterol (PROVENTIL HFA, VENTOLIN HFA, PROAIR HFA) 90 mcg/actuation inhaler Take 2 Puffs by inhalation every four (4) hours as needed for Wheezing or Shortness of Breath. 3 Inhaler 4    b-complex with vitamin c tablet Take 1 Tab by mouth daily.  calcium-cholecalciferol, d3, (CALCIUM 600 + D) 600-125 mg-unit tab Take 1,065 mg by mouth nightly. Indications: TAKES 2 AT BEDTIME      omega 3-dha-epa-fish oil (FISH OIL) 100-160-1,000 mg cap Take 1,065 mg by mouth daily.  ferrous sulfate ER (IRON) 160 mg (50 mg iron) TbER tablet Take 1 Tab by mouth daily. Indications: PT TAKES 40 MG      multivitamin (ONE A DAY) tablet Take 1 Tab by mouth daily.  gabapentin (Neurontin) 300 mg capsule Take 1 Capsule by mouth nightly. Max Daily Amount: 300 mg. (Patient not taking: Reported on 11/12/2021) 30 Capsule 0    predniSONE (DELTASONE) 20 mg tablet Take 3 tablets daily by mouth for 3 days, then take 2 tablets daily by mouth for 3 days, then take 1 tablet daily by mouth for 3 days. (Patient not taking: Reported on 9/17/2021) 18 Tablet 0    Linzess 72 mcg cap capsule TAKE 1 CAPSULE BY MOUTH DAILY BEFORE BREAKFAST (Patient not taking: Reported on 10/28/2021) 30 Cap 1    furosemide (LASIX) 20 mg tablet Take 1 Tab by mouth daily as needed (severe leg swelling).  (Patient not taking: Reported on 9/17/2021) 90 Tab 1    acetaminophen (TYLENOL EXTRA STRENGTH) 500 mg tablet Take  by mouth every six (6) hours as needed for Pain. No current facility-administered medications on file prior to visit.        Allergies   Allergen Reactions    Adhesive Tape-Silicones Swelling     REALLY BAD SWELLING AND SORE APPEAR    Alcohol Other (comments)     PT STATES SHE IS AN ALCOHOLIC    Lactose Other (comments)     PT STATES IT MAKES HER STOMACH HURT    Percodan [Oxycodone Hcl-Oxycodone-Asa] Itching and Other (comments)     crying    Nsaids (Non-Steroidal Anti-Inflammatory Drug) Other (comments)     PT NOT ABLE TO TAKE DUE TO GASTRIC BYPASS       Social History     Tobacco Use    Smoking status: Former Smoker     Packs/day: 0.50     Years: 45.00     Pack years: 22.50     Quit date: 2020     Years since quittin.3    Smokeless tobacco: Never Used   Substance Use Topics    Alcohol use: No     Comment: quit - was heavy etoh    Drug use: Not Currently     Comment: marijuana, cocaine 30 yrs ago       Family History   Problem Relation Age of Onset    Hypertension Mother     Depression Mother     Cancer Mother     Ovarian Cancer Mother     Breast Problems Mother     Cancer Father     Cancer Sister     Depression Sister     Breast Cancer Sister     Depression Brother     Stroke Neg Hx     Heart Attack Neg Hx          ROS: positives are bolded  General: fevers, chills, night sweats, fatigue, weight loss, weight gain  GI: nausea, vomiting, abdominal pain, change in bowel habits, hematochezia, melena, GERD  Integ: dermatitis, abnormal moles  HEENT: visual changes, vertigo, epistaxis, dysphagia, hoarseness  Cardiac: chest pain, palpitations, HTN, edema, varicosities  Resp: cough, shortness of breath, wheezing, hemoptysis, snoring, reactive airway disease  : hematuria, dysuria, frequency, urgency, nocturia, stress urinary incontinence   MSK: weakness, joint pain, arthritis  Endocrine:  thyroid disease, polyuria, polydipsia, polyphagia, poor wound healing, heat intolerance, cold intolerance  Lymph/Heme: anemia, bruising, history of blood transfusions  Neuro: dizziness, headache, fainting, seizures, stroke  Psych: anxiety, depression    Physical Exam:  There were no vitals taken for this visit. Telephone visit, previously:  Gen:  No distress  Head: normocephalic, atraumatic  Mouth: Clear, no overt lesions, oral mucosa pink and moist  Neck: supple, no masses, no adenopathy, trachea midline  Resp: clear bilaterally  Cardio: Regular rate and rhythm  Abdomen: soft, nontender, nondistended  Extremities: warm, well-perfused  Neuro: sensation and strength grossly intact and symmetrical  Psych: alert and oriented to person, place and time  Breasts:   Right: Examined in both the supine and upright positions. There was no supraclavicular, infraclavicular, or axillary lymphadenopathy. There were no dominant masses, no skin changes, no asymmetry identified ptotic bilaterally, size discrepancy right larger than left  Left: Examined in both the supine and upright positions. There was no supraclavicular, infraclavicular, or axillary lymphadenopathy. Visible on inspection and exam firm mass upper inner, ptotic bilaterally, size discrepancy right larger than left       Imaging:  10/19/21 bilateral mammogram/left ultrasound  1. Suspicious finding. Further evaluation with ultrasound-guided biopsy.     BI-RADS Category 4 - Suspicion for Malignancy     These findings were discussed with the patient in person. Options and  alternatives were discussed. Patient demonstrated understanding.     The lack of mammographic evidence of malignancy should not deter further work-up  of a clinically significant palpable finding. Radiodense breast tissue may  obscure an early malignancy or a palpable finding. 10-15% of breast cancers are  not detected by mammography.       Pathology:  10/22/21  A: Left breast mass, 9:00, biopsy:        Invasive carcinoma with papillary features (see comment).      INVASIVE CARCINOMA OF THE BREAST: Biopsy    SPECIMEN       Procedure: Needle biopsy       Specimen Laterality: Left    TUMOR       Histologic Type: Invasive carcinoma with papillary features.       Glandular (Acinar) / Tubular Differentiation: Score 3       Nuclear Pleomorphism: Score 2       Mitotic Rate: Score 2       Overall Grade: Grade 2 (scores of 6 or 7)       Lymphovascular Invasion: Not identified   Estrogen Receptor (ER):     Negative (0%, No internal controls in sample   tested but external controls stain                       appropriately).                        Progesterone Receptor (PgR): Negative (0%, No internal controls in sample   tested but external controls stain                       appropriately).                        Her-2 (IHC Method):          Negative (score 0)     Impression:  Patient Active Problem List   Diagnosis Code    Chronic obstructive pulmonary disease ([facility] J44.9    Recurrent depression ([facility] F33.9    Fibrosarcoma ([facility] C49.9    Alcoholism in remission ([facility] F10.21    Sleep apnea G47.30    History of gastric bypass Z98.84    Obesity, morbid ([facility] E66.01    Osteopenia M85.80    GERD (gastroesophageal reflux disease) K21.9    Chronic pain of both knees M25.561, M25.562, G89.29    Prediabetes R73.03    Age-related nuclear cataract, bilateral H25.13    Xanthelasma of left upper eyelid H02.64    Xanthelasma of right upper eyelid H02.61    Sebaceous cyst of eyelid H02.829    Personal history of smoking Z87.891    Diverticulitis K57.92    Breast cancer of upper-inner quadrant of left female breast [facility]) C50.65          59year old woman who was referred for cT2N0 triple negative breast cancer, s/p core biopsy 10/19/21. We reviewed her genetic testing results. I have recommended bilateral mastectomy. Mastectomy was then addressed. With mastectomy, almost all of the breast tissue is removed. We are not able to remove 100% of the breast tissue.   The risk of local recurrence is approximately 2-4% after mastectomy. The overlying skin is generally numb. Most often, the numbness is permanent. Mastectomy can be performed with or without reconstruction. The reconstruction is performed by the plastic surgeon. Commonly it is a multi-step process with placement of tissue expanders as the first step. If she is interested in reconstruction, I will refer her to plastic surgery. Often we are able to perform a nipple sparing mastectomy with reconstruction. The reconstructed breast differs in many ways from the native breast.  The goal is that in a bra or clothing, no one can tell she has had a mastectomy. Radiation generally is not needed after mastectomy. There are some circumstances, usually based on size, margins, local extension or lymph node status, where post-mastectomy radiation is recommended. Risks, benefits and options were discussed in detail to include, but not limited to, bleeding, infection, risks of anesthesia, injury to surrounding structures and other unforeseen events such as stroke, heart attack or death. Routine screening mammogram is not recommended after mastectomy. We also discussed contralateral prophylactic mastectomy. We discussed that overall survival is not improved by removal of the unaffected breast. Some women choose to have bilateral mastectomy for personal reasons and to improve symmetry. We discussed that her risk of developing a breast cancer in that breast is not zero, even after mastectomy. As with the cancer side, with mastectomy, almost all of the breast tissue is removed. We are not able to remove 100% of the breast tissue. The risk of developing a second primary in the unaffected breast is approximately 1% after mastectomy. The overlying skin is generally numb. Most often, the numbness is permanent. Mastectomy can be performed with or without reconstruction. The reconstruction is performed by the plastic surgeon.   Commonly it is a multi-step process with placement of tissue expanders as the first step. If she is interested in reconstruction, I will refer her to plastic surgery. Often we are able to perform a nipple sparing mastectomy with reconstruction. The reconstructed breast differs in many ways from the native breast.  The goal is that in a bra or clothing, no one can tell she has had a mastectomy. Risks, benefits and options were discussed in detail to include, but not limited to, bleeding, infection, risks of anesthesia, injury to surrounding structures and other unforeseen events such as stroke, heart attack or death. As she is clinically node negative, she is a candidate for sentinel node biopsy. We discussed this procedure is a targeted sampling of the axillary lymph nodes to allow for staging of her disease. She will be injected with radioactive isotope as well as blue dye to allow for mapping and removal of the sentinel nodes. By removing only the sentinel nodes and not performing axillary node dissection, she has a decreased risk of lymphedema (arm swelling) and nerve injury. We did specifically discuss that both of these risks still exist.   Risks, benefits and options were discussed in detail to include, but not limited to, bleeding, infection, risks of anesthesia, injury to surrounding structures and other unforeseen events such as stroke, heart attack or death. If a significant amount of cancer is noted in her lymph nodes, an axillary lymph node dissection may be recommended. In this procedure more, but not all, of the lymph nodes under the arm are removed. The chance of both lymphedema and nerve injury are increased with axillary node dissection compared to sentinel node biopsy. I generally recommend referral to physical therapy and a lymphedema specialist if an axillary node dissection is performed. After discussing the above, Ms. Oneil Zendejas prefers bilateral mastectomy, left sentinel node biopsy without reconstruction. All questions were answered. She was asked to call with any additional questions or concerns.

## 2021-11-12 NOTE — PROGRESS NOTES
Spoke with patient to facilitate her virtual visit. Confirmed name and  and that she is not driving during call. 1. Have you been to the ER, urgent care clinic since your last visit? Hospitalized since your last visit? No    2. Have you seen or consulted any other health care providers outside of the [address] [place] [address] [facility] since your last visit? Include any pap smears or colon screening.  No

## 2021-11-17 ENCOUNTER — TELEPHONE (OUTPATIENT)
Dept: SURGERY | Age: 61
End: 2021-11-17

## 2021-11-17 DIAGNOSIS — Z17.1 MALIGNANT NEOPLASM OF UPPER-INNER QUADRANT OF LEFT BREAST IN FEMALE, ESTROGEN RECEPTOR NEGATIVE (HCC): Primary | ICD-10-CM

## 2021-11-17 DIAGNOSIS — C50.212 MALIGNANT NEOPLASM OF UPPER-INNER QUADRANT OF LEFT BREAST IN FEMALE, ESTROGEN RECEPTOR NEGATIVE (HCC): Primary | ICD-10-CM

## 2021-11-17 NOTE — PERIOP NOTES
PRE-SURGICAL INSTRUCTIONS        Patient's Name:  Destiny Abreu      Today's Date:  11/17/2021            Covid Testing Date and Time:    Surgery Date:  11/23/2021                1. Do NOT eat or drink anything, including candy, gum, or ice chips after midnight on 11/23, unless you have specific instructions from your surgeon or anesthesia provider to do so.  2. You may brush your teeth before coming to the hospital.  3. No smoking 24 hours prior to the day of surgery. 4. No alcohol 24 hours prior to the day of surgery. 5. No recreational drugs for one week prior to the day of surgery. 6. Leave all valuables, including money/purse, at home. 7. Remove all jewelry, nail polish, acrylic nails, and makeup (including mascara); no lotions powders, deodorant, or perfume/cologne/after shave on the skin. 8. Follow instruction for Hibiclens washes and CHG wipes from surgeon's office. 9. Glasses/contact lenses and dentures may be worn to the hospital.  They will be removed prior to surgery. 10. Call your doctor if symptoms of a cold or illness develop within 24-48 hours prior to your surgery. 11.  If you are having an outpatient procedure, please make arrangements for a responsible ADULT TO [address] [place] [address] and stay with you for 24 hours after your surgery. 12. ONE VISITOR in the hospital at this time for outpatient procedures. Exceptions may be made for surgical admissions, per nursing unit guidelines      Special Instructions:      Bring list of CURRENT medications. Bring inhaler. Bring CPAP machine. Bring any pertinent legal medical records. Take these medications the morning of surgery with a sip of water:  Per office  . On the day of surgery, come in the main entrance of [facility]. Let the  at the desk know you are there for surgery. A staff member will come escort you to the surgical area on the second floor.     If you have any questions or concerns, please do not hesitate to call:     (Prior to the day of surgery) [facility] department:  [phone]   (Day of surgery) Pre-Op department:  [phone]    These surgical instructions were reviewed with the patient during the [facility] phone call.

## 2021-11-17 NOTE — TELEPHONE ENCOUNTER
Spoke to Ms. Gerardo Girder to inform lab order placed, may have labs done at [facility] or [facility] [place] on walk in basis 7:00am - 3:30pm/reviewed surgery arrival time and appointment on Monday, November 22, 2021 for nuc med.

## 2021-11-17 NOTE — TELEPHONE ENCOUNTER
Patient inquiring about where to have labs and covid testing done. Patient states she called [facility] and was told to call office. I advised patient I did not see an order for labs but I will inquire with surgery scheduler Herman Acevedo. Patient verbalized understanding. Patient states please call [phone] with information.

## 2021-11-17 NOTE — TELEPHONE ENCOUNTER
Patient states has been having pain in left breast x one week. Patient states pain is random. Patient states has been taking tylenol with no relieve. I advised patient has she tried taking ibuprofen as well. Patient states not able to take nsaids at all only tylenol for pain. Patient denies redness and swelling. I advised patient I will refer to Dr Alcantar's nurse for further instruction. Patient verblaized understanding. Patient states please call [phone].

## 2021-11-18 ENCOUNTER — HOSPITAL ENCOUNTER (OUTPATIENT)
Dept: LAB | Age: 61
Discharge: HOME | End: 2021-11-18
Payer: MEDICARE

## 2021-11-18 DIAGNOSIS — Z17.1 MALIGNANT NEOPLASM OF UPPER-INNER QUADRANT OF LEFT BREAST IN FEMALE, ESTROGEN RECEPTOR NEGATIVE (HCC): ICD-10-CM

## 2021-11-18 DIAGNOSIS — C50.212 MALIGNANT NEOPLASM OF UPPER-INNER QUADRANT OF LEFT BREAST IN FEMALE, ESTROGEN RECEPTOR NEGATIVE (HCC): ICD-10-CM

## 2021-11-18 LAB
ANION GAP SERPL CALC-SCNC: 5 MMOL/L (ref 3–18)
BUN SERPL-MCNC: 6 MG/DL (ref 7–18)
BUN/CREAT SERPL: 7 (ref 12–20)
CALCIUM SERPL-MCNC: 8.9 MG/DL (ref 8.5–10.1)
CHLORIDE SERPL-SCNC: 108 MMOL/L (ref 100–111)
CO2 SERPL-SCNC: 28 MMOL/L (ref 21–32)
CREAT SERPL-MCNC: 0.83 MG/DL (ref 0.6–1.3)
GLUCOSE SERPL-MCNC: 86 MG/DL (ref 74–99)
POTASSIUM SERPL-SCNC: 3.8 MMOL/L (ref 3.5–5.5)
SODIUM SERPL-SCNC: 141 MMOL/L (ref 136–145)

## 2021-11-18 PROCEDURE — 36415 COLL VENOUS BLD VENIPUNCTURE: CPT

## 2021-11-18 PROCEDURE — 80048 BASIC METABOLIC PNL TOTAL CA: CPT

## 2021-11-21 DIAGNOSIS — F33.9 RECURRENT DEPRESSION (HCC): ICD-10-CM

## 2021-11-21 DIAGNOSIS — F17.219 CIGARETTE NICOTINE DEPENDENCE WITH NICOTINE-INDUCED DISORDER: ICD-10-CM

## 2021-11-21 DIAGNOSIS — F10.21 ALCOHOLISM IN REMISSION (HCC): ICD-10-CM

## 2021-11-22 ENCOUNTER — ANESTHESIA EVENT (OUTPATIENT)
Dept: SURGERY | Age: 61
End: 2021-11-22
Payer: MEDICARE

## 2021-11-22 ENCOUNTER — HOSPITAL ENCOUNTER (OUTPATIENT)
Dept: NUCLEAR MEDICINE | Age: 61
Discharge: HOME | End: 2021-11-22
Attending: SURGERY
Payer: MEDICARE

## 2021-11-22 DIAGNOSIS — C50.212 MALIGNANT NEOPLASM OF UPPER-INNER QUADRANT OF LEFT BREAST IN FEMALE, ESTROGEN RECEPTOR NEGATIVE (HCC): ICD-10-CM

## 2021-11-22 DIAGNOSIS — Z17.1 MALIGNANT NEOPLASM OF UPPER-INNER QUADRANT OF LEFT BREAST IN FEMALE, ESTROGEN RECEPTOR NEGATIVE (HCC): ICD-10-CM

## 2021-11-22 PROCEDURE — 74011000250 HC RX REV CODE- 250

## 2021-11-22 RX ORDER — BUPROPION HYDROCHLORIDE 150 MG/1
TABLET, EXTENDED RELEASE ORAL
Qty: 180 TABLET | Refills: 3 | Status: SHIPPED | OUTPATIENT
Start: 2021-11-22 | End: 2022-03-16 | Stop reason: SDUPTHER

## 2021-11-22 RX ORDER — SODIUM BICARBONATE 84 MG/ML
INJECTION, SOLUTION INTRAVENOUS
Status: COMPLETED
Start: 2021-11-22 | End: 2021-11-22

## 2021-11-22 RX ORDER — LIDOCAINE HYDROCHLORIDE 10 MG/ML
INJECTION, SOLUTION EPIDURAL; INFILTRATION; INTRACAUDAL; PERINEURAL
Status: COMPLETED
Start: 2021-11-22 | End: 2021-11-22

## 2021-11-22 RX ADMIN — SODIUM BICARBONATE 3 MEQ: 84 INJECTION, SOLUTION INTRAVENOUS at 11:26

## 2021-11-22 RX ADMIN — LIDOCAINE HYDROCHLORIDE 3 ML: 10 INJECTION, SOLUTION EPIDURAL; INFILTRATION; INTRACAUDAL; PERINEURAL at 11:26

## 2021-11-23 ENCOUNTER — HOSPITAL ENCOUNTER (OUTPATIENT)
Age: 61
Setting detail: OUTPATIENT SURGERY
Discharge: HOME | End: 2021-11-23
Attending: SURGERY | Admitting: SURGERY
Payer: MEDICARE

## 2021-11-23 ENCOUNTER — ANESTHESIA (OUTPATIENT)
Dept: SURGERY | Age: 61
End: 2021-11-23
Payer: MEDICARE

## 2021-11-23 VITALS
HEIGHT: 65 IN | RESPIRATION RATE: 16 BRPM | WEIGHT: 293 LBS | SYSTOLIC BLOOD PRESSURE: 137 MMHG | OXYGEN SATURATION: 93 % | DIASTOLIC BLOOD PRESSURE: 76 MMHG | BODY MASS INDEX: 48.82 KG/M2 | TEMPERATURE: 98.6 F | HEART RATE: 87 BPM

## 2021-11-23 DIAGNOSIS — C50.212 MALIGNANT NEOPLASM OF UPPER-INNER QUADRANT OF LEFT BREAST IN FEMALE, ESTROGEN RECEPTOR NEGATIVE (HCC): Primary | ICD-10-CM

## 2021-11-23 DIAGNOSIS — Z17.1 MALIGNANT NEOPLASM OF UPPER-INNER QUADRANT OF LEFT BREAST IN FEMALE, ESTROGEN RECEPTOR NEGATIVE (HCC): Primary | ICD-10-CM

## 2021-11-23 PROCEDURE — 77030040361 HC SLV COMPR DVT MDII -B: Performed by: SURGERY

## 2021-11-23 PROCEDURE — 77030031139 HC SUT VCRL2 J&J -A: Performed by: SURGERY

## 2021-11-23 PROCEDURE — 77030002996 HC SUT SLK J&J -A: Performed by: SURGERY

## 2021-11-23 PROCEDURE — 77030010507 HC ADH SKN DERMBND J&J -B: Performed by: SURGERY

## 2021-11-23 PROCEDURE — 77030012407 HC DRN WND BARD -B: Performed by: SURGERY

## 2021-11-23 PROCEDURE — 88307 TISSUE EXAM BY PATHOLOGIST: CPT

## 2021-11-23 PROCEDURE — 01610 ANES ALL PX NRV MUSC SHO&AX: CPT | Performed by: NURSE ANESTHETIST, CERTIFIED REGISTERED

## 2021-11-23 PROCEDURE — 77030032490 HC SLV COMPR SCD KNE COVD -B: Performed by: SURGERY

## 2021-11-23 PROCEDURE — 74011250636 HC RX REV CODE- 250/636: Performed by: NURSE ANESTHETIST, CERTIFIED REGISTERED

## 2021-11-23 PROCEDURE — 74011250636 HC RX REV CODE- 250/636: Performed by: SURGERY

## 2021-11-23 PROCEDURE — 74011000258 HC RX REV CODE- 258: Performed by: NURSE ANESTHETIST, CERTIFIED REGISTERED

## 2021-11-23 PROCEDURE — 74011000250 HC RX REV CODE- 250: Performed by: SURGERY

## 2021-11-23 PROCEDURE — 01610 ANES ALL PX NRV MUSC SHO&AX: CPT | Performed by: ANESTHESIOLOGY

## 2021-11-23 PROCEDURE — 76210000016 HC OR PH I REC 1 TO 1.5 HR: Performed by: SURGERY

## 2021-11-23 PROCEDURE — 77030040922 HC BLNKT HYPOTHRM STRY -A: Performed by: SURGERY

## 2021-11-23 PROCEDURE — 76210000022 HC REC RM PH II 1.5 TO 2 HR: Performed by: SURGERY

## 2021-11-23 PROCEDURE — 74011250637 HC RX REV CODE- 250/637: Performed by: SURGERY

## 2021-11-23 PROCEDURE — 77030011825 HC SUPP SURG PSTOP S2SG -B: Performed by: SURGERY

## 2021-11-23 PROCEDURE — 77030010509 HC AIRWY LMA MSK TELE -A: Performed by: ANESTHESIOLOGY

## 2021-11-23 PROCEDURE — 88360 TUMOR IMMUNOHISTOCHEM/MANUAL: CPT

## 2021-11-23 PROCEDURE — 76060000036 HC ANESTHESIA 2.5 TO 3 HR: Performed by: SURGERY

## 2021-11-23 PROCEDURE — 76010000132 HC OR TIME 2.5 TO 3 HR: Performed by: SURGERY

## 2021-11-23 PROCEDURE — 77030013079 HC BLNKT BAIR HGGR 3M -A: Performed by: ANESTHESIOLOGY

## 2021-11-23 PROCEDURE — 74011000636 HC RX REV CODE- 636: Performed by: SURGERY

## 2021-11-23 PROCEDURE — 2709999900 HC NON-CHARGEABLE SUPPLY: Performed by: SURGERY

## 2021-11-23 PROCEDURE — 77030002933 HC SUT MCRYL J&J -A: Performed by: SURGERY

## 2021-11-23 PROCEDURE — 74011250637 HC RX REV CODE- 250/637: Performed by: NURSE ANESTHETIST, CERTIFIED REGISTERED

## 2021-11-23 PROCEDURE — 77030013567 HC DRN WND RESERV BARD -A: Performed by: SURGERY

## 2021-11-23 PROCEDURE — 74011000250 HC RX REV CODE- 250: Performed by: NURSE ANESTHETIST, CERTIFIED REGISTERED

## 2021-11-23 PROCEDURE — 77030010512 HC APPL CLP LIG J&J -C: Performed by: SURGERY

## 2021-11-23 RX ORDER — DIPHENHYDRAMINE HYDROCHLORIDE 50 MG/ML
12.5 INJECTION, SOLUTION INTRAMUSCULAR; INTRAVENOUS
Status: DISCONTINUED | OUTPATIENT
Start: 2021-11-23 | End: 2021-11-23 | Stop reason: HOSPADM

## 2021-11-23 RX ORDER — DEXAMETHASONE SODIUM PHOSPHATE 4 MG/ML
INJECTION, SOLUTION INTRA-ARTICULAR; INTRALESIONAL; INTRAMUSCULAR; INTRAVENOUS; SOFT TISSUE AS NEEDED
Status: DISCONTINUED | OUTPATIENT
Start: 2021-11-23 | End: 2021-11-23 | Stop reason: HOSPADM

## 2021-11-23 RX ORDER — LIDOCAINE HYDROCHLORIDE 10 MG/ML
0.1 INJECTION, SOLUTION EPIDURAL; INFILTRATION; INTRACAUDAL; PERINEURAL AS NEEDED
Status: DISCONTINUED | OUTPATIENT
Start: 2021-11-23 | End: 2021-11-23 | Stop reason: HOSPADM

## 2021-11-23 RX ORDER — HYDROMORPHONE HYDROCHLORIDE 2 MG/ML
0.2 INJECTION, SOLUTION INTRAMUSCULAR; INTRAVENOUS; SUBCUTANEOUS AS NEEDED
Status: DISCONTINUED | OUTPATIENT
Start: 2021-11-23 | End: 2021-11-23 | Stop reason: HOSPADM

## 2021-11-23 RX ORDER — INSULIN LISPRO 100 [IU]/ML
INJECTION, SOLUTION INTRAVENOUS; SUBCUTANEOUS ONCE
Status: DISCONTINUED | OUTPATIENT
Start: 2021-11-23 | End: 2021-11-23 | Stop reason: HOSPADM

## 2021-11-23 RX ORDER — HYDROMORPHONE HYDROCHLORIDE 2 MG/ML
INJECTION, SOLUTION INTRAMUSCULAR; INTRAVENOUS; SUBCUTANEOUS AS NEEDED
Status: DISCONTINUED | OUTPATIENT
Start: 2021-11-23 | End: 2021-11-23 | Stop reason: HOSPADM

## 2021-11-23 RX ORDER — FAMOTIDINE 20 MG/1
20 TABLET, FILM COATED ORAL ONCE
Status: COMPLETED | OUTPATIENT
Start: 2021-11-23 | End: 2021-11-23

## 2021-11-23 RX ORDER — FENTANYL CITRATE 50 UG/ML
INJECTION, SOLUTION INTRAMUSCULAR; INTRAVENOUS AS NEEDED
Status: DISCONTINUED | OUTPATIENT
Start: 2021-11-23 | End: 2021-11-23 | Stop reason: HOSPADM

## 2021-11-23 RX ORDER — HYDROCODONE BITARTRATE AND ACETAMINOPHEN 5; 325 MG/1; MG/1
1 TABLET ORAL ONCE
Status: COMPLETED | OUTPATIENT
Start: 2021-11-23 | End: 2021-11-23

## 2021-11-23 RX ORDER — SODIUM CHLORIDE, SODIUM LACTATE, POTASSIUM CHLORIDE, CALCIUM CHLORIDE 600; 310; 30; 20 MG/100ML; MG/100ML; MG/100ML; MG/100ML
100 INJECTION, SOLUTION INTRAVENOUS CONTINUOUS
Status: DISCONTINUED | OUTPATIENT
Start: 2021-11-23 | End: 2021-11-23 | Stop reason: HOSPADM

## 2021-11-23 RX ORDER — PROMETHAZINE HYDROCHLORIDE 25 MG/ML
6.25 INJECTION, SOLUTION INTRAMUSCULAR; INTRAVENOUS
Status: DISCONTINUED | OUTPATIENT
Start: 2021-11-23 | End: 2021-11-23 | Stop reason: HOSPADM

## 2021-11-23 RX ORDER — LIDOCAINE HYDROCHLORIDE 20 MG/ML
INJECTION, SOLUTION EPIDURAL; INFILTRATION; INTRACAUDAL; PERINEURAL AS NEEDED
Status: DISCONTINUED | OUTPATIENT
Start: 2021-11-23 | End: 2021-11-23 | Stop reason: HOSPADM

## 2021-11-23 RX ORDER — PROPOFOL 10 MG/ML
INJECTION, EMULSION INTRAVENOUS AS NEEDED
Status: DISCONTINUED | OUTPATIENT
Start: 2021-11-23 | End: 2021-11-23 | Stop reason: HOSPADM

## 2021-11-23 RX ORDER — HYDROCODONE BITARTRATE AND ACETAMINOPHEN 5; 325 MG/1; MG/1
1 TABLET ORAL
Qty: 10 TABLET | Refills: 0 | Status: SHIPPED | OUTPATIENT
Start: 2021-11-23 | End: 2021-11-26

## 2021-11-23 RX ORDER — DEXTROSE 50 % IN WATER (D50W) INTRAVENOUS SYRINGE
25-50 AS NEEDED
Status: DISCONTINUED | OUTPATIENT
Start: 2021-11-23 | End: 2021-11-23 | Stop reason: HOSPADM

## 2021-11-23 RX ORDER — SODIUM CHLORIDE 0.9 % (FLUSH) 0.9 %
5-40 SYRINGE (ML) INJECTION AS NEEDED
Status: DISCONTINUED | OUTPATIENT
Start: 2021-11-23 | End: 2021-11-23 | Stop reason: HOSPADM

## 2021-11-23 RX ORDER — ONDANSETRON 2 MG/ML
INJECTION INTRAMUSCULAR; INTRAVENOUS AS NEEDED
Status: DISCONTINUED | OUTPATIENT
Start: 2021-11-23 | End: 2021-11-23 | Stop reason: HOSPADM

## 2021-11-23 RX ORDER — GLYCOPYRROLATE 0.2 MG/ML
INJECTION INTRAMUSCULAR; INTRAVENOUS AS NEEDED
Status: DISCONTINUED | OUTPATIENT
Start: 2021-11-23 | End: 2021-11-23 | Stop reason: HOSPADM

## 2021-11-23 RX ORDER — SODIUM CHLORIDE 0.9 % (FLUSH) 0.9 %
5-40 SYRINGE (ML) INJECTION EVERY 8 HOURS
Status: DISCONTINUED | OUTPATIENT
Start: 2021-11-23 | End: 2021-11-23 | Stop reason: HOSPADM

## 2021-11-23 RX ORDER — MIDAZOLAM HYDROCHLORIDE 1 MG/ML
INJECTION, SOLUTION INTRAMUSCULAR; INTRAVENOUS AS NEEDED
Status: DISCONTINUED | OUTPATIENT
Start: 2021-11-23 | End: 2021-11-23 | Stop reason: HOSPADM

## 2021-11-23 RX ORDER — MAGNESIUM SULFATE 100 %
4 CRYSTALS MISCELLANEOUS AS NEEDED
Status: DISCONTINUED | OUTPATIENT
Start: 2021-11-23 | End: 2021-11-23 | Stop reason: HOSPADM

## 2021-11-23 RX ORDER — SODIUM CHLORIDE, SODIUM LACTATE, POTASSIUM CHLORIDE, CALCIUM CHLORIDE 600; 310; 30; 20 MG/100ML; MG/100ML; MG/100ML; MG/100ML
50 INJECTION, SOLUTION INTRAVENOUS CONTINUOUS
Status: DISCONTINUED | OUTPATIENT
Start: 2021-11-23 | End: 2021-11-23 | Stop reason: HOSPADM

## 2021-11-23 RX ORDER — ONDANSETRON 2 MG/ML
4 INJECTION INTRAMUSCULAR; INTRAVENOUS
Status: COMPLETED | OUTPATIENT
Start: 2021-11-23 | End: 2021-11-23

## 2021-11-23 RX ORDER — HYDROMORPHONE HYDROCHLORIDE 2 MG/ML
0.5 INJECTION, SOLUTION INTRAMUSCULAR; INTRAVENOUS; SUBCUTANEOUS
Status: DISCONTINUED | OUTPATIENT
Start: 2021-11-23 | End: 2021-11-23 | Stop reason: HOSPADM

## 2021-11-23 RX ORDER — EPHEDRINE SULFATE/0.9% NACL/PF 50 MG/5 ML
SYRINGE (ML) INTRAVENOUS AS NEEDED
Status: DISCONTINUED | OUTPATIENT
Start: 2021-11-23 | End: 2021-11-23 | Stop reason: HOSPADM

## 2021-11-23 RX ORDER — ISOSULFAN BLUE 50 MG/5ML
INJECTION, SOLUTION SUBCUTANEOUS AS NEEDED
Status: DISCONTINUED | OUTPATIENT
Start: 2021-11-23 | End: 2021-11-23 | Stop reason: HOSPADM

## 2021-11-23 RX ADMIN — ONDANSETRON 4 MG: 2 INJECTION INTRAMUSCULAR; INTRAVENOUS at 12:35

## 2021-11-23 RX ADMIN — LIDOCAINE HYDROCHLORIDE 100 MG: 20 INJECTION, SOLUTION EPIDURAL; INFILTRATION; INTRACAUDAL; PERINEURAL at 09:00

## 2021-11-23 RX ADMIN — HYDROMORPHONE HYDROCHLORIDE 0.5 MG: 2 INJECTION, SOLUTION INTRAMUSCULAR; INTRAVENOUS; SUBCUTANEOUS at 12:18

## 2021-11-23 RX ADMIN — PROPOFOL 200 MG: 10 INJECTION, EMULSION INTRAVENOUS at 09:00

## 2021-11-23 RX ADMIN — HYDROMORPHONE HYDROCHLORIDE 0.4 MG: 2 INJECTION, SOLUTION INTRAMUSCULAR; INTRAVENOUS; SUBCUTANEOUS at 09:52

## 2021-11-23 RX ADMIN — HYDROCODONE BITARTRATE AND ACETAMINOPHEN 1 TABLET: 5; 325 TABLET ORAL at 13:17

## 2021-11-23 RX ADMIN — Medication 15 MG: at 09:37

## 2021-11-23 RX ADMIN — HYDROMORPHONE HYDROCHLORIDE 0.5 MG: 2 INJECTION, SOLUTION INTRAMUSCULAR; INTRAVENOUS; SUBCUTANEOUS at 12:35

## 2021-11-23 RX ADMIN — WATER 3 G: 1 INJECTION INTRAMUSCULAR; INTRAVENOUS; SUBCUTANEOUS at 09:05

## 2021-11-23 RX ADMIN — DEXAMETHASONE SODIUM PHOSPHATE 4 MG: 4 INJECTION, SOLUTION INTRAMUSCULAR; INTRAVENOUS at 09:32

## 2021-11-23 RX ADMIN — ONDANSETRON 4 MG: 2 INJECTION INTRAMUSCULAR; INTRAVENOUS at 09:32

## 2021-11-23 RX ADMIN — DEXMEDETOMIDINE HYDROCHLORIDE 6 MCG: 100 INJECTION, SOLUTION, CONCENTRATE INTRAVENOUS at 10:16

## 2021-11-23 RX ADMIN — DEXMEDETOMIDINE HYDROCHLORIDE 6 MCG: 100 INJECTION, SOLUTION, CONCENTRATE INTRAVENOUS at 11:22

## 2021-11-23 RX ADMIN — DEXMEDETOMIDINE HYDROCHLORIDE 8 MCG: 100 INJECTION, SOLUTION, CONCENTRATE INTRAVENOUS at 10:22

## 2021-11-23 RX ADMIN — HYDROMORPHONE HYDROCHLORIDE 0.5 MG: 2 INJECTION, SOLUTION INTRAMUSCULAR; INTRAVENOUS; SUBCUTANEOUS at 11:38

## 2021-11-23 RX ADMIN — DEXMEDETOMIDINE HYDROCHLORIDE 6 MCG: 100 INJECTION, SOLUTION, CONCENTRATE INTRAVENOUS at 10:38

## 2021-11-23 RX ADMIN — HYDROMORPHONE HYDROCHLORIDE 0.5 MG: 2 INJECTION, SOLUTION INTRAMUSCULAR; INTRAVENOUS; SUBCUTANEOUS at 11:29

## 2021-11-23 RX ADMIN — MIDAZOLAM HYDROCHLORIDE 2 MG: 2 INJECTION, SOLUTION INTRAMUSCULAR; INTRAVENOUS at 08:54

## 2021-11-23 RX ADMIN — SODIUM CHLORIDE, SODIUM LACTATE, POTASSIUM CHLORIDE, AND CALCIUM CHLORIDE: 600; 310; 30; 20 INJECTION, SOLUTION INTRAVENOUS at 10:42

## 2021-11-23 RX ADMIN — HYDROMORPHONE HYDROCHLORIDE 0.2 MG: 2 INJECTION, SOLUTION INTRAMUSCULAR; INTRAVENOUS; SUBCUTANEOUS at 10:14

## 2021-11-23 RX ADMIN — FENTANYL CITRATE 50 MCG: 50 INJECTION, SOLUTION INTRAMUSCULAR; INTRAVENOUS at 09:06

## 2021-11-23 RX ADMIN — HYDROMORPHONE HYDROCHLORIDE 0.5 MG: 2 INJECTION, SOLUTION INTRAMUSCULAR; INTRAVENOUS; SUBCUTANEOUS at 11:58

## 2021-11-23 RX ADMIN — SODIUM CHLORIDE, SODIUM LACTATE, POTASSIUM CHLORIDE, AND CALCIUM CHLORIDE 50 ML/HR: 600; 310; 30; 20 INJECTION, SOLUTION INTRAVENOUS at 08:32

## 2021-11-23 RX ADMIN — GLYCOPYRROLATE 0.2 MG: 0.2 INJECTION INTRAMUSCULAR; INTRAVENOUS at 08:54

## 2021-11-23 RX ADMIN — DEXMEDETOMIDINE HYDROCHLORIDE 8 MCG: 100 INJECTION, SOLUTION, CONCENTRATE INTRAVENOUS at 11:12

## 2021-11-23 RX ADMIN — FENTANYL CITRATE 50 MCG: 50 INJECTION, SOLUTION INTRAMUSCULAR; INTRAVENOUS at 09:19

## 2021-11-23 RX ADMIN — HYDROMORPHONE HYDROCHLORIDE 0.4 MG: 2 INJECTION, SOLUTION INTRAMUSCULAR; INTRAVENOUS; SUBCUTANEOUS at 10:01

## 2021-11-23 RX ADMIN — DEXMEDETOMIDINE HYDROCHLORIDE 6 MCG: 100 INJECTION, SOLUTION, CONCENTRATE INTRAVENOUS at 11:18

## 2021-11-23 RX ADMIN — FAMOTIDINE 20 MG: 20 TABLET ORAL at 08:32

## 2021-11-23 NOTE — DISCHARGE INSTRUCTIONS
Patient Education        Mastectomy: What to Expect at Home  Your Recovery     Right after the surgery, you will probably feel weak, and you may feel sore for 2 to 3 days. You may feel pulling or stretching near or under your arm. You may also have itching, tingling, and throbbing in the area. This will get better in a few days. You will likely have several drains near your incision. These help with healing. The drains will be removed when the fluid buildup slows. Drains are usually removed in the first few weeks after surgery. You may be able to go back to your normal routine or return to work in several weeks, but it may take longer. How long it takes you to recover will depend on the type of surgery you had. It also depends on whether you had breast reconstruction at the same time, or if you need other treatment. Your doctor or nurse will be able to give you an idea of what you can expect. When you find out that you have cancer, you may feel many emotions and may need some help coping. This is common. Seek out family, friends, and counselors for support. You also can do things at home to make yourself feel better while you go through treatment. Call the [employer] (4-657.691.2165) or visit its website at [address] to learn more. This care sheet gives you a general idea about how long it will take for you to recover. But each person recovers at a different pace. Follow the steps below to get better as quickly as possible. How can you care for yourself at home? Activity    · Rest when you feel tired. Getting enough sleep will help you recover. After any activity, rest and raise your affected arm for a period of time equal to your activity time.     · Try to walk each day. Start by walking a little more than you did the day before. Bit by bit, increase the amount you walk.  Walking boosts blood flow and helps prevent pneumonia and constipation.     · Avoid strenuous activities, such as biking, jogging, weightlifting, or aerobic exercise, until your doctor says it is okay. This includes housework, especially if you have to use your affected arm. You will probably be able to do your normal activities in 3 to 6 weeks. Avoid repeated motions with your affected arm, such as weed pulling, window cleaning, or vacuuming, for 6 months.     · Avoid lifting anything over 10 to 15 pounds for 4 to 6 weeks. This may include a child, grocery bags, a heavy briefcase or backpack, cat litter or dog food bags, or a vacuum .     · Ask your doctor when you can drive again.     · You will probably be able to go back to work or your normal routine in 3 to 6 weeks. This depends on the type of work you do and any further treatment.     · Your doctor will let you know how soon you can take showers or baths. Diet    · You can eat your normal diet. If your stomach is upset, try bland, low-fat foods like plain rice, broiled chicken, toast, and yogurt.     · Drink plenty of fluids (unless your doctor tells you not to).     · You may notice that your bowels are not regular right after your surgery. This is common. Try to avoid constipation and straining with bowel movements. Take a fiber supplement such as Citrucel or Metamucil every day. If you have not had a bowel movement after a couple of days, take a mild laxative like Milk of Magnesia or a stool softener like Colace. Medicines    · Your doctor will tell you if and when you can restart your medicines. He or she will also give you instructions about taking any new medicines.     · If you take aspirin or some other blood thinner, ask your doctor if and when to start taking it again. Make sure that you understand exactly what your doctor wants you to do.     · Take pain medicines exactly as directed. ? If the doctor gave you a prescription medicine for pain, take it as prescribed.   ? If you are not taking a prescription pain medicine, ask your doctor if you can take an over-the-counter medicine.     · If your doctor prescribed antibiotics, take them as directed. Do not stop taking them just because you feel better. You need to take the full course of antibiotics.     · If you think your pain medicine is making you sick to your stomach:  ? Take your medicine after meals (unless your doctor has told you not to). ? Ask your doctor for a different pain medicine. Incision care    · You will have a dressing over the cut (incision). A dressing helps the incision heal and protects it. Your doctor will tell you how to take care of this.     · A woman may wear a special bra (surgi-bra) that holds the dressing in place after the surgery. The doctor will say when the bra is no longer needed. Drain care    · You may have one or more drains near your incision. Your doctor will tell you how to take care of them. Arm exercises    · If you had any lymph nodes removed from under your arm, your doctor will advise you to do arm exercises. Do not do the exercises until your doctor says it is okay. Ice and elevation    · Do not use ice for swelling or pain.     · Prop up your arm on a pillow when you sit or lie down. Try to keep your arm above the level of your heart. This will help reduce swelling. Follow-up care is a key part of your treatment and safety. Be sure to make and go to all appointments, and call your doctor if you are having problems. It's also a good idea to know your test results and keep a list of the medicines you take. When should you call for help? Call 911 anytime you think you may need emergency care. For example, call if:    · You passed out (lost consciousness).     · You have chest pain, are short of breath, or cough up blood.    Call your doctor now or seek immediate medical care if:    · You are sick to your stomach or cannot drink fluids.     · You cannot pass stools or gas.     · You have pain that does not get better after you take your pain medicine.     · You have loose stitches, or your incision comes open.     · Bright red blood has soaked through the bandage over your incision.     · You have signs of a blood clot in your leg (called a deep vein thrombosis), such as:  ? Pain in your calf, back of the knee, thigh, or groin. ? Redness or swelling in your leg.     · You have signs of infection, such as:  ? Increased pain, swelling, warmth, or redness. ? Red streaks leading from the incision. ? Pus draining from the incision. ? A fever. Watch closely for changes in your health, and be sure to contact your doctor if:    · You have any problems.     · You have new or worse swelling or pain in your arm. Where can you learn more? Go to http://www.gray.com/  Enter S340 in the search box to learn more about \"Mastectomy: What to Expect at Home. \"  Current as of: December 17, 2020               Content Version: 13.0  © 2006-2021 [employer]. Care instructions adapted under license by [employer] (which disclaims liability or warranty for this information). If you have questions about a medical condition or this instruction, always ask your healthcare professional. [address] any warranty or liability for your use of this information. [place] Node Biopsy for Breast Cancer: What to Expect at Home  Your Recovery  After a sentinel node biopsy, many people have no side effects. Some people have pain or bruising at the cut (incision) and feel tired. Your breast and underarm area may be slightly swollen. This may last a few days. You should feel close to normal in a few days. The incision the doctor made usually heals in about 2 weeks. The scar usually fades with time. Some people have a buildup of fluid in the area where the lymph nodes were removed. This is known as seroma. This goes away on its own, or your doctor can drain it.   When you had this test, your doctor injected blue dye or radioactive material (or both) into your breast. The blue dye may give your breast a bluish color and turn your urine green for about 24 hours. The radioactive material leaves the body on its own in 24 to 48 hours. A sentinel node biopsy may be done at the same time as other breast surgeries. If this is the case, how you recover will be different. This care sheet gives you a general idea about how long it will take for you to recover. But each person recovers at a different pace. Follow the steps below to get better as quickly as possible. How can you care for yourself at home? Activity    · Rest when you feel tired. Getting enough sleep will help you recover.     · Try to walk each day. Start by walking a little more than you did the day before. Bit by bit, increase the amount you walk. Walking boosts blood flow and helps prevent pneumonia and constipation.     · You may drive when you are no longer taking pain medicine and you feel up to it.     · You can lift things when you feel comfortable doing so.     · Most women return to work and their normal routines in 2 to 7 days.     · You may shower 24 to 48 hours after surgery, if your doctor okays it. Pat the incision dry. Do not take a bath for the first 2 weeks, or until your doctor tells you it is okay.     · Avoid activity or exercise that may put stress on the cut. This includes washing windows, vacuuming, or gardening with the affected arm. Diet    · You can eat your normal diet. If your stomach is upset, try bland, low-fat foods like plain rice, broiled chicken, toast, and yogurt.     · You may notice that your bowels are not regular right after your surgery. This is common. Try to avoid constipation and straining with bowel movements. Take a fiber supplement such as Citrucel or Metamucil every day. If you have not had a bowel movement after a couple of days, take a mild laxative.    Medicines    · Your doctor will tell you if and when you can restart your medicines. He or she will also give you instructions about taking any new medicines.     · If you take aspirin or some other blood thinner, ask your doctor if and when to start taking it again. Make sure that you understand exactly what your doctor wants you to do.     · Take pain medicines exactly as directed. ? If the doctor gave you a prescription medicine for pain, take it as prescribed. ? If you are not taking a prescription pain medicine, take an over-the-counter medicine such as acetaminophen (Tylenol), ibuprofen (Advil, Motrin), or naproxen (Aleve). Read and follow all instructions on the label. ? Do not take two or more pain medicines at the same time unless the doctor told you to. Many pain medicines have acetaminophen, which is Tylenol. Too much acetaminophen (Tylenol) can be harmful.     · If your doctor prescribed antibiotics, take them as directed. Do not stop taking them just because you feel better. You need to take the full course of antibiotics.     · If you think your pain medicine is making you sick to your stomach:  ? Take your medicine after meals (unless your doctor has told you not to). ? Ask your doctor for a different pain medicine. Incision care    · If you have strips of tape on the cut (incision) the doctor made, leave the tape on for about 1 week or until it falls off.     · After you can shower, wash the area daily with warm, soapy water and pat it dry. Follow-up care is a key part of your treatment and safety. Be sure to make and go to all appointments, and call your doctor if you are having problems. It's also a good idea to know your test results and keep a list of the medicines you take. When should you call for help? Call 911 anytime you think you may need emergency care. For example, call if:    · You passed out (lost consciousness).     · You have chest pain, are short of breath, or cough up blood.    Call your doctor now or seek immediate medical care if:    · You have pain that does not get better after you take pain medicine.     · You cannot pass stools or gas.     · You are sick to your stomach or cannot drink fluids.     · You have signs of a blood clot in your leg (called a deep vein thrombosis), such as:  ? Pain in your calf, back of the knee, thigh, or groin. ? Redness or swelling in your leg.     · You have signs of infection, such as:  ? Increased pain, swelling, warmth, or redness. ? Red streaks leading from the incision. ? Pus draining from the incision. ? A fever.     · You have loose stitches, or your incision comes open.     · Bright red blood has soaked through the bandage over your incision. Watch closely for changes in your health, and be sure to contact your doctor if:    · You have any problems.     · You have new or worse swelling or pain in your arm. Where can you learn more? Go to http://www.melo.com/  Enter H004 in the search box to learn more about \"[place] Node Biopsy for Breast Cancer: What to Expect at Home. \"  Current as of: December 17, 2020               Content Version: 13.0  © 4808-7225 [employer]. Care instructions adapted under license by [employer] (which disclaims liability or warranty for this information). If you have questions about a medical condition or this instruction, always ask your healthcare professional. [address] any warranty or liability for your use of this information. Patient Education        Breast Cancer: Care Instructions  Your Care Instructions     Breast cancer occurs when abnormal cells grow out of control in the breast. These cancer cells can spread within the breast, to nearby lymph nodes and other tissues, and to other parts of the body. Being treated for cancer can weaken your body, and you may feel very tired. Get the rest your body needs so you can feel better. Finding out that you have cancer is scary.  You may feel many emotions and may need some help coping. Seek out family, friends, and counselors for support. You also can do things at home to make yourself feel better while you go through treatment. Call the [facility] (9-457.234.5675) or visit its website at [address] [employer] for more information. Follow-up care is a key part of your treatment and safety. Be sure to make and go to all appointments, and call your doctor if you are having problems. It's also a good idea to know your test results and keep a list of the medicines you take. How can you care for yourself at home? · Take your medicines exactly as prescribed. Call your doctor if you think you are having a problem with your medicine. You may get medicine for nausea and vomiting if you have these side effects. · Follow your doctor's instructions to relieve pain. Pain from cancer and surgery can almost always be controlled. Use pain medicine when you first notice pain, before it becomes severe. · Eat healthy food. If you do not feel like eating, try to eat food that has protein and extra calories to keep up your strength and prevent weight loss. Drink liquid meal replacements for extra calories and protein. Try to eat your main meal early. · Get some physical activity every day, but do not get too tired. Keep doing the hobbies you enjoy as your energy allows. · Do not smoke. Smoking can make your cancer worse. If you need help quitting, talk to your doctor about stop-smoking programs and medicines. These can increase your chances of quitting for good. · Take steps to control your stress and workload. Learn relaxation techniques. ? Share your feelings. Stress and tension affect our emotions. By expressing your feelings to others, you may be able to understand and cope with them. ? Consider joining a support group.  Talking about a problem with your spouse, a good friend, or other people with similar problems is a good way to reduce tension and stress. ? Express yourself through art. Try writing, crafts, dance, or art to relieve stress. Some dance, writing, or art groups may be available just for people who have cancer. ? Be kind to your body and mind. Getting enough sleep, eating a healthy diet, and taking time to do things you enjoy can contribute to an overall feeling of balance in your life and can help reduce stress. ? Get help if you need it. Discuss your concerns with your doctor or counselor. · If you are vomiting or have diarrhea:  ? Drink plenty of fluids to prevent dehydration. Choose water and other clear liquids. If you have kidney, heart, or liver disease and have to limit fluids, talk with your doctor before you increase the amount of fluids you drink. ? When you are able to eat, try clear soups, mild foods, and liquids until all symptoms are gone for 12 to 48 hours. Other good choices include dry toast, crackers, cooked cereal, and gelatin dessert, such as Jell-O.  · If you have not already done so, prepare a list of advance directives. Advance directives are instructions to your doctor and family members about what kind of care you want if you become unable to speak or express yourself. When should you call for help? Call 911 anytime you think you may need emergency care. For example, call if:    · You passed out (lost consciousness). Call your doctor now or seek immediate medical care if:    · You have a fever.     · You have abnormal bleeding.     · You think you have an infection.     · You have new or worse pain.     · You have new symptoms, such as a cough, belly pain, vomiting, diarrhea, or a rash. Watch closely for changes in your health, and be sure to contact your doctor if:    · You are much more tired than usual.     · You have swollen glands in your armpits, groin, or neck.     · You do not get better as expected. Where can you learn more?   Go to http://www.gray.com/  Enter V321 in the search box to learn more about \"Breast Cancer: Care Instructions. \"  Current as of: December 17, 2020               Content Version: 13.0  © 2006-2021 [employer]. Care instructions adapted under license by [employer] (which disclaims liability or warranty for this information). If you have questions about a medical condition or this instruction, always ask your healthcare professional. [address] any warranty or liability for your use of this information. DISCHARGE SUMMARY from Nurse    PATIENT INSTRUCTIONS:    After general anesthesia or intravenous sedation, for 24 hours or while taking prescription Narcotics:  · Limit your activities  · Do not drive and operate hazardous machinery  · Do not make important personal or business decisions  · Do  not drink alcoholic beverages  · If you have not urinated within 8 hours after discharge, please contact your surgeon on call. Report the following to your surgeon:  · Excessive pain, swelling, redness or odor of or around the surgical area  · Temperature over 100.5  · Nausea and vomiting lasting longer than 4 hours or if unable to take medications  · Any signs of decreased circulation or nerve impairment to extremity: change in color, persistent  numbness, tingling, coldness or increase pain  · Any questions    What to do at Home:    *  Please give a list of your current medications to your Primary Care Provider. *  Please update this list whenever your medications are discontinued, doses are      changed, or new medications (including over-the-counter products) are added. *  Please carry medication information at all times in case of emergency situations. These are general instructions for a healthy lifestyle:    No smoking/ No tobacco products/ Avoid exposure to second hand smoke  Surgeon General's Warning:  Quitting smoking now greatly reduces serious risk to your health.     Obesity, smoking, and sedentary lifestyle greatly increases your risk for illness    A healthy diet, regular physical exercise & weight monitoring are important for maintaining a healthy lifestyle    You may be retaining fluid if you have a history of heart failure or if you experience any of the following symptoms:  Weight gain of 3 pounds or more overnight or 5 pounds in a week, increased swelling in our hands or feet or shortness of breath while lying flat in bed. Please call your doctor as soon as you notice any of these symptoms; do not wait until your next office visit. The discharge information has been reviewed with the patient. The patient verbalized understanding. Discharge medications reviewed with the patient and appropriate educational materials and side effects teaching were provided.   ___________________________________________________________________________________________________________________________________

## 2021-11-23 NOTE — PROGRESS NOTES
conducted a pre-surgery visit with Jeffrey Fernandez, who is a 59 y.o.,female. The  provided the following Interventions:  Initiated a relationship of care and support. Offered prayer and assurance of continued prayers on patient's behalf. There is an advance directive on file here for this  patient. Plan:  Chaplains will continue to follow and will provide pastoral care on an as needed/requested basis.  recommends bedside caregivers page  on duty if patient shows signs of acute spiritual or emotional distress.   [address]   Board Certified [address] [place] [address]   [phone]

## 2021-11-23 NOTE — ANESTHESIA PREPROCEDURE EVALUATION
Relevant Problems   No relevant active problems       Anesthetic History   No history of anesthetic complications            Review of Systems / Medical History  Patient summary reviewed and pertinent labs reviewed    Pulmonary    COPD: moderate    Sleep apnea: CPAP           Neuro/Psych   Within defined limits           Cardiovascular  Within defined limits                Exercise tolerance: >4 METS     GI/Hepatic/Renal     GERD: well controlled           Endo/Other        Morbid obesity and cancer     Other Findings              Physical Exam    Airway  Mallampati: I  TM Distance: 4 - 6 cm  Neck ROM: normal range of motion   Mouth opening: Normal     Cardiovascular  Regular rate and rhythm,  S1 and S2 normal,  no murmur, click, rub, or gallop  Rhythm: regular  Rate: normal         Dental    Dentition: Lower dentition intact and Upper dentition intact     Pulmonary  Breath sounds clear to auscultation               Abdominal  GI exam deferred       Other Findings            Anesthetic Plan    ASA: 3  Anesthesia type: general          Induction: Intravenous  Anesthetic plan and risks discussed with: Patient

## 2021-11-23 NOTE — ANESTHESIA POSTPROCEDURE EVALUATION
Procedure(s):  BILATERAL MASTECTOMY, LEFT SENTINEL NODE BIOPSY [facility] 11/22). general    Anesthesia Post Evaluation      Multimodal analgesia: multimodal analgesia used between 6 hours prior to anesthesia start to PACU discharge  Patient location during evaluation: bedside  Patient participation: complete - patient participated  Level of consciousness: awake  Pain score: 5  Pain management: adequate  Airway patency: patent  Anesthetic complications: no  Cardiovascular status: stable  Respiratory status: acceptable  Hydration status: acceptable  Post anesthesia nausea and vomiting:  none  Final Post Anesthesia Temperature Assessment:  Normothermia (36.0-37.5 degrees C)      INITIAL Post-op Vital signs:   Vitals Value Taken Time   /76 11/23/21 1245   Temp 37 °C (98.6 °F) 11/23/21 1135   Pulse 98 11/23/21 1252   Resp 16 11/23/21 1245   SpO2 93 % 11/23/21 1252   Vitals shown include unvalidated device data.

## 2021-11-23 NOTE — OP NOTES
Date of Procedure: 11/23/2021  Preoperative Diagnosis: C50.212 MALIGNANT NEOPLASM LEFT BREAST  Postoperative Diagnosis: C50.212 MALIGNANT NEOPLASM LEFT BREAST  Procedure(s):  Procedure(s):  BILATERAL MASTECTOMY, LEFT SENTINEL NODE BIOPSY [facility] 11/22)    Surgeon(s) and Role:      Chato Franco MD - Primary         Surgical Staff: Circ-1: Jay Jay Muller RN  Scrub Tech-1: Lexa Fontenot  Surg Asst-1: Lizz Fairly      Event Time In     Event Time In   Incision Start 0914   Incision Close      Event Time In   Patient In - Facility (Arrived) [phone]   Patient In - Pre-op 1862   Anesthesia Start 9461   Patient Ready for Pre-op Visitors 0818   Patient Ready for OR 0845   Surgeon Available 0818   Patient Out - Pre-op 8214   Patient In - [address] [address]   Surgeon In [address] 0902   Incision Start 0914   Incision Close    Surgeon out of OR 1049   Patient Out - OR    Anesthesia Stop    Patient In - Phase I    Notice to Anesthesia    Care Complete - Phase I    Patient Out - Phase I    Patient In - Phase II    Patient Tolerates Liquids    Patient Ready for Visitors    Patient Education Complete    Patient Voided    Care Complete - Phase II    Patient Out - Phase II    End of [address]    Patient In - Overflow    Patient Out - Overflow        Anesthesia: General  Anesthesia staff: Anesthesiologist: Sana Travis MD  CRNA: Lyndon Bob CRNA;  Aston Dong CRNA  Estimated Blood Loss: 200cc  Specimens:   ID Type Source Tests Collected by Time Destination   1 : right breast tissue Preservative Breast  Valeriy Alcantar MD 11/23/2021 7432 Pathology   2 : left breast tissue Preservative Breast  Valeriy Alcantar MD 11/23/2021 1028 Pathology   3 : left axillary sentinel nodes Preservative Axilla  Valeriy Alcantar MD 11/23/2021 1037 Pathology        Findings: bilateral breasts intact, sentinel node hot and blue   Complications: none noted  Implants: * No implants in log *      The patient was identified in the preoperative holding area and the risks again were reviewed with the patient who understands and agrees. Risks, benefits and options were discussed in detail to include, but not limited to, bleeding, infection, risks of anesthesia, injury to surrounding structures and other unforeseen events such as stroke, heart attack or death. She understands the possible need for additional surgery. She was also marked by me  to confirm the site and the procedure. The patient was taken to the operating suite and placed supine on the table. Compression stockings were placed and anesthesia induced without complication. She was then prepped and draped in the usual sterile fashion and an appropriate time-out was performed to confirm the patient, the side, and the procedure. Attention was first turned to the right  breast.  The skin was cut in an ellips. Dissection of breast tissue was extended to sternum medially, inframmary fold inferiorly, the lateral and superior aspects of the breast tissue. The breast was dissected off of the pectoralis to include the pectoralis fascia. The specimen was oriented and passed off. Attention was then turned to the left breast.  The skin was cut in an ellips. Dissection of breast tissue was extended to sternum medially, inframmary fold inferiorly, the lateral and superior aspects of the breast tissue. The breast was dissected off of the pectoralis to include the pectoralis fascia. The specimen was oriented and passed off. Prior to the prep, an appropriate  time out was performed and the left breast injected with 2cc of blue dye. In the axilla, I identified the appropriate region in the axilla with the gamma probe and accessed the axilla via the mastectomy bed, dissecting into the deep axilla. All lymph nodes were identified in the deep axilla which were hot and blue. These were removed with cautery. Two 15 Jorge drains were placed.   The flaps were reapproximated with 2-0 vicryl, followed by monocryl. Sterile surgical dressings were applied. All counts were reported to me as correct. Family was updated.

## 2021-11-23 NOTE — INTERVAL H&P NOTE
Update History & Physical    The Patient's History and Physical was reviewed with the patient and I examined the patient. There was no change. The surgical site was confirmed by the patient and me. Patient requesting Leeanna Gails for pain postoperatively. Allergies noted, confirmed patient has taken and tolerated this or similar medication in the past and this is her choice for narcotic pain medication. We discussed tylenol and ibuprofen may be sufficient, assuming she has not been told to avoid either medication (generally due to concerns for kidneys, stomach or liver). I have advised her to limit acetaminophen from all sources to less than 4,000mg per day and not to drive while taking narcotic pain medication. I have recommended taking narcotic pain medication sparingly and only if needed. If using the narcotic pain medication, I have recommended taking with food. We discussed this class of medication can constipate, so I have encouraged use of Miralax or Milk of Magnesia if constipation occurs. This class of medication can also be addicting. Again I recommend taking narcotic pain medication sparingly and only if needed. Plan:  The risk, benefits, expected outcome, and alternative to the recommended procedure have been discussed with the patient. Patient understands and wants to proceed with the procedure.     Electronically signed by Sonido Ash MD on 11/23/2021 at 8:22 AM

## 2021-11-30 NOTE — PROGRESS NOTES
Breast Cancer       Ms. Destiny Abreu is a 59year old woman with BRCA2 mutation and T2N0 triple negative breast cancer, s/p bilateral mastectomy 11/23/21. She had been diagnosed on core biopsy 10/19/21. The area of concern was identified as a palpable mass around September 2021. She denied nipple discharge. She denied breast pain. There is family history of breast cancer in her sister, mother and maternal aunt. Her most recent previous mammogram was 10/14/19. She has a personal history of fibrosarcoma. Her sister has HNPCC/ANTHONY mutation. Genetic testing via [employer] demonstrated BRCA2 mutation 11/8/21. She reports having had hysterectomy with salpingoopherectomy. Breast/GYN history:    OB History    No obstetric history on file.           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    GERD (gastroesophageal reflux disease)     History of gastric bypass 2012    History of meniscal tear 2015, 2018    right in 2015, left in 2018    HNP (herniated nucleus pulposus), lumbar     patient reported    Lung nodules     resolved 2018 CT    Osteopenia 10/03/2017    Recurrent depression ([address]     Sleep apnea     on cpap    Spinal stenosis, lumbar     patient reported    Xanthelasma of left upper eyelid 7/20/2020       Past Surgical History:   Procedure Laterality Date    HX ARTHRODESIS  01/2000    Herniated Disc, arthritis right foot 06/06, 07/07, C 6/7, C 4/6 Stenosis    HX CHOLECYSTECTOMY  09/2008    gallbladder disease    HX COLONOSCOPY  05/2009    HX GASTRIC BYPASS  2012    GASTRIC BYPASS  [address] Gastric Bypass      HX HYSTERECTOMY  06/1994    HX MASTECTOMY Bilateral 11/23/2021    BILATERAL MASTECTOMY, LEFT SENTINEL NODE BIOPSY [facility] 11/22) performed by Gabriel Obregon MD at [address] [place] [address] HX MENISCECTOMY  10/2015    right jeff meniscus    HX MENISCECTOMY Left 03/16/2018    partial meniscectomy due to tear    HX MYOMECTOMY  06/1984    benign tumor    HX ORTHOPAEDIC  1964    orthopaedic excision Fibrosarcoma and Lymphangiogram    HX PELVIC LAPAROSCOPY  04/1984    large uterine blockage    NEUROLOGICAL PROCEDURE UNLISTED  2000, 2007    Cervical fusion       Current Outpatient Medications on File Prior to Visit   Medication Sig Dispense Refill    buPROPion SR (WELLBUTRIN SR) 150 mg SR tablet TAKE 1 TABLET BY MOUTH TWICE DAILY 180 Tablet 3    sucralfate (CARAFATE) 1 gram tablet Take 1 tablet by mouth Four times a day as needed for indigestion. (Patient taking differently: two (2) times a day. Take 1 tablet by mouth Four times a day as needed for indigestion.) 360 Tablet 0    pregabalin (LYRICA) 150 mg capsule TAKE 1 CAPSULE BY MOUTH TWICE DAILY. MAX DAILY AMOUNT: 300 MG (Patient taking differently: as needed.) 180 Capsule 0    Trelegy Ellipta 100-62.5-25 mcg inhaler INHALE 1 PUFF BY MOUTH DAILY 2 Inhaler 3    dexlansoprazole (Dexilant) 60 mg CpDB capsule (delayed release) TAKE 1 CAPSULE BY MOUTH DAILY 90 Cap 3    fluticasone propionate (FLONASE) 50 mcg/actuation nasal spray SHAKE LIQUID AND USE 2 SPRAYS IN EACH NOSTRIL DAILY 48 g 5    b-complex with vitamin c tablet Take 1 Tab by mouth daily.  acetaminophen (TYLENOL EXTRA STRENGTH) 500 mg tablet Take  by mouth every six (6) hours as needed for Pain.  calcium-cholecalciferol, d3, (CALCIUM 600 + D) 600-125 mg-unit tab Take 1,065 mg by mouth nightly. Indications: TAKES 2 AT BEDTIME      omega 3-dha-epa-fish oil (FISH OIL) 100-160-1,000 mg cap Take 1,065 mg by mouth daily.  ferrous sulfate ER (IRON) 160 mg (50 mg iron) TbER tablet Take 1 Tab by mouth daily. Indications: PT TAKES 40 MG      multivitamin (ONE A DAY) tablet Take 1 Tab by mouth daily.  citalopram (CELEXA) 20 mg tablet Take 1 Tablet by mouth daily.  90 Tablet 0    diclofenac (Voltaren) 1 % gel Apply 2 g to affected area every six (6) hours as needed for Pain. (Patient not taking: Reported on 2021) 100 g 2    Linzess 72 mcg cap capsule TAKE 1 CAPSULE BY MOUTH DAILY BEFORE BREAKFAST (Patient not taking: Reported on 10/28/2021) 30 Cap 1    albuterol (PROVENTIL HFA, VENTOLIN HFA, PROAIR HFA) 90 mcg/actuation inhaler Take 2 Puffs by inhalation every four (4) hours as needed for Wheezing or Shortness of Breath. (Patient not taking: Reported on 2021) 3 Inhaler 4     No current facility-administered medications on file prior to visit.        Allergies   Allergen Reactions    Adhesive Tape-Silicones Swelling     REALLY BAD SWELLING AND SORE APPEAR    Alcohol Other (comments)     PT STATES SHE IS AN ALCOHOLIC    Lactose Other (comments)     PT STATES IT MAKES HER STOMACH HURT    Percodan [Oxycodone Hcl-Oxycodone-Asa] Itching and Other (comments)     crying    Nsaids (Non-Steroidal Anti-Inflammatory Drug) Other (comments)     PT NOT ABLE TO TAKE DUE TO GASTRIC BYPASS       Social History     Tobacco Use    Smoking status: Former Smoker     Packs/day: 0.50     Years: 45.00     Pack years: 22.50     Quit date: 2020     Years since quittin.4    Smokeless tobacco: Never Used   Substance Use Topics    Alcohol use: No     Comment: quit - was heavy etoh    Drug use: Not Currently     Comment: marijuana, cocaine 30 yrs ago       Family History   Problem Relation Age of Onset    Hypertension Mother     Depression Mother     Cancer Mother     Ovarian Cancer Mother     Breast Problems Mother     Cancer Father     Cancer Sister     Depression Sister     Breast Cancer Sister     Depression Brother     Stroke Neg Hx     Heart Attack Neg Hx          ROS: positives are bolded  General: fevers, chills, night sweats, fatigue, weight loss, weight gain  GI: nausea, vomiting, abdominal pain, change in bowel habits, hematochezia, melena, GERD  Integ: dermatitis, abnormal moles  HEENT: visual changes, vertigo, epistaxis, dysphagia, hoarseness  Cardiac: chest pain, palpitations, HTN, edema, varicosities  Resp: cough, shortness of breath, wheezing, hemoptysis, snoring, reactive airway disease  : hematuria, dysuria, frequency, urgency, nocturia, stress urinary incontinence   MSK: weakness, joint pain, arthritis  Endocrine:  thyroid disease, polyuria, polydipsia, polyphagia, poor wound healing, heat intolerance, cold intolerance  Lymph/Heme: anemia, bruising, history of blood transfusions  Neuro: dizziness, headache, fainting, seizures, stroke  Psych: anxiety, depression    Physical Exam:  Visit Vitals  Wt 131.7 kg (290 lb 6.4 oz)   BMI 48.33 kg/m²     Telephone visit, previously:  Gen:  No distress  Head: normocephalic, atraumatic  Mouth: Clear, no overt lesions, oral mucosa pink and moist  Neck: supple, no masses, no adenopathy, trachea midline  Resp: clear bilaterally  Cardio: Regular rate and rhythm  Abdomen: soft, nontender, nondistended  Extremities: warm, well-perfused  Neuro: sensation and strength grossly intact and symmetrical  Psych: alert and oriented to person, place and time  Breasts: palpation deferred, incisions clean, flaps viable, SIOBHAN serosang previously:  Right: Examined in both the supine and upright positions. There was no supraclavicular, infraclavicular, or axillary lymphadenopathy. There were no dominant masses, no skin changes, no asymmetry identified ptotic bilaterally, size discrepancy right larger than left  Left: Examined in both the supine and upright positions. There was no supraclavicular, infraclavicular, or axillary lymphadenopathy. Visible on inspection and exam firm mass upper inner, ptotic bilaterally, size discrepancy right larger than left       Imaging:  10/19/21 bilateral mammogram/left ultrasound  1. Suspicious finding.  Further evaluation with ultrasound-guided biopsy.     BI-RADS Category 4 - Suspicion for Malignancy     These findings were discussed with the patient in person. Options and  alternatives were discussed. Patient demonstrated understanding.     The lack of mammographic evidence of malignancy should not deter further work-up  of a clinically significant palpable finding. Radiodense breast tissue may  obscure an early malignancy or a palpable finding. 10-15% of breast cancers are  not detected by mammography.       Pathology:  11/23/21  A: Right breast, simple mastectomy:        Nonproliferative fibrocystic change. B: Left breast, simple mastectomy:        Invasive adenocarcinoma of no special type (ductal). Ductal carcinoma in situ. C: sentinel Lymph node, left axillary, biopsy:        One lymph node, negative for malignancy (0/1).      SPECIMEN       Procedure: Total mastectomy       Specimen Laterality: Left    TUMOR       Histologic Type:  Invasive carcinoma of no special type (ductal)       Overall Grade: Grade 2 (scores of 6 or 7)           Glandular (Acinar) / Tubular Differentiation: Score 2            Nuclear Pleomorphism: Score 3            Mitotic Rate: Score 2       Tumor Size: 28 mm       Ductal Carcinoma In Situ (DCIS): Present           Nuclear Grade: Grade III (high)           Necrosis: Present, central (expansive \"comedo\" necrosis)    Tumor Extent       Skin: Uninvolved by tumor       Nipple DCIS: DCIS does not involve the nipple epidermis       Treatment Effect in the Breast: No known presurgical therapy       Treatment Effect in the Lymph Nodes: No known pre-surgical therapy    MARGINS       Invasive Carcinoma Margins: Uninvolved by invasive carcinoma           Distance from Closest Margin (Millimeters): 25 mm           Closest Margin(s): Posterior       DCIS Margins: Uninvolved by DCIS           Distance from Closest Margin (Millimeters): 25 mm           Closest Margin(s): Posterior    LYMPH NODES       Regional Lymph Nodes: Uninvolved by tumor cells           Total Number of Lymph Nodes Examined: 1           Number of [place] Nodes Examined: 1    PATHOLOGIC STAGE CLASSIFICATION (pTNM, AJCC 8th Edition)       Primary Tumor (pT): pT2       Regional Lymph Nodes Modifier: (sn): [place] node(s) evaluated       Regional Lymph Nodes (pN): pN0    SPECIAL STUDIES       Testing Performed on Case Number [id number], reported as ER-, CT-,   Her2-     10/22/21  A: Left breast mass, 9:00, biopsy:        Invasive carcinoma with papillary features (see comment). INVASIVE CARCINOMA OF THE BREAST: Biopsy    SPECIMEN       Procedure: Needle biopsy       Specimen Laterality: Left    TUMOR       Histologic Type: Invasive carcinoma with papillary features.       Glandular (Acinar) / Tubular Differentiation: Score 3       Nuclear Pleomorphism: Score 2       Mitotic Rate: Score 2       Overall Grade: Grade 2 (scores of 6 or 7)       Lymphovascular Invasion: Not identified   Estrogen Receptor (ER):     Negative (0%, No internal controls in sample   tested but external controls stain                       appropriately).                        Progesterone Receptor (PgR): Negative (0%, No internal controls in sample   tested but external controls stain                       appropriately).                           Her-2 (IHC Method):          Negative (score 0)     Impression:  Patient Active Problem List   Diagnosis Code    Chronic obstructive pulmonary disease (HCC) J44.9    Recurrent depression ([facility] F33.9    Fibrosarcoma ([facility] C49.9    Alcoholism in remission ([facility] F10.21    Sleep apnea G47.30    History of gastric bypass Z98.84    Obesity, morbid (HCC) E66.01    Osteopenia M85.80    GERD (gastroesophageal reflux disease) K21.9    Chronic pain of both knees M25.561, M25.562, G89.29    Prediabetes R73.03    Age-related nuclear cataract, bilateral H25.13    Xanthelasma of left upper eyelid H02.64    Xanthelasma of right upper eyelid H02.61    Sebaceous cyst of eyelid H02.829    Personal history of smoking Z87.891    Diverticulitis K57.92  Breast cancer of upper-inner quadrant of left female breast [facility]) C50.65        59year old woman with BRCA2 mutation and T2N0 triple negative breast cancer, s/p bilateral mastectomy 11/23/21. The pathology was reviewed. Follow up with Dr. Zaid Webster as scheduled. Right drain removed. Follow up with me Monday for left drain removal.   All questions were answered. She was asked to call with any additional questions or concerns.

## 2021-12-01 ENCOUNTER — OFFICE VISIT (OUTPATIENT)
Dept: SURGERY | Age: 61
End: 2021-12-01
Payer: MEDICARE

## 2021-12-01 VITALS
SYSTOLIC BLOOD PRESSURE: 143 MMHG | DIASTOLIC BLOOD PRESSURE: 71 MMHG | HEART RATE: 72 BPM | WEIGHT: 290.4 LBS | OXYGEN SATURATION: 98 % | BODY MASS INDEX: 48.33 KG/M2

## 2021-12-01 DIAGNOSIS — C50.212 MALIGNANT NEOPLASM OF UPPER-INNER QUADRANT OF LEFT BREAST IN FEMALE, ESTROGEN RECEPTOR NEGATIVE (HCC): Primary | ICD-10-CM

## 2021-12-01 DIAGNOSIS — Z17.1 MALIGNANT NEOPLASM OF UPPER-INNER QUADRANT OF LEFT BREAST IN FEMALE, ESTROGEN RECEPTOR NEGATIVE (HCC): Primary | ICD-10-CM

## 2021-12-01 PROCEDURE — 99024 POSTOP FOLLOW-UP VISIT: CPT | Performed by: SURGERY

## 2021-12-01 NOTE — PROGRESS NOTES
Alexy Martin is a 59 y.o. female (: 1960) presenting to address:      Chief Complaint   Patient presents with    Surgical Follow-up     Bilateral mastectomy 21       Medication list and allergies have been reviewed with Alexy Veronica and updated as of today's date. I have gone over all Medical, Surgical and Social History with Alexy Martin and updated/added the information accordingly. 1. Have you been to the ER, Urgent Care or Hospitalized since your last visit? NO      2. Have you followed up with your PCP or any other Physicians since your procedure/ last office visit?    NO

## 2021-12-01 NOTE — LETTER
12/1/2021 11:25 AM    Patient:  Halle Courtney   YOB: 1960  Date of Visit: 12/1/2021      Abhishek Cowan NP  [address]  Via In Basket    Dear Abhishek Cowan NP,      I had the pleasure of seeing Ms. Mere Franklin in my office today for her breast cancer. I am including a copy of my office visit today. If you have questions, please do not hesitate to call me. I look forward to following Ms. Yaya Fields along with you and will keep you updated as to her progress.            Sincerely,      Mariah Hairston MD

## 2021-12-02 ENCOUNTER — TELEPHONE (OUTPATIENT)
Dept: SURGERY | Age: 61
End: 2021-12-02

## 2021-12-02 ENCOUNTER — OFFICE VISIT (OUTPATIENT)
Dept: SURGERY | Age: 61
End: 2021-12-02
Payer: MEDICARE

## 2021-12-02 VITALS
OXYGEN SATURATION: 94 % | WEIGHT: 293 LBS | DIASTOLIC BLOOD PRESSURE: 72 MMHG | BODY MASS INDEX: 48.76 KG/M2 | SYSTOLIC BLOOD PRESSURE: 151 MMHG | TEMPERATURE: 97.3 F | HEART RATE: 65 BPM

## 2021-12-02 DIAGNOSIS — Z17.1 MALIGNANT NEOPLASM OF UPPER-INNER QUADRANT OF LEFT BREAST IN FEMALE, ESTROGEN RECEPTOR NEGATIVE (HCC): Primary | ICD-10-CM

## 2021-12-02 DIAGNOSIS — C50.212 MALIGNANT NEOPLASM OF UPPER-INNER QUADRANT OF LEFT BREAST IN FEMALE, ESTROGEN RECEPTOR NEGATIVE (HCC): Primary | ICD-10-CM

## 2021-12-02 PROCEDURE — 99024 POSTOP FOLLOW-UP VISIT: CPT | Performed by: SURGERY

## 2021-12-02 NOTE — PROGRESS NOTES
Breast Cancer       Ms. Avery Dos Santos is a 59year old woman with BRCA2 mutation and T2N0 triple negative breast cancer, s/p bilateral mastectomy 11/23/21. She had been diagnosed on core biopsy 10/19/21. The area of concern was identified as a palpable mass around September 2021. She denied nipple discharge. She denied breast pain. There is family history of breast cancer in her sister, mother and maternal aunt. Her most recent previous mammogram was 10/14/19. She has a personal history of fibrosarcoma. Her sister has HNPCC/ANTHONY mutation. Genetic testing via [employer] demonstrated BRCA2 mutation 11/8/21. She reports having had hysterectomy with salpingoopherectomy. She presents 12/2/21 for left drain removal as it is no longer holding suction. Breast/GYN history:    OB History    No obstetric history on file.           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    GERD (gastroesophageal reflux disease)     History of gastric bypass 2012    History of meniscal tear 2015, 2018    right in 2015, left in 2018    HNP (herniated nucleus pulposus), lumbar     patient reported    Lung nodules     resolved 2018 CT    Osteopenia 10/03/2017    Recurrent depression ([address]     Sleep apnea     on cpap    Spinal stenosis, lumbar     patient reported    Xanthelasma of left upper eyelid 7/20/2020       Past Surgical History:   Procedure Laterality Date    HX ARTHRODESIS  01/2000    Herniated Disc, arthritis right foot 06/06, 07/07, C 6/7, C 4/6 Stenosis    HX CHOLECYSTECTOMY  09/2008    gallbladder disease    HX COLONOSCOPY  05/2009    HX GASTRIC BYPASS  2012    GASTRIC BYPASS  [facility] Gastric Bypass      HX HYSTERECTOMY  06/1994    HX MASTECTOMY Bilateral 11/23/2021    BILATERAL MASTECTOMY, LEFT SENTINEL NODE BIOPSY [facility] 11/22) performed by Katharine Kelsey Oden MD at [address] [place] [address]  [place] [place]  10/2015    right repari of torn meniscus    HX MENISCECTOMY Left 03/16/2018    partial meniscectomy due to tear    HX MYOMECTOMY  06/1984    benign tumor    HX ORTHOPAEDIC  1964    orthopaedic excision Fibrosarcoma and Lymphangiogram    HX PELVIC LAPAROSCOPY  04/1984    large uterine blockage    NEUROLOGICAL PROCEDURE UNLISTED  2000, 2007    Cervical fusion       Current Outpatient Medications on File Prior to Visit   Medication Sig Dispense Refill    buPROPion SR (WELLBUTRIN SR) 150 mg SR tablet TAKE 1 TABLET BY MOUTH TWICE DAILY 180 Tablet 3    sucralfate (CARAFATE) 1 gram tablet Take 1 tablet by mouth Four times a day as needed for indigestion. (Patient taking differently: two (2) times a day. Take 1 tablet by mouth Four times a day as needed for indigestion.) 360 Tablet 0    citalopram (CELEXA) 20 mg tablet Take 1 Tablet by mouth daily. 90 Tablet 0    pregabalin (LYRICA) 150 mg capsule TAKE 1 CAPSULE BY MOUTH TWICE DAILY. MAX DAILY AMOUNT: 300 MG (Patient taking differently: as needed.) 180 Capsule 0    diclofenac (Voltaren) 1 % gel Apply 2 g to affected area every six (6) hours as needed for Pain. (Patient not taking: Reported on 11/23/2021) 100 g 2    Trelegy Ellipta 100-62.5-25 mcg inhaler INHALE 1 PUFF BY MOUTH DAILY 2 Inhaler 3    dexlansoprazole (Dexilant) 60 mg CpDB capsule (delayed release) TAKE 1 CAPSULE BY MOUTH DAILY 90 Cap 3    Linzess 72 mcg cap capsule TAKE 1 CAPSULE BY MOUTH DAILY BEFORE BREAKFAST (Patient not taking: Reported on 10/28/2021) 30 Cap 1    fluticasone propionate (FLONASE) 50 mcg/actuation nasal spray SHAKE LIQUID AND USE 2 SPRAYS IN EACH NOSTRIL DAILY 48 g 5    albuterol (PROVENTIL HFA, VENTOLIN HFA, PROAIR HFA) 90 mcg/actuation inhaler Take 2 Puffs by inhalation every four (4) hours as needed for Wheezing or Shortness of Breath.  (Patient not taking: Reported on 11/23/2021) 3 Inhaler 4    b-complex with vitamin c tablet Take 1 Tab by mouth daily.  acetaminophen (TYLENOL EXTRA STRENGTH) 500 mg tablet Take  by mouth every six (6) hours as needed for Pain.  calcium-cholecalciferol, d3, (CALCIUM 600 + D) 600-125 mg-unit tab Take 1,065 mg by mouth nightly. Indications: TAKES 2 AT BEDTIME      omega 3-dha-epa-fish oil (FISH OIL) 100-160-1,000 mg cap Take 1,065 mg by mouth daily.  ferrous sulfate ER (IRON) 160 mg (50 mg iron) TbER tablet Take 1 Tab by mouth daily. Indications: PT TAKES 40 MG      multivitamin (ONE A DAY) tablet Take 1 Tab by mouth daily. No current facility-administered medications on file prior to visit.        Allergies   Allergen Reactions    Adhesive Tape-Silicones Swelling     REALLY BAD SWELLING AND SORE APPEAR    Alcohol Other (comments)     PT STATES SHE IS AN ALCOHOLIC    Lactose Other (comments)     PT STATES IT MAKES HER STOMACH HURT    Percodan [Oxycodone Hcl-Oxycodone-Asa] Itching and Other (comments)     crying    Nsaids (Non-Steroidal Anti-Inflammatory Drug) Other (comments)     PT NOT ABLE TO TAKE DUE TO GASTRIC BYPASS       Social History     Tobacco Use    Smoking status: Former Smoker     Packs/day: 0.50     Years: 45.00     Pack years: 22.50     Quit date: 2020     Years since quittin.4    Smokeless tobacco: Never Used   Substance Use Topics    Alcohol use: No     Comment: quit 1980s- was heavy etoh    Drug use: Not Currently     Comment: marijuana, cocaine 30 yrs ago       Family History   Problem Relation Age of Onset    Hypertension Mother     Depression Mother     Cancer Mother     Ovarian Cancer Mother     Breast Problems Mother     Cancer Father     Cancer Sister     Depression Sister     Breast Cancer Sister     Depression Brother     Stroke Neg Hx     Heart Attack Neg Hx          ROS: positives are bolded  General: fevers, chills, night sweats, fatigue, weight loss, weight gain  GI: nausea, vomiting, abdominal pain, change in bowel habits, hematochezia, melena, GERD  Integ: dermatitis, abnormal moles  HEENT: visual changes, vertigo, epistaxis, dysphagia, hoarseness  Cardiac: chest pain, palpitations, HTN, edema, varicosities  Resp: cough, shortness of breath, wheezing, hemoptysis, snoring, reactive airway disease  : hematuria, dysuria, frequency, urgency, nocturia, stress urinary incontinence   MSK: weakness, joint pain, arthritis  Endocrine:  thyroid disease, polyuria, polydipsia, polyphagia, poor wound healing, heat intolerance, cold intolerance  Lymph/Heme: anemia, bruising, history of blood transfusions  Neuro: dizziness, headache, fainting, seizures, stroke  Psych: anxiety, depression    Physical Exam:  Visit Vitals  BP (!) 151/72 (BP 1 Location: Right arm, BP Patient Position: Sitting, BP Cuff Size: Large adult)   Pulse 65   Temp 97.3 °F (36.3 °C) (Temporal)   Wt 132.9 kg (293 lb)   SpO2 94%   BMI 48.76 kg/m²     Telephone visit, previously:  Gen:  No distress  Head: normocephalic, atraumatic  Mouth: Clear, no overt lesions, oral mucosa pink and moist  Neck: supple, no masses, no adenopathy, trachea midline  Resp: clear bilaterally  Cardio: Regular rate and rhythm  Abdomen: soft, nontender, nondistended  Extremities: warm, well-perfused  Neuro: sensation and strength grossly intact and symmetrical  Psych: alert and oriented to person, place and time  Breasts: palpation deferred, incisions clean, flaps viable, SIOBHAN serosang previously:  Right: Examined in both the supine and upright positions. There was no supraclavicular, infraclavicular, or axillary lymphadenopathy. There were no dominant masses, no skin changes, no asymmetry identified ptotic bilaterally, size discrepancy right larger than left  Left: Examined in both the supine and upright positions. There was no supraclavicular, infraclavicular, or axillary lymphadenopathy.   Visible on inspection and exam firm mass upper inner, ptotic bilaterally, size discrepancy right larger than left       Imaging:  10/19/21 bilateral mammogram/left ultrasound  1. Suspicious finding. Further evaluation with ultrasound-guided biopsy.     BI-RADS Category 4 - Suspicion for Malignancy     These findings were discussed with the patient in person. Options and  alternatives were discussed. Patient demonstrated understanding.     The lack of mammographic evidence of malignancy should not deter further work-up  of a clinically significant palpable finding. Radiodense breast tissue may  obscure an early malignancy or a palpable finding. 10-15% of breast cancers are  not detected by mammography.       Pathology:  11/23/21  A: Right breast, simple mastectomy:        Nonproliferative fibrocystic change. B: Left breast, simple mastectomy:        Invasive adenocarcinoma of no special type (ductal). Ductal carcinoma in situ. C: sentinel Lymph node, left axillary, biopsy:        One lymph node, negative for malignancy (0/1).      SPECIMEN       Procedure: Total mastectomy       Specimen Laterality: Left    TUMOR       Histologic Type:  Invasive carcinoma of no special type (ductal)       Overall Grade: Grade 2 (scores of 6 or 7)           Glandular (Acinar) / Tubular Differentiation: Score 2            Nuclear Pleomorphism: Score 3            Mitotic Rate: Score 2       Tumor Size: 28 mm       Ductal Carcinoma In Situ (DCIS): Present           Nuclear Grade: Grade III (high)           Necrosis: Present, central (expansive \"comedo\" necrosis)    Tumor Extent       Skin: Uninvolved by tumor       Nipple DCIS: DCIS does not involve the nipple epidermis       Treatment Effect in the Breast: No known presurgical therapy       Treatment Effect in the Lymph Nodes: No known pre-surgical therapy    MARGINS       Invasive Carcinoma Margins: Uninvolved by invasive carcinoma           Distance from Closest Margin (Millimeters): 25 mm           Closest Margin(s): Posterior       DCIS Margins: Uninvolved by DCIS           Distance from [employer] (Millimeters): 25 mm           Closest Margin(s): Posterior    LYMPH NODES       Regional Lymph Nodes: Uninvolved by tumor cells           Total Number of Lymph Nodes Examined: 1           Number of [place] Nodes Examined: 1    PATHOLOGIC STAGE CLASSIFICATION (pTNM, AJCC 8th Edition)       Primary Tumor (pT): pT2       Regional Lymph Nodes Modifier: (sn): [place] node(s) evaluated       Regional Lymph Nodes (pN): pN0    SPECIAL STUDIES       Testing Performed on Case Number [id number], reported as ER-, DE-,   Her2-     10/22/21  A: Left breast mass, 9:00, biopsy:        Invasive carcinoma with papillary features (see comment). INVASIVE CARCINOMA OF THE BREAST: Biopsy    SPECIMEN       Procedure: Needle biopsy       Specimen Laterality: Left    TUMOR       Histologic Type: Invasive carcinoma with papillary features.       Glandular (Acinar) / Tubular Differentiation: Score 3       Nuclear Pleomorphism: Score 2       Mitotic Rate: Score 2       Overall Grade: Grade 2 (scores of 6 or 7)       Lymphovascular Invasion: Not identified   Estrogen Receptor (ER):     Negative (0%, No internal controls in sample   tested but external controls stain                       appropriately).                        Progesterone Receptor (PgR): Negative (0%, No internal controls in sample   tested but external controls stain                       appropriately).                           Her-2 (IHC Method):          Negative (score 0)     Impression:  Patient Active Problem List   Diagnosis Code    Chronic obstructive pulmonary disease (HCC) J44.9    Recurrent depression ([facility] F33.9    Fibrosarcoma ([facility] C49.9    Alcoholism in remission ([facility] F10.21    Sleep apnea G47.30    History of gastric bypass Z98.84    Obesity, morbid (HCC) E66.01    Osteopenia M85.80    GERD (gastroesophageal reflux disease) K21.9    Chronic pain of both knees M25.561, M25.562, G89.29    Prediabetes R73.03    Age-related nuclear cataract, bilateral H25.13    Xanthelasma of left upper eyelid H02.64    Xanthelasma of right upper eyelid H02.61    Sebaceous cyst of eyelid H02.829    Personal history of smoking Z87.891    Diverticulitis K57.92    Breast cancer of upper-inner quadrant of left female breast [facility]) C50.65        59year old woman with BRCA2 mutation and T2N0 triple negative breast cancer, s/p bilateral mastectomy 11/23/21. The pathology was reviewed. Follow up with Dr. Darrin Mitchell as scheduled. Left drain removed. Follow up with me 1 month. All questions were answered. She was asked to call with any additional questions or concerns.

## 2021-12-02 NOTE — PROGRESS NOTES
Nona Arshad is a 59 y.o. female (: 1960) presenting to address:    Chief Complaint   Patient presents with    Surgical Follow-up     drain removal from left breast       Medication list and allergies have been reviewed with Nona Arshad and updated as of today's date. I have gone over all Medical, Surgical and Social History with Nona Arshad and updated/added the information accordingly. 1. Have you been to the ER, Urgent Care or Hospitalized since your last visit? NO      2. Have you followed up with your PCP or any other Physicians since your procedure/ last office visit? YES.  Dr Velazquez Shadow op 21

## 2021-12-02 NOTE — LETTER
12/2/2021 12:29 PM    Patient:  Guy Spence   YOB: 1960  Date of Visit: 12/2/2021      Imtiaz Grijalva NP  [address]  Via In Basket    Dear Imtiaz Grijalva NP,      I had the pleasure of seeing Ms. Ling Otto in my office today for her breast cancer. I am including a copy of my office visit today. If you have questions, please do not hesitate to call me. I look forward to following Ms. Jose Keita along with you and will keep you updated as to her progress.            Sincerely,      Paulie Julien MD

## 2021-12-02 NOTE — TELEPHONE ENCOUNTER
Jamie Malhotra with Dr. Darrin Mitchell office called stating Dr. Darrin Mitchell would like HER 2, ER, PA ordered on Ms. 64 [facility] surgery pathology. I informed Dr. Vick Henning has submitted an order that was faxed on November 29, 2021. Jamie Malhotra states their office also submitted an order on November 29, 2021 however they're not seeing an update/addendum to path report. I told Jamie Malhotra I would relay message to our navigator for assistance.

## 2021-12-15 ENCOUNTER — TRANSCRIBE ORDER (OUTPATIENT)
Dept: SCHEDULING | Age: 61
End: 2021-12-15

## 2021-12-15 DIAGNOSIS — F33.9 RECURRENT DEPRESSION (HCC): ICD-10-CM

## 2021-12-15 DIAGNOSIS — Z01.818 ENCOUNTER FOR ECHOCARDIOGRAM BEFORE INITIATION OF CHEMOTHERAPY: ICD-10-CM

## 2021-12-15 DIAGNOSIS — Z79.899 OTHER LONG TERM (CURRENT) DRUG THERAPY: ICD-10-CM

## 2021-12-15 DIAGNOSIS — C50.212 MALIGNANT NEOPLASM OF UPPER-INNER QUADRANT OF LEFT FEMALE BREAST (HCC): Primary | ICD-10-CM

## 2021-12-15 RX ORDER — CITALOPRAM 20 MG/1
20 TABLET, FILM COATED ORAL DAILY
Qty: 90 TABLET | Refills: 0 | Status: SHIPPED | OUTPATIENT
Start: 2021-12-15 | End: 2022-03-16 | Stop reason: SDUPTHER

## 2021-12-22 NOTE — PROGRESS NOTES
Breast Cancer       Ms. Sheyla Mcgregor is a 59year old woman with BRCA2 mutation and T2N0 triple negative breast cancer, s/p bilateral mastectomy 11/23/21. She had been diagnosed on core biopsy 10/19/21. The area of concern was identified as a palpable mass around September 2021. She denied nipple discharge. She denied breast pain. There is family history of breast cancer in her sister, mother and maternal aunt. Her most recent previous mammogram was 10/14/19. She has a personal history of fibrosarcoma. Her sister has HNPCC/ANTHONY mutation. Genetic testing via [employer] demonstrated BRCA2 mutation 11/8/21. She reports having had hysterectomy with salpingoopherectomy. She will be starting adjuvant ddAC-T per Dr. Eusebia Smith. Breast/GYN history:    OB History    No obstetric history on file.           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    GERD (gastroesophageal reflux disease)     History of gastric bypass 2012    History of meniscal tear 2015, 2018    right in 2015, left in 2018    HNP (herniated nucleus pulposus), lumbar     patient reported    Lung nodules     resolved 2018 CT    Osteopenia 10/03/2017    Recurrent depression ([address]     Sleep apnea     on cpap    Spinal stenosis, lumbar     patient reported    Xanthelasma of left upper eyelid 7/20/2020       Past Surgical History:   Procedure Laterality Date    HX ARTHRODESIS  01/2000    Herniated Disc, arthritis right foot 06/06, 07/07, C 6/7, C 4/6 Stenosis    HX CHOLECYSTECTOMY  09/2008    gallbladder disease    HX COLONOSCOPY  05/2009    HX GASTRIC BYPASS  2012    GASTRIC BYPASS  [facility] Gastric Bypass      HX HYSTERECTOMY  06/1994    HX MASTECTOMY Bilateral 11/23/2021    BILATERAL MASTECTOMY, LEFT SENTINEL NODE BIOPSY [facility] 11/22) performed by Bam Blackman MD at [address] [place] [address]  HX MENISCECTOMY  10/2015    right repari of torn meniscus    HX MENISCECTOMY Left 03/16/2018    partial meniscectomy due to tear    HX MYOMECTOMY  06/1984    benign tumor    HX ORTHOPAEDIC  1964    orthopaedic excision Fibrosarcoma and Lymphangiogram    HX PELVIC LAPAROSCOPY  04/1984    large uterine blockage    NEUROLOGICAL PROCEDURE UNLISTED  2000, 2007    Cervical fusion       Current Outpatient Medications on File Prior to Visit   Medication Sig Dispense Refill    citalopram (CELEXA) 20 mg tablet TAKE 1 TABLET BY MOUTH DAILY 90 Tablet 0    buPROPion SR (WELLBUTRIN SR) 150 mg SR tablet TAKE 1 TABLET BY MOUTH TWICE DAILY 180 Tablet 3    sucralfate (CARAFATE) 1 gram tablet Take 1 tablet by mouth Four times a day as needed for indigestion. (Patient taking differently: two (2) times a day. Take 1 tablet by mouth Four times a day as needed for indigestion.) 360 Tablet 0    pregabalin (LYRICA) 150 mg capsule TAKE 1 CAPSULE BY MOUTH TWICE DAILY. MAX DAILY AMOUNT: 300 MG (Patient taking differently: as needed.) 180 Capsule 0    diclofenac (Voltaren) 1 % gel Apply 2 g to affected area every six (6) hours as needed for Pain. 100 g 2    Trelegy Ellipta 100-62.5-25 mcg inhaler INHALE 1 PUFF BY MOUTH DAILY 2 Inhaler 3    dexlansoprazole (Dexilant) 60 mg CpDB capsule (delayed release) TAKE 1 CAPSULE BY MOUTH DAILY 90 Cap 3    Linzess 72 mcg cap capsule TAKE 1 CAPSULE BY MOUTH DAILY BEFORE BREAKFAST 30 Cap 1    fluticasone propionate (FLONASE) 50 mcg/actuation nasal spray SHAKE LIQUID AND USE 2 SPRAYS IN EACH NOSTRIL DAILY 48 g 5    albuterol (PROVENTIL HFA, VENTOLIN HFA, PROAIR HFA) 90 mcg/actuation inhaler Take 2 Puffs by inhalation every four (4) hours as needed for Wheezing or Shortness of Breath. 3 Inhaler 4    b-complex with vitamin c tablet Take 1 Tab by mouth daily.  acetaminophen (TYLENOL EXTRA STRENGTH) 500 mg tablet Take  by mouth every six (6) hours as needed for Pain.       calcium-cholecalciferol, d3, (CALCIUM 600 + D) 600-125 mg-unit tab Take 1,065 mg by mouth nightly. Indications: TAKES 2 AT BEDTIME      omega 3-dha-epa-fish oil (FISH OIL) 100-160-1,000 mg cap Take 1,065 mg by mouth daily.  ferrous sulfate ER (IRON) 160 mg (50 mg iron) TbER tablet Take 1 Tab by mouth daily. Indications: PT TAKES 40 MG      multivitamin (ONE A DAY) tablet Take 1 Tab by mouth daily.  HYDROcodone-acetaminophen (Norco) 5-325 mg per tablet Take 1-2 Tablets by mouth every four (4) hours as needed for Pain for up to 7 days. Max Daily Amount: 12 Tablets. (Patient not taking: Reported on 2021) 40 Tablet 0     No current facility-administered medications on file prior to visit.        Allergies   Allergen Reactions    Adhesive Tape-Silicones Swelling     REALLY BAD SWELLING AND SORE APPEAR    Alcohol Other (comments)     PT STATES SHE IS AN ALCOHOLIC    Lactose Other (comments)     PT STATES IT MAKES HER STOMACH HURT    Percodan [Oxycodone Hcl-Oxycodone-Asa] Itching and Other (comments)     crying    Nsaids (Non-Steroidal Anti-Inflammatory Drug) Other (comments)     PT NOT ABLE TO TAKE DUE TO GASTRIC BYPASS       Social History     Tobacco Use    Smoking status: Former Smoker     Packs/day: 0.50     Years: 45.00     Pack years: 22.50     Quit date: 2020     Years since quittin.5    Smokeless tobacco: Never Used   Substance Use Topics    Alcohol use: No     Comment: quit - was heavy etoh    Drug use: Not Currently     Comment: marijuana, cocaine 30 yrs ago       Family History   Problem Relation Age of Onset    Hypertension Mother     Depression Mother     Cancer Mother     Ovarian Cancer Mother     Breast Problems Mother     Cancer Father     Cancer Sister     Depression Sister     Breast Cancer Sister     Depression Brother     Stroke Neg Hx     Heart Attack Neg Hx          ROS: positives are bolded  General: fevers, chills, night sweats, fatigue, weight loss, weight gain  GI: nausea, vomiting, abdominal pain, change in bowel habits, hematochezia, melena, GERD  Integ: dermatitis, abnormal moles  HEENT: visual changes, vertigo, epistaxis, dysphagia, hoarseness  Cardiac: chest pain, palpitations, HTN, edema, varicosities  Resp: cough, shortness of breath, wheezing, hemoptysis, snoring, reactive airway disease  : hematuria, dysuria, frequency, urgency, nocturia, stress urinary incontinence   MSK: weakness, joint pain, arthritis  Endocrine:  thyroid disease, polyuria, polydipsia, polyphagia, poor wound healing, heat intolerance, cold intolerance  Lymph/Heme: anemia, bruising, history of blood transfusions  Neuro: dizziness, headache, fainting, seizures, stroke  Psych: anxiety, depression    Physical Exam:  Visit Vitals  /80 (BP 1 Location: Right arm, BP Patient Position: Sitting, BP Cuff Size: Adult)   Pulse 69   Temp 98.1 °F (36.7 °C) (Temporal)   Resp 16   Ht 5' 5\" (1.651 m)   Wt 132.5 kg (292 lb)   SpO2 98%   BMI 48.59 kg/m²     Gen:  No distress  Head: normocephalic, atraumatic  Mouth: Clear, no overt lesions, oral mucosa pink and moist  Neck: supple, no masses, no adenopathy, trachea midline  Resp: clear bilaterally  Cardio: Regular rate and rhythm  Abdomen: soft, nontender, nondistended  Extremities: warm, well-perfused  Neuro: sensation and strength grossly intact and symmetrical  Psych: alert and oriented to person, place and time  Breasts: palpation deferred, incisions clean, flaps viable previously:  Right: Examined in both the supine and upright positions. There was no supraclavicular, infraclavicular, or axillary lymphadenopathy. There were no dominant masses, no skin changes, no asymmetry identified ptotic bilaterally, size discrepancy right larger than left  Left: Examined in both the supine and upright positions. There was no supraclavicular, infraclavicular, or axillary lymphadenopathy.   Visible on inspection and exam firm mass upper inner, ptotic bilaterally, size discrepancy right larger than left       Imaging:  10/19/21 bilateral mammogram/left ultrasound  1. Suspicious finding. Further evaluation with ultrasound-guided biopsy.     BI-RADS Category 4 - Suspicion for Malignancy     These findings were discussed with the patient in person. Options and  alternatives were discussed. Patient demonstrated understanding.     The lack of mammographic evidence of malignancy should not deter further work-up  of a clinically significant palpable finding. Radiodense breast tissue may  obscure an early malignancy or a palpable finding. 10-15% of breast cancers are  not detected by mammography.       Pathology:  11/23/21  A: Right breast, simple mastectomy:        Nonproliferative fibrocystic change. B: Left breast, simple mastectomy:        Invasive adenocarcinoma of no special type (ductal). Ductal carcinoma in situ. C: sentinel Lymph node, left axillary, biopsy:        One lymph node, negative for malignancy (0/1).      SPECIMEN       Procedure: Total mastectomy       Specimen Laterality: Left    TUMOR       Histologic Type:  Invasive carcinoma of no special type (ductal)       Overall Grade: Grade 2 (scores of 6 or 7)           Glandular (Acinar) / Tubular Differentiation: Score 2            Nuclear Pleomorphism: Score 3            Mitotic Rate: Score 2       Tumor Size: 28 mm       Ductal Carcinoma In Situ (DCIS): Present           Nuclear Grade: Grade III (high)           Necrosis: Present, central (expansive \"comedo\" necrosis)    Tumor Extent       Skin: Uninvolved by tumor       Nipple DCIS: DCIS does not involve the nipple epidermis       Treatment Effect in the Breast: No known presurgical therapy       Treatment Effect in the Lymph Nodes: No known pre-surgical therapy    MARGINS       Invasive Carcinoma Margins: Uninvolved by invasive carcinoma           Distance from Closest Margin (Millimeters): 25 mm           Closest Margin(s): Posterior     DCIS Margins: Uninvolved by DCIS           Distance from Closest Margin (Millimeters): 25 mm           Closest Margin(s): Posterior    LYMPH NODES       Regional Lymph Nodes: Uninvolved by tumor cells           Total Number of Lymph Nodes Examined: 1           Number of Erath Nodes Examined: 1    PATHOLOGIC STAGE CLASSIFICATION (pTNM, AJCC 8th Edition)       Primary Tumor (pT): pT2       Regional Lymph Nodes Modifier: (sn): Erath node(s) evaluated       Regional Lymph Nodes (pN): pN0    SPECIAL STUDIES       Testing Performed on Case Number [id number], reported as ER-, IA-,   Her2-     10/22/21  A: Left breast mass, 9:00, biopsy:        Invasive carcinoma with papillary features (see comment). INVASIVE CARCINOMA OF THE BREAST: Biopsy    SPECIMEN       Procedure: Needle biopsy       Specimen Laterality: Left    TUMOR       Histologic Type: Invasive carcinoma with papillary features.       Glandular (Acinar) / Tubular Differentiation: Score 3       Nuclear Pleomorphism: Score 2       Mitotic Rate: Score 2       Overall Grade: Grade 2 (scores of 6 or 7)       Lymphovascular Invasion: Not identified   Estrogen Receptor (ER):     Negative (0%, No internal controls in sample   tested but external controls stain                       appropriately).                        Progesterone Receptor (PgR): Negative (0%, No internal controls in sample   tested but external controls stain                       appropriately).                           Her-2 (IHC Method):          Negative (score 0)     Impression:  Patient Active Problem List   Diagnosis Code    Chronic obstructive pulmonary disease (HCC) J44.9    Recurrent depression ([facility] F33.9    Fibrosarcoma ([facility] C49.9    Alcoholism in remission ([facility] F10.21    Sleep apnea G47.30    History of gastric bypass Z98.84    Obesity, morbid (HCC) E66.01    Osteopenia M85.80    GERD (gastroesophageal reflux disease) K21.9    Chronic pain of both knees M25.561, M25.562, G89.29    Prediabetes R73.03    Age-related nuclear cataract, bilateral H25.13    Xanthelasma of left upper eyelid H02.64    Xanthelasma of right upper eyelid H02.61    Sebaceous cyst of eyelid H02.829    Personal history of smoking Z87.891    Diverticulitis K57.92    Breast cancer of upper-inner quadrant of left female breast [facility]) C50.65        59year old woman with BRCA2 mutation and T2N0 triple negative breast cancer, s/p bilateral mastectomy 11/23/21. The pathology was reviewed. Follow up with Dr. Zaid Webster as scheduled. Follow up with me 3 months. All questions were answered. She was asked to call with any additional questions or concerns.

## 2021-12-23 DIAGNOSIS — R07.89 OTHER CHEST PAIN: Primary | ICD-10-CM

## 2021-12-23 RX ORDER — HYDROCODONE BITARTRATE AND ACETAMINOPHEN 5; 325 MG/1; MG/1
1-2 TABLET ORAL
Qty: 40 TABLET | Refills: 0 | Status: SHIPPED | OUTPATIENT
Start: 2021-12-23 | End: 2021-12-30

## 2021-12-27 ENCOUNTER — HOSPITAL ENCOUNTER (OUTPATIENT)
Dept: NON INVASIVE DIAGNOSTICS | Age: 61
Discharge: HOME | End: 2021-12-27
Attending: INTERNAL MEDICINE
Payer: MEDICARE

## 2021-12-27 VITALS
HEIGHT: 65 IN | BODY MASS INDEX: 48.82 KG/M2 | SYSTOLIC BLOOD PRESSURE: 151 MMHG | WEIGHT: 293 LBS | DIASTOLIC BLOOD PRESSURE: 72 MMHG

## 2021-12-27 DIAGNOSIS — Z79.899 OTHER LONG TERM (CURRENT) DRUG THERAPY: ICD-10-CM

## 2021-12-27 LAB
ECHO AO ROOT DIAM: 2.6 CM
ECHO AO ROOT INDEX: 1.12 CM/M2
ECHO LV FRACTIONAL SHORTENING: 35 % (ref 28–44)
ECHO LV INTERNAL DIMENSION DIASTOLE INDEX: 1.85 CM/M2
ECHO LV INTERNAL DIMENSION DIASTOLIC: 4.3 CM (ref 3.9–5.3)
ECHO LV INTERNAL DIMENSION SYSTOLIC INDEX: 1.2 CM/M2
ECHO LV INTERNAL DIMENSION SYSTOLIC: 2.8 CM
ECHO LV IVSD: 1.1 CM (ref 0.6–0.9)
ECHO LV MASS 2D: 162.9 G (ref 67–162)
ECHO LV MASS INDEX 2D: 69.9 G/M2 (ref 43–95)
ECHO LV POSTERIOR WALL DIASTOLIC: 1.1 CM (ref 0.6–0.9)
ECHO LV RELATIVE WALL THICKNESS RATIO: 0.51
ECHO LVOT AREA: 3.5 CM2
ECHO LVOT DIAM: 2.1 CM

## 2021-12-27 PROCEDURE — 93308 TTE F-UP OR LMTD: CPT

## 2021-12-27 PROCEDURE — 93306 TTE W/DOPPLER COMPLETE: CPT

## 2021-12-29 ENCOUNTER — OFFICE VISIT (OUTPATIENT)
Dept: SURGERY | Age: 61
End: 2021-12-29
Payer: MEDICARE

## 2021-12-29 VITALS
WEIGHT: 292 LBS | RESPIRATION RATE: 16 BRPM | HEIGHT: 65 IN | DIASTOLIC BLOOD PRESSURE: 80 MMHG | TEMPERATURE: 98.1 F | BODY MASS INDEX: 48.65 KG/M2 | SYSTOLIC BLOOD PRESSURE: 124 MMHG | OXYGEN SATURATION: 98 % | HEART RATE: 69 BPM

## 2021-12-29 DIAGNOSIS — C50.212 MALIGNANT NEOPLASM OF UPPER-INNER QUADRANT OF LEFT BREAST IN FEMALE, ESTROGEN RECEPTOR NEGATIVE (HCC): Primary | ICD-10-CM

## 2021-12-29 DIAGNOSIS — Z17.1 MALIGNANT NEOPLASM OF UPPER-INNER QUADRANT OF LEFT BREAST IN FEMALE, ESTROGEN RECEPTOR NEGATIVE (HCC): Primary | ICD-10-CM

## 2021-12-29 PROCEDURE — 99024 POSTOP FOLLOW-UP VISIT: CPT | Performed by: SURGERY

## 2021-12-29 NOTE — LETTER
12/29/2021 12:26 PM    Patient:  Jace Guo   YOB: 1960  Date of Visit: 12/29/2021      Jacky Zavala MD  [facility] [id number]  [address] [place] [address], [facility]    Dear MD Dewayne Richards NP,      I had the pleasure of seeing Ms. Toyin Graham in my office today for her breast cancer. I am including a copy of my office visit today. If you have questions, please do not hesitate to call me. I look forward to following Ms. Jermaine Meza along with you and will keep you updated as to her progress.            Sincerely,      Amarjit Leon MD

## 2022-01-15 DIAGNOSIS — J44.9 CHRONIC OBSTRUCTIVE PULMONARY DISEASE, UNSPECIFIED COPD TYPE (HCC): ICD-10-CM

## 2022-01-17 RX ORDER — FLUTICASONE FUROATE, UMECLIDINIUM BROMIDE AND VILANTEROL TRIFENATATE 100; 62.5; 25 UG/1; UG/1; UG/1
POWDER RESPIRATORY (INHALATION)
Qty: 120 EACH | Refills: 1 | Status: SHIPPED | OUTPATIENT
Start: 2022-01-17 | End: 2022-03-01 | Stop reason: SDUPTHER

## 2022-01-29 ENCOUNTER — TRANSCRIBE ORDER (OUTPATIENT)
Dept: SCHEDULING | Age: 62
End: 2022-01-29

## 2022-01-29 DIAGNOSIS — R51.9 ACUTE NONINTRACTABLE HEADACHE, UNSPECIFIED HEADACHE TYPE: Primary | ICD-10-CM

## 2022-02-08 ENCOUNTER — HOSPITAL ENCOUNTER (OUTPATIENT)
Dept: CT IMAGING | Age: 62
Discharge: HOME | End: 2022-02-08
Attending: NURSE PRACTITIONER
Payer: MEDICARE

## 2022-02-08 DIAGNOSIS — R51.9 ACUTE NONINTRACTABLE HEADACHE, UNSPECIFIED HEADACHE TYPE: ICD-10-CM

## 2022-02-08 LAB — CREAT UR-MCNC: 0.9 MG/DL (ref 0.6–1.3)

## 2022-02-08 PROCEDURE — 70470 CT HEAD/BRAIN W/O & W/DYE: CPT

## 2022-02-08 PROCEDURE — 74011000636 HC RX REV CODE- 636

## 2022-02-08 PROCEDURE — 82565 ASSAY OF CREATININE: CPT

## 2022-02-08 RX ADMIN — IOPAMIDOL 80 ML: 612 INJECTION, SOLUTION INTRAVENOUS at 16:46

## 2022-02-21 ENCOUNTER — NURSE NAVIGATOR (OUTPATIENT)
Dept: OTHER | Age: 62
End: 2022-02-21

## 2022-02-21 NOTE — PROGRESS NOTES
Referring Provider: Fatoumata Lugo MD      Lung Cancer Risk Profile:   Age: 59  Gender: Female  Height: 65\"  Weight: 292#    Smoking History:  Smoking Status: past use  # years smokin  # years quit: 1.5 (2020)  Packs/day: 2 ppd for 40 yrs, 1 ppd for 2 yrs  Pack years: 80    Patient discussed smoking cessation with PCP: Yes, per provider order    Patient participated in shared decision making process with PCP: Yes, per provider order    Patient is currently experiencing symptoms: No    If yes what symptoms:     Co-Morbidities:  COPD    Cancer History:  Breast, fibrosarcome    Additional Risk Factors:  Father & aunt with lung cancer, exposure to second hand smoke         Patient's smoking history verified via EMR and provider note. Patient meets LDCT lung cancer screening criteria.        MIGUEL MillerN, RN, OCN  Lung Health Nurse Navigator

## 2022-02-22 ENCOUNTER — VIRTUAL VISIT (OUTPATIENT)
Dept: FAMILY MEDICINE CLINIC | Age: 62
End: 2022-02-22
Payer: MEDICARE

## 2022-02-22 DIAGNOSIS — R05.9 COUGH: ICD-10-CM

## 2022-02-22 DIAGNOSIS — J02.9 SORE THROAT: ICD-10-CM

## 2022-02-22 DIAGNOSIS — K21.9 GASTROESOPHAGEAL REFLUX DISEASE: ICD-10-CM

## 2022-02-22 PROCEDURE — 99213 OFFICE O/P EST LOW 20 MIN: CPT | Performed by: FAMILY MEDICINE

## 2022-02-22 PROCEDURE — G9717 DOC PT DX DEP/BP F/U NT REQ: HCPCS | Performed by: FAMILY MEDICINE

## 2022-02-22 PROCEDURE — 3017F COLORECTAL CA SCREEN DOC REV: CPT | Performed by: FAMILY MEDICINE

## 2022-02-22 PROCEDURE — G9708 BILAT MAST/HX BI /UNILAT MAS: HCPCS | Performed by: FAMILY MEDICINE

## 2022-02-22 PROCEDURE — G8427 DOCREV CUR MEDS BY ELIG CLIN: HCPCS | Performed by: FAMILY MEDICINE

## 2022-02-22 PROCEDURE — G8417 CALC BMI ABV UP PARAM F/U: HCPCS | Performed by: FAMILY MEDICINE

## 2022-02-22 RX ORDER — OMEPRAZOLE 20 MG/1
20 CAPSULE, DELAYED RELEASE ORAL DAILY
Qty: 30 CAPSULE | Refills: 1 | Status: SHIPPED | OUTPATIENT
Start: 2022-02-22 | End: 2022-04-23

## 2022-02-22 NOTE — PROGRESS NOTES
Chief Complaint   Patient presents with    Sore Throat     Ears, Cough     1. \"Have you been to the ER, urgent care clinic since your last visit? Hospitalized since your last visit? \" No    2. \"Have you seen or consulted any other health care providers outside of the [address] [place] [address] since your last visit? \" No     3. For patients aged 39-70: Has the patient had a colonoscopy / FIT/ Cologuard? No      If the patient is female:    4. For patients aged 41-77: Has the patient had a mammogram within the past 2 years? Yes - no Care Gap present      5. For patients aged 21-65: Has the patient had a pap smear?  Yes - no Care Gap present

## 2022-02-22 NOTE — PROGRESS NOTES
Cam Martinez (: 1960) is a 59 y.o. female, established patient, here for evaluation of the following chief complaint(s):   Sore Throat (Ears, Cough)       ASSESSMENT/PLAN:  Below is the assessment and plan developed based on review of pertinent history, labs, studies, and medications. 1. Cough/2. Sore throat  -Patient was advised to go to urgent care or go to ER as she is high risk for sever COVID should she have it. She will need to be tested for this and if she tests positive she would qualify for monoclonal antibody infusion to prevent worsening of the disease  -she was in the mean time advised to try benadryl at bedtime for allergy relief, omeprazole 20 mg BID for GERD relief, and nasogel to relive dryness in the nose  -Advised to return if all of the above are negative and do not work and symptoms are worsening     3. Gastroesophageal reflux disease  -     omeprazole (PRILOSEC) 20 mg capsule; Take 1 Capsule by mouth daily for 60 days. , Normal, Disp-30 Capsule, R-1  -Stopped dexilant and advised to continue sucralfate and start omeprazole BID      No follow-ups on file. SUBJECTIVE/OBJECTIVE:  HPI  Doses 68-year-old -American female with past medical history significant for breast cancer currently on chemotherapy, COPD, morbid obesity status post gastric bypass, prediabetes, depression, fibrosarcoma, GERD who is here for acute complaint of cough/sore throat. Cough/sore throat  Patient states that for the past 4 days starting Saturday she has been having sore throat, dry cough, occasionally loses her voice, runny nose, some congestion as well as some burning chest pain and nausea. She denies any sick contacts, shortness of breath, abdominal pain, vomiting, diarrhea and states that she has not gone outside of the house because she is on chemotherapy.      GERD  Patient states that she has been having worsening of her acid reflux since starting chemotherapy and the dexilant and sucralfate has not been helping and she would like to try something new. Denies blood in stools, or weight loss or coughing up blood. Review of Systems   Constitutional: Negative for activity change, appetite change and fever. HENT: Positive for congestion, rhinorrhea and sore throat. Ear fullness   Respiratory: Negative for shortness of breath and wheezing. Cardiovascular: Positive for chest pain. Burning chest pain which patient attributes to acid reflux getting worse and not being well controlled    Gastrointestinal: Positive for nausea. Negative for anal bleeding, blood in stool, diarrhea and vomiting. Musculoskeletal: Negative for myalgias. Skin: Negative for rash. Allergic/Immunologic: Positive for environmental allergies. Neurological: Negative for dizziness and headaches.         No data recorded     Physical Exam    [INSTRUCTIONS:  \"[x]\" Indicates a positive item  \"[]\" Indicates a negative item  -- DELETE ALL ITEMS NOT EXAMINED]    Constitutional: [x] Appears well-developed and well-nourished [x] No apparent distress      [] Abnormal -     Mental status: [x] Alert and awake  [x] Oriented to person/place/time [x] Able to follow commands    [] Abnormal -     Eyes:   EOM    [x]  Normal    [] Abnormal -   Sclera  [x]  Normal    [] Abnormal -          Discharge [x]  None visible   [] Abnormal -     HENT: [x] Normocephalic, atraumatic  [] Abnormal -   [x] Mouth/Throat: Mucous membranes are moist    External Ears [x] Normal  [] Abnormal -    Neck: [x] No visualized mass [] Abnormal -     Pulmonary/Chest: [x] Respiratory effort normal   [x] No visualized signs of difficulty breathing or respiratory distress        [] Abnormal -      Musculoskeletal:   [x] Normal gait with no signs of ataxia         [x] Normal range of motion of neck        [] Abnormal -     Neurological:        [x] No Facial Asymmetry (Cranial nerve 7 motor function) (limited exam due to video visit)          [x] No gaze palsy [] Abnormal -          Skin:        [x] No significant exanthematous lesions or discoloration noted on facial skin         [] Abnormal -            Psychiatric:       [x] Normal Affect [] Abnormal -        [x] No Hallucinations    Other pertinent observable physical exam findings:-        On this date 02/22/2022 I have spent 20 minutes reviewing previous notes, test results and face to face (virtual) with the patient discussing the diagnosis and importance of compliance with the treatment plan as well as documenting on the day of the visit. Fabby Bobo was evaluated through a synchronous (real-time) audio-video encounter. The patient (or guardian if applicable) is aware that this is a billable service, which includes applicable co-pays. Verbal consent to proceed has been obtained. The visit was conducted pursuant to the emergency declaration under the [address] [place] [address], [address] [place] [address] authority and the [employer] General Act. Patient identification was verified, and a caregiver was present when appropriate. The patient was located at home in a state where the provider was licensed to provide care. An electronic signature was used to authenticate this note.   -- Vane Duque MD

## 2022-02-23 ENCOUNTER — HOSPITAL ENCOUNTER (OUTPATIENT)
Dept: CT IMAGING | Age: 62
Discharge: HOME | End: 2022-02-23
Attending: INTERNAL MEDICINE
Payer: MEDICARE

## 2022-02-23 VITALS — BODY MASS INDEX: 48.65 KG/M2 | HEIGHT: 65 IN | WEIGHT: 292 LBS

## 2022-02-23 DIAGNOSIS — Z87.891 PERSONAL HISTORY OF SMOKING: ICD-10-CM

## 2022-02-23 PROCEDURE — 71271 CT THORAX LUNG CANCER SCR C-: CPT

## 2022-03-01 ENCOUNTER — OFFICE VISIT (OUTPATIENT)
Dept: PULMONOLOGY | Age: 62
End: 2022-03-01
Payer: MEDICARE

## 2022-03-01 VITALS
OXYGEN SATURATION: 97 % | BODY MASS INDEX: 48.48 KG/M2 | TEMPERATURE: 97.8 F | DIASTOLIC BLOOD PRESSURE: 78 MMHG | HEART RATE: 85 BPM | SYSTOLIC BLOOD PRESSURE: 132 MMHG | HEIGHT: 65 IN | WEIGHT: 291 LBS | RESPIRATION RATE: 16 BRPM

## 2022-03-01 DIAGNOSIS — C50.212 MALIGNANT NEOPLASM OF UPPER-INNER QUADRANT OF LEFT BREAST IN FEMALE, ESTROGEN RECEPTOR NEGATIVE (HCC): ICD-10-CM

## 2022-03-01 DIAGNOSIS — J44.9 CHRONIC OBSTRUCTIVE PULMONARY DISEASE, UNSPECIFIED COPD TYPE (HCC): Primary | ICD-10-CM

## 2022-03-01 DIAGNOSIS — E66.01 MORBID OBESITY WITH BMI OF 45.0-49.9, ADULT (HCC): ICD-10-CM

## 2022-03-01 DIAGNOSIS — G47.33 OBSTRUCTIVE SLEEP APNEA SYNDROME: ICD-10-CM

## 2022-03-01 DIAGNOSIS — Z17.1 MALIGNANT NEOPLASM OF UPPER-INNER QUADRANT OF LEFT BREAST IN FEMALE, ESTROGEN RECEPTOR NEGATIVE (HCC): ICD-10-CM

## 2022-03-01 DIAGNOSIS — Z87.891 PERSONAL HISTORY OF SMOKING: ICD-10-CM

## 2022-03-01 DIAGNOSIS — E66.01 OBESITY, MORBID (HCC): ICD-10-CM

## 2022-03-01 PROCEDURE — G9708 BILAT MAST/HX BI /UNILAT MAS: HCPCS | Performed by: INTERNAL MEDICINE

## 2022-03-01 PROCEDURE — 99214 OFFICE O/P EST MOD 30 MIN: CPT | Performed by: INTERNAL MEDICINE

## 2022-03-01 PROCEDURE — 3017F COLORECTAL CA SCREEN DOC REV: CPT | Performed by: INTERNAL MEDICINE

## 2022-03-01 PROCEDURE — G8417 CALC BMI ABV UP PARAM F/U: HCPCS | Performed by: INTERNAL MEDICINE

## 2022-03-01 PROCEDURE — G8427 DOCREV CUR MEDS BY ELIG CLIN: HCPCS | Performed by: INTERNAL MEDICINE

## 2022-03-01 PROCEDURE — G9717 DOC PT DX DEP/BP F/U NT REQ: HCPCS | Performed by: INTERNAL MEDICINE

## 2022-03-01 RX ORDER — FLUTICASONE FUROATE, UMECLIDINIUM BROMIDE AND VILANTEROL TRIFENATATE 100; 62.5; 25 UG/1; UG/1; UG/1
POWDER RESPIRATORY (INHALATION)
Qty: 120 EACH | Refills: 4 | Status: SHIPPED | OUTPATIENT
Start: 2022-03-01

## 2022-03-01 NOTE — PROGRESS NOTES
Malka Martell presents today for   Chief Complaint   Patient presents with    Follow-up    Sleep Apnea     CPAP       Is someone accompanying this pt? No    Is the patient using any DME equipment during OV? No   -DME Company NA    Depression Screening:  3 most recent PHQ Screens 2/22/2022   Little interest or pleasure in doing things Not at all   Feeling down, depressed, irritable, or hopeless Not at all   Total Score PHQ 2 0   Trouble falling or staying asleep, or sleeping too much -   Feeling tired or having little energy -   Poor appetite, weight loss, or overeating -   Feeling bad about yourself - or that you are a failure or have let yourself or your family down -   Trouble concentrating on things such as school, work, reading, or watching TV -   Moving or speaking so slowly that other people could have noticed; or the opposite being so fidgety that others notice -   Thoughts of being better off dead, or hurting yourself in some way -   PHQ 9 Score -   How difficult have these problems made it for you to do your work, take care of your home and get along with others -       Learning Assessment:  Learning Assessment 10/13/2021   PRIMARY LEARNER Patient   HIGHEST LEVEL OF EDUCATION - PRIMARY LEARNER  GRADUATED HIGH SCHOOL OR GED   BARRIERS PRIMARY LEARNER NONE   CO-LEARNER CAREGIVER No   PRIMARY LANGUAGE ENGLISH   LEARNER PREFERENCE PRIMARY DEMONSTRATION   ANSWERED BY self   RELATIONSHIP SELF       Abuse Screening:  Abuse Screening Questionnaire 9/17/2021   Do you ever feel afraid of your partner? N   Are you in a relationship with someone who physically or mentally threatens you? N   Is it safe for you to go home? Y       Fall Risk  Fall Risk Assessment, last 12 mths 9/17/2021   Able to walk? Yes   Fall in past 12 months? 0   Do you feel unsteady? 0   Are you worried about falling 0         Coordination of Care:  1. Have you been to the ER, urgent care clinic since your last visit?  Hospitalized since your last visit? No    2. Have you seen or consulted any other health care providers outside of the [address] [place] [address] since your last visit? Include any pap smears or colon screening.  [place]

## 2022-03-01 NOTE — PATIENT INSTRUCTIONS
Learning About CPAP for Sleep Apnea  What is CPAP? CPAP is a small machine that you use at home every night while you sleep. It increases air pressure in your throat to keep your airway open. When you have sleep apnea, this can help you sleep better, feel better, and avoid future health problems. CPAP stands for \"continuous positive airway pressure. \"  The CPAP machine will have one of the following:  · A mask that covers your nose and mouth  · A mask that covers your nose only  · A nasal pillow that covers only the openings of your nose  Why is it done? CPAP is usually the best treatment for obstructive sleep apnea. It is the first treatment choice and the most widely used. CPAP:  · Helps you have more normal sleep, so you feel less sleepy and more alert during the daytime. · May help keep heart failure or other heart problems from getting worse. · May help lower your blood pressure. If you have a bed partner, they may also sleep better when you use a CPAP. That's because you aren't snoring or restless. Your doctor may suggest CPAP if you have:  · Moderate to severe sleep apnea. · Sleep apnea and coronary artery disease (CAD). · Sleep apnea and heart failure. What are the side effects? Some people who use CPAP have:  · A dry or stuffy nose and a sore throat. · Irritated skin on the face. · Bloating. How can you care for yourself? If using CPAP is not comfortable, or if you have certain side effects, work with your doctor to fix them. Here are some things you can try:  · Be sure the mask, nasal mask, or nasal pillow fits well. · See if your doctor can adjust the pressure of your CPAP. · If your nose or mouth is dry, set the machine to deliver warmer or wetter air. Or try using a humidifier. · If your nose is runny or stuffy, talk to your doctor about using a decongestant medicine or steroid nasal spray. Be safe with medicines. Read and follow all instructions on the label.  Do not use the medicine longer than the label says. · Your doctor may also help you with problems like swallowing air, bloating, or claustrophobia. Talk to your doctor if you're still having problems. If these things don't help, you might try a different type of machine. Where can you learn more? Go to http://www.[employer]/  Enter Rainer Pena in the search box to learn more about \"Learning About CPAP for Sleep Apnea. \"  Current as of: July 6, 2021               Content Version: 13.0  © 2196-7188 [employer]. Care instructions adapted under license by [employer] (which disclaims liability or warranty for this information). If you have questions about a medical condition or this instruction, always ask your healthcare professional. [address] any warranty or liability for your use of this information. COPD and Asthma: Care Instructions  Overview     Some people who have chronic obstructive pulmonary disease (COPD) may also have asthma. With both of these conditions, air doesn't flow easily in and out of your lungs. This can make it hard to breathe. You may be short of breath, wheeze, cough, or have mucus in your airways. When you have COPD and asthma, it's important to follow your treatment plan. There are two parts to your treatment:  · Controlling COPD and asthma over the long term. · Treating attacks or flare-ups when they occur. You and your doctor can make a plan that will help. This plan tells you the medicines you take to manage your symptoms and prevent attacks or flare-ups. It also tells you what to do if you have an attack or flare-up. Follow-up care is a key part of your treatment and safety. Be sure to make and go to all appointments, and call your doctor if you are having problems. It's also a good idea to know your test results and keep a list of the medicines you take. How can you care for yourself at home?   To control COPD and asthma  · Do not smoke. Smoking can make COPD and asthma worse. If you need help quitting, talk to your doctor about stop-smoking programs and medicines. These can increase your chances of quitting for good. · Learn what sets off your COPD and asthma. Avoid these triggers when you can. Common triggers include smoke, pollen, pollution, and infections like colds, the flu, or pneumonia. · Check yourself for symptoms to know which step to follow in your action plan. Watch for things like being short of breath, having chest tightness, and coughing more than usual. Look for a change in the color or thickness of your mucus. Also notice if symptoms wake you up at night or if you get tired quickly when you exercise. · Check your lungs with a peak flow meter, if your doctor recommends it. Peak flow can tell you how well your lungs are working. · Try pulmonary rehabilitation, if your doctor prescribes it. This combines different treatments to help you reduce your symptoms and stay as active and healthy as you can. Medicines   · Call your doctor if you think you are having a problem with your medicine. COPD and asthma are treated with medicine to help you breathe easier. You may take:  ? Controller medicines. These prevent attacks and flare-ups before they happen. They are taken regularly. There are different types. ? Quick-relief medicine. This is for times when you can't prevent symptoms and need to treat them fast. It can quickly relax the airways and allow you to breathe easier. · Learn how to use your inhalers the right way. Ask your doctor or pharmacist for help. · Use oxygen if your doctor recommends it. Oxygen therapy boosts the amount of oxygen in your blood and helps you breathe easier. Use the flow rate your doctor recommends. Do not change it without talking to your doctor first.  Preventing infections   · Wash your hands often.   · Avoid contact with people who have colds and other respiratory infections. · Get a flu vaccine every year. Ask your doctor about getting the pneumococcal and whooping cough (pertussis) shots. To treat attacks when they occur  · Follow your action plan. It can tell you what to do when you have an attack or flare-up. · Take your quick-relief medicine exactly as prescribed. Talk with your doctor if you have any problems with your medicine. ? Keep this medicine with you at all times. ? You may need to use this medicine before you exercise to prevent an attack. · If your doctor prescribed corticosteroid pills or other medicines to use during an attack or flare-up, take them as directed. They may shorten the attack and help you breathe easier. When should you call for help? Call 911 anytime you think you may need emergency care. For example, call if:    · You have severe trouble breathing.     · You are having chest pain that is different or worse than usual.   Call your doctor now or seek immediate medical care if:    · You have new or worse shortness of breath.     · You cough up blood.     · You have a fever.     · You have used your quick-relief medicine but you are still short of breath.     · You have feelings of anxiety or depression. Watch closely for changes in your health, and be sure to contact your doctor if:    · You are coughing more deeply or more often, or you notice more mucus or a change in the color of your mucus.     · You have new or worse swelling in your legs or belly.     · You are not getting better as expected. Where can you learn more? Go to http://www.[employer]/  Enter A350 in the search box to learn more about \"COPD and Asthma: Care Instructions. \"  Current as of: July 6, 2021               Content Version: 13.0  © 8907-8342 [employer]. Care instructions adapted under license by [employer] (which disclaims liability or warranty for this information).  If you have questions about a medical condition or this instruction, always ask your healthcare professional. [place] [address] any warranty or liability for your use of this information.

## 2022-03-01 NOTE — PROGRESS NOTES
[facility]  Pulmonary, Critical Care, and Sleep Medicine      Pulmonary Office follow-up visit    Name: Cuba Hagen     : 1960     Date: 3/1/2022        Subjective:   Patient has been referred for evaluation of: COPD, obstructive sleep apnea, personal history of smoking    22     Recent symptoms of cough/sore throat-started about a week ago associated with loss of voice, runny nose, predominantly dry cough with some congestion and burning in her chest as well as nausea. She denies any sick contacts, shortness of breath, abdominal pain, vomiting, diarrhea and states that she has not gone outside of the house because she is on chemotherapy. She was advised by her PCP to get a COVID test which she did at home-reported negative  She also has been having some flareup of GERD-Patient states that she has been having worsening of her acid reflux since starting chemotherapy and the dexilant and sucralfate has not been helping and she would like to try something new. Denies blood in stools, or weight loss or coughing up blood. She states that symptoms are improving after her PCP changed her reflux therapy and she got cough medication as well as Claritin. Since her last visit she was diagnosed with breast cancer-had bilateral mastectomy and now receiving chemotherapy  She is currently undergoing chemotherapy at [facility] for breast cancer-doxorubicin, cyclophosphamide and Placitexil-Dr. Quin Morales  Patient completed LDCT-2022 with no nodules identified  CPAP download 2021 through 2022  100% usage for average of 7 hours 40 minutes at AutoSet max pressure 14 minimal pressure 10 with resultant AHI of 0.3-0 central events, 0.2 obstructive event    HPI  Patient is a 59 y.o. female who states that she has been experiencing increasing symptoms of shortness of breath over the past 1 year and more so over the past 6 months.   She states that she had gone to [place] about 4 years back and got symptomatic with shortness of breath-went to an urgent care center where she was told she has COPD. However since discharge she continued to smoke until June 2020 when she decided to quit smoking. She completely quit smoking in June 2020 and ironically has been having more symptoms since then. SOB: With activity, walking, bending climbing stairs. Also has had some episodes at nighttime where she has hard breathing noticed by her sister    COUGH: Has intermittent cough especially at nighttime-her sister has heard her cough and comes and checks in on her    Chest Pain: Not a usual symptom however today during her 6-minute walk she complained of chest tightness    No associated wheezing, chest pain, fever, chills, night sweats . She has been experiencing chronic swelling of lower extremities for which she takes Lasix  Has history of dyspepsia, reflux. Patient has gained 60 pounds over the past year or so. She states that she had gastric bypass surgery 8 years back and lost close to 100 pounds but has gained back all the weight  She was diagnosed with sleep apnea by Dr. Josh Fletcher years back and has been on CPAP at 14 cm pressure  Has had PFTs and echocardiogram in the past  Has completed LDCT 11/20/2019. Lung RADS 2  Comorbid conditions include- DM, HTN, AYE on CPAP at 14 cm which she states she has been using, Sleep disordered breathing, CAD, CHF, PE. DVT, previous respiratory diagnosis. Smoking status: Currently quit -June 2020 prior to that she smoked 2 packs of cigarettes per day for close to 40 years      Review of data:  I have personally reviewed all data-clinical encounters, imaging, outside test results pertinent to patient's care.   Testing:  CXR  CT scan-LDCT  PFT  Echo  6 min walk  Overnight oximetry  Sleep studies-Dr. 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    GERD (gastroesophageal reflux disease)     History of gastric bypass 2012    History of meniscal tear 2015, 2018    right in 2015, left in 2018    HNP (herniated nucleus pulposus), lumbar     patient reported    Lung nodules     resolved 2018 CT    Osteopenia 10/03/2017    Recurrent depression ([address]     Sleep apnea     on cpap    Spinal stenosis, lumbar     patient reported    Xanthelasma of left upper eyelid 7/20/2020       Past Surgical History:   Procedure Laterality Date    HX ARTHRODESIS  01/2000    Herniated Disc, arthritis right foot 06/06, 07/07, C 6/7, C 4/6 Stenosis    HX CHOLECYSTECTOMY  09/2008    gallbladder disease    HX COLONOSCOPY  05/2009    HX GASTRIC BYPASS  2012    GASTRIC BYPASS  [place] Gastric Bypass      HX HYSTERECTOMY  06/1994    HX MASTECTOMY Bilateral 11/23/2021    BILATERAL MASTECTOMY, LEFT SENTINEL NODE BIOPSY [facility] 11/22) performed by Jose Armando Rios MD at [address] [place] [address]  [facility]  10/2015    right repari of torn meniscus    HX MENISCECTOMY Left 03/16/2018    partial meniscectomy due to tear    HX MYOMECTOMY  06/1984    benign tumor    HX ORTHOPAEDIC  1964    orthopaedic excision Fibrosarcoma and Lymphangiogram    HX PELVIC LAPAROSCOPY  04/1984    large uterine blockage    NEUROLOGICAL PROCEDURE UNLISTED  2000, 2007    Cervical fusion     Allergies   Allergen Reactions    Adhesive Tape-Silicones Swelling     REALLY BAD SWELLING AND SORE APPEAR    Alcohol Other (comments)     PT STATES SHE IS AN ALCOHOLIC    Lactose Other (comments)     PT STATES IT MAKES HER STOMACH HURT    Percodan [Oxycodone Hcl-Oxycodone-Asa] Itching and Other (comments)     crying    Nsaids (Non-Steroidal Anti-Inflammatory Drug) Other (comments)     PT NOT ABLE TO TAKE DUE TO GASTRIC BYPASS     Current Outpatient Medications   Medication Sig Dispense Refill    fluticasone-umeclidinium-vilanterol (Trelegy Ellipta) 100-62.5-25 mcg inhaler INHALE 1 PUFF BY MOUTH DAILY 120 Each 4    omeprazole (PRILOSEC) 20 mg capsule Take 1 Capsule by mouth daily for 60 days. 30 Capsule 1    citalopram (CELEXA) 20 mg tablet TAKE 1 TABLET BY MOUTH DAILY 90 Tablet 0    buPROPion SR (WELLBUTRIN SR) 150 mg SR tablet TAKE 1 TABLET BY MOUTH TWICE DAILY 180 Tablet 3    sucralfate (CARAFATE) 1 gram tablet Take 1 tablet by mouth Four times a day as needed for indigestion. (Patient taking differently: two (2) times a day. Take 1 tablet by mouth Four times a day as needed for indigestion.) 360 Tablet 0    diclofenac (Voltaren) 1 % gel Apply 2 g to affected area every six (6) hours as needed for Pain. 100 g 2    fluticasone propionate (FLONASE) 50 mcg/actuation nasal spray SHAKE LIQUID AND USE 2 SPRAYS IN EACH NOSTRIL DAILY 48 g 5    albuterol (PROVENTIL HFA, VENTOLIN HFA, PROAIR HFA) 90 mcg/actuation inhaler Take 2 Puffs by inhalation every four (4) hours as needed for Wheezing or Shortness of Breath. 3 Inhaler 4    b-complex with vitamin c tablet Take 1 Tab by mouth daily.  acetaminophen (TYLENOL EXTRA STRENGTH) 500 mg tablet Take  by mouth every six (6) hours as needed for Pain.  calcium-cholecalciferol, d3, (CALCIUM 600 + D) 600-125 mg-unit tab Take 1,065 mg by mouth nightly. Indications: TAKES 2 AT BEDTIME      omega 3-dha-epa-fish oil (FISH OIL) 100-160-1,000 mg cap Take 1,065 mg by mouth daily.  ferrous sulfate ER (IRON) 160 mg (50 mg iron) TbER tablet Take 1 Tab by mouth daily. Indications: PT TAKES 40 MG      multivitamin (ONE A DAY) tablet Take 1 Tab by mouth daily.  pregabalin (LYRICA) 150 mg capsule TAKE 1 CAPSULE BY MOUTH TWICE DAILY.  MAX DAILY AMOUNT: 300 MG (Patient not taking: Reported on 2/22/2022) 180 Capsule 0    Linzess 72 mcg cap capsule TAKE 1 CAPSULE BY MOUTH DAILY BEFORE BREAKFAST (Patient not taking: Reported on 3/1/2022) 30 Cap 1     Review of Systems:  HEENT: No epistaxis, no nasal drainage, no difficulty in swallowing, no redness in eyes  Respiratory: as above  Cardiovascular: no chest pain, no palpitations,+ chronic leg edema, no syncope  Gastrointestinal: no abd pain, no vomiting, no diarrhea, no bleeding symptoms  Genitourinary: No urinary symptoms or hematuria  Integument/breast: No ulcers or rashes  Musculoskeletal:Neg  Neurological: No focal weakness, no seizures, no headaches  Behvioral/Psych: No anxiety, no depression  Constitutional: No fever, no chills, no weight loss, no night sweats     Objective:     Visit Vitals  /78 (BP 1 Location: Left upper arm, BP Patient Position: Sitting, BP Cuff Size: Large adult)   Pulse 85   Temp 97.8 °F (36.6 °C) (Oral)   Resp 16   Ht 5' 5\" (1.651 m)   Wt 132 kg (291 lb)   SpO2 97% Comment: RA Rest   BMI 48.42 kg/m²        Physical Exam:   General: comfortable, no acute distress  HEENT: pupils reactive, sclera anicteric, EOM intact  Neck: No adenopathy or thyroid swelling, no lymphadenopathy or JVD, supple  CVS: S1S2 no murmurs  RS: Mod AE bilaterally, no tactile fremitus or egophony, no accessory muscle use  Abd: soft, non tender, no hepatosplenomegaly  Neuro: non focal, awake, alert  Extrm: Trace leg edema right more than left, clubbing or cyanosis  Skin: no rash    Data review:   Pertinent labs: CBC, BMP, LFT's    PFT:  2017 normal  06/10/21   Patient effort:   Good   Meets ATS criteria for interpretation     Flows:   Maximal Mid Expiratory Flow rate is reduced to 32 % predicted   Forced Expiratory Volume in one second is reduced to 72 % predicted   FEV 1% is reduced   Volumes:   Functional Residual Capacity and Residual Volume are reduced     Flow Volume Loop:   Nonspecific obstructive pattern in Flow Volume Loop     Bronchodilator:   Significant improvement with bronchodilator     Diffusion:   Abnormal Diffusion Capacity reduced to 75 % predicted and corrects for alveolar volume Impression:   Mild to Moderate obstructive defect, Isolated reduction of Residual Volume and Functional Residual Capacity    6 min walk test; patient walked a distance of 680 m without significant oxygen desaturation however heart rate increased and dyspnea index changed from 0-6 with patient complaining of some chest tightness  Polysomnogram- spilt night- Dr. Moises Mendez  AYE. CPAP- 14 cm prescribed    Results for orders placed or performed during the hospital encounter of 02/16/20   EKG, 12 LEAD, INITIAL   Result Value Ref Range    Ventricular Rate 64 BPM    Atrial Rate 64 BPM    P-R Interval 176 ms    QRS Duration 80 ms    Q-T Interval 418 ms    QTC Calculation (Bezet) 431 ms    Calculated P Axis 23 degrees    Calculated R Axis -4 degrees    Calculated T Axis 18 degrees    Diagnosis       Normal sinus rhythm  Normal ECG  When compared with ECG of 06-MAR-2018 10:04,  No significant change was found  Confirmed by Feli Aguilera ([id number]) on 2/16/2020 7:40:15 AM       08/13/19   ECHO ADULT COMPLETE 08/14/2019 8/14/2019    Narrative · Left Ventricle: Normal cavity size, wall thickness, systolic function   (ejection fraction normal) and diastolic function. Estimated left   ventricular ejection fraction is 56 - 60%. No regional wall motion   abnormality noted. · Right Ventricle: Normal right ventricular size and function. · No hemodynamically significant valvular pathology. Signed by: Trace Foy MD     Imaging:  I have personally reviewed the patients radiographs and have reviewed the reports:  XR Results (most recent):  Results from Hospital Encounter encounter on 02/16/20    XR CHEST PORT    Narrative  AP CHEST, PORTABLE    CPT CODE: 26993    INDICATION: Above. Intermittent epigastric pain. Nausea and vomiting. COMPARISON: 5/13/2019 PA and lateral.    TECHNIQUE: Portable AP chest radiograph is reviewed. FINDINGS:    No evidence of focal pulmonary consolidation or pulmonary edema. No evidence of  pneumothorax. The costophrenic sulci appear sharp. The cardiac silhouette is top normal in size  for technique. EKG leads/wires overlie the patient. Cervical spine fusion plate/screws again  noted. No acute osseous abnormalities identified. Impression  IMPRESSION:    No evidence of acute pulmonary disease. CT Results (most recent):  Results from Hospital Encounter encounter on 02/23/22    CT LOW DOSE LUNG CANCER SCREENING    Narrative  CT of the chest for lung cancer screening    HISTORY: Lung screening. Formal smoker with 42 pack years smoking history, quit  in 6/20/20. Meet lung screening criteria. COMPARISON: 11/16/19    TECHNIQUE: Low dose unenhanced multislice helical CT was performed from the  thoracic inlet to the adrenal glands without intravenous contrast  administration. Contiguous [1.25 mm] axial images were reconstructed and lung  and soft tissue windows were generated. Coronal MIP and sagittal reformations  were generated.  -All CT scans at this facility performed using dose optimization techniques as  appreciated to a performed exam, to include automated exposure control,  adjustment of the mA and or KU according to patient size (including appropriate  matching for site specific examination), or use of iterative reconstruction  technique. FINDINGS:    The lungs are clear of nodule, mass or  airspace disease. Mild dependent  atelectasis in bilateral lower lobes. Mild centrilobular emphysema and  bronchitis appear stable. There is no significant pleural disease. The absence of intravenous contrast reduces the sensitivity for evaluation of  the mediastinum and upper abdominal organs. No mediastinal or hilar adenopathy  appreciated. The heart is not enlarged. No pericardial effusion. The aorta has  a normal contour with no evidence of aneurysm. The thyroid appears unremarkable. The bones and soft tissues of the chest wall are within normal limits.  The  imaged upper abdomen appears unremarkable. Impression  1. No lung nodule identified. 2. Stable mild COPD. LUNG RADS ASSESSMENT CATEGORIES:    Lung RADS 1 - Negative. Management:  Continue annual screening with low dose CT in 12 months. Thank you for your referral.    .     Patient Active Problem List   Diagnosis Code    Chronic obstructive pulmonary disease ([facility] J44.9    Recurrent depression ([facility] F33.9    Fibrosarcoma ([facility] C49.9    Alcoholism in remission ([facility] F10.21    Sleep apnea G47.30    History of gastric bypass Z98.84    Obesity, morbid ([facility] E66.01    Osteopenia M85.80    GERD (gastroesophageal reflux disease) K21.9    Chronic pain of both knees M25.561, M25.562, G89.29    Prediabetes R73.03    Age-related nuclear cataract, bilateral H25.13    Xanthelasma of left upper eyelid H02.64    Xanthelasma of right upper eyelid H02.61    Sebaceous cyst of eyelid H02.829    Personal history of smoking Z87.891    Diverticulitis K57.92    Breast cancer of upper-inner quadrant of left female breast (HCC) C50.212       IMPRESSION:   · SOB-multifactorial with significant component from weight gain. PFT in 2017 was unremarkable however with history of smoking likely she has a component of COPD. FEV1 72% predicted , midexpiratory flow rate 32% predicted with bronchodilator response decreased diffusion capacity which corrects for alveolar volume. She is currently on Trelegy which seems to be helping. Also need to consider pulmonary hypertension progressive with symptoms of chronic leg edema and heart rate increased with activity. Cardiac ischemic component also on should be considered in the differential diagnosis for symptoms of chest tightness  · AYE on CPAP. CPAP download with average use of 7 hours 40 minutes at [employer] 14/10 with AHI of 0.3  · Morbid obesity- BMI 50.67 h/o gastric bypass-now down to 48.59  · GERD. · H/o smoking-42 pack years. Quit 6 /2020 . She is candidate for LDCT till 2035. Last CT 11/2019- Lung RADs 2. 2 mm nodule. Most recent CT scan 2/23/2022-no nodules identified. The lungs are clear of nodule, mass or  airspace disease. Mild dependent atelectasis in bilateral lower lobes. Mild centrilobular emphysema and bronchitis appear stable. There is no significant pleural disease. · Malignant neoplasm of upper-inner quadrant of left breast in female, estrogen receptor negative (CMS/HCC) 10/19/2021 Cancer Staged . Staging form: Breast, AJCC 8th Edition  - Clinical stage from 10/19/2021: Stage IIB (cT2, cN0, cM0, G2, ER-, AL-, HER2-) follows with [facility]-Dr. Rimma Caceres        RECOMMENDATIONS:   · Discussed with patient -results of PFT-continue Trelegy and instructed to rinse mouth thoroughly after use especially with current immunocompromise status. · Continue current CPAP settings effective  · Continue treatment for GERD-agree with stepup therapy and optimization on continued basis  · Monitor symptoms closely-report to me any change in respiratory symptoms of shortness of breath, persistent cough. Explained toxicity related to chemo. No evidence at present on CT scan completed 2/23/2022  · Order LDCT for February 2023-annual follow-up. Shared decision making completed  · Maintain complete cessation of cigarette smoking  · Maintain active lifestyle, healthy nutrition  · Preventive cagyqgiclnmt-su-gg-date  · We will follow-up in 4 months with PFTs at next visit     Health maintenance screens deferred to Primary care provider.      Michiel Buerger, MD    This patient has a high complexity chronic care condition

## 2022-03-01 NOTE — LETTER
3/1/2022    Patient: Sun Huntley   YOB: 1960   Date of Visit: 3/1/2022     Jennifer Quinteros NP  [address]  Via In Basket    Dear Jennifer Quinteros NP,      Thank you for referring Ms. Sandra Buck to [address] [place] [address] for evaluation. My notes for this consultation are attached. If you have questions, please do not hesitate to call me. I look forward to following your patient along with you.       Sincerely,    Orvel Seip, MD

## 2022-03-16 DIAGNOSIS — F17.219 CIGARETTE NICOTINE DEPENDENCE WITH NICOTINE-INDUCED DISORDER: ICD-10-CM

## 2022-03-16 DIAGNOSIS — F33.9 RECURRENT DEPRESSION (HCC): ICD-10-CM

## 2022-03-16 DIAGNOSIS — F10.21 ALCOHOLISM IN REMISSION (HCC): ICD-10-CM

## 2022-03-16 NOTE — TELEPHONE ENCOUNTER
This patient contacted office for the following prescriptions to be filled:    Last office visit: 2/22/22  Follow up appointment: 6/1/22  Medication requested :   Requested Prescriptions     Pending Prescriptions Disp Refills    buPROPion SR (WELLBUTRIN SR) 150 mg SR tablet 180 Tablet 3     Sig: Take 1 Tablet by mouth two (2) times a day.  citalopram (CELEXA) 20 mg tablet 90 Tablet 0     Sig: Take 1 Tablet by mouth daily.        PCP: Dr. Kamini Villela  Mail order or Local pharmacy name: [facility]

## 2022-03-17 RX ORDER — BUPROPION HYDROCHLORIDE 150 MG/1
150 TABLET, EXTENDED RELEASE ORAL 2 TIMES DAILY
Qty: 180 TABLET | Refills: 3 | Status: SHIPPED | OUTPATIENT
Start: 2022-03-17

## 2022-03-17 RX ORDER — CITALOPRAM 20 MG/1
20 TABLET, FILM COATED ORAL DAILY
Qty: 90 TABLET | Refills: 0 | Status: SHIPPED | OUTPATIENT
Start: 2022-03-17 | End: 2022-03-21

## 2022-03-17 NOTE — TELEPHONE ENCOUNTER
Please see patient refill request    Patient was last seen on 2-    Celexa last prescribed on 12-  #90 X 0    Wellbutrin last prescribed on 11-  #180 X 3    Thank you

## 2022-03-18 PROBLEM — M25.562 CHRONIC PAIN OF BOTH KNEES: Status: ACTIVE | Noted: 2018-09-14

## 2022-03-18 PROBLEM — G89.29 CHRONIC PAIN OF BOTH KNEES: Status: ACTIVE | Noted: 2018-09-14

## 2022-03-18 PROBLEM — H25.13 AGE-RELATED NUCLEAR CATARACT, BILATERAL: Status: ACTIVE | Noted: 2020-07-20

## 2022-03-18 PROBLEM — M25.561 CHRONIC PAIN OF BOTH KNEES: Status: ACTIVE | Noted: 2018-09-14

## 2022-03-18 PROBLEM — H02.64 XANTHELASMA OF LEFT UPPER EYELID: Status: ACTIVE | Noted: 2020-07-20

## 2022-03-18 PROBLEM — M85.80 OSTEOPENIA: Status: ACTIVE | Noted: 2017-12-14

## 2022-03-19 DIAGNOSIS — F33.9 RECURRENT DEPRESSION (HCC): ICD-10-CM

## 2022-03-19 PROBLEM — K57.92 DIVERTICULITIS: Status: ACTIVE | Noted: 2021-10-13

## 2022-03-19 PROBLEM — G47.30 SLEEP APNEA: Status: ACTIVE | Noted: 2017-09-14

## 2022-03-19 PROBLEM — Z98.84 HISTORY OF GASTRIC BYPASS: Status: ACTIVE | Noted: 2017-09-14

## 2022-03-19 PROBLEM — Z87.891 PERSONAL HISTORY OF SMOKING: Status: ACTIVE | Noted: 2021-06-21

## 2022-03-19 PROBLEM — H02.829 SEBACEOUS CYST OF EYELID: Status: ACTIVE | Noted: 2020-07-20

## 2022-03-19 PROBLEM — C50.212 BREAST CANCER OF UPPER-INNER QUADRANT OF LEFT FEMALE BREAST (HCC): Status: ACTIVE | Noted: 2021-10-28

## 2022-03-19 PROBLEM — R73.03 PREDIABETES: Status: ACTIVE | Noted: 2020-04-14

## 2022-03-20 PROBLEM — H02.61 XANTHELASMA OF RIGHT UPPER EYELID: Status: ACTIVE | Noted: 2020-07-20

## 2022-03-20 PROBLEM — E66.01 OBESITY, MORBID (HCC): Status: ACTIVE | Noted: 2017-12-14

## 2022-03-20 PROBLEM — K21.9 GERD (GASTROESOPHAGEAL REFLUX DISEASE): Status: ACTIVE | Noted: 2017-12-14

## 2022-03-21 RX ORDER — CITALOPRAM 20 MG/1
20 TABLET, FILM COATED ORAL DAILY
Qty: 90 TABLET | Refills: 0 | Status: SHIPPED | OUTPATIENT
Start: 2022-03-21 | End: 2022-08-30 | Stop reason: SDUPTHER

## 2022-03-28 NOTE — PROGRESS NOTES
Breast Cancer       Ms. Atif Chirinos is a 59year old woman with BRCA2 mutation and T2N0 triple negative breast cancer, s/p bilateral mastectomy 11/23/21. She had been diagnosed on core biopsy 10/19/21. The area of concern was identified as a palpable mass around September 2021. She denied nipple discharge. She denied breast pain. There is family history of breast cancer in her sister, mother and maternal aunt. Her most recent previous mammogram was 10/14/19. She has a personal history of fibrosarcoma. Her sister has HNPCC/ANTHONY mutation. Genetic testing via [email or web address] demonstrated BRCA2 mutation 11/8/21. She reports having had hysterectomy with salpingoopherectomy. She has completed adjuvant ddAC and is receiving weekly Taxol per Dr. Sonja Cherry. She notes parasthesias. She reports tightness left axilla and lateral chest wall    Breast/GYN history:    OB History    No obstetric history on file.           Past Medical History:   Diagnosis Date    Alcoholism in remission ([address]     Asthma     Breast cancer ([address]     breast cancer left    Chronic obstructive pulmonary disease ([address]     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    GERD (gastroesophageal reflux disease)     History of gastric bypass 2012    History of meniscal tear 2015, 2018    right in 2015, left in 2018    HNP (herniated nucleus pulposus), lumbar     patient reported    Lung nodules     resolved 2018 CT    Neuropathy     Osteopenia 10/03/2017    Recurrent depression ([address]     Sleep apnea     on cpap    Spinal stenosis, lumbar     patient reported    Xanthelasma of left upper eyelid 7/20/2020       Past Surgical History:   Procedure Laterality Date    HX ARTHRODESIS  01/2000    Herniated Disc, arthritis right foot 06/06, 07/07, C 6/7, C 4/6 Stenosis    HX CHOLECYSTECTOMY  09/2008    gallbladder disease    HX COLONOSCOPY  05/2009    HX GASTRIC BYPASS  2012    GASTRIC BYPASS  [facility] Gastric Bypass      HX HYSTERECTOMY  06/1994    HX MASTECTOMY Bilateral 11/23/2021    BILATERAL MASTECTOMY, LEFT SENTINEL NODE BIOPSY [facility] 11/22) performed by Bruce Franklin MD at [address] [facility]  [facility]  10/2015    right repari of torn meniscus    HX MENISCECTOMY Left 03/16/2018    partial meniscectomy due to tear    HX MYOMECTOMY  06/1984    benign tumor    HX ORTHOPAEDIC  1964    orthopaedic excision Fibrosarcoma and Lymphangiogram    HX PELVIC LAPAROSCOPY  04/1984    large uterine blockage    NEUROLOGICAL PROCEDURE UNLISTED  2000, 2007    Cervical fusion       Current Outpatient Medications on File Prior to Visit   Medication Sig Dispense Refill    gabapentin (NEURONTIN) 100 mg capsule Take 100 mg by mouth daily.  citalopram (CELEXA) 20 mg tablet TAKE 1 TABLET BY MOUTH DAILY 90 Tablet 0    buPROPion SR (WELLBUTRIN SR) 150 mg SR tablet Take 1 Tablet by mouth two (2) times a day. 180 Tablet 3    fluticasone-umeclidinium-vilanterol (Trelegy Ellipta) 100-62.5-25 mcg inhaler INHALE 1 PUFF BY MOUTH DAILY 120 Each 4    omeprazole (PRILOSEC) 20 mg capsule Take 1 Capsule by mouth daily for 60 days. 30 Capsule 1    sucralfate (CARAFATE) 1 gram tablet Take 1 tablet by mouth Four times a day as needed for indigestion. (Patient taking differently: two (2) times a day. Take 1 tablet by mouth Four times a day as needed for indigestion.) 360 Tablet 0    diclofenac (Voltaren) 1 % gel Apply 2 g to affected area every six (6) hours as needed for Pain. 100 g 2    Linzess 72 mcg cap capsule TAKE 1 CAPSULE BY MOUTH DAILY BEFORE BREAKFAST 30 Cap 1    fluticasone propionate (FLONASE) 50 mcg/actuation nasal spray SHAKE LIQUID AND USE 2 SPRAYS IN EACH NOSTRIL DAILY 48 g 5    albuterol (PROVENTIL HFA, VENTOLIN HFA, PROAIR HFA) 90 mcg/actuation inhaler Take 2 Puffs by inhalation every four (4) hours as needed for Wheezing or Shortness of Breath. 3 Inhaler 4    b-complex with vitamin c tablet Take 1 Tab by mouth daily.       acetaminophen (TYLENOL EXTRA STRENGTH) 500 mg tablet Take  by mouth every six (6) hours as needed for Pain.  calcium-cholecalciferol, d3, (CALCIUM 600 + D) 600-125 mg-unit tab Take 1,065 mg by mouth nightly. Indications: TAKES 2 AT BEDTIME      omega 3-dha-epa-fish oil (FISH OIL) 100-160-1,000 mg cap Take 1,065 mg by mouth daily.  ferrous sulfate ER (IRON) 160 mg (50 mg iron) TbER tablet Take 1 Tab by mouth daily. Indications: PT TAKES 40 MG      multivitamin (ONE A DAY) tablet Take 1 Tab by mouth daily.  pregabalin (LYRICA) 150 mg capsule TAKE 1 CAPSULE BY MOUTH TWICE DAILY. MAX DAILY AMOUNT: 300 MG (Patient not taking: Reported on 2022) 180 Capsule 0     No current facility-administered medications on file prior to visit.        Allergies   Allergen Reactions    Adhesive Tape-Silicones Swelling     REALLY BAD SWELLING AND SORE APPEAR    Alcohol Other (comments)     PT STATES SHE IS AN ALCOHOLIC    Lactose Other (comments)     PT STATES IT MAKES HER STOMACH HURT    Percodan [Oxycodone Hcl-Oxycodone-Asa] Itching and Other (comments)     crying    Nsaids (Non-Steroidal Anti-Inflammatory Drug) Other (comments)     PT NOT ABLE TO TAKE DUE TO GASTRIC BYPASS       Social History     Tobacco Use    Smoking status: Former Smoker     Packs/day: 0.50     Years: 45.00     Pack years: 22.50     Quit date: 2020     Years since quittin.7    Smokeless tobacco: Never Used   Vaping Use    Vaping Use: Never used   Substance Use Topics    Alcohol use: No     Comment: quit - was heavy etoh    Drug use: Not Currently     Comment: marijuana, cocaine 27 yrs ago       Family History   Problem Relation Age of Onset    Hypertension Mother     Depression Mother     Cancer Mother     Ovarian Cancer Mother     Breast Problems Mother     Cancer Father     Cancer Sister     Depression Sister     Breast Cancer Sister     Depression Brother     Stroke Neg Hx     Heart Attack Neg Hx ROS: positives are bolded  General: fevers, chills, night sweats, fatigue, weight loss, weight gain  GI: nausea, vomiting, abdominal pain, change in bowel habits, hematochezia, melena, GERD  Integ: dermatitis, abnormal moles  HEENT: visual changes, vertigo, epistaxis, dysphagia, hoarseness  Cardiac: chest pain, palpitations, HTN, edema, varicosities  Resp: cough, shortness of breath, wheezing, hemoptysis, snoring, reactive airway disease  : hematuria, dysuria, frequency, urgency, nocturia, stress urinary incontinence   MSK: weakness, joint pain, arthritis  Endocrine:  thyroid disease, polyuria, polydipsia, polyphagia, poor wound healing, heat intolerance, cold intolerance  Lymph/Heme: anemia, bruising, history of blood transfusions  Neuro: dizziness, headache, fainting, seizures, stroke  Psych: anxiety, depression    Physical Exam:  Visit Vitals  /86   Pulse (!) 106   Temp 97 °F (36.1 °C)   Resp 24   Ht 5' 5\" (1.651 m)   Wt 133.8 kg (295 lb)   SpO2 98%   BMI 49.09 kg/m²     Gen:  No distress  Head: normocephalic, atraumatic  Mouth: Clear, no overt lesions, oral mucosa pink and moist  Neck: supple, no masses, no adenopathy, trachea midline  Resp: clear bilaterally  Cardio: Regular rate and rhythm  Abdomen: soft, nontender, nondistended  Extremities: warm, well-perfused  Neuro: sensation and strength grossly intact and symmetrical  Psych: alert and oriented to person, place and time  Breasts:   Right: Examined sitting. There was no supraclavicular, infraclavicular, or axillary lymphadenopathy. There were no dominant masses, no skin changes, no asymmetry identified incision clean, healing well  Left: Examinedsitting  There was no supraclavicular, infraclavicular, or axillary lymphadenopathy. Incision clean, healing well      Imaging:  10/19/21 bilateral mammogram/left ultrasound  1. Suspicious finding.  Further evaluation with ultrasound-guided biopsy.     BI-RADS Category 4 - Suspicion for Malignancy     These findings were discussed with the patient in person. Options and  alternatives were discussed. Patient demonstrated understanding.     The lack of mammographic evidence of malignancy should not deter further work-up  of a clinically significant palpable finding. Radiodense breast tissue may  obscure an early malignancy or a palpable finding. 10-15% of breast cancers are  not detected by mammography.       Pathology:  11/23/21  A: Right breast, simple mastectomy:        Nonproliferative fibrocystic change. B: Left breast, simple mastectomy:        Invasive adenocarcinoma of no special type (ductal). Ductal carcinoma in situ. C: sentinel Lymph node, left axillary, biopsy:        One lymph node, negative for malignancy (0/1).      SPECIMEN       Procedure: Total mastectomy       Specimen Laterality: Left    TUMOR       Histologic Type:  Invasive carcinoma of no special type (ductal)       Overall Grade: Grade 2 (scores of 6 or 7)           Glandular (Acinar) / Tubular Differentiation: Score 2            Nuclear Pleomorphism: Score 3            Mitotic Rate: Score 2       Tumor Size: 28 mm       Ductal Carcinoma In Situ (DCIS): Present           Nuclear Grade: Grade III (high)           Necrosis: Present, central (expansive \"comedo\" necrosis)    Tumor Extent       Skin: Uninvolved by tumor       Nipple DCIS: DCIS does not involve the nipple epidermis       Treatment Effect in the Breast: No known presurgical therapy       Treatment Effect in the Lymph Nodes: No known pre-surgical therapy    MARGINS       Invasive Carcinoma Margins: Uninvolved by invasive carcinoma           Distance from Closest Margin (Millimeters): 25 mm           Closest Margin(s): Posterior       DCIS Margins: Uninvolved by DCIS           Distance from Closest Margin (Millimeters): 25 mm           Closest Margin(s): Posterior    LYMPH NODES       Regional Lymph Nodes: Uninvolved by tumor cells           Total Number of Lymph Nodes Examined: 1           Number of [place] Nodes Examined: 1   [address] (pTNM, AJCC 8th Edition)       Primary Tumor (pT): pT2       Regional Lymph Nodes Modifier: (sn): [place] node(s) evaluated       Regional Lymph Nodes (pN): pN0    SPECIAL STUDIES       Testing Performed on Case Number [id number], reported as ER-, VA-,   Her2-     10/22/21  A: Left breast mass, 9:00, biopsy:        Invasive carcinoma with papillary features (see comment). INVASIVE CARCINOMA OF THE BREAST: Biopsy    SPECIMEN       Procedure: Needle biopsy       Specimen Laterality: Left    TUMOR       Histologic Type: Invasive carcinoma with papillary features.       Glandular (Acinar) / Tubular Differentiation: Score 3       Nuclear Pleomorphism: Score 2       Mitotic Rate: Score 2       Overall Grade: Grade 2 (scores of 6 or 7)       Lymphovascular Invasion: Not identified   Estrogen Receptor (ER):     Negative (0%, No internal controls in sample   tested but external controls stain                       appropriately).                        Progesterone Receptor (PgR): Negative (0%, No internal controls in sample   tested but external controls stain                       appropriately).                           Her-2 (IHC Method):          Negative (score 0)     Impression:  Patient Active Problem List   Diagnosis Code    Chronic obstructive pulmonary disease (HCC) J44.9    Recurrent depression ([address] F33.9    Fibrosarcoma ([address] C49.9    Alcoholism in remission ([address] F10.21    Sleep apnea G47.30    History of gastric bypass Z98.84    Obesity, morbid ([facility]) E66.01    Osteopenia M85.80    GERD (gastroesophageal reflux disease) K21.9    Chronic pain of both knees M25.561, M25.562, G89.29    Prediabetes R73.03    Age-related nuclear cataract, bilateral H25.13    Xanthelasma of left upper eyelid H02.64    Xanthelasma of right upper eyelid H02.61    Sebaceous cyst of eyelid H02.829    Personal history of smoking Z87.891    Diverticulitis K57.92    Breast cancer of upper-inner quadrant of left female breast [facility]) C50.65        59year old woman with BRCA2 mutation and T2N0 triple negative breast cancer, s/p bilateral mastectomy 11/23/21. Continue Taxol per Dr. Stan Edmondson. Will refer to physical therapy. Follow up 6 months. All questions were answered. She was asked to call with any additional questions or concerns.

## 2022-03-30 ENCOUNTER — OFFICE VISIT (OUTPATIENT)
Dept: SURGERY | Age: 62
End: 2022-03-30
Payer: MEDICARE

## 2022-03-30 VITALS
WEIGHT: 293 LBS | SYSTOLIC BLOOD PRESSURE: 128 MMHG | OXYGEN SATURATION: 98 % | TEMPERATURE: 97 F | RESPIRATION RATE: 24 BRPM | HEART RATE: 106 BPM | HEIGHT: 65 IN | DIASTOLIC BLOOD PRESSURE: 86 MMHG | BODY MASS INDEX: 48.82 KG/M2

## 2022-03-30 DIAGNOSIS — C50.212 MALIGNANT NEOPLASM OF UPPER-INNER QUADRANT OF LEFT BREAST IN FEMALE, ESTROGEN RECEPTOR NEGATIVE (HCC): Primary | ICD-10-CM

## 2022-03-30 DIAGNOSIS — Z17.1 MALIGNANT NEOPLASM OF UPPER-INNER QUADRANT OF LEFT BREAST IN FEMALE, ESTROGEN RECEPTOR NEGATIVE (HCC): Primary | ICD-10-CM

## 2022-03-30 PROCEDURE — 99213 OFFICE O/P EST LOW 20 MIN: CPT | Performed by: SURGERY

## 2022-03-30 RX ORDER — GABAPENTIN 100 MG/1
200 CAPSULE ORAL DAILY
COMMUNITY
Start: 2022-03-24 | End: 2022-10-19 | Stop reason: ALTCHOICE

## 2022-03-30 NOTE — LETTER
3/30/2022 1:25 PM    Patient:  Pedro Carrington   YOB: 1960  Date of Visit: 3/30/2022      Hanna Austin MD  [place] [address].  [facility] [address]  [address] [facility] [address]    Dear Hanna Austin MD,      I had the pleasure of seeing Ms. John Colón in my office today. I am including a copy of my office visit today. If you have questions, please do not hesitate to call me. I look forward to following Ms. Chitra Fernandez along with you and will keep you updated as to her progress.            Sincerely,      Eb Elias MD

## 2022-04-15 ENCOUNTER — HOSPITAL ENCOUNTER (OUTPATIENT)
Dept: PHYSICAL THERAPY | Age: 62
Discharge: HOME | End: 2022-04-15
Attending: SURGERY
Payer: MEDICARE

## 2022-04-15 PROCEDURE — 97140 MANUAL THERAPY 1/> REGIONS: CPT

## 2022-04-15 PROCEDURE — 97110 THERAPEUTIC EXERCISES: CPT

## 2022-04-15 PROCEDURE — 97162 PT EVAL MOD COMPLEX 30 MIN: CPT

## 2022-04-15 NOTE — PROGRESS NOTES
PT DAILY TREATMENT NOTE     Patient Name: Lazaro Postin  Date:4/15/2022  : 1960  [x]  Patient  Verified  Payor: [employer] MEDICARE COMPLETE / [facility] / Product Type: Managed Care Medicare /    In time:8:24  Out time:9:17  Total Treatment Time (min): 53  Visit #: 1 of     Medicare/BCBS Only   Total Timed Codes (min):  23 1:1 Treatment Time:  23       Treatment Area: Left-sided chest pain [R07.9]    SUBJECTIVE  Pain Level (0-10 scale): 4/10  Any medication changes, allergies to medications, adverse drug reactions, diagnosis change, or new procedure performed?: [x] No    [] Yes (see summary sheet for update)  Subjective functional status/changes:   [] No changes reported  The patient has a chief complaint of left chest and arm pit [pain since s/p mastectomy performed on 2021    OBJECTIVE  30 min [x]Eval                  []Re-Eval       15 min Therapeutic Exercise:  [x] See flow sheet :   Rationale: increase ROM and increase strength to improve the patients ability to improve ADL ease. 8 min Manual Therapy:  Left GHJ inferior/posterior capsular mobs pectoralis stretching through interval   The manual therapy interventions were performed at a separate and distinct time from the therapeutic activities interventions. Rationale: decrease pain, increase ROM and increase tissue extensibility to improve ADl ease. With   [] TE   [] TA   [] neuro   [] other: Patient Education: [x] Review HEP    [] Progressed/Changed HEP based on:   [] positioning   [] body mechanics   [] transfers   [] heat/ice application    [] other:      Other Objective/Functional Measures: See IE. Pain Level (0-10 scale) post treatment: 2/10    ASSESSMENT/Changes in Function: See POC.     Patient will continue to benefit from skilled PT services to modify and progress therapeutic interventions, address functional mobility deficits, address ROM deficits, address strength deficits, analyze and address soft tissue restrictions, analyze and cue movement patterns, analyze and modify body mechanics/ergonomics, assess and modify postural abnormalities and instruct in home and community integration to attain remaining goals. []  See Plan of Care  []  See progress note/recertification  []  See Discharge Summary       Left GHJ pectoralis stretching/lat stretching and inferior/posterior capsular mobs grade II. Progress towards goals / Updated goals:  Short Term Goals: To be accomplished in 2 weeks:              1. The patient will demonstrate independence and compliance with HEP to maximize therapeutic benefit. IE: issued HEP              2. The patient will demonstrate left shoulder PROM of ABD to 150 degrees to improve ease of reaching overhead. IE: 130 degrees  Long Term Goals: To be accomplished in 4 weeks:              1. The patient will improve FOTO score to 53 to improve quality of life. IE:  37              2. The patient will improve strength of left shoulder flexion and ER to 5/5 MMT to maximize stability during chore production. IE: 4/5 MMT ER, 4-/5 MMT flexion              3. The patient will demonstrate left shoulder AROM to 160 degrees flexion/ABD to improve ease of reaching into overhead cabinets. IE: right shoulder 150 degrees flexion, 130 degrees ABD              4. The patient will report 75% improvement in symptoms in order to improve ease of ADLs.    IE: 0%    PLAN  [x]  Upgrade activities as tolerated     []  Continue plan of care      Inocencia Win PT 4/15/2022  9:29 AM    Future Appointments   Date Time Provider Cesar Leong   4/20/2022  2:45 PM Sergio Clark PTA [facility]   4/29/2022  7:45 AM Sergio Clark PTA [facility]   5/6/2022  7:00 AM Nicolas Silverman, PT [facility]   5/13/2022  7:00 AM Nicolas Silverman, PT [facility]   6/1/2022 10:30 AM MD [facility]UEL Worrell [facility]   9/20/2022  4:00 PM Bal Jimenez NP [facility]

## 2022-04-15 NOTE — PROGRESS NOTES
[facility]  [address] [place] [address] [address]  [place], [address]  [phone] fax    Plan of Care/ Statement of Necessity for Physical Therapy Services    Patient name: Santi Bruner Start of Care: 4/15/2022   Referral source: Ludivina Slater MD : 1960    Medical Diagnosis: Left-sided chest pain [R07.9]  Payor: Cathy Meyer / Plan: [address] / Product Type: Managed Care Medicare /  Onset Date:2021    Treatment Diagnosis: left chest pain   Prior Hospitalization: see medical history Provider#: [id number]   Medications: Verified on Patient summary List    Comorbidities: depression, arthritis, latex allergy, asthma, cancer, cervical fusion   Prior Level of Function: The patient reports that she had full use of her shoulder prior to onset. The Plan of Care and following information is based on the information from the initial evaluation. Assessment/ key information: The patient is a 59year old female s/p xenia mastectomy performed on 2021. She states she experienced a problem with the drain on her left breast, and ultimately it was just removed. She reports that her left side is where more of her pain and she feels she has to keep her arm in an internally rotated position. Educated patient regarding proper posture and positioning. She is currently being treated with chemo therapy at 1 time a week on . The patient has impairments related to the diagnosis consisting of pain, decreased ROM, decreased flexibility, decreased strength, limited ADL ease, and decreased quality of life. She will benefit from PT in order to address the aforementioned impairments.      Evaluation Complexity History HIGH Complexity :3+ comorbidities / personal factors will impact the outcome/ POC ; Examination MEDIUM Complexity : 3 Standardized tests and measures addressing body structure, function, activity limitation and / or participation in recreation  ;Presentation MEDIUM Complexity : Evolving with changing characteristics  ; Clinical Decision Making MEDIUM Complexity : FOTO score of 26-74  Overall Complexity Rating: MEDIUM  Problem List: pain affecting function, decrease ROM, decrease strength, decrease ADL/ functional abilitiies, decrease activity tolerance and decrease flexibility/ joint mobility   Treatment Plan may include any combination of the following: Therapeutic exercise, Therapeutic activities, Neuromuscular re-education, Physical agent/modality, Manual therapy and Patient education  Patient / Family readiness to learn indicated by: asking questions, trying to perform skills and interest   Persons(s) to be included in education: patient (P)  Barriers to Learning/Limitations: Financial: Pt has copay and states this will limit follow-up. Patient Goal (s): I am not sure what PT can do  Patient Self Reported Health Status: fair  Rehabilitation Potential: good    Short Term Goals: To be accomplished in 2 weeks:   1. The patient will demonstrate independence and compliance with HEP to maximize therapeutic benefit. 2. The patient will demonstrate left shoulder PROM of ABD to 150 degrees to improve ease of reaching overhead. Long Term Goals: To be accomplished in 4 weeks:   1. The patient will improve FOTO score to 53 to improve quality of life. 2. The patient will improve strength of left shoulder flexion and ER to 5/5 MMT to maximize stability during chore production. 3. The patient will demonstrate left shoulder AROM to 160 degrees flexion/ABD to improve ease of reaching into overhead cabinets. 4. The patient will report 75% improvement in symptoms in order to improve ease of ADLs. Frequency / Duration: Patient to be seen 1-2 times per week for 4 weeks.     Patient/ Caregiver education and instruction: Diagnosis, prognosis, self care, activity modification and exercises   [x]  Plan of care has been reviewed with PTA    Certification Period: 4/15/2022 - 5/14/2022  Diane Lehman, PT 4/15/2022 9:18 AM    ________________________________________________________________________    I certify that the above Therapy Services are being furnished while the patient is under my care. I agree with the treatment plan and certify that this therapy is necessary.     [de-identified] Signature:____________________  Date:____________Time: _________     Su Cespedes MD  Please sign and return to [facility] [address] [place] [address] & [facility] [address]  [address]  [place] [address] [address]  [phone] fax

## 2022-04-20 ENCOUNTER — TELEPHONE (OUTPATIENT)
Dept: PHYSICAL THERAPY | Age: 62
End: 2022-04-20

## 2022-04-29 ENCOUNTER — HOSPITAL ENCOUNTER (OUTPATIENT)
Dept: PHYSICAL THERAPY | Age: 62
Discharge: HOME | End: 2022-04-29
Attending: SURGERY
Payer: MEDICARE

## 2022-04-29 PROCEDURE — 97112 NEUROMUSCULAR REEDUCATION: CPT

## 2022-04-29 PROCEDURE — 97110 THERAPEUTIC EXERCISES: CPT

## 2022-04-29 PROCEDURE — 97140 MANUAL THERAPY 1/> REGIONS: CPT

## 2022-04-29 NOTE — PROGRESS NOTES
PT DAILY TREATMENT NOTE     Patient Name: Derrick Clarke  Date:2022  : 1960  [x]  Patient  Verified  Payor: [employer] MEDICARE COMPLETE / [facility] / Product Type: Managed Care Medicare /    In time:8:28  Out time:9:10  Total Treatment Time (min): 42  Visit #: 2 of 8    Medicare/BCBS Only   Total Timed Codes (min):  42 1:1 Treatment Time:  42       Treatment Area: Left-sided chest pain [R07.9]    SUBJECTIVE  Pain Level (0-10 scale): 0/10  Any medication changes, allergies to medications, adverse drug reactions, diagnosis change, or new procedure performed?: [x] No    [] Yes (see summary sheet for update)  Subjective functional status/changes:   [] No changes reported  Pt reports no new complaints of pain. Pt reports compliance with HEP. OBJECTIVE    24 min Therapeutic Exercise:  [x] See flow sheet :   Rationale: increase ROM and increase strength to improve the patients ability to perform ADLs with increased ease. 10 min Neuromuscular Re-education:  [x]  See flow sheet :   Rationale: increase strength, improve coordination and increase proprioception  to improve the patients ability to perform ADLs with increased ease. 8 min Manual Therapy:  PROM to left shoulder: flexion, and abduction with MFR to axillary region to patients tolerance. The manual therapy interventions were performed at a separate and distinct time from the therapeutic activities interventions. Rationale: decrease pain, increase ROM and increase tissue extensibility to perform ADLs with increased ease. With   [] TE   [] TA   [] neuro   [] other: Patient Education: [x] Review HEP    [] Progressed/Changed HEP based on:   [] positioning   [] body mechanics   [] transfers   [] heat/ice application    [] other:      Other Objective/Functional Measures: Initiated exercises as per flow sheet.       Pain Level (0-10 scale) post treatment: 2/10    ASSESSMENT/Changes in Function: Pt demonstrates good left shoulder mobility but has some soft tissue restrictions with shoulder elevation at end range. Patient will continue to benefit from skilled PT services to modify and progress therapeutic interventions, address functional mobility deficits, address ROM deficits, address strength deficits, analyze and address soft tissue restrictions, analyze and cue movement patterns and analyze and modify body mechanics/ergonomics to attain remaining goals. []  See Plan of Care  []  See progress note/recertification  []  See Discharge Summary         Progress towards goals / Updated goals:  Short Term Goals: To be accomplished in 2 weeks:              7. The patient will demonstrate independence and compliance with HEP to maximize therapeutic benefit. IE: issued HEP   Current: met per patient report. 04/29/2022              2. The patient will demonstrate left shoulder PROM of ABD to 150 degrees to improve ease of reaching overhead. IE: 130 degrees  Long Term Goals: To be accomplished in 4 weeks:              1. The patient will improve FOTO score to 53 to improve quality of life. IE:  37              2. The patient will improve strength of left shoulder flexion and ER to 5/5 MMT to maximize stability during chore production. IE: 4/5 MMT ER, 4-/5 MMT flexion              3. The patient will demonstrate left shoulder AROM to 160 degrees flexion/ABD to improve ease of reaching into overhead cabinets. IE: right shoulder 150 degrees flexion, 130 degrees ABD              4. The patient will report 75% improvement in symptoms in order to improve ease of ADLs.               IE: 0%    PLAN  []  Upgrade activities as tolerated     [x]  Continue plan of care  []  Update interventions per flow sheet       []  Discharge due to:_  []  Other:_      Luna Guerrero, PTA 4/29/2022  8:31 AM    Future Appointments   Date Time Provider Cesar Leong 5/6/2022  7:00 AM Dickson Herrera, PT [facility]   5/13/2022  7:00 AM Dickson Herrera, PT [facility]   6/1/2022 10:30 AM MD ALANA Reyes [facility]   9/20/2022  4:00 PM Nicci Jimenez, KIM WARNER BS [facility]

## 2022-05-06 ENCOUNTER — HOSPITAL ENCOUNTER (OUTPATIENT)
Dept: PHYSICAL THERAPY | Age: 62
Discharge: HOME | End: 2022-05-06
Attending: SURGERY
Payer: MEDICARE

## 2022-05-06 PROCEDURE — 97140 MANUAL THERAPY 1/> REGIONS: CPT

## 2022-05-06 PROCEDURE — 97110 THERAPEUTIC EXERCISES: CPT

## 2022-05-06 PROCEDURE — 97112 NEUROMUSCULAR REEDUCATION: CPT

## 2022-05-06 NOTE — PROGRESS NOTES
PT DAILY TREATMENT NOTE     Patient Name: Zachary Page  Date:2022  : 1960  [x]  Patient  Verified  Payor: [facility] MEDICARE COMPLETE / [facility] / Product Type: Managed Care Medicare /    In time:7:00  Out time:7:38  Total Treatment Time (min): 38  Visit #: 3 of 8    Medicare/BCBS Only   Total Timed Codes (min):  38 1:1 Treatment Time:  38       Treatment Area: Left-sided chest pain [R07.9]    SUBJECTIVE  Pain Level (0-10 scale): 0/10  Any medication changes, allergies to medications, adverse drug reactions, diagnosis change, or new procedure performed?: [x] No    [] Yes (see summary sheet for update)  Subjective functional status/changes:   [] No changes reported  The patient states that she has been \"feeling the stretches\" but states they are working, concerning her left shoulder. The patient states    OBJECTIVE  20 min Therapeutic Exercise:  [x] See flow sheet :   Rationale: increase ROM and increase strength to improve the patients ability to improve ADL ease. 10 min Neuromuscular Re-education:  [x]  See flow sheet :   Rationale: increase ROM, increase strength and improve coordination  to improve the patients ability to improve ADL ease. 8 min Manual Therapy:  Left GHJ inferior/posterior capsular mobs grade II with pectoralis stretching with the patient in supine positioning. The manual therapy interventions were performed at a separate and distinct time from the therapeutic activities interventions. Rationale: decrease pain, increase ROM and increase tissue extensibility to improve ADL ease.        With   [] TE   [] TA   [] neuro   [] other: Patient Education: [x] Review HEP    [] Progressed/Changed HEP based on:   [] positioning   [] body mechanics   [] transfers   [] heat/ice application    [] other:      Other Objective/Functional Measures:   PROM left shoulder  Flexion: 160 degrees  ABD: 140 degrees    AROM left shoulder  Flexion: 160 degrees  ABD: 155 degrees AROM    Pain Level (0-10 scale) post treatment: 0/10    ASSESSMENT/Changes in Function: The patient has made excellent progress both actively and passively. She states she is able to sleep better at night with better controlled pain. She reports compliance with HEP, and her HEP has been updated due to progress concerning range of motion and a decrease in pain. Patient will continue to benefit from skilled PT services to modify and progress therapeutic interventions, address functional mobility deficits, address ROM deficits, address strength deficits, analyze and address soft tissue restrictions, analyze and cue movement patterns, analyze and modify body mechanics/ergonomics, assess and modify postural abnormalities and instruct in home and community integration to attain remaining goals. []  See Plan of Care  []  See progress note/recertification  []  See Discharge Summary         Progress towards goals / Updated goals:  Short Term Goals: To be accomplished in 2 weeks:              3. The patient will demonstrate independence and compliance with HEP to maximize therapeutic benefit.              IE: issued HEP              Current: met per patient report. 04/29/2022              2. The patient will demonstrate left shoulder PROM of ABD to 150 degrees to improve ease of reaching overhead.              IE: 130 degrees    Current: Met 155 degrees 5/06/2022  Long Term Goals: To be accomplished in 4 weeks:              1. The patient will improve FOTO score to 53 to improve quality of life.              IE:  43              2. The patient will improve strength of left shoulder flexion and ER to 5/5 MMT to maximize stability during chore production.              IE: 4/5 MMT ER, 4-/5 MMT flexion              3.  The patient will demonstrate left shoulder AROM to 160 degrees flexion/ABD to improve ease of reaching into overhead cabinets.              IE: right shoulder 150 degrees flexion, 130 degrees ABD   Current: 160 degrees flexion, 155 degrees ABD 5/06/2022              4. The patient will report 75% improvement in symptoms in order to improve ease of ADLs.               NU: 0%    PLAN  [x]  Upgrade activities as tolerated     []  Continue plan of care  []  Update interventions per flow sheet         Neida Elizaled, PT 5/6/2022  6:58 AM    Future Appointments   Date Time Provider Cesar Leong   5/6/2022  7:00 AM [facility]   5/13/2022  7:00 AM Milan Herrera, PT [facility]   6/1/2022 10:30 AM MD ALANA Reza BS AMB   9/20/2022  4:00 PM Tamiko Jimenez, NP  BS AMB

## 2022-05-13 ENCOUNTER — HOSPITAL ENCOUNTER (OUTPATIENT)
Dept: PHYSICAL THERAPY | Age: 62
Discharge: HOME | End: 2022-05-13
Attending: SURGERY
Payer: MEDICARE

## 2022-05-13 PROCEDURE — 97112 NEUROMUSCULAR REEDUCATION: CPT

## 2022-05-13 PROCEDURE — 97110 THERAPEUTIC EXERCISES: CPT

## 2022-05-13 NOTE — PROGRESS NOTES
PT DAILY TREATMENT NOTE     Patient Name: Nettie Olivera  Date:2022  : 1960  [x]  Patient  Verified  Payor: [facility] MEDICARE COMPLETE [facility] / Product Type: Managed Care Medicare /    In time:7:46  Out time:8:24  Total Treatment Time (min): 38  Visit #: 4 of 8    Medicare/BCBS Only   Total Timed Codes (min):  38 1:1 Treatment Time:  38       Treatment Area: Left-sided chest pain [R07.9]    SUBJECTIVE  Pain Level (0-10 scale): 0/10  Any medication changes, allergies to medications, adverse drug reactions, diagnosis change, or new procedure performed?: [x] No    [] Yes (see summary sheet for update)  Subjective functional status/changes:   [] No changes reported  The patient states that her shoulder is doing well. She states she has started working the exercises on both sides. OBJECTIVE  23 min Therapeutic Exercise:  [] See flow sheet :   Rationale: increase ROM and increase strength to improve the patients ability to improve ADL ease. 15 min Neuromuscular Re-education:  []  See flow sheet :   Rationale: increase ROM and increase strength  to improve the patients ability to improve ADL ease. With   [] TE   [] TA   [] neuro   [] other: Patient Education: [x] Review HEP    [] Progressed/Changed HEP based on:   [] positioning   [] body mechanics   [] transfers   [] heat/ice application    [] other:      Other Objective/Functional Measures:   Shoulder strength:  Flexion: 5/5 MMT  ER: 5/5 MMT    FOTO: 54      Pain Level (0-10 scale) post treatment: 0/10    ASSESSMENT/Changes in Function: The patient has met all goals and demonstrates excellent mobility of her left shoulder, WNL in all planes with excellent strength. She is quite compliant with HEP to date and is in agreement to D/C to continue with HEP at this time. She leaves the clinic with all questions answered.       []  See Plan of Care  []  See progress note/recertification  [x]  See Discharge Summary         Progress towards goals / Updated goals:  Short Term Goals: To be accomplished in 2 weeks:              9. The patient will demonstrate independence and compliance with HEP to maximize therapeutic benefit.              IE: issued HEP              [id number]: met per patient report. 04/29/2022              2. The patient will demonstrate left shoulder PROM of ABD to 150 degrees to improve ease of reaching overhead.              IE: 130 degrees               Current: Met 155 degrees 5/06/2022  Long Term Goals: To be accomplished in 4 weeks:              1. The patient will improve FOTO score to 53 to improve quality of life.              IE:  43   Current: Met 54 5/13/2022              2. The patient will improve strength of left shoulder flexion and ER to 5/5 MMT to maximize stability during chore production.              IE: 4/5 MMT ER, 4-/5 MMT flexion   Current: 5/5 MMT 5/13/2022              3. The patient will demonstrate left shoulder AROM to 160 degrees flexion/ABD to improve ease of reaching into overhead cabinets.              IE: right shoulder 150 degrees flexion, 130 degrees ABD              Current: 160 degrees flexion, 155 degrees ABD 5/06/2022              4. The patient will report 75% improvement in symptoms in order to improve ease of ADLs.               IE: 0%    Current: 75%    PLAN  []  Upgrade activities as tolerated     []  Continue plan of care  []  Update interventions per flow sheet       [x]  Discharge due to:_  []  Other:_      Ann Galvan, PT 5/13/2022  7:48 AM    Future Appointments   Date Time Provider Cesar Leong   6/1/2022 10:30 AM MD [facility] MIGUEL Agrawal   9/20/2022  4:00 PM Zeus Jimenez NP [facility] AMB

## 2022-05-13 NOTE — PROGRESS NOTES
[facility]  [address] [facility] [address]  [place], [address]  [phone] fax    Physical Therapy Discharge Summary    Patient name: Joe Ward Start of Care: 4/15/2022   Referral source: Alejandra Champion MD : 1960               Medical Diagnosis: Left-sided chest pain [R07.9]  Payor: [address] [place] [address] / Plan: [address] / Product Type: [employer] Care Medicare /  Onset Date:2021               Treatment Diagnosis: left chest pain   Prior Hospitalization: see medical history Provider#: [id number]   Medications: Verified on Patient summary List    Comorbidities: depression, arthritis, latex allergy, asthma, cancer, cervical fusion   Prior Level of Function: The patient reports that she had full use of her shoulder prior to onset. Visits from Start of Care: 4    Missed Visits: 1  Reporting Period : 4/15/2022 to 2022    Summary of Care:  Short Term Goals: To be accomplished in 2 weeks:              1. The patient will demonstrate independence and compliance with HEP to maximize therapeutic benefit.              IE: issued HEP              [id number]: met per patient report. 2022              2. The patient will demonstrate left shoulder PROM of ABD to 150 degrees to improve ease of reaching overhead.              IE: 130 degrees               Current: Met 155 degrees 2022  Long Term Goals: To be accomplished in 4 weeks:              1. The patient will improve FOTO score to 53 to improve quality of life.              IE:  43              Current: Met 54 2022              2. The patient will improve strength of left shoulder flexion and ER to 5/5 MMT to maximize stability during chore production.              IE: 4/5 MMT ER, 4-/5 MMT flexion              Current: 5/5 MMT 2022              3.  The patient will demonstrate left shoulder AROM to 160 degrees flexion/ABD to improve ease of reaching into overhead cabinets.              HB: right shoulder 150 degrees flexion, 130 degrees ABD              Current: 160 degrees flexion, 155 degrees ABD 5/06/2022              4. The patient will report 75% improvement in symptoms in order to improve ease of ADLs.             GY: 0%              Current: 75%    ASSESSMENT/RECOMMENDATIONS: The patient has met all goals and demonstrates excellent mobility of her left shoulder, WNL in all planes with excellent strength. She is quite compliant with HEP to date and is in agreement to D/C to continue with HEP at this time. She leaves the clinic with all questions answered.       [x]Discontinue therapy: [x]Patient has reached or is progressing toward set goals      []Patient is non-compliant or has abdicated      []Due to lack of appreciable progress towards set [address], PT 5/13/2022 8:26 AM

## 2022-05-19 ENCOUNTER — TRANSCRIBE ORDER (OUTPATIENT)
Dept: SCHEDULING | Age: 62
End: 2022-05-19

## 2022-05-19 DIAGNOSIS — Z79.899 ENCOUNTER FOR LONG-TERM (CURRENT) USE OF OTHER MEDICATIONS: Primary | ICD-10-CM

## 2022-05-26 ENCOUNTER — OFFICE VISIT (OUTPATIENT)
Dept: SURGERY | Age: 62
End: 2022-05-26
Payer: MEDICARE

## 2022-05-26 VITALS
HEART RATE: 88 BPM | TEMPERATURE: 97.2 F | WEIGHT: 291 LBS | OXYGEN SATURATION: 98 % | DIASTOLIC BLOOD PRESSURE: 80 MMHG | RESPIRATION RATE: 18 BRPM | BODY MASS INDEX: 48.48 KG/M2 | SYSTOLIC BLOOD PRESSURE: 138 MMHG | HEIGHT: 65 IN

## 2022-05-26 DIAGNOSIS — E66.01 MORBID OBESITY WITH BMI OF 45.0-49.9, ADULT (HCC): ICD-10-CM

## 2022-05-26 DIAGNOSIS — C50.212 MALIGNANT NEOPLASM OF UPPER-INNER QUADRANT OF LEFT BREAST IN FEMALE, ESTROGEN RECEPTOR NEGATIVE (HCC): Primary | ICD-10-CM

## 2022-05-26 DIAGNOSIS — Z17.1 MALIGNANT NEOPLASM OF UPPER-INNER QUADRANT OF LEFT BREAST IN FEMALE, ESTROGEN RECEPTOR NEGATIVE (HCC): Primary | ICD-10-CM

## 2022-05-26 DIAGNOSIS — N64.89 SEROMA OF BREAST: ICD-10-CM

## 2022-05-26 PROCEDURE — 99214 OFFICE O/P EST MOD 30 MIN: CPT | Performed by: SURGERY

## 2022-05-26 RX ORDER — PROCHLORPERAZINE MALEATE 10 MG
10 TABLET ORAL
COMMUNITY
Start: 2021-12-23 | End: 2022-07-21

## 2022-05-26 RX ORDER — OMEPRAZOLE 20 MG/1
20 CAPSULE, DELAYED RELEASE ORAL 2 TIMES DAILY
COMMUNITY
Start: 2022-04-20 | End: 2022-08-30 | Stop reason: SDUPTHER

## 2022-05-26 RX ORDER — PROCHLORPERAZINE 25 MG
SUPPOSITORY, RECTAL RECTAL
COMMUNITY
Start: 2022-05-16 | End: 2022-05-26 | Stop reason: ALTCHOICE

## 2022-05-26 NOTE — PROGRESS NOTES
Chucho Osborne is a 59 y.o. female  Chief Complaint   Patient presents with    New Patient     discuss mediport removal and possible reconstruction. History left breast cancer.

## 2022-05-26 NOTE — PROGRESS NOTES
CC:   Chief Complaint   Patient presents with    New Patient     discuss mediport removal and possible reconstruction. History left breast cancer. Assessment:    ICD-10-CM ICD-9-CM    1. Malignant neoplasm of upper-inner quadrant of left breast in female, estrogen receptor negative (HCC)  C50.212 174.2 SCHEDULE SURGERY    Z17.1 V86.1    2. Seroma of breast  N64.89 998.13    3. Morbid obesity with BMI of 45.0-49.9, adult ([address]  E66.01 278.01     Z68.42 V85.42        Plan: The patient has completed her chemotherapy and now interested in getting her Mediport removed. She would also like revision of the seroma on the left side so the skin is flat and matches the right. She is unhappy with the appearance. We can perform this at the same time of Mediport removal.  She has no interest in reconstruction or referral to plastics. The risks and benefits of both procedures were reviewed with the patient including infection, bleeding, need for repeat procedure, injury to surrounding structures and recurrent seroma formation as well as poor cosmetic outcome. The patient's questions were answered and consent was obtained. HPI:  Ms. Derrick Clarke is a 59year old woman with BRCA2 mutation and T2N0 triple negative breast cancer, s/p bilateral mastectomy 11/23/21. She had been diagnosed on core biopsy 10/19/21. The area of concern was identified as a palpable mass around September 2021. She denied nipple discharge. She denied breast pain. There is family history of breast cancer in her sister, mother and maternal aunt. Her most recent previous mammogram was 10/14/19.      She has a personal history of fibrosarcoma. Her sister has HNPCC/ANTHONY mutation. Genetic testing via [employer] demonstrated BRCA2 mutation 11/8/21.      She reports having had hysterectomy with salpingoopherectomy.     She has completed chemotherapy last Thursday and was given clearance by Dr. Sheri Malin to have her right sided mediport removed.    Overall today she is feeling well. Now that she has completed treatment she is interested in addressing the left mastectomy incision. She recalls being told by Dr. Ludivina Escobar in the past that this could be addressed after treatment. She is unhappy with the swelling of the left scar. She states there was always an issue with the left drain suctioning properly so at one point it was just removed. Swelling has not increased. Allergies: Allergies   Allergen Reactions    Adhesive Tape-Silicones Swelling     REALLY BAD SWELLING AND SORE APPEAR    Alcohol Other (comments)     PT STATES SHE IS AN ALCOHOLIC    Lactose Other (comments)     PT STATES IT MAKES HER STOMACH HURT    Percodan [Oxycodone Hcl-Oxycodone-Asa] Itching and Other (comments)     crying    Nsaids (Non-Steroidal Anti-Inflammatory Drug) Other (comments)     PT NOT ABLE TO TAKE DUE TO GASTRIC BYPASS       Medication Review:  Current Outpatient Medications on File Prior to Visit   Medication Sig Dispense Refill    omeprazole (PRILOSEC) 20 mg capsule Take 20 mg by mouth two (2) times a day. 2 tabs twice daily      prochlorperazine (COMPAZINE) 10 mg tablet Take 10 mg by mouth every six (6) hours as needed.  gabapentin (NEURONTIN) 100 mg capsule Take 100 mg by mouth daily.  citalopram (CELEXA) 20 mg tablet TAKE 1 TABLET BY MOUTH DAILY 90 Tablet 0    buPROPion SR (WELLBUTRIN SR) 150 mg SR tablet Take 1 Tablet by mouth two (2) times a day. 180 Tablet 3    fluticasone-umeclidinium-vilanterol (Trelegy Ellipta) 100-62.5-25 mcg inhaler INHALE 1 PUFF BY MOUTH DAILY 120 Each 4    sucralfate (CARAFATE) 1 gram tablet Take 1 tablet by mouth Four times a day as needed for indigestion. (Patient taking differently: two (2) times a day. Take 1 tablet by mouth Four times a day as needed for indigestion.) 360 Tablet 0    diclofenac (Voltaren) 1 % gel Apply 2 g to affected area every six (6) hours as needed for Pain.  100 g 2    Linzess 72 mcg cap capsule TAKE 1 CAPSULE BY MOUTH DAILY BEFORE BREAKFAST 30 Cap 1    fluticasone propionate (FLONASE) 50 mcg/actuation nasal spray SHAKE LIQUID AND USE 2 SPRAYS IN EACH NOSTRIL DAILY 48 g 5    albuterol (PROVENTIL HFA, VENTOLIN HFA, PROAIR HFA) 90 mcg/actuation inhaler Take 2 Puffs by inhalation every four (4) hours as needed for Wheezing or Shortness of Breath. 3 Inhaler 4    b-complex with vitamin c tablet Take 1 Tab by mouth daily.  acetaminophen (TYLENOL EXTRA STRENGTH) 500 mg tablet Take  by mouth every six (6) hours as needed for Pain.  calcium-cholecalciferol, d3, (CALCIUM 600 + D) 600-125 mg-unit tab Take 1,065 mg by mouth nightly. Indications: TAKES 2 AT BEDTIME      omega 3-dha-epa-fish oil (FISH OIL) 100-160-1,000 mg cap Take 1,065 mg by mouth daily.  ferrous sulfate ER (IRON) 160 mg (50 mg iron) TbER tablet Take 1 Tab by mouth daily. Indications: PT TAKES 40 MG      multivitamin (ONE A DAY) tablet Take 1 Tab by mouth daily.  pregabalin (LYRICA) 150 mg capsule TAKE 1 CAPSULE BY MOUTH TWICE DAILY. MAX DAILY AMOUNT: 300 MG (Patient not taking: Reported on 2/22/2022) 180 Capsule 0     No current facility-administered medications on file prior to visit. Systems Review:  Review of Systems   Constitutional: Positive for fatigue. Negative for fever and unexpected weight change. Respiratory: Negative for chest tightness and shortness of breath. Cardiovascular: Negative for chest pain and palpitations. Skin: Negative for pallor, rash and wound. Hematological: Negative for adenopathy. Does not bruise/bleed easily.        PMH:  Past Medical History:   Diagnosis Date    Alcoholism in remission ([address]     Asthma     Breast cancer ([facility]     breast cancer left    Chronic obstructive pulmonary disease ([facility]     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    GERD (gastroesophageal reflux disease)     History of gastric bypass 2012    History of meniscal tear 2015, 2018    right in , left in     HNP (herniated nucleus pulposus), lumbar     patient reported    Lung nodules     resolved 2018 CT    Neuropathy     Osteopenia 10/03/2017    Recurrent depression ([address]     Sleep apnea     on cpap    Spinal stenosis, lumbar     patient reported    Xanthelasma of left upper eyelid 2020       Surgical History:  Past Surgical History:   Procedure Laterality Date    HX ARTHRODESIS  2000    Herniated Disc, arthritis right foot , , C 6/7, C 4/6 Stenosis    HX CHOLECYSTECTOMY  2008    gallbladder disease    HX COLONOSCOPY  2009    HX GASTRIC BYPASS      GASTRIC BYPASS  [facility] Gastric Bypass      HX HYSTERECTOMY  1994    HX MASTECTOMY Bilateral 2021    BILATERAL MASTECTOMY, LEFT SENTINEL NODE BIOPSY [facility] ) performed by Chrissy Roach MD at [address] [place] [address]  10/2015    right repari of torn meniscus    HX MENISCECTOMY Left 2018    partial meniscectomy due to tear    HX MYOMECTOMY  1984    benign tumor    HX ORTHOPAEDIC  1964    orthopaedic excision Fibrosarcoma and Lymphangiogram    HX PELVIC LAPAROSCOPY  1984    large uterine blockage    NEUROLOGICAL PROCEDURE UNLISTED  ,     Cervical fusion       Social History:  Social History     Socioeconomic History    Marital status: SINGLE   Tobacco Use    Smoking status: Former Smoker     Packs/day: 0.50     Years: 45.00     Pack years: 22.50     Quit date: 2020     Years since quittin.9    Smokeless tobacco: Never Used   Vaping Use    Vaping Use: Never used   Substance and Sexual Activity    Alcohol use: No     Comment: quit 1980s- was heavy etoh    Drug use: Not Currently     Comment: marijuana, cocaine 30 yrs ago    Sexual activity: Never       Family History:  Family History   Problem Relation Age of Onset    Hypertension Mother     Depression Mother     Cancer Mother     Ovarian Cancer Mother     Breast Problems Mother     Cancer Father    Jasiel Ardon Sister     Depression Sister     Breast Cancer Sister     Depression Brother     Stroke Neg Hx     Heart Attack Neg Hx        No visits with results within 3 Month(s) from this visit. Latest known visit with results is:   Hospital Outpatient Visit on 02/08/2022   Component Date Value Ref Range Status    Creatinine, POC 02/08/2022 0.9  0.6 - 1.3 MG/DL Final    GFRAA, POC 02/08/2022 >60  >60 ml/min/1.73m2 Final    GFRNA, POC 02/08/2022 >60  >60 ml/min/1.73m2 Final    Estimated GFR is calculated using the IDMS-traceable Modification of Diet in Renal Disease (MDRD) Study equation, reported for both  Americans (GFRAA) and non- Americans (GFRNA), and normalized to 1.73m2 body surface area. The physician must decide which value applies to the patient. CT LOW DOSE LUNG CANCER SCREENING  Narrative: CT of the chest for lung cancer screening    HISTORY: Lung screening. Formal smoker with 42 pack years smoking history, quit  in 6/20/20. Meet lung screening criteria. COMPARISON: 11/16/19    TECHNIQUE: Low dose unenhanced multislice helical CT was performed from the  thoracic inlet to the adrenal glands without intravenous contrast  administration. Contiguous [1.25 mm] axial images were reconstructed and lung  and soft tissue windows were generated. Coronal MIP and sagittal reformations  were generated.  -All CT scans at this facility performed using dose optimization techniques as  appreciated to a performed exam, to include automated exposure control,  adjustment of the mA and or KU according to patient size (including appropriate  matching for site specific examination), or use of iterative reconstruction  technique. FINDINGS:    The lungs are clear of nodule, mass or  airspace disease. Mild dependent  atelectasis in bilateral lower lobes. Mild centrilobular emphysema and  bronchitis appear stable. There is no significant pleural disease.     The absence of intravenous contrast reduces the sensitivity for evaluation of  the mediastinum and upper abdominal organs. No mediastinal or hilar adenopathy  appreciated. The heart is not enlarged. No pericardial effusion. The aorta has  a normal contour with no evidence of aneurysm. The thyroid appears unremarkable. The bones and soft tissues of the chest wall are within normal limits. The  imaged upper abdomen appears unremarkable. Impression: 1. No lung nodule identified. 2. Stable mild COPD. LUNG RADS ASSESSMENT CATEGORIES:    Lung RADS 1 - Negative. Management:   Continue annual screening with low dose CT in 12 months. Thank you for your referral.          Physical Exam:  Visit Vitals  /80   Pulse 88   Temp 97.2 °F (36.2 °C) (Temporal)   Resp 18   Ht 5' 5\" (1.651 m)   Wt 132 kg (291 lb)   SpO2 98%   BMI 48.42 kg/m²    BMI: Body mass index is 48.42 kg/m². Physical Exam  Constitutional:       Appearance: Normal appearance. She is obese. Eyes:      Extraocular Movements: Extraocular movements intact. Conjunctiva/sclera: Conjunctivae normal.      Pupils: Pupils are equal, round, and reactive to light. Cardiovascular:      Rate and Rhythm: Normal rate and regular rhythm. Pulmonary:      Effort: Pulmonary effort is normal.      Breath sounds: Normal breath sounds. Chest:   Breasts:      Right: No axillary adenopathy or supraclavicular adenopathy. Left: No axillary adenopathy or supraclavicular adenopathy. Comments: Small bilateral dog ears medially, mastectomy incisions well healed bilaterally. Left mastectomy small seroma appreciated medially  Musculoskeletal:         General: Normal range of motion. Lymphadenopathy:      Upper Body:      Right upper body: No supraclavicular or axillary adenopathy. Left upper body: No supraclavicular or axillary adenopathy. Skin:     General: Skin is warm and dry. Neurological:      Mental Status: She is alert.    Psychiatric:         Mood and Affect: Mood normal.         Behavior: Behavior normal.         Thought Content: Thought content normal.         Judgment: Judgment normal.         I have reviewed the information entered by the clinical staff and/or patient and verified it as accurate or edited where necessary.      Electronically signed by:    Caprice Arreola DO, MPH

## 2022-05-26 NOTE — LETTER
5/26/2022    Patient: Hazel Young   YOB: 1960   Date of Visit: 5/26/2022     Jakub Hong MD  [address]  [address] [facility] [address] [address]  [address] [facility] [address]    Dear Jakub Hong MD  Fabio Poag, DO,      Thank you for referring Ms. Heena Munoz to [address] for evaluation. My notes for this consultation are attached. If you have questions, please do not hesitate to call me. I look forward to following your patient along with you.       Sincerely,    Dolores Rubio, [address]

## 2022-05-31 PROBLEM — J34.89 NOSE DRYNESS: Status: ACTIVE | Noted: 2022-02-24

## 2022-05-31 PROBLEM — J42 CHRONIC BRONCHITIS (HCC): Status: ACTIVE | Noted: 2021-11-03

## 2022-05-31 PROBLEM — R68.2 DRY MOUTH: Status: ACTIVE | Noted: 2022-02-24

## 2022-05-31 PROBLEM — K59.03 DRUG-INDUCED CONSTIPATION: Status: ACTIVE | Noted: 2022-01-13

## 2022-05-31 PROBLEM — R53.83 FATIGUE DUE TO TREATMENT: Status: ACTIVE | Noted: 2022-01-13

## 2022-05-31 PROBLEM — T45.1X5A CINV (CHEMOTHERAPY-INDUCED NAUSEA AND VOMITING): Status: ACTIVE | Noted: 2022-01-13

## 2022-05-31 PROBLEM — R11.2 CINV (CHEMOTHERAPY-INDUCED NAUSEA AND VOMITING): Status: ACTIVE | Noted: 2022-01-13

## 2022-05-31 PROBLEM — T45.1X5A NEUROPATHY DUE TO CHEMOTHERAPEUTIC DRUG (HCC): Status: ACTIVE | Noted: 2022-03-10

## 2022-05-31 PROBLEM — R12 HEARTBURN: Status: ACTIVE | Noted: 2022-01-06

## 2022-05-31 PROBLEM — G89.29 CHRONIC NONINTRACTABLE HEADACHE: Status: ACTIVE | Noted: 2022-01-27

## 2022-05-31 PROBLEM — R51.9 CHRONIC NONINTRACTABLE HEADACHE: Status: ACTIVE | Noted: 2022-01-27

## 2022-05-31 PROBLEM — R63.4 WEIGHT LOSS: Status: ACTIVE | Noted: 2022-01-13

## 2022-05-31 PROBLEM — G62.0 NEUROPATHY DUE TO CHEMOTHERAPEUTIC DRUG (HCC): Status: ACTIVE | Noted: 2022-03-10

## 2022-06-01 ENCOUNTER — VIRTUAL VISIT (OUTPATIENT)
Dept: FAMILY MEDICINE CLINIC | Age: 62
End: 2022-06-01
Payer: MEDICARE

## 2022-06-01 ENCOUNTER — DOCUMENTATION ONLY (OUTPATIENT)
Dept: FAMILY MEDICINE CLINIC | Age: 62
End: 2022-06-01

## 2022-06-01 DIAGNOSIS — F10.21 ALCOHOLISM IN REMISSION (HCC): ICD-10-CM

## 2022-06-01 DIAGNOSIS — Z71.89 ACP (ADVANCE CARE PLANNING): ICD-10-CM

## 2022-06-01 DIAGNOSIS — G62.0 NEUROPATHY DUE TO CHEMOTHERAPEUTIC DRUG (HCC): ICD-10-CM

## 2022-06-01 DIAGNOSIS — C49.9 FIBROSARCOMA (HCC): ICD-10-CM

## 2022-06-01 DIAGNOSIS — Z00.00 MEDICARE ANNUAL WELLNESS VISIT, SUBSEQUENT: ICD-10-CM

## 2022-06-01 DIAGNOSIS — J30.9 ALLERGIC RHINITIS: ICD-10-CM

## 2022-06-01 DIAGNOSIS — F33.9 RECURRENT DEPRESSION (HCC): ICD-10-CM

## 2022-06-01 DIAGNOSIS — Z13.31 DEPRESSION SCREENING: ICD-10-CM

## 2022-06-01 DIAGNOSIS — T45.1X5A NEUROPATHY DUE TO CHEMOTHERAPEUTIC DRUG (HCC): ICD-10-CM

## 2022-06-01 PROCEDURE — G8427 DOCREV CUR MEDS BY ELIG CLIN: HCPCS | Performed by: FAMILY MEDICINE

## 2022-06-01 PROCEDURE — 3017F COLORECTAL CA SCREEN DOC REV: CPT | Performed by: FAMILY MEDICINE

## 2022-06-01 PROCEDURE — G8417 CALC BMI ABV UP PARAM F/U: HCPCS | Performed by: FAMILY MEDICINE

## 2022-06-01 PROCEDURE — 99214 OFFICE O/P EST MOD 30 MIN: CPT | Performed by: FAMILY MEDICINE

## 2022-06-01 PROCEDURE — G9708 BILAT MAST/HX BI /UNILAT MAS: HCPCS | Performed by: FAMILY MEDICINE

## 2022-06-01 PROCEDURE — G9717 DOC PT DX DEP/BP F/U NT REQ: HCPCS | Performed by: FAMILY MEDICINE

## 2022-06-01 PROCEDURE — G0439 PPPS, SUBSEQ VISIT: HCPCS | Performed by: FAMILY MEDICINE

## 2022-06-01 RX ORDER — FLUTICASONE PROPIONATE 50 MCG
SPRAY, SUSPENSION (ML) NASAL
Qty: 48 G | Refills: 5 | Status: SHIPPED | OUTPATIENT
Start: 2022-06-01

## 2022-06-01 NOTE — ACP (ADVANCE CARE PLANNING)
Advance Care Planning     Advance Care Planning (ACP) Physician/NP/PA Conversation      Date of Conversation: 6/1/2022  Conducted with: Patient with Decision Making Capacity    Healthcare Decision Maker:     Primary Decision Maker: Josselyn Steven - Sister - [phone]    Secondary Decision Maker: Sulma Harvey - Daughter - [phone]  Click here to complete [address] including selection of the Healthcare Decision Maker Relationship (ie \"Primary\")        Today we documented Decision Maker(s) consistent with Legal Next of Kin hierarchy. Care Preferences:    Hospitalization: \"If your health worsens and it becomes clear that your chance of recovery is unlikely, what would be your preference regarding hospitalization? \"  The patient would prefer hospitalization. Ventilation: \"If you were unable to breathe on your own and your chance of recovery was unlikely, what would be your preference about the use of a ventilator (breathing machine) if it was available to you? \"   The patient would NOT desire the use of a ventilator. Resuscitation: \"In the event your heart stopped as a result of an underlying serious health condition, would you want attempts to be made to restart your heart, or would you prefer a natural death? \"   No, do NOT attempt to resuscitate.     Additional topics discussed: treatment goals    Conversation Outcomes / Follow-Up Plan:   ACP complete - no further action today  Reviewed DNR/DNI and patient confirms current DNR status - completed forms on file (place new order if needed)     Length of Voluntary ACP Conversation in minutes:  <16 minutes (Non-Billable)    Joseph Green MD

## 2022-06-01 NOTE — PROGRESS NOTES
This is the Subsequent Medicare Annual Wellness Exam, performed 12 months or more after the Initial AWV or the last Subsequent AWV    I have reviewed the patient's medical history in detail and updated the computerized patient record. Assessment/Plan   Education and counseling provided:  Are appropriate based on today's review and evaluation    1. Medicare annual wellness visit, subsequent  -Immunizations: unable to perform as this was virtual visit  -Breast cancer screening: Patient currently sees/oncology Dr. Gregorio Parra for breast cancer. Recently completed chemotherapy and is about to undergo further treatment.  -Routine labs: up to date  -Eye Exam: advised yearly eye exam  -counseled about diet rich in green leafy vegetables and protein, less carbs /exercise at least 150 minutes a week/weight loss  -Advanced directive: completed today  -Patient advised to establish care with new PCP as I leave July 1st  2. ACP (advance care planning): Patient is DNR/DNI completed ACP today  3.  Depression screening: on medication Wellbutrin and Celexa which is helping       Depression Risk Factor Screening     3 most recent PHQ Screens 6/1/2022   Little interest or pleasure in doing things Not at all   Feeling down, depressed, irritable, or hopeless Not at all   Total Score PHQ 2 0   Trouble falling or staying asleep, or sleeping too much Not at all   Feeling tired or having little energy Not at all   Poor appetite, weight loss, or overeating Not at all   Feeling bad about yourself - or that you are a failure or have let yourself or your family down Not at all   Trouble concentrating on things such as school, work, reading, or watching TV Not at all   Moving or speaking so slowly that other people could have noticed; or the opposite being so fidgety that others notice Not at all   Thoughts of being better off dead, or hurting yourself in some way Not at all   PHQ 9 Score 0   How difficult have these problems made it for you to do your work, take care of your home and get along with others Not difficult at all       Alcohol & Drug Abuse Risk Screen    Do you average more than 1 drink per night or more than 7 drinks a week:  No    On any one occasion in the past three months have you have had more than 3 drinks containing alcohol:  No          Functional Ability and Level of Safety    Hearing: The patient needs further evaluation. Activities of Daily Living: The home contains: handrails and grab bars  Patient does total self care      Ambulation: with no difficulty     Fall Risk:  Fall Risk Assessment, last 12 mths 6/1/2022   Able to walk? Yes   Fall in past 12 months? 0   Do you feel unsteady?  0   Are you worried about falling 0      Abuse Screen:  Patient is not abused       Cognitive Screening    Has your family/caregiver stated any concerns about your memory: yes - memory but has been through chemo     Cognitive Screening: Normal - Clock Drawing Test    Health Maintenance Due     Health Maintenance Due   Topic Date Due    COVID-19 Vaccine (3 - Booster for Graciella Belling series) 07/26/2021    Medicare Yearly Exam  09/12/2021    Colorectal Cancer Screening Combo  09/20/2021    Bone Densitometry  10/14/2021    A1C test (Diabetic or Prediabetic)  12/29/2021       Patient Care Team   Patient Care Team:  Sin Boyce MD as PCP - General (Family Medicine)  Sin Boyce MD as PCP - [facility] Empaneled Provider  Mar Samson DO (Orthopedic Surgery)  Sayra Good NP (Neurology)  Nerissa Curry MD ([address] [place] [address])  [place], [place] as Physician Assistant (Psychiatry)  Herman Dixon MD (Gastroenterology)  Tiffany Rubi MD (Cardiovascular Disease Physician)  Trish Mena MD (Neurology)  Bala Arriaga MD (Pulmonary Disease)  Alban Godfrey DO as Surgeon (General Surgery)    History     Patient Active Problem List   Diagnosis Code    Chronic obstructive pulmonary disease ([facility] J44.9    Recurrent depression ([address] F33.9    Fibrosarcoma ([address] C49.9    Alcoholism in remission ([address] F10.21    Sleep apnea G47.30    History of gastric bypass Z98.84    Obesity, morbid (HCC) E66.01    Osteopenia M85.80    GERD (gastroesophageal reflux disease) K21.9    Chronic pain of both knees M25.561, M25.562, G89.29    Prediabetes R73.03    Age-related nuclear cataract, bilateral H25.13    Xanthelasma of left upper eyelid H02.64    Xanthelasma of right upper eyelid H02.61    Sebaceous cyst of eyelid H02.829    Personal history of smoking Z87.891    Diverticulitis K57.92    Breast cancer of upper-inner quadrant of left female breast (HCC) C50.212    Chronic bronchitis ([facility]) J42    Chronic nonintractable headache R51.9, G89.29    CINV (chemotherapy-induced nausea and vomiting) R11.2, T45.1X5A    Drug-induced constipation K59.03    Dry mouth R68.2    Fatigue due to treatment R53.83    Heartburn R12    Neuropathy due to chemotherapeutic drug ([address] G62.0, T45.1X5A    Nose dryness J34.89    Weight loss R63.4     Past Medical History:   Diagnosis Date    Alcoholism in remission ([address]     Asthma     Breast cancer ([address]     breast cancer left    Chronic obstructive pulmonary disease (HCC)     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    GERD (gastroesophageal reflux disease)     History of gastric bypass 2012    History of meniscal tear 2015, 2018    right in 2015, left in 2018    HNP (herniated nucleus pulposus), lumbar     patient reported    Lung nodules     resolved 2018 CT    Neuropathy     Osteopenia 10/03/2017    Recurrent depression ([address]     Sleep apnea     on cpap    Spinal stenosis, lumbar     patient reported    Xanthelasma of left upper eyelid 7/20/2020      Past Surgical History:   Procedure Laterality Date    HX ARTHRODESIS  01/2000    Herniated Disc, arthritis right foot 06/06, 07/07, C 6/7, C 4/6 Stenosis    HX CHOLECYSTECTOMY  09/2008    gallbladder disease  HX COLONOSCOPY  05/2009    HX GASTRIC BYPASS  2012    GASTRIC BYPASS  [facility] Gastric Bypass      HX HYSTERECTOMY  06/1994    HX MASTECTOMY Bilateral 11/23/2021    BILATERAL MASTECTOMY, LEFT SENTINEL NODE BIOPSY [facility] 11/22) performed by Carson Gaston MD at [address] [place] [address]  [facility]  10/2015    right repari of torn meniscus    HX MENISCECTOMY Left 03/16/2018    partial meniscectomy due to tear    HX MYOMECTOMY  06/1984    benign tumor    HX ORTHOPAEDIC  1964    orthopaedic excision Fibrosarcoma and Lymphangiogram    HX PELVIC LAPAROSCOPY  04/1984    large uterine blockage    NEUROLOGICAL PROCEDURE UNLISTED  2000, 2007    Cervical fusion     Current Outpatient Medications   Medication Sig Dispense Refill    fluticasone propionate (FLONASE) 50 mcg/actuation nasal spray SHAKE LIQUID AND USE 2 SPRAYS IN EACH NOSTRIL DAILY 48 g 5    omeprazole (PRILOSEC) 20 mg capsule Take 20 mg by mouth two (2) times a day. 2 tabs twice daily      prochlorperazine (COMPAZINE) 10 mg tablet Take 10 mg by mouth every six (6) hours as needed.  gabapentin (NEURONTIN) 100 mg capsule Take 100 mg by mouth daily.  citalopram (CELEXA) 20 mg tablet TAKE 1 TABLET BY MOUTH DAILY 90 Tablet 0    buPROPion SR (WELLBUTRIN SR) 150 mg SR tablet Take 1 Tablet by mouth two (2) times a day. 180 Tablet 3    fluticasone-umeclidinium-vilanterol (Trelegy Ellipta) 100-62.5-25 mcg inhaler INHALE 1 PUFF BY MOUTH DAILY 120 Each 4    sucralfate (CARAFATE) 1 gram tablet Take 1 tablet by mouth Four times a day as needed for indigestion. (Patient taking differently: two (2) times a day. Take 1 tablet by mouth Four times a day as needed for indigestion.) 360 Tablet 0    diclofenac (Voltaren) 1 % gel Apply 2 g to affected area every six (6) hours as needed for Pain.  100 g 2    Linzess 72 mcg cap capsule TAKE 1 CAPSULE BY MOUTH DAILY BEFORE BREAKFAST 30 Cap 1    albuterol (PROVENTIL HFA, VENTOLIN HFA, PROAIR HFA) 90 mcg/actuation inhaler Take 2 Puffs by inhalation every four (4) hours as needed for Wheezing or Shortness of Breath. 3 Inhaler 4    b-complex with vitamin c tablet Take 1 Tab by mouth daily.  acetaminophen (TYLENOL EXTRA STRENGTH) 500 mg tablet Take  by mouth every six (6) hours as needed for Pain.  calcium-cholecalciferol, d3, (CALCIUM 600 + D) 600-125 mg-unit tab Take 1,065 mg by mouth nightly. Indications: TAKES 2 AT BEDTIME      omega 3-dha-epa-fish oil (FISH OIL) 100-160-1,000 mg cap Take 1,065 mg by mouth daily.  ferrous sulfate ER (IRON) 160 mg (50 mg iron) TbER tablet Take 1 Tab by mouth daily. Indications: PT TAKES 40 MG      multivitamin (ONE A DAY) tablet Take 1 Tab by mouth daily.  pregabalin (LYRICA) 150 mg capsule TAKE 1 CAPSULE BY MOUTH TWICE DAILY.  MAX DAILY AMOUNT: 300 MG (Patient not taking: Reported on 2022) 180 Capsule 0     Allergies   Allergen Reactions    Adhesive Tape-Silicones Swelling     REALLY BAD SWELLING AND SORE APPEAR    Alcohol Other (comments)     PT STATES SHE IS AN ALCOHOLIC    Lactose Other (comments)     PT STATES IT MAKES HER STOMACH HURT    Percodan [Oxycodone Hcl-Oxycodone-Asa] Itching and Other (comments)     crying    Nsaids (Non-Steroidal Anti-Inflammatory Drug) Other (comments)     PT NOT ABLE TO TAKE DUE TO GASTRIC BYPASS       Family History   Problem Relation Age of Onset    Hypertension Mother     Depression Mother     Cancer Mother     Ovarian Cancer Mother     Breast Problems Mother     Cancer Father     Cancer Sister     Depression Sister     Breast Cancer Sister     Depression Brother     Stroke Neg Hx     Heart Attack Neg Hx      Social History     Tobacco Use    Smoking status: Former Smoker     Packs/day: 0.50     Years: 45.00     Pack years: 22.50     Quit date: 2020     Years since quittin.9    Smokeless tobacco: Never Used   Substance Use Topics    Alcohol use: No     Comment: quit - was heavy etoh         Annie Richardson

## 2022-06-01 NOTE — PATIENT INSTRUCTIONS
Medicare Wellness Visit, Female     The best way to live healthy is to have a lifestyle where you eat a well-balanced diet, exercise regularly, limit alcohol use, and quit all forms of tobacco/nicotine, if applicable. Regular preventive services are another way to keep healthy. Preventive services (vaccines, screening tests, monitoring & exams) can help personalize your care plan, which helps you manage your own care. Screening tests can find health problems at the earliest stages, when they are easiest to treat. [facility] follows the current, evidence-based guidelines published by the [facility] ([facility]) when recommending preventive services for our patients. Because we follow these guidelines, sometimes recommendations change over time as research supports it. (For example, mammograms used to be recommended annually. Even though Medicare will still pay for an annual mammogram, the newer guidelines recommend a mammogram every two years for women of average risk). Of course, you and your doctor may decide to screen more often for some diseases, based on your risk and your co-morbidities (chronic disease you are already diagnosed with). Preventive services for you include:  - Medicare offers their members a free annual wellness visit, which is time for you and your primary care provider to discuss and plan for your preventive service needs. Take advantage of this benefit every year!  -All adults over the age of 72 should receive the recommended pneumonia vaccines. Current USPSTF guidelines recommend a series of two vaccines for the best pneumonia protection.   -All adults should have a flu vaccine yearly and a tetanus vaccine every 10 years.   -All adults age 48 and older should receive the shingles vaccines (series of two vaccines).       -All adults age 38-68 who are overweight should have a diabetes screening test once every three years.   -All adults born between [birth year] and 1965 should be screened once for Hepatitis C.  -Other screening tests and preventive services for persons with diabetes include: an eye exam to screen for diabetic retinopathy, a kidney function test, a foot exam, and stricter control over your cholesterol.   -Cardiovascular screening for adults with routine risk involves an electrocardiogram (ECG) at intervals determined by your doctor.   -Colorectal cancer screenings should be done for adults age 54-65 with no increased risk factors for colorectal cancer. There are a number of acceptable methods of screening for this type of cancer. Each test has its own benefits and drawbacks. Discuss with your doctor what is most appropriate for you during your annual wellness visit. The different tests include: colonoscopy (considered the best screening method), a fecal occult blood test, a fecal DNA test, and sigmoidoscopy.    -A bone mass density test is recommended when a woman turns 65 to screen for osteoporosis. This test is only recommended one time, as a screening. Some providers will use this same test as a disease monitoring tool if you already have osteoporosis. -Breast cancer screenings are recommended every other year for women of normal risk, age 54-69.  -Cervical cancer screenings for women over age 72 are only recommended with certain risk factors.      Here is a list of your current Health Maintenance items (your personalized list of preventive services) with a due date:  Health Maintenance Due   Topic Date Due    COVID-19 Vaccine (3 - Booster for Mechele Fort series) 07/26/2021    Annual Well Visit  09/12/2021    Colorectal Screening  09/20/2021    Bone Densitometry  10/14/2021    Hemoglobin A1C    12/29/2021

## 2022-06-01 NOTE — PROGRESS NOTES
Abhinav Fisher (: 1960) is a 59 y.o. female, established patient, here for evaluation of the following chief complaint(s): Annual Wellness Visit       ASSESSMENT/PLAN:  Below is the assessment and plan developed based on review of pertinent history, labs, studies, and medications. 1. Allergic rhinitis  -     fluticasone propionate (FLONASE) 50 mcg/actuation nasal spray; SHAKE LIQUID AND USE 2 SPRAYS IN EACH NOSTRIL DAILY, Normal, Disp-48 g, R-5  2. Fibrosarcoma (HCC)-Patient follows up with heme/onc Dr. Chano [place]  3. Neuropathy due to chemotherapeutic drug [facility]): Patient on gabapentin and symptoms are improved. She gets this from a different provider other than myself  4. Alcoholism in remission [facility]): denies alcohol use at this time  5. Recurrent depression ([facility]): controlled with Wellbutrin 150 mg XL and Celexa 20 mg daily      Return if symptoms worsen or fail to improve. SUBJECTIVE/OBJECTIVE:  HPI   This is a 78-year-old -American female with past medical history significant for breast cancer currently on chemotherapy, COPD, morbid obesity status post gastric bypass, prediabetes, depression, fibrosarcoma, GERD who is here for medicare wellness visit and routine follow up. No concerns at this time    Allergic rhinitis  She states that her symptoms are well controlled with Flonase and she needs a refill on this medication. Fibrosarcomapatient follows up with heme-onc and does not have any concerns with this at this time. Neuropathy with chemotherapeutic drug use  Patient is on gabapentin 100 mg daily at bedtime and this helps. Recurrent depression  Any suicidal homicidal ideation. Currently on Celexa 20 mg daily and Wellbutrin 150 mg extended release. States that this is working well for her. Related to chemotherapy and diagnosis of cancer she states that she was going through a lot but is feeling better of late.        Review of Systems   Constitutional: Negative for activity change, chills and fatigue. HENT: Negative for congestion, facial swelling, postnasal drip and rhinorrhea. Eyes: Negative for photophobia and redness. Respiratory: Negative for apnea, cough and shortness of breath. Cardiovascular: Negative for chest pain, palpitations and leg swelling. Gastrointestinal: Negative for abdominal distention, abdominal pain, constipation, diarrhea and nausea. Genitourinary: Negative for frequency and urgency. Musculoskeletal: Negative for back pain and gait problem. Skin: Negative for rash. Allergic/Immunologic: Negative for environmental allergies. Neurological: Negative for dizziness, light-headedness and headaches. Psychiatric/Behavioral: Negative for agitation, confusion and decreased concentration.         Patient-Reported Systolic (Top): 480  Patient-Reported Diastolic (Bottom): 70  Patient-Reported Weight: 291  Patient-Reported LMP: none       Physical Exam    [INSTRUCTIONS:  \"[x]\" Indicates a positive item  \"[]\" Indicates a negative item  -- DELETE ALL ITEMS NOT EXAMINED]    Constitutional: [x] Appears well-developed and well-nourished [x] No apparent distress      [] Abnormal -     Mental status: [x] Alert and awake  [x] Oriented to person/place/time [x] Able to follow commands    [] Abnormal -     Eyes:   EOM    [x]  Normal    [] Abnormal -   Sclera  [x]  Normal    [] Abnormal -          Discharge [x]  None visible   [] Abnormal -     HENT: [x] Normocephalic, atraumatic  [] Abnormal -   [x] Mouth/Throat: Mucous membranes are moist    External Ears [x] Normal  [] Abnormal -    Neck: [x] No visualized mass [] Abnormal -     Pulmonary/Chest: [x] Respiratory effort normal   [x] No visualized signs of difficulty breathing or respiratory distress        [] Abnormal -      Musculoskeletal:   [x] Normal gait with no signs of ataxia         [x] Normal range of motion of neck        [] Abnormal -     Neurological:        [x] No Facial Asymmetry (Cranial nerve 7 motor function) (limited exam due to video visit)          [x] No gaze palsy        [] Abnormal -          Skin:        [x] No significant exanthematous lesions or discoloration noted on facial skin         [] Abnormal -            Psychiatric:       [x] Normal Affect [] Abnormal -        [x] No Hallucinations    Other pertinent observable physical exam findings:-        On this date 06/01/2022 I have spent 20 minutes reviewing previous notes, test results and face to face (virtual) with the patient discussing the diagnosis and importance of compliance with the treatment plan as well as documenting on the day of the visit. Robby Anders, was evaluated through a synchronous (real-time) audio-video encounter. The patient (or guardian if applicable) is aware that this is a billable service, which includes applicable co-pays. This Virtual Visit was conducted with patient's (and/or legal guardian's) consent. The visit was conducted pursuant to the emergency declaration under the [facility], [address] [place] [address] authority and the [employer] Act. Patient identification was verified, and a caregiver was present when appropriate. The patient was located at: Home: [address] [place] [id number]  The provider was located at: Facility (Appt Department): [address] [place], [place] [id number]       An electronic signature was used to authenticate this note.   -- Vladimir Munguia MD

## 2022-06-02 ENCOUNTER — HOSPITAL ENCOUNTER (OUTPATIENT)
Dept: NON INVASIVE DIAGNOSTICS | Age: 62
Discharge: HOME | End: 2022-06-02
Attending: FAMILY MEDICINE
Payer: MEDICARE

## 2022-06-02 VITALS
BODY MASS INDEX: 48.48 KG/M2 | DIASTOLIC BLOOD PRESSURE: 80 MMHG | SYSTOLIC BLOOD PRESSURE: 138 MMHG | HEIGHT: 65 IN | WEIGHT: 291 LBS

## 2022-06-02 DIAGNOSIS — Z79.899 ENCOUNTER FOR LONG-TERM (CURRENT) USE OF OTHER MEDICATIONS: ICD-10-CM

## 2022-06-02 LAB
ECHO LV FRACTIONAL SHORTENING: 17 % (ref 28–44)
ECHO LV GLOBAL LONGITUDINAL STRAIN (GLS): -14.2 %
ECHO LV INTERNAL DIMENSION DIASTOLE INDEX: 1.55 CM/M2
ECHO LV INTERNAL DIMENSION DIASTOLIC: 3.6 CM (ref 3.9–5.3)
ECHO LV INTERNAL DIMENSION SYSTOLIC INDEX: 1.29 CM/M2
ECHO LV INTERNAL DIMENSION SYSTOLIC: 3 CM
ECHO LV IVSD: 1.2 CM (ref 0.6–0.9)
ECHO LV MASS 2D: 141.5 G (ref 67–162)
ECHO LV MASS INDEX 2D: 61 G/M2 (ref 43–95)
ECHO LV POSTERIOR WALL DIASTOLIC: 1.2 CM (ref 0.6–0.9)
ECHO LV RELATIVE WALL THICKNESS RATIO: 0.67

## 2022-06-02 PROCEDURE — 93325 DOPPLER ECHO COLOR FLOW MAPG: CPT

## 2022-06-22 RX ORDER — MULTIVIT WITH MINERALS/HERBS
1 TABLET ORAL DAILY
COMMUNITY

## 2022-06-22 NOTE — PERIOP NOTES
PRE-SURGICAL INSTRUCTIONS        Patient's Name:  Anil Haines      Today's Date:  6/22/2022              Surgery Date:  6/28/2022                1. Do NOT eat or drink anything, including candy, gum, or ice chips after midnight on 6/28/22, unless you have specific instructions from your surgeon or anesthesia provider to do so.  2. You may brush your teeth before coming to the hospital.  3. No smoking 24 hours prior to the day of surgery. 4. No alcohol 24 hours prior to the day of surgery. 5. No recreational drugs for one week prior to the day of surgery. 6. Leave all valuables, including money/purse, at home. 7. Remove all jewelry, nail polish, acrylic nails, and makeup (including mascara); no lotions powders, deodorant, or perfume/cologne/after shave on the skin. 8. Follow instruction for Hibiclens washes and CHG wipes from surgeon's office. 9. Glasses/contact lenses and dentures may be worn to the hospital.  They will be removed prior to surgery. 10. Call your doctor if symptoms of a cold or illness develop within 24-48 hours prior to your surgery. 11.  If you are having an outpatient procedure, please make arrangements for a responsible ADULT TO [address] [place] [address] and stay with you for 24 hours after your surgery. 12. ONE VISITOR in the hospital at this time for outpatient procedures. Exceptions may be made for surgical admissions, per nursing unit guidelines      Special Instructions:      Bring list of CURRENT medications. Bring inhaler. Bring CPAP machine. Bring any pertinent legal medical records. Take these medications the morning of surgery with a sip of water:  Use Trelegy-Ellipta inhaler            On the day of surgery, come in the main entrance of DR. FELIZ[facility]S [facility]". Let the  at the desk know you are there for surgery. A staff member will come escort you to the surgical area on the second floor.     If you have any questions or concerns, please do not hesitate to call:     (Prior to the day of surgery) [facility] department:  [phone]   (Day of surgery) Pre-Op department:  [phone]    These surgical instructions were reviewed with Goldie Ellis during the [facility] phone call.

## 2022-06-24 DIAGNOSIS — J44.9 CHRONIC OBSTRUCTIVE PULMONARY DISEASE, UNSPECIFIED COPD TYPE (HCC): ICD-10-CM

## 2022-06-24 RX ORDER — ALBUTEROL SULFATE 90 UG/1
2 AEROSOL, METERED RESPIRATORY (INHALATION)
Qty: 18 G | Refills: 3 | Status: SHIPPED | OUTPATIENT
Start: 2022-06-24 | End: 2022-10-21 | Stop reason: ALTCHOICE

## 2022-06-24 NOTE — TELEPHONE ENCOUNTER
[facility]  Refilled albuterol to [facility] on file

## 2022-06-27 ENCOUNTER — ANESTHESIA EVENT (OUTPATIENT)
Dept: SURGERY | Age: 62
End: 2022-06-27
Payer: MEDICARE

## 2022-06-28 ENCOUNTER — HOSPITAL ENCOUNTER (OUTPATIENT)
Age: 62
Setting detail: OUTPATIENT SURGERY
Discharge: HOME | End: 2022-06-28
Attending: SURGERY | Admitting: SURGERY
Payer: MEDICARE

## 2022-06-28 ENCOUNTER — ANESTHESIA (OUTPATIENT)
Dept: SURGERY | Age: 62
End: 2022-06-28
Payer: MEDICARE

## 2022-06-28 VITALS
TEMPERATURE: 97.8 F | BODY MASS INDEX: 47.32 KG/M2 | SYSTOLIC BLOOD PRESSURE: 140 MMHG | OXYGEN SATURATION: 98 % | DIASTOLIC BLOOD PRESSURE: 89 MMHG | WEIGHT: 284 LBS | RESPIRATION RATE: 18 BRPM | HEIGHT: 65 IN | HEART RATE: 86 BPM

## 2022-06-28 DIAGNOSIS — Z98.890 S/P SCAR REVISION: Primary | ICD-10-CM

## 2022-06-28 LAB
AMPHET UR QL SCN: NEGATIVE
BARBITURATES UR QL SCN: NEGATIVE
BENZODIAZ UR QL: NEGATIVE
CANNABINOIDS UR QL SCN: POSITIVE
COCAINE UR QL SCN: NEGATIVE
HDSCOM,HDSCOM: ABNORMAL
METHADONE UR QL: NEGATIVE
OPIATES UR QL: NEGATIVE
PCP UR QL: NEGATIVE

## 2022-06-28 PROCEDURE — 00400 ANES INTEGUMENTARY SYS NOS: CPT | Performed by: NURSE ANESTHETIST, CERTIFIED REGISTERED

## 2022-06-28 PROCEDURE — 2709999900 HC NON-CHARGEABLE SUPPLY: Performed by: SURGERY

## 2022-06-28 PROCEDURE — 77030018836 HC SOL IRR NACL ICUM -A: Performed by: SURGERY

## 2022-06-28 PROCEDURE — 74011250636 HC RX REV CODE- 250/636: Performed by: NURSE ANESTHETIST, CERTIFIED REGISTERED

## 2022-06-28 PROCEDURE — 77030040504 HC DRN WND MDII -B: Performed by: SURGERY

## 2022-06-28 PROCEDURE — 88300 SURGICAL PATH GROSS: CPT

## 2022-06-28 PROCEDURE — 77030003028 HC SUT VCRL J&J -A: Performed by: SURGERY

## 2022-06-28 PROCEDURE — 76010000138 HC OR TIME 0.5 TO 1 HR: Performed by: SURGERY

## 2022-06-28 PROCEDURE — 76210000016 HC OR PH I REC 1 TO 1.5 HR: Performed by: SURGERY

## 2022-06-28 PROCEDURE — 74011000250 HC RX REV CODE- 250: Performed by: SURGERY

## 2022-06-28 PROCEDURE — 74011000272 HC RX REV CODE- 272: Performed by: SURGERY

## 2022-06-28 PROCEDURE — 76210000021 HC REC RM PH II 0.5 TO 1 HR: Performed by: SURGERY

## 2022-06-28 PROCEDURE — 80307 DRUG TEST PRSMV CHEM ANLYZR: CPT

## 2022-06-28 PROCEDURE — 77030040922 HC BLNKT HYPOTHRM STRY -A: Performed by: SURGERY

## 2022-06-28 PROCEDURE — 77030011267 HC ELECTRD BLD COVD -A: Performed by: SURGERY

## 2022-06-28 PROCEDURE — 76060000032 HC ANESTHESIA 0.5 TO 1 HR: Performed by: SURGERY

## 2022-06-28 PROCEDURE — 88307 TISSUE EXAM BY PATHOLOGIST: CPT

## 2022-06-28 PROCEDURE — 74011000250 HC RX REV CODE- 250: Performed by: NURSE ANESTHETIST, CERTIFIED REGISTERED

## 2022-06-28 PROCEDURE — 74011250637 HC RX REV CODE- 250/637: Performed by: SURGERY

## 2022-06-28 PROCEDURE — 77030002916 HC SUT ETHLN J&J -A: Performed by: SURGERY

## 2022-06-28 PROCEDURE — 93005 ELECTROCARDIOGRAM TRACING: CPT

## 2022-06-28 PROCEDURE — 88305 TISSUE EXAM BY PATHOLOGIST: CPT

## 2022-06-28 PROCEDURE — 77030040361 HC SLV COMPR DVT MDII -B: Performed by: SURGERY

## 2022-06-28 PROCEDURE — 77030002996 HC SUT SLK J&J -A: Performed by: SURGERY

## 2022-06-28 PROCEDURE — 74011250636 HC RX REV CODE- 250/636: Performed by: SURGERY

## 2022-06-28 PROCEDURE — 77030031139 HC SUT VCRL2 J&J -A: Performed by: SURGERY

## 2022-06-28 PROCEDURE — 77030010507 HC ADH SKN DERMBND J&J -B: Performed by: SURGERY

## 2022-06-28 PROCEDURE — 77030002933 HC SUT MCRYL J&J -A: Performed by: SURGERY

## 2022-06-28 PROCEDURE — 00400 ANES INTEGUMENTARY SYS NOS: CPT | Performed by: ANESTHESIOLOGY

## 2022-06-28 RX ORDER — MAGNESIUM SULFATE 100 %
4 CRYSTALS MISCELLANEOUS AS NEEDED
Status: DISCONTINUED | OUTPATIENT
Start: 2022-06-28 | End: 2022-06-28 | Stop reason: HOSPADM

## 2022-06-28 RX ORDER — SODIUM CHLORIDE 0.9 % (FLUSH) 0.9 %
5-40 SYRINGE (ML) INJECTION AS NEEDED
Status: DISCONTINUED | OUTPATIENT
Start: 2022-06-28 | End: 2022-06-28 | Stop reason: HOSPADM

## 2022-06-28 RX ORDER — FENTANYL CITRATE 50 UG/ML
50 INJECTION, SOLUTION INTRAMUSCULAR; INTRAVENOUS
Status: DISCONTINUED | OUTPATIENT
Start: 2022-06-28 | End: 2022-06-28 | Stop reason: HOSPADM

## 2022-06-28 RX ORDER — TRAMADOL HYDROCHLORIDE 50 MG/1
50 TABLET ORAL
Qty: 20 TABLET | Refills: 0 | Status: SHIPPED | OUTPATIENT
Start: 2022-06-28 | End: 2022-07-03

## 2022-06-28 RX ORDER — SODIUM CHLORIDE 0.9 % (FLUSH) 0.9 %
5-40 SYRINGE (ML) INJECTION EVERY 8 HOURS
Status: DISCONTINUED | OUTPATIENT
Start: 2022-06-28 | End: 2022-06-28 | Stop reason: HOSPADM

## 2022-06-28 RX ORDER — FENTANYL CITRATE 50 UG/ML
25 INJECTION, SOLUTION INTRAMUSCULAR; INTRAVENOUS AS NEEDED
Status: DISCONTINUED | OUTPATIENT
Start: 2022-06-28 | End: 2022-06-28 | Stop reason: HOSPADM

## 2022-06-28 RX ORDER — ROCURONIUM BROMIDE 10 MG/ML
INJECTION, SOLUTION INTRAVENOUS AS NEEDED
Status: DISCONTINUED | OUTPATIENT
Start: 2022-06-28 | End: 2022-06-28 | Stop reason: HOSPADM

## 2022-06-28 RX ORDER — PROPOFOL 10 MG/ML
INJECTION, EMULSION INTRAVENOUS AS NEEDED
Status: DISCONTINUED | OUTPATIENT
Start: 2022-06-28 | End: 2022-06-28 | Stop reason: HOSPADM

## 2022-06-28 RX ORDER — GLYCOPYRROLATE 0.2 MG/ML
INJECTION INTRAMUSCULAR; INTRAVENOUS AS NEEDED
Status: DISCONTINUED | OUTPATIENT
Start: 2022-06-28 | End: 2022-06-28 | Stop reason: HOSPADM

## 2022-06-28 RX ORDER — SODIUM CHLORIDE, SODIUM LACTATE, POTASSIUM CHLORIDE, CALCIUM CHLORIDE 600; 310; 30; 20 MG/100ML; MG/100ML; MG/100ML; MG/100ML
25 INJECTION, SOLUTION INTRAVENOUS CONTINUOUS
Status: DISCONTINUED | OUTPATIENT
Start: 2022-06-28 | End: 2022-06-28 | Stop reason: HOSPADM

## 2022-06-28 RX ORDER — LIDOCAINE HYDROCHLORIDE 20 MG/ML
INJECTION, SOLUTION EPIDURAL; INFILTRATION; INTRACAUDAL; PERINEURAL AS NEEDED
Status: DISCONTINUED | OUTPATIENT
Start: 2022-06-28 | End: 2022-06-28 | Stop reason: HOSPADM

## 2022-06-28 RX ORDER — DEXAMETHASONE SODIUM PHOSPHATE 4 MG/ML
INJECTION, SOLUTION INTRA-ARTICULAR; INTRALESIONAL; INTRAMUSCULAR; INTRAVENOUS; SOFT TISSUE AS NEEDED
Status: DISCONTINUED | OUTPATIENT
Start: 2022-06-28 | End: 2022-06-28 | Stop reason: HOSPADM

## 2022-06-28 RX ORDER — ONDANSETRON 2 MG/ML
INJECTION INTRAMUSCULAR; INTRAVENOUS AS NEEDED
Status: DISCONTINUED | OUTPATIENT
Start: 2022-06-28 | End: 2022-06-28 | Stop reason: HOSPADM

## 2022-06-28 RX ORDER — SUCCINYLCHOLINE CHLORIDE 20 MG/ML
INJECTION INTRAMUSCULAR; INTRAVENOUS AS NEEDED
Status: DISCONTINUED | OUTPATIENT
Start: 2022-06-28 | End: 2022-06-28 | Stop reason: HOSPADM

## 2022-06-28 RX ORDER — FENTANYL CITRATE 50 UG/ML
INJECTION, SOLUTION INTRAMUSCULAR; INTRAVENOUS AS NEEDED
Status: DISCONTINUED | OUTPATIENT
Start: 2022-06-28 | End: 2022-06-28 | Stop reason: HOSPADM

## 2022-06-28 RX ORDER — DEXTROSE MONOHYDRATE 100 MG/ML
0-250 INJECTION, SOLUTION INTRAVENOUS AS NEEDED
Status: DISCONTINUED | OUTPATIENT
Start: 2022-06-28 | End: 2022-06-28 | Stop reason: HOSPADM

## 2022-06-28 RX ORDER — TRAMADOL HYDROCHLORIDE 50 MG/1
50 TABLET ORAL ONCE
Status: COMPLETED | OUTPATIENT
Start: 2022-06-28 | End: 2022-06-28

## 2022-06-28 RX ORDER — MIDAZOLAM HYDROCHLORIDE 1 MG/ML
INJECTION, SOLUTION INTRAMUSCULAR; INTRAVENOUS AS NEEDED
Status: DISCONTINUED | OUTPATIENT
Start: 2022-06-28 | End: 2022-06-28 | Stop reason: HOSPADM

## 2022-06-28 RX ORDER — NEOSTIGMINE METHYLSULFATE 1 MG/ML
INJECTION, SOLUTION INTRAVENOUS AS NEEDED
Status: DISCONTINUED | OUTPATIENT
Start: 2022-06-28 | End: 2022-06-28 | Stop reason: HOSPADM

## 2022-06-28 RX ORDER — SODIUM CHLORIDE, SODIUM LACTATE, POTASSIUM CHLORIDE, CALCIUM CHLORIDE 600; 310; 30; 20 MG/100ML; MG/100ML; MG/100ML; MG/100ML
INJECTION, SOLUTION INTRAVENOUS
Status: DISCONTINUED | OUTPATIENT
Start: 2022-06-28 | End: 2022-06-28 | Stop reason: HOSPADM

## 2022-06-28 RX ADMIN — LIDOCAINE HYDROCHLORIDE 100 MG: 20 INJECTION, SOLUTION EPIDURAL; INFILTRATION; INTRACAUDAL; PERINEURAL at 07:30

## 2022-06-28 RX ADMIN — Medication 3 MG: at 07:55

## 2022-06-28 RX ADMIN — SUCCINYLCHOLINE CHLORIDE 100 MG: 20 INJECTION, SOLUTION INTRAMUSCULAR; INTRAVENOUS at 07:30

## 2022-06-28 RX ADMIN — SODIUM CHLORIDE, SODIUM LACTATE, POTASSIUM CHLORIDE, AND CALCIUM CHLORIDE: 600; 310; 30; 20 INJECTION, SOLUTION INTRAVENOUS at 07:24

## 2022-06-28 RX ADMIN — MIDAZOLAM HYDROCHLORIDE 2 MG: 2 INJECTION, SOLUTION INTRAMUSCULAR; INTRAVENOUS at 07:22

## 2022-06-28 RX ADMIN — PROPOFOL 150 MG: 10 INJECTION, EMULSION INTRAVENOUS at 07:30

## 2022-06-28 RX ADMIN — SODIUM CHLORIDE, PRESERVATIVE FREE 20 MG: 5 INJECTION INTRAVENOUS at 06:38

## 2022-06-28 RX ADMIN — ONDANSETRON 4 MG: 2 INJECTION INTRAMUSCULAR; INTRAVENOUS at 07:38

## 2022-06-28 RX ADMIN — FENTANYL CITRATE 25 MCG: 50 INJECTION, SOLUTION INTRAMUSCULAR; INTRAVENOUS at 08:37

## 2022-06-28 RX ADMIN — DEXAMETHASONE SODIUM PHOSPHATE 4 MG: 4 INJECTION, SOLUTION INTRAMUSCULAR; INTRAVENOUS at 07:38

## 2022-06-28 RX ADMIN — SODIUM CHLORIDE, POTASSIUM CHLORIDE, SODIUM LACTATE AND CALCIUM CHLORIDE 25 ML/HR: 600; 310; 30; 20 INJECTION, SOLUTION INTRAVENOUS at 06:15

## 2022-06-28 RX ADMIN — CEFAZOLIN SODIUM 3 G: 1 INJECTION, POWDER, FOR SOLUTION INTRAMUSCULAR; INTRAVENOUS at 07:24

## 2022-06-28 RX ADMIN — FENTANYL CITRATE 50 MCG: 50 INJECTION, SOLUTION INTRAMUSCULAR; INTRAVENOUS at 08:57

## 2022-06-28 RX ADMIN — ROCURONIUM BROMIDE 20 MG: 50 INJECTION INTRAVENOUS at 07:42

## 2022-06-28 RX ADMIN — FENTANYL CITRATE 100 MCG: 50 INJECTION, SOLUTION INTRAMUSCULAR; INTRAVENOUS at 07:29

## 2022-06-28 RX ADMIN — TRAMADOL HYDROCHLORIDE 50 MG: 50 TABLET, COATED ORAL at 09:21

## 2022-06-28 RX ADMIN — GLYCOPYRROLATE 0.4 MG: 0.2 INJECTION INTRAMUSCULAR; INTRAVENOUS at 07:55

## 2022-06-28 NOTE — OP NOTES
Removal of right mediport, left mastectomy scar revision    Patient Name: Cathy Shepherd     SURGERY DATE: 22     : 1960     AGE: 57 y.o. Anesthesiologist: Anesthesiologist: Fabricio Sterling MD  CRNA: Jia Alvarez CRNA  SRNA: Sb Knight     Anesthesia: General     PreOp DX: B47.476 MALIGNANT NEOPLASM UPPER INNER QUADRANT OF LEFT BREAST  Z17.1 ESTROGEN RECEPTOR NEGATIVE     PostOp DX: same    Procedure: Removal of right mediport, left mastectomy scar revision    Procedure Details: After informed consent was obtained the patient was taken to the operating room and placed in the supine position. General anesthesia was administered by the anesthetist to titrate to effect. The right and left chest were prepped and draped in the usual sterile fashion and a time out procedure was performed. Local anesthesia was used to anesthetize the skin at the mediport site and area of scar revision for medial dog ears. Using a 15 blade scalpel the previous mediport scar was opened. Bovie electrocautery was used to excise the capsule of the port which was easily removed with tubing intact. The tract for the tubing was closed with 0 vicryl suture in a figure of eight fashion and was hemostatic. The wound was irrigated and suctioned dry and found to be hemostatic. The deep dermis was closed with 3-0 vicryl in a simple interrupted fashion and skin was closed with 4-0 monocryl in a subcuticular manner. The dog ear of the left medial mastectomy was then elipsed with a 15 blade scalpel. Bovie was then used to circumferentially dissect off the excess tissue down to the pectoralis muscle so the scar would be flat against the chest wall. The wound was irrigated and suctioned dry and found to be hemostatic. We then closed the deep tissue with 2-0 vicryl in a simple interrupted fashion. The deep dermis was then closed with 3-0 vicryl in a simple interrupted fashion.  Skin was closed with 4-0 monocryl in a subcuticular manner. Dermabond dressings were then applied. The patient was then extubated, tolerated the procedure well and sent to recovery in stable condition.        Estimated Blood Loss: 10cc    Specimens: none           Complications: None           Disposition: extubated, tolerated procedure well           Condition: Stable    Dunia Salas DO

## 2022-06-28 NOTE — H&P
CC:        Chief Complaint   Patient presents with    New Patient       discuss mediport removal and possible reconstruction. History left breast cancer.         Assessment:      ICD-10-CM ICD-9-CM     1. Malignant neoplasm of upper-inner quadrant of left breast in female, estrogen receptor negative (HCC)  C50.212 174.2 SCHEDULE SURGERY     Z17.1 V86.1     2. Seroma of breast  N64.89 998.13     3. Morbid obesity with BMI of 45.0-49.9, adult (HCC)  E66.01 278.01       Z68.42 V85.42           Plan: The patient has completed her chemotherapy and now interested in getting her Mediport removed. She would also like revision of the seroma on the left side so the skin is flat and matches the right. She is unhappy with the appearance. We can perform this at the same time of Mediport removal.  She has no interest in reconstruction or referral to plastics. The risks and benefits of both procedures were reviewed with the patient including infection, bleeding, need for repeat procedure, injury to surrounding structures and recurrent seroma formation as well as poor cosmetic outcome. The patient's questions were answered and consent was obtained.           HPI:  Ms. Sunshine Petersen a 59year old woman with BRCA2 mutation and T2N0 triple negative breast cancer, s/p bilateral mastectomy 11/23/21. She had been diagnosed on core biopsy 10/19/21. The area of concern was identified as a palpable mass around September 2021. She denied nipple discharge. She denied breast pain. There is family history of breast cancer in her sister, mother and maternal aunt. Her most recent previous mammogram was 10/14/19.      She has a personal history of fibrosarcoma.  Her sister has HNPCC/ANTHONY mutation.  Genetic testing via [employer] demonstrated BRCA2 mutation 11/8/21.      She reports having had hysterectomy with salpingoopherectomy.     She has completed chemotherapy last Thursday and was given clearance by Dr. Jenkins Likes to have her right sided mediport removed.       Overall today she is feeling well. Now that she has completed treatment she is interested in addressing the left mastectomy incision. She recalls being told by Dr. Alexey Giron in the past that this could be addressed after treatment. She is unhappy with the swelling of the left scar. She states there was always an issue with the left drain suctioning properly so at one point it was just removed. Swelling has not increased.      Allergies: Allergies   Allergen Reactions    Adhesive Tape-Silicones Swelling       REALLY BAD SWELLING AND SORE APPEAR    Alcohol Other (comments)       PT STATES SHE IS AN ALCOHOLIC    Lactose Other (comments)       PT STATES IT MAKES HER STOMACH HURT    Percodan [Oxycodone Hcl-Oxycodone-Asa] Itching and Other (comments)       crying    Nsaids (Non-Steroidal Anti-Inflammatory Drug) Other (comments)       PT NOT ABLE TO TAKE DUE TO GASTRIC BYPASS         Medication Review:         Current Outpatient Medications on File Prior to Visit   Medication Sig Dispense Refill    omeprazole (PRILOSEC) 20 mg capsule Take 20 mg by mouth two (2) times a day. 2 tabs twice daily        prochlorperazine (COMPAZINE) 10 mg tablet Take 10 mg by mouth every six (6) hours as needed.        gabapentin (NEURONTIN) 100 mg capsule Take 100 mg by mouth daily.        citalopram (CELEXA) 20 mg tablet TAKE 1 TABLET BY MOUTH DAILY 90 Tablet 0    buPROPion SR (WELLBUTRIN SR) 150 mg SR tablet Take 1 Tablet by mouth two (2) times a day. 180 Tablet 3    fluticasone-umeclidinium-vilanterol (Trelegy Ellipta) 100-62.5-25 mcg inhaler INHALE 1 PUFF BY MOUTH DAILY 120 Each 4    sucralfate (CARAFATE) 1 gram tablet Take 1 tablet by mouth Four times a day as needed for indigestion. (Patient taking differently: two (2) times a day.  Take 1 tablet by mouth Four times a day as needed for indigestion.) 360 Tablet 0    diclofenac (Voltaren) 1 % gel Apply 2 g to affected area every six (6) hours as needed for Pain. 100 g 2    Linzess 72 mcg cap capsule TAKE 1 CAPSULE BY MOUTH DAILY BEFORE BREAKFAST 30 Cap 1    fluticasone propionate (FLONASE) 50 mcg/actuation nasal spray SHAKE LIQUID AND USE 2 SPRAYS IN EACH NOSTRIL DAILY 48 g 5    albuterol (PROVENTIL HFA, VENTOLIN HFA, PROAIR HFA) 90 mcg/actuation inhaler Take 2 Puffs by inhalation every four (4) hours as needed for Wheezing or Shortness of Breath. 3 Inhaler 4    b-complex with vitamin c tablet Take 1 Tab by mouth daily.        acetaminophen (TYLENOL EXTRA STRENGTH) 500 mg tablet Take  by mouth every six (6) hours as needed for Pain.        calcium-cholecalciferol, d3, (CALCIUM 600 + D) 600-125 mg-unit tab Take 1,065 mg by mouth nightly. Indications: TAKES 2 AT BEDTIME        omega 3-dha-epa-fish oil (FISH OIL) 100-160-1,000 mg cap Take 1,065 mg by mouth daily.        ferrous sulfate ER (IRON) 160 mg (50 mg iron) TbER tablet Take 1 Tab by mouth daily. Indications: PT TAKES 40 MG        multivitamin (ONE A DAY) tablet Take 1 Tab by mouth daily.        pregabalin (LYRICA) 150 mg capsule TAKE 1 CAPSULE BY MOUTH TWICE DAILY. MAX DAILY AMOUNT: 300 MG (Patient not taking: Reported on 2/22/2022) 180 Capsule 0      No current facility-administered medications on file prior to visit.         Systems Review:  Review of Systems   Constitutional: Positive for fatigue. Negative for fever and unexpected weight change. Respiratory: Negative for chest tightness and shortness of breath. Cardiovascular: Negative for chest pain and palpitations. Skin: Negative for pallor, rash and wound. Hematological: Negative for adenopathy.  Does not bruise/bleed easily.         PMH:       Past Medical History:   Diagnosis Date    [facility]ism [facility] remission ([facility]      Asthma      Breast cancer ([facility]       breast cancer left    Chronic obstructive pulmonary disease (HCC)      Fibrosarcoma (HCC) 1963     connective tissue cancer    GERD (gastroesophageal reflux disease)      History of gastric bypass     History of meniscal tear ,      right in , left in     HNP (herniated nucleus pulposus), lumbar       patient reported    Lung nodules       resolved  CT    Neuropathy      Osteopenia 10/03/2017    Recurrent depression ([facility]      Sleep apnea       on cpap    Spinal stenosis, lumbar       patient reported    Xanthelasma of left upper eyelid 2020         Surgical History:        Past Surgical History:   Procedure Laterality Date    HX ARTHRODESIS   2000     Herniated Disc, arthritis right foot , , C , C  Stenosis    HX CHOLECYSTECTOMY   2008     gallbladder disease    HX COLONOSCOPY   2009    HX GASTRIC BYPASS        GASTRIC BYPASS  [facility] Gastric Bypass      HX HYSTERECTOMY   1994    HX MASTECTOMY Bilateral 2021     BILATERAL MASTECTOMY, LEFT SENTINEL NODE BIOPSY [facility] ) performed by Dawna Richmond MD at [address] [place] [address]   10/2015     right repari of torn meniscus    HX MENISCECTOMY Left 2018     partial meniscectomy due to tear    HX MYOMECTOMY   1984     benign tumor    HX ORTHOPAEDIC   1964     orthopaedic excision Fibrosarcoma and Lymphangiogram    HX PELVIC LAPAROSCOPY   1984     large uterine blockage    NEUROLOGICAL PROCEDURE UNLISTED   ,      Cervical fusion         Social History:  Social History            Socioeconomic History    Marital status: SINGLE   Tobacco Use    Smoking status: Former Smoker       Packs/day: 0.50       Years: 45.00       Pack years: 22.50       Quit date: 2020       Years since quittin.9    Smokeless tobacco: Never Used   Vaping Use    Vaping Use: Never used   Substance and Sexual Activity    Alcohol use:  No       Comment: quit 1980s- was heavy etoh    Drug use: Not Currently       Comment: marijuana, cocaine 30 yrs ago    Sexual activity: Never         Family History:        Family History Problem Relation Age of Onset    Hypertension Mother      Depression Mother      Cancer Mother      Ovarian Cancer Mother      Breast Problems Mother      Cancer Father      Cancer Sister      Depression Sister      Breast Cancer Sister      Depression Brother      Stroke Neg Hx      Heart Attack Neg Hx                   No visits with results within 3 Month(s) from this visit. Latest known visit with results is:   Hospital Outpatient Visit on 02/08/2022   Component Date Value Ref Range Status    Creatinine, POC 02/08/2022 0.9  0.6 - 1.3 MG/DL Final    GFRAA, POC 02/08/2022 >60  >60 ml/min/1.73m2 Final    GFRNA, POC 02/08/2022 >60  >60 ml/min/1.73m2 Final     Estimated GFR is calculated using the IDMS-traceable Modification of Diet in Renal Disease (MDRD) Study equation, reported for both  Americans (GFRAA) and non- Americans (GFRNA), and normalized to 1.73m2 body surface area. The physician must decide which value applies to the patient.         CT LOW DOSE LUNG CANCER SCREENING  Narrative: CT of the chest for lung cancer screening     HISTORY: Lung screening. Formal smoker with 42 pack years smoking history, quit  in 6/20/20. Meet lung screening criteria.     COMPARISON: 11/16/19     TECHNIQUE: Low dose unenhanced multislice helical CT was performed from the  thoracic inlet to the adrenal glands without intravenous contrast  administration. Contiguous [1.25 mm] axial images were reconstructed and lung  and soft tissue windows were generated.  Coronal MIP and sagittal reformations  were generated.  -All CT scans at this facility performed using dose optimization techniques as  appreciated to a performed exam, to include automated exposure control,  adjustment of the mA and or KU according to patient size (including appropriate  matching for site specific examination), or use of iterative reconstruction  technique.     FINDINGS:     The lungs are clear of nodule, mass or  airspace disease. Mild dependent  atelectasis in bilateral lower lobes. Mild centrilobular emphysema and  bronchitis appear stable. There is no significant pleural disease.     The absence of intravenous contrast reduces the sensitivity for evaluation of  the mediastinum and upper abdominal organs. No mediastinal or hilar adenopathy  appreciated. The heart is not enlarged. No pericardial effusion. The aorta has  a normal contour with no evidence of aneurysm. The thyroid appears unremarkable. The bones and soft tissues of the chest wall are within normal limits. The  imaged upper abdomen appears unremarkable. Impression: 1. No lung nodule identified. 2. Stable mild COPD.     LUNG RADS ASSESSMENT CATEGORIES:     Lung RADS 1 - Negative.     Management:   Continue annual screening with low dose CT in 12 months. Thank you for your referral.            Physical Exam:  Visit Vitals  /80   Pulse 88   Temp 97.2 °F (36.2 °C) (Temporal)   Resp 18   Ht 5' 5\" (1.651 m)   Wt 132 kg (291 lb)   SpO2 98%   BMI 48.42 kg/m²    BMI: Body mass index is 48.42 kg/m².     Physical Exam  Constitutional:       Appearance: Normal appearance. She is obese. Eyes:      Extraocular Movements: Extraocular movements intact. Conjunctiva/sclera: Conjunctivae normal.      Pupils: Pupils are equal, round, and reactive to light. Cardiovascular:      Rate and Rhythm: Normal rate and regular rhythm. Pulmonary:      Effort: Pulmonary effort is normal.      Breath sounds: Normal breath sounds. Chest:   Breasts:      Right: No axillary adenopathy or supraclavicular adenopathy. Left: No axillary adenopathy or supraclavicular adenopathy.             Comments: Small bilateral dog ears medially, mastectomy incisions well healed bilaterally. Left mastectomy small seroma appreciated medially  Musculoskeletal:         General: Normal range of motion.    Lymphadenopathy:      Upper Body:      Right upper body: No supraclavicular or axillary adenopathy. Left upper body: No supraclavicular or axillary adenopathy. Skin:     General: Skin is warm and dry. Neurological:      Mental Status: She is alert. Psychiatric:         Mood and Affect: Mood normal.         Behavior: Behavior normal.         Thought Content:  Thought content normal.         Judgment: Judgment normal.            I have reviewed the information entered by the clinical staff and/or patient and verified it as accurate or edited where necessary.      Electronically signed by:     Melissa Fagan DO, MPH

## 2022-06-28 NOTE — DISCHARGE INSTRUCTIONS
Post Operative Discharge Instructions    1. No driving for 24 hours after surgery and off of prescription pain medication. 2. Avoid activities that bump or cause jarring movements at the surgical site for 10 days. 3. No lifting more than 10-15 pounds for 6 weeks after surgery or until cleared for activity at your follow up.    4. Walking is encouraged after surgery. 5. Stairs are ok to climb. DIET:     Diet as tolerated. Start with liquids then advance your diet based on how you fell.  No alcoholic beverages for 24 hours after surgery or while on antibiotics or pain mdications.  Drink plenty of water. MEDICATIONS:     Use daily stool softners (over the counter such as Colace or Senekot) while on pain medications.  Resume pre-operative medications. If you are on any blood thinners see special instructions below.  Use prescriptions given or Tylenol as needed for pain.  Do not use more than 4000mg of Tylenol (acetaminophen) per day. Be aware this may be  in your prescription medication as well.  Be aware narcotic prescriptions are tightly controlled in the [address]. If requiring more than one refill, a follow up appointment will be required. WOUND CARE:      You have skin glue on your incisions, you may shower in 24 hours and pat dry.  Do not tub bathe, swim, or soak incisions until cleared to do so at your follow up.  Ice bag to the affected area; 20 minutes on and 20 minutes off if desired. FOLLOW UP CARE:     You should have an appointment scheduled within 14 days after surgery. If this is not yet scheduled, call the office.  Any forms that you need filled out regarding your medical care can be brought to the office at follow up appointment of faxed to: [id number] [employer]:   Temperature is over 101 degrees, a slight fever can be normal 24-48 hour after surgery.    Nausea & vomiting that persists more than 24 hours after surgery.  Your wound appears very red, hot, painful or swollen.  Excessive bleeding occurs form the incision. *Between the hours 9-5 Monday-Friday please call the office at [phone]. If you do not receive a call back the same day, please do not hesitate to call my cell phone at [phone]    *If there is a medical emergency please go to the nearest emergency room immediately and do not hesitate to call my cell phone    *Weekends, after hours please call my cell phone      DISCHARGE SUMMARY from Nurse    PATIENT INSTRUCTIONS:    After general anesthesia or intravenous sedation, for 24 hours or while taking prescription Narcotics:  · Limit your activities  · Do not drive and operate hazardous machinery  · Do not make important personal or business decisions  · Do  not drink alcoholic beverages  · If you have not urinated within 8 hours after discharge, please contact your surgeon on call. Report the following to your surgeon:  · Excessive pain, swelling, redness or odor of or around the surgical area  · Temperature over 100.5  · Nausea and vomiting lasting longer than 4 hours or if unable to take medications  · Any signs of decreased circulation or nerve impairment to extremity: change in color, persistent  numbness, tingling, coldness or increase pain  · Any questions    What to do at Home:      *  Please give a list of your current medications to your Primary Care Provider. *  Please update this list whenever your medications are discontinued, doses are      changed, or new medications (including over-the-counter products) are added. *  Please carry medication information at all times in case of emergency situations. These are general instructions for a healthy lifestyle:    No smoking/ No tobacco products/ Avoid exposure to second hand smoke  Surgeon General's Warning:  Quitting smoking now greatly reduces serious risk to your health.     Obesity, smoking, and sedentary lifestyle greatly increases your risk for illness    A healthy diet, regular physical exercise & weight monitoring are important for maintaining a healthy lifestyle    You may be retaining fluid if you have a history of heart failure or if you experience any of the following symptoms:  Weight gain of 3 pounds or more overnight or 5 pounds in a week, increased swelling in our hands or feet or shortness of breath while lying flat in bed. Please call your doctor as soon as you notice any of these symptoms; do not wait until your next office visit. The discharge information has been reviewed with the patient. The patient verbalized understanding. Discharge medications reviewed with the patient and appropriate educational materials and side effects teaching were provided.   ___________________________________________________________________________________________________________________________________

## 2022-06-28 NOTE — ANESTHESIA POSTPROCEDURE EVALUATION
Procedure(s):  REMOVAL OF RIGHT MEDIPORT  revision of left mastectomy scar. general    Anesthesia Post Evaluation      Multimodal analgesia: multimodal analgesia used between 6 hours prior to anesthesia start to PACU discharge  Patient location during evaluation: bedside  Patient participation: complete - patient participated  Level of consciousness: awake  Pain management: adequate  Airway patency: patent  Anesthetic complications: no  Cardiovascular status: stable  Respiratory status: acceptable  Hydration status: acceptable  Post anesthesia nausea and vomiting:  controlled      INITIAL Post-op Vital signs:   Vitals Value Taken Time   /83 06/28/22 0922   Temp 36.4 °C (97.6 °F) 06/28/22 0814   Pulse 86 06/28/22 0928   Resp 17 06/28/22 0928   SpO2 87 % 06/28/22 0928   Vitals shown include unvalidated device data.

## 2022-06-28 NOTE — ANESTHESIA PREPROCEDURE EVALUATION
Relevant Problems   RESPIRATORY SYSTEM   (+) Chronic bronchitis (HCC)   (+) Chronic obstructive pulmonary disease (HCC)   (+) Sleep apnea      NEUROLOGY   (+) Chronic nonintractable headache   (+) Recurrent depression (HCC)      GASTROINTESTINAL   (+) GERD (gastroesophageal reflux disease)      ENDOCRINE   (+) Obesity, morbid (HCC)      PERSONAL HX & FAMILY HX OF CANCER   (+) Breast cancer of upper-inner quadrant of left female breast (HCC)   (+) Fibrosarcoma (HCC)       Anesthetic History     PONV          Review of Systems / Medical History  Patient summary reviewed and pertinent labs reviewed    Pulmonary    COPD: moderate    Sleep apnea: CPAP    Asthma : well controlled       Neuro/Psych         Psychiatric history     Cardiovascular  Within defined limits                Exercise tolerance: >4 METS     GI/Hepatic/Renal     GERD: well controlled           Endo/Other        Morbid obesity and cancer     Other Findings              Physical Exam    Airway  Mallampati: II  TM Distance: 4 - 6 cm  Neck ROM: normal range of motion   Mouth opening: Normal     Cardiovascular  Regular rate and rhythm,  S1 and S2 normal,  no murmur, click, rub, or gallop  Rhythm: regular  Rate: normal         Dental    Dentition: Lower dentition intact and Upper dentition intact     Pulmonary  Breath sounds clear to auscultation               Abdominal  GI exam deferred       Other Findings            Anesthetic Plan    ASA: 3  Anesthesia type: general          Induction: Intravenous  Anesthetic plan and risks discussed with: Patient

## 2022-06-28 NOTE — PROGRESS NOTES
conducted a pre-surgery visit with Joe Ward, who is a 57 y.o.,female. The  provided the following Interventions:  Initiated a relationship of care and support. Offered prayer and assurance of continued prayers on patient's behalf. There is an advance directive present in chart. Plan:  Chaplains will continue to follow and will provide pastoral care on an as needed/requested basis.  recommends bedside caregivers page  on duty if patient shows signs of acute spiritual or emotional distress.   Reise[address]   Board Certified [address] [place] [address]   [phone]

## 2022-06-29 LAB
ATRIAL RATE: 80 BPM
CALCULATED P AXIS, ECG09: 14 DEGREES
CALCULATED R AXIS, ECG10: -6 DEGREES
CALCULATED T AXIS, ECG11: 23 DEGREES
DIAGNOSIS, 93000: NORMAL
P-R INTERVAL, ECG05: 136 MS
Q-T INTERVAL, ECG07: 410 MS
QRS DURATION, ECG06: 76 MS
QTC CALCULATION (BEZET), ECG08: 472 MS
VENTRICULAR RATE, ECG03: 80 BPM

## 2022-07-13 ENCOUNTER — OFFICE VISIT (OUTPATIENT)
Dept: SURGERY | Age: 62
End: 2022-07-13
Payer: MEDICARE

## 2022-07-13 ENCOUNTER — DOCUMENTATION ONLY (OUTPATIENT)
Dept: OTHER | Age: 62
End: 2022-07-13

## 2022-07-13 VITALS
WEIGHT: 282 LBS | BODY MASS INDEX: 46.98 KG/M2 | DIASTOLIC BLOOD PRESSURE: 80 MMHG | TEMPERATURE: 97.3 F | HEIGHT: 65 IN | SYSTOLIC BLOOD PRESSURE: 132 MMHG | OXYGEN SATURATION: 96 % | HEART RATE: 76 BPM | RESPIRATION RATE: 20 BRPM

## 2022-07-13 DIAGNOSIS — Z98.890 S/P SCAR REVISION: Primary | ICD-10-CM

## 2022-07-13 PROCEDURE — 99024 POSTOP FOLLOW-UP VISIT: CPT | Performed by: SURGERY

## 2022-07-13 RX ORDER — OLAPARIB 150 MG/1
TABLET, FILM COATED ORAL
COMMUNITY
Start: 2022-06-02

## 2022-07-13 NOTE — PROGRESS NOTES
Chief Complaint   Patient presents with    Post OP Follow Up     removal mediport revision left mastectomy scar   1. Have you been to the ER, urgent care clinic since your last visit? Hospitalized since your last visit? No    2. Have you seen or consulted any other health care providers outside of the [address] [place] [address] since your last visit? Include any pap smears or colon screening.  No

## 2022-07-13 NOTE — PROGRESS NOTES
CC:   Chief Complaint   Patient presents with    Post OP Follow Up     removal mediport revision left mastectomy scar        Assessment:    ICD-10-CM ICD-9-CM    1. S/P scar revision  Z98.890 V45.89        Plan: She is doing very well with her surgery and has no complaints. She is pleased with how the scar looks. I would like to see her back in the clinic in 3 months for routine follow-up. She agrees with this plan. HPI:  Sabrina Rodriguez is a 57 y.o. female who for follow-up status post Mediport removal and scar revision of the left mastectomy. She reports being very happy with the incision and has no complaints. She has no pain, fevers, chills or drainage from this area. Allergies: Allergies   Allergen Reactions    Adhesive Tape-Silicones Swelling     REALLY BAD SWELLING AND SORE APPEAR    Alcohol Other (comments)     PT STATES SHE IS AN ALCOHOLIC    Lactose Other (comments)     PT STATES IT MAKES HER STOMACH HURT    Percodan [Oxycodone Hcl-Oxycodone-Asa] Itching and Other (comments)     crying    Nsaids (Non-Steroidal Anti-Inflammatory Drug) Other (comments)     PT NOT ABLE TO TAKE DUE TO GASTRIC BYPASS       Medication Review:  Current Outpatient Medications on File Prior to Visit   Medication Sig Dispense Refill    Lynparza 150 mg tablet       albuterol (PROVENTIL HFA, VENTOLIN HFA, PROAIR HFA) 90 mcg/actuation inhaler Take 2 Puffs by inhalation every four (4) hours as needed for Wheezing or Shortness of Breath for up to 90 days. 18 g 3    b complex vitamins tablet Take 1 Tablet by mouth daily.  fluticasone propionate (FLONASE) 50 mcg/actuation nasal spray SHAKE LIQUID AND USE 2 SPRAYS IN EACH NOSTRIL DAILY 48 g 5    omeprazole (PRILOSEC) 20 mg capsule Take 20 mg by mouth two (2) times a day. 2 tabs twice daily      gabapentin (NEURONTIN) 100 mg capsule Take 200 mg by mouth daily.       citalopram (CELEXA) 20 mg tablet TAKE 1 TABLET BY MOUTH DAILY 90 Tablet 0    buPROPion SR [facility] SR) 150 mg SR tablet Take 1 Tablet by mouth two (2) times a day. 180 Tablet 3    fluticasone-umeclidinium-vilanterol (Trelegy Ellipta) 100-62.5-25 mcg inhaler INHALE 1 PUFF BY MOUTH DAILY 120 Each 4    sucralfate (CARAFATE) 1 gram tablet Take 1 tablet by mouth Four times a day as needed for indigestion. (Patient taking differently: two (2) times a day. Take 1 tablet by mouth Four times a day as needed for indigestion.) 360 Tablet 0    diclofenac (Voltaren) 1 % gel Apply 2 g to affected area every six (6) hours as needed for Pain. 100 g 2    Linzess 72 mcg cap capsule TAKE 1 CAPSULE BY MOUTH DAILY BEFORE BREAKFAST 30 Cap 1    acetaminophen (TYLENOL EXTRA STRENGTH) 500 mg tablet Take  by mouth every six (6) hours as needed for Pain.  calcium-cholecalciferol, d3, (CALCIUM 600 + D) 600-125 mg-unit tab Take 1,065 mg by mouth nightly.  omega 3-dha-epa-fish oil (FISH OIL) 100-160-1,000 mg cap Take 1,065 mg by mouth daily.  ferrous sulfate ER (IRON) 160 mg (50 mg iron) TbER tablet Take 1 Tab by mouth daily. Indications: PT TAKES 40 MG      multivitamin (ONE A DAY) tablet Take 1 Tab by mouth daily.  prochlorperazine (COMPAZINE) 10 mg tablet Take 10 mg by mouth every six (6) hours as needed. (Patient not taking: Reported on 7/13/2022)       No current facility-administered medications on file prior to visit. Systems Review:  Review of Systems   Constitutional: Negative for fatigue and fever.        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    GERD (gastroesophageal reflux disease)     History of gastric bypass 2012    History of meniscal tear 2015, 2018    right in 2015, left in 2018    HNP (herniated nucleus pulposus), lumbar     patient reported    Lung nodules     resolved 2018 CT    Neuropathy     Osteopenia 10/03/2017    Recurrent depression (HCC)     Sleep apnea     on cpap    Spinal stenosis, lumbar     patient reported    Xanthelasma of left upper eyelid 2020       Surgical History:  Past Surgical History:   Procedure Laterality Date    HX ARTHRODESIS  2000    Herniated Disc, arthritis right foot , , C , C  Stenosis    HX CHOLECYSTECTOMY  2008    gallbladder disease    HX COLONOSCOPY  2009    HX GASTRIC BYPASS      GASTRIC BYPASS  [facility] Gastric Bypass      HX HYSTERECTOMY  1994    HX MASTECTOMY Bilateral 2021    BILATERAL MASTECTOMY, LEFT SENTINEL NODE BIOPSY [facility] ) performed by Keisha Mckenna MD at [address] [facility] HX MASTECTOMY Left 2022    revision of left mastectomy scar performed by Dolores Woodall DO at [address] [place] [address] [address] [facility]  10/2015    right repari of torn meniscus    HX MENISCECTOMY Left 2018    partial meniscectomy due to tear    HX MYOMECTOMY  1984    benign tumor    HX ORTHOPAEDIC  1964    orthopaedic excision Fibrosarcoma and Lymphangiogram    HX PELVIC LAPAROSCOPY  1984    large uterine blockage    NEUROLOGICAL PROCEDURE UNLISTED  ,     Cervical fusion       Social History:  Social History     Socioeconomic History    Marital status: SINGLE   Tobacco Use    Smoking status: Former Smoker     Packs/day: 0.50     Years: 45.00     Pack years: 22.50     Quit date: 2020     Years since quittin.0    Smokeless tobacco: Never Used   Vaping Use    Vaping Use: Never used   Substance and Sexual Activity    Alcohol use: No     Comment: quit 1980s- was heavy etoh    Drug use: Not Currently     Comment: marijuana, cocaine 30 yrs ago    Sexual activity: Never       Family History:  Family History   Problem Relation Age of Onset    Hypertension Mother     Depression Mother     Cancer Mother     Ovarian Cancer Mother     Breast Problems Mother     Cancer Father    [place] Sister     Depression Sister     Breast Cancer Sister     Depression Brother     Stroke Neg Hx     Heart Attack Neg Hx        Admission on 06/28/2022, Discharged on 06/28/2022   Component Date Value Ref Range Status    Ventricular Rate 06/28/2022 80  BPM Final    Atrial Rate 06/28/2022 80  BPM Final    P-R Interval 06/28/2022 136  ms Final    QRS Duration 06/28/2022 76  ms Final    Q-T Interval 06/28/2022 410  ms Final    QTC Calculation (Bezet) 06/28/2022 472  ms Final    Calculated P Axis 06/28/2022 14  degrees Final    Calculated R Axis 06/28/2022 -6  degrees Final    Calculated T Axis 06/28/2022 23  degrees Final    Diagnosis 06/28/2022    Final                    Value:Normal sinus rhythm  Cannot exclude anterior MI versus lead placement issue. Abnormal ECG  When compared with ECG of 16-FEB-2020 04:59,  No significant change was found  Confirmed by Yobani Pace MD, Thad Rangel [id number]) on 6/29/2022 8:07:12 AM      BENZODIAZEPINES 06/28/2022 Negative  NEG   Final    BARBITURATES 06/28/2022 Negative  NEG   Final    THC (TH-CANNABINOL) 06/28/2022 Positive* NEG   Final    OPIATES 06/28/2022 Negative  NEG   Final    PCP(PHENCYCLIDINE) 06/28/2022 Negative  NEG   Final    COCAINE 06/28/2022 Negative  NEG   Final    AMPHETAMINES 06/28/2022 Negative  NEG   Final    METHADONE 06/28/2022 Negative  NEG   Final    HDSCOM 06/28/2022 (NOTE)   Final    Comment: Specimen analysis was performed without chain of custody handling. These results should be used for medical purposes only and not for   legal or employment purposes. Unconfirmed screening results must not   be used for non-medical purposes.     The cut-off concentration for positive results are as follows:    AMPH     1000 ng/mL  KEYUR      200 ng/mL  ANU      200 ng/mL  BECCA       300 ng/mL  METH      300 ng/mL  OPI       300 ng/mL  PCP        25 ng/mL  THC        50 ng/mL       Hospital Outpatient Visit on 06/02/2022   Component Date Value Ref Range Status    IVSd 06/02/2022 1.2* 0.6 - 0.9 cm Final    LVIDd 06/02/2022 3.6* 3.9 - 5.3 cm Final    LVIDs 06/02/2022 3.0  cm Final    LVPWd 06/02/2022 1.2* 0.6 - 0.9 cm Final    Global Longitudinal Strain 06/02/2022 -14.2  % Final    Fractional Shortening 2D 06/02/2022 17  28 - 44 % Final    LVIDd Index 06/02/2022 1.55  cm/m2 Final    LVIDs Index 06/02/2022 1.29  cm/m2 Final    LV RWT Ratio 06/02/2022 0.67   Final    LV Mass 2D 06/02/2022 141.5  67 - 162 g Final    LV Mass 2D Index 06/02/2022 61.0  43 - 95 g/m2 Final       ECHO ADULT FOLLOW-UP OR LIMITED    Left Ventricle: Normal left ventricular systolic function with a   visually estimated EF of 55 - 60%. Left ventricle is smaller than normal.   Increased wall thickness. Findings consistent with mild concentric   hypertrophy. Normal wall motion. Global longitudinal strain is -14.2%. Strain performed on Vivid S70. Physical Exam:  Visit Vitals  /80   Pulse 76   Temp 97.3 °F (36.3 °C)   Resp 20   Ht 5' 5\" (1.651 m)   Wt 127.9 kg (282 lb)   SpO2 96%   BMI 46.93 kg/m²    BMI: Body mass index is 46.93 kg/m². Physical Exam  Constitutional:       Appearance: Normal appearance. Skin:     Comments: Port incision site well-healed, no seroma, left mastectomy scar revision site well-healed with no evidence of seroma   Neurological:      Mental Status: She is alert. I have reviewed the information entered by the clinical staff and/or patient and verified it as accurate or edited where necessary.      Electronically signed by:    Lisy Sapp DO, MPH

## 2022-07-21 ENCOUNTER — OFFICE VISIT (OUTPATIENT)
Dept: FAMILY MEDICINE CLINIC | Age: 62
End: 2022-07-21
Payer: MEDICARE

## 2022-07-21 VITALS
BODY MASS INDEX: 47.1 KG/M2 | WEIGHT: 282.7 LBS | SYSTOLIC BLOOD PRESSURE: 139 MMHG | HEART RATE: 68 BPM | HEIGHT: 65 IN | DIASTOLIC BLOOD PRESSURE: 82 MMHG | OXYGEN SATURATION: 94 % | RESPIRATION RATE: 20 BRPM | TEMPERATURE: 98.4 F

## 2022-07-21 DIAGNOSIS — M79.641 PAIN IN BOTH HANDS: ICD-10-CM

## 2022-07-21 DIAGNOSIS — K21.9 GASTROESOPHAGEAL REFLUX DISEASE, UNSPECIFIED WHETHER ESOPHAGITIS PRESENT: ICD-10-CM

## 2022-07-21 DIAGNOSIS — M79.642 PAIN IN BOTH HANDS: ICD-10-CM

## 2022-07-21 DIAGNOSIS — R73.03 PREDIABETES: Primary | ICD-10-CM

## 2022-07-21 DIAGNOSIS — Z12.11 SCREENING FOR COLON CANCER: ICD-10-CM

## 2022-07-21 LAB — HBA1C MFR BLD HPLC: 6.2 %

## 2022-07-21 PROCEDURE — 99213 OFFICE O/P EST LOW 20 MIN: CPT | Performed by: NURSE PRACTITIONER

## 2022-07-21 PROCEDURE — 36416 COLLJ CAPILLARY BLOOD SPEC: CPT | Performed by: NURSE PRACTITIONER

## 2022-07-21 PROCEDURE — 83036 HEMOGLOBIN GLYCOSYLATED A1C: CPT | Performed by: NURSE PRACTITIONER

## 2022-07-21 RX ORDER — SUCRALFATE 1 G/1
1 TABLET ORAL 2 TIMES DAILY
Qty: 180 TABLET | Refills: 3 | Status: SHIPPED | OUTPATIENT
Start: 2022-07-21

## 2022-07-21 RX ORDER — FLUCONAZOLE 150 MG/1
TABLET ORAL
COMMUNITY
Start: 2022-07-02 | End: 2022-08-31 | Stop reason: ALTCHOICE

## 2022-07-21 RX ORDER — LANOLIN ALCOHOL/MO/W.PET/CERES
1 CREAM (GRAM) TOPICAL
COMMUNITY
End: 2022-09-13

## 2022-07-21 RX ORDER — OXYBUTYNIN CHLORIDE 5 MG/1
5 TABLET ORAL 2 TIMES DAILY
COMMUNITY
Start: 2022-06-16

## 2022-07-21 NOTE — PROGRESS NOTES
Marisolantony Burns presents today for   Chief Complaint   Patient presents with    Establish Care       Is someone accompanying this pt? no    Is the patient using any DME equipment during OV? no    Depression Screening:  3 most recent PHQ Screens 7/21/2022   Little interest or pleasure in doing things Not at all   Feeling down, depressed, irritable, or hopeless Not at all   Total Score PHQ 2 0   Trouble falling or staying asleep, or sleeping too much -   Feeling tired or having little energy -   Poor appetite, weight loss, or overeating -   Feeling bad about yourself - or that you are a failure or have let yourself or your family down -   Trouble concentrating on things such as school, work, reading, or watching TV -   Moving or speaking so slowly that other people could have noticed; or the opposite being so fidgety that others notice -   Thoughts of being better off dead, or hurting yourself in some way -   PHQ 9 Score -   How difficult have these problems made it for you to do your work, take care of your home and get along with others -       Learning Assessment:  Learning Assessment 6/1/2022   PRIMARY LEARNER Patient   HIGHEST LEVEL OF EDUCATION - PRIMARY LEARNER  4 YEARS OF COLLEGE   BARRIERS PRIMARY LEARNER NONE   CO-LEARNER CAREGIVER No   PRIMARY LANGUAGE ENGLISH   LEARNER PREFERENCE PRIMARY DEMONSTRATION   ANSWERED BY self   RELATIONSHIP SELF       Fall Risk  Fall Risk Assessment, last 12 mths 6/1/2022   Able to walk? Yes   Fall in past 12 months? 0   Do you feel unsteady?  0   Are you worried about falling 0       ADL  ADL Assessment 7/21/2022   Feeding yourself No Help Needed   Getting from bed to chair No Help Needed   Getting dressed No Help Needed   Bathing or showering No Help Needed   Walk across the room (includes cane/walker) No Help Needed   Using the telphone No Help Needed   Taking your medications No Help Needed   Preparing meals No Help Needed   Managing money (expenses/bills) No Help Needed Moderately strenuous housework (laundry) No Help Needed   Shopping for personal items (toiletries/medicines) No Help Needed   Shopping for groceries No Help Needed   Driving No Help Needed   Climbing a flight of stairs No Help Needed   Getting to places beyond walking distances No Help Needed       Health Maintenance reviewed and discussed and ordered per Provider. Health Maintenance Due   Topic Date Due    COVID-19 Vaccine (3 - Booster for Moderna series) 07/26/2021    Colorectal Cancer Screening Combo  09/20/2021    Bone Densitometry  10/14/2021    A1C test (Diabetic or Prediabetic)  12/29/2021   . Coordination of Care:  1. \"Have you been to the ER, urgent care clinic since your last visit? Hospitalized since your last visit? \" No    2. \"Have you seen or consulted any other health care providers outside of the [address] [place] [address] since your last visit? \" No     3. For patients aged 39-70: Has the patient had a colonoscopy? No     If the patient is female:    4. For patients aged 41-77: Has the patient had a mammogram within the past 2 years? Yes - no Care Gap present - breast cancer    5. For patients aged 21-65: Has the patient had a pap smear?  No

## 2022-07-21 NOTE — PROGRESS NOTES
History of Present Illness  Bisi Damon is a 57 y.o. female who presents today to [facility]. past medical history significant for breast cancer she has completed chemotherapy and recently had her Mediport removed, COPD, morbid obesity status post gastric bypass, prediabetes, depression, fibrosarcoma, GERD. Only complaint today is bilateral hand pain, with the right being worst than the left. Denies any chest pain, shortness of breath, nausea or vomiting. Appetite is good.          Chief Complaint   Patient presents with    [facility]           Past Medical History:   Diagnosis Date    Alcoholism in remission [facility])     Asthma     Breast cancer ([facility]     breast cancer left    Chronic obstructive pulmonary disease ([facility]     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    GERD (gastroesophageal reflux disease)     History of gastric bypass 2012    History of meniscal tear 2015, 2018    right in 2015, left in 2018    HNP (herniated nucleus pulposus), lumbar     patient reported    Lung nodules     resolved 2018 CT    Neuropathy     Osteopenia 10/03/2017    Recurrent depression ([facility]     Sleep apnea     on cpap    Spinal stenosis, lumbar     patient reported    Xanthelasma of left upper eyelid 7/20/2020        Past Surgical History:   Procedure Laterality Date    HX ARTHRODESIS  01/2000    Herniated Disc, arthritis right foot 06/06, 07/07, C 6/7, C 4/6 Stenosis    HX CHOLECYSTECTOMY  09/2008    gallbladder disease    HX COLONOSCOPY  05/2009    HX GASTRIC BYPASS  2012    GASTRIC BYPASS  [facility] Gastric Bypass      HX HYSTERECTOMY  06/1994    HX MASTECTOMY Bilateral 11/23/2021    BILATERAL MASTECTOMY, LEFT SENTINEL NODE BIOPSY [facility] 11/22) performed by Maryanne Her MD at [address] Left 6/28/2022    revision of left mastectomy scar performed by Radhames Juarez DO at [address] [facility]     [facility]  10/2015    right repari of torn meniscus    HX MENISCECTOMY Left 03/16/2018 partial meniscectomy due to tear    HX MYOMECTOMY  06/1984    benign tumor    HX ORTHOPAEDIC  1964    orthopaedic excision Fibrosarcoma and Lymphangiogram    HX PELVIC LAPAROSCOPY  04/1984    large uterine blockage    NEUROLOGICAL PROCEDURE UNLISTED  2000, 2007    Cervical fusion        Current Medications  Current Outpatient Medications   Medication Sig    fluconazole (DIFLUCAN) 150 mg tablet     oxybutynin (DITROPAN) 5 mg tablet Take 5 mg by mouth two (2) times a day. ferrous sulfate 325 mg (65 mg iron) tablet Take 1 Tablet by mouth Daily (before breakfast). Lynparza 150 mg tablet     albuterol (PROVENTIL HFA, VENTOLIN HFA, PROAIR HFA) 90 mcg/actuation inhaler Take 2 Puffs by inhalation every four (4) hours as needed for Wheezing or Shortness of Breath for up to 90 days. b complex vitamins tablet Take 1 Tablet by mouth daily. fluticasone propionate (FLONASE) 50 mcg/actuation nasal spray SHAKE LIQUID AND USE 2 SPRAYS IN EACH NOSTRIL DAILY    omeprazole (PRILOSEC) 20 mg capsule Take 20 mg by mouth two (2) times a day. 2 tabs twice daily    gabapentin (NEURONTIN) 100 mg capsule Take 200 mg by mouth daily. citalopram (CELEXA) 20 mg tablet TAKE 1 TABLET BY MOUTH DAILY    buPROPion SR (WELLBUTRIN SR) 150 mg SR tablet Take 1 Tablet by mouth two (2) times a day. fluticasone-umeclidinium-vilanterol (Trelegy Ellipta) 100-62.5-25 mcg inhaler INHALE 1 PUFF BY MOUTH DAILY    sucralfate (CARAFATE) 1 gram tablet Take 1 tablet by mouth Four times a day as needed for indigestion. (Patient taking differently: two (2) times a day. Take 1 tablet by mouth Four times a day as needed for indigestion.)    diclofenac (Voltaren) 1 % gel Apply 2 g to affected area every six (6) hours as needed for Pain. acetaminophen (TYLENOL) 500 mg tablet Take  by mouth every six (6) hours as needed for Pain. calcium-cholecalciferol, d3, 600-125 mg-unit tab Take 1,065 mg by mouth nightly.     omega 3-dha-epa-fish oil 100-160-1,000 mg cap Take 1,065 mg by mouth daily. multivitamin (ONE A DAY) tablet Take 1 Tab by mouth daily. Linzess 72 mcg cap capsule TAKE 1 CAPSULE BY MOUTH DAILY BEFORE BREAKFAST     No current facility-administered medications for this visit.          Allergies   Allergen Reactions    Adhesive Tape-Silicones Swelling     REALLY BAD SWELLING AND SORE APPEAR    Alcohol Other (comments)     PT STATES SHE IS AN ALCOHOLIC    Lactose Other (comments)     PT STATES IT MAKES HER STOMACH HURT    Percodan [Oxycodone Hcl-Oxycodone-Asa] Itching and Other (comments)     crying    Nsaids (Non-Steroidal Anti-Inflammatory Drug) Other (comments)     PT NOT ABLE TO TAKE DUE TO GASTRIC BYPASS          Family History   Problem Relation Age of Onset    Hypertension Mother     Depression Mother     Cancer Mother     Ovarian Cancer Mother     Breast Problems Mother     Cancer Father     Cancer Sister     Depression Sister     Breast Cancer Sister     Depression Brother     Stroke Neg Hx     Heart Attack Neg Hx           Social History     Tobacco Use    Smoking status: Former     Packs/day: 0.50     Years: 45.00     Pack years: 22.50     Types: Cigarettes     Quit date: 2020     Years since quittin.0    Smokeless tobacco: Never   Vaping Use    Vaping Use: Never used   Substance Use Topics    Alcohol use: No     Comment: quit - was heavy etoh    Drug use: Not Currently     Comment: marijuana, cocaine 30 yrs ago        Health Maintenance   Topic Date Due    Colorectal Cancer Screening Combo  2021    Bone Densitometry  10/14/2021    Flu Vaccine (1) 2022    Low dose CT lung screening  2023    Depression Monitoring  2023    Medicare Yearly Exam  2023    Pneumococcal 0-64 years (3 - PPSV23 or PCV20) 2023    A1C test (Diabetic or Prediabetic)  2023    Lipid Screen  2025    DTaP/Tdap/Td series (3 - Td or Tdap) 2030    Hepatitis C Screening  Completed    Bone Densitometry (Dexa) Screening  Completed    Shingrix Vaccine Age 50>  Completed    COVID-19 Vaccine  Completed     Immunization History   Administered Date(s) Administered    COVID-19, MODERNA BLUE border, Primary or Immunocompromised, (age 18y+), IM, 100 mcg/0.5mL 01/29/2021, 02/26/2021    COVID-19, MODERNA Booster BLUE border, (age 18y+), IM, 50mcg/0.25mL 12/12/2021, 06/18/2022    Influenza Vaccine 09/16/2011, 10/03/2012, 10/24/2013    Influenza Vaccine (Quad) PF (>6 Mo Flulaval, Fluarix, and >3 Yrs Afluria, Fluzone [id number]) 09/14/2017, 09/12/2018, 09/27/2019, 09/18/2020, 10/13/2021    Pneumococcal Conjugate (PCV-13) 10/24/2013    Pneumococcal Polysaccharide (PPSV-23) 11/22/2017    TB Skin Test (PPD) Intradermal 10/06/2017, 09/27/2019, 10/08/2019, 08/03/2021    Tdap 02/10/2009, 09/18/2020    Zoster Recombinant 09/12/2018, 11/14/2018       Review of Systems  Review of Systems   All other systems reviewed and are negative. Physical Exam  Vitals reviewed. Constitutional:       Appearance: She is obese. Cardiovascular:      Rate and Rhythm: Normal rate and regular rhythm. Pulmonary:      Effort: Pulmonary effort is normal.      Breath sounds: Normal breath sounds. Abdominal:      General: Bowel sounds are normal.      Palpations: Abdomen is soft. Skin:     General: Skin is warm. Neurological:      Mental Status: She is alert and oriented to person, place, and time.           Visit Vitals  /82 (BP 1 Location: Right arm, BP Patient Position: Sitting, BP Cuff Size: Large adult)   Pulse 68   Temp 98.4 °F (36.9 °C) (Oral)   Resp 20   Ht 5' 5\" (1.651 m)   Wt 282 lb 11.2 oz (128.2 kg)   SpO2 94%   BMI 47.04 kg/m²          Laboratory/Tests:  Office Visit on 07/21/2022   Component Date Value Ref Range Status    Hemoglobin A1c (POC) 07/21/2022 6.2  % Final   Admission on 06/28/2022, Discharged on 06/28/2022   Component Date Value Ref Range Status    Ventricular Rate 06/28/2022 80  BPM Final    Atrial Rate 06/28/2022 80  BPM Final    P-R Interval 06/28/2022 136  ms Final    QRS Duration 06/28/2022 76  ms Final    Q-T Interval 06/28/2022 410  ms Final    QTC Calculation (Bezet) 06/28/2022 472  ms Final    Calculated P Axis 06/28/2022 14  degrees Final    Calculated R Axis 06/28/2022 -6  degrees Final    Calculated T Axis 06/28/2022 23  degrees Final    Diagnosis 06/28/2022    Final                    Value:Normal sinus rhythm  Cannot exclude anterior MI versus lead placement issue. Abnormal ECG  When compared with ECG of 16-FEB-2020 04:59,  No significant change was found  Confirmed by Lonnell Kussmaul, MD, [facility] ([id number]) on 6/29/2022 8:07:12 AM      BENZODIAZEPINES 06/28/2022 Negative  NEG   Final    BARBITURATES 06/28/2022 Negative  NEG   Final    THC (TH-CANNABINOL) 06/28/2022 Positive (A) NEG   Final    OPIATES 06/28/2022 Negative  NEG   Final    PCP(PHENCYCLIDINE) 06/28/2022 Negative  NEG   Final    COCAINE 06/28/2022 Negative  NEG   Final    AMPHETAMINES 06/28/2022 Negative  NEG   Final    METHADONE 06/28/2022 Negative  NEG   Final    HDSCOM 06/28/2022 (NOTE)   Final    Comment: Specimen analysis was performed without chain of custody handling. These results should be used for medical purposes only and not for   legal or employment purposes. Unconfirmed screening results must not   be used for non-medical purposes.     The cut-off concentration for positive results are as follows:    AMPH     1000 ng/mL  KEYUR      200 ng/mL  ANU      200 ng/mL  BECCA       300 ng/mL  METH      300 ng/mL  OPI       300 ng/mL  PCP        25 ng/mL  THC        50 ng/mL       Hospital Outpatient Visit on 06/02/2022   Component Date Value Ref Range Status    IVSd 06/02/2022 1.2 (A) 0.6 - 0.9 cm Final    LVIDd 06/02/2022 3.6 (A) 3.9 - 5.3 cm Final    LVIDs 06/02/2022 3.0  cm Final    LVPWd 06/02/2022 1.2 (A) 0.6 - 0.9 cm Final    Global Longitudinal Strain 06/02/2022 -14.2  % Final    Fractional Shortening 2D 06/02/2022 17  28 - 44 % Final LVIDd Index 06/02/2022 1.55  cm/m2 Final    LVIDs Index 06/02/2022 1.29  cm/m2 Final    LV RWT Ratio 06/02/2022 0.67   Final    LV Mass 2D 06/02/2022 141.5  67 - 162 g Final    LV Mass 2D Index 06/02/2022 61.0  43 - 95 g/m2 Final         Assessment/Plan:    1. Prediabetes  A1C today 6.2 increased from 5.9 on 7/20/2020  Reduce carbs and sugary foods  Initiate cardio exercise  Weight reduction  Increase water intake   - AMB POC HEMOGLOBIN A1C  - COLLECTION CAPILLARY BLOOD SPECIMEN    2. Gastroesophageal reflux disease, unspecified whether esophagitis present  Continue current treatment of Carafate  - sucralfate (CARAFATE) 1 gram tablet; Take 1 Tablet by mouth two (2) times a day. Take 1 tablet by mouth  Dispense: 180 Tablet; Refill: 3    3. Pain in both hands  Will have try physical therapy for her hand pain, if this doesn't help we will discuss other options, she is agreement with treatment plan  - REFERRAL TO PHYSICAL THERAPY    4. Screening for colon cancer  - REFERRAL TO GASTROENTEROLOGY       30 minutes of total time reviewing the records, talking with the patient, ordering tests and charting. I have discussed the diagnosis with the patient and the intended plan as seen in the above orders. The patient has received an after-visit summary and questions were answered concerning future plans. I have discussed medication side effects and warnings with the patient as well. I have reviewed the plan of care with the patient, accepted their input and they are in agreement with the treatment goals. Previous lab and imaging results were reviewed by me.        [address] [facility] [place], NP-C  July 21, 2022

## 2022-07-26 ENCOUNTER — TELEPHONE (OUTPATIENT)
Dept: PHYSICAL THERAPY | Age: 62
End: 2022-07-26

## 2022-07-29 ENCOUNTER — TELEPHONE (OUTPATIENT)
Dept: PHYSICAL THERAPY | Age: 62
End: 2022-07-29

## 2022-08-05 ENCOUNTER — TELEPHONE (OUTPATIENT)
Dept: FAMILY MEDICINE CLINIC | Age: 62
End: 2022-08-05

## 2022-08-05 ENCOUNTER — APPOINTMENT (OUTPATIENT)
Dept: PHYSICAL THERAPY | Age: 62
End: 2022-08-05
Attending: NURSE PRACTITIONER

## 2022-08-05 ENCOUNTER — HOSPITAL ENCOUNTER (OUTPATIENT)
Dept: PHYSICAL THERAPY | Age: 62
Discharge: HOME | End: 2022-08-05
Attending: NURSE PRACTITIONER
Payer: MEDICARE

## 2022-08-05 DIAGNOSIS — M79.642 PAIN IN BOTH HANDS: Primary | ICD-10-CM

## 2022-08-05 DIAGNOSIS — M79.641 PAIN IN BOTH HANDS: Primary | ICD-10-CM

## 2022-08-05 PROCEDURE — 97165 OT EVAL LOW COMPLEX 30 MIN: CPT

## 2022-08-05 PROCEDURE — 97535 SELF CARE MNGMENT TRAINING: CPT

## 2022-08-05 NOTE — PROGRESS NOTES
[facility]  [address]  [place], [address]  [phone] fax    Plan of Care/Statement of Necessity for Occupational Therapy Services    Patient name: Dhaval Bernard Start of Care: 2022   Referral source: Myranda Brandt NP-C : 1960    Medical Diagnosis: No admission diagnoses are documented for this encounter. Payor: Gil John / Plan: [address] / Product Type: Managed Care Medicare /  Onset Date:May 2022    Treatment Diagnosis: xenia wrist pain   Prior Hospitalization: see medical history Provider#: [id number]   Medications: Verified on Patient summary List    Comorbidities: Active breast ca, asthma, arthritis, depression   Prior Level of Function: Mod A with ADL/IADL tasks secondary to ca diagnosis           The Plan of Care and following information is based on the information from the initial evaluation. Assessment/ key information: Patient is a right hand dominant 57 y.o. female with a chief complaint  of xenia hand pain (Left > R) beginning after mediport IV chemotherapy completed in May 2022. Pt reports she is currently taking the pill form of chemotherapy and takes gabapentin which has relieved the numbness and tingling but not the pain. Pt c/o pain beginning in xenia thumbs and radial wrists then radiates circumferentially about both wrists. Pt presents to skilled OT today rating her pain level 6/10 in the left wrist and 3/10 in the right wrist that is worsened with any use. She reports some relief when wearing a wrist immobilization brace on the left wrist which she has tried intermittently. There is tenderness with palpation to the base of xenia thumbs and radial wrists. Her xenia writs AROM is [employer] however pain with all movements. Assessed  and pinch strength and pt reports increased pain in the left wrist with  and pinch tasks. There is edema in the wrist noted.   There is also positive provocation of symptoms bilaterally with Finkelstein's testing. Patient received an initial evaluation today followed by education as to diagnosis, precautions and treatment plan. Pt was recommended to wear a left wrist and thumb spica and therapist assisted with were to purchase a brace. Pt educated on activity modifications and activities to avoid in order to reduce pain in xenia wrists. Skilled OT services are necessary to address aforementioned deficits to improve quality of life. Evaluation Complexity: History LOW Complexity : Brief history review  Examination LOW Complexity : 1-3 performance deficits relating to physical, cognitive , or psychosocial skils that result in activity limitations and / or participation restrictions  Clinical Decision Making MEDIUM Complexity : Patient may present with comorbidities that affect occupational performnce.  Miniml to moderate modification of tasks or assistance (eg, physical or verbal ) with assesment(s) is necessary to enable patient to complete evaluation   Overall Complexity Rating: LOW     Patient would benefit from OT/Hand therapy services for the following problems:  Problem List: Pain effecting function, Decreased range of motion, Decreased strength, Edema effecting function, Decreased coordination/prehension, Decreased ADL/functional abilities , Decreased activity tolerance, Decreased flexibility/joint mobility, and Sensability     Treatment Plan may include any combination of the following: Therapeutic exercise, Therapeutic activities, Neuromuscular re-education, Physical agent/modality, Manual therapy, Splinting/orthoses, Patient education, and ADLs/IADLs    Patient / Family readiness to learn indicated by: asking questions, trying to perform skills, and interest    Persons(s) to be included in education:   patient (P)    Barriers to Learning/Limitations: None    Patient Goal (s): relief of pain symptoms    Patient Self Reported Health Status: fair    Rehabilitation Potential: good    Short Term Goals: To be accomplished in 2  weeks:  Goal:* Patient will be compliant with initial home exercise program to take an active role in their rehabilitation process. Status at Eval: not yet provided gentle wrist AROM    Goal:* Patient will demonstrate a good understanding of their condition and strategies for self-management. Status at Eval: pt educated on  prognosis, diagnosis, treatment plan, precautions, activity modifications, wrist and thumb spica wear    Long Term Goals: To be accomplished in 4 weeks:    Goal:* Patient will have 68 degrees of left wrist flexion in order to perform hygiene tasks and /or work with tools such as a screwdriver. Status at Eval:63 deg    Goal:* Patient will demonstrate a 0.2 cm decrease in edema of the left wrist in order to begin participating in regular daily tasks. Status at Eval: left wrist 18.0 cm circum     Goal:* pt will report reduced pain in the left wrist, 0-2, in order to increase participation with grooming, bathing and dressing tasks using the left UE. Status at eval: left wrist 6/10    Goal:* pt will report reduced pain in the left wrist, 0-2, in order to increase participation with grooming, bathing and dressing tasks using the right UE. Status at eval: right wrist 3/10    Goal:* Patient will show a 13 point improvement on FOTO functional status measure to improve overall functional performance.   Status at Eval: 48    Frequency / Duration: Patient to be seen 2 times per week for 4 weeks:  Patient/ Caregiver education and instruction: Diagnosis, prognosis, self care, activity modification, brace/ splint application, and exercises  [x]  Plan of care has been reviewed with [facility]    Certification Period: 8/5/2022 -9/3/2022  Cuong Malik OT 8/5/2022 9:12 AM    ________________________________________________________________________    I certify that the above Therapy Services are being furnished while the patient is under my care. I agree with the treatment plan and certify that this therapy is necessary.     [de-identified] Signature:____________Date:_________TIME:________     Myranda Brandt, KIM-C  ** Signature, Date and Time must be completed for valid certification **    Please sign and return to [address], [facility] [address]  [phone] fax

## 2022-08-05 NOTE — PROGRESS NOTES
Hand Therapy Evaluation and Daily Note    Patient Name: Sonia Hinojosa  Date:2022  : 1960  Age: 57 y.o.y/o  [x]  Patient  Verified  Payor: [employer] MEDICARE COMPLETE / Plan: [facility] MEDICARE COMPLETE / Product Type: [employer] Medicare /    Referring Provider: RONNIE Mata MD Visit:  10/19/2022  Onset Date:  May 2022  Surgical Date: na  Surgical Procedure: na    In time:9:20 AM  Out time:10:00 AM  Total Treatment Time (min): 40  Total Timed Codes (min): 25  1:1 Treatment Time ( [address] only): 40   Visit #: 1 of 8    Treatment Area: No admission diagnoses are documented for this encounter. Precautions:    Hand Dominance: right handed   Hand Involved: bilateral    Total Evaluation Time:  15    History of Present Condition:  Patient is a right hand dominant 57 y.o. female with a chief complaint  of xenia hand pain (Left > R) beginning after mediport IV chemotherapy completed in May 2022. Pt reports she is currently taking the pill form of chemotherapy and takes gabapentin which has relieved the numbness and tingling but not the pain. Pt c/o pain beginning in xenia thumbs and radial wrists then radiates circumferentially about both wrists. Pain Rating:   Current: (0-no pain 10-debilitating pain) moderate  6/10  At best: (0-no pain 10-debilitating pain) mild 3/10  At worst: (0-no pain 10-debilitating pain) moderate 7/10  Location: bilateral hand and bilateral thumb  Type:  moderate   Better with: medication - Voltaren, wrist immobilization brace  Worse with: any use    Medications/Allergies/Past Medical History:  See chart; reviewed with patient. Active breast ca, asthma, arthritis, depression    Diagnostic Tests: none    Prior Level of Function:  Mod A with ADL/IADL tasks secondary to ca diagnosis    Current Level of Function:  Mod-Max A with IADL tasks, driving with pain     Social History: Pt lives with sister in home    Occupation/Job Requirements: retired school teacher    Observation: AROM WFL  Scar/incision:   na  Location:  xenia     Palpation:  tenderness with palpation to base of thumbs and radial wrists. Range of Motion:    Elbow/Wrist   Wrist Right/Left    Flex 68/63    Ext 84/80    UD 50/39    RD 22/22    FA      Pro 84/84    Sup 69/77      Strength:   Measurements: Taken with Cash Dynamometer, in Lbs   Level 2 8/5/2022    Right 21   Left 11   Deficit    Change          Pinch Measurements: Taken with Pinch Gauge, in Lbs   (hand) 8/5/2022    Lateral     Right 11   Left     Deficit    Change    Pad    Right    Left    Deficit    Change    Oh    Right    Left    Deficit    Change        Sensation:    intact      Edema: GIRTH CHART measured in cm  Date: 8/5/2022     Side Right/Left    DPC circum. 20.3/20.0    Wrist Crease 17.4/18.0    FA      Elbow           Special Tests:     ADLs  Feeding:        []MaxA   []ModA   []Krysten   [] CGA   []SBA   []Clemente   [x]Independent  UE Dressing:       []MaxA   []ModA   []Krysten   [] CGA   []SBA   []Clemente   [x]Independent  LE Dressing:       []MaxA   []ModA   []Krysten   [] CGA   []SBA   []Clemente   [x]Independent  Grooming:       []MaxA   []ModA   []Krysten   [] CGA   []SBA   []Clemente   [x]Independent  Toileting:       []MaxA   []ModA   []Krysten   [] CGA   []SBA   []Clemente   [x]Independent  Bathing:       []MaxA   []ModA   []Krysten   [] CGA   []SBA   []Clemente   [x]Independent  Light Meal Prep:    [x]MaxA   []ModA   []Krysten   [] CGA   []SBA   []Clemente   []Independent  Household/Other: [x]MaxA   []ModA   []Krysten   [] CGA   []SBA   []Clemente   []Independent  Adaptive Equip:     []MaxA   []ModA   []Krysten   [] CGA   []SBA   []Clemente   []Independent  Driving:       []MaxA   []ModA   []Krysten   [] CGA   []SBA   []Clemente   [x]Independent      Todays Treatment:  Patient received an initial evaluation today followed by education as to diagnosis, precautions and treatment plan.  Pt was recommended to wear a left wrist and thumb spica and therapist assisted with were to purchase a brace. Pt educated on activity modifications and activities to avoid in order to reduce pain in xenia wrists. OBJECTIVE  Modality rationale: decrease pain and increase tissue extensibility to improve the patients ability to move xenia wrists. Min Type Additional Details    [] Estim:  []Unatt       []IFC  []Premod                        []Other:  []w/ice   []w/heat  Position:  Location:    [] Estim: []Att    []TENS instruct  []NMES                    []Other:  []w/US   []w/ice   []w/heat  Position:  Location:    []  Traction: [] Cervical       []Lumbar                       [] Prone          []Supine                       []Intermittent   []Continuous Lbs:  [] before manual  [] after manual    []  Ultrasound: []Continuous   [] Pulsed                           []1MHz   []3MHz W/cm2:  Location:    []  Iontophoresis with dexamethasone         Location: [] Take home patch   [] In clinic   5 concurrent with self care []  Ice     [x]  Heat MHP  []  Ice massage  []  Laser   []  Paraffin Position:seated, resting  Location: xenia wrists    []  Laser with stim  []  Other:  Position:  Location:    []  Vasopneumatic Device Pressure:       [] lo [] med [] hi   Temperature: [] lo [] med [] hi       [x] Skin assessment post-treatment:  [x]intact []redness- no adverse reaction    25 min Self Care/Home Management: prognosis, diagnosis, treatment plan, precautions, activity modifications, wrist and thumb spica wear   Rationale:  education   to improve the patients ability to reduce pain and improve functional use of xenia wrists for daily tasks.      With   [] TE   [] TA   [] neuro   [] other: Patient Education: [x] Review HEP    [] Progressed/Changed HEP based on:   [] positioning   [] body mechanics   [] transfers   [] heat/ice application   [] Splint wear/care   [] Sensory re-education   [] scar management      [] other:      Pain Level (0-10 scale) post treatment: 3/10    Patient will continue to benefit from skilled OT services to modify and progress therapeutic interventions, address ROM deficits, address strength deficits, analyze and address soft tissue restrictions, and instruct in home and community integration to attain goals. Assessment: Pt presents to skilled OT today rating her pain level 6/10 in the left wrist and 3/10 in the right wrist that is worsened with any use. She reports some relief when wearing a wrist immobilization brace on the left wrist which she has tried intermittently. There is tenderness with palpation to the base of xenia thumbs and radial wrists. Her xenia writs AROM is [employer] [place] however pain with all movements. Assessed  and pinch strength and pt reports increased pain in the left wrist with  and pinch tasks. There is edema in the wrist noted. There is also positive provocation of symptoms bilaterally with Finkelstein's testing. Evaluation Complexity: History LOW Complexity : Brief history review  Examination LOW Complexity : 1-3 performance deficits relating to physical, cognitive , or psychosocial skils that result in activity limitations and / or participation restrictions  Clinical Decision Making MEDIUM Complexity : Patient may present with comorbidities that affect occupational performnce.  Miniml to moderate modification of tasks or assistance (eg, physical or verbal ) with assesment(s) is necessary to enable patient to complete evaluation   Overall Complexity Rating: LOW     Patient would benefit from OT/Hand therapy services for the following problems:  Problem List: Pain effecting function, Decreased range of motion, Decreased strength, Edema effecting function, Decreased coordination/prehension, Decreased ADL/functional abilities , Decreased activity tolerance, Decreased flexibility/joint mobility, and Sensability     Treatment Plan may include any combination of the following: Therapeutic exercise, Therapeutic activities, Neuromuscular re-education, Physical agent/modality, Manual therapy, Splinting/orthoses, Patient education, and ADLs/IADLs    Patient / Family readiness to learn indicated by: asking questions, trying to perform skills, and interest    Persons(s) to be included in education:   patient (P)    Barriers to Learning/Limitations: None    Patient Goal (s): relief of pain symptoms    Patient Self Reported Health Status: fair    Rehabilitation Potential: good    Short Term Goals: To be accomplished in 2  weeks:  Goal:* Patient will be compliant with initial home exercise program to take an active role in their rehabilitation process. Status at Eval: not yet provided gentle wrist AROM    Goal:* Patient will demonstrate a good understanding of their condition and strategies for self-management. Status at Eval: pt educated on  prognosis, diagnosis, treatment plan, precautions, activity modifications, wrist and thumb spica wear    Long Term Goals: To be accomplished in 4 weeks:    Goal:* Patient will have 68 degrees of left wrist flexion in order to perform hygiene tasks and /or work with tools such as a screwdriver. Status at Eval:63 deg    Goal:* Patient will demonstrate a 0.2 cm decrease in edema of the left wrist in order to begin participating in regular daily tasks. Status at Eval: left wrist 18.0 cm circum     Goal:* pt will report reduced pain in the left wrist, 0-2, in order to increase participation with grooming, bathing and dressing tasks using the left UE. Status at eval: left wrist 6/10    Goal:* pt will report reduced pain in the left wrist, 0-2, in order to increase participation with grooming, bathing and dressing tasks using the right UE. Status at eval: right wrist 3/10    Goal:* Patient will show a 13 point improvement on FOTO functional status measure to improve overall functional performance.   Status at Eval: 48    Frequency / Duration: Patient to be seen 2 times per week for 4 weeks:  Patient/ Caregiver education and instruction: Diagnosis, prognosis, self care, activity modification, brace/ splint application, and exercises      Certification Period: 8/5/2022 -9/3/2022    April Lincoln OT, 8/5/2022 9:11 AM

## 2022-08-08 ENCOUNTER — HOSPITAL ENCOUNTER (OUTPATIENT)
Dept: PHYSICAL THERAPY | Age: 62
Discharge: HOME | End: 2022-08-08
Attending: NURSE PRACTITIONER
Payer: MEDICARE

## 2022-08-08 PROCEDURE — 97530 THERAPEUTIC ACTIVITIES: CPT

## 2022-08-08 PROCEDURE — 97140 MANUAL THERAPY 1/> REGIONS: CPT

## 2022-08-08 PROCEDURE — 97110 THERAPEUTIC EXERCISES: CPT

## 2022-08-08 PROCEDURE — 97535 SELF CARE MNGMENT TRAINING: CPT

## 2022-08-08 NOTE — PROGRESS NOTES
OT DAILY TREATMENT NOTE     Patient Name: Porsha Bell  Date:2022  : 1960  [x]  Patient  Verified  Payor: [employer] MEDICARE COMPLETE / Plan: [address] / Product Type: Managed Care Medicare /    In time:2:31  Out time:3:15  Total Treatment Time (min): 44  Visit #: 2 of 8    Medicare/BCBS Only   Total Timed Codes (min):  44 1:1 Treatment Time:  44     Treatment Area: Pain in right hand [M79.641]  Pain in left hand [M79.642]    SUBJECTIVE  Pain Level (0-10 scale): 4-5/10 left , 2/10 right   Any medication changes, allergies to medications, adverse drug reactions, diagnosis change, or new procedure performed?: [x] No    [] Yes (see summary sheet for update)  Subjective functional status/changes:   [] No changes reported    \"I took medication before coming here\"  \"Wrist brace ordered and will be here Thursday\"  \"I am retired because of my breast cancer diagnosis\"   \"Wrists hurt when I am at my computer\"    OBJECTIVE    Modality rationale: decrease pain and increase tissue extensibility to improve the patients ability to move xenia wrists.     Min Type Additional Details      [] Estim:  []Unatt       []IFC  []Premod                        []Other: []w/ice   []w/heat  Position:  Location:      [] Estim: []Att    []TENS instruct  []NMES                    []Other: []w/US   []w/ice   []w/heat  Position:  Location:      []  Traction: [] Cervical       []Lumbar                       [] Prone          []Supine                       []Intermittent   []Continuous Lbs:  [] before manual  [] after manual      []  Ultrasound: []Continuous   [] Pulsed                           []1MHz   []3MHz W/cm2:  Location:      []  Iontophoresis with dexamethasone        Location: [] Take home patch  [] In clinic    5 concurrent with self care []  Ice     [x]  Heat MHP  []  Ice massage  []  Laser  []  Paraffin Position:seated, resting  Location: xenia wrists      []  Laser with stim  []  Other: Position:  Location:      []  Vasopneumatic Device Pressure:       [] lo [] med [] hi  Temperature: [] lo [] med [] hi      [x] Skin assessment post-treatment:  [x]intact []redness- no adverse reaction      17 min Therapeutic Exercise:  [] See flow sheet :   Rationale: increase ROM to improve the patients ability to move wrist and  for functional daily tasks. Bilateral hands/wrist:     Junito Lindsey   Wrist ROM HEP - flex/ext no fist.   Towel scrunches   Tendon glides       12 min Manual Therapy:  IASTM #6 using sweeping and fanning techniques over dorsal radial wrists and thumbs. The manual therapy interventions were performed at a separate and distinct time from the therapeutic activities interventions. Rationale: decrease pain, increase ROM, increase tissue extensibility, and decrease edema  to move wrist and  for functional daily tasks. 15 min Self Care/Home Management: modalities, edema management , work space positioning, activity modifications. Rationale:  patient educations   to improve the patients ability to self-manage symptoms and improve functional use of bilateral UE's for daily tasks. With   [] TE   [] TA   [] neuro   [] other: Patient Education: [x] Review HEP    [x] Progressed/Changed HEP based on: initiated gentle wrist ROM HEP and tendon glide HEP. [] positioning   [] body mechanics   [] transfers   [] heat/ice application   [] Splint wear/care   [] Sensory re-education   [] scar management      [] other:            Other Objective/Functional Measures:     Edema: GIRTH CHART measured in cm  Date: 8/5/2022 8/8/2022    Side Right/Left Right/Left    DPC circum.  20.3/20.0  19.7cm/19.4cm   Wrist Crease 17.4/18.0 17cm/ 17.8cm   FA       Elbow             Pain Level (0-10 scale) post treatment: 5/10 left , 3/10 right     ASSESSMENT/Changes in Function: increased pain in bilateral wrists with wrist ROM exercises, pt able to complete digit ROM exercises without increased pain, decreased edema in bilateral wrist and DPC, pt heavily education on activity modification strategies and positioning/ergonomics at computer space. Patient will continue to benefit from skilled OT services to address ROM deficits, address strength deficits, analyze and address soft tissue restrictions, analyze and cue movement patterns, and analyze and modify body mechanics/ergonomics to attain remaining goals. [x]  See Plan of Care  []  See progress note/recertification  []  See Discharge Summary         Progress towards goals / Updated goals:    Short Term Goals: To be accomplished in 2  weeks:  Goal:* Patient will be compliant with initial home exercise program to take an active role in their rehabilitation process. Status at [facility]: not yet provided gentle wrist AROM  8/8/2022: initiated gentle wrist ROM HEP and Tendon glide HEP. Goal:* Patient will demonstrate a good understanding of their condition and strategies for self-management. Status at [facility]: pt educated on  prognosis, diagnosis, treatment plan, precautions, activity modifications, wrist and thumb spica wear  8/8/2022: education provided on work space/computer space positioning. Provided dycem to assist with opening jars. Long Term Goals: To be accomplished in 4 weeks:               Goal:* Patient will have 68 degrees of left wrist flexion in order to perform hygiene tasks and /or work with tools such as a screwdriver. Status at Eval:63 deg     Goal:* Patient will demonstrate a 0.2 cm decrease in edema of the left wrist in order to begin participating in regular daily tasks. Status at [facility]: left wrist 18.0 cm circum     Goal:* pt will report reduced pain in the left wrist, 0-2, in order to increase participation with grooming, bathing and dressing tasks using the left UE.   Status at eval: left wrist 6/10     Goal:* pt will report reduced pain in the left wrist, 0-2, in order to increase participation with grooming, bathing and dressing tasks using the right UE. Status at eval: right wrist 3/10     Goal:* Patient will show a 13 point improvement on FOTO functional status measure to improve overall functional performance.   Status at Eval: 50    PLAN  []  Upgrade activities as tolerated     [x]  Continue plan of care  []  Update interventions per flow sheet       []  Discharge due to:_  []  Other:_      VAN Parra 8/8/2022  1:07 PM    Future Appointments   Date Time Provider Cesar Dang   8/8/2022  2:30 PM [place] Jossy, [facility]   8/10/2022  1:00 PM [place] Jossy, [facility]   8/15/2022  1:00 PM Sunday Maurer, OT [facility]   8/17/2022 11:30 AM Jared Fenton, [facility]   8/22/2022  8:30 AM Sunday Maurer, OT [facility]   8/24/2022  8:30 AM Sunday Maurer, OT [facility]   8/29/2022 10:00 AM Sunday Maurer, OT [facility]   9/13/2022  8:00 AM PPA SPIROMETRY [facility]   9/13/2022  9:45 AM Ethel Levi MD [facility]   10/13/2022  8:30 AM DO ULICES Hernandez [facility]   10/19/2022  9:00 AM Myranda Brandt NPMichelleC [facility] BS AM[facility]

## 2022-08-10 ENCOUNTER — HOSPITAL ENCOUNTER (OUTPATIENT)
Dept: PHYSICAL THERAPY | Age: 62
Discharge: HOME | End: 2022-08-10
Attending: NURSE PRACTITIONER
Payer: MEDICARE

## 2022-08-10 PROCEDURE — 97535 SELF CARE MNGMENT TRAINING: CPT

## 2022-08-10 PROCEDURE — 97110 THERAPEUTIC EXERCISES: CPT

## 2022-08-10 PROCEDURE — 97140 MANUAL THERAPY 1/> REGIONS: CPT

## 2022-08-10 NOTE — PROGRESS NOTES
OT DAILY TREATMENT NOTE     Patient Name: Kay Munoz  Date:8/10/2022  : 1960  [x]  Patient  Verified  Payor: AARP MEDICARE COMPLETE / Plan: [facility] MEDICARE COMPLETE / Product Type: Managed Care Medicare /    In time:1:03  Out time:1:43  Total Treatment Time (min): 40  Visit #: 3 of 8    Medicare/BCBS Only   Total Timed Codes (min):  40 1:1 Treatment Time:  40     Treatment Area: Pain in right hand [M79.641]  Pain in left hand [M79.642]    SUBJECTIVE  Pain Level (0-10 scale): 4/10 left, 0/10 right  Any medication changes, allergies to medications, adverse drug reactions, diagnosis change, or new procedure performed?: [x] No    [] Yes (see summary sheet for update)  Subjective functional status/changes:   [] No changes reported    \"My left thumb really hurt after last time, I couldn't use it to drive\"  \"I changed my keyboard on the computer\"    OBJECTIVE    Modality rationale: decrease edema, decrease pain, and increase tissue extensibility to improve the patients ability to move wrist and  for functional daily tasks.    Min Type Additional Details    [] Estim:  []Unatt       []IFC  []Premod                        []Other:  []w/ice   []w/heat  Position:  Location:    [] Estim: []Att    []TENS instruct  []NMES                    []Other:  []w/US   []w/ice   []w/heat  Position:  Location:    []  Traction: [] Cervical       []Lumbar                       [] Prone          []Supine                       []Intermittent   []Continuous Lbs:  [] before manual  [] after manual    []  Ultrasound: []Continuous   [] Pulsed                           []1MHz   []3MHz W/cm2:  Location:    []  Iontophoresis with dexamethasone         Location: [] Take home patch   [] In clinic   5 min with self care []  Ice     [x]  Heat- MHP  []  Ice massage  []  Laser   []  Paraffin Position: seated/ resting  Location: bilateral hands/wrist    []  Laser with stim  []  Other:  Position:  Location:    []  Vasopneumatic Device    []  Right     []  Left  Pre-treatment girth:  Post-treatment girth:  Measured at (location):  Pressure:       [] lo [] med [] hi   Temperature: [] lo [] med [] hi       [x] Skin assessment post-treatment:  [x]intact [x]redness- no adverse reaction    []redness - adverse reaction:     15 min Therapeutic Exercise:  [] See flow sheet :   Rationale: increase ROM to improve the patients ability to move wrist and  for functional daily tasks. Bilateral hands:    Ball rolls- 2 min  Towel scrunches  Tendon glides  Wrist AROM HEP  Finger abd/add with marbles    10 min Manual Therapy:  IASTM #6 using sweeping and fanning techniques over dorsal radial wrists and thumbs. The manual therapy interventions were performed at a separate and distinct time from the therapeutic activities interventions. Rationale: decrease pain, increase ROM, increase tissue extensibility, and decrease edema  to improve the patients ability to move wrist and  for functional daily tasks. 15 min Self Care/Home Management: pain management, ice/heat modality, activity modification strategies. Rationale:  patient education   to improve the patients ability to self-manage symptoms and improve functional use of bilateral UE's for daily tasks. With   [] TE   [] TA   [] neuro   [] other: Patient Education: [x] Review HEP    [x] Progressed/Changed HEP based on: pt independent with wrist AROM HEP, min cueing for positioning during tendon glide HEP   [] positioning   [] body mechanics   [] transfers   [] heat/ice application   [] Splint wear/care   [] Sensory re-education   [] scar management      [] other:            Other Objective/Functional Measures:     Pt tolerated all wrist and hand ROM exercises. Pt unable to tolerate paraffin wax treatment due to sensitivity to the heat. increased time to complete finger abd/add tasks with left hand.     Pain Level (0-10 scale) post treatment: 3/10 left, 0/10 right    ASSESSMENT/Changes in Function: decreased pain and stiffness after heat modality and IASTM massage, pt completed all wrist and hand ROM exercises with no increase in pain level, pt educated on joint protection strategies and precautions (provided handout), pt unable to tolerate paraffin wax today due to increased sensitivity. Patient will continue to benefit from skilled OT services to address strength deficits, analyze and address soft tissue restrictions, analyze and cue movement patterns, and analyze and modify body mechanics/ergonomics to attain remaining goals. [x]  See Plan of Care  []  See progress note/recertification  []  See Discharge Summary         Progress towards goals / Updated goals:    Short Term Goals: To be accomplished in 2  weeks:  Goal:* Patient will be compliant with initial home exercise program to take an active role in their rehabilitation process. Status at [facility]: not yet provided gentle wrist AROM  8/8/2022: initiated gentle wrist ROM HEP and Tendon glide HEP. 8/10/2022: pt independent with wrist AROM HEP, min cueing for positioning during tendon glide HEP. Goal:* Patient will demonstrate a good understanding of their condition and strategies for self-management. Status at [facility]: pt educated on  prognosis, diagnosis, treatment plan, precautions, activity modifications, wrist and thumb spica wear  8/8/2022: education provided on work space/computer space positioning. Provided dycem to assist with opening jars. 8/10/202: education provided on precautions/activity modification/joint protection strategies. Long Term Goals: To be accomplished in 4 weeks:               Goal:* Patient will have 68 degrees of left wrist flexion in order to perform hygiene tasks and /or work with tools such as a screwdriver.   Status at [facility]:63 deg     Goal:* Patient will demonstrate a 0.2 cm decrease in edema of the left wrist in order to begin participating in regular daily tasks.  Status at Eval: left wrist 18.0 cm circum     Goal:* pt will report reduced pain in the left wrist, 0-2, in order to increase participation with grooming, bathing and dressing tasks using the left UE. Status at eval: left wrist 6/10     Goal:* pt will report reduced pain in the left wrist, 0-2, in order to increase participation with grooming, bathing and dressing tasks using the right UE. Status at eval: right wrist 3/10     Goal:* Patient will show a 13 point improvement on FOTO functional status measure to improve overall functional performance.   Status at Eval: 50       PLAN  []  Upgrade activities as tolerated     [x]  Continue plan of care  []  Update interventions per flow sheet       []  Discharge due to:_  []  Other:_      Murraychica Trammell 8/10/2022  9:35 AM    Future Appointments   Date Time Provider Cesar Dang   8/10/2022  1:00 PM VAN Wilkerson [facility]   8/15/2022  1:00 PM Charito Smith, OT [facility]   8/17/2022 11:30 AM VAN Wilkerson [facility]   8/22/2022  8:30 AM Charito Smith, OT [facility]   8/24/2022  8:30 AM Charito Smith, OT [facility]   8/29/2022 10:00 AM Charito Smith, OT [facility]   9/13/2022  8:00 AM PPA SPIROMETRY [facility] AM[facility]   9/13/2022  9:45 AM MD LUIZ Warner [facility] AMB   10/13/2022  8:30 AM DO ULICES An BS AMB   10/19/2022  9:00 AM Myranda Brandt NPMichelleC [facility] AMB

## 2022-08-15 ENCOUNTER — HOSPITAL ENCOUNTER (OUTPATIENT)
Dept: PHYSICAL THERAPY | Age: 62
Discharge: HOME | End: 2022-08-15
Attending: NURSE PRACTITIONER
Payer: MEDICARE

## 2022-08-15 PROCEDURE — 97535 SELF CARE MNGMENT TRAINING: CPT

## 2022-08-15 PROCEDURE — 97140 MANUAL THERAPY 1/> REGIONS: CPT

## 2022-08-15 PROCEDURE — 97110 THERAPEUTIC EXERCISES: CPT

## 2022-08-15 NOTE — PROGRESS NOTES
OT DAILY TREATMENT NOTE     Patient Name: Osmel Reece  Date:8/15/2022  : 1960  [x]  Patient  Verified  Payor: AARP MEDICARE COMPLETE / Plan: [facility] MEDICARE COMPLETE / Product Type: Managed Care Medicare /    In time:1:04 PM  Out time:1:50 PM  Total Treatment Time (min): 55  Visit #: 4 of 8    Medicare/BCBS Only   Total Timed Codes (min):  46 1:1 Treatment Time:  46     Treatment Area: Pain in right hand [M79.641]  Pain in left hand [M79.642]    SUBJECTIVE  Pain Level (0-10 scale): 0/10  Any medication changes, allergies to medications, adverse drug reactions, diagnosis change, or new procedure performed?: [x] No    [] Yes (see summary sheet for update)  Subjective functional status/changes:   [] No changes reported  \"The exercises and the brace are really helping. \"    OBJECTIVE    Modality rationale: decrease pain and increase tissue extensibility to improve the patients ability to functionally use xenia hands   Min Type Additional Details    [] Estim:  []Unatt       []IFC  []Premod                        []Other:  []w/ice   []w/heat  Position:  Location:    [] Estim: []Att    []TENS instruct  []NMES                    []Other:  []w/US   []w/ice   []w/heat  Position:  Location:    []  Traction: [] Cervical       []Lumbar                       [] Prone          []Supine                       []Intermittent   []Continuous Lbs:  [] before manual  [] after manual    []  Ultrasound: []Continuous   [] Pulsed                           []1MHz   []3MHz W/cm2:  Location:    []  Iontophoresis with dexamethasone         Location: [] Take home patch   [] In clinic   5, concurrent with self care []  Ice     [x]  Heat MHP  []  Ice massage  []  Laser   []  Paraffin Position:seated, resting  Location: xenia hands    []  Laser with stim  []  Other:  Position:  Location:    []  Vasopneumatic Device    []  Right     []  Left  Pre-treatment girth:  Post-treatment girth:  Measured at (location):  Pressure:       [] lo [] med [] hi   Temperature: [] lo [] med [] hi       [x] Skin assessment post-treatment:  [x]intact []redness- no adverse reaction    []redness - adverse reaction:     23 min Therapeutic Exercise:  [x] See flow sheet :   Rationale: increase ROM and improve coordination to improve the patients ability to grasp, manipulate items and move xenia wrists to improve use for daily tasks. 15 min Manual Therapy:  IASTM using tool #6 using sweeping technique    The manual therapy interventions were performed at a separate and distinct time from the therapeutic activities interventions. Rationale: increase tissue extensibility and decrease edema  to xenia hands at radial wrists and thumbs     8 min Self Care/Home Management: edema mgmt, compression garment wear   Rationale:  education   to improve the patients ability to reduce symptoms in left wrist    With   [] TE   [] TA   [] neuro   [] other: Patient Education: [x] Review HEP    [] Progressed/Changed HEP based on:   [] positioning   [] body mechanics   [] transfers   [] heat/ice application   [] Splint wear/care   [] Sensory re-education   [] scar management      [] other:            Other Objective/Functional Measures:      Edema: GIRTH CHART measured in cm  Date: 8/5/2022   8/15/2022    Side Right/Left Left    DPC circum. 20.3/20.0     Wrist Crease 17.4/18.0 18.0    FA       Elbow             Pain Level (0-10 scale) post treatment: 0/10 right, 2/10 left     ASSESSMENT/Changes in Function: Pt presents to treatment session with left thumb and wrist spica donned and reports reduced pain since beginning wear. Good compliance with HEP reported. Issued compression garment for left hand and wrist and educated on wear. Progressing towards goals.      Patient will continue to benefit from skilled OT services to modify and progress therapeutic interventions, address ROM deficits, address strength deficits, analyze and address soft tissue restrictions, and instruct in home and community integration to attain remaining goals. []  See Plan of Care  []  See progress note/recertification  []  See Discharge Summary         Progress towards goals / Updated goals:  Short Term Goals: To be accomplished in 2  weeks:  Goal:* Patient will be compliant with initial home exercise program to take an active role in their rehabilitation process. Status at Eval: not yet provided gentle wrist AROM  8/8/2022: initiated gentle wrist ROM HEP and Tendon glide HEP. 8/10/2022: pt independent with wrist AROM HEP, min cueing for positioning during tendon glide HEP. Goal:* Patient will demonstrate a good understanding of their condition and strategies for self-management. Status at Eval: pt educated on  prognosis, diagnosis, treatment plan, precautions, activity modifications, wrist and thumb spica wear  8/8/2022: education provided on work space/computer space positioning. Provided dycem to assist with opening jars. 8/10/202: education provided on precautions/activity modification/joint protection strategies. Long Term Goals: To be accomplished in 4 weeks:               Goal:* Patient will have 68 degrees of left wrist flexion in order to perform hygiene tasks and /or work with tools such as a screwdriver. Status at Eval:63 deg     Goal:* Patient will demonstrate a 0.2 cm decrease in edema of the left wrist in order to begin participating in regular daily tasks. Status at Eval: left wrist 18.0 cm circum     Goal:* pt will report reduced pain in the left wrist, 0-2, in order to increase participation with grooming, bathing and dressing tasks using the left UE. Status at eval: left wrist 6/10     Goal:* pt will report reduced pain in the left wrist, 0-2, in order to increase participation with grooming, bathing and dressing tasks using the right UE.   Status at eval: right wrist 3/10     Goal:* Patient will show a 13 point improvement on FOTO functional status measure to improve overall functional performance.   Status at Eval: 50       PLAN  []  Upgrade activities as tolerated     [x]  Continue plan of care  []  Update interventions per flow sheet       []  Discharge due to:_  []  Other:_      Nina Samuel, MICHAEL 8/15/2022  1:07 PM    Future Appointments   Date Time Provider Cesar Dang   8/17/2022 11:30 AM Jared Fenton, VAN [facility]   8/22/2022  8:30 AM Sunday Maurer, OT [facility]   8/24/2022  8:30 AM Sunday Maurer, OT [facility]   8/29/2022 10:00 AM Sunday Maurer, OT [facility]   9/13/2022  8:00 AM GABE ENRIQUEZ [facility] BS AMB   9/13/2022  9:45 AM Ethel Levi MD [facility] BS AMB   10/13/2022  8:30 AM Manny Lemon DO [facility] BS AMB   10/19/2022  9:00 AM Myranda Brandt, NP-C [facility] BS AMB

## 2022-08-17 ENCOUNTER — HOSPITAL ENCOUNTER (OUTPATIENT)
Dept: PHYSICAL THERAPY | Age: 62
Discharge: HOME | End: 2022-08-17
Attending: NURSE PRACTITIONER
Payer: MEDICARE

## 2022-08-17 PROCEDURE — 97140 MANUAL THERAPY 1/> REGIONS: CPT

## 2022-08-17 PROCEDURE — 97535 SELF CARE MNGMENT TRAINING: CPT

## 2022-08-17 PROCEDURE — 97110 THERAPEUTIC EXERCISES: CPT

## 2022-08-17 NOTE — PROGRESS NOTES
OT DAILY TREATMENT NOTE     Patient Name: Osmel Osorioing  Date:2022  : 1960  [x]  Patient  Verified  Payor: AARP MEDICARE COMPLETE / Plan: [facility] AARP MEDICARE COMPLETE / Product Type: Managed Care Medicare /    In time:11:28  Out time:12:10  Total Treatment Time (min): 42  Visit #: 5 of 8    Medicare/BCBS Only   Total Timed Codes (min):  42 1:1 Treatment Time:  42     Treatment Area: Pain in right hand [M79.641]  Pain in left hand [M79.642]    SUBJECTIVE  Pain Level (0-10 scale): 2/10  Any medication changes, allergies to medications, adverse drug reactions, diagnosis change, or new procedure performed?: [x] No    [] Yes (see summary sheet for update)  Subjective functional status/changes:   [] No changes reported    \"Went to the doctor the other day and they upped my gabapentin\"   \"My fingers feel tight today\"    OBJECTIVE    Modality rationale: decrease pain and increase tissue extensibility to improve the patients ability to grasp, manipulate items and move xenia wrists to improve use for daily tasks.     Min Type Additional Details    [] Estim:  []Unatt       []IFC  []Premod                        []Other:  []w/ice   []w/heat  Position:  Location:    [] Estim: []Att    []TENS instruct  []NMES                    []Other:  []w/US   []w/ice   []w/heat  Position:  Location:    []  Traction: [] Cervical       []Lumbar                       [] Prone          []Supine                       []Intermittent   []Continuous Lbs:  [] before manual  [] after manual    []  Ultrasound: []Continuous   [] Pulsed                           []1MHz   []3MHz W/cm2:  Location:    []  Iontophoresis with dexamethasone         Location: [] Take home patch   [] In clinic   5 min with self care []  Ice     [x]  Heat-MHP  []  Ice massage  []  Laser   []  Paraffin Position: seated resting  Location: bilateral hands/wrist    []  Laser with stim  []  Other:  Position:  Location:    []  Vasopneumatic Device    []  Right []  Left  Pre-treatment girth:  Post-treatment girth:  Measured at (location):  Pressure:       [] lo [] med [] hi   Temperature: [] lo [] med [] hi       [x] Skin assessment post-treatment:  [x]intact [x]redness- no adverse reaction    []redness - adverse reaction:     19 min Therapeutic Exercise:  [] See flow sheet :   Rationale: increase ROM to improve the patients ability to grasp, manipulate items and move bilateral wrists to improve use for daily tasks. Bilateral hands: Towel scrunches  Intrinsic stretches  Tendon glides  Ball rolls- 2 min- flex/ext  Finger abd/add with marbles  Dexterity balls-medium on tabletop- 2 min       15 min Manual Therapy:  IASTM using tool #6 using sweeping technique    The manual therapy interventions were performed at a separate and distinct time from the therapeutic activities interventions. Rationale: increase tissue extensibility and decrease edema  to bilateral hands at radial wrists and thumbs. 8 min Self Care/Home Management: pain management, activity modification, edema management   Rationale:  patient education   to improve the patients ability to reduce symptoms in left wrist       With   [] TE   [] TA   [] neuro   [] other: Patient Education: [x] Review HEP    [x] Progressed/Changed HEP based on: initiated intrinsic stretch HEP, pt independent with tendon glide HEP. [] positioning   [] body mechanics   [] transfers   [] heat/ice application   [] Splint wear/care   [] Sensory re-education   [] scar management      [] other:            Other Objective/Functional Measures:     Pt tolerated all wrist and hand ROM exercises. Increased time to coordination tasks with left hand.     Pain Level (0-10 scale) post treatment: 3/10    ASSESSMENT/Changes in Function: decreased stiffness after heat modality and IASTM massage, pt tolerated all wrist and hand ROM exercises well, increased time to complete coordination tasks with left hand due to increased pain, slight increase in pain level after participation in constant clinic activities, initiated intrinsic stretches due pt report of ongoing stiffness in digits, pt heavily educated on activity modification strategies and edema and pain management. Patient will continue to benefit from skilled OT services to address strength deficits, analyze and address soft tissue restrictions, analyze and cue movement patterns, and analyze and modify body mechanics/ergonomics to attain remaining goals. [x]  See Plan of Care  []  See progress note/recertification  []  See Discharge Summary         Progress towards goals / Updated goals:    Short Term Goals: To be accomplished in 2  weeks:  Goal:* Patient will be compliant with initial home exercise program to take an active role in their rehabilitation process. Status at [facility]: not yet provided gentle wrist AROM  8/8/2022: initiated gentle wrist ROM HEP and Tendon glide HEP. 8/10/2022: pt independent with wrist AROM HEP, min cueing for positioning during tendon glide HEP.  8/17/2022: initiated intrinsic stretch HEP, pt independent with tendon glide HEP. Goal:* Patient will demonstrate a good understanding of their condition and strategies for self-management. Status at [facility]: pt educated on  prognosis, diagnosis, treatment plan, precautions, activity modifications, wrist and thumb spica wear  8/8/2022: education provided on work space/computer space positioning. Provided dycem to assist with opening jars. 8/10/202: education provided on precautions/activity modification/joint protection strategies. 8/17/2022: reviewed activity modification strategies and edema/pain management. Long Term Goals: To be accomplished in 4 weeks:              Goal:* Patient will have 68 degrees of left wrist flexion in order to perform hygiene tasks and /or work with tools such as a screwdriver.   Status at [facility]:63 deg     Goal:* Patient will demonstrate a 0.2 cm decrease in edema of the left wrist in order to begin participating in regular daily tasks. Status at Eval: left wrist 18.0 cm circum     Goal:* pt will report reduced pain in the left wrist, 0-2, in order to increase participation with grooming, bathing and dressing tasks using the left UE. Status at eval: left wrist 6/10     Goal:* pt will report reduced pain in the right wrist, 0-2, in order to increase participation with grooming, bathing and dressing tasks using the right UE. Status at eval: right wrist 3/10     Goal:* Patient will show a 13 point improvement on FOTO functional status measure to improve overall functional performance.   Status at Eval: 50    PLAN  []  Upgrade activities as tolerated     [x]  Continue plan of care  []  Update interventions per flow sheet       []  Discharge due to:_  []  Other:_      Viridiana Lisa 8/17/2022  9:37 AM    Future Appointments   Date Time Provider Cesar Leong   8/17/2022 11:30 AM VAN Barraza [facility]   8/22/2022  8:30 AM Keri Pagan, OT [facility]   8/24/2022  8:30 AM Keri Choee, OT [facility]   8/29/2022 10:00 AM Keri Ave, OT [facility]   9/13/2022  8:00 AM PPA SPIROMETRY [facility] AMB   9/13/2022  9:45 AM Maria Del Carmen Willingham MD [facility] BS AMB   10/13/2022  8:30 AM DO ULICES Cadena BS AMB   10/19/2022  9:00 AM Myranda Brandt, NP-C [facility] BS AMB

## 2022-08-22 ENCOUNTER — HOSPITAL ENCOUNTER (OUTPATIENT)
Dept: PHYSICAL THERAPY | Age: 62
Discharge: HOME | End: 2022-08-22
Attending: NURSE PRACTITIONER
Payer: MEDICARE

## 2022-08-22 PROCEDURE — 97110 THERAPEUTIC EXERCISES: CPT

## 2022-08-22 PROCEDURE — 97140 MANUAL THERAPY 1/> REGIONS: CPT

## 2022-08-22 PROCEDURE — 97535 SELF CARE MNGMENT TRAINING: CPT

## 2022-08-22 NOTE — PROGRESS NOTES
OT DAILY TREATMENT NOTE     Patient Name: Miki Arana  Date:2022  : 1960  [x]  Patient  Verified  Payor: [employer] MEDICARE COMPLETE / Plan: [address] / Product Type: Managed Care Medicare /    In time:8:30 AM  Out time:9:16 AM  Total Treatment Time (min): 46  Visit #: 6 of 8    Medicare/BCBS Only   Total Timed Codes (min):  46 1:1 Treatment Time:  46     Treatment Area: Pain in right hand [M79.641]  Pain in left hand [M79.642]    SUBJECTIVE  Pain Level (0-10 scale): 0/10  Any medication changes, allergies to medications, adverse drug reactions, diagnosis change, or new procedure performed?: [x] No    [] Yes (see summary sheet for update)  Subjective functional status/changes:   [] No changes reported  \"The wrist brace has been really helping. It has taken away all of the wrist pain. \"    \"The stretches are still hard but they upped my gabapentin and that seems to have worked. \"      OBJECTIVE    Modality rationale: decrease pain and increase tissue extensibility to improve the patients ability to functionally use xenia hands and wrists   Min Type Additional Details    [] Estim:  []Unatt       []IFC  []Premod                        []Other:  []w/ice   []w/heat  Position:  Location:    [] Estim: []Att    []TENS instruct  []NMES                    []Other:  []w/US   []w/ice   []w/heat  Position:  Location:    []  Traction: [] Cervical       []Lumbar                       [] Prone          []Supine                       []Intermittent   []Continuous Lbs:  [] before manual  [] after manual    []  Ultrasound: []Continuous   [] Pulsed                           []1MHz   []3MHz W/cm2:  Location:    []  Iontophoresis with dexamethasone         Location: [] Take home patch   [] In clinic   5  []  Ice     [x]  Heat MHP  []  Ice massage  []  Laser   []  Paraffin Position: seated, resting  Location: xenia wrists    []  Laser with stim  []  Other:  Position:  Location:    []  Vasopneumatic Device    []  Right     []  Left  Pre-treatment girth:  Post-treatment girth:  Measured at (location):  Pressure:       [] lo [] med [] hi   Temperature: [] lo [] med [] hi       [x] Skin assessment post-treatment:  [x]intact [x]redness- no adverse reaction    []redness - adverse reaction:     23 min Therapeutic Exercise:  [x] See flow sheet :   Rationale: increase ROM, increase strength, and improve coordination to improve the patients ability to move xenia wrists, manipulate items using xenia hands and improve functional use of xenia UEs. 15 min Manual Therapy:  IASTM using tool #6 using sweeping and fanning techniques    The manual therapy interventions were performed at a separate and distinct time from the therapeutic activities interventions. Rationale: increase tissue extensibility and decrease edema  to xenia hands, wrists and digits 1-5    8 min Self Care/Home Management: compression garment wear,    Rationale:  education   to improve the patients ability to reduce pain with functional use of xenia UEs. With   [] TE   [] TA   [] neuro   [] other: Patient Education: [x] Review HEP    [] Progressed/Changed HEP based on:   [] positioning   [] body mechanics   [] transfers   [] heat/ice application   [] Splint wear/care   [] Sensory re-education   [] scar management      [] other:            Other Objective/Functional Measures:   Reduced wrist pain with brace wear     Pain Level (0-10 scale) post treatment: 2/10    ASSESSMENT/Changes in Function: Pt reports relief at home with ice. She is performing HEPbut with increased discomfort in xenia hand digits. Recommended fingerless compression garment wear and assisted patient in researching for gloves. Issued compression garments for xenia wrists.     Patient will continue to benefit from skilled OT services to modify and progress therapeutic interventions, address ROM deficits, address strength deficits, analyze and address soft tissue restrictions, and instruct in home and community integration to attain remaining goals. []  See Plan of Care  []  See progress note/recertification  []  See Discharge Summary         Progress towards goals / Updated goals:     Short Term Goals: To be accomplished in 2  weeks:  Goal:* Patient will be compliant with initial home exercise program to take an active role in their rehabilitation process. Status at Eval: not yet provided gentle wrist AROM  8/8/2022: initiated gentle wrist ROM HEP and Tendon glide HEP. 8/10/2022: pt independent with wrist AROM HEP, min cueing for positioning during tendon glide HEP.  8/17/2022: initiated intrinsic stretch HEP, pt independent with tendon glide HEP. Goal:* Patient will demonstrate a good understanding of their condition and strategies for self-management. Status at Eval: pt educated on  prognosis, diagnosis, treatment plan, precautions, activity modifications, wrist and thumb spica wear  8/8/2022: education provided on work space/computer space positioning. Provided dycem to assist with opening jars. 8/10/202: education provided on precautions/activity modification/joint protection strategies. 8/17/2022: reviewed activity modification strategies and edema/pain management. Long Term Goals: To be accomplished in 4 weeks:              Goal:* Patient will have 68 degrees of left wrist flexion in order to perform hygiene tasks and /or work with tools such as a screwdriver. Status at Eval:63 deg     Goal:* Patient will demonstrate a 0.2 cm decrease in edema of the left wrist in order to begin participating in regular daily tasks. Status at Eval: left wrist 18.0 cm circum     Goal:* pt will report reduced pain in the left wrist, 0-2, in order to increase participation with grooming, bathing and dressing tasks using the left UE.   Status at eval: left wrist 6/10  8/22/2022 -increased pain reported with therex      Goal:* pt will report reduced pain in the right wrist, 0-2, in order to increase participation with grooming, bathing and dressing tasks using the right UE. Status at eval: right wrist 3/10  8/22/2022 -increased pain reported with therex     Goal:* Patient will show a 13 point improvement on FOTO functional status measure to improve overall functional performance.   Status at Eval: 50     PLAN  []  Upgrade activities as tolerated     [x]  Continue plan of care  []  Update interventions per flow sheet       []  Discharge due to:_  []  Other:_      Mikhail Art OT 8/22/2022  8:35 AM    Future Appointments   Date Time Provider Cesar Leong   8/24/2022  8:30 AM [facility]   8/29/2022 10:00 AM [facility]   9/13/2022  8:00 AM PPA SPIROMETRY [facility] BS AMB   9/13/2022  9:45 AM Saul Magaña MD [facility] BS AMB   10/13/2022  8:30 AM DO ULICES Keller BS AMB   10/19/2022  9:00 AM Myranda Brandt, NP-C [facility] BS AMB

## 2022-08-24 ENCOUNTER — HOSPITAL ENCOUNTER (OUTPATIENT)
Dept: PHYSICAL THERAPY | Age: 62
Discharge: HOME | End: 2022-08-24
Attending: NURSE PRACTITIONER
Payer: MEDICARE

## 2022-08-24 PROCEDURE — 97140 MANUAL THERAPY 1/> REGIONS: CPT

## 2022-08-24 PROCEDURE — 97110 THERAPEUTIC EXERCISES: CPT

## 2022-08-24 PROCEDURE — 97535 SELF CARE MNGMENT TRAINING: CPT

## 2022-08-24 NOTE — PROGRESS NOTES
OT DAILY TREATMENT NOTE     Patient Name: Porsha Bell  Date:2022  : 1960  [x]  Patient  Verified  Payor: [employer] MEDICARE COMPLETE / Plan: [facility] MEDICARE COMPLETE / Product Type: Managed Care Medicare /    In time:8:36 AM  Out time:9:17 AM  Total Treatment Time (min): 41  Visit #: 7 of 8    Medicare/BCBS Only   Total Timed Codes (min):  41 1:1 Treatment Time:  41     Treatment Area: Pain in right hand [M79.641]  Pain in left hand [M79.642]    SUBJECTIVE  Pain Level (0-10 scale): 0/10 right, 2/10 left   Any medication changes, allergies to medications, adverse drug reactions, diagnosis change, or new procedure performed?: [x] No    [] Yes (see summary sheet for update)  Subjective functional status/changes:   [] No changes reported  \"I am feeling better today. \"    \"It does okay when I am not using the left but as soon as I use it then the pain goes up. \"    OBJECTIVE    Modality rationale: decrease pain and increase tissue extensibility to improve the patients ability to functionally use xenia hands   Min Type Additional Details    [] Estim:  []Unatt       []IFC  []Premod                        []Other:  []w/ice   []w/heat  Position:  Location:    [] Estim: []Att    []TENS instruct  []NMES                    []Other:  []w/US   []w/ice   []w/heat  Position:  Location:    []  Traction: [] Cervical       []Lumbar                       [] Prone          []Supine                       []Intermittent   []Continuous Lbs:  [] before manual  [] after manual    []  Ultrasound: []Continuous   [] Pulsed                           []1MHz   []3MHz W/cm2:  Location:    []  Iontophoresis with dexamethasone         Location: [] Take home patch   [] In clinic   5, concurrent with self care []  Ice     [x]  Heat MHP  []  Ice massage  []  Laser   []  Paraffin Position: seated, resting  Location: xenia wrists    []  Laser with stim  []  Other:  Position:  Location:    []  Vasopneumatic Device    []  Right     [] Left  Pre-treatment girth:  Post-treatment girth:  Measured at (location):  Pressure:       [] lo [] med [] hi   Temperature: [] lo [] med [] hi       [x] Skin assessment post-treatment:  [x]intact [x]redness- no adverse reaction    []redness - adverse reaction:     18 min Therapeutic Exercise:  [x] See flow sheet :   Rationale: increase ROM, increase strength, and improve coordination to improve the patients ability to grasp, , manipulate items using xenia hands    15 min Manual Therapy:  IASTM using tool #6, 5 using sweeping and fanning techniques    The manual therapy interventions were performed at a separate and distinct time from the therapeutic activities interventions. Rationale: increase tissue extensibility and decrease edema  to xenia wrists and     8 min Self Care/Home Management: activity modifications, pain mgmt, left wrist brace wear   Rationale:  education   to improve the patients ability to reduce symptoms and improve functional use of xenia UEs. With   [] TE   [] TA   [] neuro   [] other: Patient Education: [x] Review HEP    [] Progressed/Changed HEP based on:   [] positioning   [] body mechanics   [] transfers   [] heat/ice application   [] Splint wear/care   [] Sensory re-education   [] scar management      [] other:            Other Objective/Functional Measures:   Left wrist edema cm - 18.0 cm circum, right 17.6 cm circum       Pain Level (0-10 scale) post treatment: 0/10 right, 2/10 left    ASSESSMENT/Changes in Function: Reduced pain reported with IASTM to xenia hand digits at last treatment session. No increased pain with activities this treatment session. Discussed reassessment at next visit. Patient will continue to benefit from skilled OT services to modify and progress therapeutic interventions, address ROM deficits, address strength deficits, analyze and address soft tissue restrictions, and instruct in home and community integration to attain remaining goals.      []  See Plan of Care  []  See progress note/recertification  []  See Discharge Summary         Progress towards goals / Updated goals:  Short Term Goals: To be accomplished in 2  weeks:  Goal:* Patient will be compliant with initial home exercise program to take an active role in their rehabilitation process. Status at Eval: not yet provided gentle wrist AROM  8/8/2022: initiated gentle wrist ROM HEP and Tendon glide HEP. 8/10/2022: pt independent with wrist AROM HEP, min cueing for positioning during tendon glide HEP.  8/17/2022: initiated intrinsic stretch HEP, pt independent with tendon glide HEP. Goal:* Patient will demonstrate a good understanding of their condition and strategies for self-management. Status at [facility]: pt educated on  prognosis, diagnosis, treatment plan, precautions, activity modifications, wrist and thumb spica wear  8/8/2022: education provided on work space/computer space positioning. Provided dycem to assist with opening jars. 8/10/202: education provided on precautions/activity modification/joint protection strategies. 8/17/2022: reviewed activity modification strategies and edema/pain management. Long Term Goals: To be accomplished in 4 weeks:              Goal:* Patient will have 68 degrees of left wrist flexion in order to perform hygiene tasks and /or work with tools such as a screwdriver. Status at [facility]:63 deg     Goal:* Patient will demonstrate a 0.2 cm decrease in edema of the left wrist in order to begin participating in regular daily tasks. Status at Eval: left wrist 18.0 cm circum     Goal:* pt will report reduced pain in the left wrist, 0-2, in order to increase participation with grooming, bathing and dressing tasks using the left UE.   Status at eval: left wrist 6/10  8/22/2022 -increased pain reported with therex      Goal:* pt will report reduced pain in the right wrist, 0-2, in order to increase participation with grooming, bathing and dressing tasks using the right UE.  Status at eval: right wrist 3/10  8/22/2022 -increased pain reported with therex     Goal:* Patient will show a 13 point improvement on FOTO functional status measure to improve overall functional performance.   Status at Eval: 50    PLAN  []  Upgrade activities as tolerated     [x]  Continue plan of care  []  Update interventions per flow sheet       []  Discharge due to:_  []  Other:_      Gabriel Owen OT 8/24/2022  8:14 AM    Future Appointments   Date Time Provider Cesar Leong   8/24/2022  8:30 AM Sindhu Jean, MICHAEL [facility]   8/29/2022 10:00 AM Sindhu Jean, OT [facility]   9/13/2022  8:00 AM PPA SPIROMETRY [facility]"" BS AMB   9/13/2022  9:45 AM Carmen Vicente MD \[facility]\"" BS AMB   10/13/2022  8:30 AM Fany Falcon DO [facility] BS AMB   10/19/2022  9:00 AM Myranda Brandt, NP-C [facility] BS AMB

## 2022-08-29 ENCOUNTER — HOSPITAL ENCOUNTER (OUTPATIENT)
Dept: PHYSICAL THERAPY | Age: 62
Discharge: HOME | End: 2022-08-29
Attending: NURSE PRACTITIONER
Payer: MEDICARE

## 2022-08-29 PROCEDURE — 97140 MANUAL THERAPY 1/> REGIONS: CPT

## 2022-08-29 PROCEDURE — 97110 THERAPEUTIC EXERCISES: CPT

## 2022-08-29 PROCEDURE — 97535 SELF CARE MNGMENT TRAINING: CPT

## 2022-08-29 NOTE — PROGRESS NOTES
OT DISCHARGE DAILY NOTE AND SUMMARY     Date:2022  Patient name: Dedra Madrid Start of Care: 2022   Referral source: Myranda Brandt NP-C : 1960   Medical/Treatment Diagnosis: Pain in right hand [M79.641]  Pain in left hand [M79.642] Onset Date:May 2022     Prior Hospitalization: see medical history Provider#: [id number]   Medications: Verified on Patient Summary List     Comorbidities: Active breast ca, asthma, arthritis, depression   Prior Level of Function: Mod A with ADL/IADL tasks secondary to ca diagnosis           Visits from Start of Care: 8    Missed Visits: 0    Reporting Period : 2022  to 2022     [x]  Patient  Verified  Payor: [employer] / Plan: [address] / Product Type: Managed Care Medicare /    In time:10:02 AM  Out time:10:46 AM  Total Treatment Time (min): 44  Visit #: 8 of 8    Medicare/BCBS Only   Total Timed Codes (min):  44 1:1 Treatment Time:  44       Treatment Area: Pain in right hand [M79.641]  Pain in left hand [M79.642]    SUBJECTIVE  Pain Level (0-10 scale): 0/10 left, 6/10 right  Any medication changes, allergies to medications, adverse drug reactions, diagnosis change, or new procedure performed?: [x] No    [] Yes (see summary sheet for update)  Subjective functional status/changes:   [] No changes reported  \"My wrists are better now it is just in my hands. \"    OBJECTIVE    Modality rationale: decrease pain and increase tissue extensibility to improve the patients ability to functionally use xenia hands and wrists.    Min Type Additional Details    [] Estim:  []Unatt       []IFC  []Premod                        []Other:  []w/ice   []w/heat  Position:  Location:    [] Estim: []Att    []TENS instruct  []NMES                    []Other:  []w/US   []w/ice   []w/heat  Position:  Location:    []  Traction: [] Cervical       []Lumbar                       [] Prone          []Supine                       []Intermittent []Continuous Lbs:  [] before manual  [] after manual    []  Ultrasound: []Continuous   [] Pulsed                           []1MHz   []3MHz W/cm2:  Location:    []  Iontophoresis with dexamethasone         Location: [] Take home patch   [] In clinic   5 concurrent with self care []  Ice     [x]  Heat -MHP  []  Ice massage  []  Laser   []  Anodyne Position:seated, resting  Location:xenia hands and wrists    []  Laser with stim  []  Other:  Position:  Location:    []  Vasopneumatic Device    []  Right     []  Left  Pre-treatment girth:  Post-treatment girth:  Measured at (location):  Pressure:       [] lo [] med [] hi   Temperature: [] lo [] med [] hi   [x] Skin assessment post-treatment:  [x]intact [x]redness- no adverse reaction    []redness - adverse reaction:     24 min Therapeutic Exercise:  [x] See flow sheet :   Rationale: increase ROM and improve coordination to improve the patients ability to grasp, manipulate items using xenia hands.      10 min Manual Therapy:  IASTM using tool #6 using sweeping and fanning technique    Rationale: increase tissue extensibility and decrease edema  to xenia hand digits 1-5    10 min Self Care/Home Management: d/c instruct, FOTO outcome measure   Rationale:  education   to improve the patients ability to return to PLOF using xenia hands     With   [] TE   [] TA   [] neuro   [] other: Patient Education: [x] Review HEP    [] Progressed/Changed HEP based on:   [] positioning   [] body mechanics   [] transfers   [] heat/ice application   [] Splint wear/care   [] Sensory re-education   [] scar management      [] other:            Other Objective/Functional Measures:   Range of Motion:    Elbow/Wrist   Wrist Right/Left  8/5/22 8/29/2022  Right/Left   Flex 68/63  73/72   Ext 84/80  80/80   UD 50/39     RD 22/22     FA        Pro 84/84     Sup 69/77        Strength:   Measurements: Taken with Cash Dynamometer, in Lbs   Level 2 8/5/2022 8/29/2022    Right 21 22   Left 11 32   Deficit Change           Edema: GIRTH CHART measured in cm  Date: 8/5/2022 8/29/2022     Side Right/Left  Right/Left   DPC circum. 20.3/20.0     Wrist Crease 17.4/18.0 18.0    FA        Elbow             Pain Level (0-10 scale) post treatment: 0/10 left, 2/10 right    Summary of Care:  Short Term Goals: To be accomplished in 2  weeks:  Goal:* Patient will be compliant with initial home exercise program to take an active role in their rehabilitation process. Status at Eval: not yet provided gentle wrist AROM  8/8/2022: initiated gentle wrist ROM HEP and Tendon glide HEP. 8/10/2022: pt independent with wrist AROM HEP, min cueing for positioning during tendon glide HEP.  8/17/2022: initiated intrinsic stretch HEP, pt independent with tendon glide HEP. Status at d/c 8/29/2022 -pt reports compliance 2-4x per day, goal met     Goal:* Patient will demonstrate a good understanding of their condition and strategies for self-management. Status at Eval: pt educated on  prognosis, diagnosis, treatment plan, precautions, activity modifications, wrist and thumb spica wear  8/8/2022: education provided on work space/computer space positioning. Provided dycem to assist with opening jars. 8/10/202: education provided on precautions/activity modification/joint protection strategies. 8/17/2022: reviewed activity modification strategies and edema/pain management. Status at d/c 8/29/2022 -pt reports heat/ice, compression garment wear, activity modifications and joint protection strategies, goal met     Long Term Goals: To be accomplished in 4 weeks:              Goal:* Patient will have 68 degrees of left wrist flexion in order to perform hygiene tasks and /or work with tools such as a screwdriver. Status at Eval:63 deg     Goal:* Patient will demonstrate a 0.2 cm decrease in edema of the left wrist in order to begin participating in regular daily tasks.   Status at Eval: left wrist 18.0 cm circum   Status at d/c 8/29/2022 -18.0 cm, no change, goal not met     Goal:* pt will report reduced pain in the left wrist, 0-2, in order to increase participation with grooming, bathing and dressing tasks using the left UE. Status at eval: left wrist 6/10  8/22/2022 -increased pain reported with therex  Status at d/c 8/29/2022 -0/10 left wrist goal met       Goal:* pt will report reduced pain in the right wrist, 0-2, in order to increase participation with grooming, bathing and dressing tasks using the right UE. Status at eval: right wrist 3/10  8/22/2022 -increased pain reported with therex  Status at d/c 8/29/2022 -0-2/10 right wrist goal met     Goal:* Patient will show a 13 point improvement on FOTO functional status measure to improve overall functional performance. Status at Eval: 48  Status at d/c 8/29/2022 -55, progressing, goal not met     ASSESSMENT/Changes in Function: pt is progressing towards or has met goals set for this reporting period. Pt has been provided copies of HEP and provided with hand coordination activities to be completed daily. Pt also instructed on rocker knife and use of adaptive cutting board to assist with cooking activities. All questions and concerns were addressed during this treatment session and pt agrees to d/c at this time.  Thank you for this referral.      PLAN:  [x]Discontinue therapy: [x]Patient has reached or is progressing toward set goals      []Patient is non-compliant or has abdicated      []Due to lack of appreciable progress towards set goals    Thank you for this referral!    Silva Carmen OT 8/29/2022 10:08 AM      Future Appointments   Date Time Provider Cesar Leong   8/29/2022 10:00 AM Lindbergh Gaucher, OT [id number]   9/13/2022  8:00 AM [facility]PA SPIROMETRY [facility] BS AMB   9/13/2022  9:45 AM Rowena Stein MD [facility] BS AMB   10/13/2022  8:30 AM DO ULICES Goodman BS AMB   10/19/2022  9:00 AM Myranda Brandt, NP-C [facility] BS AMB

## 2022-08-30 DIAGNOSIS — F33.9 RECURRENT DEPRESSION (HCC): ICD-10-CM

## 2022-08-31 RX ORDER — OMEPRAZOLE 20 MG/1
CAPSULE, DELAYED RELEASE ORAL
Qty: 360 CAPSULE | Refills: 1 | Status: SHIPPED | OUTPATIENT
Start: 2022-08-31

## 2022-08-31 RX ORDER — CITALOPRAM 20 MG/1
20 TABLET, FILM COATED ORAL DAILY
Qty: 90 TABLET | Refills: 0 | Status: SHIPPED | OUTPATIENT
Start: 2022-08-31

## 2022-09-13 ENCOUNTER — OFFICE VISIT (OUTPATIENT)
Dept: PULMONOLOGY | Age: 62
End: 2022-09-13

## 2022-09-13 VITALS
SYSTOLIC BLOOD PRESSURE: 136 MMHG | HEIGHT: 65 IN | HEART RATE: 92 BPM | OXYGEN SATURATION: 98 % | DIASTOLIC BLOOD PRESSURE: 80 MMHG | RESPIRATION RATE: 18 BRPM | WEIGHT: 277 LBS | BODY MASS INDEX: 46.15 KG/M2 | TEMPERATURE: 97.9 F

## 2022-09-13 DIAGNOSIS — T45.1X5A NEUROPATHY DUE TO CHEMOTHERAPEUTIC DRUG (HCC): ICD-10-CM

## 2022-09-13 DIAGNOSIS — G47.33 OBSTRUCTIVE SLEEP APNEA SYNDROME: ICD-10-CM

## 2022-09-13 DIAGNOSIS — J44.9 CHRONIC OBSTRUCTIVE PULMONARY DISEASE, UNSPECIFIED COPD TYPE (HCC): Primary | ICD-10-CM

## 2022-09-13 DIAGNOSIS — G62.0 NEUROPATHY DUE TO CHEMOTHERAPEUTIC DRUG (HCC): ICD-10-CM

## 2022-09-13 DIAGNOSIS — Z87.891 PERSONAL HISTORY OF SMOKING: ICD-10-CM

## 2022-09-13 PROCEDURE — G8427 DOCREV CUR MEDS BY ELIG CLIN: HCPCS | Performed by: INTERNAL MEDICINE

## 2022-09-13 PROCEDURE — 94729 DIFFUSING CAPACITY: CPT | Performed by: INTERNAL MEDICINE

## 2022-09-13 PROCEDURE — 3017F COLORECTAL CA SCREEN DOC REV: CPT | Performed by: INTERNAL MEDICINE

## 2022-09-13 PROCEDURE — 94060 EVALUATION OF WHEEZING: CPT | Performed by: INTERNAL MEDICINE

## 2022-09-13 PROCEDURE — G9717 DOC PT DX DEP/BP F/U NT REQ: HCPCS | Performed by: INTERNAL MEDICINE

## 2022-09-13 PROCEDURE — 94727 GAS DIL/WSHOT DETER LNG VOL: CPT | Performed by: INTERNAL MEDICINE

## 2022-09-13 PROCEDURE — G8417 CALC BMI ABV UP PARAM F/U: HCPCS | Performed by: INTERNAL MEDICINE

## 2022-09-13 PROCEDURE — G9708 BILAT MAST/HX BI /UNILAT MAS: HCPCS | Performed by: INTERNAL MEDICINE

## 2022-09-13 PROCEDURE — 99214 OFFICE O/P EST MOD 30 MIN: CPT | Performed by: INTERNAL MEDICINE

## 2022-09-13 NOTE — PROGRESS NOTES
[facility]  Pulmonary, Critical Care, and Sleep Medicine      Pulmonary Office follow-up visit    Name: Milan Shukla     : 1960     Date: 2022        Subjective:   Patient has been referred for evaluation of: COPD, obstructive sleep apnea, personal history of smoking    22   Patient states that she continues to have a cough which at times gets extremely bothersome. She has been using her Trelegy but admits to occasionally forgetting to use it. She uses albuterol for rescue  She has been using her CPAP, sometimes has difficulty sleeping related to discomfort from her neuropathy for which she receives gabapentin. She completed PFTs today and is here to discuss further  She also has been having some flareup of GERD-Patient states that she has been having worsening of her acid reflux since starting chemotherapy and the dexilant and sucralfate has not been helping and she would like to try something new. Denies blood in stools, or weight loss or coughing up blood. She states that symptoms are improving after her PCP changed her reflux therapy and she got cough medication as well as Claritin.   She has been diagnosed with breast cancer-had bilateral mastectomy and now receiving chemotherapy  She is currently undergoing chemotherapy at [facility] for breast cancer-doxorubicin, cyclophosphamide and Placitexil-Dr. Darin Varghese  Patient completed LDCT-2022 with no nodules identified    CPAP download from 6/15/2022-2022 shows 100% usage for average of 8 hours at AutoSet 14/10 cmH2O with resultant AHI of 0.7  CPAP download 2021 through 2022  100% usage for average of 7 hours 40 minutes at AutoSet max pressure 14 minimal pressure 10 with resultant AHI of 0.3-0 central events, 0.2 obstructive event    HPI  Patient is a 57 y.o. female who states that she has been experiencing increasing symptoms of shortness of breath over the past 1 year and more so over the past 6 months. She states that she had gone to [place] about 4 years back and got symptomatic with shortness of breath-went to an urgent care center where she was told she has COPD. However since discharge she continued to smoke until June 2020 when she decided to quit smoking. She completely quit smoking in June 2020 and ironically has been having more symptoms since then. SOB: With activity, walking, bending climbing stairs. Also has had some episodes at nighttime where she has hard breathing noticed by her sister    COUGH: Has intermittent cough especially at nighttime-her sister has heard her cough and comes and checks in on her    Chest Pain: Not a usual symptom however today during her 6-minute walk she complained of chest tightness    No associated wheezing, chest pain, fever, chills, night sweats . She has been experiencing chronic swelling of lower extremities for which she takes Lasix  Has history of dyspepsia, reflux. Patient has gained 60 pounds over the past year or so. She states that she had gastric bypass surgery 8 years back and lost close to 100 pounds but has gained back all the weight  She was diagnosed with sleep apnea by Dr. Alesha Pope years back and has been on CPAP at 14 cm pressure  Has had PFTs and echocardiogram in the past  Has completed LDCT 11/20/2019. Lung RADS 2  Comorbid conditions include- DM, HTN, AYE on CPAP at 14 cm which she states she has been using, Sleep disordered breathing, CAD, CHF, PE. DVT, previous respiratory diagnosis. Smoking status: Currently quit -June 2020 prior to that she smoked 2 packs of cigarettes per day for close to 40 years      Review of data:  I have personally reviewed all data-clinical encounters, imaging, outside test results pertinent to patient's care.   Testing:  CXR  CT scan-LDCT  PFT  Echo  6 min walk  Overnight oximetry  Sleep studies-Dr. 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    GERD (gastroesophageal reflux disease)     History of gastric bypass 2012    History of meniscal tear 2015, 2018    right in 2015, left in 2018    HNP (herniated nucleus pulposus), lumbar     patient reported    Lung nodules     resolved 2018 CT    Neuropathy     Osteopenia 10/03/2017    Recurrent depression ([address]     Sleep apnea     on cpap    Spinal stenosis, lumbar     patient reported    Xanthelasma of left upper eyelid 7/20/2020       Past Surgical History:   Procedure Laterality Date    HX ARTHRODESIS  01/2000    Herniated Disc, arthritis right foot 06/06, 07/07, C 6/7, C 4/6 Stenosis    HX CHOLECYSTECTOMY  09/2008    gallbladder disease    HX COLONOSCOPY  05/2009    HX GASTRIC BYPASS  2012    GASTRIC BYPASS  [facility] Gastric Bypass      HX HYSTERECTOMY  06/1994    HX MASTECTOMY Bilateral 11/23/2021    BILATERAL MASTECTOMY, LEFT SENTINEL NODE BIOPSY [facility] 11/22) performed by Maryanne Her MD at [address] Left 6/28/2022    revision of left mastectomy scar performed by Radhames Juarez DO at [address] [facility]  10/2015    right repari of torn meniscus    HX MENISCECTOMY Left 03/16/2018    partial meniscectomy due to tear    HX MYOMECTOMY  06/1984    benign tumor    HX ORTHOPAEDIC  1964    orthopaedic excision Fibrosarcoma and Lymphangiogram    HX PELVIC LAPAROSCOPY  04/1984    large uterine blockage    NEUROLOGICAL PROCEDURE UNLISTED  2000, 2007    Cervical fusion     Allergies   Allergen Reactions    Adhesive Tape-Silicones Swelling     REALLY BAD SWELLING AND SORE APPEAR    Alcohol Other (comments)     PT STATES SHE IS AN ALCOHOLIC    Lactose Other (comments)     PT STATES IT MAKES HER STOMACH HURT    Percodan [Oxycodone Hcl-Oxycodone-Asa] Itching and Other (comments)     crying    Nsaids (Non-Steroidal Anti-Inflammatory Drug) Other (comments)     PT NOT ABLE TO TAKE DUE TO GASTRIC BYPASS     Current Outpatient Medications   Medication Sig Dispense Refill    citalopram (CELEXA) 20 mg tablet Take 1 Tablet by mouth daily. 90 Tablet 0    omeprazole (PRILOSEC) 20 mg capsule Take 2 capsules by mouth twice daily 360 Capsule 1    oxybutynin (DITROPAN) 5 mg tablet Take 5 mg by mouth two (2) times a day. sucralfate (CARAFATE) 1 gram tablet Take 1 Tablet by mouth two (2) times a day. Take 1 tablet by mouth 180 Tablet 3    Lynparza 150 mg tablet       albuterol (PROVENTIL HFA, VENTOLIN HFA, PROAIR HFA) 90 mcg/actuation inhaler Take 2 Puffs by inhalation every four (4) hours as needed for Wheezing or Shortness of Breath for up to 90 days. 18 g 3    b complex vitamins tablet Take 1 Tablet by mouth daily. fluticasone propionate (FLONASE) 50 mcg/actuation nasal spray SHAKE LIQUID AND USE 2 SPRAYS IN EACH NOSTRIL DAILY 48 g 5    gabapentin (NEURONTIN) 100 mg capsule Take 200 mg by mouth daily. buPROPion SR (WELLBUTRIN SR) 150 mg SR tablet Take 1 Tablet by mouth two (2) times a day. 180 Tablet 3    fluticasone-umeclidinium-vilanterol (Trelegy Ellipta) 100-62.5-25 mcg inhaler INHALE 1 PUFF BY MOUTH DAILY 120 Each 4    diclofenac (Voltaren) 1 % gel Apply 2 g to affected area every six (6) hours as needed for Pain. 100 g 2    Linzess 72 mcg cap capsule TAKE 1 CAPSULE BY MOUTH DAILY BEFORE BREAKFAST 30 Cap 1    acetaminophen (TYLENOL) 500 mg tablet Take  by mouth every six (6) hours as needed for Pain. calcium-cholecalciferol, d3, 600-125 mg-unit tab Take 1,065 mg by mouth nightly. omega 3-dha-epa-fish oil 100-160-1,000 mg cap Take 1,065 mg by mouth daily. multivitamin (ONE A DAY) tablet Take 1 Tab by mouth daily.        Review of Systems:  HEENT: No epistaxis, no nasal drainage, no difficulty in swallowing, no redness in eyes  Respiratory: as above  Cardiovascular: no chest pain, no palpitations,+ chronic leg edema, no syncope  Gastrointestinal: no abd pain, no vomiting, no diarrhea, no bleeding symptoms  Genitourinary: No urinary symptoms or hematuria  Integument/breast: No ulcers or rashes  Musculoskeletal:Neg  Neurological: No focal weakness, no seizures, no headaches  Behvioral/Psych: No anxiety, no depression  Constitutional: No fever, no chills, no weight loss, no night sweats     Objective:     Visit Vitals  /80 (BP 1 Location: Left upper arm, BP Patient Position: Sitting, BP Cuff Size: Large adult)   Pulse 92   Temp 97.9 °F (36.6 °C) (Oral)   Resp 18   Ht 5' 5\" (1.651 m)   Wt 125.6 kg (277 lb)   SpO2 98% Comment: RA at rest   BMI 46.10 kg/m²          Physical Exam:   General: comfortable, no acute distress  HEENT: pupils reactive, sclera anicteric, EOM intact  Neck: No adenopathy or thyroid swelling, no lymphadenopathy or JVD, supple  CVS: S1S2 no murmurs  RS: Mod AE bilaterally, no tactile fremitus or egophony, no accessory muscle use  Abd: soft, non tender, no hepatosplenomegaly  Neuro: non focal, awake, alert  Extrm: Trace leg edema right more than left, clubbing or cyanosis  Skin: no rash    Data review:   Pertinent labs: CBC, BMP, LFT's    PFT:  9/13/2022  Normal FEV1, midexpiratory flow rate is reduced to 53% predicted and improves 60% postbronchodilator.   Mildly decreased expiratory reserve volume and normal DLCO    2017 normal  06/10/21   Patient effort:   Good   Meets ATS criteria for interpretation     Flows:   Maximal Mid Expiratory Flow rate is reduced to 32 % predicted   Forced Expiratory Volume in one second is reduced to 72 % predicted   FEV 1% is reduced   Volumes:   Functional Residual Capacity and Residual Volume are reduced     Flow Volume Loop:   Nonspecific obstructive pattern in Flow Volume Loop     Bronchodilator:   Significant improvement with bronchodilator     Diffusion:   Abnormal Diffusion Capacity reduced to 75 % predicted and corrects for alveolar volume     Impression:   Mild to Moderate obstructive defect, Isolated reduction of Residual Volume and Functional Residual Capacity    6 min walk test; patient walked a distance of 680 m without significant oxygen desaturation however heart rate increased and dyspnea index changed from 0-6 with patient complaining of some chest tightness  Polysomnogram- spilt night- Dr. Kaleb Barber  AYE. CPAP- 14 cm prescribed    Results for orders placed or performed during the hospital encounter of 06/28/22   EKG, 12 LEAD, INITIAL   Result Value Ref Range    Ventricular Rate 80 BPM    Atrial Rate 80 BPM    P-R Interval 136 ms    QRS Duration 76 ms    Q-T Interval 410 ms    QTC Calculation (Bezet) 472 ms    Calculated P Axis 14 degrees    Calculated R Axis -6 degrees    Calculated T Axis 23 degrees    Diagnosis       Normal sinus rhythm  Cannot exclude anterior MI versus lead placement issue. Abnormal ECG  When compared with ECG of 16-FEB-2020 04:59,  No significant change was found  Confirmed by Bonifacio Patel MD, Sebastien Antonio ([id number]) on 6/29/2022 8:07:12 AM       08/13/19   ECHO ADULT COMPLETE 08/14/2019 8/14/2019    Narrative · Left Ventricle: Normal cavity size, wall thickness, systolic function   (ejection fraction normal) and diastolic function. Estimated left   ventricular ejection fraction is 56 - 60%. No regional wall motion   abnormality noted. · Right Ventricle: Normal right ventricular size and function. · No hemodynamically significant valvular pathology. Signed by: Nayeli Wilder MD     Imaging:  I have personally reviewed the patients radiographs and have reviewed the reports:  XR Results (most recent):  Results from Hospital Encounter encounter on 02/16/20    XR CHEST PORT    Narrative  AP CHEST, PORTABLE    CPT CODE: 07346    INDICATION: Above. Intermittent epigastric pain. Nausea and vomiting.     COMPARISON: 5/13/2019 PA and lateral.    TECHNIQUE: Portable AP chest radiograph is reviewed. FINDINGS:    No evidence of focal pulmonary consolidation or pulmonary edema. No evidence of  pneumothorax. The costophrenic sulci appear sharp. The cardiac silhouette is top normal in size  for technique. EKG leads/wires overlie the patient. Cervical spine fusion plate/screws again  noted. No acute osseous abnormalities identified. Impression  IMPRESSION:    No evidence of acute pulmonary disease. CT Results (most recent):  Results from Hospital Encounter encounter on 02/23/22    CT LOW DOSE LUNG CANCER SCREENING    Narrative  CT of the chest for lung cancer screening    HISTORY: Lung screening. Formal smoker with 42 pack years smoking history, quit  in 6/20/20. Meet lung screening criteria. COMPARISON: 11/16/19    TECHNIQUE: Low dose unenhanced multislice helical CT was performed from the  thoracic inlet to the adrenal glands without intravenous contrast  administration. Contiguous [1.25 mm] axial images were reconstructed and lung  and soft tissue windows were generated. Coronal MIP and sagittal reformations  were generated.  -All CT scans at this facility performed using dose optimization techniques as  appreciated to a performed exam, to include automated exposure control,  adjustment of the mA and or KU according to patient size (including appropriate  matching for site specific examination), or use of iterative reconstruction  technique. FINDINGS:    The lungs are clear of nodule, mass or  airspace disease. Mild dependent  atelectasis in bilateral lower lobes. Mild centrilobular emphysema and  bronchitis appear stable. There is no significant pleural disease. The absence of intravenous contrast reduces the sensitivity for evaluation of  the mediastinum and upper abdominal organs. No mediastinal or hilar adenopathy  appreciated. The heart is not enlarged. No pericardial effusion.   The aorta has  a normal contour with no evidence of aneurysm. The thyroid appears unremarkable. The bones and soft tissues of the chest wall are within normal limits. The  imaged upper abdomen appears unremarkable. Impression  1. No lung nodule identified. 2. Stable mild COPD. LUNG RADS ASSESSMENT CATEGORIES:    Lung RADS 1 - Negative. Management:  Continue annual screening with low dose CT in 12 months. Thank you for your referral.    .     Patient Active Problem List   Diagnosis Code    Chronic obstructive pulmonary disease ([facility] J44.9    Recurrent depression ([facility] F33.9    Fibrosarcoma ([facility] C49.9    Alcoholism in remission ([facility] F10.21    Sleep apnea G47.30    History of gastric bypass Z98.84    Obesity, morbid (HCC) E66.01    Osteopenia M85.80    GERD (gastroesophageal reflux disease) K21.9    Chronic pain of both knees M25.561, M25.562, G89.29    Prediabetes R73.03    Age-related nuclear cataract, bilateral H25.13    Xanthelasma of left upper eyelid H02.64    Xanthelasma of right upper eyelid H02.61    Sebaceous cyst of eyelid H02.829    Personal history of smoking Z87.891    Diverticulitis K57.92    Breast cancer of upper-inner quadrant of left female breast ([facility]) C50.212    Chronic bronchitis ([facility]) J42    Chronic nonintractable headache R51.9, G89.29    CINV (chemotherapy-induced nausea and vomiting) R11.2, T45.1X5A    Drug-induced constipation K59.03    Dry mouth R68.2    Fatigue due to treatment R53.83    Heartburn R12    Neuropathy due to chemotherapeutic drug (HCC) G62.0, T45.1X5A    Nose dryness J34.89    Weight loss R63.4       IMPRESSION:   Persistent cough likely reactive airways-chronic bronchiolitis. Question if any relationship to her current chemotherapeutic agents however in view of history of smoking would continue to optimize treatment for COPD/asthma. SOB-multifactorial with significant component from weight gain. PFT in 2017 was unremarkable however with history of smoking likely she has a component of COPD. FEV1 72% predicted , midexpiratory flow rate 32% predicted with bronchodilator response decreased diffusion capacity which corrects for alveolar volume. She is currently on Trelegy which seems to be helping. AYE on CPAP. CPAP download with excellent compliance and effectiveness noted  Morbid obesity- BMI 50.67 h/o gastric bypass-now down to 46.10  GERD. H/o smoking-42 pack years. Quit 6 /2020 . She is candidate for LDCT till 2035. Last CT 11/2019- Lung RADs 2. 2 mm nodule. Most recent CT scan 2/23/2022-no nodules identified. The lungs are clear of nodule, mass or  airspace disease. Mild dependent atelectasis in bilateral lower lobes. Mild centrilobular emphysema and bronchitis appear stable. There is no significant pleural disease. Malignant neoplasm of upper-inner quadrant of left breast in female, estrogen receptor negative (CMS/HCC) 10/19/2021 Cancer Staged . Staging form: Breast, AJCC 8th Edition  - Clinical stage from 10/19/2021: Stage IIB (cT2, cN0, cM0, G2, ER-, WI-, HER2-) follows with [place] [facility]-Dr. Shital Wang        RECOMMENDATIONS:   Discussed with patient -results of PFT-continue Trelegy and instructed to rinse mouth thoroughly after use especially with current immunocompromise status. Instructed her to use albuterol 2 puffs first and then use the Trelegy daily. If cough does not improve we will consider alternate therapy  Continue current CPAP settings effective  Continue treatment for GERD-agree with stepup therapy and optimization on continued basis  Monitor symptoms closely-report to me any change in respiratory symptoms of shortness of breath, persistent cough. Explained toxicity related to chemo. No evidence at present on CT scan completed 2/23/2022  Order LDCT for February 2023-annual follow-up.   Shared decision making completed  Maintain complete cessation of cigarette smoking  Maintain active lifestyle, healthy nutrition  Preventive gpasjcemalmm-kx-ak-date  We will follow-up in 3 months      Health maintenance screens deferred to Primary care provider.      Sigrid Maxwell MD    This patient has a high complexity chronic care condition

## 2022-09-13 NOTE — PROGRESS NOTES
Sonia Hinojosa presents today for   Chief Complaint   Patient presents with    COPD    Results     PFT    Cough    Wheezing       Is someone accompanying this pt? No    Is the patient using any DME equipment during OV? No    -DME Company NA    Depression Screening:  3 most recent PHQ Screens 7/21/2022   Little interest or pleasure in doing things Not at all   Feeling down, depressed, irritable, or hopeless Not at all   Total Score PHQ 2 0   Trouble falling or staying asleep, or sleeping too much -   Feeling tired or having little energy -   Poor appetite, weight loss, or overeating -   Feeling bad about yourself - or that you are a failure or have let yourself or your family down -   Trouble concentrating on things such as school, work, reading, or watching TV -   Moving or speaking so slowly that other people could have noticed; or the opposite being so fidgety that others notice -   Thoughts of being better off dead, or hurting yourself in some way -   PHQ 9 Score -   How difficult have these problems made it for you to do your work, take care of your home and get along with others -       Learning Assessment:  Learning Assessment 6/1/2022   PRIMARY LEARNER Patient   HIGHEST LEVEL OF EDUCATION - PRIMARY LEARNER  4 YEARS OF COLLEGE   BARRIERS PRIMARY LEARNER NONE   CO-LEARNER CAREGIVER No   PRIMARY LANGUAGE ENGLISH   LEARNER PREFERENCE PRIMARY DEMONSTRATION   ANSWERED BY self   RELATIONSHIP SELF       Abuse Screening:  Abuse Screening Questionnaire 7/21/2022   Do you ever feel afraid of your partner? N   Are you in a relationship with someone who physically or mentally threatens you? N   Is it safe for you to go home? Y       Fall Risk  Fall Risk Assessment, last 12 mths 6/1/2022   Able to walk? Yes   Fall in past 12 months? 0   Do you feel unsteady? 0   Are you worried about falling 0         Coordination of Care:  1. Have you been to the ER, urgent care clinic since your last visit?  Hospitalized since your last visit? No    2. Have you seen or consulted any other health care providers outside of the [address] [place] [address] since your last visit? Include any pap smears or colon screening.  Dr Danielle Conley

## 2022-09-13 NOTE — LETTER
9/13/2022    Patient: Gertrudis Bhardwaj   YOB: 1960   Date of Visit: 9/13/2022     Myranda TAYLOR [place], NP-C  [address] [address]  [address] [facility] [address]    Dear 1000 , NP-C,      Thank you for referring Ms. Barak Springer to [address] [place] [address] for evaluation. My notes for this consultation are attached. If you have questions, please do not hesitate to call me. I look forward to following your patient along with you.       Sincerely,    Charity Gipson MD

## 2022-09-14 ENCOUNTER — HOSPITAL ENCOUNTER (OUTPATIENT)
Dept: PHYSICAL THERAPY | Age: 62
Discharge: HOME | End: 2022-09-14
Payer: MEDICARE

## 2022-09-14 PROCEDURE — 97535 SELF CARE MNGMENT TRAINING: CPT

## 2022-09-14 PROCEDURE — 97110 THERAPEUTIC EXERCISES: CPT

## 2022-09-14 PROCEDURE — 97162 PT EVAL MOD COMPLEX 30 MIN: CPT

## 2022-09-14 NOTE — PROGRESS NOTES
[facility]  [address]  [place], [address]  [phone] fax    Plan of Care/ Statement of Necessity for Physical Therapy Services    Patient name: Earnestine Burns Start of Care: 2022   Referral source: Lurdes Souza NP : 1960    Medical Diagnosis: Muscle weakness (generalized) [M62.81]  Payor: Radha Stands / Plan: [address] / Product Type: Managed Care Medicare /  Onset Date: Summer 2022    Treatment Diagnosis: Deconditioning/decreased balance   Prior Hospitalization: see medical history Provider#: [id number]   Medications: Verified on Patient summary List    Comorbidities: Active breast ca, asthma, arthritis, depression   Prior Level of Function: The patient states she had improved ease of balance, stepping over objects, and standing ease in general.      The Plan of Care and following information is based on the information from the initial evaluation. Assessment/ key information: The patient is a 57year old female with a chief complaint of numbness, tingling and burning of her LEs consistent with peripheral neuropathy that she suffered following a course of radiation that she completed in May of this year. She reports she feels it has increased over the summer. She states she feels she has difficulty stepping over things, and her has pain standing. She does take gabapentin 3 times a day, but states this does not feel as though it is helpful with her symptoms. The patient demonstrates sit to stand endurance test of 5x in 30\", TUG score of 21\", and difficulty with modified tandem stance noting 21\". She has decreased strength, decreased balance, pain, and decreased endurance. The patient will benefit from skilled PT in order to address the aforementioned impairments.      Evaluation Complexity History HIGH Complexity :3+ comorbidities / personal factors will impact the outcome/ POC ; Examination MEDIUM Complexity : 3 Standardized tests and measures addressing body structure, function, activity limitation and / or participation in recreation  ;Presentation MEDIUM Complexity : Evolving with changing characteristics  ; Clinical Decision Making MEDIUM Complexity : FOTO score of 26-74  Overall Complexity Rating: MEDIUM  Problem List: pain affecting function, decrease ROM, decrease strength, impaired gait/ balance, decrease ADL/ functional abilitiies, decrease activity tolerance, decrease flexibility/ joint mobility, and decrease transfer abilities   Treatment Plan may include any combination of the following: Therapeutic exercise, Therapeutic activities, Neuromuscular re-education, Physical agent/modality, Gait/balance training, Patient education, Self Care training, Functional mobility training, Home safety training, and Stair training  Patient / Family readiness to learn indicated by: asking questions, trying to perform skills, and interest  Persons(s) to be included in education: patient (P)  Barriers to Learning/Limitations: None  Patient Goal (s): tools for pain relief  Patient Self Reported Health Status: good  Rehabilitation Potential: fair - good    Short Term Goals: To be accomplished in 2 weeks:   1. The patient will demonstrate independence and compliance with HEP to maximize therapeutic benefit. 2. The patient will improve sit to stand endurance test in 30\" to 8 times to improve efficiency of transfers. Long Term Goals: To be accomplished in 4 weeks:   1. The patient will improve FOTO score to 49 to improve ease of ADLs. 2. The patient will improve hip ABD strength to 4+/5 MMT to maximize stability in stance. 3. The patient will demonstrate TUG score to 15\" to improve efficiency of home negotiation. 4. The patient will demonstrate ability to negotiate large hurdles with left and right LE to improve ease of negotiating home.      Frequency / Duration: Patient to be seen 2 times per week for 4 weeks. Patient/ Caregiver education and instruction: Diagnosis, prognosis, self care, activity modification, and exercises   [x]  Plan of care has been reviewed with PTA    Certification Period: 9/14/2022 - 10/13/2022  Stevan Runner, PT 9/14/2022 12:41 PM    ________________________________________________________________________    I certify that the above Therapy Services are being furnished while the patient is under my care. I agree with the treatment plan and certify that this therapy is necessary.     [de-identified] Signature:____________________  Date:____________Time: _________     Marva Adhikari NP  Please sign and return to [facility] [address] [place] [address] & [facility]  [address] [facility] [address] [place] [address], [facility] [address]  [phone] fax

## 2022-09-14 NOTE — PROGRESS NOTES
PT DAILY TREATMENT NOTE     Patient Name: Josue Spence  Date:2022  : 1960  [x]  Patient  Verified  Payor: AARP MEDICARE COMPLETE / Plan: [facility] MEDICARE COMPLETE / Product Type: Managed Care Medicare /    In time:11:25  Out time:12:15  Total Treatment Time (min): 50  Visit #: 1 of 8    Medicare/BCBS Only   Total Timed Codes (min):  23 1:1 Treatment Time:  23       Treatment Area: Muscle weakness (generalized) [M62.81]    SUBJECTIVE  Pain Level (0-10 scale): 3/10  Any medication changes, allergies to medications, adverse drug reactions, diagnosis change, or new procedure performed?: [x] No    [] Yes (see summary sheet for update)  Subjective functional status/changes:   [] No changes reported  The patient has a chief complaint of peripheral neuropathy pain which she feels is causing her balance to not be as good which began over the summer following chemotherapy. OBJECTIVE  15 min Therapeutic Exercise:  [] See flow sheet :   Rationale: increase ROM and increase strength to improve the patients ability to improve ADL ease. 8 min Self Care/Home Management: Education regarding balance strategies ankle, hip, stepping, as well as proprioceptive inputs role concerning balance and the implications that peripheral neuropathy has on the balance systems. Rationale: improve coordination, improve balance, and increase proprioception  to improve the patients ability to improve ADL ease.            With   [] TE   [] TA   [] neuro   [] other: Patient Education: [x] Review HEP    [] Progressed/Changed HEP based on:   [] positioning   [] body mechanics   [] transfers   [] heat/ice application    [] other:      Other Objective/Functional Measures: See IE     Pain Level (0-10 scale) post treatment: 4/10    ASSESSMENT/Changes in Function: See POC    Patient will continue to benefit from skilled PT services to modify and progress therapeutic interventions, address functional mobility deficits, address ROM deficits, address strength deficits, analyze and address soft tissue restrictions, analyze and cue movement patterns, analyze and modify body mechanics/ergonomics, assess and modify postural abnormalities, and instruct in home and community integration to attain remaining goals. []  See Plan of Care  []  See progress note/recertification  []  See Discharge Summary         Progress towards goals / Updated goals:  Short Term Goals: To be accomplished in 2 weeks:              1. The patient will demonstrate independence and compliance with HEP to maximize therapeutic benefit. IE: issued HEP              2. The patient will improve sit to stand endurance test in 30\" to 8 times to improve efficiency of transfers. IE: 5x  Long Term Goals: To be accomplished in 4 weeks:              1. The patient will improve FOTO score to 49 to improve ease of ADLs. IE: 39              2. The patient will improve hip ABD strength to 4+/5 MMT to maximize stability in stance. IE: 3+/5 MMT B              3. The patient will demonstrate TUG score to 15\" to improve efficiency of home negotiation. IE: 21\"              4. The patient will demonstrate ability to negotiate large hurdles with left and right LE to improve ease of negotiating home.    IE: unable to stance negotiate with left LE    PLAN  [x]  Upgrade activities as tolerated     []  Continue plan of care  []  Update interventions per flow sheet       []  Discharge due to:_  []  Other:_      Warner Rios, PT 9/14/2022  12:53 PM    Future Appointments   Date Time Provider Cesar Leong   9/21/2022  1:45 PM Aide Ordoñez, [facility]   9/23/2022 12:00 PM Hector, [address] [facility]   9/26/2022  1:15 PM Myrtis Roll, PTA [facility]   9/28/2022  9:45 AM Myrtis Roll, PTA [facility]   10/3/2022  9:45 AM Myrtis Roll, PTA [facility]   10/5/2022  9:15 AM Aide Ordoñez, PT [facility]   10/10/2022  9:45 AM Luis Teague K, PTA [facility]   10/12/2022  9:15 AM Jazmyne Herrera, PT [facility]   10/13/2022  8:30 AM DO ULICES Roth BS AMB   10/19/2022  9:00 AM Myranad Brandt, NPMichelleC [facility] BS AMB

## 2022-09-21 ENCOUNTER — HOSPITAL ENCOUNTER (OUTPATIENT)
Dept: PHYSICAL THERAPY | Age: 62
Discharge: HOME | End: 2022-09-21
Payer: MEDICARE

## 2022-09-21 PROCEDURE — 97112 NEUROMUSCULAR REEDUCATION: CPT

## 2022-09-21 PROCEDURE — 97110 THERAPEUTIC EXERCISES: CPT

## 2022-09-21 NOTE — PROGRESS NOTES
PT DAILY TREATMENT NOTE     Patient Name: Oneil Zendejas  Date:2022  : 1960  [x]  Patient  Verified  Payor: AARP MEDICARE COMPLETE / [facility] / Product Type: Managed Care Medicare /    In time:1:24  Out time:2:12  Total Treatment Time (min): 48  Visit #: 2 of 8    Medicare/BCBS Only   Total Timed Codes (min):  38 1:1 Treatment Time:  38       Treatment Area: Muscle weakness (generalized) [M62.81]    SUBJECTIVE  Pain Level (0-10 scale): 4/10  Any medication changes, allergies to medications, adverse drug reactions, diagnosis change, or new procedure performed?: [x] No    [] Yes (see summary sheet for update)  Subjective functional status/changes:   [] No changes reported  The patient reports that she has been compliant with HEP. OBJECTIVE  Modality rationale: decrease pain and increase tissue extensibility to improve the patients ability to improve ADL ease. Min Type Additional Details   10 []  Ice     [x]  heat  []  Ice massage  []  Laser   []  Anodyne Position: supine  Location: B knees elevated on wedge   [] Skin assessment post-treatment:  []intact []redness- no adverse reaction    []redness - adverse reaction:     23 min Therapeutic Exercise:  [x] See flow sheet :   Rationale: increase ROM and increase strength to improve the patients ability to improve ADL ease. 15 min Neuromuscular Re-education:  [x]  See flow sheet :   Rationale: improve coordination, improve balance, and increase proprioception  to improve the patients ability to improve ADL ease. With   [] TE   [] TA   [] neuro   [] other: Patient Education: [x] Review HEP    [] Progressed/Changed HEP based on:   [] positioning   [] body mechanics   [] transfers   [] heat/ice application    [] other:      Other Objective/Functional Measures:  Required 2 rest breaks during session due to fatigue of LEs. Pain Level (0-10 scale) post treatment: 4/10    ASSESSMENT/Changes in Function:  The patient was able to full participate in session, though did experience increase in knee pain following standing interventions. She was able to perform step ups with left knee ascending onto step, but had increase in knee pain when ascending on step with right knee and coming down onto left knee. Opted to hold this movement pattern after two reps due to notable discomfort. Patient will continue to benefit from skilled PT services to modify and progress therapeutic interventions, address functional mobility deficits, address ROM deficits, address strength deficits, analyze and address soft tissue restrictions, analyze and cue movement patterns, analyze and modify body mechanics/ergonomics, assess and modify postural abnormalities, address imbalance/dizziness, and instruct in home and community integration to attain remaining goals. []  See Plan of Care  []  See progress note/recertification  []  See Discharge Summary         Progress towards goals / Updated goals:  Short Term Goals: To be accomplished in 2 weeks:              1. The patient will demonstrate independence and compliance with HEP to maximize therapeutic benefit. IE: issued HEP   Current: The patient reports compliance with HEP to date 9/21/2022              2. The patient will improve sit to stand endurance test in 30\" to 8 times to improve efficiency of transfers. IE: 5x  Long Term Goals: To be accomplished in 4 weeks:              1. The patient will improve FOTO score to 49 to improve ease of ADLs. IE: 39              2. The patient will improve hip ABD strength to 4+/5 MMT to maximize stability in stance. IE: 3+/5 MMT B              3. The patient will demonstrate TUG score to 15\" to improve efficiency of home negotiation. IE: 21\"              4. The patient will demonstrate ability to negotiate large hurdles with left and right LE to improve ease of negotiating home. IE: unable to stance negotiate with left LE    PLAN  [x]  Upgrade activities as tolerated     []  Continue plan of care  []  Update interventions per flow sheet       []  Discharge due to:_  []  Other:_      Mariely Ram, PT 9/21/2022  1:27 PM    Future Appointments   Date Time Provider Cesar Leong   9/21/2022  1:45 PM Nancy Sandhu, PT [facility]   9/23/2022 12:00 PM Hector, [address] [facility]   9/28/2022  9:45 AM Espiridion Crosser, PTA [facility]   9/30/2022  1:00 PM Nancy Sandhu, PT [facility]   10/3/2022  9:45 AM Espiridion Crosser, PTA [facility]   10/5/2022  9:15 AM Nancy Sandhu, PT [facility]   10/10/2022  9:45 AM Espiridion Crosskam, PTA [facility]   10/12/2022  9:15 AM Nancy Sandhu, PT [facility]   10/13/2022  8:30 AM DO ULICES Dawkins BS AMB   10/19/2022  9:00 AM Myranda Brandt, NP-C [facility] BS AMB

## 2022-09-23 ENCOUNTER — HOSPITAL ENCOUNTER (OUTPATIENT)
Dept: PHYSICAL THERAPY | Age: 62
Discharge: HOME | End: 2022-09-23
Payer: MEDICARE

## 2022-09-23 PROCEDURE — 97112 NEUROMUSCULAR REEDUCATION: CPT

## 2022-09-23 PROCEDURE — 97110 THERAPEUTIC EXERCISES: CPT

## 2022-09-23 NOTE — PROGRESS NOTES
PT DAILY TREATMENT NOTE     Patient Name: Tatyana Lewis  Date:2022  : 1960  [x]  Patient  Verified  Payor: [employer] MEDICARE COMPLETE / Plan: [address] / Product Type: Managed Care Medicare /    In time:11:57  Out time:12:41  Total Treatment Time (min): 44  Visit #: 3 of 8    Medicare/BCBS Only   Total Timed Codes (min):  34 1:1 Treatment Time:  34       Treatment Area: Muscle weakness (generalized) [M62.81]    SUBJECTIVE  Pain Level (0-10 scale): 3  Any medication changes, allergies to medications, adverse drug reactions, diagnosis change, or new procedure performed?: [x] No    [] Yes (see summary sheet for update)  Subjective functional status/changes:   [] No changes reported  The pt reports her knees were sore after last session but not bad the next morning    OBJECTIVE    Modality rationale: decrease pain and increase tissue extensibility to improve the patients ability to perform ADLs   Min Type Additional Details    [] Estim:  []Unatt       []IFC  []Premod                        []Other:  []w/ice   []w/heat  Position:  Location:    [] Estim: []Att    []TENS instruct  []NMES                    []Other:  []w/US   []w/ice   []w/heat  Position:  Location:    []  Traction: [] Cervical       []Lumbar                       [] Prone          []Supine                       []Intermittent   []Continuous Lbs:  [] before manual  [] after manual    []  Ultrasound: []Continuous   [] Pulsed                           []1MHz   []3MHz W/cm2:  Location:    []  Iontophoresis with dexamethasone         Location: [] Take home patch   [] In clinic   10 []  Ice     [x]  heat  []  Ice massage  []  Laser   []  Anodyne Position: supine with wedge  Location: B knees    []  Laser with stim  []  Other:  Position:  Location:    []  Vasopneumatic Device    []  Right     []  Left  Pre-treatment girth:  Post-treatment girth:  Measured at (location):  Pressure:       [] lo [] med [] hi   Temperature: [] lo [] med [] hi   [] Skin assessment post-treatment:  []intact []redness- no adverse reaction    []redness - adverse reaction:       18 min Therapeutic Exercise:  [] See flow sheet :   Rationale: increase ROM and increase strength to improve the patients ability to perform daily tasks     8 min Therapeutic Activity:  []  See flow sheet :   Rationale: increase strength and improve coordination  to improve the patients ability to perform functional tasks with improved efficiency     8 min Neuromuscular Re-education:  []  See flow sheet :   Rationale: improve balance and increase proprioception  to improve the patients ability to perform ADLs with improved proprioception and stability    With   [] TE   [] TA   [] neuro   [] other: Patient Education: [x] Review HEP    [] Progressed/Changed HEP based on:   [] positioning   [] body mechanics   [] transfers   [] heat/ice application    [] other:      Other Objective/Functional Measures:      Pain Level (0-10 scale) post treatment: 2    ASSESSMENT/Changes in Function: Pt reports increased ease with standing interventions today, able to perform more therex with today's session. Will assess soreness post exercise and progress as able next visit    Patient will continue to benefit from skilled PT services to modify and progress therapeutic interventions, address functional mobility deficits, address ROM deficits, address strength deficits, analyze and address soft tissue restrictions, analyze and cue movement patterns, analyze and modify body mechanics/ergonomics, and address imbalance/dizziness to attain remaining goals. []  See Plan of Care  []  See progress note/recertification  []  See Discharge Summary         Progress towards goals / Updated goals:  Short Term Goals: To be accomplished in 2 weeks:              1. The patient will demonstrate independence and compliance with HEP to maximize therapeutic benefit. IE: issued HEP              Current:  The patient reports compliance with HEP to date 9/21/2022              2. The patient will improve sit to stand endurance test in 30\" to 8 times to improve efficiency of transfers. IE: 5x  Long Term Goals: To be accomplished in 4 weeks:              1. The patient will improve FOTO score to 49 to improve ease of ADLs. IE: 39              2. The patient will improve hip ABD strength to 4+/5 MMT to maximize stability in stance. IE: 3+/5 MMT B              3. The patient will demonstrate TUG score to 15\" to improve efficiency of home negotiation. IE: 21\"              4. The patient will demonstrate ability to negotiate large hurdles with left and right LE to improve ease of negotiating home.               IE: unable to stance negotiate with left LE    PLAN  []  Upgrade activities as tolerated     []  Continue plan of care  []  Update interventions per flow sheet       []  Discharge due to:_  []  Other:_      Climmie Acre DPT CMTPT 9/23/2022  12:02 PM    Future Appointments   Date Time Provider Cesar Leong   9/28/2022  9:45 AM Marly John, PTA [facility]   9/30/2022  1:00 PM Serenity Mendiola, PT [facility]   10/3/2022  9:45 AM Marly John, PTA [facility]   10/5/2022  9:15 AM Serenity Mendiola, PT [facility]   10/10/2022  9:45 AM Marly John, PTA [facility]   10/12/2022  9:15 AM Serenity Mendiola, PT [facility]   10/13/2022  8:30 AM DO ZORAIDA Diaz BS AMB   10/19/2022  9:00 AM Myranda Brandt NPMichelleC [facility] BS AMB

## 2022-09-26 ENCOUNTER — APPOINTMENT (OUTPATIENT)
Dept: PHYSICAL THERAPY | Age: 62
End: 2022-09-26
Payer: MEDICARE

## 2022-09-28 ENCOUNTER — HOSPITAL ENCOUNTER (OUTPATIENT)
Dept: PHYSICAL THERAPY | Age: 62
Discharge: HOME | End: 2022-09-28
Payer: MEDICARE

## 2022-09-28 PROCEDURE — 97110 THERAPEUTIC EXERCISES: CPT

## 2022-09-28 PROCEDURE — 97112 NEUROMUSCULAR REEDUCATION: CPT

## 2022-09-28 NOTE — PROGRESS NOTES
PT DAILY TREATMENT NOTE     Patient Name: Azalia Khan  Date:2022  : 1960  [x]  Patient  Verified  Payor: AARP MEDICARE COMPLETE / Plan: [facility] MEDICARE COMPLETE / Product Type: Managed Care Medicare /    In time:9:48  Out time:10:30  Total Treatment Time (min): 42  Visit #: 4 of 8    Medicare/BCBS Only   Total Timed Codes (min):  32 1:1 Treatment Time:  32       Treatment Area: Muscle weakness (generalized) [M62.81]    SUBJECTIVE  Pain Level (0-10 scale): 4/10  Any medication changes, allergies to medications, adverse drug reactions, diagnosis change, or new procedure performed?: [x] No    [] Yes (see summary sheet for update)  Subjective functional status/changes:   [] No changes reported  Pt reports no new complaints of pain. Pt reports compliance with HEP. OBJECTIVE    Modality rationale: decrease pain and increase tissue extensibility to improve the patients ability to perform ADLs with increased ease.     Min Type Additional Details    [] Estim:  []Unatt       []IFC  []Premod                        []Other:  []w/ice   []w/heat  Position:  Location:    [] Estim: []Att    []TENS instruct  []NMES                    []Other:  []w/US   []w/ice   []w/heat  Position:  Location:    []  Traction: [] Cervical       []Lumbar                       [] Prone          []Supine                       []Intermittent   []Continuous Lbs:  [] before manual  [] after manual    []  Ultrasound: []Continuous   [] Pulsed                           []1MHz   []3MHz W/cm2:  Location:    []  Iontophoresis with dexamethasone         Location: [] Take home patch   [] In clinic   10 []  Ice     [x]  heat  []  Ice massage  []  Laser   []  Anodyne Position:supine with LE elevated  Location:bilateral knees    []  Laser with stim  []  Other:  Position:  Location:    []  Vasopneumatic Device    []  Right     []  Left  Pre-treatment girth:  Post-treatment girth:  Measured at (location):  Pressure:       [] lo [] med [] hi   Temperature: [] lo [] med [] hi   [] Skin assessment post-treatment:  []intact []redness- no adverse reaction    []redness - adverse reaction:     12 min Therapeutic Exercise:  [x] See flow sheet :   Rationale: increase ROM and increase strength to improve the patients ability to perform ADLs with increased ease. 10 min Therapeutic Activity:  [x]  See flow sheet :   Rationale: increase strength, improve coordination, and increase proprioception  to improve the patients ability to perform ADLs with increased ease. 10 min Neuromuscular Re-education:  [x]  See flow sheet :   Rationale: increase strength, improve coordination, and increase proprioception  to improve the patients ability to perform ADLs with increased ease. With   [] TE   [] TA   [] neuro   [] other: Patient Education: [x] Review HEP    [] Progressed/Changed HEP based on:   [] positioning   [] body mechanics   [] transfers   [] heat/ice application    [] other:      Other Objective/Functional Measures: Pt able to performing step ups bilaterally without increased pain. Pain Level (0-10 scale) post treatment: 2/10    ASSESSMENT/Changes in Function: Pt demonstrates no significant limitations with exercise due to her knee pain. Pt requires minimal rest breaks due to fatigue but puts forth great effort. Patient will continue to benefit from skilled PT services to modify and progress therapeutic interventions, address functional mobility deficits, address ROM deficits, address strength deficits, analyze and address soft tissue restrictions, analyze and cue movement patterns, analyze and modify body mechanics/ergonomics, and assess and modify postural abnormalities to attain remaining goals. []  See Plan of Care  []  See progress note/recertification  []  See Discharge Summary         Progress towards goals / Updated goals:  Short Term Goals:  To be accomplished in 2 weeks:              1. The patient will demonstrate independence and compliance with HEP to maximize therapeutic benefit. IE: issued HEP              Current: The patient reports compliance with HEP to date 9/21/2022              2. The patient will improve sit to stand endurance test in 30\" to 8 times to improve efficiency of transfers. IE: 5x  Long Term Goals: To be accomplished in 4 weeks:              1. The patient will improve FOTO score to 49 to improve ease of ADLs. IE: 39              2. The patient will improve hip ABD strength to 4+/5 MMT to maximize stability in stance. IE: 3+/5 MMT B              3. The patient will demonstrate TUG score to 15\" to improve efficiency of home negotiation. IE: 21\"              4. The patient will demonstrate ability to negotiate large hurdles with left and right LE to improve ease of negotiating home. IE: unable to stance negotiate with left LE   Current: negotiating 1 nathaniel with UE support.  09/28/2022       PLAN  []  Upgrade activities as tolerated     [x]  Continue plan of care  []  Update interventions per flow sheet       []  Discharge due to:_  []  Other:_      Macarena Singleton, PTA 9/28/2022  9:57 AM    Future Appointments   Date Time Provider Cesar Leong   9/30/2022  1:00 PM Angela Camacho, PT [facility]   10/3/2022  9:45 AM Gabriel Piña, PTA [facility]   10/5/2022  9:15 AM Angela Camacho, PT [facility]   10/10/2022  9:45 AM Gabriel Piña, PTA [facility]   10/12/2022  9:15 AM Angela Camacho, PT [facility]   10/13/2022  8:30 AM DO ULICES Watters [facility]   10/19/2022  9:00 AM Myranda Brandt NPLUCÍA [facility] AM[facility]

## 2022-09-30 ENCOUNTER — HOSPITAL ENCOUNTER (OUTPATIENT)
Dept: PHYSICAL THERAPY | Age: 62
Discharge: HOME | End: 2022-09-30
Payer: MEDICARE

## 2022-09-30 PROCEDURE — 97110 THERAPEUTIC EXERCISES: CPT

## 2022-09-30 PROCEDURE — 97112 NEUROMUSCULAR REEDUCATION: CPT

## 2022-09-30 NOTE — PROGRESS NOTES
PT DAILY TREATMENT NOTE     Patient Name: Jannet Babinski  Date:2022  : 1960  [x]  Patient  Verified  Payor: AARP MEDICARE COMPLETE / Plan: [facility] MEDICARE COMPLETE / Product Type: Managed Care Medicare /    In time:1:00  Out time:1:40  Total Treatment Time (min): 40  Visit #: 5 of 8    Medicare/BCBS Only   Total Timed Codes (min):  30 1:1 Treatment Time:  30       Treatment Area: Muscle weakness (generalized) [M62.81]    SUBJECTIVE  Pain Level (0-10 scale): 4/10 left knee  Any medication changes, allergies to medications, adverse drug reactions, diagnosis change, or new procedure performed?: [x] No    [] Yes (see summary sheet for update)  Subjective functional status/changes:   [] No changes reported  The patient reports that she is doing fairly well, but does indicate left knee pain upon arrival.     OBJECTIVE  Modality rationale: decrease pain and increase tissue extensibility to improve the patients ability to improve ADL ease. Min Type Additional Details   10 []  Ice     [x]  heat  []  Ice massage  []  Laser   []  Anodyne Position: supine  Location: B knees   [] Skin assessment post-treatment:  []intact []redness- no adverse reaction    []redness - adverse reaction:     18 min Therapeutic Exercise:  [x] See flow sheet :   Rationale: increase ROM and increase strength to improve the patients ability to improve ADL ease. 12 min Neuromuscular Re-education:  [x]  See flow sheet :   Rationale: improve coordination, improve balance, and increase proprioception  to improve the patients ability to improve ADL ease.      With   [] TE   [] TA   [] neuro   [] other: Patient Education: [x] Review HEP    [] Progressed/Changed HEP based on:   [] positioning   [] body mechanics   [] transfers   [] heat/ice application    [] other:      Other Objective/Functional Measures:   TUG: 10\"     Pain Level (0-10 scale) post treatment: 2/10    ASSESSMENT/Changes in Function: Significant improvement concerning TUG score with a time of 10\" meeting LTG. She remains challenged by leg lifts as well as strengthening interventions with fatigue appreciated post session. The patient continues to put forth good effort and leaves in less pain than arrival.    Patient will continue to benefit from skilled PT services to modify and progress therapeutic interventions, address functional mobility deficits, address ROM deficits, address strength deficits, analyze and address soft tissue restrictions, analyze and cue movement patterns, analyze and modify body mechanics/ergonomics, assess and modify postural abnormalities, address imbalance/dizziness, and instruct in home and community integration to attain remaining goals. []  See Plan of Care  []  See progress note/recertification  []  See Discharge Summary         Progress towards goals / Updated goals:  Short Term Goals: To be accomplished in 2 weeks:              1. The patient will demonstrate independence and compliance with HEP to maximize therapeutic benefit. IE: issued HEP              Current: The patient reports compliance with HEP to date 9/21/2022              2. The patient will improve sit to stand endurance test in 30\" to 8 times to improve efficiency of transfers. IE: 5x  Long Term Goals: To be accomplished in 4 weeks:              1. The patient will improve FOTO score to 49 to improve ease of ADLs. IE: 39              2. The patient will improve hip ABD strength to 4+/5 MMT to maximize stability in stance. IE: 3+/5 MMT B              3. The patient will demonstrate TUG score to 15\" to improve efficiency of home negotiation. IE: 21\"   Current: Met - 10\" 9/30/2022              4. The patient will demonstrate ability to negotiate large hurdles with left and right LE to improve ease of negotiating home.               IE: unable to stance negotiate with left LE              Current: negotiating 1 nathaniel with UE support.  09/28/2022     PLAN  []  Upgrade activities as tolerated     []  Continue plan of care  []  Update interventions per flow sheet       []  Discharge due to:_  []  Other:_      Maxwell Lane PT 9/30/2022  1:01 PM    Future Appointments   Date Time Provider Cesar Leong   10/3/2022  9:45 AM Oli Khan PTA [facility]   10/5/2022  9:15 AM Marcelino Oakley, PT [facility]   10/10/2022  9:45 AM Oli Khan PTA [facility]   10/12/2022  9:15 AM Marcelino Oakley, PT [facility]   10/13/2022  8:30 AM DO ULICES Guallpa BS AMB   10/19/2022  9:00 AM Myranda Brandt, NP-C [facility] AMB

## 2022-10-03 ENCOUNTER — APPOINTMENT (OUTPATIENT)
Dept: PHYSICAL THERAPY | Age: 62
End: 2022-10-03
Payer: MEDICARE

## 2022-10-05 ENCOUNTER — HOSPITAL ENCOUNTER (OUTPATIENT)
Dept: PHYSICAL THERAPY | Age: 62
Discharge: HOME | End: 2022-10-05
Payer: MEDICARE

## 2022-10-05 PROCEDURE — 97110 THERAPEUTIC EXERCISES: CPT

## 2022-10-05 PROCEDURE — 97112 NEUROMUSCULAR REEDUCATION: CPT

## 2022-10-05 NOTE — PROGRESS NOTES
PT DAILY TREATMENT NOTE     Patient Name: Pedro Carrington  Date:10/5/2022  : 1960  [x]  Patient  Verified  Payor: AARP MEDICARE COMPLETE / [facility] / Product Type: Managed Care Medicare /    In time:9:17  Out time:10:06  Total Treatment Time (min): 49  Visit #: 6 of 8    Medicare/BCBS Only   Total Timed Codes (min):  39 1:1 Treatment Time:  39       Treatment Area: Muscle weakness (generalized) [M62.81]    SUBJECTIVE  Pain Level (0-10 scale): 2/10  Any medication changes, allergies to medications, adverse drug reactions, diagnosis change, or new procedure performed?: [x] No    [] Yes (see summary sheet for update)  Subjective functional status/changes:   [] No changes reported  The patient reports less pain today upon arrival.    OBJECTIVE  Modality rationale: decrease pain and increase tissue extensibility to improve the patients ability to improve ADL ease. Min Type Additional Details   10 []  Ice     [x]  heat  []  Ice massage  []  Laser   []  Anodyne Position: supine with wedge beneath legs  Location: B knees   [] Skin assessment post-treatment:  []intact []redness- no adverse reaction    []redness - adverse reaction:     24 min Therapeutic Exercise:  [x] See flow sheet :   Rationale: increase ROM and increase strength to improve the patients ability to improve ADL ease. 15 min Neuromuscular Re-education:  [x]  See flow sheet :   Rationale: increase ROM, increase strength, and improve coordination  to improve the patients ability to improve ADL ease.      With   [] TE   [] TA   [] neuro   [] other: Patient Education: [x] Review HEP    [] Progressed/Changed HEP based on:   [] positioning   [] body mechanics   [] transfers   [] heat/ice application    [] other:      Other Objective/Functional Measures:   Sit to stand 30\": 6X     Pain Level (0-10 scale) post treatment: 2/10    ASSESSMENT/Changes in Function: The patient is progressing fairly well noting improvement in sit to stands in 30\" as wel as significant improvement in TUG score last visit. She is quite motivated and has been updated with HEP. Patient will continue to benefit from skilled PT services to modify and progress therapeutic interventions, address functional mobility deficits, address ROM deficits, address strength deficits, analyze and address soft tissue restrictions, analyze and cue movement patterns, analyze and modify body mechanics/ergonomics, assess and modify postural abnormalities, address imbalance/dizziness, and instruct in home and community integration to attain remaining goals. []  See Plan of Care  []  See progress note/recertification  []  See Discharge Summary         Progress towards goals / Updated goals:  Short Term Goals: To be accomplished in 2 weeks:              1. The patient will demonstrate independence and compliance with HEP to maximize therapeutic benefit. IE: issued HEP              Current: The patient reports compliance with HEP to date 9/21/2022              2. The patient will improve sit to stand endurance test in 30\" to 8 times to improve efficiency of transfers. IE: 5x   Current: Progressing - 6x 10/05/2022  Long Term Goals: To be accomplished in 4 weeks:              1. The patient will improve FOTO score to 49 to improve ease of ADLs. IE: 39              2. The patient will improve hip ABD strength to 4+/5 MMT to maximize stability in stance. IE: 3+/5 MMT B              3. The patient will demonstrate TUG score to 15\" to improve efficiency of home negotiation. IE: 21\"              Current: Met - 10\" 9/30/2022              4. The patient will demonstrate ability to negotiate large hurdles with left and right LE to improve ease of negotiating home. IE: unable to stance negotiate with left LE              Current: negotiating 1 nathaniel with UE support.  09/28/2022    PLAN  [x] Upgrade activities as tolerated     []  Continue plan of care  []  Update interventions per flow sheet       []  Discharge due to:_  []  Other:_      Jaqueline Vidal, PT 10/5/2022  9:26 AM    Future Appointments   Date Time Provider Cesar Dang   10/10/2022  9:45 [facility]   10/12/2022  9:15 [facility]   10/13/2022  8:30 AM DO ULICES Christian BS AMB   10/19/2022  9:00 AM Myranda Brandt, NP-C [facility] AMB

## 2022-10-10 ENCOUNTER — HOSPITAL ENCOUNTER (OUTPATIENT)
Dept: PHYSICAL THERAPY | Age: 62
Discharge: HOME | End: 2022-10-10
Payer: MEDICARE

## 2022-10-10 PROCEDURE — 97112 NEUROMUSCULAR REEDUCATION: CPT

## 2022-10-10 PROCEDURE — 97110 THERAPEUTIC EXERCISES: CPT

## 2022-10-10 PROCEDURE — 97116 GAIT TRAINING THERAPY: CPT

## 2022-10-10 NOTE — PROGRESS NOTES
PT DAILY TREATMENT NOTE     Patient Name: Vadim Wong  Date:10/10/2022  : 1960  [x]  Patient  Verified  Payor: AARP MEDICARE COMPLETE / Plan: [facility] A[employer] MEDICARE COMPLETE / Product Type: Managed Care Medicare /    In time:9:45  Out time:10:35  Total Treatment Time (min): 50  Visit #: 7 of 8    Medicare/BCBS Only   Total Timed Codes (min):  40 1:1 Treatment Time:  40       Treatment Area: Muscle weakness (generalized) [M62.81]    SUBJECTIVE  Pain Level (0-10 scale): 4/10  Any medication changes, allergies to medications, adverse drug reactions, diagnosis change, or new procedure performed?: [x] No    [] Yes (see summary sheet for update)  Subjective functional status/changes:   [] No changes reported  Pt reports she is sore today but overall she's okay. Pt reports compliance with HEP. OBJECTIVE    Modality rationale: decrease pain and increase tissue extensibility to improve the patients ability to perform ADLs with increased ease.     Min Type Additional Details    [] Estim:  []Unatt       []IFC  []Premod                        []Other:  []w/ice   []w/heat  Position:  Location:    [] Estim: []Att    []TENS instruct  []NMES                    []Other:  []w/US   []w/ice   []w/heat  Position:  Location:    []  Traction: [] Cervical       []Lumbar                       [] Prone          []Supine                       []Intermittent   []Continuous Lbs:  [] before manual  [] after manual    []  Ultrasound: []Continuous   [] Pulsed                           []1MHz   []3MHz W/cm2:  Location:    []  Iontophoresis with dexamethasone         Location: [] Take home patch   [] In clinic   10 []  Ice     [x]  heat  []  Ice massage  []  Laser   []  Anodyne Position:supine  Location:bilateral knees    []  Laser with stim  []  Other:  Position:  Location:    []  Vasopneumatic Device    []  Right     []  Left  Pre-treatment girth:  Post-treatment girth:  Measured at (location):  Pressure:       [] lo [] med [] hi   Temperature: [] lo [] med [] hi   [] Skin assessment post-treatment:  []intact []redness- no adverse reaction    []redness - adverse reaction:     22 min Therapeutic Exercise:  [x] See flow sheet :   Rationale: increase ROM and increase strength to improve the patients ability to perform ADLs with increased esae. 10 min Neuromuscular Re-education:  [x]  See flow sheet :   Rationale: increase strength, improve coordination, and increase proprioception  to improve the patients ability to perform ADLs with increased ease. 8 min Gait Training: Forward, lateral, retro 30 ft each with SBA  device on level surfaces with CGA level of assist   Rationale: With   [] TE   [] TA   [] neuro   [] other: Patient Education: [x] Review HEP    [] Progressed/Changed HEP based on:   [] positioning   [] body mechanics   [] transfers   [] heat/ice application    [] other:      Other Objective/Functional Measures: Pt unable to perform step ups on left lower extremity due to pain. Pain Level (0-10 scale) post treatment: 0/10    ASSESSMENT/Changes in Function: Pt puts forth good effort when performing all exercises but has increased pain with step ups on left LE. Patient will continue to benefit from skilled PT services to modify and progress therapeutic interventions, address functional mobility deficits, address ROM deficits, address strength deficits, analyze and address soft tissue restrictions, analyze and cue movement patterns, analyze and modify body mechanics/ergonomics, and assess and modify postural abnormalities to attain remaining goals. []  See Plan of Care  []  See progress note/recertification  []  See Discharge Summary         Progress towards goals / Updated goals:  Short Term Goals: To be accomplished in 2 weeks:              1. The patient will demonstrate independence and compliance with HEP to maximize therapeutic benefit. IE: issued HEP              Current:  The patient reports compliance with HEP to date 9/21/2022              2. The patient will improve sit to stand endurance test in 30\" to 8 times to improve efficiency of transfers. IE: 5x              Current: Progressing - 6x 10/05/2022  Long Term Goals: To be accomplished in 4 weeks:              1. The patient will improve FOTO score to 49 to improve ease of ADLs. IE: 39              2. The patient will improve hip ABD strength to 4+/5 MMT to maximize stability in stance. IE: 3+/5 MMT B              3. The patient will demonstrate TUG score to 15\" to improve efficiency of home negotiation. IE: 21\"              Current: Met - 10\" 9/30/2022              4. The patient will demonstrate ability to negotiate large hurdles with left and right LE to improve ease of negotiating home. IE: unable to stance negotiate with left LE              Current: negotiating 1 nathaniel with UE support.  09/28/2022       PLAN  []  Upgrade activities as tolerated     [x]  Continue plan of care  []  Update interventions per flow sheet       []  Discharge due to:_  []  Other:_      Pricilla Shane, PTA 10/10/2022  9:44 AM    Future Appointments   Date Time Provider Cesar Leong   10/10/2022  9:45 AM Oli Gent, PTA [facility]   10/12/2022  9:15 AM Pao Herrera, PT [facility]   10/13/2022  8:30 AM DO ULICES Guallpa BS AMB   10/17/2022  2:45 PM Oli Gent, PTA [facility]   10/19/2022  9:00 AM Myranda Brandt NPLUCÍA [facility] AMB   10/19/2022  2:30 PM Pao Herrera, PT [facility]   10/24/2022  9:45 AM Oli Gent, PTA [facility]   10/26/2022  9:45 AM Oli Gent, PTA [facility]   10/31/2022  9:45 AM Oli Gent, PTA [facility]   11/2/2022  9:45 AM Oli Gent, PTA [facility]   11/7/2022 10:30 AM Pao Herrera, PT [facility]

## 2022-10-17 ENCOUNTER — HOSPITAL ENCOUNTER (OUTPATIENT)
Dept: PHYSICAL THERAPY | Age: 62
Discharge: HOME | End: 2022-10-17
Payer: MEDICARE

## 2022-10-17 PROCEDURE — 97112 NEUROMUSCULAR REEDUCATION: CPT

## 2022-10-17 PROCEDURE — 97110 THERAPEUTIC EXERCISES: CPT

## 2022-10-17 NOTE — PROGRESS NOTES
PT DAILY TREATMENT NOTE     Patient Name: Vadim Wong  Date:10/17/2022  : 1960  [x]  Patient  Verified  Payor: AARP MEDICARE COMPLETE / Plan: [facility] AARP MEDICARE COMPLETE / Product Type: Managed Care Medicare /    In time:2:45  Out time:3:35  Total Treatment Time (min): 50  Visit #: 1 of 8    Medicare/BCBS Only   Total Timed Codes (min):  40 1:1 Treatment Time:  40       Treatment Area: Muscle weakness (generalized) [M62.81]    SUBJECTIVE  Pain Level (0-10 scale): 3/10  Any medication changes, allergies to medications, adverse drug reactions, diagnosis change, or new procedure performed?: [x] No    [] Yes (see summary sheet for update)  Subjective functional status/changes:   [] No changes reported  Pt reports compliance with HEP but states the pain in her left knee has been limiting her ability to perform daily activities and HEP. OBJECTIVE    Modality rationale: decrease inflammation and decrease pain to improve the patients ability to perform ADLs with increased ease.     Min Type Additional Details    [] Estim:  []Unatt       []IFC  []Premod                        []Other:  []w/ice   []w/heat  Position:  Location:    [] Estim: []Att    []TENS instruct  []NMES                    []Other:  []w/US   []w/ice   []w/heat  Position:  Location:    []  Traction: [] Cervical       []Lumbar                       [] Prone          []Supine                       []Intermittent   []Continuous Lbs:  [] before manual  [] after manual    []  Ultrasound: []Continuous   [] Pulsed                           []1MHz   []3MHz W/cm2:  Location:    []  Iontophoresis with dexamethasone         Location: [] Take home patch   [] In clinic   10 []  Ice     [x]  heat  []  Ice massage  []  Laser   []  Anodyne Position:supine  Location: bilateral knees    []  Laser with stim  []  Other:  Position:  Location:    []  Vasopneumatic Device    []  Right     []  Left  Pre-treatment girth:  Post-treatment girth:  Measured at (location):  Pressure:       [] lo [] med [] hi   Temperature: [] lo [] med [] hi   [] Skin assessment post-treatment:  []intact []redness- no adverse reaction    []redness - adverse reaction:     32 min Therapeutic Exercise:  [x] See flow sheet :   Rationale: increase ROM and increase strength to improve the patients ability to perform ADLs with increased ease. 8 min Neuromuscular Re-education:  [x]  See flow sheet :   Rationale: increase strength, improve coordination, and increase proprioception  to improve the patients ability to perform ADLs with increased ease. With   [] TE   [] TA   [] neuro   [] other: Patient Education: [x] Review HEP    [] Progressed/Changed HEP based on:   [] positioning   [] body mechanics   [] transfers   [] heat/ice application    [] other:      Other Objective/Functional Measures: FOTO: 27     Pain Level (0-10 scale) post treatment: 0/10    ASSESSMENT/Changes in Function: Pt has decreased c/o pain post treatment. Pt puts forth great effort and is able to perform many exercises though she has pain in her left knee with and without weight bearing. Patient will continue to benefit from skilled PT services to modify and progress therapeutic interventions, address functional mobility deficits, address ROM deficits, address strength deficits, analyze and address soft tissue restrictions, analyze and cue movement patterns, analyze and modify body mechanics/ergonomics, and assess and modify postural abnormalities to attain remaining goals. []  See Plan of Care  []  See progress note/recertification  []  See Discharge Summary         Progress towards goals / Updated goals:  Goals for this certification period to be accomplished in 4 weeks:                1. The patient will improve sit to stand endurance test in 30\" to 8 times to improve efficiency of transfers.               Recert: Progressing - 6x               2. The patient will improve FOTO score to 49 to improve ease of ADLs. Irecert: regressed to 27              3. The patient will improve hip ABD strength to 4+/5 MMT to maximize stability in stance. Recert: Left: 4-/5, Right: 3+/5.               4. The patient will demonstrate ability to negotiate large hurdles with left and right LE to improve ease of negotiating home. Recert: negotiating 1 nathaniel with UE support.         PLAN  []  Upgrade activities as tolerated     [x]  Continue plan of care  []  Update interventions per flow sheet       []  Discharge due to:_  []  Other:_      Cassandra Mcnair, PTA 10/17/2022  3:35 PM    Future Appointments   Date Time Provider Cesar Leong   10/19/2022  9:00 AM Myranda Brandt, [facility] BS AMB   10/19/2022  2:30 PM Cordell Herrera, PT [facility]   10/21/2022  9:15 AM DO ULICES Cook BS AMB   10/24/2022  9:45 AM Maki Morley PTA [facility]   10/26/2022  9:45 AM Maki Morley PTA [facility]   10/31/2022  9:45 AM Maki Morley PTA [facility]   11/2/2022  9:45 AM Maki Morley PTA [facility]   11/7/2022 10:30 AM Cordell Herrera, PT [facility]

## 2022-10-17 NOTE — PROGRESS NOTES
[facility]  [address] [facility] [address]  Patricio leblanc, [address]  [phone] fax    Continued Plan of Care/ Re-certification for Physical Therapy Services    Patient name: Allyson Beasley Start of Care: 2022   Referral source: Dangelo Whitman NP : 1960               Medical Diagnosis: Muscle weakness (generalized) [M62.81]  Payor: [address] [place] [address] / Plan: [address] / Product Type: Managed Care Medicare /  Onset Date: Summer 2022               Treatment Diagnosis: Deconditioning/decreased balance   Prior Hospitalization: see medical history Provider#: [id number]   Medications: Verified on Patient summary List    Comorbidities: Active breast ca, asthma, arthritis, depression   Prior Level of Function: The patient states she had improved ease of balance, stepping over objects, and standing ease in general.                       Visits from Start of Care: 7    Missed Visits: 1    The Plan of Care and following information is based on the patient's current status:  Short Term Goals: To be accomplished in 2 weeks:              1. The patient will demonstrate independence and compliance with HEP to maximize therapeutic benefit. IE: issued HEP              Current: The patient reports compliance with HEP to date               2. The patient will improve sit to stand endurance test in 30\" to 8 times to improve efficiency of transfers. IE: 5x              Current: Progressing - 6x   Long Term Goals: To be accomplished in 4 weeks:              1. The patient will improve FOTO score to 49 to improve ease of ADLs. IE: 39              2. The patient will improve hip ABD strength to 4+/5 MMT to maximize stability in stance. IE: 3+/5 MMT B   Current: Left: 4-/5, Right: 3+/5.               3. The patient will demonstrate TUG score to 15\" to improve efficiency of home negotiation.               IE: 21\"              Current: Met - 10\"               4. The patient will demonstrate ability to negotiate large hurdles with left and right LE to improve ease of negotiating home. IE: unable to stance negotiate with left LE              Current: negotiating 1 nathaniel with UE support. Key functional changes: Improved TUG, sit to stand efficiency. Problems/ barriers to goal attainment: None     Problem List: pain affecting function, decrease ROM, decrease strength, edema affecting function, impaired gait/ balance, decrease ADL/ functional abilitiies, decrease activity tolerance, and decrease flexibility/ joint mobility    Treatment Plan: Therapeutic exercise, Neuromuscular reeducation, Manual therapy, Therapeutic activity, Self care/home management, Electric stim unattended , Gait training, Ultrasound, Mechanical traction, and Electric stim attended     Patient Goal (s) has been updated and includes: \"tools for pain relief. \"     Goals for this certification period to be accomplished in 4 weeks:    1. The patient will improve sit to stand endurance test in 30\" to 8 times to improve efficiency of transfers. Recert: Progressing - 6x               2. The patient will improve FOTO score to 49 to improve ease of ADLs. Irecert: regressed to 27              3. The patient will improve hip ABD strength to 4+/5 MMT to maximize stability in stance. Recert: Left: 4-/5, Right: 3+/5.               4. The patient will demonstrate ability to negotiate large hurdles with left and right LE to improve ease of negotiating home. Recert: negotiating 1 nathaniel with UE support. Frequency / Duration: Patient to be seen 2 times per week for 4 weeks:    Assessment / Recommendations:Pt demonstrates steady slow progress toward functional goals with improved sit to stand efficiency and TUG score. Pt does have limitations due to continued pain limiting standing tolerance. Pt will benefit from continued PT to improved strength and decreased pain of left LE to improve patients ability to perform ADLs and functional activities. Patient will continue to benefit from skilled PT services to modify and progress therapeutic interventions, address functional mobility deficits, address ROM deficits, address strength deficits, analyze and address soft tissue restrictions, analyze and cue movement patterns, analyze and modify body mechanics/ergonomics, and assess and modify postural abnormalities to attain remaining goals. Certification Period: 10/17/2022-11/15/2022    Jason ShahSEVERO 10/17/2022 2:42 PM  Co-sign: Pedro Munoz DPT PT [facility]    ________________________________________________________________________  I certify that the above Therapy Services are being furnished while the patient is under my care. I agree with the treatment plan and certify that this therapy is necessary. [] I have read the above and request that my patient continue as recommended.   [] I have read the above report and request that my patient continue therapy with the following changes/special instructions: _____________________________________________  [] I have read the above report and request that my patient be discharged from therapy    Physician's Signature:____________Date:_________TIME:________     Liz Ratliff NP  ** Signature, Date and Time must be completed for valid certification **    Please sign and return to [facility] [address] [place] [address] & [facility]  [address]  [place], [facility] [address]  [phone] fax

## 2022-10-18 ENCOUNTER — TELEPHONE (OUTPATIENT)
Dept: PHYSICAL THERAPY | Age: 62
End: 2022-10-18

## 2022-10-19 ENCOUNTER — APPOINTMENT (OUTPATIENT)
Dept: PHYSICAL THERAPY | Age: 62
End: 2022-10-19
Payer: MEDICARE

## 2022-10-19 ENCOUNTER — OFFICE VISIT (OUTPATIENT)
Dept: FAMILY MEDICINE CLINIC | Age: 62
End: 2022-10-19
Payer: MEDICARE

## 2022-10-19 VITALS
RESPIRATION RATE: 18 BRPM | OXYGEN SATURATION: 97 % | SYSTOLIC BLOOD PRESSURE: 139 MMHG | HEART RATE: 74 BPM | DIASTOLIC BLOOD PRESSURE: 80 MMHG

## 2022-10-19 DIAGNOSIS — G56.03 CARPAL TUNNEL SYNDROME ON BOTH SIDES: ICD-10-CM

## 2022-10-19 DIAGNOSIS — T45.1X5A NEUROPATHY DUE TO CHEMOTHERAPEUTIC DRUG (HCC): ICD-10-CM

## 2022-10-19 DIAGNOSIS — G62.0 NEUROPATHY DUE TO CHEMOTHERAPEUTIC DRUG (HCC): ICD-10-CM

## 2022-10-19 DIAGNOSIS — R73.03 PREDIABETES: Primary | ICD-10-CM

## 2022-10-19 DIAGNOSIS — Z78.0 POST-MENOPAUSAL: ICD-10-CM

## 2022-10-19 DIAGNOSIS — Z23 ENCOUNTER FOR IMMUNIZATION: ICD-10-CM

## 2022-10-19 LAB — HBA1C MFR BLD HPLC: 5.8 %

## 2022-10-19 PROCEDURE — G0008 ADMIN INFLUENZA VIRUS VAC: HCPCS | Performed by: NURSE PRACTITIONER

## 2022-10-19 PROCEDURE — 36416 COLLJ CAPILLARY BLOOD SPEC: CPT | Performed by: NURSE PRACTITIONER

## 2022-10-19 PROCEDURE — 90686 IIV4 VACC NO PRSV 0.5 ML IM: CPT | Performed by: NURSE PRACTITIONER

## 2022-10-19 PROCEDURE — 83036 HEMOGLOBIN GLYCOSYLATED A1C: CPT | Performed by: NURSE PRACTITIONER

## 2022-10-19 PROCEDURE — 99213 OFFICE O/P EST LOW 20 MIN: CPT | Performed by: NURSE PRACTITIONER

## 2022-10-19 RX ORDER — GABAPENTIN 300 MG/1
300 CAPSULE ORAL 2 TIMES DAILY
Qty: 60 CAPSULE | Refills: 0 | Status: SHIPPED | OUTPATIENT
Start: 2022-10-19

## 2022-10-19 NOTE — PROGRESS NOTES
Jannet Babinski presents today for   Chief Complaint   Patient presents with    Follow-up     58 y/o F present to clinic for a 3m. PT reports she wants a flu shot but not sure if she should get it due to breast cancer. A1c was done during intake. Is someone accompanying this pt? NO, NO (name) No (relationship) (temp NO)    Is the patient using any DME equipment during OV? Yes walker ( as needed)    Depression Screening:  3 most recent PHQ Screens 10/19/2022   Little interest or pleasure in doing things Not at all   Feeling down, depressed, irritable, or hopeless Not at all   Total Score PHQ 2 0   Trouble falling or staying asleep, or sleeping too much -   Feeling tired or having little energy -   Poor appetite, weight loss, or overeating -   Feeling bad about yourself - or that you are a failure or have let yourself or your family down -   Trouble concentrating on things such as school, work, reading, or watching TV -   Moving or speaking so slowly that other people could have noticed; or the opposite being so fidgety that others notice -   Thoughts of being better off dead, or hurting yourself in some way -   PHQ 9 Score -   How difficult have these problems made it for you to do your work, take care of your home and get along with others -       Learning Assessment:  Learning Assessment 6/1/2022   PRIMARY LEARNER Patient   HIGHEST LEVEL OF EDUCATION - PRIMARY LEARNER  4 YEARS OF COLLEGE   BARRIERS PRIMARY LEARNER NONE   CO-LEARNER CAREGIVER No   PRIMARY LANGUAGE ENGLISH   LEARNER PREFERENCE PRIMARY DEMONSTRATION   ANSWERED BY self   RELATIONSHIP SELF       Fall Risk  Fall Risk Assessment, last 12 mths 6/1/2022   Able to walk? Yes   Fall in past 12 months? 0   Do you feel unsteady?  0   Are you worried about falling 0       ADL  ADL Assessment 7/21/2022   Feeding yourself No Help Needed   Getting from bed to chair No Help Needed   Getting dressed No Help Needed   Bathing or showering No Help Needed   Walk across the room (includes cane/walker) No Help Needed   Using the telphone No Help Needed   Taking your medications No Help Needed   Preparing meals No Help Needed   Managing money (expenses/bills) No Help Needed   Moderately strenuous housework (laundry) No Help Needed   Shopping for personal items (toiletries/medicines) No Help Needed   Shopping for groceries No Help Needed   Driving No Help Needed   Climbing a flight of stairs No Help Needed   Getting to places beyond walking distances No Help Needed       Health Maintenance reviewed and discussed and ordered per Provider. Health Maintenance Due   Topic Date Due    Colorectal Cancer Screening Combo  09/20/2021    Bone Densitometry  10/14/2021    Flu Vaccine (1) 08/01/2022   . Coordination of Care:  1. \"Have you been to the ER, urgent care clinic since your last visit? Hospitalized since your last visit? \" No    2. \"Have you seen or consulted any other health care providers outside of the [address] [place] [address] [facility] since your last visit? \" No     3. For patients aged 39-70: Has the patient had a colonoscopy? No     If the patient is female:    4. For patients aged 41-77: Has the patient had a mammogram within the past 2 years? NA - based on age    11. For patients aged 21-65: Has the patient had a pap smear?  No

## 2022-10-19 NOTE — PROGRESS NOTES
Fingerstick for HBa1C done in left first finger by Alfred Stiles per order of [facility], NP after cleaning area with alcohol wipe. Patient tolerated procedure well.

## 2022-10-19 NOTE — PROGRESS NOTES
Freedom Rose is a 57 y.o. female who presents for routine immunizations. She denies any symptoms , reactions or allergies that would exclude them from being immunized today. Risks and adverse reactions were discussed and the VIS was given to them. All questions were addressed. She refused to stay in the office for observation, was in no distress when they left.

## 2022-10-19 NOTE — PROGRESS NOTES
History of Present Illness  Bashir White is a 57 y.o. female who presents today to for her follow up care. past medical history significant for breast cancer, COPD, morbid obesity status post gastric bypass, prediabetes, depression, fibrosarcoma, GERD. Continues to have  bilateral hand pain, with the right being worst than the left. She has completed physical therapy, reports it did help but now the pain has returned. She would like to see about increasing her Gabapentin. Her knees have been bothering and she is going to follow up with Dr. Nely Leone regarding this. Denies any chest pain, shortness of breath, nausea or vomiting. Appetite is good. Chief Complaint   Patient presents with    Follow-up     56 y/o F present to clinic for a 3m. PT reports she wants a flu shot but not sure if she should get it due to breast cancer. A1c was done during intake.           Past Medical History:   Diagnosis Date    Alcoholism in remission 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    GERD (gastroesophageal reflux disease)     History of gastric bypass 2012    History of meniscal tear 2015, 2018    right in 2015, left in 2018    HNP (herniated nucleus pulposus), lumbar     patient reported    Lung nodules     resolved 2018 CT    Neuropathy     Osteopenia 10/03/2017    Recurrent depression ([address]     Sleep apnea     on cpap    Spinal stenosis, lumbar     patient reported    Xanthelasma of left upper eyelid 07/20/2020        Past Surgical History:   Procedure Laterality Date    HX ARTHRODESIS  01/2000    Herniated Disc, arthritis right foot 06/06, 07/07, C 6/7, C 4/6 Stenosis    HX CHOLECYSTECTOMY  09/2008    gallbladder disease    HX COLONOSCOPY  05/2009    HX GASTRIC BYPASS  2012    GASTRIC BYPASS  [facility] Gastric Bypass      HX HYSTERECTOMY  06/1994    HX MASTECTOMY Bilateral 11/23/2021    BILATERAL MASTECTOMY, LEFT SENTINEL NODE BIOPSY [facility] 11/22) performed by Joceline Zavaleta MD at [address] Left 6/28/2022    revision of left mastectomy scar performed by Bryan Gil DO at [address]     [address]  10/2015    right repari of torn meniscus    HX MENISCECTOMY Left 03/16/2018    partial meniscectomy due to tear    HX MYOMECTOMY  06/1984    benign tumor    HX ORTHOPAEDIC  1964    orthopaedic excision Fibrosarcoma and Lymphangiogram    HX PELVIC LAPAROSCOPY  04/1984    large uterine blockage    NEUROLOGICAL PROCEDURE UNLISTED  2000, 2007    Cervical fusion        Current Medications  Current Outpatient Medications   Medication Sig    citalopram (CELEXA) 20 mg tablet Take 1 Tablet by mouth daily. omeprazole (PRILOSEC) 20 mg capsule Take 2 capsules by mouth twice daily    oxybutynin (DITROPAN) 5 mg tablet Take 5 mg by mouth two (2) times a day. sucralfate (CARAFATE) 1 gram tablet Take 1 Tablet by mouth two (2) times a day. Take 1 tablet by mouth    Lynparza 150 mg tablet     b complex vitamins tablet Take 1 Tablet by mouth daily. fluticasone propionate (FLONASE) 50 mcg/actuation nasal spray SHAKE LIQUID AND USE 2 SPRAYS IN EACH NOSTRIL DAILY    gabapentin (NEURONTIN) 100 mg capsule Take 200 mg by mouth daily. buPROPion SR (WELLBUTRIN SR) 150 mg SR tablet Take 1 Tablet by mouth two (2) times a day. fluticasone-umeclidinium-vilanterol (Trelegy Ellipta) 100-62.5-25 mcg inhaler INHALE 1 PUFF BY MOUTH DAILY    diclofenac (Voltaren) 1 % gel Apply 2 g to affected area every six (6) hours as needed for Pain. Linzess 72 mcg cap capsule TAKE 1 CAPSULE BY MOUTH DAILY BEFORE BREAKFAST    acetaminophen (TYLENOL) 500 mg tablet Take  by mouth every six (6) hours as needed for Pain. calcium-cholecalciferol, d3, 600-125 mg-unit tab Take 1,065 mg by mouth nightly.     omega 3-dha-epa-fish oil 100-160-1,000 mg cap Take 1,065 mg by mouth daily.    multivitamin (ONE A DAY) tablet Take 1 Tab by mouth daily. albuterol (PROVENTIL HFA, VENTOLIN HFA, PROAIR HFA) 90 mcg/actuation inhaler Take 2 Puffs by inhalation every four (4) hours as needed for Wheezing or Shortness of Breath for up to 90 days. No current facility-administered medications for this visit.          Allergies   Allergen Reactions    Adhesive Tape-Silicones Swelling     REALLY BAD SWELLING AND SORE APPEAR    Alcohol Other (comments)     PT STATES SHE IS AN ALCOHOLIC    Lactose Other (comments)     PT STATES IT MAKES HER STOMACH HURT    Percodan [Oxycodone Hcl-Oxycodone-Asa] Itching and Other (comments)     crying    Nsaids (Non-Steroidal Anti-Inflammatory Drug) Other (comments)     PT NOT ABLE TO TAKE DUE TO GASTRIC BYPASS          Family History   Problem Relation Age of Onset    Hypertension Mother     Depression Mother     Cancer Mother         Ovarian Cancer, breast cancer,     Ovarian Cancer Mother     Breast Problems Mother     Cancer Father         skin cancer,     Cancer Sister         Breast cancer    Depression Sister     Breast Cancer Sister     Depression Brother     Alcohol abuse Maternal Grandfather     Diabetes Maternal Grandmother             Stroke Neg Hx     Heart Attack Neg Hx           Social History     Tobacco Use    Smoking status: Former     Packs/day: 0.50     Years: 45.00     Pack years: 22.50     Types: Cigarettes     Quit date: 2020     Years since quittin.3    Smokeless tobacco: Never   Vaping Use    Vaping Use: Never used   Substance Use Topics    Alcohol use: No     Comment: quit - was heavy etoh    Drug use: Not Currently     Comment: marijuana, cocaine 30 yrs ago        Health Maintenance   Topic Date Due    Colorectal Cancer Screening Combo  2021    Bone Densitometry  10/14/2021    Flu Vaccine (1) 2022    Low dose CT lung screening  2023    Medicare Yearly Exam  2023    Pneumococcal 0-64 years (3 - PPSV23 or PCV20) 06/06/2023    Depression Monitoring  07/21/2023    A1C test (Diabetic or Prediabetic)  07/21/2023    Lipid Screen  12/29/2025    DTaP/Tdap/Td series (3 - Td or Tdap) 09/18/2030    Hepatitis C Screening  Completed    Bone Densitometry (Dexa) Screening  Completed    Shingrix Vaccine Age 50>  Completed    COVID-19 Vaccine  Completed     Immunization History   Administered Date(s) Administered    COVID-19, MODERNA BLUE border, Primary or Immunocompromised, (age 18y+), IM, 100 mcg/0.5mL 01/29/2021, 02/26/2021    COVID-19, MODERNA Booster BLUE border, (age 18y+), IM, 50mcg/0.25mL 12/12/2021, 06/18/2022    Influenza Vaccine 09/16/2011, 10/03/2012, 10/24/2013    Influenza, FLUARIX, FLULAVAL, Colan Crain (age 10 mo+) AND AFLURIA, (age 1 y+), PF, 0.5mL 09/14/2017, 09/12/2018, 09/27/2019, 09/18/2020, 10/13/2021    Pneumococcal Conjugate (PCV-13) 10/24/2013    Pneumococcal Polysaccharide (PPSV-23) 11/22/2017    TB Skin Test (PPD) Intradermal 10/06/2017, 09/27/2019, 10/08/2019, 08/03/2021    Tdap 02/10/2009, 09/18/2020    Zoster Recombinant 09/12/2018, 11/14/2018       Review of Systems  Review of Systems   Musculoskeletal:         Bilateral hand pain   All other systems reviewed and are negative. Physical Exam  Vitals reviewed. Constitutional:       Appearance: She is obese. Cardiovascular:      Rate and Rhythm: Normal rate and regular rhythm. Pulmonary:      Effort: Pulmonary effort is normal.      Breath sounds: Normal breath sounds. Abdominal:      General: Bowel sounds are normal.      Palpations: Abdomen is soft. Musculoskeletal:      Comments: Tingling, numbness bilateral hands   Skin:     General: Skin is warm. Neurological:      Mental Status: She is alert and oriented to person, place, and time.           Visit Vitals  /80   Pulse 74   Resp 18   SpO2 97%          Laboratory/Tests:  Office Visit on 07/21/2022   Component Date Value Ref Range Status    Hemoglobin A1c (POC) 07/21/2022 6.2  % Final         Assessment/Plan:    1. Prediabetes  A1C today 5.8 decreased from 6.2 in July  Reduce carbs and sugary foods  Initiate cardio exercise  Weight reduction  Increase water intake   - AMB POC HEMOGLOBIN A1C  - COLLECTION CAPILLARY BLOOD SPECIMEN    2. Encounter for immunization  - INFLUENZA, FLUARIX, FLULAVAL, FLUZONE (AGE 6 MO+), AFLURIA(AGE 3Y+) IM, PF, 0.5 ML    3. Post-menopausal  - DEXA BONE DENSITY STUDY AXIAL; Future    4. Carpal tunnel syndrome on both sides  Positive Phalen maneuver and Tinel test  - REFERRAL TO ORTHOPEDICS    5. Neuropathy due to chemotherapeutic drug (HCC)  Will increase gabapentin from 400 mg daily to 600 mg daily   - gabapentin (NEURONTIN) 300 mg capsule; Take 1 Capsule by mouth two (2) times a day. Max Daily Amount: 600 mg. Indications: neuropathic pain  Dispense: 60 Capsule; Refill: 0         30 minutes of total time reviewing the records, talking with the patient, ordering tests and charting. I have discussed the diagnosis with the patient and the intended plan as seen in the above orders. The patient has received an after-visit summary and questions were answered concerning future plans. I have discussed medication side effects and warnings with the patient as well. I have reviewed the plan of care with the patient, accepted their input and they are in agreement with the treatment goals. Previous lab and imaging results were reviewed by me.        [address] [place] [address], NP-C  October 19, 2022

## 2022-10-20 NOTE — PROGRESS NOTES
CC:   Chief Complaint   Patient presents with    Follow-up     06/28/2022 Left Mastectomy Scar revision s/p  11/23/2021 Simple mastectomy for triple negative invasive adenocarcinoma         Assessment:    ICD-10-CM ICD-9-CM    1. Malignant neoplasm of upper-inner quadrant of left breast in female, estrogen receptor negative ([facility]  C50.212 174.2 SCHEDULE SURGERY    Z17.1 V86.1       2. Morbid obesity with BMI of 45.0-49.9, adult ([facility]  E66.01 278.01     Z68.42 V85.42           Plan: She is doing very well with her surgery  but would like the excess skin bilaterally removed as this is interfering with her clothing. I discussed with the patient that the incision will likely extend to the back and she is comfortable with this so as long as it is flat and does not rub against her clothing. The risks and benefits of the procedure were reviewed with the patient including infection, bleeding, need for repeat procedure, injury to surrounding structures and poor cosmetic outcome. The patient's questions were answered and consent was obtained. HPI:Ms. Fabby Bobo is a 59year old woman with BRCA2 mutation and T2N0 triple negative breast cancer, s/p bilateral mastectomy 11/23/21. She had been diagnosed on core biopsy 10/19/21. The area of concern was identified as a palpable mass around September 2021. She denied nipple discharge. She denied breast pain. There is family history of breast cancer in her sister, mother and maternal aunt. Her most recent previous mammogram was 10/14/19. She has a personal history of fibrosarcoma. Her sister has HNPCC/ANTHONY mutation. Genetic testing via [facility] demonstrated BRCA2 mutation 11/8/21. She did undergo mediport removal and mastectomy scar revision on 6/28/2022 medial aspect of incision. She states the lateral dog ears are bothering her with clothing and she is interested in removal. She continues to deal with lymphedema of her hands and peripheral neuropathy.  She is in physical therapy with little improvement. She has been referred to a hand specialist for evaluation. 7/13/2022  [place] is a 57 y.o. female who for follow-up status post Mediport removal and scar revision of the left mastectomy. She reports being very happy with the incision and has no complaints. She has no pain, fevers, chills or drainage from this area. Allergies: Allergies   Allergen Reactions    Adhesive Tape-Silicones Swelling     REALLY BAD SWELLING AND SORE APPEAR    Alcohol Other (comments)     PT STATES SHE IS AN ALCOHOLIC    Lactose Other (comments)     PT STATES IT MAKES HER STOMACH HURT    Percodan [Oxycodone Hcl-Oxycodone-Asa] Itching and Other (comments)     crying    Nsaids (Non-Steroidal Anti-Inflammatory Drug) Other (comments)     PT NOT ABLE TO TAKE DUE TO GASTRIC BYPASS       Medication Review:  Current Outpatient Medications on File Prior to Visit   Medication Sig Dispense Refill    gabapentin (NEURONTIN) 300 mg capsule Take 1 Capsule by mouth two (2) times a day. Max Daily Amount: 600 mg. Indications: neuropathic pain 60 Capsule 0    citalopram (CELEXA) 20 mg tablet Take 1 Tablet by mouth daily. 90 Tablet 0    omeprazole (PRILOSEC) 20 mg capsule Take 2 capsules by mouth twice daily 360 Capsule 1    oxybutynin (DITROPAN) 5 mg tablet Take 5 mg by mouth two (2) times a day. sucralfate (CARAFATE) 1 gram tablet Take 1 Tablet by mouth two (2) times a day. Take 1 tablet by mouth 180 Tablet 3    Lynparza 150 mg tablet       b complex vitamins tablet Take 1 Tablet by mouth daily. fluticasone propionate (FLONASE) 50 mcg/actuation nasal spray SHAKE LIQUID AND USE 2 SPRAYS IN EACH NOSTRIL DAILY 48 g 5    buPROPion SR (WELLBUTRIN SR) 150 mg SR tablet Take 1 Tablet by mouth two (2) times a day.  180 Tablet 3    fluticasone-umeclidinium-vilanterol (Trelegy Ellipta) 100-62.5-25 mcg inhaler INHALE 1 PUFF BY MOUTH DAILY 120 Each 4    diclofenac (Voltaren) 1 % gel Apply 2 g to affected area every six (6) hours as needed for Pain. 100 g 2    Linzess 72 mcg cap capsule TAKE 1 CAPSULE BY MOUTH DAILY BEFORE BREAKFAST 30 Cap 1    acetaminophen (TYLENOL) 500 mg tablet Take  by mouth every six (6) hours as needed for Pain. calcium-cholecalciferol, d3, 600-125 mg-unit tab Take 1,065 mg by mouth nightly. omega 3-dha-epa-fish oil 100-160-1,000 mg cap Take 1,065 mg by mouth daily. multivitamin (ONE A DAY) tablet Take 1 Tab by mouth daily. No current facility-administered medications on file prior to visit. Systems Review:  Review of Systems   Constitutional:  Negative for fatigue and fever. Musculoskeletal:  Negative for arthralgias. Skin:  Negative for pallor, rash and wound. Hematological:  Negative for adenopathy. Does not bruise/bleed easily.      PMH:  Past Medical History:   Diagnosis Date    Alcoholism in remission ([address]     Arthritis 2016    Asthma     Breast cancer ([address]     breast cancer left    Chronic obstructive pulmonary disease ([address]     Chronic pain 1999    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    GERD (gastroesophageal reflux disease)     History of gastric bypass 2012    History of meniscal tear 2015, 2018    right in 2015, left in 2018    HNP (herniated nucleus pulposus), lumbar     patient reported    Lung nodules     resolved 2018 CT    Neuropathy     Osteopenia 10/03/2017    Recurrent depression ([address]     Sleep apnea     on cpap    Spinal stenosis, lumbar     patient reported    Xanthelasma of left upper eyelid 07/20/2020       Surgical History:  Past Surgical History:   Procedure Laterality Date    HX ARTHRODESIS  01/2000    Herniated Disc, arthritis right foot 06/06, 07/07, C 6/7, C 4/6 Stenosis    HX CHOLECYSTECTOMY  09/2008    gallbladder disease    HX COLONOSCOPY  05/2009    HX GASTRIC BYPASS  2012    GASTRIC BYPASS  [facility] Gastric Bypass      HX HYSTERECTOMY  06/1994    HX MASTECTOMY Bilateral 11/23/2021    BILATERAL MASTECTOMY, LEFT SENTINEL NODE BIOPSY [facility] ) performed by Rhea Gooden MD at [address] Left 2022    revision of left mastectomy scar performed by Kimberly Bradford DO at [address] [facility]     [facility]  10/2015    right repari of torn meniscus    HX MENISCECTOMY Left 2018    partial meniscectomy due to tear    HX MYOMECTOMY  1984    benign tumor    HX ORTHOPAEDIC  1964    orthopaedic excision Fibrosarcoma and Lymphangiogram    HX PELVIC LAPAROSCOPY  1984    large uterine blockage    NEUROLOGICAL PROCEDURE UNLISTED  ,     Cervical fusion       Social History:  Social History     Socioeconomic History    Marital status: SINGLE   Tobacco Use    Smoking status: Former     Packs/day: 0.50     Years: 45.00     Pack years: 22.50     Types: Cigarettes     Quit date: 2020     Years since quittin.3    Smokeless tobacco: Never   Vaping Use    Vaping Use: Never used   Substance and Sexual Activity    Alcohol use: No     Comment: quit 1980s- was heavy etoh    Drug use: Not Currently     Comment: marijuana, cocaine 30 yrs ago    Sexual activity: Not Currently     Partners: Male     Birth control/protection: None     Social Determinants of Health     Financial Resource Strain: Low Risk     Difficulty of Paying Living Expenses: Not hard at all   Food Insecurity: No Food Insecurity    Worried About Running Out of Food in the Last Year: Never true    Ran Out of Food in the Last Year: Never true   Transportation Needs: No Transportation Needs    Lack of Transportation (Medical): No    Lack of Transportation (Non-Medical): No   Housing Stability: Low Risk     Unable to Pay for Housing in the Last Year: No    Number of Places Lived in the Last Year: 1    Unstable Housing in the Last Year: No       Family History:  Family History   Problem Relation Age of Onset    Hypertension Mother     Depression Mother     Cancer Mother         Ovarian Cancer, breast cancer,  Ovarian Cancer Mother     Breast Problems Mother     Cancer Father         skin cancer,     Cancer Sister         Breast cancer    Depression Sister     Breast Cancer Sister     Depression Brother     Alcohol abuse Maternal Grandfather     Diabetes Maternal Grandmother             Stroke Neg Hx     Heart Attack Neg Hx        Office Visit on 10/19/2022   Component Date Value Ref Range Status    Hemoglobin A1c (POC) 10/19/2022 5.8  % Final       ECHO ADULT FOLLOW-UP OR LIMITED    Left Ventricle: Normal left ventricular systolic function with a   visually estimated EF of 55 - 60%. Left ventricle is smaller than normal.   Increased wall thickness. Findings consistent with mild concentric   hypertrophy. Normal wall motion. Global longitudinal strain is -14.2%. Strain performed on Vivid S70. Physical Exam:  Visit Vitals  BP (!) 140/96 (BP Patient Position: Sitting)   Pulse 74   Temp 97.7 °F (36.5 °C) (Temporal)   Resp 16   Ht 5' 5\" (1.651 m)   Wt 127 kg (280 lb)   SpO2 99%   BMI 46.59 kg/m²      BMI: Body mass index is 46.59 kg/m². Physical Exam  Constitutional:       Appearance: Normal appearance. She is obese. Cardiovascular:      Rate and Rhythm: Normal rate. Chest:   Breasts:     Right: Absent. Left: Absent. Comments: Dogs ears bilaterally incisions extending mid axilla  Skin:     General: Skin is warm and dry. Comments: Port incision site well-healed, no seroma, left mastectomy scar revision site well-healed with no evidence of seroma   Neurological:      General: No focal deficit present. Mental Status: She is alert and oriented to person, place, and time. Mental status is at baseline. Psychiatric:         Mood and Affect: Mood normal.         Behavior: Behavior normal.         Thought Content:  Thought content normal.         Judgment: Judgment normal.       I have reviewed the information entered by the clinical staff and/or patient and verified it as accurate or edited where necessary.      Electronically signed by:    Lilia Quintero DO, MPH

## 2022-10-21 ENCOUNTER — OFFICE VISIT (OUTPATIENT)
Dept: SURGERY | Age: 62
End: 2022-10-21
Payer: MEDICARE

## 2022-10-21 VITALS
SYSTOLIC BLOOD PRESSURE: 140 MMHG | HEART RATE: 74 BPM | RESPIRATION RATE: 16 BRPM | HEIGHT: 65 IN | DIASTOLIC BLOOD PRESSURE: 96 MMHG | OXYGEN SATURATION: 99 % | WEIGHT: 280 LBS | TEMPERATURE: 97.7 F | BODY MASS INDEX: 46.65 KG/M2

## 2022-10-21 DIAGNOSIS — Z17.1 MALIGNANT NEOPLASM OF UPPER-INNER QUADRANT OF LEFT BREAST IN FEMALE, ESTROGEN RECEPTOR NEGATIVE (HCC): Primary | ICD-10-CM

## 2022-10-21 DIAGNOSIS — E66.01 MORBID OBESITY WITH BMI OF 45.0-49.9, ADULT (HCC): ICD-10-CM

## 2022-10-21 DIAGNOSIS — C50.212 MALIGNANT NEOPLASM OF UPPER-INNER QUADRANT OF LEFT BREAST IN FEMALE, ESTROGEN RECEPTOR NEGATIVE (HCC): Primary | ICD-10-CM

## 2022-10-21 PROCEDURE — G8427 DOCREV CUR MEDS BY ELIG CLIN: HCPCS | Performed by: SURGERY

## 2022-10-21 PROCEDURE — G9717 DOC PT DX DEP/BP F/U NT REQ: HCPCS | Performed by: SURGERY

## 2022-10-21 PROCEDURE — G8417 CALC BMI ABV UP PARAM F/U: HCPCS | Performed by: SURGERY

## 2022-10-21 PROCEDURE — 99214 OFFICE O/P EST MOD 30 MIN: CPT | Performed by: SURGERY

## 2022-10-21 PROCEDURE — G9708 BILAT MAST/HX BI /UNILAT MAS: HCPCS | Performed by: SURGERY

## 2022-10-21 PROCEDURE — 3017F COLORECTAL CA SCREEN DOC REV: CPT | Performed by: SURGERY

## 2022-10-21 NOTE — PROGRESS NOTES
Atif Chirinos is a 57 y.o. female  Chief Complaint   Patient presents with    Follow-up     06/28/2022 Left Mastectomy Scar revision s/p  11/23/2021 Simple mastectomy for triple negative invasive adenocarcinoma      Visit Vitals  BP (!) 140/96 (BP Patient Position: Sitting)   Pulse 74   Temp 97.7 °F (36.5 °C) (Temporal)   Resp 16   Ht 5' 5\" (1.651 m)   Wt 280 lb (127 kg)   SpO2 99%   BMI 46.59 kg/m²     1. Have you been to the ER, urgent care clinic since your last visit? Hospitalized since your last visit? No    2. Have you seen or consulted any other health care providers outside of the [address] [place] [address] since your last visit? Include any pap smears or colon screening.  No

## 2022-10-24 ENCOUNTER — HOSPITAL ENCOUNTER (OUTPATIENT)
Dept: BONE DENSITY | Age: 62
Discharge: HOME | End: 2022-10-24
Attending: NURSE PRACTITIONER
Payer: MEDICARE

## 2022-10-24 ENCOUNTER — APPOINTMENT (OUTPATIENT)
Dept: PHYSICAL THERAPY | Age: 62
End: 2022-10-24
Payer: MEDICARE

## 2022-10-24 DIAGNOSIS — Z78.0 POST-MENOPAUSAL: ICD-10-CM

## 2022-10-24 PROCEDURE — 77080 DXA BONE DENSITY AXIAL: CPT

## 2022-10-24 NOTE — PROGRESS NOTES
Dexa bone scan showed osteopenia/low bone density.  It looks like she is on a calcium and vitamin D supplement, have her check to see if she is at least getting 800 iu of vitamin D and 1200 mg of calcium daily

## 2022-10-26 ENCOUNTER — HOSPITAL ENCOUNTER (OUTPATIENT)
Dept: PHYSICAL THERAPY | Age: 62
Discharge: HOME | End: 2022-10-26
Payer: MEDICARE

## 2022-10-26 PROCEDURE — 97112 NEUROMUSCULAR REEDUCATION: CPT

## 2022-10-26 PROCEDURE — 97110 THERAPEUTIC EXERCISES: CPT

## 2022-10-26 PROCEDURE — 97530 THERAPEUTIC ACTIVITIES: CPT

## 2022-10-26 NOTE — PROGRESS NOTES
PT DAILY TREATMENT NOTE     Patient Name: Atif Chirinos  Date:10/26/2022  : 1960  [x]  Patient  Verified  Payor: [employer] MEDICARE COMPLETE / Plan: [facility] MEDICARE COMPLETE / Product Type: Managed Care Medicare /    In time:9:45  Out time:10:33  Total Treatment Time (min): 48  Visit #: 2 of 8    Medicare/BCBS Only   Total Timed Codes (min):  38 1:1 Treatment Time:  38       Treatment Area: Muscle weakness (generalized) [M62.81]    SUBJECTIVE  Pain Level (0-10 scale): 3/10  Any medication changes, allergies to medications, adverse drug reactions, diagnosis change, or new procedure performed?: [x] No    [] Yes (see summary sheet for update)  Subjective functional status/changes:   [] No changes reported  Pt reports compliance with HEP. Pt reports continued pain in bilateral knees; pt reports not being able to sleep longer than 4 hours without waking up in pain. OBJECTIVE    Modality rationale: decrease pain and increase tissue extensibility to improve the patients ability to perform ADLs with increased ease.     Min Type Additional Details    [] Estim:  []Unatt       []IFC  []Premod                        []Other:  []w/ice   []w/heat  Position:  Location:    [] Estim: []Att    []TENS instruct  []NMES                    []Other:  []w/US   []w/ice   []w/heat  Position:  Location:    []  Traction: [] Cervical       []Lumbar                       [] Prone          []Supine                       []Intermittent   []Continuous Lbs:  [] before manual  [] after manual    []  Ultrasound: []Continuous   [] Pulsed                           []1MHz   []3MHz W/cm2:  Location:    []  Iontophoresis with dexamethasone         Location: [] Take home patch   [] In clinic   10 []  Ice     [x]  heat  []  Ice massage  []  Laser   []  Anodyne Position:supine  Location:bilateral knee    []  Laser with stim  []  Other:  Position:  Location:    []  Vasopneumatic Device    []  Right     []  Left  Pre-treatment girth:  Post-treatment girth:  Measured at (location):  Pressure:       [] lo [] med [] hi   Temperature: [] lo [] med [] hi   [] Skin assessment post-treatment:  []intact []redness- no adverse reaction    []redness - adverse reaction:     28 min Therapeutic Exercise:  [x] See flow sheet :   Rationale: increase ROM and increase strength to improve the patients ability to perform ADLs with increased ease. 10 min Neuromuscular Re-education:  [x]  See flow sheet :   Rationale: increase strength, improve coordination, and increase proprioception  to improve the patients ability to perform ADLs with increased ease. With   [] TE   [] TA   [] neuro   [] other: Patient Education: [x] Review HEP    [] Progressed/Changed HEP based on:   [] positioning   [] body mechanics   [] transfers   [] heat/ice application    [] other:      Other Objective/Functional Measures: Increased weight with leg raises. Pain Level (0-10 scale) post treatment: 0/10    ASSESSMENT/Changes in Function: pt demonstrates no adverse reaction to treatment. Pt is unable to perform step ups on left LE due to pain. Patient will continue to benefit from skilled PT services to modify and progress therapeutic interventions, address functional mobility deficits, address ROM deficits, address strength deficits, analyze and address soft tissue restrictions, analyze and cue movement patterns, analyze and modify body mechanics/ergonomics, and assess and modify postural abnormalities to attain remaining goals. []  See Plan of Care  []  See progress note/recertification  []  See Discharge Summary         Progress towards goals / Updated goals:  Goals for this certification period to be accomplished in 4 weeks:                1. The patient will improve sit to stand endurance test in 30\" to 8 times to improve efficiency of transfers.               Recert: Progressing - 6x               2. The patient will improve FOTO score to 49 to improve ease of ADLs. Irecert: regressed to 27              3. The patient will improve hip ABD strength to 4+/5 MMT to maximize stability in stance. Recert: Left: 4-/5, Right: 3+/5.               4. The patient will demonstrate ability to negotiate large hurdles with left and right LE to improve ease of negotiating home. Recert: negotiating 1 nathaniel with UE support.      PLAN  []  Upgrade activities as tolerated     [x]  Continue plan of care  []  Update interventions per flow sheet       []  Discharge due to:_  []  Other:_      Lorenzo Duran PTA 10/26/2022  10:04 AM    Future Appointments   Date Time Provider Cesar Dang   10/31/2022  9:45 AM Silva Calvo PTA [facility]   11/2/2022  9:45 AM Silva Calvo PTA [facility]   11/7/2022  9:20 AM Dewayne ROSAS DO [facility]   11/8/2022  8:30 AM Silva Calvo PTA [facility]   11/11/2022 10:00 AM Michael Lucio MD [facility] BS AMB   12/7/2022  9:15 AM Evangelina Bentley DO [facility] AMB   1/18/2023  8:30 AM Gerald Castellano MD [facility] AM[facility]

## 2022-10-31 ENCOUNTER — APPOINTMENT (OUTPATIENT)
Dept: PHYSICAL THERAPY | Age: 62
End: 2022-10-31
Payer: MEDICARE

## 2022-11-02 ENCOUNTER — APPOINTMENT (OUTPATIENT)
Dept: PHYSICAL THERAPY | Age: 62
End: 2022-11-02

## 2022-11-07 ENCOUNTER — OFFICE VISIT (OUTPATIENT)
Dept: ORTHOPEDIC SURGERY | Age: 62
End: 2022-11-07
Payer: MEDICARE

## 2022-11-07 ENCOUNTER — APPOINTMENT (OUTPATIENT)
Dept: PHYSICAL THERAPY | Age: 62
End: 2022-11-07

## 2022-11-07 VITALS — BODY MASS INDEX: 46.35 KG/M2 | WEIGHT: 278.2 LBS | TEMPERATURE: 97.5 F | HEIGHT: 65 IN

## 2022-11-07 DIAGNOSIS — G56.01 RIGHT CARPAL TUNNEL SYNDROME: Primary | ICD-10-CM

## 2022-11-07 DIAGNOSIS — M19.032 PRIMARY OSTEOARTHRITIS OF LEFT WRIST: ICD-10-CM

## 2022-11-07 DIAGNOSIS — M79.641 RIGHT HAND PAIN: ICD-10-CM

## 2022-11-07 PROCEDURE — G8417 CALC BMI ABV UP PARAM F/U: HCPCS | Performed by: ORTHOPAEDIC SURGERY

## 2022-11-07 PROCEDURE — G9717 DOC PT DX DEP/BP F/U NT REQ: HCPCS | Performed by: ORTHOPAEDIC SURGERY

## 2022-11-07 PROCEDURE — 96372 THER/PROPH/DIAG INJ SC/IM: CPT | Performed by: ORTHOPAEDIC SURGERY

## 2022-11-07 PROCEDURE — 99214 OFFICE O/P EST MOD 30 MIN: CPT | Performed by: ORTHOPAEDIC SURGERY

## 2022-11-07 PROCEDURE — 73130 X-RAY EXAM OF HAND: CPT | Performed by: ORTHOPAEDIC SURGERY

## 2022-11-07 PROCEDURE — 3017F COLORECTAL CA SCREEN DOC REV: CPT | Performed by: ORTHOPAEDIC SURGERY

## 2022-11-07 PROCEDURE — 20526 THER INJECTION CARP TUNNEL: CPT | Performed by: ORTHOPAEDIC SURGERY

## 2022-11-07 PROCEDURE — G8427 DOCREV CUR MEDS BY ELIG CLIN: HCPCS | Performed by: ORTHOPAEDIC SURGERY

## 2022-11-07 PROCEDURE — G9708 BILAT MAST/HX BI /UNILAT MAS: HCPCS | Performed by: ORTHOPAEDIC SURGERY

## 2022-11-07 NOTE — PROGRESS NOTES
Vinny Diaz is a 57 y.o. female right handed . Worker's Compensation and legal considerations: none filed. Vitals:    11/07/22 0915   Temp: 97.5 °F (36.4 °C)   TempSrc: Temporal   Weight: 278 lb 3.2 oz (126.2 kg)   Height: 5' 5\" (1.651 m)   PainSc:   7   PainLoc: Hand           Chief Complaint   Patient presents with    Hand Pain     Bilat          HPI: Patient presents today with complaints of right hand pain and numbness and tingling as well as occasional wrist pain. She also reports left wrist pain. She reports the pain in the wrist on the left to be worse than the right. She reports minimal to no numbness and tingling on the left.     Date of onset: Indeterminate    Injury: No    Prior Treatment:  No    Numbness/ Tingling: Yes: Comment: Right      ROS: Review of Systems - General ROS: negative  Psychological ROS: negative  ENT ROS: negative  Allergy and Immunology ROS: negative  Hematological and Lymphatic ROS: negative  Respiratory ROS: no cough, shortness of breath, or wheezing  Cardiovascular ROS: no chest pain or dyspnea on exertion  Gastrointestinal ROS: no abdominal pain, change in bowel habits, or black or bloody stools  Musculoskeletal ROS: negative  Neurological ROS: positive for - numbness/tingling  Dermatological ROS: negative    Past Medical History:   Diagnosis Date    Alcoholism in remission ([address]     Arthritis 2016    Asthma     Breast cancer ([address]     breast cancer left    Chronic obstructive pulmonary disease (HCC)     Chronic pain 1999    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    GERD (gastroesophageal reflux disease)     History of gastric bypass 2012    History of meniscal tear 2015, 2018    right in 2015, left in 2018    HNP (herniated nucleus pulposus), lumbar     patient reported    Lung nodules     resolved 2018 CT    Neuropathy     Osteopenia 10/03/2017    Recurrent depression ([address]     Sleep apnea     on cpap    Spinal stenosis, lumbar     patient reported    Xanthelasma of left upper eyelid 07/20/2020       Past Surgical History:   Procedure Laterality Date    HX ARTHRODESIS  01/2000    Herniated Disc, arthritis right foot 06/06, 07/07, C 6/7, C 4/6 Stenosis    HX CHOLECYSTECTOMY  09/2008    gallbladder disease    HX COLONOSCOPY  05/2009    HX GASTRIC BYPASS  2012    GASTRIC BYPASS  [facility] Gastric Bypass      HX HYSTERECTOMY  06/1994    HX MASTECTOMY Bilateral 11/23/2021    BILATERAL MASTECTOMY, LEFT SENTINEL NODE BIOPSY [facility] 11/22) performed by Opal Mcgraw MD at [address] Left 6/28/2022    revision of left mastectomy scar performed by Eva Lorenz DO at [address] [facility]     [facility] [address]  10/2015    right repari of torn meniscus    HX MENISCECTOMY Left 03/16/2018    partial meniscectomy due to tear    HX MYOMECTOMY  06/1984    benign tumor    HX ORTHOPAEDIC  1964    orthopaedic excision Fibrosarcoma and Lymphangiogram    HX PELVIC LAPAROSCOPY  04/1984    large uterine blockage    NEUROLOGICAL PROCEDURE UNLISTED  2000, 2007    Cervical fusion       Current Outpatient Medications   Medication Sig Dispense Refill    gabapentin (NEURONTIN) 300 mg capsule Take 1 Capsule by mouth two (2) times a day. Max Daily Amount: 600 mg. Indications: neuropathic pain 60 Capsule 0    citalopram (CELEXA) 20 mg tablet Take 1 Tablet by mouth daily. 90 Tablet 0    omeprazole (PRILOSEC) 20 mg capsule Take 2 capsules by mouth twice daily 360 Capsule 1    oxybutynin (DITROPAN) 5 mg tablet Take 5 mg by mouth two (2) times a day. sucralfate (CARAFATE) 1 gram tablet Take 1 Tablet by mouth two (2) times a day. Take 1 tablet by mouth 180 Tablet 3    Lynparza 150 mg tablet       b complex vitamins tablet Take 1 Tablet by mouth daily. fluticasone propionate (FLONASE) 50 mcg/actuation nasal spray SHAKE LIQUID AND USE 2 SPRAYS IN EACH NOSTRIL DAILY 48 g 5    buPROPion SR (WELLBUTRIN SR) 150 mg SR tablet Take 1 Tablet by mouth two (2) times a day. 180 Tablet 3    fluticasone-umeclidinium-vilanterol (Trelegy Ellipta) 100-62.5-25 mcg inhaler INHALE 1 PUFF BY MOUTH DAILY 120 Each 4    diclofenac (Voltaren) 1 % gel Apply 2 g to affected area every six (6) hours as needed for Pain. 100 g 2    Linzess 72 mcg cap capsule TAKE 1 CAPSULE BY MOUTH DAILY BEFORE BREAKFAST 30 Cap 1    acetaminophen (TYLENOL) 500 mg tablet Take  by mouth every six (6) hours as needed for Pain. calcium-cholecalciferol, d3, 600-125 mg-unit tab Take 1,065 mg by mouth nightly. omega 3-dha-epa-fish oil 100-160-1,000 mg cap Take 1,065 mg by mouth daily. multivitamin (ONE A DAY) tablet Take 1 Tab by mouth daily. Current Facility-Administered Medications   Medication Dose Route Frequency Provider Last Rate Last Admin    triamcinolone acetonide (KENALOG) 10 mg/mL injection 5 mg  5 mg Other ONCE Delmar Puri, DO           Allergies   Allergen Reactions    Adhesive Tape-Silicones Swelling     REALLY BAD SWELLING AND SORE APPEAR    Alcohol Other (comments)     PT STATES SHE IS AN ALCOHOLIC    Lactose Other (comments)     PT STATES IT MAKES HER STOMACH HURT    Percodan [Oxycodone Hcl-Oxycodone-Asa] Itching and Other (comments)     crying    Nsaids (Non-Steroidal Anti-Inflammatory Drug) Other (comments)     PT NOT ABLE TO TAKE DUE TO GASTRIC BYPASS           PE:     Physical Exam  Vitals and nursing note reviewed. Constitutional:       General: She is not in acute distress. Appearance: Normal appearance. She is not ill-appearing. Cardiovascular:      Pulses: Normal pulses. Pulmonary:      Effort: Pulmonary effort is normal. No respiratory distress. Musculoskeletal:         General: Tenderness present. No swelling, deformity or signs of injury. Normal range of motion. Cervical back: Normal range of motion and neck supple. Right lower leg: No edema. Left lower leg: No edema. Skin:     General: Skin is warm and dry.       Capillary Refill: Capillary refill takes less than 2 seconds. Findings: No bruising or erythema. Neurological:      General: No focal deficit present. Mental Status: She is alert and oriented to person, place, and time. Psychiatric:         Mood and Affect: Mood normal.         Behavior: Behavior normal.          Wrist: Tenderness reproduced at the left radiocarpal joint. No reproducible tenderness on the right side. Tenderness L R Test L R   1st Ext Comp - - Finkelstein's - -   Snuff Box - - Fitzgerald - -   2nd Ext Comp - - S-L Shear - -   S-L Joint - - L-T Shear - -   L-T Joint - - DRUJ Sup - -   6th Ext Comp - - DRUJ Pro - -   Ulnar Snuff - - DRUJ Grind - -   Fovea - - TFCC - -   STT Joint - - Mid-Carp Inst - -   FCR - - P-T Grind - -   Intersection - - ECU Sublux. - -      Dorsal Ganglion: -   Volar Ganglion: -      ROM: Full      NEUROVASCULAR    Examination L R Examination L R   Carpal Comp. - + Pronator Comp. - -   Carpal Tinel - + Pronator Tinel - -   Phalen's - - Pronator Stress - -   Cubital Comp. - - Guyon Comp. - -   Cubital Tinel - - Guyon Tinel - -   Elbow Hyperflexion - - Adson's - -   Spurling's - - SC Comp. - -   PCB Median abn - - SC Tinel - -   Radial Tinel - - IC Comp. - -   Digital Tinel - - IC Tinel - -   Radial 2-Pt WNL WNL Ulnar 2-Pt WNL WNL     Radial Pulse: 2+  Capillary Refill: < 2 sec  Sarbjit: Not Performed  Digital Sarbjit: Not Performed        Imagin/7/2022 3 views of bilateral hands significant for minimal degenerative changes especially notable at the left wrist.        ICD-10-CM ICD-9-CM    1. Right carpal tunnel syndrome  G56.01 354.0 triamcinolone acetonide (KENALOG) 10 mg/mL injection 5 mg      INJECT CARPAL TUNNEL      AMB SUPPLY ORDER      EMG ONE EXTREMITY UPPER RT      NCV/LAT MOTOR PER NERVE UP/RT      2. Primary osteoarthritis of left wrist  M19.032 715.13 AMB POC XRAY, HAND; 3+ VIEWS      AMB SUPPLY ORDER      3.  Right hand pain  M79.641 729.5 AMB POC XRAY, HAND; 3+ VIEWS Plan:     Right carpal tunnel injection, night splint, and upper extremity EMG. Left universal wrist brace. Instructed patient on continuing Voltaren gel on the left side. We also discussed possibility of an injection in the future. Follow-up and Dispositions    Return for EMG Review. Plan was reviewed with patient, who verbalized agreement and understanding of the plan    [address] [facility] NOTE        Chart reviewed for the following:   Delmar EMERSON DO, have reviewed the History, Physical and updated the Allergic reactions for [address] [place] [address] performed immediately prior to start of procedure:   Shankar EMERSON DO, have performed the following reviews on Jordana Toro prior to the start of the procedure:            * Patient was identified by name and date of birth   * Agreement on procedure being performed was verified  * Risks and Benefits explained to the patient  * Procedure site verified and marked as necessary  * Patient was positioned for comfort  * Consent was signed and verified     Time: 09:39 AM      Date of procedure: 11/7/2022    Procedure performed by: Shankar Delatorre DO    Provider assisted by: Roberto Corrales MA    Patient assisted by: self    How tolerated by patient: tolerated the procedure well with no complications    Post Procedural Pain Scale: 0 - No Hurt    Comments: none    Procedure:  After consent was obtained, using sterile technique the right carpal tunnel was prepped. Local anesthetic used: 1% lidocaine. Kenalog 5 mg and was then injected and the needle withdrawn. The procedure was well tolerated. The patient is asked to continue to rest the area for a few more days before resuming regular activities. It may be more painful for the first 1-2 days. Watch for fever, or increased swelling or persistent pain in the joint.  Call or return to clinic prn if such symptoms occur or there is failure to improve as anticipated.

## 2022-11-07 NOTE — LETTER
11/7/2022    Patient: Vadim Wong   YOB: 1960   Date of Visit: 11/7/2022     Joy Horton MD  [facility] [id number]  Via In [employer], NP-C  [address] [address]  Via In [facility] [address]    Dear Joy Horton MD  [facility], NP-C,      Thank you for referring Ms. Fariba Meza to [address] [place] [address] for evaluation. My notes for this consultation are attached. If you have questions, please do not hesitate to call me. I look forward to following your patient along with you.       Sincerely,    Samanta Trejo, DO

## 2022-11-08 ENCOUNTER — APPOINTMENT (OUTPATIENT)
Dept: PHYSICAL THERAPY | Age: 62
End: 2022-11-08

## 2022-11-11 ENCOUNTER — OFFICE VISIT (OUTPATIENT)
Dept: ORTHOPEDIC SURGERY | Age: 62
End: 2022-11-11
Payer: MEDICARE

## 2022-11-11 VITALS — WEIGHT: 270 LBS | BODY MASS INDEX: 44.93 KG/M2

## 2022-11-11 DIAGNOSIS — M17.12 PRIMARY OSTEOARTHRITIS OF LEFT KNEE: ICD-10-CM

## 2022-11-11 DIAGNOSIS — M17.11 PRIMARY OSTEOARTHRITIS OF RIGHT KNEE: ICD-10-CM

## 2022-11-11 DIAGNOSIS — G89.29 CHRONIC PAIN OF LEFT KNEE: Primary | ICD-10-CM

## 2022-11-11 DIAGNOSIS — M25.562 CHRONIC PAIN OF LEFT KNEE: Primary | ICD-10-CM

## 2022-11-11 PROCEDURE — 73564 X-RAY EXAM KNEE 4 OR MORE: CPT | Performed by: ORTHOPAEDIC SURGERY

## 2022-11-11 PROCEDURE — 20610 DRAIN/INJ JOINT/BURSA W/O US: CPT | Performed by: ORTHOPAEDIC SURGERY

## 2022-11-11 RX ORDER — BETAMETHASONE SODIUM PHOSPHATE AND BETAMETHASONE ACETATE 3; 3 MG/ML; MG/ML
12 INJECTION, SUSPENSION INTRA-ARTICULAR; INTRALESIONAL; INTRAMUSCULAR; SOFT TISSUE ONCE
Status: COMPLETED | OUTPATIENT
Start: 2022-11-11 | End: 2022-11-11

## 2022-11-11 RX ADMIN — BETAMETHASONE SODIUM PHOSPHATE AND BETAMETHASONE ACETATE 12 MG: 3; 3 INJECTION, SUSPENSION INTRA-ARTICULAR; INTRALESIONAL; INTRAMUSCULAR; SOFT TISSUE at 10:24

## 2022-11-11 NOTE — PROGRESS NOTES
Patient: Susy Forbes                MRN: [id number]       SSN: [id number]  YOB: 1960        AGE: 57 y.o. SEX: female  Body mass index is 44.93 kg/m². PCP: Javier Hammond MD  11/11/22    Ms. Rikki Bland returns for reevaluation of bilateral knee pain she is had back surgery and she is doing well getting her appetite back on chemo both knees bother and she has had treatments for them in the past achy pain that worse with kneeling stairs getting up and down from a chair prolonged walking not too much in the way of night pain and otherwise feeling well no fevers no cough no shortness of breath in the examination today body mass index is at 45 the hips rotate adequately both knees are in varus prep slightly worse on the right than on the left she comes out fairly straight within 2 degrees of extension bends well 115 degrees and Homans' sign is negative there is no evidence for infection or DVT calf nontender [place] Cinnamon' sign is negative x-rays 4 views left knee today on November 11, 2022 which confirms really quite severe arthritis of both knees perhaps slightly worse on the right than on the left there was a discussion regarding surgery I think we should manage her nonoperatively at this time but eventual total knee replacements therefore both knees were injected and well-tolerated and I will see her back in 3 months time for follow-up assessment 6 weeks if she is having any problems and at this point I would favor cortisone or viscosupplementation although that might be an option for her    REVIEW OF SYSTEMS:      CON: negative  EYE: negative   ENT: negative  RESP: negative  GI:    negative   :  negative  MSK: Positive  A twelve point review of systems was completed, positives noted and all other systems were reviewed and are negative          Past Medical History:   Diagnosis Date    Alcoholism in remission ([facility]     Arthritis 2016    Asthma     Breast cancer ([facility]     breast cancer left    Chronic obstructive pulmonary disease (HCC)     Chronic pain     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    GERD (gastroesophageal reflux disease)     History of gastric bypass     History of meniscal tear , 2018    right in , left in     HNP (herniated nucleus pulposus), lumbar     patient reported    Lung nodules     resolved  CT    Neuropathy     Osteopenia 10/03/2017    Recurrent depression ([address]     Sleep apnea     on cpap    Spinal stenosis, lumbar     patient reported    Xanthelasma of left upper eyelid 2020       Family History   Problem Relation Age of Onset    Hypertension Mother     Depression Mother     Cancer Mother         Ovarian Cancer, breast cancer,     Ovarian Cancer Mother     Breast Problems Mother     Cancer Father         skin cancer,     Cancer Sister         Breast cancer    Depression Sister     Breast Cancer Sister     Depression Brother     Alcohol abuse Maternal Grandfather     Diabetes Maternal Grandmother             Stroke Neg Hx     Heart Attack Neg Hx        Current Outpatient Medications   Medication Sig Dispense Refill    gabapentin (NEURONTIN) 300 mg capsule Take 1 Capsule by mouth two (2) times a day. Max Daily Amount: 600 mg. Indications: neuropathic pain 60 Capsule 0    citalopram (CELEXA) 20 mg tablet Take 1 Tablet by mouth daily. 90 Tablet 0    omeprazole (PRILOSEC) 20 mg capsule Take 2 capsules by mouth twice daily 360 Capsule 1    oxybutynin (DITROPAN) 5 mg tablet Take 5 mg by mouth two (2) times a day. sucralfate (CARAFATE) 1 gram tablet Take 1 Tablet by mouth two (2) times a day. Take 1 tablet by mouth 180 Tablet 3    Lynparza 150 mg tablet       b complex vitamins tablet Take 1 Tablet by mouth daily.       fluticasone propionate (FLONASE) 50 mcg/actuation nasal spray SHAKE LIQUID AND USE 2 SPRAYS IN EACH NOSTRIL DAILY 48 g 5    buPROPion SR (WELLBUTRIN SR) 150 mg SR tablet Take 1 Tablet by mouth two (2) times a day. 180 Tablet 3    fluticasone-umeclidinium-vilanterol (Trelegy Ellipta) 100-62.5-25 mcg inhaler INHALE 1 PUFF BY MOUTH DAILY 120 Each 4    diclofenac (Voltaren) 1 % gel Apply 2 g to affected area every six (6) hours as needed for Pain. 100 g 2    Linzess 72 mcg cap capsule TAKE 1 CAPSULE BY MOUTH DAILY BEFORE BREAKFAST 30 Cap 1    acetaminophen (TYLENOL) 500 mg tablet Take  by mouth every six (6) hours as needed for Pain. calcium-cholecalciferol, d3, 600-125 mg-unit tab Take 1,065 mg by mouth nightly. omega 3-dha-epa-fish oil 100-160-1,000 mg cap Take 1,065 mg by mouth daily. multivitamin (ONE A DAY) tablet Take 1 Tab by mouth daily.        Current Facility-Administered Medications   Medication Dose Route Frequency Provider Last Rate Last Admin    betamethasone (CELESTONE) injection 12 mg  12 mg Intra artICUlar ONCE Misti Garcia MD           Allergies   Allergen Reactions    Adhesive Tape-Silicones Swelling     REALLY BAD SWELLING AND SORE APPEAR    Alcohol Other (comments)     PT STATES SHE IS AN ALCOHOLIC    Lactose Other (comments)     PT STATES IT MAKES HER STOMACH HURT    Percodan [Oxycodone Hcl-Oxycodone-Asa] Itching and Other (comments)     crying    Nsaids (Non-Steroidal Anti-Inflammatory Drug) Other (comments)     PT NOT ABLE TO TAKE DUE TO GASTRIC BYPASS       Past Surgical History:   Procedure Laterality Date    HX ARTHRODESIS  01/2000    Herniated Disc, arthritis right foot 06/06, 07/07, C 6/7, C 4/6 Stenosis    HX CHOLECYSTECTOMY  09/2008    gallbladder disease    HX COLONOSCOPY  05/2009    HX GASTRIC BYPASS  2012    GASTRIC BYPASS  [facility] Gastric Bypass      HX HYSTERECTOMY  06/1994    HX MASTECTOMY Bilateral 11/23/2021    BILATERAL MASTECTOMY, LEFT SENTINEL NODE BIOPSY [facility] 11/22) performed by Conrado Dsouza MD at [address] Left 6/28/2022    revision of left mastectomy scar performed by Solomon Terrell DO at [address] [facility] MENISCECTOMY  10/2015    right repari of torn meniscus    HX MENISCECTOMY Left 2018    partial meniscectomy due to tear    HX MYOMECTOMY  1984    benign tumor    HX ORTHOPAEDIC  1964    orthopaedic excision Fibrosarcoma and Lymphangiogram    HX PELVIC LAPAROSCOPY  1984    large uterine blockage    NEUROLOGICAL PROCEDURE UNLISTED  ,     Cervical fusion       Social History     Socioeconomic History    Marital status: SINGLE     Spouse name: Not on file    Number of children: Not on file    Years of education: Not on file    Highest education level: Not on file   Occupational History    Not on file   Tobacco Use    Smoking status: Former     Packs/day: 0.50     Years: 45.00     Pack years: 22.50     Types: Cigarettes     Quit date: 2020     Years since quittin.3    Smokeless tobacco: Never   Vaping Use    Vaping Use: Never used   Substance and Sexual Activity    Alcohol use: No     Comment: quit 1980s- was heavy etoh    Drug use: Not Currently     Comment: marijuana, cocaine 30 yrs ago    Sexual activity: Not Currently     Partners: Male     Birth control/protection: None   Other Topics Concern    Not on file   Social History Narrative    Not on file     Social Determinants of Health     Financial Resource Strain: Low Risk     Difficulty of Paying Living Expenses: Not hard at all   Food Insecurity: No Food Insecurity    Worried About Running Out of Food in the Last Year: Never true    Ran Out of Food in the Last Year: Never true   Transportation Needs: No Transportation Needs    Lack of Transportation (Medical): No    Lack of Transportation (Non-Medical):  No   Physical Activity: Inactive    Days of Exercise per Week: 0 days    Minutes of Exercise per Session: 0 min   Stress: Not on file   Social Connections: Not on file   Intimate Partner Violence: Not At Risk    Fear of Current or Ex-Partner: No    Emotionally Abused: No    Physically Abused: No    Sexually Abused: No   Housing Stability: Low Risk     Unable to Pay for Housing in the Last Year: No    Number of Places Lived in the Last Year: 1    Unstable Housing in the Last Year: No       Visit Vitals  Wt 122.5 kg (270 lb)   BMI 44.93 kg/m²         PHYSICAL EXAMINATION:  GENERAL: Alert and oriented x3, in no acute distress, well-developed, well-nourished, afebrile. HEART: No JVD. EYES: No scleral icterus   NECK: No significant lymphadenopathy   LUNGS: No respiratory compromise or indrawing  ABDOMEN: Soft, non-tender, non-distended. Note: This note was completed using voice recognition software. Any typographical/name errors or mistakes are unintentional.    Electronically signed by: MD IDANIA Sharif, Stacy EATON [employer], M.D., have reviewed the history, physical, and have updated the allergic reactions for [facility]. TIME OUT performed immediately prior to the start of procedure:  Darrian De León M.D., have performed the following reviews on [facility] prior to the start of the procedure:    - Patient was identified by name and date of birth  - Agreement on procedure being performed was verified  - Risks and benefits explained to the patient  - Patient was positioned for comfort  - Consent was signed and verified  - Patient was advised regarding risks of bruising, bleeding, infection and pain    Time: 10:14 AM     Body Part: intra-articular injection of bilateral knees. Medication and Dose: 1mL Celestone preparation, i.e. 6 mg, and 3 mL 1% lidocaine, to each knee. Date of Procedure: 11/11/22    PROCEDURE PERFORMED BY : Kiah EATON [employer], M.D., [facility])    Provider Assisted by: Daniel Pan    Patient assisted by: self    Patient tolerated procedure well with no complications

## 2022-11-16 ENCOUNTER — TELEPHONE (OUTPATIENT)
Dept: PHYSICAL THERAPY | Age: 62
End: 2022-11-16

## 2022-11-16 RX ORDER — ALBUTEROL SULFATE 90 UG/1
AEROSOL, METERED RESPIRATORY (INHALATION)
COMMUNITY

## 2022-11-16 NOTE — PERIOP NOTES
PRE-SURGICAL INSTRUCTIONS        Patient's Name:  Tabby Maurer      Today's Date:  11/16/2022            Covid Testing Date and Time:    Surgery Date:  11/22/2022                Do NOT eat or drink anything, including candy, gum, or ice chips after midnight on 11/21/2022, unless you have specific instructions from your surgeon or anesthesia provider to do so. You may brush your teeth before coming to the hospital.  No smoking 24 hours prior to the day of surgery. No alcohol 24 hours prior to the day of surgery. No recreational drugs for one week prior to the day of surgery. Leave all valuables, including money/purse, at home. Remove all jewelry, nail polish, acrylic nails, and makeup (including mascara); no lotions powders, deodorant, or perfume/cologne/after shave on the skin. Follow instruction for Hibiclens washes and CHG wipes from surgeon's office. Glasses/contact lenses and dentures may be worn to the hospital.  They will be removed prior to surgery. Call your doctor if symptoms of a cold or illness develop within 24-48 hours prior to your surgery. 11.  If you are having an outpatient procedure, please make arrangements for a responsible ADULT TO [address] [place] [address] and stay with you for 24 hours after your surgery. 12. ONE VISITOR in the hospital at this time for outpatient procedures. Exceptions may be made for surgical admissions, per nursing unit guidelines      Special Instructions:      Bring list of CURRENT medications. Bring inhaler. Bring CPAP machine. Bring any pertinent legal medical records. Take these medications the morning of surgery with a sip of water:  per MD  Follow physician instructions about insulin. Follow physician instructions about stopping anticoagulants. Complete bowel prep per MD instructions. On the day of surgery, come in the main entrance of [facility]. Let the  at the desk know you are there for surgery.   A staff member will come escort you to the surgical area on the second floor. If you have any questions or concerns, please do not hesitate to call:     (Prior to the day of surgery) PAT department:  [phone]   (Day of surgery) Pre-Op department:  [phone]    These surgical instructions were reviewed with patient during the PAT phone call.

## 2022-11-21 ENCOUNTER — ANESTHESIA EVENT (OUTPATIENT)
Dept: SURGERY | Age: 62
End: 2022-11-21
Payer: MEDICARE

## 2022-11-22 ENCOUNTER — ANESTHESIA (OUTPATIENT)
Dept: SURGERY | Age: 62
End: 2022-11-22
Payer: MEDICARE

## 2022-11-22 ENCOUNTER — HOSPITAL ENCOUNTER (OUTPATIENT)
Age: 62
Setting detail: OUTPATIENT SURGERY
Discharge: HOME | End: 2022-11-22
Attending: SURGERY | Admitting: SURGERY
Payer: MEDICARE

## 2022-11-22 VITALS
HEART RATE: 95 BPM | BODY MASS INDEX: 45.03 KG/M2 | OXYGEN SATURATION: 98 % | RESPIRATION RATE: 14 BRPM | TEMPERATURE: 97.3 F | DIASTOLIC BLOOD PRESSURE: 74 MMHG | WEIGHT: 270.6 LBS | SYSTOLIC BLOOD PRESSURE: 113 MMHG

## 2022-11-22 DIAGNOSIS — Z90.13 S/P MASTECTOMY, BILATERAL: Primary | ICD-10-CM

## 2022-11-22 PROCEDURE — 13101 CMPLX RPR TRUNK 2.6-7.5 CM: CPT | Performed by: SURGERY

## 2022-11-22 PROCEDURE — 77030011267 HC ELECTRD BLD COVD -A: Performed by: SURGERY

## 2022-11-22 PROCEDURE — 77030040361 HC SLV COMPR DVT MDII -B: Performed by: SURGERY

## 2022-11-22 PROCEDURE — 77030010507 HC ADH SKN DERMBND J&J -B: Performed by: SURGERY

## 2022-11-22 PROCEDURE — 00400 ANES INTEGUMENTARY SYS NOS: CPT | Performed by: NURSE ANESTHETIST, CERTIFIED REGISTERED

## 2022-11-22 PROCEDURE — 74011250636 HC RX REV CODE- 250/636: Performed by: NURSE ANESTHETIST, CERTIFIED REGISTERED

## 2022-11-22 PROCEDURE — 74011000250 HC RX REV CODE- 250: Performed by: SURGERY

## 2022-11-22 PROCEDURE — 77030016441 HC APPL CLP LIG1 J&J -B: Performed by: SURGERY

## 2022-11-22 PROCEDURE — 74011250636 HC RX REV CODE- 250/636: Performed by: SURGERY

## 2022-11-22 PROCEDURE — 76060000033 HC ANESTHESIA 1 TO 1.5 HR: Performed by: SURGERY

## 2022-11-22 PROCEDURE — 77030031139 HC SUT VCRL2 J&J -A: Performed by: SURGERY

## 2022-11-22 PROCEDURE — 77030002933 HC SUT MCRYL J&J -A: Performed by: SURGERY

## 2022-11-22 PROCEDURE — 74011250637 HC RX REV CODE- 250/637: Performed by: ANESTHESIOLOGY

## 2022-11-22 PROCEDURE — 77030002996 HC SUT SLK J&J -A: Performed by: SURGERY

## 2022-11-22 PROCEDURE — 2709999900 HC NON-CHARGEABLE SUPPLY: Performed by: SURGERY

## 2022-11-22 PROCEDURE — 74011250637 HC RX REV CODE- 250/637: Performed by: SURGERY

## 2022-11-22 PROCEDURE — 77030040922 HC BLNKT HYPOTHRM STRY -A: Performed by: SURGERY

## 2022-11-22 PROCEDURE — 74011250637 HC RX REV CODE- 250/637: Performed by: NURSE ANESTHETIST, CERTIFIED REGISTERED

## 2022-11-22 PROCEDURE — 76210000016 HC OR PH I REC 1 TO 1.5 HR: Performed by: SURGERY

## 2022-11-22 PROCEDURE — 76010000149 HC OR TIME 1 TO 1.5 HR: Performed by: SURGERY

## 2022-11-22 PROCEDURE — 77030013079 HC BLNKT BAIR HGGR 3M -A: Performed by: ANESTHESIOLOGY

## 2022-11-22 PROCEDURE — 74011000258 HC RX REV CODE- 258: Performed by: NURSE ANESTHETIST, CERTIFIED REGISTERED

## 2022-11-22 PROCEDURE — 74011000250 HC RX REV CODE- 250: Performed by: NURSE ANESTHETIST, CERTIFIED REGISTERED

## 2022-11-22 PROCEDURE — 76210000021 HC REC RM PH II 0.5 TO 1 HR: Performed by: SURGERY

## 2022-11-22 PROCEDURE — 77030031753 HC SHR ENDO COAG HARM J&J -E: Performed by: SURGERY

## 2022-11-22 PROCEDURE — 77030040201 HC PRB SET LOCALIZER DISP BIPT -D: Performed by: SURGERY

## 2022-11-22 PROCEDURE — 77030008683 HC TU ET CUF COVD -A: Performed by: ANESTHESIOLOGY

## 2022-11-22 PROCEDURE — 77030026438 HC STYL ET INTUB CARD -A: Performed by: ANESTHESIOLOGY

## 2022-11-22 PROCEDURE — 80307 DRUG TEST PRSMV CHEM ANLYZR: CPT

## 2022-11-22 PROCEDURE — 00400 ANES INTEGUMENTARY SYS NOS: CPT | Performed by: ANESTHESIOLOGY

## 2022-11-22 RX ORDER — HYDROCODONE BITARTRATE AND ACETAMINOPHEN 5; 325 MG/1; MG/1
1 TABLET ORAL ONCE
Status: COMPLETED | OUTPATIENT
Start: 2022-11-22 | End: 2022-11-22

## 2022-11-22 RX ORDER — FENTANYL CITRATE 50 UG/ML
INJECTION, SOLUTION INTRAMUSCULAR; INTRAVENOUS AS NEEDED
Status: DISCONTINUED | OUTPATIENT
Start: 2022-11-22 | End: 2022-11-22 | Stop reason: HOSPADM

## 2022-11-22 RX ORDER — SODIUM CHLORIDE, SODIUM LACTATE, POTASSIUM CHLORIDE, CALCIUM CHLORIDE 600; 310; 30; 20 MG/100ML; MG/100ML; MG/100ML; MG/100ML
50 INJECTION, SOLUTION INTRAVENOUS CONTINUOUS
Status: DISCONTINUED | OUTPATIENT
Start: 2022-11-22 | End: 2022-11-22 | Stop reason: HOSPADM

## 2022-11-22 RX ORDER — MIDAZOLAM HYDROCHLORIDE 1 MG/ML
INJECTION, SOLUTION INTRAMUSCULAR; INTRAVENOUS AS NEEDED
Status: DISCONTINUED | OUTPATIENT
Start: 2022-11-22 | End: 2022-11-22 | Stop reason: HOSPADM

## 2022-11-22 RX ORDER — SODIUM CHLORIDE 0.9 % (FLUSH) 0.9 %
5-40 SYRINGE (ML) INJECTION AS NEEDED
Status: DISCONTINUED | OUTPATIENT
Start: 2022-11-22 | End: 2022-11-22 | Stop reason: HOSPADM

## 2022-11-22 RX ORDER — CEFAZOLIN SODIUM 1 G/3ML
INJECTION, POWDER, FOR SOLUTION INTRAMUSCULAR; INTRAVENOUS AS NEEDED
Status: DISCONTINUED | OUTPATIENT
Start: 2022-11-22 | End: 2022-11-22 | Stop reason: HOSPADM

## 2022-11-22 RX ORDER — HYDROCODONE BITARTRATE AND ACETAMINOPHEN 5; 325 MG/1; MG/1
1 TABLET ORAL AS NEEDED
Status: COMPLETED | OUTPATIENT
Start: 2022-11-22 | End: 2022-11-22

## 2022-11-22 RX ORDER — LIDOCAINE HYDROCHLORIDE 10 MG/ML
0.1 INJECTION, SOLUTION EPIDURAL; INFILTRATION; INTRACAUDAL; PERINEURAL AS NEEDED
Status: DISCONTINUED | OUTPATIENT
Start: 2022-11-22 | End: 2022-11-22 | Stop reason: HOSPADM

## 2022-11-22 RX ORDER — WATER FOR INJECTION,STERILE
VIAL (ML) INJECTION AS NEEDED
Status: DISCONTINUED | OUTPATIENT
Start: 2022-11-22 | End: 2022-11-22 | Stop reason: HOSPADM

## 2022-11-22 RX ORDER — FENTANYL CITRATE 50 UG/ML
50 INJECTION, SOLUTION INTRAMUSCULAR; INTRAVENOUS AS NEEDED
Status: DISCONTINUED | OUTPATIENT
Start: 2022-11-22 | End: 2022-11-22 | Stop reason: HOSPADM

## 2022-11-22 RX ORDER — ONDANSETRON 2 MG/ML
INJECTION INTRAMUSCULAR; INTRAVENOUS AS NEEDED
Status: DISCONTINUED | OUTPATIENT
Start: 2022-11-22 | End: 2022-11-22 | Stop reason: HOSPADM

## 2022-11-22 RX ORDER — PROPOFOL 10 MG/ML
INJECTION, EMULSION INTRAVENOUS AS NEEDED
Status: DISCONTINUED | OUTPATIENT
Start: 2022-11-22 | End: 2022-11-22 | Stop reason: HOSPADM

## 2022-11-22 RX ORDER — ONDANSETRON 2 MG/ML
4 INJECTION INTRAMUSCULAR; INTRAVENOUS ONCE
Status: DISCONTINUED | OUTPATIENT
Start: 2022-11-22 | End: 2022-11-22 | Stop reason: HOSPADM

## 2022-11-22 RX ORDER — FAMOTIDINE 20 MG/1
20 TABLET, FILM COATED ORAL ONCE
Status: COMPLETED | OUTPATIENT
Start: 2022-11-22 | End: 2022-11-22

## 2022-11-22 RX ORDER — LIDOCAINE HYDROCHLORIDE 20 MG/ML
INJECTION, SOLUTION EPIDURAL; INFILTRATION; INTRACAUDAL; PERINEURAL AS NEEDED
Status: DISCONTINUED | OUTPATIENT
Start: 2022-11-22 | End: 2022-11-22 | Stop reason: HOSPADM

## 2022-11-22 RX ORDER — SODIUM CHLORIDE 0.9 % (FLUSH) 0.9 %
5-40 SYRINGE (ML) INJECTION EVERY 8 HOURS
Status: DISCONTINUED | OUTPATIENT
Start: 2022-11-22 | End: 2022-11-22 | Stop reason: HOSPADM

## 2022-11-22 RX ORDER — ROCURONIUM BROMIDE 10 MG/ML
INJECTION, SOLUTION INTRAVENOUS AS NEEDED
Status: DISCONTINUED | OUTPATIENT
Start: 2022-11-22 | End: 2022-11-22 | Stop reason: HOSPADM

## 2022-11-22 RX ORDER — PHENYLEPHRINE HCL IN 0.9% NACL 1 MG/10 ML
SYRINGE (ML) INTRAVENOUS AS NEEDED
Status: DISCONTINUED | OUTPATIENT
Start: 2022-11-22 | End: 2022-11-22 | Stop reason: HOSPADM

## 2022-11-22 RX ORDER — HYDROCODONE BITARTRATE AND ACETAMINOPHEN 5; 325 MG/1; MG/1
1 TABLET ORAL
Qty: 15 TABLET | Refills: 0 | Status: SHIPPED | OUTPATIENT
Start: 2022-11-22 | End: 2022-11-27

## 2022-11-22 RX ORDER — SUCCINYLCHOLINE CHLORIDE 20 MG/ML
INJECTION INTRAMUSCULAR; INTRAVENOUS AS NEEDED
Status: DISCONTINUED | OUTPATIENT
Start: 2022-11-22 | End: 2022-11-22 | Stop reason: HOSPADM

## 2022-11-22 RX ORDER — DEXAMETHASONE SODIUM PHOSPHATE 4 MG/ML
INJECTION, SOLUTION INTRA-ARTICULAR; INTRALESIONAL; INTRAMUSCULAR; INTRAVENOUS; SOFT TISSUE AS NEEDED
Status: DISCONTINUED | OUTPATIENT
Start: 2022-11-22 | End: 2022-11-22 | Stop reason: HOSPADM

## 2022-11-22 RX ADMIN — FAMOTIDINE 20 MG: 20 TABLET ORAL at 07:10

## 2022-11-22 RX ADMIN — PROPOFOL 50 MG: 10 INJECTION, EMULSION INTRAVENOUS at 07:46

## 2022-11-22 RX ADMIN — FENTANYL CITRATE 50 MCG: 50 INJECTION, SOLUTION INTRAMUSCULAR; INTRAVENOUS at 07:29

## 2022-11-22 RX ADMIN — LIDOCAINE HYDROCHLORIDE 100 MG: 20 INJECTION, SOLUTION EPIDURAL; INFILTRATION; INTRACAUDAL; PERINEURAL at 07:29

## 2022-11-22 RX ADMIN — DEXAMETHASONE SODIUM PHOSPHATE 8 MG: 4 INJECTION, SOLUTION INTRAMUSCULAR; INTRAVENOUS at 07:38

## 2022-11-22 RX ADMIN — Medication 50 MCG: at 08:12

## 2022-11-22 RX ADMIN — HYDROCODONE BITARTRATE AND ACETAMINOPHEN 1 TABLET: 5; 325 TABLET ORAL at 09:05

## 2022-11-22 RX ADMIN — HYDROCODONE BITARTRATE AND ACETAMINOPHEN 1 TABLET: 5; 325 TABLET ORAL at 10:28

## 2022-11-22 RX ADMIN — FENTANYL CITRATE 25 MCG: 50 INJECTION, SOLUTION INTRAMUSCULAR; INTRAVENOUS at 07:46

## 2022-11-22 RX ADMIN — DEXMEDETOMIDINE HYDROCHLORIDE 2 MCG: 100 INJECTION, SOLUTION, CONCENTRATE INTRAVENOUS at 08:03

## 2022-11-22 RX ADMIN — FENTANYL CITRATE 25 MCG: 50 INJECTION, SOLUTION INTRAMUSCULAR; INTRAVENOUS at 07:40

## 2022-11-22 RX ADMIN — FENTANYL CITRATE 50 MCG: 50 INJECTION INTRAMUSCULAR; INTRAVENOUS at 08:40

## 2022-11-22 RX ADMIN — FENTANYL CITRATE 50 MCG: 50 INJECTION INTRAMUSCULAR; INTRAVENOUS at 08:54

## 2022-11-22 RX ADMIN — ROCURONIUM BROMIDE 5 MG: 50 INJECTION INTRAVENOUS at 07:29

## 2022-11-22 RX ADMIN — WATER 30 ML: 1 INJECTION INTRAMUSCULAR; INTRAVENOUS; SUBCUTANEOUS at 07:33

## 2022-11-22 RX ADMIN — SUCCINYLCHOLINE CHLORIDE 100 MG: 20 INJECTION, SOLUTION INTRAMUSCULAR; INTRAVENOUS at 07:29

## 2022-11-22 RX ADMIN — DEXMEDETOMIDINE HYDROCHLORIDE 4 MCG: 100 INJECTION, SOLUTION, CONCENTRATE INTRAVENOUS at 07:44

## 2022-11-22 RX ADMIN — SODIUM CHLORIDE, SODIUM LACTATE, POTASSIUM CHLORIDE, AND CALCIUM CHLORIDE 50 ML/HR: 600; 310; 30; 20 INJECTION, SOLUTION INTRAVENOUS at 07:10

## 2022-11-22 RX ADMIN — MIDAZOLAM HYDROCHLORIDE 2 MG: 2 INJECTION, SOLUTION INTRAMUSCULAR; INTRAVENOUS at 07:24

## 2022-11-22 RX ADMIN — ONDANSETRON 4 MG: 2 INJECTION INTRAMUSCULAR; INTRAVENOUS at 07:41

## 2022-11-22 RX ADMIN — PROPOFOL 200 MG: 10 INJECTION, EMULSION INTRAVENOUS at 07:29

## 2022-11-22 RX ADMIN — PROPOFOL 50 MG: 10 INJECTION, EMULSION INTRAVENOUS at 07:58

## 2022-11-22 RX ADMIN — CEFAZOLIN 3 G: 1 INJECTION, POWDER, FOR SOLUTION INTRAMUSCULAR; INTRAVENOUS at 07:33

## 2022-11-22 NOTE — DISCHARGE INSTRUCTIONS
Post Operative Discharge Instructions    No driving for 24 hours after surgery and off of prescription pain medication. Avoid activities that bump or cause jarring movements at the surgical site for 10 days. No lifting more than 10-15 pounds for 6 weeks after surgery or until cleared for activity at your follow up. Walking is encouraged after surgery. Stairs are ok to climb. DIET:    Diet as tolerated. Start with liquids then advance your diet based on how you fell. No alcoholic beverages for 24 hours after surgery or while on antibiotics or pain mdications. Drink plenty of water. MEDICATIONS:    Use daily stool softners (over the counter such as Colace or Senekot) while on pain medications. Resume pre-operative medications. If you are on any blood thinners see special instructions below. Use prescriptions given or Tylenol, Ibuprofen as needed for pain. Do not use more than 4000mg of Tylenol (acetaminophen) per day. Be aware this may be  in your prescription medication as well. Be aware narcotic prescriptions are tightly controlled in the state of [place]. If requiring more than one refill, a follow up appointment will be required. WOUND CARE:      You have skin glue on your incisions, you may shower in 24 hours and pat dry. Glue will fall off on it's own    Do not tub bathe, swim, or soak incisions until cleared to do so at your follow up. Ice bag to the affected area; 20 minutes on and 20 minutes off if desired. FOLLOW UP CARE:    You should have an appointment scheduled within 14 days after surgery. If this is not yet scheduled, call the office. Any forms that you need filled out regarding your medical care can be brought to the office at follow up appointment of faxed to: [id number] [address] [facility] IF:  Temperature is over 101 degrees, a slight fever can be normal 24-48 hour after surgery.   Nausea & vomiting that persists more than 24 hours after surgery. Your wound appears very red, hot, painful or swollen. Excessive bleeding occurs form the incision. *Between the hours 9-5 Monday-Friday please call the office at [phone]. If you do not receive a call back the same day, please do not hesitate to call my cell phone at [phone]    *If there is a medical emergency please go to the nearest emergency room immediately and do not hesitate to call my cell phone    *Weekends, after hours please call my cell phone         DISCHARGE SUMMARY from Nurse    PATIENT INSTRUCTIONS:    After general anesthesia or intravenous sedation, for 24 hours or while taking prescription Narcotics:  Limit your activities  Do not drive and operate hazardous machinery  Do not make important personal or business decisions  Do  not drink alcoholic beverages  If you have not urinated within 8 hours after discharge, please contact your surgeon on call. Report the following to your surgeon:  Excessive pain, swelling, redness or odor of or around the surgical area  Temperature over 100.5  Nausea and vomiting lasting longer than 4 hours or if unable to take medications  Any signs of decreased circulation or nerve impairment to extremity: change in color, persistent  numbness, tingling, coldness or increase pain  Any questions        These are general instructions for a healthy lifestyle:    No smoking/ No tobacco products/ Avoid exposure to second hand smoke  Surgeon General's Warning:  Quitting smoking now greatly reduces serious risk to your health.     Obesity, smoking, and sedentary lifestyle greatly increases your risk for illness    A healthy diet, regular physical exercise & weight monitoring are important for maintaining a healthy lifestyle    You may be retaining fluid if you have a history of heart failure or if you experience any of the following symptoms:  Weight gain of 3 pounds or more overnight or 5 pounds in a week, increased swelling in our hands or feet or shortness of breath while lying flat in bed. Please call your doctor as soon as you notice any of these symptoms; do not wait until your next office visit. The discharge information has been reviewed with the patient. The patient verbalized understanding. Discharge medications reviewed with the patient and appropriate educational materials and side effects teaching were provided.   ___________________________________________________________________________________________________________________________________

## 2022-11-22 NOTE — OP NOTES
Bilateral mastectomy scar revision    Patient Name: Cortes Rangel    SURGERY DATE: 22    : 1960    AGE: 57 y.o. Surgeon: Fern Rodrigez DO    Anesthesiologist: Anesthesiologist: Daria Andrews MD  CRNA: Florian Bauer CRNA    Anesthesia: General    Surgical Assist: Circ-1: Salima Nevarez RN  Circ-2: Destiney Short RN  Scrub Tech-1: Arnoldo Scriver  Surg Asst-1: Canda Mitten    PreOp DX: D46.550 MALIGNANT NEOPLASM UPPER INNER QUADRANT OF LEFT BREAST  Z17.1 ESTROGEN RECEPTOR NEGATIVE    PostOp DX: same    Procedure: Bilateral mastectomy scar revision    Procedure Details: After informed consent was obtained the patient was taken to the operating room and placed in the supine position. General anesthesia was administered by the anesthetist and titrated to effect. The bilateral chest was prepped and draped in the usual sterile fashion and a time out procedure was performed. Next the dog ear of the left lateral mastectomy incision was then elipsed with a 15 blade scalpel. Bovie was then used to circumferentially dissect off the excess tissue down to to allow the scar to lay flat. The wound was irrigated and suctioned dry and found to be hemostatic. We then closed the deep tissue with 2-0 vicryl in a simple interrupted fashion. The deep dermis was then closed with 3-0 vicryl in a simple interrupted fashion. Skin was closed with 4-0 monocryl in a subcuticular manner. We then began on the right. The mastectomy incision laterally was then elipsed with a 15 blade scalpel. Bovie was then used to circumferentially dissect off the excess tissue down to to allow the scar to lay flat. The wound was irrigated and suctioned dry and found to be hemostatic. We then closed the deep tissue with 2-0 vicryl in a simple interrupted fashion. The deep dermis was then closed with 3-0 vicryl in a simple interrupted fashion. Skin was closed with 4-0 monocryl in a subcuticular manner. Dermabond dressings were then applied. The patient was then extubated, tolerated the procedure well and sent to recovery in stable condition.      Implant: * No implants in log *     Estimated Blood Loss:  20cc    Specimens: * No specimens in log *             Complications: None           Disposition: extubated, tolerated procedure well           Condition: Stable    Angela Jain DO

## 2022-11-22 NOTE — PROGRESS NOTES
conducted a pre-surgery visit with Tabby Maurer, who is a 57 y.o.,female. The  provided the following Interventions:  Initiated a relationship of care and support. Offered prayer and assurance of continued prayers on patient's behalf. There is an advance directive present     Plan:  Chaplains will continue to follow and will provide pastoral care on an as needed/requested basis.  recommends bedside caregivers page  on duty if patient shows signs of acute spiritual or emotional distress.    Reiseñor 3   Board Certified [address] [place] [address]   [phone]

## 2022-11-22 NOTE — ACP (ADVANCE CARE PLANNING)
Advance Care Planning   Advance Care Planning Inpatient Note  [address] [facility] Department    Today's Date: 11/22/2022  Unit: [facility] PACU    Received request from . Upon review of chart and communication with care team, patient's decision making abilities are not in question. Patient was/were present in the room during visit. Goals of ACP Conversation:  Discuss Advance Care planning documents    Health Care Decision Makers:      Primary Decision Maker: Isadora Habermann (Fernando Marshall) - Sister - [phone]    Secondary Decision Maker: Zacksumaya Amari - Daughter - [phone]    Summary:  Verified [facility]      Advance Care Planning Documents (Patient Wishes) on file:  Healthcare Power of /Advance Directive appointment of Health care agent  Living Will/ Advance Directive  Anatomical Gift/Organ donation     Assessment:    Patient seen as a pre-op visit this morning and will be discharged after her procedure. There is an advance directive present.      Interventions:      Care Preferences Communicated:  No    Outcomes/Plan:  Existing Advance Directive reviewed with patient; no changes to patient's previously recorded wishes    [facility] on 11/22/2022 at 9:22 AM

## 2022-11-22 NOTE — ANESTHESIA POSTPROCEDURE EVALUATION
Procedure(s):  BILATERAL MASTECTOMY SCAR REVISION. general    Anesthesia Post Evaluation      Multimodal analgesia: multimodal analgesia used between 6 hours prior to anesthesia start to PACU discharge  Patient location during evaluation: bedside  Patient participation: complete - patient participated  Level of consciousness: awake  Pain management: adequate  Airway patency: patent  Anesthetic complications: no  Cardiovascular status: stable  Respiratory status: acceptable  Hydration status: acceptable  Post anesthesia nausea and vomiting:  controlled      INITIAL Post-op Vital signs:   Vitals Value Taken Time   /57 11/22/22 0925   Temp 36.7 °C (98.1 °F) 11/22/22 0915   Pulse 88 11/22/22 0929   Resp 16 11/22/22 0929   SpO2 93 % 11/22/22 0929   Vitals shown include unvalidated device data.

## 2022-11-22 NOTE — ANESTHESIA PREPROCEDURE EVALUATION
Relevant Problems   RESPIRATORY SYSTEM   (+) Chronic bronchitis (HCC)   (+) Chronic obstructive pulmonary disease (HCC)   (+) Sleep apnea      NEUROLOGY   (+) Chronic nonintractable headache   (+) Recurrent depression (HCC)      GASTROINTESTINAL   (+) GERD (gastroesophageal reflux disease)      ENDOCRINE   (+) Obesity, morbid (HCC)      PERSONAL HX & FAMILY HX OF CANCER   (+) Breast cancer of upper-inner quadrant of left female breast (HCC)   (+) Fibrosarcoma (HCC)       Anesthetic History     PONV          Review of Systems / Medical History  Patient summary reviewed and pertinent labs reviewed    Pulmonary    COPD: moderate    Sleep apnea: CPAP    Asthma : well controlled       Neuro/Psych         Psychiatric history     Cardiovascular  Within defined limits                Exercise tolerance: >4 METS     GI/Hepatic/Renal     GERD: well controlled           Endo/Other        Morbid obesity, arthritis and cancer     Other Findings              Physical Exam    Airway  Mallampati: II  TM Distance: 4 - 6 cm  Neck ROM: normal range of motion   Mouth opening: Normal     Cardiovascular  Regular rate and rhythm,  S1 and S2 normal,  no murmur, click, rub, or gallop  Rhythm: regular  Rate: normal         Dental    Dentition: Lower dentition intact and Upper dentition intact     Pulmonary                 Abdominal  GI exam deferred       Other Findings            Anesthetic Plan    ASA: 3  Anesthesia type: general          Induction: Intravenous  Anesthetic plan and risks discussed with: Patient

## 2022-11-22 NOTE — H&P
Office Visit  10/21/2022  [facility] [address]  [place], [place]  General Surgery Malignant neoplasm of upper-inner quadrant of left breast in female, estrogen receptor negative ([facility] [address] +1 more  Dx Follow-up ; Referred by Arvind Gomez DO  Reason for Visit     Progress Notes  Christine Mireles DO (Physician)   General Surgery     CC:        Chief Complaint   Patient presents with    Follow-up       06/28/2022 Left Mastectomy Scar revision s/p  11/23/2021 Simple mastectomy for triple negative invasive adenocarcinoma          Assessment:      ICD-10-CM ICD-9-CM     1. Malignant neoplasm of upper-inner quadrant of left breast in female, estrogen receptor negative ([facility] [address]  C50.212 174.2 SCHEDULE SURGERY     Z17.1 V86.1         2. Morbid obesity with BMI of 45.0-49.9, adult ([facility] [address]  E66.01 278.01       Z68.42 V85.42               Plan: She is doing very well with her surgery  but would like the excess skin bilaterally removed as this is interfering with her clothing. I discussed with the patient that the incision will likely extend to the back and she is comfortable with this so as long as it is flat and does not rub against her clothing. The risks and benefits of the procedure were reviewed with the patient including infection, bleeding, need for repeat procedure, injury to surrounding structures and poor cosmetic outcome. The patient's questions were answered and consent was obtained. HPI:Ms. Manuel Martinez is a 59year old woman with BRCA2 mutation and T2N0 triple negative breast cancer, s/p bilateral mastectomy 11/23/21. She had been diagnosed on core biopsy 10/19/21. The area of concern was identified as a palpable mass around September 2021. She denied nipple discharge. She denied breast pain. There is family history of breast cancer in her sister, mother and maternal aunt. Her most recent previous mammogram was 10/14/19. She has a personal history of fibrosarcoma. Her sister has HNPCC/ANTHONY mutation. Genetic testing via [employer] demonstrated BRCA2 mutation 11/8/21. She did undergo mediport removal and mastectomy scar revision on 6/28/2022 medial aspect of incision. She states the lateral dog ears are bothering her with clothing and she is interested in removal. She continues to deal with lymphedema of her hands and peripheral neuropathy. She is in physical therapy with little improvement. She has been referred to a hand specialist for evaluation. 7/13/2022  Sunshine Boykin is a 57 y.o. female who for follow-up status post Mediport removal and scar revision of the left mastectomy. She reports being very happy with the incision and has no complaints. She has no pain, fevers, chills or drainage from this area. Allergies: Allergies   Allergen Reactions    Adhesive Tape-Silicones Swelling       REALLY BAD SWELLING AND SORE APPEAR    Alcohol Other (comments)       PT STATES SHE IS AN ALCOHOLIC    Lactose Other (comments)       PT STATES IT MAKES HER STOMACH HURT    Percodan [Oxycodone Hcl-Oxycodone-Asa] Itching and Other (comments)       crying    Nsaids (Non-Steroidal Anti-Inflammatory Drug) Other (comments)       PT NOT ABLE TO TAKE DUE TO GASTRIC BYPASS         Medication Review:         Current Outpatient Medications on File Prior to Visit   Medication Sig Dispense Refill    gabapentin (NEURONTIN) 300 mg capsule Take 1 Capsule by mouth two (2) times a day. Max Daily Amount: 600 mg. Indications: neuropathic pain 60 Capsule 0    citalopram (CELEXA) 20 mg tablet Take 1 Tablet by mouth daily. 90 Tablet 0    omeprazole (PRILOSEC) 20 mg capsule Take 2 capsules by mouth twice daily 360 Capsule 1    oxybutynin (DITROPAN) 5 mg tablet Take 5 mg by mouth two (2) times a day. sucralfate (CARAFATE) 1 gram tablet Take 1 Tablet by mouth two (2) times a day.  Take 1 tablet by mouth 180 Tablet 3    Lynparza 150 mg tablet          b complex vitamins tablet Take 1 Tablet by mouth daily. fluticasone propionate (FLONASE) 50 mcg/actuation nasal spray SHAKE LIQUID AND USE 2 SPRAYS IN EACH NOSTRIL DAILY 48 g 5    buPROPion SR (WELLBUTRIN SR) 150 mg SR tablet Take 1 Tablet by mouth two (2) times a day. 180 Tablet 3    fluticasone-umeclidinium-vilanterol (Trelegy Ellipta) 100-62.5-25 mcg inhaler INHALE 1 PUFF BY MOUTH DAILY 120 Each 4    diclofenac (Voltaren) 1 % gel Apply 2 g to affected area every six (6) hours as needed for Pain. 100 g 2    Linzess 72 mcg cap capsule TAKE 1 CAPSULE BY MOUTH DAILY BEFORE BREAKFAST 30 Cap 1    acetaminophen (TYLENOL) 500 mg tablet Take  by mouth every six (6) hours as needed for Pain. calcium-cholecalciferol, d3, 600-125 mg-unit tab Take 1,065 mg by mouth nightly. omega 3-dha-epa-fish oil 100-160-1,000 mg cap Take 1,065 mg by mouth daily. multivitamin (ONE A DAY) tablet Take 1 Tab by mouth daily. No current facility-administered medications on file prior to visit. Systems Review:  Review of Systems   Constitutional:  Negative for fatigue and fever. Musculoskeletal:  Negative for arthralgias. Skin:  Negative for pallor, rash and wound. Hematological:  Negative for adenopathy. Does not bruise/bleed easily.       PMH:       Past Medical History:   Diagnosis Date    Alcoholism in remission [facility])      Arthritis 2016    Asthma      Breast cancer ([facility] [address]       breast cancer left    Chronic obstructive pulmonary disease ([address]      Chronic pain 1999    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     connective tissue cancer    GERD (gastroesophageal reflux disease)      History of gastric bypass 2012    History of meniscal tear 2015, 2018     right in 2015, left in 2018    HNP (herniated nucleus pulposus), lumbar       patient reported    Lung nodules       resolved 2018 CT    Neuropathy      Osteopenia 10/03/2017    Recurrent depression ([address]      Sleep apnea       on cpap    Spinal stenosis, lumbar       patient reported Xanthelasma of left upper eyelid 2020         Surgical History:        Past Surgical History:   Procedure Laterality Date    HX ARTHRODESIS   2000     Herniated Disc, arthritis right foot , , C , C  Stenosis    HX CHOLECYSTECTOMY   2008     gallbladder disease    HX COLONOSCOPY   2009    HX GASTRIC BYPASS        GASTRIC BYPASS  [address] Gastric Bypass      HX HYSTERECTOMY   1994    HX MASTECTOMY Bilateral 2021     BILATERAL MASTECTOMY, LEFT SENTINEL NODE BIOPSY [facility] ) performed by Bebe Thao MD at [address] Left 2022     revision of left mastectomy scar performed by Sailaja Trent DO at [address] [facility]     [facility]   10/2015     right repari of torn meniscus    HX MENISCECTOMY Left 2018     partial meniscectomy due to tear    HX MYOMECTOMY   1984     benign tumor    HX ORTHOPAEDIC   1964     orthopaedic excision Fibrosarcoma and Lymphangiogram    HX PELVIC LAPAROSCOPY   1984     large uterine blockage    NEUROLOGICAL PROCEDURE UNLISTED   ,      Cervical fusion         Social History:  Social History            Socioeconomic History    Marital status: SINGLE   Tobacco Use    Smoking status: Former       Packs/day: 0.50       Years: 45.00       Pack years: 22.50       Types: Cigarettes       Quit date: 2020       Years since quittin.3    Smokeless tobacco: Never   Vaping Use    Vaping Use: Never used   Substance and Sexual Activity    Alcohol use: No       Comment: quit 1980s- was heavy etoh    Drug use: Not Currently       Comment: marijuana, cocaine 30 yrs ago    Sexual activity: Not Currently       Partners: Male       Birth control/protection: None      Social Determinants of Health          Financial Resource Strain: Low Risk     Difficulty of Paying Living Expenses: Not hard at all   Food Insecurity: No Food Insecurity    Worried About Running Out of Food in the Last Year: Never true    Ran Out of Food in the Last Year: Never true   Transportation Needs: No Transportation Needs    Lack of Transportation (Medical): No    Lack of Transportation (Non-Medical): No   Housing Stability: Low Risk     Unable to Pay for Housing in the Last Year: No    Number of [place] in the Last Year: 1    Unstable Housing in the Last Year: No         Family History:        Family History   Problem Relation Age of Onset    Hypertension Mother      Depression Mother      Cancer Mother           Ovarian Cancer, breast cancer,     Ovarian Cancer Mother      Breast Problems Mother      Cancer Father           skin cancer,     Cancer Sister           Breast cancer    Depression Sister      Breast Cancer Sister      Depression Brother      Alcohol abuse Maternal Grandfather      Diabetes Maternal Grandmother               Stroke Neg Hx      Heart Attack Neg Hx                   Office Visit on 10/19/2022   Component Date Value Ref Range Status    Hemoglobin A1c (POC) 10/19/2022 5.8  % Final         ECHO ADULT FOLLOW-UP OR LIMITED    Left Ventricle: Normal left ventricular systolic function with a   visually estimated EF of 55 - 60%. Left ventricle is smaller than normal.   Increased wall thickness. Findings consistent with mild concentric   hypertrophy. Normal wall motion. Global longitudinal strain is -14.2%. Strain performed on Vivid S70. Physical Exam:  Visit Vitals  BP (!) 140/96 (BP Patient Position: Sitting)   Pulse 74   Temp 97.7 °F (36.5 °C) (Temporal)   Resp 16   Ht 5' 5\" (1.651 m)   Wt 127 kg (280 lb)   SpO2 99%   BMI 46.59 kg/m²       BMI: Body mass index is 46.59 kg/m². Physical Exam  Constitutional:       Appearance: Normal appearance. She is obese. Cardiovascular:      Rate and Rhythm: Normal rate. Chest:   Breasts:     Right: Absent. Left: Absent. Comments: Dogs ears bilaterally incisions extending mid axilla  Skin:     General: Skin is warm and dry. Comments: Port incision site well-healed, no seroma, left mastectomy scar revision site well-healed with no evidence of seroma   Neurological:      General: No focal deficit present. Mental Status: She is alert and oriented to person, place, and time. Mental status is at baseline. Psychiatric:         Mood and Affect: Mood normal.         Behavior: Behavior normal.         Thought Content: Thought content normal.         Judgment: Judgment normal.         I have reviewed the information entered by the clinical staff and/or patient and verified it as accurate or edited where necessary.       Electronically signed by:     Maine Monk DO, MPH

## 2022-11-22 NOTE — PROGRESS NOTES
Substitution Information per the Cefazolin Weight-Based Surgical Prophylaxis Dosing Protocol. Cefazolin - Jessica-operative Dosing  2gm pre-op unless patient ? 120 kg -   3g pre-op      Weight Dose Ordered Dose Dispensed   122.5 kg (270 lb)  as of 11/11/2022  2 g 3 g

## 2022-11-27 DIAGNOSIS — F33.9 RECURRENT DEPRESSION (HCC): ICD-10-CM

## 2022-11-27 RX ORDER — CITALOPRAM 20 MG/1
20 TABLET, FILM COATED ORAL DAILY
Qty: 90 TABLET | Refills: 0 | Status: SHIPPED | OUTPATIENT
Start: 2022-11-27

## 2022-12-06 NOTE — PROGRESS NOTES
CC:   Chief Complaint   Patient presents with    Follow-up     Bilateral mastectomy scar revision BRCA2 mutation and T2N0 triple negative breast cancer, s/p bilateral mastectomy 11/23/21. Assessment:    ICD-10-CM ICD-9-CM    1. Malignant neoplasm of upper-inner quadrant of left breast in female, estrogen receptor negative (HCC)  C50.212 174.2     Z17.1 V86.1       2. S/P scar revision  Z98.890 V45.89           Plan: She is doing very well with her surgery and pleased with the cosmetic outcome. I encouraged her to perform skin checks monthly even after mastectomy and should she notice any changes to her skin or new lumps I would like to see her back prior to her 6 month follow up. She agrees with this plan. HPI: She is here today for her initial follow up from surgery on 11/22/2022 for bilateral mastectomy scar revision. Overall she is doing great she did have some bruising on the right side but this has resolved. She has no fevers, chills or drainage from her incisions. 10/21/2022  Ms. Cortes Rangel is a 59year old woman with BRCA2 mutation and T2N0 triple negative breast cancer, s/p bilateral mastectomy 11/23/21. She had been diagnosed on core biopsy 10/19/21. The area of concern was identified as a palpable mass around September 2021. She denied nipple discharge. She denied breast pain. There is family history of breast cancer in her sister, mother and maternal aunt. Her most recent previous mammogram was 10/14/19. She has a personal history of fibrosarcoma. Her sister has HNPCC/ANTHONY mutation. Genetic testing via [employer] demonstrated BRCA2 mutation 11/8/21. She did undergo mediport removal and mastectomy scar revision on 6/28/2022 medial aspect of incision. She states the lateral dog ears are bothering her with clothing and she is interested in removal. She continues to deal with lymphedema of her hands and peripheral neuropathy.  She is in physical therapy with little improvement. She has been referred to a hand specialist for evaluation. 7/13/2022  Sunshine Nava is a 57 y.o. female who for follow-up status post Mediport removal and scar revision of the left mastectomy. She reports being very happy with the incision and has no complaints. She has no pain, fevers, chills or drainage from this area. Allergies: Allergies   Allergen Reactions    Adhesive Tape-Silicones Swelling     REALLY BAD SWELLING AND SORE APPEAR    Alcohol Other (comments)     PT STATES SHE IS AN ALCOHOLIC    Lactose Other (comments)     PT STATES IT MAKES HER STOMACH HURT    Percodan [Oxycodone Hcl-Oxycodone-Asa] Itching and Other (comments)     crying    Nsaids (Non-Steroidal Anti-Inflammatory Drug) Other (comments)     PT NOT ABLE TO TAKE DUE TO GASTRIC BYPASS       Medication Review:  Current Outpatient Medications on File Prior to Visit   Medication Sig Dispense Refill    citalopram (CELEXA) 20 mg tablet TAKE 1 TABLET BY MOUTH DAILY 90 Tablet 0    albuterol (PROVENTIL HFA, VENTOLIN HFA, PROAIR HFA) 90 mcg/actuation inhaler Take  by inhalation. gabapentin (NEURONTIN) 300 mg capsule Take 1 Capsule by mouth two (2) times a day. Max Daily Amount: 600 mg. Indications: neuropathic pain 60 Capsule 0    omeprazole (PRILOSEC) 20 mg capsule Take 2 capsules by mouth twice daily 360 Capsule 1    oxybutynin (DITROPAN) 5 mg tablet Take 5 mg by mouth two (2) times a day. sucralfate (CARAFATE) 1 gram tablet Take 1 Tablet by mouth two (2) times a day. Take 1 tablet by mouth 180 Tablet 3    Lynparza 150 mg tablet two (2) times a day. b complex vitamins tablet Take 1 Tablet by mouth daily.       fluticasone propionate (FLONASE) 50 mcg/actuation nasal spray SHAKE LIQUID AND USE 2 SPRAYS IN EACH NOSTRIL DAILY 48 g 5    fluticasone-umeclidinium-vilanterol (Trelegy Ellipta) 100-62.5-25 mcg inhaler INHALE 1 PUFF BY MOUTH DAILY 120 Each 4    diclofenac (Voltaren) 1 % gel Apply 2 g to affected area every six (6) hours as needed for Pain. 100 g 2    Linzess 72 mcg cap capsule TAKE 1 CAPSULE BY MOUTH DAILY BEFORE BREAKFAST 30 Cap 1    acetaminophen (TYLENOL) 500 mg tablet Take  by mouth every six (6) hours as needed for Pain. calcium-cholecalciferol, d3, 600-125 mg-unit tab Take 1,065 mg by mouth nightly. omega 3-dha-epa-fish oil 100-160-1,000 mg cap Take 1,065 mg by mouth daily. multivitamin (ONE A DAY) tablet Take 1 Tab by mouth daily. No current facility-administered medications on file prior to visit. Systems Review:  Review of Systems   Constitutional:  Negative for fatigue and fever. Musculoskeletal:  Negative for arthralgias. Skin:  Negative for pallor, rash and wound. Hematological:  Negative for adenopathy. Does not bruise/bleed easily.      PMH:  Past Medical History:   Diagnosis Date    Alcoholism in remission [facility])     Arthritis 2016    Asthma     Breast cancer ([address]     breast cancer left    Chronic obstructive pulmonary disease ([address]     Chronic pain 1999    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    GERD (gastroesophageal reflux disease)     History of gastric bypass 2012    History of meniscal tear 2015, 2018    right in 2015, left in 2018    HNP (herniated nucleus pulposus), lumbar     patient reported    Lung nodules     resolved 2018 CT    Neuropathy     Osteopenia 10/03/2017    Recurrent depression ([address]     Sleep apnea     on cpap    Spinal stenosis, lumbar     patient reported    Xanthelasma of left upper eyelid 07/20/2020       Surgical History:  Past Surgical History:   Procedure Laterality Date    HX ARTHRODESIS  01/2000    Herniated Disc, arthritis right foot 06/06, 07/07, C 6/7, C 4/6 Stenosis    HX BREAST BIOPSY Bilateral 11/22/2022    BILATERAL MASTECTOMY SCAR REVISION performed by Oral DO Stephen at [address]    HX CHOLECYSTECTOMY  09/2008    gallbladder disease    HX COLONOSCOPY  05/2009    HX GASTRIC BYPASS  2012    GASTRIC BYPASS [facility] Gastric Bypass      HX HYSTERECTOMY  1994    HX MASTECTOMY Bilateral 2021    BILATERAL MASTECTOMY, LEFT SENTINEL NODE BIOPSY [facility] ) performed by Amanda Nixon MD at [address] Left 2022    revision of left mastectomy scar performed by Burke Callaway DO at [address] [facility]     [facility]  10/2015    right repari of torn meniscus    HX MENISCECTOMY Left 2018    partial meniscectomy due to tear    HX MYOMECTOMY  1984    benign tumor    HX ORTHOPAEDIC  1964    orthopaedic excision Fibrosarcoma and Lymphangiogram    HX PELVIC LAPAROSCOPY  1984    large uterine blockage    HX VASCULAR ACCESS      NEUROLOGICAL PROCEDURE UNLISTED  ,     Cervical fusion       Social History:  Social History     Socioeconomic History    Marital status: SINGLE   Tobacco Use    Smoking status: Former     Packs/day: 0.50     Years: 45.00     Pack years: 22.50     Types: Cigarettes     Quit date: 2020     Years since quittin.4    Smokeless tobacco: Never   Vaping Use    Vaping Use: Never used   Substance and Sexual Activity    Alcohol use: No     Comment: quit 1980s- was heavy etoh    Drug use: Not Currently     Types: Marijuana     Comment: edibles    Sexual activity: Not Currently     Partners: Male     Birth control/protection: None     Social Determinants of Health     Financial Resource Strain: Low Risk     Difficulty of Paying Living Expenses: Not hard at all   Food Insecurity: No Food Insecurity    Worried About Running Out of Food in the Last Year: Never true    Ran Out of Food in the Last Year: Never true   Transportation Needs: No Transportation Needs    Lack of Transportation (Medical): No    Lack of Transportation (Non-Medical):  No   Physical Activity: Inactive    Days of Exercise per Week: 0 days    Minutes of Exercise per Session: 0 min   Housing Stability: Low Risk     Unable to Pay for Housing in the Last Year: No    Number of Places Lived in the Last Year: 1    Unstable Housing in the Last Year: No       Family History:  Family History   Problem Relation Age of Onset    Hypertension Mother     Depression Mother     Cancer Mother         Ovarian Cancer, breast cancer,     Ovarian Cancer Mother     Breast Problems Mother     Cancer Father         skin cancer,     Cancer Sister         Breast cancer    Depression Sister     Breast Cancer Sister     Depression Brother     Alcohol abuse Maternal Grandfather     Diabetes Maternal Grandmother             Stroke Neg Hx     Heart Attack Neg Hx        Admission on 2022, Discharged on 2022   Component Date Value Ref Range Status    BENZODIAZEPINES 2022 Negative  NEG   Final    BARBITURATES 2022 Negative  NEG   Final    THC (TH-CANNABINOL) 2022 Positive (A)  NEG   Final    OPIATES 2022 Negative  NEG   Final    PCP(PHENCYCLIDINE) 2022 Negative  NEG   Final    COCAINE 2022 Negative  NEG   Final    AMPHETAMINES 2022 Negative  NEG   Final    METHADONE 2022 Negative  NEG   Final    HDSCOM 2022 (NOTE)   Final    Comment: Specimen analysis was performed without chain of custody handling. These results should be used for medical purposes only and not for   legal or employment purposes. Unconfirmed screening results must not   be used for non-medical purposes. The cut-off concentration for positive results are as follows:    AMPH     1000 ng/mL  KEYUR      200 ng/mL  ANU      200 ng/mL  BECCA       300 ng/mL  METH      300 ng/mL  OPI       300 ng/mL  PCP        25 ng/mL  THC        50 ng/mL       Office Visit on 10/19/2022   Component Date Value Ref Range Status    Hemoglobin A1c (POC) 10/19/2022 5.8  % Final       DEXA BONE DENSITY STUDY AXIAL  Narrative: DEXA BONE DENSITOMETRY, CENTRAL    INDICATION: Postmenopausal. History of breast cancer and osteopenia. Gastric  bypass.     TECHNIQUE: Using [employer] densitometer, bone density measurement was  performed in the lumbar spine, in addition to the proximal left and right  femora. T Score refers to standard deviations above or below average compared to  a young adult of the same sex. Z Score refers to standard deviations above or  below average compared to a patient of the same sex, age, race and weight. COMPARISON: October 3, 2017. October 14, 2019. FINDINGS:     Lumbar Spine Levels: L1-L4  Mean Bone Mineral Density (BMD):  1.068 g/cm2    T Score: -1.0       Z Score: -0.2      BMD increased 0.4%, which is not statistically significant within a 95 percent  confidence interval compared to preceding study. BMD increased 2.1%, which is not statistically significant compared to baseline  study. Left Total Proximal Femur BMD: 1.004 g/cm2    T Score:  0.0       Z Score:  0.2       BMD increased 0.2%, which is not statistically significant within a 95 percent  confidence interval compared to preceding study. BMD decreased 2.4%, which is not statistically significant compared to baseline  study. Right Total Proximal Femur BMD: 0.928 g/cm2  T Score:  -0.6      Z Score:  -0.4       BMD decreased 2.2%, which is not statistically significant within a 95 percent  confidence interval compared to preceding study. BMD increased 1.2%, which is not statistically significant compared to baseline  study. Left Femoral Neck BMD:  0.924 g/cm2   T Score:  -0.8  Z Score:  -0.3    Right Femoral Neck BMD:  0.824 g/cm2   T Score:  -1.5  Z Score:  -1.0    Impression: 1. BMD measures consistent with osteopenia/low bone density. 2.  Compared to the preceding study, BMD is without statistically significant  interval change. 3.  Compared to the baseline study, BMD is without statistically significant  interval change.     Based upon current ISCD guidelines, the patient's overall diagnostic category,  selected using WHO criteria in postmenopausal women and males aged 48 and above,  is selected based upon the lowest T Score from among the lumbar spine, total  femur, femoral neck, (or distal third radius if measured). WHO Definition of Osteoporosis and Osteopenia on DXA (specified for  post-menopausal  females):      Normal:                     T Score at or above -1 SD    Osteopenia:              T Score between -1 and -2.5 SD    Osteoporosis:           T Score at or below -2.5 SD    The risk of fracture approximately doubles for each 1 SD decrease in T Score. It is important to consider other factors in assessing a patient's risk of  fracture, including age, risk of falling/injury, history of fragility fracture,  family history of osteoporosis, smoking, low weight. Various fracture risk tools have been developed for adult patients and are  available online. For example, the FRAX tool developed by [facility] is widely used. Reference www.iscd.org. It is also important to note that DXA measures bone density but does not  distinguish among causes of decreased bone density, which include primary versus  secondary osteoporosis (such as metabolic bone disorders or possible effects of  medications) and also other conditions (such as osteomalacia). Clinical  considerations should determine what additional evaluation may be warranted to  exclude secondary conditions in a patient with low bone density. Please note that reliable, valid comparisons can not be made between studies  which have been performed on different densitometers. If clinically warranted,  follow up study performed at this site would best permit assessment of trend for  possible change in bone mineral density over time in comparison to this study. Thank you for this referral.         Physical Exam:  Visit Vitals  /80   Pulse 72   Temp 98 °F (36.7 °C)   Resp 20   Ht 5' 5\" (1.651 m)   Wt 122 kg (269 lb)   SpO2 96%   BMI 44.76 kg/m²      BMI: Body mass index is 44.76 kg/m².     Physical Exam  Constitutional: Appearance: Normal appearance. She is obese. Cardiovascular:      Rate and Rhythm: Normal rate. Chest:   Breasts:     Right: Absent. Left: Absent. Comments: B/l incisions well healed with no residual dog ears  Skin:     General: Skin is warm and dry. Comments: Port incision site well-healed, no seroma, left mastectomy scar revision site well-healed with no evidence of seroma   Neurological:      General: No focal deficit present. Mental Status: She is alert and oriented to person, place, and time. Mental status is at baseline. Psychiatric:         Mood and Affect: Mood normal.         Behavior: Behavior normal.         Thought Content: Thought content normal.         Judgment: Judgment normal.       I have reviewed the information entered by the clinical staff and/or patient and verified it as accurate or edited where necessary.      Electronically signed by:    Jonas Cheng DO, MPH

## 2022-12-07 ENCOUNTER — OFFICE VISIT (OUTPATIENT)
Dept: SURGERY | Age: 62
End: 2022-12-07
Payer: MEDICARE

## 2022-12-07 VITALS
TEMPERATURE: 98 F | HEART RATE: 72 BPM | OXYGEN SATURATION: 96 % | DIASTOLIC BLOOD PRESSURE: 80 MMHG | RESPIRATION RATE: 20 BRPM | HEIGHT: 65 IN | BODY MASS INDEX: 44.82 KG/M2 | WEIGHT: 269 LBS | SYSTOLIC BLOOD PRESSURE: 124 MMHG

## 2022-12-07 DIAGNOSIS — Z17.1 MALIGNANT NEOPLASM OF UPPER-INNER QUADRANT OF LEFT BREAST IN FEMALE, ESTROGEN RECEPTOR NEGATIVE (HCC): Primary | ICD-10-CM

## 2022-12-07 DIAGNOSIS — C50.212 MALIGNANT NEOPLASM OF UPPER-INNER QUADRANT OF LEFT BREAST IN FEMALE, ESTROGEN RECEPTOR NEGATIVE (HCC): Primary | ICD-10-CM

## 2022-12-07 DIAGNOSIS — Z98.890 S/P SCAR REVISION: ICD-10-CM

## 2022-12-07 PROCEDURE — 99024 POSTOP FOLLOW-UP VISIT: CPT | Performed by: SURGERY

## 2022-12-07 NOTE — PROGRESS NOTES
Chief Complaint   Patient presents with    Follow-up     Bilateral mastectomy scar revision BRCA2 mutation and T2N0 triple negative breast cancer, s/p bilateral mastectomy 11/23/21. 1. Have you been to the ER, urgent care clinic since your last visit? Hospitalized since your last visit? No    2. Have you seen or consulted any other health care providers outside of the [address] [place] [address] since your last visit? Include any pap smears or colon screening.  No

## 2022-12-14 ENCOUNTER — OFFICE VISIT (OUTPATIENT)
Dept: ORTHOPEDIC SURGERY | Age: 62
End: 2022-12-14
Payer: MEDICARE

## 2022-12-14 VITALS
BODY MASS INDEX: 44.82 KG/M2 | DIASTOLIC BLOOD PRESSURE: 80 MMHG | HEIGHT: 65 IN | TEMPERATURE: 97.4 F | SYSTOLIC BLOOD PRESSURE: 136 MMHG | WEIGHT: 269 LBS | OXYGEN SATURATION: 97 % | HEART RATE: 73 BPM

## 2022-12-14 DIAGNOSIS — R20.2 NUMBNESS AND TINGLING OF RIGHT UPPER EXTREMITY: ICD-10-CM

## 2022-12-14 DIAGNOSIS — R20.0 NUMBNESS AND TINGLING OF RIGHT UPPER EXTREMITY: Primary | ICD-10-CM

## 2022-12-14 DIAGNOSIS — R20.0 NUMBNESS AND TINGLING OF RIGHT UPPER EXTREMITY: ICD-10-CM

## 2022-12-14 DIAGNOSIS — R20.2 NUMBNESS AND TINGLING OF RIGHT UPPER EXTREMITY: Primary | ICD-10-CM

## 2022-12-14 LAB — HBA1C MFR BLD HPLC: 5.5 %
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
## 2022-12-14 NOTE — PROGRESS NOTES
Azalia Brioness presents today for   Chief Complaint   Patient presents with    Arm Pain     RUE-EMG       Is someone accompanying this pt? no    Is the patient using any DME equipment during [address]? no    Depression Screening:  3 most recent PHQ Screens 10/21/2022   PHQ Not Done Functional capacity motivation limits accuracy   Little interest or pleasure in doing things Not at all   Feeling down, depressed, irritable, or hopeless Not at all   Total Score PHQ 2 0   Trouble falling or staying asleep, or sleeping too much -   Feeling tired or having little energy -   Poor appetite, weight loss, or overeating -   Feeling bad about yourself - or that you are a failure or have let yourself or your family down -   Trouble concentrating on things such as school, work, reading, or watching TV -   Moving or speaking so slowly that other people could have noticed; or the opposite being so fidgety that others notice -   Thoughts of being better off dead, or hurting yourself in some way -   PHQ 9 Score -   How difficult have these problems made it for you to do your work, take care of your home and get along with others -       Learning Assessment:  Learning Assessment 6/1/2022   PRIMARY LEARNER Patient   HIGHEST LEVEL OF EDUCATION - PRIMARY LEARNER  4 YEARS OF COLLEGE   BARRIERS PRIMARY LEARNER NONE   CO-LEARNER CAREGIVER No   PRIMARY LANGUAGE ENGLISH   LEARNER PREFERENCE PRIMARY DEMONSTRATION   ANSWERED BY self   RELATIONSHIP SELF       Abuse Screening:  Abuse Screening Questionnaire 7/21/2022   Do you ever feel afraid of your partner? N   Are you in a relationship with someone who physically or mentally threatens you? N   Is it safe for you to go home? Y       Fall Risk  Fall Risk Assessment, last 12 mths 6/1/2022   Able to walk? Yes   Fall in past 12 months? 0   Do you feel unsteady? 0   Are you worried about falling 0       OPIOID RISK TOOL  No flowsheet data found. Coordination of Care:  1.  Have you been to the ER, urgent care clinic since your last visit? no  Hospitalized since your last visit? no    2. Have you seen or consulted any other health care providers outside of the [address] [place] [address] since your last visit? Nov 22 Include any pap smears or colon screening.  no

## 2022-12-15 ENCOUNTER — TELEPHONE (OUTPATIENT)
Dept: PHYSICAL THERAPY | Age: 62
End: 2022-12-15

## 2022-12-15 ENCOUNTER — OFFICE VISIT (OUTPATIENT)
Dept: ORTHOPEDIC SURGERY | Age: 62
End: 2022-12-15
Payer: MEDICARE

## 2022-12-15 VITALS — TEMPERATURE: 97.8 F | HEIGHT: 65 IN | WEIGHT: 270 LBS | BODY MASS INDEX: 44.98 KG/M2

## 2022-12-15 DIAGNOSIS — M19.032 PRIMARY OSTEOARTHRITIS OF LEFT WRIST: Primary | ICD-10-CM

## 2022-12-15 DIAGNOSIS — M25.531 RIGHT WRIST PAIN: ICD-10-CM

## 2022-12-15 DIAGNOSIS — M79.641 RIGHT HAND PAIN: ICD-10-CM

## 2022-12-15 NOTE — LETTER
12/15/2022    Patient: Pedro Carrington   YOB: 1960   Date of Visit: 12/15/2022     Nicko Nathan MD  [facility] [address] [id number]  [place] [place]    Dear Nicko Nathan MD,      Thank you for referring Ms. John Colón to [address] [place] [address] for evaluation. My notes for this consultation are attached. If you have questions, please do not hesitate to call me. I look forward to following your patient along with you.       Sincerely,    Isi Fuentes, DO

## 2022-12-15 NOTE — PROGRESS NOTES
Freedom Rose is a 57 y.o. female right handed . Worker's Compensation and legal considerations: none filed. Vitals:    12/15/22 1043   Temp: 97.8 °F (36.6 °C)   TempSrc: Temporal   Weight: 270 lb (122.5 kg)   Height: 5' 5\" (1.651 m)   PainSc:   2   PainLoc: Hand           Chief Complaint   Patient presents with    Hand Pain     Right hand pain       HPI: Patient presents today for right upper extremity EMG follow-up. She reports continued pain in the left wrist and would like to know what treatment options are available as well as continue treatment for the right hand. Initial HPI: Patient presents today with complaints of right hand pain and numbness and tingling as well as occasional wrist pain. She also reports left wrist pain. She reports the pain in the wrist on the left to be worse than the right. She reports minimal to no numbness and tingling on the left.     Date of onset: Indeterminate    Injury: No    Prior Treatment:  No    Numbness/ Tingling: Yes: Comment: Right      ROS: Review of Systems - General ROS: negative  Psychological ROS: negative  ENT ROS: negative  Allergy and Immunology ROS: negative  Hematological and Lymphatic ROS: negative  Respiratory ROS: no cough, shortness of breath, or wheezing  Cardiovascular ROS: no chest pain or dyspnea on exertion  Gastrointestinal ROS: no abdominal pain, change in bowel habits, or black or bloody stools  Musculoskeletal ROS: negative  Neurological ROS: positive for - numbness/tingling  Dermatological ROS: negative    Past Medical History:   Diagnosis Date    Alcoholism in remission ([facility] [address]     Arthritis 2016    Asthma     Breast cancer ([facility] [address]     breast cancer left    Chronic obstructive pulmonary disease (HCC)     Chronic pain 1999    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    GERD (gastroesophageal reflux disease)     History of gastric bypass 2012    History of meniscal tear 2015, 2018    right in 2015, left in 2018 HNP (herniated nucleus pulposus), lumbar     patient reported    Lung nodules     resolved 2018 CT    Neuropathy     Osteopenia 10/03/2017    Recurrent depression ([facility] [address]     Sleep apnea     on cpap    Spinal stenosis, lumbar     patient reported    Xanthelasma of left upper eyelid 07/20/2020       Past Surgical History:   Procedure Laterality Date    HX ARTHRODESIS  01/2000    Herniated Disc, arthritis right foot 06/06, 07/07, C 6/7, C 4/6 Stenosis    HX BREAST BIOPSY Bilateral 11/22/2022    BILATERAL MASTECTOMY SCAR REVISION performed by Ld Ventura DO at [address] [facility] [address]    HX CHOLECYSTECTOMY  09/2008    gallbladder disease    HX COLONOSCOPY  05/2009    HX GASTRIC BYPASS  2012    GASTRIC BYPASS  [facility] Gastric Bypass      HX HYSTERECTOMY  06/1994    HX MASTECTOMY Bilateral 11/23/2021    BILATERAL MASTECTOMY, LEFT SENTINEL NODE BIOPSY [facility] 11/22) performed by Bruce Franklin MD at [address] Left 06/28/2022    revision of left mastectomy scar performed by Ld Ventura DO at [address] [facility] [address]     [facility] [address]  10/2015    right repari of torn meniscus    HX MENISCECTOMY Left 03/16/2018    partial meniscectomy due to tear    HX MYOMECTOMY  06/1984    benign tumor    HX ORTHOPAEDIC  1964    orthopaedic excision Fibrosarcoma and Lymphangiogram    HX PELVIC LAPAROSCOPY  04/1984    large uterine blockage    HX VASCULAR ACCESS      NEUROLOGICAL PROCEDURE UNLISTED  2000, 2007    Cervical fusion       Current Outpatient Medications   Medication Sig Dispense Refill    citalopram (CELEXA) 20 mg tablet TAKE 1 TABLET BY MOUTH DAILY 90 Tablet 0    albuterol (PROVENTIL HFA, VENTOLIN HFA, PROAIR HFA) 90 mcg/actuation inhaler Take  by inhalation. gabapentin (NEURONTIN) 300 mg capsule Take 1 Capsule by mouth two (2) times a day. Max Daily Amount: 600 mg.  Indications: neuropathic pain 60 Capsule 0    omeprazole (PRILOSEC) 20 mg capsule Take 2 capsules by mouth twice daily 360 Capsule 1    oxybutynin (DITROPAN) 5 mg tablet Take 5 mg by mouth two (2) times a day. sucralfate (CARAFATE) 1 gram tablet Take 1 Tablet by mouth two (2) times a day. Take 1 tablet by mouth 180 Tablet 3    Lynparza 150 mg tablet two (2) times a day. b complex vitamins tablet Take 1 Tablet by mouth daily. fluticasone propionate (FLONASE) 50 mcg/actuation nasal spray SHAKE LIQUID AND USE 2 SPRAYS IN EACH NOSTRIL DAILY 48 g 5    fluticasone-umeclidinium-vilanterol (Trelegy Ellipta) 100-62.5-25 mcg inhaler INHALE 1 PUFF BY MOUTH DAILY 120 Each 4    diclofenac (Voltaren) 1 % gel Apply 2 g to affected area every six (6) hours as needed for Pain. 100 g 2    Linzess 72 mcg cap capsule TAKE 1 CAPSULE BY MOUTH DAILY BEFORE BREAKFAST 30 Cap 1    acetaminophen (TYLENOL) 500 mg tablet Take  by mouth every six (6) hours as needed for Pain. calcium-cholecalciferol, d3, 600-125 mg-unit tab Take 1,065 mg by mouth nightly. omega 3-dha-epa-fish oil 100-160-1,000 mg cap Take 1,065 mg by mouth daily. multivitamin (ONE A DAY) tablet Take 1 Tab by mouth daily. Allergies   Allergen Reactions    Adhesive Tape-Silicones Swelling     REALLY BAD SWELLING AND SORE APPEAR    Alcohol Other (comments)     PT STATES SHE IS AN ALCOHOLIC    Lactose Other (comments)     PT STATES IT MAKES HER STOMACH HURT    Percodan [Oxycodone Hcl-Oxycodone-Asa] Itching and Other (comments)     crying    Nsaids (Non-Steroidal Anti-Inflammatory Drug) Other (comments)     PT NOT ABLE TO TAKE DUE TO GASTRIC BYPASS           PE:     Physical Exam  Vitals and nursing note reviewed. Constitutional:       General: She is not in acute distress. Appearance: Normal appearance. She is not ill-appearing. Cardiovascular:      Pulses: Normal pulses. Pulmonary:      Effort: Pulmonary effort is normal. No respiratory distress. Musculoskeletal:         General: Tenderness present. No swelling, deformity or signs of injury. Normal range of motion. Cervical back: Normal range of motion and neck supple. Right lower leg: No edema. Left lower leg: No edema. Skin:     General: Skin is warm and dry. Capillary Refill: Capillary refill takes less than 2 seconds. Findings: No bruising or erythema. Neurological:      General: No focal deficit present. Mental Status: She is alert and oriented to person, place, and time. Psychiatric:         Mood and Affect: Mood normal.         Behavior: Behavior normal.          Wrist: Tenderness reproduced at the left radiocarpal joint. No reproducible tenderness on the right side. Tenderness L R Test L R   1st Ext Comp - - Finkelstein's - -   Snuff Box - - Fitzgerald - -   2nd Ext Comp - - S-L Shear - -   S-L Joint - - L-T Shear - -   L-T Joint - - DRUJ Sup - -   6th Ext Comp - - DRUJ Pro - -   Ulnar Snuff - - DRUJ Grind - -   Fovea - - TFCC - -   STT Joint - - Mid-Carp Inst - -   FCR - - P-T Grind - -   Intersection - - ECU Sublux. - -      Dorsal Ganglion: -   Volar Ganglion: -      ROM: Full      NEUROVASCULAR    Examination L R Examination L R   Carpal Comp. - - Pronator Comp. - -   Carpal Tinel - - Pronator Tinel - -   Phalen's - - Pronator Stress - -   Cubital Comp. - - Guyon Comp. - -   Cubital Tinel - - Guyon Tinel - -   Elbow Hyperflexion - - Adson's - -   Spurling's - - SC Comp. - -   PCB Median abn - - SC Tinel - -   Radial Tinel - - IC Comp. - -   Digital Tinel - - IC Tinel - -   Radial 2-Pt WNL WNL Ulnar 2-Pt WNL WNL     Radial Pulse: 2+  Capillary Refill: < 2 sec  [employer]: Not Performed  [employer]: Not Performed      NCV & EMG Findings:  Evaluation of the right ulnar (ADM) motor nerve showed reduced amplitude (6.7 mV). All remaining nerves (as indicated in the following tables) were within normal limits. INTERPRETATION  This was a normal nerve conduction and EMG study showing there to be no signs of neuropathy, myopathy, or radiculopathy in the nerves and muscles tested. CLINICAL INTERPRETATION  Her electrodiagnostic findings do not appear to explain her right arm symptoms. Imagin/7/2022 3 views of bilateral hands significant for minimal degenerative changes especially notable at the left wrist.        ICD-10-CM ICD-9-CM    1. Primary osteoarthritis of left wrist  M19.032 715.13 DRAIN/INJECT INTERMEDIATE JOINT/BURSA      triamcinolone acetonide (KENALOG) 10 mg/mL injection 5 mg      2. Right hand pain  M79.641 729.5 REFERRAL TO OCCUPATIONAL THERAPY      3. Right wrist pain  M25.531 719.43 REFERRAL TO OCCUPATIONAL THERAPY            Plan:     Left wrist joint injection. Referral to Occupational Therapy for right hand and wrist range of motion exercises and edema control. Follow-up and Dispositions    Return if symptoms worsen or fail to improve. Plan was reviewed with patient, who verbalized agreement and understanding of the plan     [facility] NOTE        Chart reviewed for the following:   Delmar EMERSON DO, have reviewed the History, Physical and updated the Allergic reactions for [address] [place] [address] performed immediately prior to start of procedure:   Delmar EMERSON DO, have performed the following reviews on [facility] prior to the start of the procedure:            * Patient was identified by name and date of birth   * Agreement on procedure being performed was verified  * Risks and Benefits explained to the patient  * Procedure site verified and marked as necessary  * Patient was positioned for comfort  * Consent was signed and verified     Time: 11 AM      Date of procedure: 12/15/2022    Procedure performed by:   Erin Orourke DO    Provider assisted by: Marlen Monique MA    Patient assisted by: self    How tolerated by patient: tolerated the procedure well with no complications    Post Procedural Pain Scale: 0 - No Hurt    Comments: none    Procedure:  After consent was obtained, using sterile technique the left wrist was prepped. Local anesthetic used: 1% lidocaine. Kenalog 5 mg and was then injected and the needle withdrawn. The procedure was well tolerated. The patient is asked to continue to rest the area for a few more days before resuming regular activities. It may be more painful for the first 1-2 days. Watch for fever, or increased swelling or persistent pain in the joint. Call or return to clinic prn if such symptoms occur or there is failure to improve as anticipated.

## 2023-01-06 ENCOUNTER — APPOINTMENT (OUTPATIENT)
Dept: GENERAL RADIOLOGY | Age: 63
End: 2023-01-06
Attending: EMERGENCY MEDICINE
Payer: MEDICARE

## 2023-01-06 ENCOUNTER — HOSPITAL ENCOUNTER (EMERGENCY)
Age: 63
Discharge: HOME | End: 2023-01-06
Attending: EMERGENCY MEDICINE
Payer: MEDICARE

## 2023-01-06 VITALS
SYSTOLIC BLOOD PRESSURE: 146 MMHG | BODY MASS INDEX: 45.75 KG/M2 | HEIGHT: 64 IN | WEIGHT: 268 LBS | RESPIRATION RATE: 14 BRPM | TEMPERATURE: 99.1 F | DIASTOLIC BLOOD PRESSURE: 75 MMHG | HEART RATE: 62 BPM | OXYGEN SATURATION: 100 %

## 2023-01-06 DIAGNOSIS — K21.9 GASTROESOPHAGEAL REFLUX DISEASE, UNSPECIFIED WHETHER ESOPHAGITIS PRESENT: ICD-10-CM

## 2023-01-06 DIAGNOSIS — R51.9 NONINTRACTABLE HEADACHE, UNSPECIFIED CHRONICITY PATTERN, UNSPECIFIED HEADACHE TYPE: ICD-10-CM

## 2023-01-06 DIAGNOSIS — J18.9 COMMUNITY ACQUIRED PNEUMONIA, UNSPECIFIED LATERALITY: ICD-10-CM

## 2023-01-06 DIAGNOSIS — R07.9 CHEST PAIN, UNSPECIFIED TYPE: Primary | ICD-10-CM

## 2023-01-06 LAB
ALBUMIN SERPL-MCNC: 3 G/DL (ref 3.4–5)
ALBUMIN/GLOB SERPL: 0.8 (ref 0.8–1.7)
ALP SERPL-CCNC: 93 U/L (ref 45–117)
ALT SERPL-CCNC: 36 U/L (ref 13–56)
ANION GAP SERPL CALC-SCNC: 4 MMOL/L (ref 3–18)
APPEARANCE UR: NORMAL
AST SERPL-CCNC: 17 U/L (ref 10–38)
ATRIAL RATE: 63 BPM
BASOPHILS # BLD: 0.1 K/UL (ref 0–0.1)
BASOPHILS NFR BLD: 1 % (ref 0–2)
BILIRUB SERPL-MCNC: 0.2 MG/DL (ref 0.2–1)
BILIRUB UR QL: NEGATIVE
BNP SERPL-MCNC: 138 PG/ML (ref 0–900)
BUN SERPL-MCNC: 8 MG/DL (ref 7–18)
BUN/CREAT SERPL: 8 (ref 12–20)
CALCIUM SERPL-MCNC: 8.8 MG/DL (ref 8.5–10.1)
CALCULATED P AXIS, ECG09: 14 DEGREES
CALCULATED R AXIS, ECG10: 0 DEGREES
CALCULATED T AXIS, ECG11: 30 DEGREES
CHLORIDE SERPL-SCNC: 107 MMOL/L (ref 100–111)
CO2 SERPL-SCNC: 28 MMOL/L (ref 21–32)
COLOR UR: YELLOW
COVID-19 RAPID TEST, COVR: NOT DETECTED
CREAT SERPL-MCNC: 0.97 MG/DL (ref 0.6–1.3)
DIAGNOSIS, 93000: NORMAL
DIFFERENTIAL METHOD BLD: ABNORMAL
EOSINOPHIL # BLD: 0.2 K/UL (ref 0–0.4)
EOSINOPHIL NFR BLD: 2 % (ref 0–5)
ERYTHROCYTE [DISTWIDTH] IN BLOOD BY AUTOMATED COUNT: 14.8 % (ref 11.6–14.5)
FLUAV AG NPH QL IA: NEGATIVE
FLUBV AG NOSE QL IA: NEGATIVE
GLOBULIN SER CALC-MCNC: 3.8 G/DL (ref 2–4)
GLUCOSE SERPL-MCNC: 99 MG/DL (ref 74–99)
GLUCOSE UR STRIP.AUTO-MCNC: NEGATIVE MG/DL
HCT VFR BLD AUTO: 40.4 % (ref 35–45)
HGB BLD-MCNC: 13.5 G/DL (ref 12–16)
HGB UR QL STRIP: NEGATIVE
IMM GRANULOCYTES # BLD AUTO: 0 K/UL (ref 0–0.04)
IMM GRANULOCYTES NFR BLD AUTO: 1 % (ref 0–0.5)
KETONES UR QL STRIP.AUTO: NEGATIVE MG/DL
LEUKOCYTE ESTERASE UR QL STRIP.AUTO: NEGATIVE
LYMPHOCYTES # BLD: 1.8 K/UL (ref 0.9–3.6)
LYMPHOCYTES NFR BLD: 21 % (ref 21–52)
MCH RBC QN AUTO: 31.7 PG (ref 24–34)
MCHC RBC AUTO-ENTMCNC: 33.4 G/DL (ref 31–37)
MCV RBC AUTO: 94.8 FL (ref 78–100)
MONOCYTES # BLD: 0.4 K/UL (ref 0.05–1.2)
MONOCYTES NFR BLD: 5 % (ref 3–10)
NEUTS SEG # BLD: 6.1 K/UL (ref 1.8–8)
NEUTS SEG NFR BLD: 71 % (ref 40–73)
NITRITE UR QL STRIP.AUTO: NEGATIVE
NRBC # BLD: 0.03 K/UL (ref 0–0.01)
NRBC BLD-RTO: 0.3 PER 100 WBC
P-R INTERVAL, ECG05: 146 MS
PH UR STRIP: 8 (ref 5–8)
PLATELET # BLD AUTO: 381 K/UL (ref 135–420)
PMV BLD AUTO: 9.3 FL (ref 9.2–11.8)
POTASSIUM SERPL-SCNC: 3.7 MMOL/L (ref 3.5–5.5)
PROT SERPL-MCNC: 6.8 G/DL (ref 6.4–8.2)
PROT UR STRIP-MCNC: NEGATIVE MG/DL
Q-T INTERVAL, ECG07: 442 MS
QRS DURATION, ECG06: 76 MS
QTC CALCULATION (BEZET), ECG08: 452 MS
RBC # BLD AUTO: 4.26 M/UL (ref 4.2–5.3)
SODIUM SERPL-SCNC: 139 MMOL/L (ref 136–145)
SOURCE, COVRS: NORMAL
SP GR UR REFRACTOMETRY: 1.01 (ref 1–1.03)
TROPONIN-HIGH SENSITIVITY: 7 NG/L (ref 0–54)
TROPONIN-HIGH SENSITIVITY: 8 NG/L (ref 0–54)
UROBILINOGEN UR QL STRIP.AUTO: 0.2 EU/DL (ref 0.2–1)
VENTRICULAR RATE, ECG03: 63 BPM
WBC # BLD AUTO: 8.6 K/UL (ref 4.6–13.2)

## 2023-01-06 PROCEDURE — 71045 X-RAY EXAM CHEST 1 VIEW: CPT

## 2023-01-06 PROCEDURE — 84484 ASSAY OF TROPONIN QUANT: CPT

## 2023-01-06 PROCEDURE — 93005 ELECTROCARDIOGRAM TRACING: CPT

## 2023-01-06 PROCEDURE — 80053 COMPREHEN METABOLIC PANEL: CPT

## 2023-01-06 PROCEDURE — 83880 ASSAY OF NATRIURETIC PEPTIDE: CPT

## 2023-01-06 PROCEDURE — 96374 THER/PROPH/DIAG INJ IV PUSH: CPT

## 2023-01-06 PROCEDURE — 74011250636 HC RX REV CODE- 250/636: Performed by: EMERGENCY MEDICINE

## 2023-01-06 PROCEDURE — 74011000250 HC RX REV CODE- 250: Performed by: EMERGENCY MEDICINE

## 2023-01-06 PROCEDURE — 85025 COMPLETE CBC W/AUTO DIFF WBC: CPT

## 2023-01-06 PROCEDURE — 99285 EMERGENCY DEPT VISIT HI MDM: CPT

## 2023-01-06 PROCEDURE — 87635 SARS-COV-2 COVID-19 AMP PRB: CPT

## 2023-01-06 PROCEDURE — 74011250637 HC RX REV CODE- 250/637: Performed by: EMERGENCY MEDICINE

## 2023-01-06 PROCEDURE — 81003 URINALYSIS AUTO W/O SCOPE: CPT

## 2023-01-06 PROCEDURE — 87804 INFLUENZA ASSAY W/OPTIC: CPT

## 2023-01-06 RX ORDER — DOXYCYCLINE HYCLATE 100 MG
100 TABLET ORAL 2 TIMES DAILY
Qty: 14 TABLET | Refills: 0 | Status: SHIPPED | OUTPATIENT
Start: 2023-01-06 | End: 2023-01-13

## 2023-01-06 RX ORDER — ACETAMINOPHEN 500 MG
1000 TABLET ORAL
Status: COMPLETED | OUTPATIENT
Start: 2023-01-06 | End: 2023-01-06

## 2023-01-06 RX ORDER — OMEPRAZOLE 20 MG/1
40 TABLET, DELAYED RELEASE ORAL DAILY
Qty: 30 TABLET | Refills: 2 | Status: SHIPPED | OUTPATIENT
Start: 2023-01-06

## 2023-01-06 RX ORDER — FAMOTIDINE 20 MG/1
20 TABLET, FILM COATED ORAL 2 TIMES DAILY
Qty: 20 TABLET | Refills: 0 | Status: SHIPPED | OUTPATIENT
Start: 2023-01-06 | End: 2023-01-16

## 2023-01-06 RX ORDER — SUCRALFATE 1 G/1
1 TABLET ORAL 4 TIMES DAILY
Qty: 180 TABLET | Refills: 3 | Status: SHIPPED | OUTPATIENT
Start: 2023-01-06

## 2023-01-06 RX ORDER — SUCRALFATE 1 G/1
1 TABLET ORAL ONCE
Status: COMPLETED | OUTPATIENT
Start: 2023-01-06 | End: 2023-01-06

## 2023-01-06 RX ADMIN — FAMOTIDINE 20 MG: 10 INJECTION, SOLUTION INTRAVENOUS at 16:04

## 2023-01-06 RX ADMIN — ACETAMINOPHEN 1000 MG: 500 TABLET ORAL at 15:32

## 2023-01-06 RX ADMIN — ALUMINUM HYDROXIDE, MAGNESIUM HYDROXIDE, AND SIMETHICONE 40 ML: 200; 200; 20 SUSPENSION ORAL at 14:48

## 2023-01-06 RX ADMIN — SUCRALFATE 1 G: 1 TABLET ORAL at 16:05

## 2023-01-06 NOTE — ED TRIAGE NOTES
Pt c/o chest pain, nausea, and neck pain since Tuesday.     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

## 2023-01-06 NOTE — ED PROVIDER NOTES
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    GERD (gastroesophageal reflux disease)     History of gastric bypass 2012    History of meniscal tear 2015, 2018    right in 2015, left in 2018    HNP (herniated nucleus pulposus), lumbar     patient reported    Lung nodules     resolved 2018 CT    Neuropathy     Osteopenia 10/03/2017    Recurrent depression ([facility] [address]     Sleep apnea     on cpap    Spinal stenosis, lumbar     patient reported    Xanthelasma of left upper eyelid 07/20/2020       Past Surgical History:   Procedure Laterality Date    HX ARTHRODESIS  01/2000    Herniated Disc, arthritis right foot 06/06, 07/07, C 6/7, C 4/6 Stenosis    HX BREAST BIOPSY Bilateral 11/22/2022    BILATERAL MASTECTOMY SCAR REVISION performed by Sheron Mckinney DO at [address] [facility]    HX CHOLECYSTECTOMY  09/2008    gallbladder disease    HX COLONOSCOPY  05/2009    HX GASTRIC BYPASS  2012    GASTRIC BYPASS  [facility] Gastric Bypass      HX HYSTERECTOMY  06/1994    HX MASTECTOMY Bilateral 11/23/2021    BILATERAL MASTECTOMY, LEFT SENTINEL NODE BIOPSY [facility] 11/22) performed by Mireille Wray MD at [address] Left 06/28/2022    revision of left mastectomy scar performed by Sheron Mckinney DO at [address] [facility]     [facility]  10/2015    right repari of torn meniscus    HX MENISCECTOMY Left 03/16/2018    partial meniscectomy due to tear    HX MYOMECTOMY  06/1984    benign tumor    HX ORTHOPAEDIC  1964    orthopaedic excision Fibrosarcoma and Lymphangiogram    HX PELVIC LAPAROSCOPY  04/1984    large uterine blockage    HX VASCULAR ACCESS      NEUROLOGICAL PROCEDURE UNLISTED  2000, 2007    Cervical fusion         Family History:   Problem Relation Age of Onset    Hypertension Mother Depression Mother     Cancer Mother         Ovarian Cancer, breast cancer,     Ovarian Cancer Mother     Breast Problems Mother     Cancer Father         skin cancer,     Cancer Sister         Breast cancer    Depression Sister     Breast Cancer Sister     Depression Brother     Alcohol abuse Maternal Grandfather     Diabetes Maternal Grandmother             Stroke Neg Hx     Heart Attack Neg Hx        Social History     Socioeconomic History    Marital status: SINGLE     Spouse name: Not on file    Number of children: Not on file    Years of education: Not on file    Highest education level: Not on file   Occupational History    Not on file   Tobacco Use    Smoking status: Former     Packs/day: 0.50     Years: 45.00     Pack years: 22.50     Types: Cigarettes     Quit date: 2020     Years since quittin.5    Smokeless tobacco: Never   Vaping Use    Vaping Use: Never used   Substance and Sexual Activity    Alcohol use: No     Comment: quit 1980s- was heavy etoh    Drug use: Not Currently     Types: Marijuana     Comment: edibles    Sexual activity: Not Currently     Partners: Male     Birth control/protection: None   Other Topics Concern    Not on file   Social History Narrative    Not on file     Social Determinants of Health     Financial Resource Strain: Low Risk     Difficulty of Paying Living Expenses: Not hard at all   Food Insecurity: No Food Insecurity    Worried About Running Out of Food in the Last Year: Never true    Ran Out of Food in the Last Year: Never true   Transportation Needs: No Transportation Needs    Lack of Transportation (Medical): No    Lack of Transportation (Non-Medical):  No   Physical Activity: Inactive    Days of Exercise per Week: 0 days    Minutes of Exercise per Session: 0 min   Stress: Not on file   Social Connections: Not on file   Intimate Partner Violence: Not At Risk    Fear of Current or Ex-Partner: No    Emotionally Abused: No    Physically Abused: No    Sexually Abused: No   Housing Stability: Low Risk     Unable to Pay for Housing in the Last Year: No    Number of Places Lived in the Last Year: 1    Unstable Housing in the Last Year: No         ALLERGIES: Adhesive tape-silicones, Alcohol, Lactose, Percodan [oxycodone hcl-oxycodone-asa], and Nsaids (non-steroidal anti-inflammatory drug)    Review of Systems    Vitals:    01/06/23 1220 01/06/23 1250 01/06/23 1305 01/06/23 1457   BP:  (!) 161/74 (!) 161/73 (!) 150/78   Pulse: 65  64 66   Resp: 18  17 18   Temp:    99.1 °F (37.3 °C)   SpO2: 97%  96% 100%   Weight:       Height:                Physical Exam       Recent Results (from the past 12 hour(s))   EKG, 12 LEAD, INITIAL    Collection Time: 01/06/23 11:48 AM   Result Value Ref Range    Ventricular Rate 63 BPM    Atrial Rate 63 BPM    P-R Interval 146 ms    QRS Duration 76 ms    Q-T Interval 442 ms    QTC Calculation (Bezet) 452 ms    Calculated P Axis 14 degrees    Calculated R Axis 0 degrees    Calculated T Axis 30 degrees    Diagnosis       Normal sinus rhythm  Septal infarct (cited on or before 06-JAN-2023)  Abnormal ECG  When compared with ECG of 28-JUN-2022 06:18,  Questionable change in initial forces of Septal leads  Confirmed by Ford Contreras MD, Ruy Soto ([id number]) on 1/6/2023 12:39:11 PM     CBC WITH AUTOMATED DIFF    Collection Time: 01/06/23 12:20 PM   Result Value Ref Range    WBC 8.6 4.6 - 13.2 K/uL    RBC 4.26 4.20 - 5.30 M/uL    HGB 13.5 12.0 - 16.0 g/dL    HCT 40.4 35.0 - 45.0 %    MCV 94.8 78.0 - 100.0 FL    MCH 31.7 24.0 - 34.0 PG    MCHC 33.4 31.0 - 37.0 g/dL    RDW 14.8 (H) 11.6 - 14.5 %    PLATELET 750 598 - 993 K/uL    MPV 9.3 9.2 - 11.8 FL    NRBC 0.3 (H) 0  WBC    ABSOLUTE NRBC 0.03 (H) 0.00 - 0.01 K/uL    NEUTROPHILS 71 40 - 73 %    LYMPHOCYTES 21 21 - 52 %    MONOCYTES 5 3 - 10 %    EOSINOPHILS 2 0 - 5 %    BASOPHILS 1 0 - 2 %    IMMATURE GRANULOCYTES 1 (H) 0.0 - 0.5 %    ABS. NEUTROPHILS 6.1 1.8 - 8.0 K/UL    ABS.  LYMPHOCYTES 1.8 0.9 - 3.6 K/UL    ABS. MONOCYTES 0.4 0.05 - 1.2 K/UL    ABS. EOSINOPHILS 0.2 0.0 - 0.4 K/UL    ABS. BASOPHILS 0.1 0.0 - 0.1 K/UL    ABS. IMM. GRANS. 0.0 0.00 - 0.04 K/UL    DF AUTOMATED     METABOLIC PANEL, COMPREHENSIVE    Collection Time: 01/06/23 12:20 PM   Result Value Ref Range    Sodium 139 136 - 145 mmol/L    Potassium 3.7 3.5 - 5.5 mmol/L    Chloride 107 100 - 111 mmol/L    CO2 28 21 - 32 mmol/L    Anion gap 4 3.0 - 18 mmol/L    Glucose 99 74 - 99 mg/dL    BUN 8 7.0 - 18 MG/DL    Creatinine 0.97 0.6 - 1.3 MG/DL    BUN/Creatinine ratio 8 (L) 12 - 20      eGFR >60 >60 ml/min/1.73m2    Calcium 8.8 8.5 - 10.1 MG/DL    Bilirubin, total 0.2 0.2 - 1.0 MG/DL    ALT (SGPT) 36 13 - 56 U/L    AST (SGOT) 17 10 - 38 U/L    Alk.  phosphatase 93 45 - 117 U/L    Protein, total 6.8 6.4 - 8.2 g/dL    Albumin 3.0 (L) 3.4 - 5.0 g/dL    Globulin 3.8 2.0 - 4.0 g/dL    A-G Ratio 0.8 0.8 - 1.7     NT-PRO BNP    Collection Time: 01/06/23 12:20 PM   Result Value Ref Range    NT pro- 0 - 900 PG/ML   TROPONIN-HIGH SENSITIVITY    Collection Time: 01/06/23 12:20 PM   Result Value Ref Range    Troponin-High Sensitivity 7 0 - 54 ng/L   COVID-19 RAPID TEST    Collection Time: 01/06/23  1:30 PM   Result Value Ref Range    Specimen source Nasopharyngeal      COVID-19 rapid test Not detected     INFLUENZA A & B AG (RAPID TEST)    Collection Time: 01/06/23  1:30 PM   Result Value Ref Range    Influenza A Antigen Negative NEG      Influenza B Antigen Negative NEG     TROPONIN-HIGH SENSITIVITY    Collection Time: 01/06/23  2:50 PM   Result Value Ref Range    Troponin-High Sensitivity 8 0 - 54 ng/L   URINALYSIS W/ RFLX MICROSCOPIC    Collection Time: 01/06/23  2:50 PM   Result Value Ref Range    Color YELLOW      Appearance CLOUDY      Specific gravity 1.008 1.005 - 1.030      pH (UA) 8.0 5.0 - 8.0      Protein Negative NEG mg/dL    Glucose Negative NEG mg/dL    Ketone Negative NEG mg/dL    Bilirubin Negative NEG      Blood Negative NEG Urobilinogen 0.2 0.2 - 1.0 EU/dL    Nitrites Negative NEG      Leukocyte Esterase Negative NEG       XR CHEST PORT   Final Result      Basilar atelectasis versus infiltrates. Mild enlarged cardiac silhouette with central vascular congestion. Medical Decision Making  Amount and/or Complexity of Data Reviewed  Labs: ordered. Radiology: ordered. ECG/medicine tests: ordered. Risk  OTC drugs.            Procedures woman with pmh as listed about who presented to the ED today with chest pain radiating to her neck, associated with nausea and a headache. It feels like her typical GERD sx. Her EKG shows NSR without ischemic changes, as interpreted by me. Her first and second troponins are negative. Her BNP is not elevated and she does not have any clinical evidence of heart failure. Her chest x-ray shows basilar atelectasis versus infiltrate. She is afebrile, does not have an elevated white blood cell count, and I doubt that she has any intra-abdominal pathology. GERD. She has received PO Tylenol, Pepcid, GI cocktail, and Carafate in the ED. Her symptoms are much better. She will be discharged home with prescriptions for doxycycline, Prilosec, Pepcid and Carafate. She will be advised to follow-up with her primary care physician in 2 to 3 days. Return precautions have been given. Amount and/or Complexity of Data Reviewed  Labs: ordered. Radiology: ordered. ECG/medicine tests: ordered. Risk  OTC drugs. Prescription drug management.            Procedures

## 2023-01-06 NOTE — PROGRESS NOTES
[facility]  [address] [place] [address]  [place], [address]  [phone] fax    Physical Therapy Discharge Summary    Patient name: Pedro Carrington Start of Care: 2022   Referral source: Josh Dueñas NP : 1960               Medical Diagnosis: Muscle weakness (generalized) [M62.81]  Payor: [employer] / Plan: [address] / Product Type: Managed Care Medicare /  Onset Date: Summer 2022               Treatment Diagnosis: Deconditioning/decreased balance   Prior Hospitalization: see medical history Provider#: [id number]   Medications: Verified on Patient summary List    Comorbidities: Active breast ca, asthma, arthritis, depression   Prior Level of Function: The patient states she had improved ease of balance, stepping over objects, and standing ease in general.  Visits from Start of Care: 9    Missed Visits: 1  Reporting Period : 10/17/2022 to 10/26/2022    Summary of Care:  Goals for this certification period to be accomplished in 4 weeks:                1. The patient will improve sit to stand endurance test in 30\" to 8 times to improve efficiency of transfers. Recert: Progressing - 6x               2. The patient will improve FOTO score to 49 to improve ease of ADLs. Irecert: regressed to 27              3. The patient will improve hip ABD strength to 4+/5 MMT to maximize stability in stance. Recert: Left: 4-/5, Right: 3+/5.               4. The patient will demonstrate ability to negotiate large hurdles with left and right LE to improve ease of negotiating home. Recert: negotiating 1 nathaniel with UE support. ASSESSMENT/RECOMMENDATIONS: The patient failed to follow-up after her initial follow-up regarding her last appointment. She had made progress but self D/C'd and thus were unable to make additional appointments.      [x]Discontinue therapy: []Patient has reached or is progressing toward set goals      [x]Patient is non-compliant or has abdicated      []Due to lack of appreciable progress towards set [address], PT 1/6/2023 9:11 AM

## 2023-01-18 ENCOUNTER — OFFICE VISIT (OUTPATIENT)
Dept: FAMILY MEDICINE CLINIC | Age: 63
End: 2023-01-18
Payer: MEDICARE

## 2023-01-18 VITALS
HEART RATE: 71 BPM | DIASTOLIC BLOOD PRESSURE: 77 MMHG | RESPIRATION RATE: 20 BRPM | OXYGEN SATURATION: 97 % | SYSTOLIC BLOOD PRESSURE: 135 MMHG | WEIGHT: 269.6 LBS | BODY MASS INDEX: 46.03 KG/M2 | HEIGHT: 64 IN | TEMPERATURE: 97.6 F

## 2023-01-18 DIAGNOSIS — F33.9 RECURRENT MAJOR DEPRESSIVE DISORDER, REMISSION STATUS UNSPECIFIED (HCC): ICD-10-CM

## 2023-01-18 DIAGNOSIS — C50.212 MALIGNANT NEOPLASM OF UPPER-INNER QUADRANT OF LEFT BREAST IN FEMALE, ESTROGEN RECEPTOR NEGATIVE (HCC): ICD-10-CM

## 2023-01-18 DIAGNOSIS — J42 CHRONIC BRONCHITIS, UNSPECIFIED CHRONIC BRONCHITIS TYPE (HCC): ICD-10-CM

## 2023-01-18 DIAGNOSIS — G62.0 NEUROPATHY DUE TO CHEMOTHERAPEUTIC DRUG (HCC): ICD-10-CM

## 2023-01-18 DIAGNOSIS — R73.03 PREDIABETES: Primary | ICD-10-CM

## 2023-01-18 DIAGNOSIS — C49.9 FIBROSARCOMA (HCC): ICD-10-CM

## 2023-01-18 DIAGNOSIS — Z17.1 MALIGNANT NEOPLASM OF UPPER-INNER QUADRANT OF LEFT BREAST IN FEMALE, ESTROGEN RECEPTOR NEGATIVE (HCC): ICD-10-CM

## 2023-01-18 DIAGNOSIS — T45.1X5A NEUROPATHY DUE TO CHEMOTHERAPEUTIC DRUG (HCC): ICD-10-CM

## 2023-01-18 DIAGNOSIS — E66.01 OBESITY, MORBID (HCC): ICD-10-CM

## 2023-01-18 DIAGNOSIS — F10.21 ALCOHOLISM IN REMISSION (HCC): ICD-10-CM

## 2023-01-18 DIAGNOSIS — E66.01 MORBID OBESITY WITH BMI OF 45.0-49.9, ADULT (HCC): ICD-10-CM

## 2023-01-18 PROBLEM — C50.919 MALIGNANT TUMOR OF BREAST (HCC): Status: RESOLVED | Noted: 2022-12-23 | Resolved: 2023-01-18

## 2023-01-18 PROBLEM — M85.851 OSTEOPENIA OF NECK OF RIGHT FEMUR: Status: ACTIVE | Noted: 2017-12-14

## 2023-01-18 PROBLEM — R51.9 CHRONIC NONINTRACTABLE HEADACHE: Status: RESOLVED | Noted: 2022-01-27 | Resolved: 2023-01-18

## 2023-01-18 PROBLEM — K59.03 DRUG-INDUCED CONSTIPATION: Status: RESOLVED | Noted: 2022-01-13 | Resolved: 2023-01-18

## 2023-01-18 PROBLEM — J34.89 NOSE DRYNESS: Status: RESOLVED | Noted: 2022-02-24 | Resolved: 2023-01-18

## 2023-01-18 PROBLEM — K21.9 GASTROESOPHAGEAL REFLUX DISEASE: Status: ACTIVE | Noted: 2017-12-14

## 2023-01-18 PROBLEM — J44.9 CHRONIC OBSTRUCTIVE LUNG DISEASE (HCC): Status: ACTIVE | Noted: 2022-12-23

## 2023-01-18 PROBLEM — F41.8 MIXED ANXIETY AND DEPRESSIVE DISORDER: Status: ACTIVE | Noted: 2022-12-23

## 2023-01-18 PROBLEM — R12 HEARTBURN: Status: RESOLVED | Noted: 2022-01-06 | Resolved: 2023-01-18

## 2023-01-18 PROBLEM — R68.2 DRY MOUTH: Status: RESOLVED | Noted: 2022-02-24 | Resolved: 2023-01-18

## 2023-01-18 PROBLEM — C50.919 MALIGNANT TUMOR OF BREAST (HCC): Status: ACTIVE | Noted: 2022-12-23

## 2023-01-18 PROBLEM — R53.83 FATIGUE DUE TO TREATMENT: Status: RESOLVED | Noted: 2022-01-13 | Resolved: 2023-01-18

## 2023-01-18 PROBLEM — K57.92 DIVERTICULITIS: Status: RESOLVED | Noted: 2021-10-13 | Resolved: 2023-01-18

## 2023-01-18 PROBLEM — E55.9 VITAMIN D DEFICIENCY: Status: ACTIVE | Noted: 2022-09-22

## 2023-01-18 PROBLEM — G89.29 CHRONIC NONINTRACTABLE HEADACHE: Status: RESOLVED | Noted: 2022-01-27 | Resolved: 2023-01-18

## 2023-01-18 PROBLEM — H02.829 SEBACEOUS CYST OF EYELID: Status: RESOLVED | Noted: 2020-07-20 | Resolved: 2023-01-18

## 2023-01-18 PROBLEM — R63.4 WEIGHT LOSS: Status: RESOLVED | Noted: 2022-01-13 | Resolved: 2023-01-18

## 2023-01-18 PROCEDURE — G9717 DOC PT DX DEP/BP F/U NT REQ: HCPCS | Performed by: STUDENT IN AN ORGANIZED HEALTH CARE EDUCATION/TRAINING PROGRAM

## 2023-01-18 PROCEDURE — 3017F COLORECTAL CA SCREEN DOC REV: CPT | Performed by: STUDENT IN AN ORGANIZED HEALTH CARE EDUCATION/TRAINING PROGRAM

## 2023-01-18 PROCEDURE — 99214 OFFICE O/P EST MOD 30 MIN: CPT | Performed by: STUDENT IN AN ORGANIZED HEALTH CARE EDUCATION/TRAINING PROGRAM

## 2023-01-18 PROCEDURE — G8427 DOCREV CUR MEDS BY ELIG CLIN: HCPCS | Performed by: STUDENT IN AN ORGANIZED HEALTH CARE EDUCATION/TRAINING PROGRAM

## 2023-01-18 PROCEDURE — G9708 BILAT MAST/HX BI /UNILAT MAS: HCPCS | Performed by: STUDENT IN AN ORGANIZED HEALTH CARE EDUCATION/TRAINING PROGRAM

## 2023-01-18 PROCEDURE — G8417 CALC BMI ABV UP PARAM F/U: HCPCS | Performed by: STUDENT IN AN ORGANIZED HEALTH CARE EDUCATION/TRAINING PROGRAM

## 2023-01-18 RX ORDER — PREGABALIN 150 MG/1
CAPSULE ORAL
COMMUNITY
Start: 2022-11-03 | End: 2023-01-18

## 2023-01-18 RX ORDER — SEMAGLUTIDE 1.34 MG/ML
0.25 INJECTION, SOLUTION SUBCUTANEOUS
Qty: 4 PEN | Refills: 1 | Status: SHIPPED | OUTPATIENT
Start: 2023-01-18

## 2023-01-18 RX ORDER — GABAPENTIN 300 MG/1
600 CAPSULE ORAL 2 TIMES DAILY
Qty: 120 CAPSULE | Refills: 0 | Status: SHIPPED | OUTPATIENT
Start: 2023-01-18 | End: 2023-02-17

## 2023-01-18 RX ORDER — PROCHLORPERAZINE MALEATE 10 MG
TABLET ORAL
COMMUNITY

## 2023-01-18 RX ORDER — DEXLANSOPRAZOLE 60 MG/1
CAPSULE, DELAYED RELEASE ORAL
COMMUNITY

## 2023-01-18 RX ORDER — FAMOTIDINE 20 MG/1
TABLET, FILM COATED ORAL
COMMUNITY

## 2023-01-18 RX ORDER — DEXTROMETHORPHAN HYDROBROMIDE, GUAIFENESIN 5; 100 MG/5ML; MG/5ML
650 LIQUID ORAL EVERY 8 HOURS
COMMUNITY

## 2023-01-18 RX ORDER — PEN NEEDLE, DIABETIC 31 GX3/16"
NEEDLE, DISPOSABLE MISCELLANEOUS
Qty: 30 PEN NEEDLE | Refills: 2 | Status: SHIPPED | OUTPATIENT
Start: 2023-01-18

## 2023-01-18 RX ORDER — BUPROPION HYDROCHLORIDE 150 MG/1
150 TABLET, EXTENDED RELEASE ORAL 2 TIMES DAILY
COMMUNITY
Start: 2022-12-27

## 2023-01-18 NOTE — PROGRESS NOTES
Chronic Disease Follow up    Sherryle Boozer (: 1960) is a 57 y.o. female here for evaluation of the following chief concerns(s):  New Patient (Here to establish care - former [facility] patient)       ASSESSMENT/PLAN:  1. Prediabetes  -     semaglutide (Ozempic) 0.25 mg or 0.5 mg/dose (2 mg/1.5 ml) subq pen; 0.25 mg by SubCUTAneous route every seven (7) days. , Normal, Disp-4 Pen, R-1  -     Insulin Needles, Disposable, 32 gauge x 5/32\" ndle; Use weekly with ozempic pen, Normal, Disp-30 Pen Needle, R-2  2. Fibrosarcoma ([facility]  3. Neuropathy due to chemotherapeutic drug (HCC)  -     gabapentin (NEURONTIN) 300 mg capsule; Take 2 Capsules by mouth two (2) times a day for 30 days. Max Daily Amount: 1,200 mg., Normal, Disp-120 Capsule, R-0  4. Chronic bronchitis, unspecified chronic bronchitis type ([address]  5. Alcoholism in remission ([facility] [address]  6. Recurrent major depressive disorder, remission status unspecified ([facility] [address]  7. Morbid obesity with BMI of 45.0-49.9, adult (HCC)  -     semaglutide (Ozempic) 0.25 mg or 0.5 mg/dose (2 mg/1.5 ml) subq pen; 0.25 mg by SubCUTAneous route every seven (7) days. , Normal, Disp-4 Pen, R-1  -     Insulin Needles, Disposable, 32 gauge x 5/32\" ndle; Use weekly with ozempic pen, Normal, Disp-30 Pen Needle, R-2  8. Obesity, morbid ([address]  9. Malignant neoplasm of upper-inner quadrant of left breast in female, estrogen receptor negative ([facility] [address]    Orders Placed This Encounter    buPROPion SR (WELLBUTRIN SR) 150 mg SR tablet     Sig: Take 150 mg by mouth two (2) times a day.     dexlansoprazole (DEXILANT) 60 mg CpDB capsule (delayed release)     Sig: Dexilant 60 mg capsule, delayed release    famotidine (PEPCID) 20 mg tablet     Sig: famotidine 20 mg tablet   TAKE 1 TABLET BY MOUTH TWICE DAILY FOR 10 DAYS    DISCONTD: pregabalin (LYRICA) 150 mg capsule    prochlorperazine (COMPAZINE) 10 mg tablet     Sig: prochlorperazine maleate 10 mg tablet   PRN as needed    acetaminophen (Tylenol Arthritis Pain) 650 mg TbER     Sig: Take 650 mg by mouth every eight (8) hours. gabapentin (NEURONTIN) 300 mg capsule     Sig: Take 2 Capsules by mouth two (2) times a day for 30 days. Max Daily Amount: 1,200 mg. Dispense:  120 Capsule     Refill:  0    semaglutide (Ozempic) 0.25 mg or 0.5 mg/dose (2 mg/1.5 ml) subq pen     Si.25 mg by SubCUTAneous route every seven (7) days. Dispense:  4 Pen     Refill:  1    Insulin Needles, Disposable, 32 gauge x \" ndle     Sig: Use weekly with ozempic pen     Dispense:  30 Pen Needle     Refill:  2     Prediabetes, Obesity- Diet and exercise counseling. Recommend at least 150 minutes weekly of moderate intensity activity. Recommend mediterranean diet. Start Semaglutide for weight loss- side effect profile discussed, hypoglycemic precautions, regular meals  Hx of fibrosarcoma- in remission  Neuropathy- likely 2/2 chemotherapy- increase Gabapentin to 600mg BID  COPD-stable. Follows with pulmonology. Continue Trelegy, Albuterol PRN. Alcoholism in remission- stable, has been sober for 8 years now. Keep up the great work  Depression- overall stable. Continue Wellbutrin, Celexa  Breast cancer- stable. Following with breast surgery, oncology. On Brenna. Will continue to follow along. Return in about 6 weeks (around 3/1/2023). Sunshine Morel agrees with plan as above and has no additional questions at this time. SUBJECTIVE/OBJECTIVE:  Patient presents for EOC. This is a new patient to me, was previously following with [employer]. Histories have been reviewed and updated per chart below. Hx of breast cancer- diagnosed in . Both breasts, tripple negative. Initial double mastectomy was 2021- had revision for scar tissue in 2022. Follows with Dr. Melissa Trinh (surgeon), [facility]. S/P chemo. On Dior Lute currently. Follows every 3 months with oncology currently. Mom and sister and grandmother all  at young ages.  BRACA + .    Depression- mostly stable- sometimes has some low energy days mostly when thinking about her cancer and/or her weight. She has a good support system overall. On Wellbutrin and Celexa currently- happy with meds and does not want to change. Patient is interested in medications to help her with weight loss. She did have gastric bypass surgery in 2013-did well at first but then started regaining the weight. She tries to eat healthy, unable to exercise too much because of arthritis in her knees. Hx of lots of joint pain, arthritis. All joints are painful when she wakes up in the morning. Probably needs knee replacement per ortho. Got cortisone shot in R knee which helped. Saw ortho hand- sent her to PT. Has a lot of neuropathy still in her hands and feet which is painful and bothersome. On gabapentin currently- helping some but not enough. 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 COPD- used to smoke, quit 3 years ago. Smoked 1-1.5 PPD x 20 years at least. On Trelegy and Albuterol. Follows with Dr. Bethel Mendoza pulmonology. Hx of osteopenia in spine. S/P 2 spine fusions. No treatment for osteopenia currently- monitoring. Hx of alcoholism- has been sober for 8 years now. Has colonoscopy scheduled in February at [facility].       Denies fever/chills, chest pain, SOB, abdominal pain, leg swelling    Past Medical History:   Diagnosis Date    Alcoholism in remission [facility])     Arthritis 2016    Asthma     Breast cancer ([address]     breast cancer left    Chronic obstructive pulmonary disease ([address]     Chronic pain 1999    Diverticulitis 10/13/2021    Fibrosarcoma ([facility] [address] 1963    connective tissue cancer    GERD (gastroesophageal reflux disease)     History of gastric bypass 2012    History of meniscal tear 2015, 2018    right in 2015, left in 2018    HNP (herniated nucleus pulposus), lumbar     patient reported    Lung nodules     resolved 2018 CT    Neuropathy Osteopenia 10/03/2017    Recurrent depression ([facility] [address]     Sleep apnea     on cpap    Spinal stenosis, lumbar     patient reported    Xanthelasma of left upper eyelid 2020     Past Surgical History:   Procedure Laterality Date    HX ARTHRODESIS  2000    Herniated Disc, arthritis right foot , , C 6/7, C 4/6 Stenosis    HX BREAST BIOPSY Bilateral 2022    BILATERAL MASTECTOMY SCAR REVISION performed by Smita Shane DO at [facility] MAIN OR    HX CHOLECYSTECTOMY  2008    gallbladder disease    HX COLONOSCOPY  2009    HX GASTRIC BYPASS      GASTRIC BYPASS  [facility] Gastric Bypass      HX HYSTERECTOMY  1994    HX MASTECTOMY Bilateral 2021    BILATERAL MASTECTOMY, LEFT SENTINEL NODE BIOPSY [facility] ) performed by Amanda Dillon MD at [address] Left 2022    revision of left mastectomy scar performed by Smita Shane DO at [address] [facility]     [facility]  10/2015    right repari of torn meniscus    HX MENISCECTOMY Left 2018    partial meniscectomy due to tear    HX MYOMECTOMY  1984    benign tumor    HX ORTHOPAEDIC  1964    orthopaedic excision Fibrosarcoma and Lymphangiogram    HX PELVIC LAPAROSCOPY  1984    large uterine blockage    HX VASCULAR ACCESS      [place] UNLISTED NEUROLOGICAL/NEUROMUSCULAR DX PX  ,     Cervical fusion     Family History   Problem Relation Age of Onset    Hypertension Mother     Depression Mother     Cancer Mother         Ovarian Cancer, breast cancer,     Ovarian Cancer Mother     Breast Problems Mother     Cancer Father         skin cancer,     Cancer Sister         Breast cancer    Depression Sister     Breast Cancer Sister     Depression Brother     Alcohol abuse Maternal Grandfather     Diabetes Maternal Grandmother             Stroke Neg Hx     Heart Attack Neg Hx        Social History     Socioeconomic History    Marital status: SINGLE   Tobacco Use    Smoking status: Former Packs/day: 0.50     Years: 45.00     Pack years: 22.50     Types: Cigarettes     Quit date: 2020     Years since quittin.5    Smokeless tobacco: Never   Vaping Use    Vaping Use: Never used   Substance and Sexual Activity    Alcohol use: No     Comment: quit 1980s- was heavy etoh    Drug use: Not Currently     Types: Marijuana     Comment: edibles    Sexual activity: Not Currently     Partners: Male     Birth control/protection: None     Social Determinants of Health     Financial Resource Strain: Low Risk     Difficulty of Paying Living Expenses: Not hard at all   Food Insecurity: No Food Insecurity    Worried About Running Out of Food in the Last Year: Never true    Ran Out of Food in the Last Year: Never true   Transportation Needs: No Transportation Needs    Lack of Transportation (Medical): No    Lack of Transportation (Non-Medical): No   Physical Activity: Inactive    Days of Exercise per Week: 0 days    Minutes of Exercise per Session: 0 min   Housing Stability: Low Risk     Unable to Pay for Housing in the Last Year: No    Number of Places Lived in the Last Year: 1    Unstable Housing in the Last Year: No     Social History     Tobacco Use   Smoking Status Former    Packs/day: 0.50    Years: 45.00    Pack years: 22.50    Types: Cigarettes    Quit date: 2020    Years since quittin.5   Smokeless Tobacco Never       Current Outpatient Medications   Medication Sig Dispense Refill    buPROPion SR (WELLBUTRIN SR) 150 mg SR tablet Take 150 mg by mouth two (2) times a day. dexlansoprazole (DEXILANT) 60 mg CpDB capsule (delayed release) Dexilant 60 mg capsule, delayed release      famotidine (PEPCID) 20 mg tablet famotidine 20 mg tablet   TAKE 1 TABLET BY MOUTH TWICE DAILY FOR 10 DAYS      prochlorperazine (COMPAZINE) 10 mg tablet prochlorperazine maleate 10 mg tablet   PRN as needed      acetaminophen (Tylenol Arthritis Pain) 650 mg TbER Take 650 mg by mouth every eight (8) hours. gabapentin (NEURONTIN) 300 mg capsule Take 2 Capsules by mouth two (2) times a day for 30 days. Max Daily Amount: 1,200 mg. 120 Capsule 0    semaglutide (Ozempic) 0.25 mg or 0.5 mg/dose (2 mg/1.5 ml) subq pen 0.25 mg by SubCUTAneous route every seven (7) days. 4 Pen 1    Insulin Needles, Disposable, 32 gauge x 5/32\" ndle Use weekly with ozempic pen 30 Pen Needle 2    sucralfate (CARAFATE) 1 gram tablet Take 1 Tablet by mouth four (4) times daily. Take 1 tablet by mouth 180 Tablet 3    citalopram (CELEXA) 20 mg tablet TAKE 1 TABLET BY MOUTH DAILY 90 Tablet 0    albuterol (PROVENTIL HFA, VENTOLIN HFA, PROAIR HFA) 90 mcg/actuation inhaler Take  by inhalation. omeprazole (PRILOSEC) 20 mg capsule Take 2 capsules by mouth twice daily 360 Capsule 1    oxybutynin (DITROPAN) 5 mg tablet Take 5 mg by mouth two (2) times a day. Lynparza 150 mg tablet two (2) times a day. b complex vitamins tablet Take 1 Tablet by mouth daily. fluticasone propionate (FLONASE) 50 mcg/actuation nasal spray SHAKE LIQUID AND USE 2 SPRAYS IN EACH NOSTRIL DAILY 48 g 5    fluticasone-umeclidinium-vilanterol (Trelegy Ellipta) 100-62.5-25 mcg inhaler INHALE 1 PUFF BY MOUTH DAILY 120 Each 4    diclofenac (Voltaren) 1 % gel Apply 2 g to affected area every six (6) hours as needed for Pain. 100 g 2    calcium-cholecalciferol, d3, 600-125 mg-unit tab Take 1,065 mg by mouth nightly. omega 3-dha-epa-fish oil 100-160-1,000 mg cap Take 1,065 mg by mouth daily. multivitamin (ONE A DAY) tablet Take 1 Tab by mouth daily. omeprazole (PriLOSEC OTC) 20 mg tablet Take 2 Tablets by mouth daily. (Patient not taking: Reported on 1/18/2023) 30 Tablet 2    Linzess 72 mcg cap capsule TAKE 1 CAPSULE BY MOUTH DAILY BEFORE BREAKFAST 30 Cap 1    acetaminophen (TYLENOL) 500 mg tablet Take  by mouth every six (6) hours as needed for Pain.  (Patient not taking: Reported on 1/18/2023)       Allergies   Allergen Reactions    Latex Hives and Rash Adhesive Tape-Silicones Swelling     REALLY BAD SWELLING AND SORE APPEAR    Alcohol Other (comments)     PT STATES SHE IS AN ALCOHOLIC    Lactose Other (comments)     PT STATES IT MAKES HER STOMACH HURT    Percodan [Oxycodone Hcl-Oxycodone-Asa] Itching and Other (comments)     crying    Sulfa (Sulfonamide Antibiotics) Swelling     REALLY BAD SWELLING AND SORE APPEAR    Nsaids (Non-Steroidal Anti-Inflammatory Drug) Other (comments)     PT NOT ABLE TO TAKE DUE TO GASTRIC BYPASS       /77 (BP 1 Location: Left upper arm, BP Patient Position: Sitting, BP Cuff Size: Large adult)   Pulse 71   Temp 97.6 °F (36.4 °C) (Temporal)   Resp 20   Ht 5' 4\" (1.626 m)   Wt 269 lb 9.6 oz (122.3 kg)   SpO2 97%   BMI 46.28 kg/m²     Gen: NAD, well appearing   Heart: RRR, no m/g/r  Lungs: CTA bilaterally, no wheezing, breathing comfortably  Abd: Soft, non tender to palpation  Ext: No swelling  Psych: cooperative. Appropriate mood and affect. Lab Results   Component Value Date/Time    WBC 8.6 01/06/2023 12:20 PM    HGB 13.5 01/06/2023 12:20 PM    HCT 40.4 01/06/2023 12:20 PM    PLATELET 536 77/99/0802 12:20 PM    MCV 94.8 01/06/2023 12:20 PM     Lab Results   Component Value Date/Time    Sodium 139 01/06/2023 12:20 PM    Potassium 3.7 01/06/2023 12:20 PM    Chloride 107 01/06/2023 12:20 PM    CO2 28 01/06/2023 12:20 PM    Anion gap 4 01/06/2023 12:20 PM    Glucose 99 01/06/2023 12:20 PM    BUN 8 01/06/2023 12:20 PM    Creatinine 0.97 01/06/2023 12:20 PM    BUN/Creatinine ratio 8 (L) 01/06/2023 12:20 PM    GFR est AA >60 11/18/2021 03:01 PM    GFR est non-AA >60 11/18/2021 03:01 PM    Calcium 8.8 01/06/2023 12:20 PM    Bilirubin, total 0.2 01/06/2023 12:20 PM    Alk.  phosphatase 93 01/06/2023 12:20 PM    Protein, total 6.8 01/06/2023 12:20 PM    Albumin 3.0 (L) 01/06/2023 12:20 PM    Globulin 3.8 01/06/2023 12:20 PM    A-G Ratio 0.8 01/06/2023 12:20 PM    ALT (SGPT) 36 01/06/2023 12:20 PM    AST (SGOT) 17 01/06/2023 12:20 PM Lab Results   Component Value Date/Time    Cholesterol, total 169 12/29/2020 08:49 AM    HDL Cholesterol 58 12/29/2020 08:49 AM    LDL, calculated 95.6 12/29/2020 08:49 AM    VLDL, calculated 15.4 12/29/2020 08:49 AM    Triglyceride 77 12/29/2020 08:49 AM    CHOL/HDL Ratio 2.9 12/29/2020 08:49 AM       On this date 01/18/23 I have spent 35 minutes reviewing previous notes, test results and face to face with the patient for interview/exam, discussing working diagnosis and treatment plan as well as documenting on the day of the visit. Medical decision making complexity: moderate-high    I have discussed the diagnosis with the patient and the intended plan as seen in the above orders. The patient has received an after-visit summary and questions were answered concerning future plans. I have discussed medication side effects and warnings with the patient as well. I have reviewed the plan of care with the patient, accepted their input and they are in agreement with the treatment goals. Previous lab and imaging results were reviewed by me.      Andrew Vazquez MD   Family Medicine

## 2023-01-18 NOTE — PROGRESS NOTES
Luciana Reynolds presents today for   Chief Complaint   Patient presents with    New Patient     Here to establish care - former [facility] patient       Is someone accompanying this pt? no    Is the patient using any DME equipment during [address]? no    Depression Screening:  3 most recent PHQ Screens 1/18/2023   PHQ Not Done -   Little interest or pleasure in doing things Not at all   Feeling down, depressed, irritable, or hopeless Several days   Total Score PHQ 2 1   Trouble falling or staying asleep, or sleeping too much -   Feeling tired or having little energy -   Poor appetite, weight loss, or overeating -   Feeling bad about yourself - or that you are a failure or have let yourself or your family down -   Trouble concentrating on things such as school, work, reading, or watching TV -   Moving or speaking so slowly that other people could have noticed; or the opposite being so fidgety that others notice -   Thoughts of being better off dead, or hurting yourself in some way -   PHQ 9 Score -   How difficult have these problems made it for you to do your work, take care of your home and get along with others -       Learning Assessment:  Learning Assessment 6/1/2022   PRIMARY LEARNER Patient   HIGHEST LEVEL OF EDUCATION - PRIMARY LEARNER  4 YEARS OF COLLEGE   BARRIERS PRIMARY LEARNER NONE   CO-LEARNER CAREGIVER No   PRIMARY LANGUAGE ENGLISH   LEARNER PREFERENCE PRIMARY DEMONSTRATION   ANSWERED BY self   RELATIONSHIP SELF       Fall Risk  Fall Risk Assessment, last 12 mths 6/1/2022   Able to walk? Yes   Fall in past 12 months? 0   Do you feel unsteady?  0   Are you worried about falling 0       ADL  ADL Assessment 1/18/2023   Feeding yourself No Help Needed   Getting from bed to chair No Help Needed   Getting dressed No Help Needed   Bathing or showering No Help Needed   Walk across the room (includes cane/walker) No Help Needed   Using the telphone No Help Needed   Taking your medications No Help Needed   Preparing meals No Help Needed   Managing money (expenses/bills) No Help Needed   Moderately strenuous housework (laundry) No Help Needed   Shopping for personal items (toiletries/medicines) No Help Needed   Shopping for groceries No Help Needed   Driving No Help Needed   Climbing a flight of stairs No Help Needed   Getting to places beyond walking distances No Help Needed       Health Maintenance reviewed and discussed and ordered per Provider. Health Maintenance Due   Topic Date Due    Colorectal Cancer Screening Combo  09/20/2021    COVID-19 Vaccine (5 - Booster for Felice Roers series) 08/13/2022   . Coordination of Care:  1. \"Have you been to the ER, urgent care clinic since your last visit? Hospitalized since your last visit? \" Yes Where: [facility]    2. \"Have you seen or consulted any other health care providers outside of the [address] [place] [address] [facility] since your last visit? \" Yes Where: [facility]      3. For patients aged 39-70: Has the patient had a colonoscopy? No -- scheduled    If the patient is female:    4. For patients aged 41-77: Has the patient had a mammogram within the past 2 years? Yes - no Care Gap present    5. For patients aged 21-65: Has the patient had a pap smear?  Yes - no Care Gap present

## 2023-01-19 ENCOUNTER — DOCUMENTATION ONLY (OUTPATIENT)
Dept: ORTHOPEDIC SURGERY | Age: 63
End: 2023-01-19

## 2023-01-23 ENCOUNTER — OFFICE VISIT (OUTPATIENT)
Dept: FAMILY MEDICINE CLINIC | Age: 63
End: 2023-01-23
Payer: MEDICARE

## 2023-01-23 VITALS
WEIGHT: 269.6 LBS | HEIGHT: 64 IN | OXYGEN SATURATION: 95 % | BODY MASS INDEX: 46.03 KG/M2 | HEART RATE: 67 BPM | DIASTOLIC BLOOD PRESSURE: 85 MMHG | TEMPERATURE: 97.8 F | SYSTOLIC BLOOD PRESSURE: 157 MMHG | RESPIRATION RATE: 20 BRPM

## 2023-01-23 DIAGNOSIS — E66.01 MORBID OBESITY WITH BMI OF 45.0-49.9, ADULT (HCC): ICD-10-CM

## 2023-01-23 DIAGNOSIS — R73.03 PREDIABETES: ICD-10-CM

## 2023-01-23 DIAGNOSIS — R22.1 LOCALIZED SWELLING, MASS AND LUMP, NECK: Primary | ICD-10-CM

## 2023-01-23 PROCEDURE — G8427 DOCREV CUR MEDS BY ELIG CLIN: HCPCS | Performed by: STUDENT IN AN ORGANIZED HEALTH CARE EDUCATION/TRAINING PROGRAM

## 2023-01-23 PROCEDURE — G9717 DOC PT DX DEP/BP F/U NT REQ: HCPCS | Performed by: STUDENT IN AN ORGANIZED HEALTH CARE EDUCATION/TRAINING PROGRAM

## 2023-01-23 PROCEDURE — G8417 CALC BMI ABV UP PARAM F/U: HCPCS | Performed by: STUDENT IN AN ORGANIZED HEALTH CARE EDUCATION/TRAINING PROGRAM

## 2023-01-23 PROCEDURE — 3017F COLORECTAL CA SCREEN DOC REV: CPT | Performed by: STUDENT IN AN ORGANIZED HEALTH CARE EDUCATION/TRAINING PROGRAM

## 2023-01-23 PROCEDURE — G9708 BILAT MAST/HX BI /UNILAT MAS: HCPCS | Performed by: STUDENT IN AN ORGANIZED HEALTH CARE EDUCATION/TRAINING PROGRAM

## 2023-01-23 PROCEDURE — 99214 OFFICE O/P EST MOD 30 MIN: CPT | Performed by: STUDENT IN AN ORGANIZED HEALTH CARE EDUCATION/TRAINING PROGRAM

## 2023-01-23 RX ORDER — AMOXICILLIN AND CLAVULANATE POTASSIUM 875; 125 MG/1; MG/1
1 TABLET, FILM COATED ORAL EVERY 12 HOURS
Qty: 14 TABLET | Refills: 0 | Status: SHIPPED | OUTPATIENT
Start: 2023-01-23 | End: 2023-01-30

## 2023-01-23 RX ORDER — SEMAGLUTIDE 1.34 MG/ML
0.25 INJECTION, SOLUTION SUBCUTANEOUS
Qty: 4 PEN | Refills: 1 | Status: SHIPPED | OUTPATIENT
Start: 2023-01-23

## 2023-01-23 NOTE — PROGRESS NOTES
Nehemias Ng presents today for   Chief Complaint   Patient presents with    Neck Pain     Right sided neck pain and swelling that was noticed a few days ago. Is someone accompanying this pt? no    Is the patient using any DME equipment during [address]? no    Depression Screening:  3 most recent PHQ Screens 1/23/2023   PHQ Not Done -   Little interest or pleasure in doing things Not at all   Feeling down, depressed, irritable, or hopeless Not at all   Total Score PHQ 2 0   Trouble falling or staying asleep, or sleeping too much -   Feeling tired or having little energy -   Poor appetite, weight loss, or overeating -   Feeling bad about yourself - or that you are a failure or have let yourself or your family down -   Trouble concentrating on things such as school, work, reading, or watching TV -   Moving or speaking so slowly that other people could have noticed; or the opposite being so fidgety that others notice -   Thoughts of being better off dead, or hurting yourself in some way -   PHQ 9 Score -   How difficult have these problems made it for you to do your work, take care of your home and get along with others -       Learning Assessment:  Learning Assessment 6/1/2022   PRIMARY LEARNER Patient   HIGHEST LEVEL OF EDUCATION - PRIMARY LEARNER  4 YEARS OF COLLEGE   BARRIERS PRIMARY LEARNER NONE   CO-LEARNER CAREGIVER No   PRIMARY LANGUAGE ENGLISH   LEARNER PREFERENCE PRIMARY DEMONSTRATION   ANSWERED BY self   RELATIONSHIP SELF       Fall Risk  Fall Risk Assessment, last 12 mths 6/1/2022   Able to walk? Yes   Fall in past 12 months? 0   Do you feel unsteady?  0   Are you worried about falling 0       ADL  ADL Assessment 1/23/2023   Feeding yourself No Help Needed   Getting from bed to chair No Help Needed   Getting dressed No Help Needed   Bathing or showering No Help Needed   Walk across the room (includes cane/walker) No Help Needed   Using the telphone No Help Needed   Taking your medications No Help Needed   Preparing meals No Help Needed   Managing money (expenses/bills) No Help Needed   Moderately strenuous housework (laundry) No Help Needed   Shopping for personal items (toiletries/medicines) No Help Needed   Shopping for groceries No Help Needed   Driving No Help Needed   Climbing a flight of stairs No Help Needed   Getting to places beyond walking distances No Help Needed       Health Maintenance reviewed and discussed and ordered per Provider. Health Maintenance Due   Topic Date Due    Colorectal Cancer Screening Combo  09/20/2021    COVID-19 Vaccine (5 - Booster for Lali [facility] series) 08/13/2022   . Coordination of Care:  1. \"Have you been to the ER, urgent care clinic since your last visit? Hospitalized since your last visit? \" No    2. \"Have you seen or consulted any other health care providers outside of the [address] [place] [address] since your last visit? \" No     3. For patients aged 39-70: Has the patient had a colonoscopy? No     If the patient is female:    4. For patients aged 41-77: Has the patient had a mammogram within the past 2 years? Yes - no Care Gap present    5. For patients aged 21-65: Has the patient had a pap smear?  Yes - no Care Gap present

## 2023-01-23 NOTE — PROGRESS NOTES
SICK VISIT     Roshni Amado (: 1960) is a 57 y.o. female here for evaluation of the following chief concerns(s):  Neck Pain (Right sided neck pain and swelling that was noticed a few days ago. )       ASSESSMENT/PLAN:  1. Localized swelling, mass and lump, neck  -     amoxicillin-clavulanate (AUGMENTIN) 875-125 mg per tablet; Take 1 Tablet by mouth every twelve (12) hours for 7 days. , Normal, Disp-14 Tablet, R-0  -     DUPLEX UPPER EXT ART RIGHT W WBI; Future  2. Morbid obesity with BMI of 45.0-49.9, adult (HCC)  -     semaglutide (Ozempic) 0.25 mg or 0.5 mg/dose (2 mg/1.5 ml) subq pen; 0.25 mg by SubCUTAneous route every seven (7) days. , Normal, Disp-4 Pen, R-1  3. Prediabetes  -     semaglutide (Ozempic) 0.25 mg or 0.5 mg/dose (2 mg/1.5 ml) subq pen; 0.25 mg by SubCUTAneous route every seven (7) days. , Normal, Disp-4 Pen, R-1  Orders Placed This Encounter    DUPLEX UPPER EXT ART RIGHT W WBI     Standing Status:   Future     Standing Expiration Date:   2023    semaglutide (Ozempic) 0.25 mg or 0.5 mg/dose (2 mg/1.5 ml) subq pen     Si.25 mg by SubCUTAneous route every seven (7) days. Dispense:  4 Pen     Refill:  1    amoxicillin-clavulanate (AUGMENTIN) 875-125 mg per tablet     Sig: Take 1 Tablet by mouth every twelve (12) hours for 7 days. Dispense:  14 Tablet     Refill:  0     Given that patient is immunocompromised s/p cancer treatment, I am going to treat her with Augmentin for possible infection. Hemodynamically stable. Ultrasound to r/o clot given patient had recent port. RTO/ER precautions. Elevated BP- likely 2/2 pain. Was normal last visit. Recheck at follow up. FU as scheduled for chronic disease    Sunshine Fitzgerald agrees with plan as above and has no additional questions at this time. SUBJECTIVE/OBJECTIVE:    Patient presents for swelling and pain in her right neck.   Symptoms started 2 days ago all of a sudden   Area of the swelling is right above the collarbone-it is red and hot also. No overlying skin changes, no drainage. Patient has a history of both breast cancer. She had a port on the right side which was removed in July 2022-no issues with the port since. She denies any swelling or pain in the right arm. Pain feels like burning. Also feels congested in her right ear and right sinus. No dental pain or known dental infections.       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    GERD (gastroesophageal reflux disease)     History of gastric bypass 2012    History of meniscal tear 2015, 2018    right in 2015, left in 2018    HNP (herniated nucleus pulposus), lumbar     patient reported    Lung nodules     resolved 2018 CT    Neuropathy     Osteopenia 10/03/2017    Recurrent depression ([facility] [address]     Sleep apnea     on cpap    Spinal stenosis, lumbar     patient reported    Xanthelasma of left upper eyelid 07/20/2020     Past Surgical History:   Procedure Laterality Date    HX ARTHRODESIS  01/2000    Herniated Disc, arthritis right foot 06/06, 07/07, C 6/7, C 4/6 Stenosis    HX BREAST BIOPSY Bilateral 11/22/2022    BILATERAL MASTECTOMY SCAR REVISION performed by Alton Soria DO at [facility]    HX CHOLECYSTECTOMY  09/2008    gallbladder disease    HX COLONOSCOPY  05/2009    HX GASTRIC BYPASS  2012    GASTRIC BYPASS  [facility] Gastric Bypass      HX HYSTERECTOMY  06/1994    HX MASTECTOMY Bilateral 11/23/2021    BILATERAL MASTECTOMY, LEFT SENTINEL NODE BIOPSY [facility] 11/22) performed by Fany Thornton MD at [address] Left 06/28/2022    revision of left mastectomy scar performed by Alton Soria DO at [facility]     [facility]  10/2015    right repari of torn meniscus    HX MENISCECTOMY Left 2018    partial meniscectomy due to tear    HX MYOMECTOMY  1984    benign tumor    HX ORTHOPAEDIC  1964    orthopaedic excision Fibrosarcoma and Lymphangiogram    HX PELVIC LAPAROSCOPY  1984    large uterine blockage    HX VASCULAR ACCESS      [facility] UNLISTED NEUROLOGICAL/NEUROMUSCULAR DX PX  ,     Cervical fusion     Family History   Problem Relation Age of Onset    Hypertension Mother     Depression Mother     Cancer Mother         Ovarian Cancer, breast cancer,     Ovarian Cancer Mother     Breast Problems Mother     Cancer Father         skin cancer,     Cancer Sister         Breast cancer    Depression Sister     Breast Cancer Sister     Depression Brother     Alcohol abuse Maternal Grandfather     Diabetes Maternal Grandmother             Stroke Neg Hx     Heart Attack Neg Hx        Social History     Socioeconomic History    Marital status: SINGLE   Tobacco Use    Smoking status: Former     Packs/day: 0.50     Years: 45.00     Pack years: 22.50     Types: Cigarettes     Quit date: 2020     Years since quittin.5    Smokeless tobacco: Never   Vaping Use    Vaping Use: Never used   Substance and Sexual Activity    Alcohol use: No     Comment: quit 1980s- was heavy etoh    Drug use: Not Currently     Types: Marijuana     Comment: edibles    Sexual activity: Not Currently     Partners: Male     Birth control/protection: None     Social Determinants of Health     Financial Resource Strain: Low Risk     Difficulty of Paying Living Expenses: Not hard at all   Food Insecurity: No Food Insecurity    Worried About Running Out of Food in the Last Year: Never true    Ran Out of Food in the Last Year: Never true   Transportation Needs: No Transportation Needs    Lack of Transportation (Medical): No    Lack of Transportation (Non-Medical):  No   Physical Activity: Inactive    Days of Exercise per Week: 0 days    Minutes of Exercise per Session: 0 min   Housing Stability: Low Risk     Unable to Pay for Housing in the Last Year: No    Number of Places Lived in the Last Year: 1    Unstable Housing in the Last Year: No     Social History     Tobacco Use   Smoking Status Former    Packs/day: 0.50    Years: 45.00    Pack years: 22.50    Types: Cigarettes    Quit date: 2020    Years since quittin.5   Smokeless Tobacco Never       Current Outpatient Medications   Medication Sig Dispense Refill    semaglutide (Ozempic) 0.25 mg or 0.5 mg/dose (2 mg/1.5 ml) subq pen 0.25 mg by SubCUTAneous route every seven (7) days. 4 Pen 1    amoxicillin-clavulanate (AUGMENTIN) 875-125 mg per tablet Take 1 Tablet by mouth every twelve (12) hours for 7 days. 14 Tablet 0    buPROPion SR (WELLBUTRIN SR) 150 mg SR tablet Take 150 mg by mouth two (2) times a day. dexlansoprazole (DEXILANT) 60 mg CpDB capsule (delayed release) Dexilant 60 mg capsule, delayed release      famotidine (PEPCID) 20 mg tablet famotidine 20 mg tablet   TAKE 1 TABLET BY MOUTH TWICE DAILY FOR 10 DAYS      prochlorperazine (COMPAZINE) 10 mg tablet prochlorperazine maleate 10 mg tablet   PRN as needed      acetaminophen (TYLENOL) 650 mg TbER Take 650 mg by mouth every eight (8) hours. gabapentin (NEURONTIN) 300 mg capsule Take 2 Capsules by mouth two (2) times a day for 30 days. Max Daily Amount: 1,200 mg. 120 Capsule 0    Insulin Needles, Disposable, 32 gauge x 5/32\" ndle Use weekly with ozempic pen 30 Pen Needle 2    sucralfate (CARAFATE) 1 gram tablet Take 1 Tablet by mouth four (4) times daily. Take 1 tablet by mouth 180 Tablet 3    citalopram (CELEXA) 20 mg tablet TAKE 1 TABLET BY MOUTH DAILY 90 Tablet 0    albuterol (PROVENTIL HFA, VENTOLIN HFA, PROAIR HFA) 90 mcg/actuation inhaler Take  by inhalation. omeprazole (PRILOSEC) 20 mg capsule Take 2 capsules by mouth twice daily 360 Capsule 1    oxybutynin (DITROPAN) 5 mg tablet Take 5 mg by mouth two (2) times a day. Lynparza 150 mg tablet two (2) times a day. b complex vitamins tablet Take 1 Tablet by mouth daily. fluticasone propionate (FLONASE) 50 mcg/actuation nasal spray SHAKE LIQUID AND USE 2 SPRAYS IN EACH NOSTRIL DAILY 48 g 5    fluticasone-umeclidinium-vilanterol (Trelegy Ellipta) 100-62.5-25 mcg inhaler INHALE 1 PUFF BY MOUTH DAILY 120 Each 4    diclofenac (Voltaren) 1 % gel Apply 2 g to affected area every six (6) hours as needed for Pain. 100 g 2    Linzess 72 mcg cap capsule TAKE 1 CAPSULE BY MOUTH DAILY BEFORE BREAKFAST 30 Cap 1    calcium-cholecalciferol, d3, 600-125 mg-unit tab Take 1,065 mg by mouth nightly. omega 3-dha-epa-fish oil 100-160-1,000 mg cap Take 1,065 mg by mouth daily. multivitamin (ONE A DAY) tablet Take 1 Tab by mouth daily. omeprazole (PriLOSEC OTC) 20 mg tablet Take 2 Tablets by mouth daily. (Patient not taking: No sig reported) 30 Tablet 2    acetaminophen (TYLENOL) 500 mg tablet Take  by mouth every six (6) hours as needed for Pain. (Patient not taking: No sig reported)       Allergies   Allergen Reactions    Latex Hives and Rash    Adhesive Tape-Silicones Swelling     REALLY BAD SWELLING AND SORE APPEAR    Alcohol Other (comments)     PT STATES SHE IS AN ALCOHOLIC    Lactose Other (comments)     PT STATES IT MAKES HER STOMACH HURT    Percodan [Oxycodone Hcl-Oxycodone-Asa] Itching and Other (comments)     crying    Sulfa (Sulfonamide Antibiotics) Swelling     REALLY BAD SWELLING AND SORE APPEAR    Nsaids (Non-Steroidal Anti-Inflammatory Drug) Other (comments)     PT NOT ABLE TO TAKE DUE TO GASTRIC BYPASS       BP (!) 157/85 (BP 1 Location: Right arm, BP Patient Position: Sitting, BP Cuff Size: Large adult)   Pulse 67   Temp 97.8 °F (36.6 °C) (Temporal)   Resp 20   Ht 5' 4\" (1.626 m)   Wt 269 lb 9.6 oz (122.3 kg)   SpO2 95%   BMI 46.28 kg/m²     Gen: NAD, well appearing   Neck: no cervical lymphadenopathy- tender area just above the right collarbone. + edema. + erythema. No open wounds or drainage.  Tender to touch, no fluctuance. Psych: cooperative. Appropriate mood and affect. Lab Results   Component Value Date/Time    WBC 8.6 01/06/2023 12:20 PM    HGB 13.5 01/06/2023 12:20 PM    HCT 40.4 01/06/2023 12:20 PM    PLATELET 526 70/05/5350 12:20 PM    MCV 94.8 01/06/2023 12:20 PM     Lab Results   Component Value Date/Time    Sodium 139 01/06/2023 12:20 PM    Potassium 3.7 01/06/2023 12:20 PM    Chloride 107 01/06/2023 12:20 PM    CO2 28 01/06/2023 12:20 PM    Anion gap 4 01/06/2023 12:20 PM    Glucose 99 01/06/2023 12:20 PM    BUN 8 01/06/2023 12:20 PM    Creatinine 0.97 01/06/2023 12:20 PM    BUN/Creatinine ratio 8 (L) 01/06/2023 12:20 PM    GFR est AA >60 11/18/2021 03:01 PM    GFR est non-AA >60 11/18/2021 03:01 PM    Calcium 8.8 01/06/2023 12:20 PM    Bilirubin, total 0.2 01/06/2023 12:20 PM    Alk. phosphatase 93 01/06/2023 12:20 PM    Protein, total 6.8 01/06/2023 12:20 PM    Albumin 3.0 (L) 01/06/2023 12:20 PM    Globulin 3.8 01/06/2023 12:20 PM    A-G Ratio 0.8 01/06/2023 12:20 PM    ALT (SGPT) 36 01/06/2023 12:20 PM    AST (SGOT) 17 01/06/2023 12:20 PM     Lab Results   Component Value Date/Time    Cholesterol, total 169 12/29/2020 08:49 AM    HDL Cholesterol 58 12/29/2020 08:49 AM    LDL, calculated 95.6 12/29/2020 08:49 AM    VLDL, calculated 15.4 12/29/2020 08:49 AM    Triglyceride 77 12/29/2020 08:49 AM    CHOL/HDL Ratio 2.9 12/29/2020 08:49 AM       On this date 01/23/23 I have spent 20 minutes reviewing previous notes, test results and face to face with the patient for interview/exam, discussing working diagnosis and treatment plan as well as documenting on the day of the visit. Medical decision making complexity: moderate-high    I have discussed the diagnosis with the patient and the intended plan as seen in the above orders. The patient has received an after-visit summary and questions were answered concerning future plans.   I have discussed medication side effects and warnings with the patient as well. I have reviewed the plan of care with the patient, accepted their input and they are in agreement with the treatment goals. Previous lab and imaging results were reviewed by me.      Rik Nava MD   Family Medicine

## 2023-01-24 ENCOUNTER — HOSPITAL ENCOUNTER (EMERGENCY)
Age: 63
Discharge: HOME | End: 2023-01-24
Attending: EMERGENCY MEDICINE
Payer: MEDICARE

## 2023-01-24 ENCOUNTER — APPOINTMENT (OUTPATIENT)
Dept: CT IMAGING | Age: 63
End: 2023-01-24
Attending: PHYSICIAN ASSISTANT
Payer: MEDICARE

## 2023-01-24 VITALS
DIASTOLIC BLOOD PRESSURE: 105 MMHG | RESPIRATION RATE: 20 BRPM | SYSTOLIC BLOOD PRESSURE: 142 MMHG | HEART RATE: 67 BPM | OXYGEN SATURATION: 97 % | TEMPERATURE: 98.4 F

## 2023-01-24 DIAGNOSIS — R22.1 NECK SWELLING: Primary | ICD-10-CM

## 2023-01-24 LAB
ALBUMIN SERPL-MCNC: 3.2 G/DL (ref 3.4–5)
ALBUMIN/GLOB SERPL: 0.8 (ref 0.8–1.7)
ALP SERPL-CCNC: 104 U/L (ref 45–117)
ALT SERPL-CCNC: 54 U/L (ref 13–56)
ANION GAP SERPL CALC-SCNC: 5 MMOL/L (ref 3–18)
AST SERPL-CCNC: 25 U/L (ref 10–38)
BASOPHILS # BLD: 0 K/UL (ref 0–0.1)
BASOPHILS NFR BLD: 1 % (ref 0–2)
BILIRUB SERPL-MCNC: 0.5 MG/DL (ref 0.2–1)
BUN SERPL-MCNC: 11 MG/DL (ref 7–18)
BUN/CREAT SERPL: 12 (ref 12–20)
CALCIUM SERPL-MCNC: 9 MG/DL (ref 8.5–10.1)
CHLORIDE SERPL-SCNC: 109 MMOL/L (ref 100–111)
CO2 SERPL-SCNC: 26 MMOL/L (ref 21–32)
CREAT SERPL-MCNC: 0.93 MG/DL (ref 0.6–1.3)
DIFFERENTIAL METHOD BLD: ABNORMAL
EOSINOPHIL # BLD: 0.3 K/UL (ref 0–0.4)
EOSINOPHIL NFR BLD: 4 % (ref 0–5)
ERYTHROCYTE [DISTWIDTH] IN BLOOD BY AUTOMATED COUNT: 14.6 % (ref 11.6–14.5)
GLOBULIN SER CALC-MCNC: 3.9 G/DL (ref 2–4)
GLUCOSE SERPL-MCNC: 107 MG/DL (ref 74–99)
HCT VFR BLD AUTO: 41.8 % (ref 35–45)
HGB BLD-MCNC: 14.1 G/DL (ref 12–16)
IMM GRANULOCYTES # BLD AUTO: 0 K/UL (ref 0–0.04)
IMM GRANULOCYTES NFR BLD AUTO: 0 % (ref 0–0.5)
LYMPHOCYTES # BLD: 1.9 K/UL (ref 0.9–3.6)
LYMPHOCYTES NFR BLD: 23 % (ref 21–52)
MCH RBC QN AUTO: 31.5 PG (ref 24–34)
MCHC RBC AUTO-ENTMCNC: 33.7 G/DL (ref 31–37)
MCV RBC AUTO: 93.5 FL (ref 78–100)
MONOCYTES # BLD: 0.3 K/UL (ref 0.05–1.2)
MONOCYTES NFR BLD: 4 % (ref 3–10)
NEUTS SEG # BLD: 5.7 K/UL (ref 1.8–8)
NEUTS SEG NFR BLD: 68 % (ref 40–73)
NRBC # BLD: 0.03 K/UL (ref 0–0.01)
NRBC BLD-RTO: 0.4 PER 100 WBC
PLATELET # BLD AUTO: 385 K/UL (ref 135–420)
PMV BLD AUTO: 9.2 FL (ref 9.2–11.8)
POTASSIUM SERPL-SCNC: 4.1 MMOL/L (ref 3.5–5.5)
PROT SERPL-MCNC: 7.1 G/DL (ref 6.4–8.2)
RBC # BLD AUTO: 4.47 M/UL (ref 4.2–5.3)
SODIUM SERPL-SCNC: 140 MMOL/L (ref 136–145)
WBC # BLD AUTO: 8.3 K/UL (ref 4.6–13.2)

## 2023-01-24 PROCEDURE — 85025 COMPLETE CBC W/AUTO DIFF WBC: CPT

## 2023-01-24 PROCEDURE — 70491 CT SOFT TISSUE NECK W/DYE: CPT

## 2023-01-24 PROCEDURE — 99285 EMERGENCY DEPT VISIT HI MDM: CPT

## 2023-01-24 PROCEDURE — 96374 THER/PROPH/DIAG INJ IV PUSH: CPT

## 2023-01-24 PROCEDURE — 74011000636 HC RX REV CODE- 636: Performed by: EMERGENCY MEDICINE

## 2023-01-24 PROCEDURE — 80053 COMPREHEN METABOLIC PANEL: CPT

## 2023-01-24 PROCEDURE — 71260 CT THORAX DX C+: CPT

## 2023-01-24 RX ADMIN — IOPAMIDOL 70 ML: 612 INJECTION, SOLUTION INTRAVENOUS at 12:30

## 2023-01-24 NOTE — ED TRIAGE NOTES
Pt c/o swelling of right neck and chest around collar bone. Previous Mediport in place however was removed. C/o tingling localized to swollen area. Referred to ED to r/o dvt by PCP. PMH breast CA, double mastectomy with lymph removal on left.

## 2023-01-24 NOTE — ED PROVIDER NOTES
EMERGENCY DEPARTMENT HISTORY AND PHYSICAL EXAM    Date: 1/24/2023  Patient Name: Robert Weaver    History of Presenting Illness     Chief Complaint   Patient presents with    Neck Swelling         History Provided By: Patient      Additional History (Context): Robert Weaver is a 57 y.o. female with a history of breast cancer and COPD who presents today for right-sided lower neck and chest swelling. Patient reports the area of swelling is where her Mediport used to be. Reports it was removed this past July. Reports she is still currently undergoing treatment for breast cancer with oral chemo. States she has had a bilateral mastectomy. Is not on any blood thinners. Denies any known trauma or injury. Denies any throat closing sensation or shortness of breath. Reports she was advised to come to emergency department for evaluation by her primary care doctor. PCP: Ras Trammell MD    Current Outpatient Medications   Medication Sig Dispense Refill    semaglutide (Ozempic) 0.25 mg or 0.5 mg/dose (2 mg/1.5 ml) subq pen 0.25 mg by SubCUTAneous route every seven (7) days. 4 Pen 1    amoxicillin-clavulanate (AUGMENTIN) 875-125 mg per tablet Take 1 Tablet by mouth every twelve (12) hours for 7 days. 14 Tablet 0    buPROPion SR (WELLBUTRIN SR) 150 mg SR tablet Take 150 mg by mouth two (2) times a day. dexlansoprazole (DEXILANT) 60 mg CpDB capsule (delayed release) Dexilant 60 mg capsule, delayed release      famotidine (PEPCID) 20 mg tablet famotidine 20 mg tablet   TAKE 1 TABLET BY MOUTH TWICE DAILY FOR 10 DAYS      prochlorperazine (COMPAZINE) 10 mg tablet prochlorperazine maleate 10 mg tablet   PRN as needed      acetaminophen (TYLENOL) 650 mg TbER Take 650 mg by mouth every eight (8) hours. gabapentin (NEURONTIN) 300 mg capsule Take 2 Capsules by mouth two (2) times a day for 30 days.  Max Daily Amount: 1,200 mg. 120 Capsule 0    Insulin Needles, Disposable, 32 gauge x 5/32\" ndle Use weekly with ozempic pen 30 Pen Needle 2    sucralfate (CARAFATE) 1 gram tablet Take 1 Tablet by mouth four (4) times daily. Take 1 tablet by mouth 180 Tablet 3    omeprazole (PriLOSEC OTC) 20 mg tablet Take 2 Tablets by mouth daily. (Patient not taking: No sig reported) 30 Tablet 2    citalopram (CELEXA) 20 mg tablet TAKE 1 TABLET BY MOUTH DAILY 90 Tablet 0    albuterol (PROVENTIL HFA, VENTOLIN HFA, PROAIR HFA) 90 mcg/actuation inhaler Take  by inhalation. omeprazole (PRILOSEC) 20 mg capsule Take 2 capsules by mouth twice daily 360 Capsule 1    oxybutynin (DITROPAN) 5 mg tablet Take 5 mg by mouth two (2) times a day. Lynparza 150 mg tablet two (2) times a day. b complex vitamins tablet Take 1 Tablet by mouth daily. fluticasone propionate (FLONASE) 50 mcg/actuation nasal spray SHAKE LIQUID AND USE 2 SPRAYS IN EACH NOSTRIL DAILY 48 g 5    fluticasone-umeclidinium-vilanterol (Trelegy Ellipta) 100-62.5-25 mcg inhaler INHALE 1 PUFF BY MOUTH DAILY 120 Each 4    diclofenac (Voltaren) 1 % gel Apply 2 g to affected area every six (6) hours as needed for Pain. 100 g 2    Linzess 72 mcg cap capsule TAKE 1 CAPSULE BY MOUTH DAILY BEFORE BREAKFAST 30 Cap 1    acetaminophen (TYLENOL) 500 mg tablet Take  by mouth every six (6) hours as needed for Pain. (Patient not taking: No sig reported)      calcium-cholecalciferol, d3, 600-125 mg-unit tab Take 1,065 mg by mouth nightly. omega 3-dha-epa-fish oil 100-160-1,000 mg cap Take 1,065 mg by mouth daily. multivitamin (ONE A DAY) tablet Take 1 Tab by mouth daily.          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    GERD (gastroesophageal reflux disease)     History of gastric bypass     History of meniscal tear ,     right in , left in     HNP (herniated nucleus pulposus), lumbar     patient reported    Lung nodules     resolved 2018 CT    Neuropathy     Osteopenia 10/03/2017    Recurrent depression ([facility] [address]     Sleep apnea     on cpap    Spinal stenosis, lumbar     patient reported    Xanthelasma of left upper eyelid 2020       Past Surgical History:  Past Surgical History:   Procedure Laterality Date    HX ARTHRODESIS  2000    Herniated Disc, arthritis right foot , , C 6/7, C 4/ Stenosis    HX BREAST BIOPSY Bilateral 2022    BILATERAL MASTECTOMY SCAR REVISION performed by Easton Corado DO at [address] [facility]    HX CHOLECYSTECTOMY  2008    gallbladder disease    HX COLONOSCOPY  2009    HX GASTRIC BYPASS      GASTRIC BYPASS  [facility] Gastric Bypass      HX HYSTERECTOMY  1994    HX MASTECTOMY Bilateral 2021    BILATERAL MASTECTOMY, LEFT SENTINEL NODE BIOPSY [facility] ) performed by Elliott Saucedo MD at [facility] MAIN OR    HX MASTECTOMY Left 2022    revision of left mastectomy scar performed by Easton Corado DO at [address] [facility]     [place]  10/2015    right repari of torn meniscus    HX MENISCECTOMY Left 2018    partial meniscectomy due to tear    HX MYOMECTOMY  1984    benign tumor    HX ORTHOPAEDIC  1964    orthopaedic excision Fibrosarcoma and Lymphangiogram    HX PELVIC LAPAROSCOPY  1984    large uterine blockage    HX VASCULAR ACCESS      [facility] UNLISTED NEUROLOGICAL/NEUROMUSCULAR DX PX  ,     Cervical fusion       Family History:  Family History   Problem Relation Age of Onset    Hypertension Mother     Depression Mother     Cancer Mother         Ovarian Cancer, breast cancer,     Ovarian Cancer Mother     Breast Problems Mother     Cancer Father         skin cancer,     Cancer Sister         Breast cancer    Depression Sister     Breast Cancer Sister     Depression Brother     Alcohol abuse Maternal Grandfather     Diabetes Maternal Grandmother             Stroke Neg Hx     Heart Attack Neg Hx        Social History:  Social History     Tobacco Use    Smoking status: Former     Packs/day: 0.50     Years: 45.00     Pack years: 22.50     Types: Cigarettes     Quit date: 2020     Years since quittin.5    Smokeless tobacco: Never   Vaping Use    Vaping Use: Never used   Substance Use Topics    Alcohol use: No     Comment: quit - was heavy etoh    Drug use: Not Currently     Types: Marijuana     Comment: edibles       Allergies: Allergies   Allergen Reactions    Latex Hives and Rash    Adhesive Tape-Silicones Swelling     REALLY BAD SWELLING AND SORE APPEAR    Alcohol Other (comments)     PT STATES SHE IS AN ALCOHOLIC    Lactose Other (comments)     PT STATES IT MAKES HER STOMACH HURT    Percodan [Oxycodone Hcl-Oxycodone-Asa] Itching and Other (comments)     crying    Sulfa (Sulfonamide Antibiotics) Swelling     REALLY BAD SWELLING AND SORE APPEAR    Nsaids (Non-Steroidal Anti-Inflammatory Drug) Other (comments)     PT NOT ABLE TO TAKE DUE TO GASTRIC BYPASS         Review of Systems   Review of Systems   Constitutional:  Negative for chills and fever. HENT:  Negative for congestion, rhinorrhea and sore throat. Respiratory:  Negative for cough and shortness of breath. Cardiovascular:  Negative for chest pain. Gastrointestinal:  Negative for abdominal pain, blood in stool, constipation, diarrhea, nausea and vomiting. Genitourinary:  Negative for dysuria, frequency and hematuria. Musculoskeletal:  Positive for neck pain. Negative for back pain and myalgias. Right lower neck and right sided chest pain and swelling, no overlying erythema or induration    Skin:  Negative for rash and wound. Neurological:  Negative for dizziness and headaches. All other systems reviewed and are negative.   All Other Systems Negative  Physical Exam     Vitals: 01/24/23 1108   BP: (!) 142/105   Pulse: 67   Resp: 20   Temp: 98.4 °F (36.9 °C)   SpO2: 97%     Physical Exam  Vitals and nursing note reviewed. Constitutional:       General: She is not in acute distress. Appearance: She is well-developed. She is not diaphoretic. HENT:      Head: Normocephalic and atraumatic. Eyes:      Conjunctiva/sclera: Conjunctivae normal.   Neck:     Cardiovascular:      Rate and Rhythm: Normal rate and regular rhythm. Heart sounds: Normal heart sounds. Pulmonary:      Effort: Pulmonary effort is normal. No respiratory distress. Breath sounds: Normal breath sounds. Chest:      Chest wall: No tenderness. Abdominal:      General: Bowel sounds are normal. There is no distension. Palpations: Abdomen is soft. Tenderness: There is no abdominal tenderness. There is no guarding or rebound. Musculoskeletal:         General: No deformity. Cervical back: Full passive range of motion without pain, normal range of motion and neck supple. No pain with movement. Skin:     General: Skin is warm and dry. Neurological:      Mental Status: She is alert and oriented to person, place, and time. Deep Tendon Reflexes: Reflexes are normal and symmetric. Diagnostic Study Results     Labs -     Recent Results (from the past 12 hour(s))   METABOLIC PANEL, COMPREHENSIVE    Collection Time: 01/24/23 11:31 AM   Result Value Ref Range    Sodium 140 136 - 145 mmol/L    Potassium 4.1 3.5 - 5.5 mmol/L    Chloride 109 100 - 111 mmol/L    CO2 26 21 - 32 mmol/L    Anion gap 5 3.0 - 18 mmol/L    Glucose 107 (H) 74 - 99 mg/dL    BUN 11 7.0 - 18 MG/DL    Creatinine 0.93 0.6 - 1.3 MG/DL    BUN/Creatinine ratio 12 12 - 20      eGFR >60 >60 ml/min/1.73m2    Calcium 9.0 8.5 - 10.1 MG/DL    Bilirubin, total 0.5 0.2 - 1.0 MG/DL    ALT (SGPT) 54 13 - 56 U/L    AST (SGOT) 25 10 - 38 U/L    Alk.  phosphatase 104 45 - 117 U/L    Protein, total 7.1 6.4 - 8.2 g/dL    Albumin 3.2 (L) 3.4 - 5.0 g/dL    Globulin 3.9 2.0 - 4.0 g/dL    A-G Ratio 0.8 0.8 - 1.7     CBC WITH AUTOMATED DIFF    Collection Time: 01/24/23  1:28 PM   Result Value Ref Range    WBC 8.3 4.6 - 13.2 K/uL    RBC 4.47 4.20 - 5.30 M/uL    HGB 14.1 12.0 - 16.0 g/dL    HCT 41.8 35.0 - 45.0 %    MCV 93.5 78.0 - 100.0 FL    MCH 31.5 24.0 - 34.0 PG    MCHC 33.7 31.0 - 37.0 g/dL    RDW 14.6 (H) 11.6 - 14.5 %    PLATELET 444 635 - 696 K/uL    MPV 9.2 9.2 - 11.8 FL    NRBC 0.4 (H) 0  WBC    ABSOLUTE NRBC 0.03 (H) 0.00 - 0.01 K/uL    NEUTROPHILS 68 40 - 73 %    LYMPHOCYTES 23 21 - 52 %    MONOCYTES 4 3 - 10 %    EOSINOPHILS 4 0 - 5 %    BASOPHILS 1 0 - 2 %    IMMATURE GRANULOCYTES 0 0.0 - 0.5 %    ABS. NEUTROPHILS 5.7 1.8 - 8.0 K/UL    ABS. LYMPHOCYTES 1.9 0.9 - 3.6 K/UL    ABS. MONOCYTES 0.3 0.05 - 1.2 K/UL    ABS. EOSINOPHILS 0.3 0.0 - 0.4 K/UL    ABS. BASOPHILS 0.0 0.0 - 0.1 K/UL    ABS. IMM. GRANS. 0.0 0.00 - 0.04 K/UL    DF AUTOMATED         Radiologic Studies -   CT NECK SOFT TISSUE W CONT   Final Result   Enlargement of the palatine tonsils but no significant airway stenosis or   abscess. Minimal edema or inflammation at the midline anterior chest wall anterior to the   sternum without a focal mass or fluid collection. CT CHEST W CONT   Final Result      No acute findings to explain patient's symptoms. Minimal edema/inflammation or   postsurgical changes at the midline anterior chest wall. CT Results  (Last 48 hours)                 01/24/23 1234  CT NECK SOFT TISSUE W CONT Final result    Impression:  Enlargement of the palatine tonsils but no significant airway stenosis or   abscess. Minimal edema or inflammation at the midline anterior chest wall anterior to the   sternum without a focal mass or fluid collection.                Narrative:  CT OF THE NECK WITH IV CONTRAST       HISTORY:Swelling at right neck and chest around collar bone, prior Mediport   removed in July, tingling, history of breast cancer, double mastectomy with   left-sided lymph node removal. Current chemotherapy. COMPARISON:2/23/2022        TECHNIQUE:  A series of contiguous helical scans were performed through the neck   following bolus injection of nonionic IV. All CT scans at this facility are performed using dose optimization technique as   appropriate to a performed exam, to include automated exposure control,   adjustment of the mA and/or kV according to patient size (including appropriate   matching first site-specific examinations), or use of iterative reconstruction   technique. RESULT:       Limited visualized brain parenchyma unremarkable. Orbits grossly normal. Nasal   canal is patent. There is enlargement of the palatine tonsils but no significant   stenosis of the airway. Epiglottis is normal. Thyroid is symmetric. Parotid and   submandibular glands are normal. No cervical lymphadenopathy. There is mild soft   tissue haziness at the midline anterior chest wall anterior to the sternum   without a focal mass or fluid collection. There are adjacent surgical clips. Mild sinus mucosal thickening. Mastoid air cells are patent. Straightening   cervical spine. C6-C7 anterior fixation. Degenerative disc disease with   ankylosis C4-7. Vasculature is patent. 01/24/23 1234  CT CHEST W CONT Final result    Impression:      No acute findings to explain patient's symptoms. Minimal edema/inflammation or   postsurgical changes at the midline anterior chest wall. Narrative:  CT CHEST WITH ENHANCEMENT       INDICATION: Swelling right neck and chest around collar bone at site of prior   Mediport which has been removed. History of breast cancer status post   mastectomy, lymph node removal, chemotherapy. TECHNIQUE: Axial images obtained from the thoracic inlet to the level of the   diaphragm following uneventful administration of intravenous contrast. Coronal   and sagittal reformatted images were obtained. All CT scans at this facility are performed using dose optimization technique as   appropriate to a performed exam, to include automated exposure control,   adjustment of the mA and/or kV according to patient size (including appropriate   matching first site-specific examinations), or use of iterative reconstruction   technique. COMPARISON: 2/23/2022. CHEST FINDINGS:        Thyroid: Unremarkable in its visualized aspects. Pericardium/ Heart: No significant effusion. Aorta/ Vessels: No aneurysm or dissection. Lymph Nodes: Unremarkable. .       Lungs: Unremarkable. Pleura: No effusion. Upper Abdomen: Unremarkable. Bones/soft tissues: Bilateral mastectomy. Surgical clips in the midline   bilateral anterior chest wall. There is minimal soft tissue haziness at the   midline anterior chest wall. Left axillary lymph node dissection. No significant   edema/inflammation, superficial mass or fluid collection. Osteopenia. Degenerative disc disease. Anterior cervical fixation. CXR Results  (Last 48 hours)      None              Medical Decision Making   I am the first provider for this patient. I reviewed the vital signs, available nursing notes, past medical history, past surgical history, family history and social history. Vital Signs-Reviewed the patient's vital signs. Records Reviewed: Nursing Notes and Old Medical Records     Procedures: None   Procedures    Provider Notes (Medical Decision Making):     Differential: Metastases, thrombus, abscess, cellulitis, injury/trauma    Plan: We will order labs, coags and CT with contrast of neck and chest.  Have discussed case with Dr. Anayeli Carcamo who agrees with this plan. ED Course as of 01/24/23 1417   Tue Jan 24, 2023   1417 CT imaging results and images reviewed with Dr. Anayeli Carcamo. Advises no additional imaging at this time.   Discussed with patient that it is important that she follow-up closely with oncology and her primary care doctor. Patient states understanding agrees. Return precautions have been given. Will discharge home. [CS]      ED Course User Index  [CS] Ranulfo Gimenez         MED RECONCILIATION:  No current facility-administered medications for this encounter. Current Outpatient Medications   Medication Sig    semaglutide (Ozempic) 0.25 mg or 0.5 mg/dose (2 mg/1.5 ml) subq pen 0.25 mg by SubCUTAneous route every seven (7) days. amoxicillin-clavulanate (AUGMENTIN) 875-125 mg per tablet Take 1 Tablet by mouth every twelve (12) hours for 7 days. buPROPion SR (WELLBUTRIN SR) 150 mg SR tablet Take 150 mg by mouth two (2) times a day. dexlansoprazole (DEXILANT) 60 mg CpDB capsule (delayed release) Dexilant 60 mg capsule, delayed release    famotidine (PEPCID) 20 mg tablet famotidine 20 mg tablet   TAKE 1 TABLET BY MOUTH TWICE DAILY FOR 10 DAYS    prochlorperazine (COMPAZINE) 10 mg tablet prochlorperazine maleate 10 mg tablet   PRN as needed    acetaminophen (TYLENOL) 650 mg TbER Take 650 mg by mouth every eight (8) hours. gabapentin (NEURONTIN) 300 mg capsule Take 2 Capsules by mouth two (2) times a day for 30 days. Max Daily Amount: 1,200 mg. Insulin Needles, Disposable, 32 gauge x 5/32\" ndle Use weekly with ozempic pen    sucralfate (CARAFATE) 1 gram tablet Take 1 Tablet by mouth four (4) times daily. Take 1 tablet by mouth    omeprazole (PriLOSEC OTC) 20 mg tablet Take 2 Tablets by mouth daily. (Patient not taking: No sig reported)    citalopram (CELEXA) 20 mg tablet TAKE 1 TABLET BY MOUTH DAILY    albuterol (PROVENTIL HFA, VENTOLIN HFA, PROAIR HFA) 90 mcg/actuation inhaler Take  by inhalation. omeprazole (PRILOSEC) 20 mg capsule Take 2 capsules by mouth twice daily    oxybutynin (DITROPAN) 5 mg tablet Take 5 mg by mouth two (2) times a day. Lynparza 150 mg tablet two (2) times a day. b complex vitamins tablet Take 1 Tablet by mouth daily.     fluticasone propionate (FLONASE) 50 mcg/actuation nasal spray SHAKE LIQUID AND USE 2 SPRAYS IN EACH NOSTRIL DAILY    fluticasone-umeclidinium-vilanterol (Trelegy Ellipta) 100-62.5-25 mcg inhaler INHALE 1 PUFF BY MOUTH DAILY    diclofenac (Voltaren) 1 % gel Apply 2 g to affected area every six (6) hours as needed for Pain. Linzess 72 mcg cap capsule TAKE 1 CAPSULE BY MOUTH DAILY BEFORE BREAKFAST    acetaminophen (TYLENOL) 500 mg tablet Take  by mouth every six (6) hours as needed for Pain. (Patient not taking: No sig reported)    calcium-cholecalciferol, d3, 600-125 mg-unit tab Take 1,065 mg by mouth nightly. omega 3-dha-epa-fish oil 100-160-1,000 mg cap Take 1,065 mg by mouth daily. multivitamin (ONE A DAY) tablet Take 1 Tab by mouth daily. Disposition:  Home     DISCHARGE NOTE:   Pt has been reexamined. Patient has no new complaints, changes, or physical findings. Care plan outlined and precautions discussed. Results of workup were reviewed with the patient. All medications were reviewed with the patient. All of pt's questions and concerns were addressed. Patient was instructed and agrees to follow up with PCP/ oncology as well as to return to the ED upon further deterioration. Patient is ready to go home. Follow-up Information       Follow up With Specialties Details Why Contact Info    [facility] EMERGENCY DEPT Emergency Medicine   [address]  [phone]    Catherine Paiz MD Family Medicine Schedule an appointment as soon as possible for a visit   [address]  [phone]              Current Discharge Medication List              Diagnosis     Clinical Impression:   1. Neck swelling          \"Please note that this dictation was completed with [employer], the computer voice recognition software. Quite often unanticipated grammatical, syntax, homophones, and other interpretive errors are inadvertently transcribed by the computer software. Please disregard these errors. Please excuse any errors that have escaped final proofreading. \"

## 2023-01-25 ENCOUNTER — HOSPITAL ENCOUNTER (OUTPATIENT)
Dept: VASCULAR SURGERY | Age: 63
Discharge: HOME | End: 2023-01-25
Attending: STUDENT IN AN ORGANIZED HEALTH CARE EDUCATION/TRAINING PROGRAM
Payer: MEDICARE

## 2023-01-25 DIAGNOSIS — R22.1 LOCALIZED SWELLING, MASS AND LUMP, NECK: ICD-10-CM

## 2023-01-25 PROCEDURE — 93971 EXTREMITY STUDY: CPT

## 2023-01-25 NOTE — PROGRESS NOTES
Vascular Ultrasound 1/25/23 Order states swelling mass in  Right Neck. I changed order due to indication and patient had mastectomy  and port removal in July; so unable to perform [facility] with original order of right upper arterial duplex with [facility].

## 2023-02-02 ENCOUNTER — PATIENT OUTREACH (OUTPATIENT)
Dept: CASE MANAGEMENT | Age: 63
End: 2023-02-02

## 2023-02-03 ENCOUNTER — PATIENT OUTREACH (OUTPATIENT)
Dept: CASE MANAGEMENT | Age: 63
End: 2023-02-03

## 2023-02-03 NOTE — PROGRESS NOTES
Date/Time:  2/3/2023 3:40 PM    Method of communication with patient:phone    [facility] ([facility]) contacted the patient by telephone to perform Ambulatory Care Coordination. Verified name and  (PHI) with patient as identifiers. Provided introduction to self, and explanation of the Ambulatory Care Manager's role. Encounter Note:   Patient enrolled in Complex Case Management effective 2/3/2023 and will be followed per [facility] protocol. Initial questions answered with subsequent encounters planned. Reviewed upcoming appointments - verified that patient could attend appointments  Further questions answered as needed, patient has [facility] contact information  Preliminary review of medications done during this encounter - will do full med rec on next encounter  Reporting side effects from chemo therapy - provider aware. See Care Mangement Documentation Checklist for assessment   Respiratory and cardiac status shows no issues at present  Depression screen done - PHQ2 score = 0    Reviewed most recent clinic visit w/ patient who verbalized understanding. Patient given an opportunity to ask questions. Notes / Challenges    NA       The patient agrees to contact the PCP office or the [facility] for questions related to their healthcare. Provided contact information for future reference. Disease Specific:   COPD    Home Health Active: No    DME Active: No    Barriers to care?  depression, lack of knowledge about disease, medication management, stages of grief    ACP Decision maker    Primary Decision Maker: Jenna Cool (Marcin Ricks) - Sister - [phone]    Secondary Decision Maker: Chano Marshall - Daughter - [phone]  Advance Care Planning 2022   Patient's [address] is: Named in scanned ACP document   Primary Decision Maker Name -   Primary Decision Maker Phone Number -   Primary Decision Maker Relationship to Patient -   Secondary Decision Maker Name -   Secondary Decision Maker Relationship to Patient -   Confirm Advance Directive Yes, on file   Patient Would Like to Complete Advance Directive -   Does the patient have other document types Power of ;Organ Directive       Medication(s):   Medication reconciliation was not performed with patient, who verbalizes understanding of administration of home medications. There were no barriers to obtaining medications identified at this time. Current Outpatient Medications   Medication Sig    semaglutide (Ozempic) 0.25 mg or 0.5 mg/dose (2 mg/1.5 ml) subq pen 0.25 mg by SubCUTAneous route every seven (7) days. buPROPion SR (WELLBUTRIN SR) 150 mg SR tablet Take 150 mg by mouth two (2) times a day. dexlansoprazole (DEXILANT) 60 mg CpDB capsule (delayed release) Dexilant 60 mg capsule, delayed release    famotidine (PEPCID) 20 mg tablet famotidine 20 mg tablet   TAKE 1 TABLET BY MOUTH TWICE DAILY FOR 10 DAYS    prochlorperazine (COMPAZINE) 10 mg tablet prochlorperazine maleate 10 mg tablet   PRN as needed    acetaminophen (TYLENOL) 650 mg TbER Take 650 mg by mouth every eight (8) hours. gabapentin (NEURONTIN) 300 mg capsule Take 2 Capsules by mouth two (2) times a day for 30 days. Max Daily Amount: 1,200 mg. Insulin Needles, Disposable, 32 gauge x 5/32\" ndle Use weekly with ozempic pen    sucralfate (CARAFATE) 1 gram tablet Take 1 Tablet by mouth four (4) times daily. Take 1 tablet by mouth    omeprazole (PriLOSEC OTC) 20 mg tablet Take 2 Tablets by mouth daily. (Patient not taking: No sig reported)    citalopram (CELEXA) 20 mg tablet TAKE 1 TABLET BY MOUTH DAILY    albuterol (PROVENTIL HFA, VENTOLIN HFA, PROAIR HFA) 90 mcg/actuation inhaler Take  by inhalation. omeprazole (PRILOSEC) 20 mg capsule Take 2 capsules by mouth twice daily    oxybutynin (DITROPAN) 5 mg tablet Take 5 mg by mouth two (2) times a day. Lynparza 150 mg tablet two (2) times a day. b complex vitamins tablet Take 1 Tablet by mouth daily. fluticasone propionate (FLONASE) 50 mcg/actuation nasal spray SHAKE LIQUID AND USE 2 SPRAYS IN EACH NOSTRIL DAILY    fluticasone-umeclidinium-vilanterol (Trelegy Ellipta) 100-62.5-25 mcg inhaler INHALE 1 PUFF BY MOUTH DAILY    diclofenac (Voltaren) 1 % gel Apply 2 g to affected area every six (6) hours as needed for Pain. Linzess 72 mcg cap capsule TAKE 1 CAPSULE BY MOUTH DAILY BEFORE BREAKFAST    acetaminophen (TYLENOL) 500 mg tablet Take  by mouth every six (6) hours as needed for Pain. (Patient not taking: No sig reported)    calcium-cholecalciferol, d3, 600-125 mg-unit tab Take 1,065 mg by mouth nightly. omega 3-dha-epa-fish oil 100-160-1,000 mg cap Take 1,065 mg by mouth daily. multivitamin (ONE A DAY) tablet Take 1 Tab by mouth daily. No current facility-administered medications for this visit. [facility] follow up appointment(s): No future appointments. Goals Addressed                   This Visit's Progress     Attends follow up appointments on schedule   On track     Knowledge and adherence of prescribed medication (ie. action, side effects, missed dose, etc.). On track        time.

## 2023-02-06 ENCOUNTER — TRANSCRIBE ORDER (OUTPATIENT)
Dept: SCHEDULING | Age: 63
End: 2023-02-06

## 2023-02-06 DIAGNOSIS — R51.9 CEPHALALGIA: Primary | ICD-10-CM

## 2023-02-07 ENCOUNTER — HOSPITAL ENCOUNTER (OUTPATIENT)
Dept: MRI IMAGING | Age: 63
Discharge: HOME | End: 2023-02-07
Attending: NURSE PRACTITIONER
Payer: MEDICARE

## 2023-02-07 DIAGNOSIS — R51.9 CEPHALALGIA: ICD-10-CM

## 2023-02-07 LAB — CREAT UR-MCNC: 1.1 MG/DL (ref 0.6–1.3)

## 2023-02-07 PROCEDURE — A9577 INJ MULTIHANCE: HCPCS

## 2023-02-07 PROCEDURE — 70553 MRI BRAIN STEM W/O & W/DYE: CPT

## 2023-02-07 PROCEDURE — 74011250636 HC RX REV CODE- 250/636

## 2023-02-07 PROCEDURE — 82565 ASSAY OF CREATININE: CPT

## 2023-02-07 RX ADMIN — GADOBENATE DIMEGLUMINE 20 ML: 529 INJECTION, SOLUTION INTRAVENOUS at 09:22

## 2023-02-14 DIAGNOSIS — M79.641 RIGHT HAND PAIN: Primary | ICD-10-CM

## 2023-02-14 DIAGNOSIS — M25.531 RIGHT WRIST PAIN: ICD-10-CM

## 2023-02-16 ENCOUNTER — CARE COORDINATION (OUTPATIENT)
Facility: CLINIC | Age: 63
End: 2023-02-16

## 2023-02-16 ASSESSMENT — PATIENT HEALTH QUESTIONNAIRE - PHQ9
SUM OF ALL RESPONSES TO PHQ QUESTIONS 1-9: 0

## 2023-02-16 NOTE — CARE COORDINATION
Ambulatory Care Coordination Note  2023    Patient Current Location:  [place]     ACM contacted the patient by telephone. Verified name and  with patient as identifiers. Provided introduction to self, and explanation of the ACM role. Challenges to be reviewed by the provider   Additional needs identified to be addressed with provider: No  Patient  Chief Complaint of headache on side of head and swelling into neck area. wondering if TMJ related? Method of communication with provider: phone. ACM: Jana Kirk RN    Encounter Note:    Spoke with patient - Patient states doing well at present   Further questions answered as needed and patient has ACM contact information   Remains on Chemo  Any changes in med therapy noted in med list - no complications or side effects    Depression screen done - PHQ2 score = 0       Offered patient enrollment in the Remote Patient Monitoring (RPM) program for in-home monitoring: NA.     Lab Results       None            Care Coordination Interventions    Referral from Primary Care Provider: No  Suggested Interventions and Community Resources          Goals Addressed                   This Visit's Progress     Attend follow up appointments on schedule   On track     Take all medications as ordered   On track            Future Appointments   Date Time Provider Giuliana eLal   2023 11:45 AM DO ELI SinSH BS AMB   3/15/2023  8:45 AM Martita Angel MD [facility] BS AMB   2023  8:30 AM DO TEO Sin BS AMB

## 2023-02-23 ENCOUNTER — OFFICE VISIT (OUTPATIENT)
Age: 63
End: 2023-02-23
Payer: COMMERCIAL

## 2023-02-23 VITALS
TEMPERATURE: 98 F | HEIGHT: 64 IN | BODY MASS INDEX: 45.24 KG/M2 | HEART RATE: 68 BPM | DIASTOLIC BLOOD PRESSURE: 82 MMHG | OXYGEN SATURATION: 98 % | WEIGHT: 265 LBS | RESPIRATION RATE: 18 BRPM | SYSTOLIC BLOOD PRESSURE: 128 MMHG

## 2023-02-23 DIAGNOSIS — R22.1 MASS OF RIGHT SIDE OF NECK: Primary | ICD-10-CM

## 2023-02-23 DIAGNOSIS — E66.01 MORBID (SEVERE) OBESITY DUE TO EXCESS CALORIES (HCC): ICD-10-CM

## 2023-02-23 DIAGNOSIS — Z17.1 MALIGNANT NEOPLASM OF UPPER-INNER QUADRANT OF LEFT BREAST IN FEMALE, ESTROGEN RECEPTOR NEGATIVE (HCC): ICD-10-CM

## 2023-02-23 DIAGNOSIS — C50.212 MALIGNANT NEOPLASM OF UPPER-INNER QUADRANT OF LEFT BREAST IN FEMALE, ESTROGEN RECEPTOR NEGATIVE (HCC): ICD-10-CM

## 2023-02-23 PROCEDURE — G8482 FLU IMMUNIZE ORDER/ADMIN: HCPCS | Performed by: SURGERY

## 2023-02-23 PROCEDURE — G8417 CALC BMI ABV UP PARAM F/U: HCPCS | Performed by: SURGERY

## 2023-02-23 PROCEDURE — 3017F COLORECTAL CA SCREEN DOC REV: CPT | Performed by: SURGERY

## 2023-02-23 PROCEDURE — 1036F TOBACCO NON-USER: CPT | Performed by: SURGERY

## 2023-02-23 PROCEDURE — G8427 DOCREV CUR MEDS BY ELIG CLIN: HCPCS | Performed by: SURGERY

## 2023-02-23 PROCEDURE — 99213 OFFICE O/P EST LOW 20 MIN: CPT | Performed by: SURGERY

## 2023-02-23 RX ORDER — BUPROPION HYDROCHLORIDE 150 MG/1
TABLET, EXTENDED RELEASE ORAL
COMMUNITY
Start: 2021-11-22

## 2023-02-23 RX ORDER — SEMAGLUTIDE 1.34 MG/ML
INJECTION, SOLUTION SUBCUTANEOUS
COMMUNITY
Start: 2023-02-16

## 2023-02-23 RX ORDER — PYRIDOXINE HCL (VITAMIN B6) 100 MG
1 TABLET ORAL
COMMUNITY

## 2023-02-23 NOTE — PROGRESS NOTES
Plan: I reviewed with her CT scan of the neck, MRI of the brain as well as ultrasound which were all unremarkable. I suspect this is a small fluid pocket from the tunneling of the Mediport tubing which goes in this exact area. This does not feel like a solid mass but a small seroma pocket. Recommend we repeat ultrasound of this area to ensure no changes since last imaging. If this is benign the area can be monitored. Should symptoms progress or this area become larger certainly it could be opened and the cavity obliterated. She agrees with this plan. I will call her back once imaging is completed. Otherwise follow-up in 6 months or sooner for routine breast cancer follow-up. HPI: She is here today to follow up swelling of the right neck. She noticed this since January. Feels some discomfort over this area and headaches. No dizziness or visual changes. Feels it has increased is size since first noticing it. No fevers or chills. 12/7/2022  She is here today for her initial follow up from surgery on 11/22/2022 for bilateral mastectomy scar revision. Overall she is doing great  she did have some bruising on the right side but this has resolved. She has no fevers, chills or drainage from her incisions. 10/21/2022   Ms. Raymundo Jarvis is a 59year old woman with BRCA2  mutation and T2N0 triple negative breast cancer, s/p bilateral mastectomy 11/23/21. She had been diagnosed on core biopsy 10/19/21. The area of concern was identified as a palpable mass around September 2021. She denied nipple discharge. She denied breast  pain. There is family history of breast cancer in her sister, mother and maternal aunt. Her most recent previous mammogram was 10/14/19. She has a personal history of fibrosarcoma. Her sister has HNPCC/PAULINO mutation. Genetic testing via [facility] demonstrated BRCA2 mutation  11/8/21.         She did undergo mediport removal and mastectomy scar revision on 6/28/2022 medial aspect of incision. She states the lateral dog ears are bothering her with clothing and she is interested in removal. She continues to deal with lymphedema of her hands  and peripheral neuropathy. She is in physical therapy with little improvement. She has been referred to a hand specialist for evaluation. 7/13/2022   Elisabeth Gary is a 57 y.o.  female who for follow-up status post Mediport removal and scar revision of the left mastectomy. She reports being very happy with the incision and has no complaints. She has no pain, fevers, chills  or drainage from this area. Allergies:         Medication Review:       Current Outpatient Medications on File Prior to Visit         Medication                                    No current facility-administered medications on file prior to visit. Systems Review:   Review of Systems    Constitutional:  Negative for fatigue and fever. Musculoskeletal:  Negative for arthralgias. Skin:  Negative for pallor, rash and wound. Hematological:  Negative for adenopathy. Does not bruise/bleed easily.        PMH:       Past Medical History:       Diagnosis                                                                                                                                            Surgical History:       Past Surgical History:        Procedure                                                                                                                                                                                                                       Social History:       Social History            Socioeconomic History              Tobacco Use                                   Vaping Use              Substance and Sexual Activity                                                                               Social Determinants of Health            Financial Resource Strain: Low Risk              Food Insecurity: No Food Insecurity                                                              Family History:       Family History        Problem                                                                                                             Admission on 11/22/2022, Discharged on 11/22/2022          Component                            Office Visit on 10/19/2022          Component                      DEXA BONE DENSITY STUDY AXIAL   Narrative: DEXA BONE DENSITOMETRY, CENTRAL      INDICATION: Postmenopausal. History of breast cancer and osteopenia. Gastric   bypass. TECHNIQUE: Using [employer] densitometer, bone density measurement was   performed in the lumbar spine, in addition to the proximal left and right   femora. T Score refers to standard deviations above or below average compared to   a young adult of the same sex. Z Score refers to standard deviations above or   below average compared to a patient of the same sex, age, race and weight. COMPARISON: October 3, 2017. October 14, 2019. FINDINGS:       Lumbar Spine Levels: L1-L4   Mean Bone Mineral Density (BMD):  1.068 g/cm2     T Score: -1.0        Z Score: -0.2       BMD increased 0.4%, which is not statistically significant within a 95 percent   confidence interval compared to preceding study. BMD increased 2.1%, which is not statistically significant compared to baseline   study. Left Total Proximal Femur BMD: 1.004 g/cm2     T Score:  0.0        Z Score:  0.2        BMD increased 0.2%, which is not statistically significant within a 95 percent   confidence interval compared to preceding study. BMD decreased 2.4%, which is not statistically significant compared to baseline   study. Right Total Proximal Femur BMD: 0.928 g/cm2   T Score:  -0.6       Z Score:  -0.4        BMD decreased 2.2%, which is not statistically significant within a 95 percent   confidence interval compared to preceding study.    BMD increased 1.2%, which is not statistically significant compared to baseline   study. Left Femoral Neck BMD:  0.924 g/cm2    T Score:  -0.8   Z Score:  -0.3      Right Femoral Neck BMD:  0.824 g/cm2    T Score:  -1.5   Z Score:  -1.0     Impression: 1. BMD measures consistent with osteopenia/low bone density. 2.  Compared to the preceding study, BMD is without statistically significant   interval change. 3.  Compared to the baseline study, BMD is without statistically significant   interval change. Based upon current ISCD guidelines, the patient's overall diagnostic category,   selected using WHO criteria in postmenopausal women and males aged 48 and above,   is selected based upon the lowest T Score from among the lumbar spine, total   femur, femoral neck, (or distal third radius if measured). WHO Definition of Osteoporosis and Osteopenia on DXA (specified for   post-menopausal  females):        Normal:                     T Score at or above -1 SD     Osteopenia:              T Score between -1 and -2.5 SD     Osteoporosis:           T Score at or below -2.5 SD      The risk of fracture approximately doubles for each 1 SD decrease in T Score. It is important to consider other factors in assessing a patient's risk of   fracture, including age, risk of falling/injury, history of fragility fracture,   family history of osteoporosis, smoking, low weight. Various fracture risk tools have been developed for adult patients and are   available online. For example, the FRAX tool developed by [facility] is widely used. Reference www.iscd.org. It is also important to note that DXA measures bone density but does not   distinguish among causes of decreased bone density, which include primary versus   secondary osteoporosis (such as metabolic bone disorders or possible effects of   medications) and also other conditions (such as osteomalacia).   Clinical   considerations should determine what additional evaluation may be warranted to   exclude secondary conditions in a patient with low bone density. Please note that reliable, valid comparisons can not be made between studies   which have been performed on different densitometers. If clinically warranted,   follow up study performed at this site would best permit assessment of trend for   possible change in bone mineral density over time in comparison to this study. Thank you for this referral.             Physical Exam:   Visit Vitals        BP  124/80     Pulse  72     Temp  98 °F (36.7 °C)     Resp  20     Ht  5' 5\" (1.651 m)     Wt  122 kg (269 lb)     SpO2  96%        BMI         BMI: Body mass index is 44.76 kg/m². Physical Exam   Constitutional :        Appearance: Normal appearance. She is obese. Cardiovascular:       Rate and Rhythm: Normal rate. Chest:    Breasts:      Right: Absent. Left: Absent. Right anterior neck lateral to IJ 3.0cm superficial swelling noted at previous site of mediport tubing, mild tenderness to palpation, overlying skin normal             Comments: B/l incisions well healed with no residual dog ears    Skin:      General: Skin is warm and dry. Comments: Port incision site well-healed, no seroma, left mastectomy scar revision site well-healed with no evidence of seroma    Neurological:       General: No focal deficit present. Mental Status: She is alert and oriented to person, place, and time. Mental status is at baseline. Psychiatric:          Mood and Affect: Mood normal.          Behavior: Behavior normal.          Thought Content: Thought content normal.          Judgment: Judgment normal.          I have reviewed the information entered by the clinical staff and/or patient and verified it as accurate or edited where necessary.        Electronically signed by:      Wallace Landrum DO, MPH

## 2023-02-27 ENCOUNTER — ANESTHESIA (OUTPATIENT)
Facility: HOSPITAL | Age: 63
End: 2023-02-27
Payer: MEDICARE

## 2023-02-27 ENCOUNTER — HOSPITAL ENCOUNTER (OUTPATIENT)
Facility: HOSPITAL | Age: 63
Setting detail: OUTPATIENT SURGERY
Discharge: HOME | End: 2023-02-27
Attending: INTERNAL MEDICINE | Admitting: INTERNAL MEDICINE
Payer: MEDICARE

## 2023-02-27 ENCOUNTER — ANESTHESIA EVENT (OUTPATIENT)
Facility: HOSPITAL | Age: 63
End: 2023-02-27
Payer: MEDICARE

## 2023-02-27 VITALS
SYSTOLIC BLOOD PRESSURE: 132 MMHG | HEART RATE: 65 BPM | TEMPERATURE: 97.8 F | OXYGEN SATURATION: 99 % | RESPIRATION RATE: 16 BRPM | DIASTOLIC BLOOD PRESSURE: 53 MMHG

## 2023-02-27 LAB — GLUCOSE BLD STRIP.AUTO-MCNC: 115 MG/DL (ref 70–110)

## 2023-02-27 PROCEDURE — 6360000002 HC RX W HCPCS: Performed by: ANESTHESIOLOGY

## 2023-02-27 PROCEDURE — 3700000000 HC ANESTHESIA ATTENDED CARE: Performed by: INTERNAL MEDICINE

## 2023-02-27 PROCEDURE — 3700000001 HC ADD 15 MINUTES (ANESTHESIA): Performed by: INTERNAL MEDICINE

## 2023-02-27 PROCEDURE — 3600007502: Performed by: INTERNAL MEDICINE

## 2023-02-27 PROCEDURE — 2580000003 HC RX 258: Performed by: ANESTHESIOLOGY

## 2023-02-27 PROCEDURE — 00813 ANES UPR LWR GI NDSC PX: CPT | Performed by: ANESTHESIOLOGY

## 2023-02-27 PROCEDURE — 82962 GLUCOSE BLOOD TEST: CPT

## 2023-02-27 PROCEDURE — 3600007512: Performed by: INTERNAL MEDICINE

## 2023-02-27 PROCEDURE — 88305 TISSUE EXAM BY PATHOLOGIST: CPT

## 2023-02-27 PROCEDURE — 7100000011 HC PHASE II RECOVERY - ADDTL 15 MIN: Performed by: INTERNAL MEDICINE

## 2023-02-27 PROCEDURE — 2709999900 HC NON-CHARGEABLE SUPPLY: Performed by: INTERNAL MEDICINE

## 2023-02-27 PROCEDURE — 7100000010 HC PHASE II RECOVERY - FIRST 15 MIN: Performed by: INTERNAL MEDICINE

## 2023-02-27 RX ORDER — PROPOFOL 10 MG/ML
INJECTION, EMULSION INTRAVENOUS PRN
Status: DISCONTINUED | OUTPATIENT
Start: 2023-02-27 | End: 2023-02-27 | Stop reason: SDUPTHER

## 2023-02-27 RX ORDER — SODIUM CHLORIDE, SODIUM LACTATE, POTASSIUM CHLORIDE, CALCIUM CHLORIDE 600; 310; 30; 20 MG/100ML; MG/100ML; MG/100ML; MG/100ML
INJECTION, SOLUTION INTRAVENOUS CONTINUOUS
Status: DISCONTINUED | OUTPATIENT
Start: 2023-02-27 | End: 2023-02-27 | Stop reason: HOSPADM

## 2023-02-27 RX ADMIN — PROPOFOL 100 MG: 10 INJECTION, EMULSION INTRAVENOUS at 12:18

## 2023-02-27 RX ADMIN — SODIUM CHLORIDE, POTASSIUM CHLORIDE, SODIUM LACTATE AND CALCIUM CHLORIDE: 600; 310; 30; 20 INJECTION, SOLUTION INTRAVENOUS at 12:01

## 2023-02-27 RX ADMIN — PROPOFOL 150 MG: 10 INJECTION, EMULSION INTRAVENOUS at 12:27

## 2023-02-27 NOTE — DISCHARGE INSTRUCTIONS
Colon Polyps: Care Instructions  Your Care Instructions     Colon polyps are growths in the colon or the rectum. The cause of most colon polyps is not known, and most people who get them do not have any problems. But a certain kind can turn into cancer. For this reason, regular testing for colon polyps is important for people as they get older. It is also important for anyone who has an increased risk for colon cancer.  Polyps are usually found through routine colon cancer screening tests. Although most colon polyps are not cancerous, they are usually removed and then tested for cancer. Screening for colon cancer saves lives because the cancer can usually be cured if it is caught early.  If you have a polyp that is the type that can turn into cancer, you may need more tests to examine your entire colon. The doctor will remove any other polyps that are found, and you will be tested more often.  Follow-up care is a key part of your treatment and safety. Be sure to make and go to all appointments, and call your doctor if you are having problems. It's also a good idea to know your test results and keep a list of the medicines you take.  How can you care for yourself at home?  Regular exams to look for colon polyps are the best way to prevent polyps from turning into colon cancer. These can include stool tests, sigmoidoscopy, colonoscopy, and CT colonography. Talk with your doctor about a testing schedule that is right for you.  To prevent polyps  There is no home treatment that can prevent colon polyps. But these steps may help lower your risk for cancer.  Stay active. Being active can help you get to and stay at a healthy weight. Try to exercise on most days of the week. Walking is a good choice.  Eat well. Choose a variety of vegetables, fruits, legumes (such as peas and beans), fish, poultry, and whole grains.  Do not smoke. If you need help quitting, talk to your doctor about stop-smoking programs and  medicines. These can increase your chances of quitting for good. If you drink alcohol, limit how much you drink. Limit alcohol to 2 drinks a day for men and 1 drink a day for women. When should you call for help? Call your doctor now or seek immediate medical care if:    You have severe belly pain. Your stools are maroon or very bloody. Watch closely for changes in your health, and be sure to contact your doctor if:    You have a fever. You have nausea or vomiting. You have a change in bowel habits (new constipation or diarrhea). Your symptoms get worse or are not improving as expected. Where can you learn more? Go to http://www.woods.com/ and enter C571 to learn more about \"Colon Polyps: Care Instructions. \"  Current as of: June 6, 2022               Content Version: 13.5  © 2780-8092 Healthwise, Incorporated. Care instructions adapted under license by [facility] ([facility]). If you have questions about a medical condition or this instruction, always ask your healthcare professional. [place] [address] any warranty or liability for your use of this information. Colonoscopy: What to Expect at [address] [place] [address]  After a colonoscopy, you'll stay at the clinic until you wake up. Then you can go home. But you'll need to arrange for a ride. Your doctor will tell you when you can eat and do your other usual activities. Your doctor will talk to you about when you'll need your next colonoscopy. Your doctor can help you decide how often you need to be checked. This will depend on the results of your test and your risk for colorectal cancer. After the test, you may be bloated or have gas pains. You may need to pass gas. If a biopsy was done or a polyp was removed, you may have streaks of blood in your stool (feces) for a few days. Problems such as heavy rectal bleeding may not occur until several weeks after the test. This isn't common.  But it can happen after polyps are removed. This care sheet gives you a general idea about how long it will take for you to recover. But each person recovers at a different pace. Follow the steps below to get better as quickly as possible. How can you care for yourself at home? Activity    Rest when you feel tired. You can do your normal activities when it feels okay to do so. Diet    Follow your doctor's directions for eating. Unless your doctor has told you not to, drink plenty of fluids. This helps to replace the fluids that were lost during the colon prep. Do not drink alcohol. Medicines    Your doctor will tell you if and when you can restart your medicines. You will also be given instructions about taking any new medicines. If you stopped taking aspirin or some other blood thinner, your doctor will tell you when to start taking it again. If polyps were removed or a biopsy was done during the test, your doctor may tell you not to take aspirin or other anti-inflammatory medicines for a few days. These include ibuprofen (Advil, Motrin) and naproxen (Aleve). Other instructions    For your safety, do not drive or operate machinery until the medicine wears off and you can think clearly. Your doctor may tell you not to drive or operate machinery until the day after your test.     Do not sign legal documents or make major decisions until the medicine wears off and you can think clearly. The anesthesia can make it hard for you to fully understand what you are agreeing to. Follow-up care is a key part of your treatment and safety. Be sure to make and go to all appointments, and call your doctor if you are having problems. It's also a good idea to know your test results and keep a list of the medicines you take. When should you call for help? Call 911 anytime you think you may need emergency care. For example, call if:    You passed out (lost consciousness). You pass maroon or bloody stools.      You have trouble breathing. Call your doctor now or seek immediate medical care if:    You have pain that does not get better after you take pain medicine. You are sick to your stomach or cannot drink fluids. You have new or worse belly pain. You have blood in your stools. You have a fever. You cannot pass stools or gas. Watch closely for changes in your health, and be sure to contact your doctor if you have any problems. Where can you learn more? Go to http://www.woods.com/ and enter E264 to learn more about \"Colonoscopy: What to Expect at Home. \"  Current as of: May 4, 2022               Content Version: 13.5  © 2797-0342 Healthwise, [employer]. Care instructions adapted under license by [facility] [place] ([facility]). If you have questions about a medical condition or this instruction, always ask your healthcare professional. [address] any warranty or liability for your use of this information. Learning About Swallowing Problems  What are swallowing problems? Certain health problems that affect the nervous system can cause trouble swallowing. These conditions include stroke, ALS (also known as Yue Gehrig's disease), Parkinson's disease, and multiple sclerosis. The muscles and nerves that help move food through the throat and esophagus may not work right. Growths, such as cancer, and other problems with your esophagus can also make it hard to swallow. The esophagus is the tube that leads from your throat to your stomach. How are swallowing problems diagnosed? A doctor or speech therapist will examine you to check for swallowing problems. You may get swallowing tests to check how well your throat muscles work. For these tests, you swallow a special liquid that helps the doctor see your throat and esophagus on an X-ray or video screen. Other tests use a thin, flexible tube called a scope to check for problems with your esophagus.  The doctor puts the scope in your mouth and down your throat to look at your esophagus. What are the symptoms? Symptoms of swallowing problems may include:  Trouble getting food or liquids to go down on the first try. Gagging, choking, or coughing when you swallow. Having food or liquids come back up through your throat, mouth, or nose after you swallow. Feeling like foods or liquids are stuck in some part of your throat or chest.  Pain when you swallow. How are swallowing problems treated? How swallowing problems are treated depends on the cause. The main goals of treatment will be to help you eat and swallow safely and get good nutrition. This is important for your health and quality of life. You may learn exercises to train your throat muscles to work together so you're able to swallow better. Learning certain ways to put food in your mouth or to position your head while eating may also help. Your doctor or a speech therapist may recommend changes to your diet to help make it easier to swallow. You may need to avoid certain foods or liquids. You also may need to change the thickness of foods or liquids in your diet. To eat and swallow safely, follow any instructions you get from your doctor or therapist. These ideas may help:  Sit upright when eating, drinking, and taking pills. Take small bites of food. Chew completely and swallow before taking another bite. Take small sips of liquids. If eating makes you tired, eat smaller but more frequent meals. Tip your chin down when there is food in your mouth. Where can you learn more? Go to http://www.chandler.com/ and enter D018 to learn more about \"Learning About Swallowing Problems. \"  Current as of: May 4, 2022               Content Version: 13.5  © 3121-7311 Healthwise, Incorporated. Care instructions adapted under license by [facility] ([facility]).  If you have questions about a medical condition or this instruction, always ask your healthcare professional. Tracey Kinney [facility] disclaims any warranty or liability for your use of this information. Gastritis: Care Instructions  Your Care Instructions     Gastritis is a sore and upset stomach. It happens when something irritates the stomach lining. Many things can cause it. These include an infection such as the flu or something you ate or drank. Medicines or a sore on the lining of the stomach (ulcer) also can cause it. Your belly may bloat and ache. You may belch, vomit, and feel sick to your stomach. You should be able to relieve the problem by taking medicine. And it may help to change your diet. If gastritis lasts, your doctor may prescribe medicine. Follow-up care is a key part of your treatment and safety. Be sure to make and go to all appointments, and call your doctor if you are having problems. It's also a good idea to know your test results and keep a list of the medicines you take. How can you care for yourself at home? If your doctor prescribed antibiotics, take them as directed. Do not stop taking them just because you feel better. You need to take the full course of antibiotics. Be safe with medicines. If your doctor prescribed medicine to decrease stomach acid, take it as directed. Call your doctor if you think you are having a problem with your medicine. Do not take any other medicine, including over-the-counter pain relievers, without talking to your doctor first.  If your doctor recommends over-the-counter medicine to reduce stomach acid, such as Pepcid AC (famotidine), Prilosec (omeprazole), or Tagamet HB (cimetidine) follow the directions on the label. Drink plenty of fluids to prevent dehydration. Choose water and other clear liquids. If you have kidney, heart, or liver disease and have to limit fluids, talk with your doctor before you increase the amount of fluids you drink. Avoid foods that make your symptoms worse.  These may include chocolate, mint, alcohol, pepper, spicy foods, high-fat foods, or drinks with caffeine in them, such as tea, coffee, joelle, or energy drinks. If your symptoms are worse after you eat a certain food, you may want to stop eating it to see if your symptoms get better. When should you call for help? Call 911 anytime you think you may need emergency care. For example, call if:    You vomit blood or what looks like coffee grounds. You pass maroon or very bloody stools. Call your doctor now or seek immediate medical care if:    You start breathing fast and have not produced urine in the last 8 hours. You cannot keep fluids down. Watch closely for changes in your health, and be sure to contact your doctor if:    You do not get better as expected. Where can you learn more? Go to http://www.woods.com/ and enter Z536 to learn more about \"Gastritis: Care Instructions. \"  Current as of: June 6, 2022               Content Version: 13.5  © 2006-2022 [employer]. Care instructions adapted under license by [facility] ([facility]). If you have questions about a medical condition or this instruction, always ask your healthcare professional. [place] [address] any warranty or liability for your use of this information. Upper GI Endoscopy: [facility] [address] [facility]. Your doctor used a thin, lighted tube that bends to look at the inside of your esophagus, your stomach, and the first part of the small intestine, called the duodenum. After you have an endoscopy, you will stay at the hospital or clinic for 1 to 2 hours. This will allow the medicine to wear off. You will be able to go home after your doctor or nurse checks to make sure that you're not having any problems.   You may have to stay overnight if you had treatment during the test. You may have a sore throat for a day or two after the test.  This care sheet gives you a general idea about what to expect after the test.  How can you care for yourself at home? Activity   Rest as much as you need to after you go home. You should be able to go back to your usual activities the day after the test.  Diet   Follow your doctor's directions for eating after the test.  Drink plenty of fluids (unless your doctor has told you not to). Medications   If you have a sore throat the day after the test, use an over-the-counter spray to numb your throat. Follow-up care is a key part of your treatment and safety. Be sure to make and go to all appointments, and call your doctor if you are having problems. It's also a good idea to know your test results and keep a list of the medicines you take. When should you call for help? Call 911 anytime you think you may need emergency care. For example, call if:    You passed out (lost consciousness). You have trouble breathing. You pass maroon or bloody stools. Call your doctor now or seek immediate medical care if:    You have pain that does not get better after your take pain medicine. You have new or worse belly pain. You have blood in your stools. You are sick to your stomach and cannot keep fluids down. You have a fever. You cannot pass stools or gas. Watch closely for changes in your health, and be sure to contact your doctor if:    Your throat still hurts after a day or two. You do not get better as expected. Where can you learn more? Go to http://www.woods.com/ and enter J454 to learn more about \"Upper GI Endoscopy: What to Expect at Home. \"  Current as of: June 6, 2022               Content Version: 13.5  © 2006-2022 Healthwise, Incorporated. Care instructions adapted under license by [facility] ([facility]). If you have questions about a medical condition or this instruction, always ask your healthcare professional. [address] any warranty or liability for your use of this information.

## 2023-02-27 NOTE — ANESTHESIA POSTPROCEDURE EVALUATION
Department of Anesthesiology  Postprocedure Note    Patient: Rosaura Marshall  MRN: [id number]  YOB: 1960  Date of evaluation: 2/27/2023      Procedure Summary     Date: 02/27/23 Room / Location: [facility] ENDO 02 / [facility] END[facility]COPY    Anesthesia Start: 1215 Anesthesia Stop: 5049    Procedures:       EGD ESOPHAGOGASTRODUODENOSCOPY/ BIOPSIES (Upper GI Region)      COLONOSCOPY/POLYPECTOMY (Abdomen) Diagnosis:       Gastroesophageal reflux disease, unspecified whether esophagitis present      Non-cardiac chest pain      Dysphagia, unspecified type      Personal history of colonic polyps      (Obesity -E66.9)    Surgeons: Juan Luis Bourgeois MD Responsible Provider: Sugar Villanueva MD    Anesthesia Type: MAC ASA Status: 3          Anesthesia Type: MAC    Ofelia Phase I:      Ofelia Phase II:        Anesthesia Post Evaluation    Patient location during evaluation: bedside  Patient participation: complete - patient participated  Level of consciousness: awake  Airway patency: patent  Nausea & Vomiting: no nausea  Complications: no  Cardiovascular status: hemodynamically stable  Respiratory status: acceptable  Hydration status: euvolemic  Multimodal analgesia pain management approach

## 2023-02-27 NOTE — ANESTHESIA PRE PROCEDURE
Department of Anesthesiology  Preprocedure Note       Name:  Dina Pink   Age:  57 y.o.  :  1960                                          MRN:  [id number]         Date:  2023      Surgeon: Dmitry Blackwood):  Vance Banks MD    Procedure: Procedure(s):  EGD ESOPHAGOGASTRODUODENOSCOPY  COLONOSCOPY    Medications prior to admission:   Prior to Admission medications    Medication Sig Start Date End Date Taking? Authorizing Provider   buPROPion (WELLBUTRIN SR) 150 MG extended release tablet bupropion HCl  mg tablet,12 hr sustained-release   TAKE 1 TABLET BY MOUTH TWICE DAILY 21   Historical Provider, MD MCARTHUR Complex-Biotin-FA (B COMPLETE) TABS Take 1 tablet by mouth    Historical Provider, MD   OZEMPIC, 0.25 OR 0.5 MG/DOSE, 2 MG/1.5ML SOPN INJECT 0.25 MG SUBCUTANEOUSLY EVERY 7 DAYS 23   Historical Provider, MD   acetaminophen (TYLENOL) 500 MG tablet Take by mouth every 6 hours as needed    Ar Automatic Reconciliation   albuterol sulfate HFA (PROVENTIL;VENTOLIN;PROAIR) 108 (90 Base) MCG/ACT inhaler Inhale into the lungs    Ar Automatic Reconciliation   Calcium Carbonate-Vitamin D 600-3. 125 MG-MCG TABS Take 1,065 mg by mouth    Ar Automatic Reconciliation   citalopram (CELEXA) 20 MG tablet Take 20 mg by mouth daily 22   Ar Automatic Reconciliation   diclofenac sodium (VOLTAREN) 1 % GEL Apply 2 g topically every 6 hours as needed 21   Ar Automatic Reconciliation   fluticasone (FLONASE) 50 MCG/ACT nasal spray SHAKE LIQUID AND USE 2 SPRAYS IN EACH NOSTRIL DAILY 22   Ar Automatic Reconciliation   fluticasone-umeclidin-vilant (TRELEGY ELLIPTA) 100-62.5-25 MCG/ACT AEPB inhaler INHALE 1 PUFF BY MOUTH DAILY 3/1/22   Ar Automatic Reconciliation   gabapentin (NEURONTIN) 300 MG capsule TAKE 1 CAPSULE BY MOUTH TWICE DAILY. MAX DAILY AMOUNT: 600 MG.  INDICATIONS: NERVE PAIN 22   Ar Automatic Reconciliation   linaCLOtide (LINZESS) 72 MCG CAPS capsule TAKE 1 CAPSULE BY MOUTH DAILY BEFORE BREAKFAST 3/16/21   Ar Automatic Reconciliation   Olaparib (LYNPARZA) 150 MG TABS 2 times daily 6/2/22   Ar Automatic Reconciliation   omeprazole (PRILOSEC) 20 MG delayed release capsule Take 2 capsules by mouth twice daily 8/31/22   Ar Automatic Reconciliation   oxybutynin (DITROPAN) 5 MG tablet Take 5 mg by mouth 2 times daily 6/16/22   Ar Automatic Reconciliation   sucralfate (CARAFATE) 1 GM tablet Take 1 g by mouth 2 times daily 7/21/22   Ar Automatic Reconciliation       Current medications:    No current facility-administered medications for this encounter. Allergies:     Allergies   Allergen Reactions    Adhesive Tape Swelling     REALLY BAD SWELLING AND SORE APPEAR    Alcohol Other (See Comments)     PT STATES SHE IS AN ALCOHOLIC    Lactose Other (See Comments)     PT STATES IT MAKES HER STOMACH HURT    Oxycodone Itching and Other (See Comments)     crying    Nsaids Other (See Comments)     PT NOT ABLE TO TAKE DUE TO GASTRIC BYPASS       Problem List:    Patient Active Problem List   Diagnosis Code    Chronic pain of both knees M25.561, M25.562, G89.29    Age-related nuclear cataract, bilateral H25.13    Xanthelasma of left upper eyelid H02.64    Alcoholism in remission ([facility]) F10.21    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    History of gastric bypass Z98.84    Personal history of smoking Z87.891    Prediabetes R73.03    Breast cancer of upper-inner quadrant of left female breast ([facility]) C50.212    Sleep apnea G47.30    Xanthelasma of right upper eyelid H02.61    Obesity, morbid ([facility]) E66.01    CINV (chemotherapy-induced nausea and vomiting) R11.2, T45.1X5A    Neuropathy due to chemotherapeutic drug ([facility]) G62.0, T45.1X5A    Chronic obstructive lung disease ([facility]) J44.9    Mixed anxiety and depressive disorder F41.8    Osteopenia of neck of right femur M85.851    Gastroesophageal reflux disease K21.9    Vitamin D deficiency E55.9       Past Medical History:        Diagnosis Date    Alcoholism in remission ([address]     Arthritis 2016    Asthma     Breast cancer ([address]     breast cancer left    Chronic obstructive pulmonary disease ([address]     Chronic pain 1999    Diverticulitis 10/13/2021    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    GERD (gastroesophageal reflux disease)     History of gastric bypass 2012    History of meniscal tear 2015, 2018    right in 2015, left in 2018    HNP (herniated nucleus pulposus), lumbar     patient reported    Lung nodules     resolved 2018 CT    Neuropathy     Osteopenia 10/03/2017    Recurrent depression ([address]     Sleep apnea     on cpap    Spinal stenosis, lumbar     patient reported    Xanthelasma of left upper eyelid 07/20/2020       Past Surgical History:        Procedure Laterality Date    APPENDECTOMY      ARTHRODESIS  01/2000    Herniated Disc, arthritis right foot 06/06, 07/07, C 6/7, C 4/6 Stenosis    BREAST BIOPSY Bilateral 11/22/2022    BILATERAL MASTECTOMY SCAR REVISION performed by Tiff Hinds DO at [address]  09/2008    gallbladder disease    COLONOSCOPY  05/2009    GASTRIC BYPASS SURGERY  2012    GASTRIC BYPASS  [facility] Gastric Bypass      HYSTERECTOMY (CERVIX STATUS UNKNOWN)  06/1994    HYSTERECTOMY, VAGINAL      MASTECTOMY Left 06/28/2022    revision of left mastectomy scar performed by Tiff Hinds DO at [address] Bilateral 11/23/2021    BILATERAL MASTECTOMY, LEFT SENTINEL NODE BIOPSY [facility] 11/22) performed by Catia Turk MD at [address] Left 03/16/2018    partial meniscectomy due to tear    MENISCECTOMY  10/2015    right repari of torn meniscus    MYOMECTOMY  06/1984    benign tumor    NEUROLOGICAL SURGERY  2000, 2007    Cervical fusion   [address]    orthopaedic excision Fibrosarcoma and Lymphangiogram    PELVIC LAPAROSCOPY  04/1984    large uterine blockage    US BREAST BIOPSY W LOC DEVICE 1ST LESION LEFT Left 10/22/2021    US BREAST NEEDLE BIOPSY LEFT 10/22/2021 [facility] RAD US    VASCULAR SURGERY         Social History:    Social History     Tobacco Use    Smoking status: Former     Packs/day: 0.50     Types: Cigarettes     Quit date: 2020     Years since quittin.6    Smokeless tobacco: Never   Substance Use Topics    Alcohol use:  No                                Counseling given: Not Answered      Vital Signs (Current):   Vitals:    23 1132   BP: (!) 144/56   Pulse: 70   Resp: 16   Temp: 98.6 °F (37 °C)   SpO2: 96%                                              BP Readings from Last 3 Encounters:   23 (!) 144/56   23 128/82   23 (!) 157/85       NPO Status: Time of last liquid consumption: 07                        Time of last solid consumption:                         Date of last liquid consumption: 23                        Date of last solid food consumption: 23    BMI:   Wt Readings from Last 3 Encounters:   23 265 lb (120.2 kg)   23 269 lb 9.6 oz (122.3 kg)   23 269 lb 9.6 oz (122.3 kg)     There is no height or weight on file to calculate BMI.    CBC:   Lab Results   Component Value Date/Time    WBC 8.3 2023 01:28 PM    RBC 4.47 2023 01:28 PM    HGB 14.1 2023 01:28 PM    HCT 41.8 2023 01:28 PM    MCV 93.5 2023 01:28 PM    RDW 14.6 2023 01:28 PM     2023 01:28 PM       CMP:   Lab Results   Component Value Date/Time     2023 11:31 AM    K 4.1 2023 11:31 AM     2023 11:31 AM    CO2 26 2023 11:31 AM    BUN 11 2023 11:31 AM    CREATININE 0.93 2023 11:31 AM    GFRAA >60 2022 04:25 PM    AGRATIO 0.8 2023 11:31 AM    LABGLOM >60 2022 04:25 PM    GLUCOSE 107 2023 11:31 AM    PROT 7.1 2023 11:31 AM    CALCIUM 9.0 2023 11:31 AM    BILITOT 0.5 2023 11:31 AM    ALKPHOS 104 2023 11:31 AM    AST 25 2023 11:31 AM    ALT 54 01/24/2023 11:31 AM       POC Tests: No results for input(s): POCGLU, POCNA, POCK, POCCL, POCBUN, POCHEMO, POCHCT in the last 72 hours. Coags: No results found for: PROTIME, INR, APTT    HCG (If Applicable): No results found for: PREGTESTUR, PREGSERUM, HCG, HCGQUANT     ABGs: No results found for: PHART, PO2ART, RGJ8CXZ, GUJ2MAF, BEART, D1XOWMGI     Type & Screen (If Applicable):  No results found for: LABABO, LABRH    Drug/Infectious Status (If Applicable):  No results found for: HIV, HEPCAB    COVID-19 Screening (If Applicable):   Lab Results   Component Value Date/Time    COVID19 Not detected 01/06/2023 01:30 PM    COVID19 Not Detected 06/04/2021 03:20 PM           Anesthesia Evaluation    Airway: Mallampati: II  TM distance: >3 FB   Neck ROM: full  Mouth opening: > = 3 FB   Dental:    (+) poor dentition      Pulmonary:   (+) COPD:  sleep apnea:  decreased breath sounds asthma:                            Cardiovascular:  Exercise tolerance: no interval change,           Rhythm: regular  Rate: normal                    Neuro/Psych:   (+) psychiatric history:            GI/Hepatic/Renal:   (+) GERD:,           Endo/Other:    (+) Diabetes, . Abdominal:   (+) obese,           Vascular: Other Findings:           Anesthesia Plan      MAC     ASA 3             Anesthetic plan and risks discussed with patient.                         Cristofer Lee MD   2/27/2023

## 2023-02-27 NOTE — H&P
History and physical has been reviewed. The patient has been examined. There have been no significant clinical changes since the completion of the originally dated History and Physical.    Vinita Joseph MD  [facility].  [email or web address]  Phone: [phone]  Cell: [phone]

## 2023-03-03 ENCOUNTER — CARE COORDINATION (OUTPATIENT)
Facility: CLINIC | Age: 63
End: 2023-03-03

## 2023-03-03 RX ORDER — CITALOPRAM 20 MG/1
20 TABLET ORAL DAILY
Qty: 90 TABLET | Refills: 0 | Status: SHIPPED | OUTPATIENT
Start: 2023-03-03

## 2023-03-03 NOTE — TELEPHONE ENCOUNTER
Requested Prescriptions     Pending Prescriptions Disp Refills    citalopram (CELEXA) 20 MG tablet 90 tablet 0     Sig: Take 1 tablet by mouth daily

## 2023-03-07 RX ORDER — PEN NEEDLE, DIABETIC 32GX 5/32"
NEEDLE, DISPOSABLE MISCELLANEOUS
COMMUNITY
Start: 2023-01-18

## 2023-03-07 RX ORDER — PANTOPRAZOLE SODIUM 40 MG/1
TABLET, DELAYED RELEASE ORAL
COMMUNITY
Start: 2023-02-24

## 2023-03-15 ENCOUNTER — OFFICE VISIT (OUTPATIENT)
Facility: CLINIC | Age: 63
End: 2023-03-15
Payer: MEDICARE

## 2023-03-15 VITALS
OXYGEN SATURATION: 96 % | SYSTOLIC BLOOD PRESSURE: 139 MMHG | RESPIRATION RATE: 19 BRPM | DIASTOLIC BLOOD PRESSURE: 82 MMHG | WEIGHT: 270 LBS | BODY MASS INDEX: 46.1 KG/M2 | HEART RATE: 70 BPM | HEIGHT: 64 IN | TEMPERATURE: 98.4 F

## 2023-03-15 DIAGNOSIS — F33.1 MODERATE EPISODE OF RECURRENT MAJOR DEPRESSIVE DISORDER (HCC): Primary | ICD-10-CM

## 2023-03-15 PROBLEM — R53.83 FATIGUE DUE TO TREATMENT: Status: ACTIVE | Noted: 2022-01-13

## 2023-03-15 PROBLEM — F41.8 MIXED ANXIETY AND DEPRESSIVE DISORDER: Status: ACTIVE | Noted: 2022-12-23

## 2023-03-15 PROBLEM — J42 CHRONIC BRONCHITIS (HCC): Status: ACTIVE | Noted: 2021-11-03

## 2023-03-15 PROBLEM — G47.30 SLEEP APNEA: Status: ACTIVE | Noted: 2017-09-14

## 2023-03-15 PROBLEM — K52.1 CHEMOTHERAPY-INDUCED DIARRHEA: Status: ACTIVE | Noted: 2022-01-13

## 2023-03-15 PROBLEM — R12 HEARTBURN: Status: ACTIVE | Noted: 2022-01-06

## 2023-03-15 PROBLEM — C50.919 MALIGNANT TUMOR OF BREAST (HCC): Status: ACTIVE | Noted: 2022-12-23

## 2023-03-15 PROBLEM — T45.1X5A CHEMOTHERAPY-INDUCED DIARRHEA: Status: ACTIVE | Noted: 2022-01-13

## 2023-03-15 PROBLEM — K21.9 GASTROESOPHAGEAL REFLUX DISEASE: Status: ACTIVE | Noted: 2017-12-14

## 2023-03-15 PROBLEM — J44.9 CHRONIC OBSTRUCTIVE LUNG DISEASE (HCC): Status: ACTIVE | Noted: 2022-12-23

## 2023-03-15 PROBLEM — M85.851 OSTEOPENIA OF NECK OF RIGHT FEMUR: Status: ACTIVE | Noted: 2017-12-14

## 2023-03-15 PROCEDURE — 99214 OFFICE O/P EST MOD 30 MIN: CPT | Performed by: STUDENT IN AN ORGANIZED HEALTH CARE EDUCATION/TRAINING PROGRAM

## 2023-03-15 RX ORDER — BUPROPION HYDROCHLORIDE 300 MG/1
300 TABLET ORAL EVERY MORNING
Qty: 30 TABLET | Refills: 3 | Status: SHIPPED | OUTPATIENT
Start: 2023-03-15

## 2023-03-15 NOTE — PROGRESS NOTES
Cynthia Jc presents today for   Chief Complaint   Patient presents with    Follow-up     6 week follow up        Is someone accompanying this pt? no    Is the patient using any DME equipment during OV? no    Health Maintenance reviewed and discussed and ordered per Provider. Health Maintenance Due   Topic Date Due    HIV screen  Never done    Hepatitis C screen  Never done    COVID-19 Vaccine (5 - Booster for Moderna series) 08/13/2022    Low dose CT lung screening  02/23/2023   . Coordination of Care:  1. \"Have you been to the ER, urgent care clinic since your last visit? Hospitalized since your last visit? \" No    2. \"Have you seen or consulted any other health care providers outside of the [address] [place] [address] since your last visit? \" No    3. For patients aged 39-70: Has the patient had a colonoscopy? Yes- No care gap present    If the patient is female:    4. For patients aged 41-77: Has the patient had a mammogram within the past 2 years? Yes- No care gap present    5. For patients aged 21-65: Has the patient had a pap smear?  Yes- No care gap present

## 2023-03-15 NOTE — PROGRESS NOTES
Neck , no lymphadenopathy Sick Visit patient presents for depression. Lisbeth Matute (: 1960) is a 57 y.o. female here for evaluation of the following chief concerns(s):  Follow-up (6 week follow up )       ASSESSMENT/PLAN:  1. Moderate episode of recurrent major depressive disorder (HCC)  -     buPROPion (WELLBUTRIN XL) 300 MG extended release tablet; Take 1 tablet by mouth every morning, Disp-30 tablet, R-3Normal  No orders of the defined types were placed in this encounter. Increase Wellbutrin to 300 mg daily. Continue Celexa. We talked about counseling and I have given patient a list of resources to look into. I am also going to look into some breast cancer support groups for her to possibly join. Return in about 4 weeks (around 2023). Patient agrees with plan as above and has no additional questions at this time. SUBJECTIVE/OBJECTIVE:    Patient presents for depression. Patient has been undergoing breast cancer treatment for about the past year. She is feeling very down and depressed, low energy throughout the day, anhedonia for about the past couple of weeks. The side effects from her chemotherapy are really getting to her. She is stuck in the house a lot because of diarrhea. She is currently on Wellbutrin and Celexa-both seem to help quite a bit in the beginning, however the effect has somewhat worn off. Patient is also on gabapentin for neuropathy. Right neck swelling ended up being for Mediport-surgeon is not concerned. Patient is really happy with the Ozempic-has decreased her appetite significantly. Not causing her any nausea or vomiting. Vitals:    03/15/23 0842   BP: 139/82   Site: Right Upper Arm   Position: Sitting   Cuff Size: Large Adult   Pulse: 70   Resp: 19   Temp: 98.4 °F (36.9 °C)   TempSrc: Temporal   SpO2: 96%   Weight: 270 lb (122.5 kg)   Height: 5' 4\" (1.626 m)      Body mass index is 46.35 kg/m².      Gen: NAD, well appearing   Resp: breathing comfortably  Psych: cooperative. Tearful.     I reviewed prior available labs.     Medical decision making complexity: moderate-high    I have discussed the diagnosis with the patient and the intended plan as seen in the above orders.  The patient has received an after-visit summary and questions were answered concerning future plans.  I have discussed medication side effects and warnings with the patient as well. I have reviewed the plan of care with the patient, accepted their input and they are in agreement with the treatment goals. Previous lab and imaging results were reviewed by me.     Justine rPasad MD   Family Medicine

## 2023-03-23 ENCOUNTER — OFFICE VISIT (OUTPATIENT)
Age: 63
End: 2023-03-23

## 2023-03-23 VITALS — HEIGHT: 64 IN | RESPIRATION RATE: 18 BRPM | WEIGHT: 270 LBS | BODY MASS INDEX: 46.1 KG/M2

## 2023-03-23 DIAGNOSIS — M17.12 UNILATERAL PRIMARY OSTEOARTHRITIS, LEFT KNEE: Primary | ICD-10-CM

## 2023-03-23 NOTE — PROGRESS NOTES
Time: 3:07 PM          Body Part: intra-articular injection of right knee         Medication and Dose: 2 mL standard Euflexxa preparation, i.e. 20 mg, and 3 mL 1% lidocaine          Date of Procedure: 23          PROCEDURE PERFORMED BY : Noe Odonnell M.D., [facility])          Provider Assisted by: Nicole Schroeder           Patient assisted by: self          Patient tolerated procedure well with no complications          Past Medical History:   Diagnosis Date    Alcoholism in remission [facility])     Arthritis 2016    Asthma     Breast cancer ([address]     breast cancer left    Chronic obstructive pulmonary disease ([address]     Chronic pain     Diverticulitis 10/13/2021    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    GERD (gastroesophageal reflux disease)     History of gastric bypass     History of meniscal tear , 2018    right in , left in     HNP (herniated nucleus pulposus), lumbar     patient reported    Lung nodules     resolved  CT    Neuropathy     Osteopenia 10/03/2017    Recurrent depression ([address]     Sleep apnea     on cpap    Spinal stenosis, lumbar     patient reported    Xanthelasma of left upper eyelid 2020       Family History   Problem Relation Age of Onset    Hypertension Mother     Depression Mother     Cancer Mother         Ovarian Cancer, breast cancer,     Ovarian Cancer Mother     Breast Problems Mother     Cancer Father         skin cancer,     Heart Attack Neg Hx     Depression Sister     Breast Cancer Sister     Depression Brother     Alcohol Abuse Maternal Grandfather     Diabetes Maternal Grandmother             Stroke Neg Hx     Cancer Sister         Breast cancer       Current Outpatient Medications   Medication Sig Dispense Refill    buPROPion (WELLBUTRIN XL) 300 MG extended release tablet Take 1 tablet by mouth every morning 30 tablet 3    BD PEN NEEDLE JUANCARLOS 2ND GEN 32G X 4 MM MISC USE WEEKLY WITH OZEMPIC PEN      pantoprazole

## 2023-03-29 ENCOUNTER — HOSPITAL ENCOUNTER (OUTPATIENT)
Facility: HOSPITAL | Age: 63
Discharge: HOME | End: 2023-04-01
Payer: MEDICARE

## 2023-03-29 DIAGNOSIS — R22.1 MASS OF RIGHT SIDE OF NECK: ICD-10-CM

## 2023-03-29 PROCEDURE — 76536 US EXAM OF HEAD AND NECK: CPT

## 2023-03-31 ENCOUNTER — CARE COORDINATION (OUTPATIENT)
Facility: CLINIC | Age: 63
End: 2023-03-31

## 2023-04-06 ENCOUNTER — OFFICE VISIT (OUTPATIENT)
Age: 63
End: 2023-04-06
Payer: MEDICARE

## 2023-04-06 VITALS — RESPIRATION RATE: 18 BRPM | BODY MASS INDEX: 46.35 KG/M2 | HEIGHT: 64 IN

## 2023-04-06 DIAGNOSIS — M17.11 PRIMARY OSTEOARTHRITIS OF RIGHT KNEE: Primary | ICD-10-CM

## 2023-04-06 PROCEDURE — 20611 DRAIN/INJ JOINT/BURSA W/US: CPT | Performed by: PHYSICIAN ASSISTANT

## 2023-04-06 PROCEDURE — 99024 POSTOP FOLLOW-UP VISIT: CPT | Performed by: PHYSICIAN ASSISTANT

## 2023-04-06 NOTE — PROGRESS NOTES
Patient: Marina Martinez                MRN: [id number]       SSN: [id number]  YOB: 1960        AGE: 57 y.o. SEX: female  Body mass index is 46.35 kg/m². PCP: Lu Cunningham MD  04/06/23        Pt presents today for their 2nd gelsyn-3  injection for right knee . There has been no recent fevers/chills, systemic changes, or injuries to report. PE:  General A&Ox3, NAD  Exam of the knees reveals skin intact, no erythema, no ecchymosis, no signs for infection. No cyanosis, clubbing, or edema present distally. Neg calf tenderness, neg homans, no signs for DVT present. A: right knee OA    P: The right knee was injected with 2ml gelsyn-3 with US guided assistance without complications. Patient was instructed on post injection care. Chart reviewed for the following:  Tammy BOWLES PA-C, have reviewed the History, Physical and updated the Allergic reactions for Elisabeth Echeverria Devoid? TIME OUT performed immediately prior to start of procedure:  Tammy BOWLES PA-C, have performed the following reviews on Marina Martinez prior to the start of the procedure:  ????????  * Patient was identified by name and date of birth   * Agreement on procedure being performed was verified  * Risks and Benefits explained to the patient  * Procedure site verified and marked as necessary  * Patient was positioned for comfort  * Consent was signed and verified    Time: 3:56 PM    Body part: right knee, intra-articular    Medication & Dose: 2ml gelsyn-3    Date of procedure: 04/06/23    Procedure performed by: Antonio Arcos PA-C    Patient assisted by: self    How tolerated by patient: tolerated the procedure well with no complications    Post Procedural Pain Scale: 5    Comments:  [address] ([employer]) using a frequency of 10MHz with a 12L-RS transducer head was used to confirm needle placement.   Ultrasound images captured using [address] ([employer]) Ultrasound machine and scanned into patient's

## 2023-04-07 DIAGNOSIS — T45.1X5A NEUROPATHY DUE TO CHEMOTHERAPEUTIC DRUG (HCC): Primary | ICD-10-CM

## 2023-04-07 DIAGNOSIS — G62.0 NEUROPATHY DUE TO CHEMOTHERAPEUTIC DRUG (HCC): Primary | ICD-10-CM

## 2023-04-07 RX ORDER — FLUTICASONE FUROATE, UMECLIDINIUM BROMIDE AND VILANTEROL TRIFENATATE 100; 62.5; 25 UG/1; UG/1; UG/1
POWDER RESPIRATORY (INHALATION)
Qty: 1 EACH | Refills: 5 | Status: SHIPPED | OUTPATIENT
Start: 2023-04-07

## 2023-04-07 RX ORDER — GABAPENTIN 300 MG/1
CAPSULE ORAL
Qty: 120 CAPSULE | Refills: 0 | Status: SHIPPED | OUTPATIENT
Start: 2023-04-07 | End: 2023-05-07

## 2023-04-13 DIAGNOSIS — F33.1 MODERATE EPISODE OF RECURRENT MAJOR DEPRESSIVE DISORDER (HCC): ICD-10-CM

## 2023-04-13 RX ORDER — BUPROPION HYDROCHLORIDE 300 MG/1
300 TABLET ORAL EVERY MORNING
Qty: 90 TABLET | Refills: 1 | Status: SHIPPED | OUTPATIENT
Start: 2023-04-13

## 2023-04-28 ENCOUNTER — CARE COORDINATION (OUTPATIENT)
Facility: CLINIC | Age: 63
End: 2023-04-28

## 2023-04-28 ASSESSMENT — PATIENT HEALTH QUESTIONNAIRE - PHQ9
SUM OF ALL RESPONSES TO PHQ QUESTIONS 1-9: 0

## 2023-04-28 NOTE — CARE COORDINATION
Ambulatory Care Coordination Note  2023    Patient Current Location:  [place]     ACM contacted the patient by telephone. Verified name and  with patient as identifiers. Provided introduction to self, and explanation of the ACM role. Challenges to be reviewed by the provider   Additional needs identified to be addressed with provider: No  Patient  Chief Complaint of headache on side of head and swelling into neck area. wondering if TMJ related? Method of communication with provider: phone. ACM: Prudencio Suresh RN    Encounter Note:    Spoke with patient - Patient states doing well at present   Further questions answered as needed and patient has ACM contact information   Remains on Chemo - scheduled for 2 more months. - Patient tolerating well. Any changes in med therapy noted in med list - no complications or side effects. Question from [address] re: Mauro Ayala - PCP notified. Depression screen done - PHQ2 score = 0       Offered patient enrollment in the Remote Patient Monitoring (RPM) program for in-home monitoring: NA.     Lab Results       None            Care Coordination Interventions    Referral from Primary Care Provider: No  Suggested Interventions and Community Resources          Goals Addressed                   This Visit's Progress     Attend follow up appointments on schedule   On track     Take all medications as ordered   On track            Future Appointments   Date Time Provider Giuliana Leal   2023  2:45 PM MARCELLA Flores BS AMB   2023  8:30 AM Bairon Goyal DO [facility] BS AMB

## 2023-05-03 DIAGNOSIS — T45.1X5A NEUROPATHY DUE TO CHEMOTHERAPEUTIC DRUG (HCC): ICD-10-CM

## 2023-05-03 DIAGNOSIS — G62.0 NEUROPATHY DUE TO CHEMOTHERAPEUTIC DRUG (HCC): ICD-10-CM

## 2023-05-03 RX ORDER — GABAPENTIN 300 MG/1
CAPSULE ORAL
Qty: 120 CAPSULE | Refills: 0 | Status: SHIPPED | OUTPATIENT
Start: 2023-05-03 | End: 2023-06-02

## 2023-05-04 ENCOUNTER — CARE COORDINATION (OUTPATIENT)
Facility: CLINIC | Age: 63
End: 2023-05-04

## 2023-05-05 ENCOUNTER — OFFICE VISIT (OUTPATIENT)
Age: 63
End: 2023-05-05

## 2023-05-05 VITALS — WEIGHT: 270 LBS | HEIGHT: 64 IN | BODY MASS INDEX: 46.1 KG/M2

## 2023-05-05 DIAGNOSIS — M17.11 PRIMARY OSTEOARTHRITIS OF RIGHT KNEE: Primary | ICD-10-CM

## 2023-05-05 NOTE — PROGRESS NOTES
Patient: Cait Valladares                MRN: [id number]       SSN: [id number]  YOB: 1960        AGE: 57 y.o. SEX: female  Body mass index is 46.35 kg/m². PCP: Evens Vargas MD  05/05/23      This office note has been dictated. REVIEW OF SYSTEMS:  Constitutional: Negative for fever, chills, weight loss and malaise/fatigue. HENT: Negative. Eyes: Negative. Respiratory: Negative. Cardiovascular: Negative. Gastrointestinal: No bowel incontinence or constipation. Genitourinary: No bladder incontinence or saddle anesthesia. Skin: Negative. Neurological: Negative. Endo/Heme/Allergies: Negative. Psychiatric/Behavioral: Negative. Musculoskeletal: As per HPI above. Past Medical History:   Diagnosis Date    Alcoholism in remission [facility])     Arthritis 2016    Asthma     [facility]     breast cancer left    Chronic obstructive pulmonary disease ([facility]     Chronic pain 1999    Diverticulitis 10/13/2021    Fibrosarcoma ([facility] 1963    connective tissue cancer    GERD (gastroesophageal reflux disease)     History of gastric bypass 2012    History of meniscal tear 2015, 2018    right in 2015, left in 2018    HNP (herniated nucleus pulposus), lumbar     patient reported    Lung nodules     resolved 2018 CT    Neuropathy     Osteopenia 10/03/2017    Recurrent depression ([facility]     Sleep apnea     on cpap    Spinal stenosis, lumbar     patient reported    Xanthelasma of left upper eyelid 07/20/2020         Current Outpatient Medications:     gabapentin (NEURONTIN) 300 MG capsule, TAKE 2 CAPSULES BY MOUTH TWICE DAILY.  MAX DAILY AMOUNT: 1200 MG, Disp: 120 capsule, Rfl: 0    buPROPion (WELLBUTRIN XL) 300 MG extended release tablet, Take 1 tablet by mouth every morning, Disp: 90 tablet, Rfl: 1    TRELEGY ELLIPTA 100-62.5-25 MCG/ACT AEPB inhaler, INHALE 1 PUFF BY MOUTH DAILY, Disp: 1 each, Rfl: 5    BD PEN NEEDLE JUANCARLOS 2ND GEN 32G X 4 MM MISC, USE WEEKLY WITH OZEMPIC

## 2023-05-12 ENCOUNTER — CARE COORDINATION (OUTPATIENT)
Facility: CLINIC | Age: 63
End: 2023-05-12

## 2023-05-15 ENCOUNTER — CARE COORDINATION (OUTPATIENT)
Facility: CLINIC | Age: 63
End: 2023-05-15

## 2023-05-15 RX ORDER — CITALOPRAM 20 MG/1
TABLET ORAL
Qty: 90 TABLET | Refills: 1 | Status: SHIPPED | OUTPATIENT
Start: 2023-05-15

## 2023-05-15 NOTE — CARE COORDINATION
Multiple attempts to contact patient for Complex Case Management enrollment. Messages with contact information provided. Will send letter and end episode. Patient has graduated from the Complex Case Management  program on 5/15/2023. Patient/family has the ability to self-manage at this time. Care management goals have been completed. No further Ambulatory Care Manager follow up scheduled. Goals Addressed                   This Visit's Progress     COMPLETED: Attend follow up appointments on schedule        COMPLETED: Take all medications as ordered               Patient has Ambulatory Care Manager's contact information for any further questions, concerns, or needs.   Patients upcoming visits:    Future Appointments   Date Time Provider Giuliana Leal   5/24/2023 10:00 AM Elisabeth Neal MD [facility] BS AMB   6/21/2023  8:30 AM Gavi Melvin DO [facility] BS AMB

## 2023-05-24 ENCOUNTER — OFFICE VISIT (OUTPATIENT)
Facility: CLINIC | Age: 63
End: 2023-05-24
Payer: MEDICARE

## 2023-05-24 VITALS
WEIGHT: 264.6 LBS | OXYGEN SATURATION: 95 % | DIASTOLIC BLOOD PRESSURE: 85 MMHG | HEART RATE: 74 BPM | SYSTOLIC BLOOD PRESSURE: 135 MMHG | BODY MASS INDEX: 45.17 KG/M2 | TEMPERATURE: 98.2 F | HEIGHT: 64 IN | RESPIRATION RATE: 18 BRPM

## 2023-05-24 DIAGNOSIS — E66.01 OBESITY, MORBID (HCC): ICD-10-CM

## 2023-05-24 DIAGNOSIS — G89.29 OTHER CHRONIC PAIN: Primary | ICD-10-CM

## 2023-05-24 DIAGNOSIS — R73.03 PREDIABETES: ICD-10-CM

## 2023-05-24 PROCEDURE — 99214 OFFICE O/P EST MOD 30 MIN: CPT | Performed by: STUDENT IN AN ORGANIZED HEALTH CARE EDUCATION/TRAINING PROGRAM

## 2023-05-24 RX ORDER — SEMAGLUTIDE 1.34 MG/ML
1 INJECTION, SOLUTION SUBCUTANEOUS WEEKLY
Qty: 3 ML | Refills: 3 | Status: SHIPPED | OUTPATIENT
Start: 2023-05-24

## 2023-05-24 RX ORDER — NITROFURANTOIN MACROCRYSTALS 100 MG/1
CAPSULE ORAL
COMMUNITY
Start: 2023-05-20

## 2023-05-24 RX ORDER — AZELASTINE HYDROCHLORIDE, FLUTICASONE PROPIONATE 137; 50 UG/1; UG/1
SPRAY, METERED NASAL
COMMUNITY
Start: 2023-05-21

## 2023-05-24 NOTE — PROGRESS NOTES
SICK VISIT     Dov Eubanks (: 1960) is a 57 y.o. female here for evaluation of the following chief concerns(s):  Follow-up (2m follow up )       ASSESSMENT/PLAN:  1. Other chronic pain  -     External Referral To Rheumatology  2. Prediabetes  -     Semaglutide, 1 MG/DOSE, (OZEMPIC, 1 MG/DOSE,) 4 MG/3ML SOPN; Inject 1 mg into the skin once a week, Disp-3 mL, R-3Normal  3. Obesity, morbid ([address]  -     Semaglutide, 1 MG/DOSE, (OZEMPIC, 1 MG/DOSE,) 4 MG/3ML SOPN; Inject 1 mg into the skin once a week, Disp-3 mL, R-3Normal    Orders Placed This Encounter   Procedures    External Referral To Rheumatology     Referral Priority:   Routine     Referral Type:   Eval and Treat     Referral Reason:   Specialty Services Required     Requested Specialty:   Rheumatology     Number of Visits Requested:   1     Chronic pain-refer to rheumatology per patient request.    Prediabetes, obesity-increase Ozempic dose to 1 mg weekly. Trying to lose 40 pounds for her knee surgery. Urinary retention-is being treated for UTI currently. Stop oxybutynin-can cause urinary retention. Depression-improved. Continue Celexa and Wellbutrin. Follow-up next week if urinary symptoms have not resolved-  would consider referral to urology-if not, I will see her back in 3 months for chronic disease follow-up. Patient agrees with plan as above and has no additional questions at this time. SUBJECTIVE/OBJECTIVE:    Patient presents for follow-up depression    Mood has been fairly stable lately, she is no longer in bed all day,tearful. She is still in chemotherapy, will be done at the end of July  She has been in a lot of pain for several months-she describes stiffness, pain in all joints. Apparently her oncologist had ran some autoimmune testing and wants her referred to a rheumatologist for further evaluation. Patient states last week she started experiencing some urinary retention.   She is currently on antibiotics for

## 2023-05-24 NOTE — PROGRESS NOTES
Meenakshi Knapp presents today for   Chief Complaint   Patient presents with    Follow-up     2m follow up        Is someone accompanying this pt? no    Is the patient using any DME equipment during OV? no    Health Maintenance reviewed and discussed and ordered per Provider. Health Maintenance Due   Topic Date Due    HIV screen  Never done    Hepatitis C screen  Never done    COVID-19 Vaccine (5 - Booster for Moderna series) 08/13/2022    Low dose CT lung screening  02/23/2023    Annual Wellness Visit (AWV)  06/02/2023   . Coordination of Care:  1. \"Have you been to the ER, urgent care clinic since your last visit? Hospitalized since your last visit? \" No    2. \"Have you seen or consulted any other health care providers outside of the [address] [place] [address] since your last visit? \" No    3. For patients aged 39-70: Has the patient had a colonoscopy? Yes- No care gap present    If the patient is female:    4. For patients aged 41-77: Has the patient had a mammogram within the past 2 years? Yes- No care gap present    5. For patients aged 21-65: Has the patient had a pap smear?  Yes- No care gap present

## 2023-06-05 RX ORDER — FLUTICASONE FUROATE, UMECLIDINIUM BROMIDE AND VILANTEROL TRIFENATATE 100; 62.5; 25 UG/1; UG/1; UG/1
POWDER RESPIRATORY (INHALATION)
Qty: 1 EACH | Refills: 5 | Status: SHIPPED | OUTPATIENT
Start: 2023-06-05

## 2023-06-12 RX ORDER — NITROFURANTOIN MACROCRYSTALS 100 MG/1
CAPSULE ORAL
Qty: 14 CAPSULE | OUTPATIENT
Start: 2023-06-12

## 2023-07-25 DIAGNOSIS — G62.0 NEUROPATHY DUE TO CHEMOTHERAPEUTIC DRUG (HCC): ICD-10-CM

## 2023-07-25 DIAGNOSIS — T45.1X5A NEUROPATHY DUE TO CHEMOTHERAPEUTIC DRUG (HCC): ICD-10-CM

## 2023-07-31 RX ORDER — GABAPENTIN 300 MG/1
CAPSULE ORAL
Qty: 120 CAPSULE | Refills: 0 | Status: SHIPPED | OUTPATIENT
Start: 2023-07-31 | End: 2023-08-31

## 2023-08-18 ENCOUNTER — OFFICE VISIT (OUTPATIENT)
Facility: CLINIC | Age: 63
End: 2023-08-18
Payer: MEDICARE

## 2023-08-18 VITALS
BODY MASS INDEX: 44.05 KG/M2 | TEMPERATURE: 97.5 F | DIASTOLIC BLOOD PRESSURE: 86 MMHG | SYSTOLIC BLOOD PRESSURE: 135 MMHG | RESPIRATION RATE: 19 BRPM | WEIGHT: 258 LBS | HEIGHT: 64 IN | OXYGEN SATURATION: 97 % | HEART RATE: 87 BPM

## 2023-08-18 DIAGNOSIS — R07.89 MUSCULOSKELETAL CHEST PAIN: Primary | ICD-10-CM

## 2023-08-18 PROCEDURE — 99214 OFFICE O/P EST MOD 30 MIN: CPT | Performed by: STUDENT IN AN ORGANIZED HEALTH CARE EDUCATION/TRAINING PROGRAM

## 2023-08-18 PROCEDURE — 1123F ACP DISCUSS/DSCN MKR DOCD: CPT | Performed by: STUDENT IN AN ORGANIZED HEALTH CARE EDUCATION/TRAINING PROGRAM

## 2023-08-18 RX ORDER — DULOXETIN HYDROCHLORIDE 60 MG/1
60 CAPSULE, DELAYED RELEASE ORAL DAILY
COMMUNITY
Start: 2023-08-09

## 2023-08-18 RX ORDER — METHOCARBAMOL 750 MG/1
750 TABLET, FILM COATED ORAL 4 TIMES DAILY
Qty: 40 TABLET | Refills: 0 | Status: SHIPPED | OUTPATIENT
Start: 2023-08-18 | End: 2023-08-28

## 2023-08-18 RX ORDER — OXYBUTYNIN CHLORIDE 5 MG/1
5 TABLET ORAL DAILY
COMMUNITY
Start: 2023-08-15

## 2023-08-18 NOTE — PROGRESS NOTES
Marium Pino presents today for   Chief Complaint   Patient presents with    Follow-up     ER follow up from [facility] for chest and back pain       Is someone accompanying this pt? no    Is the patient using any DME equipment during OV? no    Health Maintenance reviewed and discussed and ordered per Provider. Health Maintenance Due   Topic Date Due    HIV screen  Never done    Hepatitis C screen  Never done    COVID-19 Vaccine (5 - Booster for Moderna series) 08/13/2022    Low dose CT lung screening &/or counseling  02/23/2023    Annual Wellness Visit (AWV)  06/02/2023    Pneumococcal 65+ years Vaccine (3 - PPSV23 if available, else PCV20) 06/06/2023    Flu vaccine (1) 08/01/2023   . Coordination of Care:  1. \"Have you been to the ER, urgent care clinic since your last visit? Hospitalized since your last visit? \" Yes    2. \"Have you seen or consulted any other health care providers outside of the [address] [place] [address] since your last visit? \" Yes    3. For patients aged 43-73: Has the patient had a colonoscopy? Yes- No care gap present    If the patient is female:    4. For patients aged 43-66: Has the patient had a mammogram within the past 2 years? Yes- No care gap present    5. For patients aged 21-65: Has the patient had a pap smear?  Yes- No care gap present

## 2023-08-22 SDOH — HEALTH STABILITY: PHYSICAL HEALTH: ON AVERAGE, HOW MANY DAYS PER WEEK DO YOU ENGAGE IN MODERATE TO STRENUOUS EXERCISE (LIKE A BRISK WALK)?: 0 DAYS

## 2023-08-22 ASSESSMENT — LIFESTYLE VARIABLES
HOW OFTEN DO YOU HAVE A DRINK CONTAINING ALCOHOL: NEVER
HOW MANY STANDARD DRINKS CONTAINING ALCOHOL DO YOU HAVE ON A TYPICAL DAY: PATIENT DOES NOT DRINK
HOW OFTEN DO YOU HAVE A DRINK CONTAINING ALCOHOL: 1
HOW MANY STANDARD DRINKS CONTAINING ALCOHOL DO YOU HAVE ON A TYPICAL DAY: 0
HOW OFTEN DO YOU HAVE SIX OR MORE DRINKS ON ONE OCCASION: 1

## 2023-08-22 ASSESSMENT — PATIENT HEALTH QUESTIONNAIRE - PHQ9
SUM OF ALL RESPONSES TO PHQ QUESTIONS 1-9: 1
1. LITTLE INTEREST OR PLEASURE IN DOING THINGS: 1
SUM OF ALL RESPONSES TO PHQ QUESTIONS 1-9: 1
SUM OF ALL RESPONSES TO PHQ9 QUESTIONS 1 & 2: 1
SUM OF ALL RESPONSES TO PHQ QUESTIONS 1-9: 1
SUM OF ALL RESPONSES TO PHQ QUESTIONS 1-9: 1
2. FEELING DOWN, DEPRESSED OR HOPELESS: 0

## 2023-08-23 ENCOUNTER — OFFICE VISIT (OUTPATIENT)
Facility: CLINIC | Age: 63
End: 2023-08-23
Payer: MEDICARE

## 2023-08-23 VITALS
DIASTOLIC BLOOD PRESSURE: 88 MMHG | HEIGHT: 64 IN | OXYGEN SATURATION: 96 % | HEART RATE: 85 BPM | SYSTOLIC BLOOD PRESSURE: 135 MMHG | RESPIRATION RATE: 18 BRPM | WEIGHT: 252 LBS | BODY MASS INDEX: 43.02 KG/M2 | TEMPERATURE: 97.5 F

## 2023-08-23 DIAGNOSIS — M85.851 OSTEOPENIA OF NECK OF RIGHT FEMUR: ICD-10-CM

## 2023-08-23 DIAGNOSIS — Z90.13 HISTORY OF BILATERAL MASTECTOMY: ICD-10-CM

## 2023-08-23 DIAGNOSIS — G62.0 NEUROPATHY DUE TO CHEMOTHERAPEUTIC DRUG (HCC): ICD-10-CM

## 2023-08-23 DIAGNOSIS — Z00.00 MEDICARE ANNUAL WELLNESS VISIT, SUBSEQUENT: Primary | ICD-10-CM

## 2023-08-23 DIAGNOSIS — K21.9 GASTROESOPHAGEAL REFLUX DISEASE, UNSPECIFIED WHETHER ESOPHAGITIS PRESENT: ICD-10-CM

## 2023-08-23 DIAGNOSIS — R73.03 PREDIABETES: ICD-10-CM

## 2023-08-23 DIAGNOSIS — E66.01 OBESITY, MORBID (HCC): ICD-10-CM

## 2023-08-23 DIAGNOSIS — C50.919 MALIGNANT NEOPLASM OF FEMALE BREAST, UNSPECIFIED ESTROGEN RECEPTOR STATUS, UNSPECIFIED LATERALITY, UNSPECIFIED SITE OF BREAST (HCC): ICD-10-CM

## 2023-08-23 DIAGNOSIS — F41.8 MIXED ANXIETY AND DEPRESSIVE DISORDER: ICD-10-CM

## 2023-08-23 DIAGNOSIS — J44.9 CHRONIC OBSTRUCTIVE PULMONARY DISEASE, UNSPECIFIED COPD TYPE (HCC): ICD-10-CM

## 2023-08-23 DIAGNOSIS — T45.1X5A NEUROPATHY DUE TO CHEMOTHERAPEUTIC DRUG (HCC): ICD-10-CM

## 2023-08-23 PROBLEM — H25.13 AGE-RELATED NUCLEAR CATARACT, BILATERAL: Status: RESOLVED | Noted: 2020-07-20 | Resolved: 2023-08-23

## 2023-08-23 PROBLEM — K52.1 CHEMOTHERAPY-INDUCED DIARRHEA: Status: RESOLVED | Noted: 2022-01-13 | Resolved: 2023-08-23

## 2023-08-23 PROBLEM — J42 CHRONIC BRONCHITIS (HCC): Status: RESOLVED | Noted: 2021-11-03 | Resolved: 2023-08-23

## 2023-08-23 PROBLEM — Z87.891 PERSONAL HISTORY OF SMOKING: Status: RESOLVED | Noted: 2021-06-21 | Resolved: 2023-08-23

## 2023-08-23 PROBLEM — H02.61 XANTHELASMA OF RIGHT UPPER EYELID: Status: RESOLVED | Noted: 2020-07-20 | Resolved: 2023-08-23

## 2023-08-23 PROBLEM — R53.83 FATIGUE DUE TO TREATMENT: Status: RESOLVED | Noted: 2022-01-13 | Resolved: 2023-08-23

## 2023-08-23 PROBLEM — R12 HEARTBURN: Status: RESOLVED | Noted: 2022-01-06 | Resolved: 2023-08-23

## 2023-08-23 PROBLEM — C50.212 BREAST CANCER OF UPPER-INNER QUADRANT OF LEFT FEMALE BREAST (HCC): Status: RESOLVED | Noted: 2021-10-28 | Resolved: 2023-08-23

## 2023-08-23 PROCEDURE — 1123F ACP DISCUSS/DSCN MKR DOCD: CPT | Performed by: STUDENT IN AN ORGANIZED HEALTH CARE EDUCATION/TRAINING PROGRAM

## 2023-08-23 PROCEDURE — G0439 PPPS, SUBSEQ VISIT: HCPCS | Performed by: STUDENT IN AN ORGANIZED HEALTH CARE EDUCATION/TRAINING PROGRAM

## 2023-08-23 RX ORDER — SEMAGLUTIDE 2.68 MG/ML
2 INJECTION, SOLUTION SUBCUTANEOUS WEEKLY
Qty: 3 ML | Refills: 2 | Status: SHIPPED | OUTPATIENT
Start: 2023-08-23

## 2023-08-23 RX ORDER — SEMAGLUTIDE 1.34 MG/ML
1 INJECTION, SOLUTION SUBCUTANEOUS WEEKLY
Qty: 3 ML | Refills: 3 | Status: CANCELLED | OUTPATIENT
Start: 2023-08-23

## 2023-08-23 NOTE — PROGRESS NOTES
Chronic Disease Follow up    Froylan Pino (: 1960) is a 70 y.o. female here for evaluation of the following chief concerns(s):  Medicare AWV (Medicare wellness)       ASSESSMENT/PLAN:  1. Medicare annual wellness visit, subsequent  2. Prediabetes  -     Semaglutide, 2 MG/DOSE, (OZEMPIC, 2 MG/DOSE,) 8 MG/3ML SOPN; Inject 2 mg into the skin once a week, Disp-3 mL, R-2Normal  3. Obesity, morbid ([address])  -     Semaglutide, 2 MG/DOSE, (OZEMPIC, 2 MG/DOSE,) 8 MG/3ML SOPN; Inject 2 mg into the skin once a week, Disp-3 mL, R-2Normal  4. Chronic obstructive pulmonary disease, unspecified COPD type ([address])  5. Mixed anxiety and depressive disorder  6. Osteopenia of neck of right femur  7. History of bilateral mastectomy  8. Malignant neoplasm of female breast, unspecified estrogen receptor status, unspecified laterality, unspecified site of breast ([address])  9. Gastroesophageal reflux disease, unspecified whether esophagitis present  10. Neuropathy due to chemotherapeutic drug ([address])    No orders of the defined types were placed in this encounter. Prediabetes, Obesity- Diet and exercise counseling. Recommend at least 150 minutes weekly of moderate intensity activity. Recommend mediterranean diet. Tolerating Ozempic well- increase dose to 2mg weekly. Hx of fibrosarcoma- in remission    Neuropathy- likely 2/2 chemotherapy- has just started Cymbalta    COPD, ABBY -stable. Follows with pulmonology. Continue Trelegy, Albuterol PRN, CPAP. Recommend following up with pulmonology regarding the dry cough    Depression- stable. Continue Wellbutrin    H/O Breast cancer- in remission. Following with breast surgery, oncology. Return in about 6 months (around 2024). Patient agrees with plan as above and has no additional questions at this time.      SUBJECTIVE/OBJECTIVE:    Patient presents for follow up chronic disease     H/O breast cancer   Diagnosed in    Both breasts- triple negative   Initial double mastectomy
Veronica Ge presents today for   Chief Complaint   Patient presents with    Medicare AWV     Medicare wellness       Is someone accompanying this pt? yes    Is the patient using any DME equipment during OV? no    Health Maintenance reviewed and discussed and ordered per Provider. Health Maintenance Due   Topic Date Due    HIV screen  Never done    Hepatitis C screen  Never done    COVID-19 Vaccine (5 - Booster for Moderna series) 08/13/2022    Low dose CT lung screening &/or counseling  02/23/2023    Pneumococcal 65+ years Vaccine (3 - PPSV23 if available, else PCV20) 06/06/2023    Flu vaccine (1) 08/01/2023   . Coordination of Care:  1. \"Have you been to the ER, urgent care clinic since your last visit? Hospitalized since your last visit? \" No    2. \"Have you seen or consulted any other health care providers outside of the [address] [place] [address] since your last visit? \" No    3. For patients aged 43-73: Has the patient had a colonoscopy? Yes- No care gap present    If the patient is female:    4. For patients aged 43-66: Has the patient had a mammogram within the past 2 years? Yes- No care gap present    5. For patients aged 21-65: Has the patient had a pap smear?  N/A based on age/sex
(PROVENTIL;VENTOLIN;PROAIR) 108 (90 Base) MCG/ACT inhaler Inhale into the lungs Yes Ar Automatic Reconciliation   Calcium Carbonate-Vitamin D 600-3. 125 MG-MCG TABS Take 1,065 mg by mouth Yes Ar Automatic Reconciliation   diclofenac sodium (VOLTAREN) 1 % GEL Apply 2 g topically every 6 hours as needed Yes Ar Automatic Reconciliation   sucralfate (CARAFATE) 1 GM tablet Take 1 tablet by mouth 2 times daily Yes Ar Automatic Reconciliation   gabapentin (NEURONTIN) 300 MG capsule TAKE 2 CAPSULES BY MOUTH TWICE DAILY. MAX DAILY AMOUNT: 1200 MG  Patient not taking: Reported on 8/18/2023  Evaline Fraction, MD   nitrofurantoin (MACRODANTIN) 100 MG capsule TAKE 1 CAPSULE BY MOUTH TWICE A DAY FOR 7 DAYS  Patient not taking: Reported on 8/18/2023  Historical Provider, MD   citalopram (CELEXA) 20 MG tablet TAKE 1 TABLET BY MOUTH EVERY DAY  Patient not taking: Reported on 8/18/2023  Cricket Turcios MD   [address]) 150 MG TABS 2 times daily  Patient not taking: Reported on 8/18/2023  Ar Automatic Reconciliation       CareTeam (Including outside providers/suppliers regularly involved in providing care):   Patient Care Team:  Cricket Turcios MD as PCP - General  Cricket Turcios MD as PCP - [facility]   Lyle Payton, PA as Physician Assistant  Juan Daniel Andrew DO as Surgeon     Reviewed and updated this visit:  Tobacco  Allergies  Med Hx  Surg Hx  Soc Hx  Fam Hx      I, Perez Weber LPN, 6/81/0019, performed the documented evaluation under the direct supervision of the attending physician.

## 2023-08-23 NOTE — PATIENT INSTRUCTIONS
Personalized Preventive Plan for Henri Jones - 8/23/2023  Medicare offers a range of preventive health benefits. Some of the tests and screenings are paid in full while other may be subject to a deductible, co-insurance, and/or copay. Some of these benefits include a comprehensive review of your medical history including lifestyle, illnesses that may run in your family, and various assessments and screenings as appropriate. After reviewing your medical record and screening and assessments performed today your provider may have ordered immunizations, labs, imaging, and/or referrals for you. A list of these orders (if applicable) as well as your Preventive Care list are included within your After Visit Summary for your review. Other Preventive Recommendations:    A preventive eye exam performed by an eye specialist is recommended every 1-2 years to screen for glaucoma; cataracts, macular degeneration, and other eye disorders. A preventive dental visit is recommended every 6 months. Try to get at least 150 minutes of exercise per week or 10,000 steps per day on a pedometer . Order or download the FREE \"Exercise & Physical Activity: Your Everyday Guide\" from [employer]. Call 3-917.811.4095 or search [employer] online. You need 7812-6338 mg of calcium and 2678-6519 IU of vitamin D per day. It is possible to meet your calcium requirement with diet alone, but a vitamin D supplement is usually necessary to meet this goal.  When exposed to the sun, use a sunscreen that protects against both UVA and UVB radiation with an SPF of 30 or greater. Reapply every 2 to 3 hours or after sweating, drying off with a towel, or swimming. Always wear a seat belt when traveling in a car. Always wear a helmet when riding a bicycle or motorcycle. Fatigue: Care Instructions  Your Care Instructions     Fatigue is a feeling of tiredness, exhaustion, or lack of energy.  You may

## 2023-08-28 ENCOUNTER — TELEPHONE (OUTPATIENT)
Facility: CLINIC | Age: 63
End: 2023-08-28

## 2023-08-28 NOTE — TELEPHONE ENCOUNTER
Patient called wanted to inform  that after her appointment on 8/23/23 she tested positive for Covid on 8/25/23.

## 2023-09-07 ENCOUNTER — TELEPHONE (OUTPATIENT)
Facility: CLINIC | Age: 63
End: 2023-09-07

## 2023-09-07 DIAGNOSIS — B37.0 ORAL THRUSH: Primary | ICD-10-CM

## 2023-09-07 NOTE — TELEPHONE ENCOUNTER
Spoke with patient via phone call on 9/7/2023. Patient reports oral thrush after using warm salt water gargle to help with sore throat pain. Prescribed Nystatin swish and swallow. Patient reports she has experienced shortness of breath since COVID diagnosis approximately 2 weeks ago. Advised patient if her shortness of breath worsens to report to ED immediately. Patient understood had no further questions at this time.

## 2023-10-04 ENCOUNTER — OFFICE VISIT (OUTPATIENT)
Facility: CLINIC | Age: 63
End: 2023-10-04
Payer: MEDICARE

## 2023-10-04 VITALS
TEMPERATURE: 97.6 F | BODY MASS INDEX: 41.11 KG/M2 | RESPIRATION RATE: 20 BRPM | OXYGEN SATURATION: 97 % | WEIGHT: 240.8 LBS | HEIGHT: 64 IN | SYSTOLIC BLOOD PRESSURE: 134 MMHG | DIASTOLIC BLOOD PRESSURE: 90 MMHG | HEART RATE: 84 BPM

## 2023-10-04 DIAGNOSIS — R73.03 PREDIABETES: ICD-10-CM

## 2023-10-04 DIAGNOSIS — R05.9 COUGH, UNSPECIFIED TYPE: ICD-10-CM

## 2023-10-04 DIAGNOSIS — Z23 IMMUNIZATION DUE: Primary | ICD-10-CM

## 2023-10-04 DIAGNOSIS — E66.01 OBESITY, MORBID (HCC): ICD-10-CM

## 2023-10-04 LAB — HBA1C MFR BLD: 5.6 %

## 2023-10-04 PROCEDURE — 1123F ACP DISCUSS/DSCN MKR DOCD: CPT | Performed by: STUDENT IN AN ORGANIZED HEALTH CARE EDUCATION/TRAINING PROGRAM

## 2023-10-04 PROCEDURE — G0008 ADMIN INFLUENZA VIRUS VAC: HCPCS | Performed by: STUDENT IN AN ORGANIZED HEALTH CARE EDUCATION/TRAINING PROGRAM

## 2023-10-04 PROCEDURE — 83036 HEMOGLOBIN GLYCOSYLATED A1C: CPT | Performed by: STUDENT IN AN ORGANIZED HEALTH CARE EDUCATION/TRAINING PROGRAM

## 2023-10-04 PROCEDURE — 99213 OFFICE O/P EST LOW 20 MIN: CPT | Performed by: STUDENT IN AN ORGANIZED HEALTH CARE EDUCATION/TRAINING PROGRAM

## 2023-10-04 PROCEDURE — 90694 VACC AIIV4 NO PRSRV 0.5ML IM: CPT | Performed by: STUDENT IN AN ORGANIZED HEALTH CARE EDUCATION/TRAINING PROGRAM

## 2023-10-04 RX ORDER — SEMAGLUTIDE 2.68 MG/ML
2 INJECTION, SOLUTION SUBCUTANEOUS WEEKLY
Qty: 3 ML | Refills: 2 | Status: SHIPPED | OUTPATIENT
Start: 2023-10-04

## 2023-10-04 RX ORDER — BENZONATATE 100 MG/1
100 CAPSULE ORAL 3 TIMES DAILY PRN
Qty: 42 CAPSULE | Refills: 0 | Status: SHIPPED | OUTPATIENT
Start: 2023-10-04 | End: 2023-10-18

## 2023-10-04 ASSESSMENT — PATIENT HEALTH QUESTIONNAIRE - PHQ9
1. LITTLE INTEREST OR PLEASURE IN DOING THINGS: 0
SUM OF ALL RESPONSES TO PHQ QUESTIONS 1-9: 0
2. FEELING DOWN, DEPRESSED OR HOPELESS: 0
SUM OF ALL RESPONSES TO PHQ QUESTIONS 1-9: 0
SUM OF ALL RESPONSES TO PHQ QUESTIONS 1-9: 0
SUM OF ALL RESPONSES TO PHQ9 QUESTIONS 1 & 2: 0
SUM OF ALL RESPONSES TO PHQ QUESTIONS 1-9: 0

## 2023-10-04 NOTE — PATIENT INSTRUCTIONS
Patient Education        Influenza (Flu) Vaccine: Care Instructions  Overview     Influenza (flu) is an infection in the lungs and breathing passages. It is caused by the influenza virus. There are different strains, or types, of the flu virus every year. The flu comes on quickly. It can cause a cough, stuffy nose, fever, chills, tiredness, and aches and pains. These symptoms may last for a few weeks. The flu can make you feel very sick, but most of the time it doesn't lead to other problems. But it can cause serious problems in people who are older or who have a long-term illness, such as heart disease or diabetes. You can help prevent the flu by getting a flu vaccine every year, as soon as it is available. You cannot get the flu from the vaccine. The vaccine prevents most cases of the flu. But even when the vaccine doesn't prevent the flu, it can make symptoms less severe and reduce the chance of problems from the flu. You may have a slight fever or muscle aches or pains after getting a flu vaccine. Follow-up care is a key part of your treatment and safety. Be sure to make and go to all appointments, and call your doctor if you are having problems. It's also a good idea to know your test results and keep a list of the medicines you take. Who should get the flu vaccine? Everyone age 7 months or older should get a flu vaccine each year. It lowers the chance of getting and spreading the flu. The vaccine is very important for people who are at high risk for getting other health problems from the flu. This includes:  Anyone 48years of age or older. People who live in a long-term care center, such as a nursing home. All young children. Adults and children 6 months and older who have long-term heart or lung problems, such as asthma.   Adults and children 6 months and older who needed medical care or were in a hospital during the past year because of diabetes, chronic kidney disease, or a weak immune system

## 2023-10-04 NOTE — PROGRESS NOTES
Needs note to return to work    Chief Complaint   Patient presents with    Follow-up     Chronic condition     Lynda Diamond is a 70 y.o. female who presents for routine immunizations. She denies any symptoms , reactions or allergies that would exclude them from being immunized today. Risks and adverse reactions were discussed and the VIS was given to them. All questions were addressed. She was observed for 15 min post injection. There were no reactions observed. Steffi Gabriel LPN   Fingerstick for HBa1C done in middle finger by Steffi Gabriel LPN per order of Dr. Sangita Byers after cleaning area with alcohol wipe. Patient tolerated procedure well. 1. \"Have you been to the ER, urgent care clinic since your last visit? Hospitalized since your last visit? \" No    2. \"Have you seen or consulted any other health care providers outside of the [address] [place] [address] since your last visit? \" No     3. For patients aged 43-73: Has the patient had a colonoscopy / FIT/ Cologuard? Yes - no Care Gap present      If the patient is female:    4. For patients aged 43-66: Has the patient had a mammogram within the past 2 years? Yes - no Care Gap present      5. For patients aged 21-65: Has the patient had a pap smear?  NA - based on age or sex pt c/o "vomiting 6 weeks pregnant' Itraconazole Counseling:  I discussed with the patient the risks of itraconazole including but not limited to liver damage, nausea/vomiting, neuropathy, and severe allergy.  The patient understands that this medication is best absorbed when taken with acidic beverages such as non-diet cola or ginger ale.  The patient understands that monitoring is required including baseline LFTs and repeat LFTs at intervals.  The patient understands that they are to contact us or the primary physician if concerning signs are noted.

## 2023-10-05 ENCOUNTER — PATIENT MESSAGE (OUTPATIENT)
Dept: OTHER | Facility: CLINIC | Age: 63
End: 2023-10-05

## 2023-10-10 ENCOUNTER — PATIENT MESSAGE (OUTPATIENT)
Facility: CLINIC | Age: 63
End: 2023-10-10

## 2023-10-11 RX ORDER — SEMAGLUTIDE 1.7 MG/.75ML
1.7 INJECTION, SOLUTION SUBCUTANEOUS
Qty: 3 ML | Refills: 2 | Status: SHIPPED | OUTPATIENT
Start: 2023-10-11

## 2023-11-02 DIAGNOSIS — F33.1 MODERATE EPISODE OF RECURRENT MAJOR DEPRESSIVE DISORDER (HCC): ICD-10-CM

## 2023-11-03 RX ORDER — BUPROPION HYDROCHLORIDE 300 MG/1
300 TABLET ORAL EVERY MORNING
Qty: 90 TABLET | Refills: 1 | Status: SHIPPED | OUTPATIENT
Start: 2023-11-03

## 2023-11-14 NOTE — PROGRESS NOTES
After obtaining consent, and per orders of James Whitfield NP, injection of Tetanus Booster and [place] were both given by Yari Linton. Patient instructed to remain in clinic for 20 minutes afterwards, and to report any adverse reaction to me immediately. Cartilage Graft Text: The defect edges were debeveled with a #15 scalpel blade. Given the location of the defect, shape of the defect, the fact the defect involved a full thickness cartilage defect a cartilage graft was deemed most appropriate.  An appropriate donor site was identified, cleansed, and anesthetized. The cartilage graft was then harvested and transferred to the recipient site, oriented appropriately and then sutured into place.  The secondary defect was then repaired using a primary closure.

## 2023-11-20 DIAGNOSIS — E66.01 OBESITY, MORBID (HCC): ICD-10-CM

## 2023-11-20 DIAGNOSIS — R73.03 PREDIABETES: ICD-10-CM

## 2023-11-20 RX ORDER — SEMAGLUTIDE 2.68 MG/ML
2 INJECTION, SOLUTION SUBCUTANEOUS WEEKLY
Qty: 3 ML | Refills: 2 | Status: CANCELLED | OUTPATIENT
Start: 2023-11-20

## 2023-12-18 ENCOUNTER — TELEPHONE (OUTPATIENT)
Facility: CLINIC | Age: 63
End: 2023-12-18

## 2023-12-18 NOTE — TELEPHONE ENCOUNTER
Patient called stating she has been having bp issues, thinks she may need to be put on medication. Has been seen in ED for this matter.      Patients phone number [phone]

## 2024-01-15 SDOH — ECONOMIC STABILITY: TRANSPORTATION INSECURITY
IN THE PAST 12 MONTHS, HAS LACK OF TRANSPORTATION KEPT YOU FROM MEETINGS, WORK, OR FROM GETTING THINGS NEEDED FOR DAILY LIVING?: NO

## 2024-01-15 SDOH — ECONOMIC STABILITY: INCOME INSECURITY: HOW HARD IS IT FOR YOU TO PAY FOR THE VERY BASICS LIKE FOOD, HOUSING, MEDICAL CARE, AND HEATING?: NOT HARD AT ALL

## 2024-01-15 SDOH — ECONOMIC STABILITY: FOOD INSECURITY: WITHIN THE PAST 12 MONTHS, THE FOOD YOU BOUGHT JUST DIDN'T LAST AND YOU DIDN'T HAVE MONEY TO GET MORE.: NEVER TRUE

## 2024-01-15 SDOH — ECONOMIC STABILITY: FOOD INSECURITY: WITHIN THE PAST 12 MONTHS, YOU WORRIED THAT YOUR FOOD WOULD RUN OUT BEFORE YOU GOT MONEY TO BUY MORE.: NEVER TRUE

## 2024-01-15 SDOH — ECONOMIC STABILITY: HOUSING INSECURITY
IN THE LAST 12 MONTHS, WAS THERE A TIME WHEN YOU DID NOT HAVE A STEADY PLACE TO SLEEP OR SLEPT IN A SHELTER (INCLUDING NOW)?: NO

## 2024-01-18 ENCOUNTER — OFFICE VISIT (OUTPATIENT)
Facility: CLINIC | Age: 64
End: 2024-01-18
Payer: MEDICARE

## 2024-01-18 ENCOUNTER — HOSPITAL ENCOUNTER (OUTPATIENT)
Age: 64
Discharge: HOME | End: 2024-01-18
Payer: MEDICARE

## 2024-01-18 VITALS
BODY MASS INDEX: 40.46 KG/M2 | WEIGHT: 237 LBS | OXYGEN SATURATION: 96 % | HEART RATE: 73 BPM | SYSTOLIC BLOOD PRESSURE: 128 MMHG | TEMPERATURE: 98.2 F | DIASTOLIC BLOOD PRESSURE: 80 MMHG | RESPIRATION RATE: 18 BRPM | HEIGHT: 64 IN

## 2024-01-18 DIAGNOSIS — R19.7 DIARRHEA, UNSPECIFIED TYPE: ICD-10-CM

## 2024-01-18 DIAGNOSIS — H02.66 XANTHELASMA OF EYELID, BILATERAL: ICD-10-CM

## 2024-01-18 DIAGNOSIS — R73.03 PREDIABETES: ICD-10-CM

## 2024-01-18 DIAGNOSIS — R35.89 POLYURIA: ICD-10-CM

## 2024-01-18 DIAGNOSIS — R74.8 ELEVATED LIVER ENZYMES: ICD-10-CM

## 2024-01-18 DIAGNOSIS — H02.63 XANTHELASMA OF EYELID, BILATERAL: ICD-10-CM

## 2024-01-18 DIAGNOSIS — I10 ESSENTIAL HYPERTENSION: Primary | ICD-10-CM

## 2024-01-18 LAB
ALBUMIN SERPL-MCNC: 3.3 G/DL (ref 3.4–5)
ALBUMIN/GLOB SERPL: 0.8 (ref 0.8–1.7)
ALP SERPL-CCNC: 163 U/L (ref 45–117)
ALT SERPL-CCNC: 59 U/L (ref 13–56)
AST SERPL W P-5'-P-CCNC: 24 U/L (ref 10–38)
BILIRUB DIRECT SERPL-MCNC: 0.1 MG/DL (ref 0–0.2)
BILIRUB SERPL-MCNC: 0.3 MG/DL (ref 0.2–1)
GLOBULIN SER CALC-MCNC: 3.9 G/DL (ref 2–4)
PROT SERPL-MCNC: 7.2 G/DL (ref 6.4–8.2)
TSH SERPL DL<=0.05 MIU/L-ACNC: 1.49 UIU/ML (ref 0.36–3.74)

## 2024-01-18 PROCEDURE — 3079F DIAST BP 80-89 MM HG: CPT | Performed by: STUDENT IN AN ORGANIZED HEALTH CARE EDUCATION/TRAINING PROGRAM

## 2024-01-18 PROCEDURE — 80076 HEPATIC FUNCTION PANEL: CPT

## 2024-01-18 PROCEDURE — 82728 ASSAY OF FERRITIN: CPT

## 2024-01-18 PROCEDURE — 99214 OFFICE O/P EST MOD 30 MIN: CPT | Performed by: STUDENT IN AN ORGANIZED HEALTH CARE EDUCATION/TRAINING PROGRAM

## 2024-01-18 PROCEDURE — 84443 ASSAY THYROID STIM HORMONE: CPT

## 2024-01-18 PROCEDURE — 82977 ASSAY OF GGT: CPT

## 2024-01-18 PROCEDURE — 36415 COLL VENOUS BLD VENIPUNCTURE: CPT

## 2024-01-18 PROCEDURE — 1123F ACP DISCUSS/DSCN MKR DOCD: CPT | Performed by: STUDENT IN AN ORGANIZED HEALTH CARE EDUCATION/TRAINING PROGRAM

## 2024-01-18 PROCEDURE — 80061 LIPID PANEL: CPT

## 2024-01-18 PROCEDURE — 3074F SYST BP LT 130 MM HG: CPT | Performed by: STUDENT IN AN ORGANIZED HEALTH CARE EDUCATION/TRAINING PROGRAM

## 2024-01-18 PROCEDURE — 83036 HEMOGLOBIN GLYCOSYLATED A1C: CPT

## 2024-01-18 RX ORDER — SODIUM FLUORIDE 6.1 MG/ML
GEL, DENTIFRICE DENTAL
COMMUNITY
Start: 2023-12-30

## 2024-01-18 RX ORDER — AMLODIPINE BESYLATE 10 MG/1
10 TABLET ORAL DAILY
Qty: 90 TABLET | Refills: 3 | Status: SHIPPED | OUTPATIENT
Start: 2024-01-18

## 2024-01-18 RX ORDER — GABAPENTIN 300 MG/1
300 CAPSULE ORAL 3 TIMES DAILY
COMMUNITY

## 2024-01-18 RX ORDER — BUTALBITAL, ACETAMINOPHEN AND CAFFEINE 50; 325; 40 MG/1; MG/1; MG/1
1 TABLET ORAL EVERY 4 HOURS PRN
COMMUNITY
Start: 2023-12-10

## 2024-01-18 ASSESSMENT — PATIENT HEALTH QUESTIONNAIRE - PHQ9
7. TROUBLE CONCENTRATING ON THINGS, SUCH AS READING THE NEWSPAPER OR WATCHING TELEVISION: 0
SUM OF ALL RESPONSES TO PHQ QUESTIONS 1-9: 3
10. IF YOU CHECKED OFF ANY PROBLEMS, HOW DIFFICULT HAVE THESE PROBLEMS MADE IT FOR YOU TO DO YOUR WORK, TAKE CARE OF THINGS AT HOME, OR GET ALONG WITH OTHER PEOPLE: 0
2. FEELING DOWN, DEPRESSED OR HOPELESS: 0
4. FEELING TIRED OR HAVING LITTLE ENERGY: 0
8. MOVING OR SPEAKING SO SLOWLY THAT OTHER PEOPLE COULD HAVE NOTICED. OR THE OPPOSITE, BEING SO FIGETY OR RESTLESS THAT YOU HAVE BEEN MOVING AROUND A LOT MORE THAN USUAL: 0
SUM OF ALL RESPONSES TO PHQ QUESTIONS 1-9: 3
1. LITTLE INTEREST OR PLEASURE IN DOING THINGS: 0
6. FEELING BAD ABOUT YOURSELF - OR THAT YOU ARE A FAILURE OR HAVE LET YOURSELF OR YOUR FAMILY DOWN: 0
9. THOUGHTS THAT YOU WOULD BE BETTER OFF DEAD, OR OF HURTING YOURSELF: 0
3. TROUBLE FALLING OR STAYING ASLEEP: 3
SUM OF ALL RESPONSES TO PHQ QUESTIONS 1-9: 3
SUM OF ALL RESPONSES TO PHQ9 QUESTIONS 1 & 2: 0
5. POOR APPETITE OR OVEREATING: 0
SUM OF ALL RESPONSES TO PHQ QUESTIONS 1-9: 3

## 2024-01-18 NOTE — PROGRESS NOTES
Elisabeth Ludy Steiner presents today for   Chief Complaint   Patient presents with    Follow-up     ED follow up 12/10/23  Pt reports having trouble sleeping, headaches, dry mouth, muscle weakness, blurred vision, severe hand pain, and excessive urination.   Pt also reports waking up multiple times through the night.  Pt BP readings have been ranging 202/123/110/71.         Is someone accompanying this pt? yes    Is the patient using any DME equipment during OV? no    Health Maintenance reviewed and discussed and ordered per Provider.    Health Maintenance Due   Topic Date Due    HIV screen  Never done    Hepatitis C screen  Never done    Respiratory Syncytial Virus (RSV) Pregnant or age 60 yrs+ (1 - 1-dose 60+ series) Never done    Low dose CT lung screening &/or counseling  02/23/2023    Pneumococcal 65+ years Vaccine (3 - PPSV23 or PCV20) 06/06/2023    Diabetic retinal exam  Never done    Annual Wellness Visit (Medicare Advantage)  01/01/2024    GFR test (Diabetes, CKD 3-4, OR last GFR 15-59)  01/24/2024   .      Coordination of Care:  1. \"Have you been to the ER, urgent care clinic since your last visit?  Hospitalized since your last visit?\" Yes    2. \"Have you seen or consulted any other health care providers outside of the [facility] since your last visit?\" No    3. For patients aged 45-75: Has the patient had a colonoscopy? Yes- No care gap present    If the patient is female:    4. For patients aged 40-74: Has the patient had a mammogram within the past 2 years? Discontinued     5. For patients aged 21-65: Has the patient had a pap smear? N/A based on age/sex   
BAD SWELLING AND SORE APPEAR      Nsaids Other (See Comments)     PT NOT ABLE TO TAKE DUE TO GASTRIC BYPASS    Aspirin Nausea And Vomiting     Gastric bypass  Gastric bypass       Social History     Tobacco Use    Smoking status: Former     Current packs/day: 0.00     Average packs/day: 2.0 packs/day for 45.0 years (90.0 ttl pk-yrs)     Types: Cigarettes     Start date: 6/20/1975     Quit date: 6/20/2020     Years since quitting: 3.5    Smokeless tobacco: Never   Substance Use Topics    Alcohol use: No    Drug use: Not Currently     Types: Marijuana (Weed)        Review of Systems   As noted above in HPI.      Objective:   /80 (Site: Right Upper Arm, Position: Sitting, Cuff Size: Large Adult)   Pulse 73   Temp 98.2 °F (36.8 °C) (Temporal)   Resp 18   Ht 1.626 m (5' 4\")   Wt 107.5 kg (237 lb)   SpO2 96%   BMI 40.68 kg/m²      General: alert, oriented, not in distress  Chest/Lungs: clear breath sounds, no wheezing or crackles  Heart: normal rate, regular rhythm, no murmur  Extremities: no focal deformities, no edema  Skin: no active skin lesions      Assessment & Plan:     Essential hypertension  -     External Referral To Nephrology  Polyuria  -     Hemoglobin A1C; Future  Elevated liver enzymes  -     Lipid Panel; Future  -     Gamma GT; Future  -     Hepatitis Panel, Acute; Future  -     Ferritin; Future  -     Hepatic Function Panel; Future  Diarrhea, unspecified type  -     TSH; Future  Prediabetes  -     Hemoglobin A1C; Future  Xanthelasma of eyelid, bilateral     Patient with new onset hypertension.  Requested referral to nephrology per oncology recommendations.  They would like to rule out renal artery stenosis.  I doubt this diagnosis given she was controlled on 1 pressure medication among other factors.  Suspect it is more likely related to sleep apnea and needing to adjust settings.  She does have an appoint with pulmonologist upcoming.  Nephrology can rule out other secondary causes to include

## 2024-01-19 LAB
CHOLEST SERPL-MCNC: 178 MG/DL
EST. AVERAGE GLUCOSE BLD GHB EST-MCNC: 117 MG/DL
FERRITIN SERPL-MCNC: 425 NG/ML (ref 8–388)
GGT SERPL-CCNC: 266 U/L (ref 5–55)
HBA1C MFR BLD: 5.7 % (ref 4.2–5.6)
HDLC SERPL-MCNC: 66 MG/DL (ref 40–60)
HDLC SERPL: 2.7 (ref 0–5)
LDLC SERPL CALC-MCNC: 91.8 MG/DL (ref 0–100)
LIPID PANEL: ABNORMAL
TRIGL SERPL-MCNC: 101 MG/DL
VLDLC SERPL CALC-MCNC: 20.2 MG/DL

## 2024-01-31 ENCOUNTER — HOSPITAL ENCOUNTER (OUTPATIENT)
Facility: HOSPITAL | Age: 64
Discharge: HOME | End: 2024-02-03
Attending: INTERNAL MEDICINE
Payer: MEDICARE

## 2024-01-31 DIAGNOSIS — R74.8 ELEVATED LIVER ENZYMES: ICD-10-CM

## 2024-01-31 PROCEDURE — 76705 ECHO EXAM OF ABDOMEN: CPT

## 2024-02-21 ENCOUNTER — OFFICE VISIT (OUTPATIENT)
Age: 64
End: 2024-02-21
Payer: MEDICARE

## 2024-02-21 VITALS
OXYGEN SATURATION: 94 % | HEIGHT: 64 IN | TEMPERATURE: 97.8 F | SYSTOLIC BLOOD PRESSURE: 138 MMHG | WEIGHT: 262.6 LBS | HEART RATE: 78 BPM | RESPIRATION RATE: 14 BRPM | BODY MASS INDEX: 44.83 KG/M2 | DIASTOLIC BLOOD PRESSURE: 83 MMHG

## 2024-02-21 DIAGNOSIS — Z15.09 BRCA GENE MUTATION POSITIVE: ICD-10-CM

## 2024-02-21 DIAGNOSIS — Z17.1 MALIGNANT NEOPLASM OF UPPER-INNER QUADRANT OF LEFT BREAST IN FEMALE, ESTROGEN RECEPTOR NEGATIVE (HCC): Primary | ICD-10-CM

## 2024-02-21 DIAGNOSIS — Z15.01 BRCA GENE MUTATION POSITIVE: ICD-10-CM

## 2024-02-21 DIAGNOSIS — C50.212 MALIGNANT NEOPLASM OF UPPER-INNER QUADRANT OF LEFT BREAST IN FEMALE, ESTROGEN RECEPTOR NEGATIVE (HCC): Primary | ICD-10-CM

## 2024-02-21 PROCEDURE — 99214 OFFICE O/P EST MOD 30 MIN: CPT | Performed by: SURGERY

## 2024-02-21 PROCEDURE — 1123F ACP DISCUSS/DSCN MKR DOCD: CPT | Performed by: SURGERY

## 2024-02-22 ASSESSMENT — ENCOUNTER SYMPTOMS
SHORTNESS OF BREATH: 0
CHEST TIGHTNESS: 0

## 2024-02-22 NOTE — PROGRESS NOTES
Elisabeth Steiner is a 63 y.o. female (: 1960)    Chief Complaint   Patient presents with    Follow-up     Left breast       Medication list and allergies have been reviewed with Elisabeth Valenciaaine Jatin and updated as of today's date.     I have gone over all Medical, Surgical and Social History with Elisabeth Steiner and updated/added the information accordingly.      1. Have you been to the ER, urgent care clinic since your last visit?  Hospitalized since your last visit?No    2. Have you seen or consulted any other health care providers outside of the [facility] since your last visit?  Include any pap smears or colon screening. No   
T4, free T3.       Hemoglobin A1C 01/18/2024 5.7 (H)  4.2 - 5.6 % Final    Comment: (NOTE)  HbA1C Interpretive Ranges  <5.7              Normal  5.7 - 6.4         Consider Prediabetes  >6.5              Consider Diabetes      Estimated Avg Glucose 01/18/2024 117  mg/dL Final    Comment: (NOTE)  The eAG should be interpreted with patient characteristics in mind   since ethnicity, interindividual differences, red cell lifespan,   variation in rates of glycation, etc. may affect the validity of the   calculation.      GGT 01/18/2024 266 (H)  5 - 55 U/L Final    Ferritin 01/18/2024 425 (H)  8 - 388 ng/mL Final    Total Protein 01/18/2024 7.2  6.4 - 8.2 g/dL Final    Albumin 01/18/2024 3.3 (L)  3.4 - 5.0 g/dL Final    Globulin 01/18/2024 3.9  2.0 - 4.0 g/dL Final    Albumin/Globulin Ratio 01/18/2024 0.8  0.8 - 1.7   Final    Total Bilirubin 01/18/2024 0.3  0.2 - 1.0 mg/dL Final    Bilirubin, Direct 01/18/2024 0.1  0.0 - 0.2 mg/dL Final    Alk Phosphatase 01/18/2024 163 (H)  45 - 117 U/L Final    AST 01/18/2024 24  10 - 38 U/L Final    ALT 01/18/2024 59 (H)  13 - 56 U/L Final       US ABDOMEN LIMITED Specify organ? LIVER  Narrative: ULTRASOUND ABDOMEN LIMITED    HISTORY: Right upper quadrant pain    COMPARISON: CT abdomen pelvis dated 2/16/2020    TECHNIQUE:  Multiple grayscale static and dynamic ultrasonographic images of the  right upper quadrant were performed and supplemented with color Doppler flow  analysis.    RESULT:   Pancreas:     Portions obscured: Tail     Lesions: None    Liver:        Craniocaudal dimensions:17.2 cm       Echotexture:  Normal, homogeneous       Echogenicity:  Increased       Surface contour:  Smooth       Lesions:  None       Portal vein: 8 mm in transverse diameter with appropriate direction of  flow.    Biliary:        Intrahepatic Biliary Dilation: Absent        CBD: 10 mm, enlarged but can be seen in the post cholecystectomy state       Gallbladder: Absent          Right Kidney: Normal

## 2024-02-27 DIAGNOSIS — E66.01 OBESITY, MORBID (HCC): ICD-10-CM

## 2024-02-27 DIAGNOSIS — R73.03 PREDIABETES: ICD-10-CM

## 2024-02-27 RX ORDER — SEMAGLUTIDE 2.68 MG/ML
2 INJECTION, SOLUTION SUBCUTANEOUS WEEKLY
Qty: 9 ML | Refills: 3 | Status: SHIPPED | OUTPATIENT
Start: 2024-02-27

## 2024-02-28 RX ORDER — GABAPENTIN 300 MG/1
300 CAPSULE ORAL 3 TIMES DAILY
Qty: 90 CAPSULE | OUTPATIENT
Start: 2024-02-28

## 2024-02-28 NOTE — TELEPHONE ENCOUNTER
Fran faxed a refill request for gabapentin      [facility] [place]

## 2024-03-06 ENCOUNTER — OFFICE VISIT (OUTPATIENT)
Facility: CLINIC | Age: 64
End: 2024-03-06
Payer: MEDICARE

## 2024-03-06 VITALS
RESPIRATION RATE: 17 BRPM | HEIGHT: 64 IN | TEMPERATURE: 97.6 F | SYSTOLIC BLOOD PRESSURE: 133 MMHG | DIASTOLIC BLOOD PRESSURE: 78 MMHG | OXYGEN SATURATION: 96 % | BODY MASS INDEX: 43.81 KG/M2 | HEART RATE: 92 BPM | WEIGHT: 256.6 LBS

## 2024-03-06 DIAGNOSIS — E66.01 OBESITY, MORBID (HCC): ICD-10-CM

## 2024-03-06 DIAGNOSIS — R73.03 PREDIABETES: ICD-10-CM

## 2024-03-06 DIAGNOSIS — Z90.13 HISTORY OF BILATERAL MASTECTOMY: ICD-10-CM

## 2024-03-06 DIAGNOSIS — K21.9 GASTROESOPHAGEAL REFLUX DISEASE, UNSPECIFIED WHETHER ESOPHAGITIS PRESENT: ICD-10-CM

## 2024-03-06 DIAGNOSIS — T45.1X5A NEUROPATHY DUE TO CHEMOTHERAPEUTIC DRUG (HCC): Primary | ICD-10-CM

## 2024-03-06 DIAGNOSIS — K76.0 NONALCOHOLIC HEPATOSTEATOSIS: ICD-10-CM

## 2024-03-06 DIAGNOSIS — Z85.3 HISTORY OF BREAST CANCER: ICD-10-CM

## 2024-03-06 DIAGNOSIS — G62.0 NEUROPATHY DUE TO CHEMOTHERAPEUTIC DRUG (HCC): Primary | ICD-10-CM

## 2024-03-06 DIAGNOSIS — J44.9 CHRONIC OBSTRUCTIVE PULMONARY DISEASE, UNSPECIFIED COPD TYPE (HCC): ICD-10-CM

## 2024-03-06 PROCEDURE — 1123F ACP DISCUSS/DSCN MKR DOCD: CPT | Performed by: STUDENT IN AN ORGANIZED HEALTH CARE EDUCATION/TRAINING PROGRAM

## 2024-03-06 PROCEDURE — 99214 OFFICE O/P EST MOD 30 MIN: CPT | Performed by: STUDENT IN AN ORGANIZED HEALTH CARE EDUCATION/TRAINING PROGRAM

## 2024-03-06 RX ORDER — GABAPENTIN 300 MG/1
300 CAPSULE ORAL 3 TIMES DAILY
Qty: 270 CAPSULE | Refills: 0 | Status: SHIPPED | OUTPATIENT
Start: 2024-03-06 | End: 2024-06-04

## 2024-03-06 RX ORDER — SUCRALFATE 1 G/1
1 TABLET ORAL 2 TIMES DAILY
Qty: 180 TABLET | Refills: 1 | Status: SHIPPED | OUTPATIENT
Start: 2024-03-06 | End: 2024-06-04

## 2024-03-06 RX ORDER — SODIUM FLUORIDE 6 MG/ML
PASTE, DENTIFRICE DENTAL
COMMUNITY
Start: 2024-01-19

## 2024-03-06 NOTE — PROGRESS NOTES
Elisabeth Steiner presents today for   Chief Complaint   Patient presents with    Follow-up     1m follow up (saw Dr. Oneil)       Is someone accompanying this pt? no    Is the patient using any DME equipment during OV? no    Health Maintenance reviewed and discussed and ordered per Provider.    Health Maintenance Due   Topic Date Due    HIV screen  Never done    Hepatitis C screen  Never done    Respiratory Syncytial Virus (RSV) Pregnant or age 60 yrs+ (1 - 1-dose 60+ series) Never done    Low dose CT lung screening &/or counseling  02/23/2023    Pneumococcal 65+ years Vaccine (3 - PPSV23 or PCV20) 06/06/2023    Diabetic retinal exam  Never done    Annual Wellness Visit (Medicare Advantage)  01/01/2024    GFR test (Diabetes, CKD 3-4, OR last GFR 15-59)  01/24/2024   .      \"Have you been to the ER, urgent care clinic since your last visit?  Hospitalized since your last visit?\"    NO    “Have you seen or consulted any other health care providers outside of [facility] since your last visit?”    NO             
Upper Arm   Position: Sitting   Cuff Size: Large Adult   Pulse: 92   Resp: 17   Temp: 97.6 °F (36.4 °C)   TempSrc: Temporal   SpO2: 96%   Weight: 116.4 kg (256 lb 9.6 oz)   Height: 1.626 m (5' 4\")      Body mass index is 44.05 kg/m².     Gen: NAD, well appearing   Psych: cooperative. Appropriate mood and affect.    I reviewed prior available labs.     Medical decision making complexity: moderate    I have discussed the diagnosis with the patient and the intended plan as seen in the above orders.  The patient has received an after-visit summary and questions were answered concerning future plans.  I have discussed medication side effects and warnings with the patient as well. I have reviewed the plan of care with the patient, accepted their input and they are in agreement with the treatment goals. Previous lab and imaging results were reviewed by me.     Justine Prasad MD  Family Medicine

## 2024-04-17 DIAGNOSIS — F33.1 MODERATE EPISODE OF RECURRENT MAJOR DEPRESSIVE DISORDER (HCC): ICD-10-CM

## 2024-04-18 RX ORDER — BUPROPION HYDROCHLORIDE 300 MG/1
300 TABLET ORAL EVERY MORNING
Qty: 90 TABLET | Refills: 0 | Status: SHIPPED | OUTPATIENT
Start: 2024-04-18

## 2024-04-25 ENCOUNTER — PATIENT MESSAGE (OUTPATIENT)
Facility: CLINIC | Age: 64
End: 2024-04-25

## 2024-04-26 RX ORDER — AZELASTINE HYDROCHLORIDE, FLUTICASONE PROPIONATE 137; 50 UG/1; UG/1
SPRAY, METERED NASAL
Qty: 23 G | Refills: 5 | Status: SHIPPED | OUTPATIENT
Start: 2024-04-26

## 2024-04-26 NOTE — TELEPHONE ENCOUNTER
From: Elisabeth Steiner  To: Dr. Justine Prasad  Sent: 4/25/2024 11:44 PM EDT  Subject: Refills    Please send a 90 day prescription for Azelastine Hydrochloride and fluticasone to [facility]. Thank you

## 2024-04-29 ENCOUNTER — TELEPHONE (OUTPATIENT)
Facility: CLINIC | Age: 64
End: 2024-04-29

## 2024-04-29 DIAGNOSIS — J44.9 CHRONIC OBSTRUCTIVE PULMONARY DISEASE, UNSPECIFIED COPD TYPE (HCC): Primary | ICD-10-CM

## 2024-04-29 NOTE — TELEPHONE ENCOUNTER
Patient called stating she needs a new referral to a pulmonologist. The one she was seeing has retired. Patient wanting to know if she could be referred to a Dr. Adalberto Stevens.     Patient can be reached back at [phone]

## 2024-05-06 ENCOUNTER — OFFICE VISIT (OUTPATIENT)
Facility: CLINIC | Age: 64
End: 2024-05-06
Payer: MEDICARE

## 2024-05-06 VITALS
HEIGHT: 64 IN | RESPIRATION RATE: 18 BRPM | TEMPERATURE: 97.5 F | BODY MASS INDEX: 44.01 KG/M2 | DIASTOLIC BLOOD PRESSURE: 86 MMHG | WEIGHT: 257.8 LBS | OXYGEN SATURATION: 99 % | HEART RATE: 72 BPM | SYSTOLIC BLOOD PRESSURE: 134 MMHG

## 2024-05-06 DIAGNOSIS — J44.1 COPD EXACERBATION (HCC): Primary | ICD-10-CM

## 2024-05-06 DIAGNOSIS — F32.A DEPRESSION, UNSPECIFIED DEPRESSION TYPE: ICD-10-CM

## 2024-05-06 PROCEDURE — 99214 OFFICE O/P EST MOD 30 MIN: CPT | Performed by: STUDENT IN AN ORGANIZED HEALTH CARE EDUCATION/TRAINING PROGRAM

## 2024-05-06 PROCEDURE — 1123F ACP DISCUSS/DSCN MKR DOCD: CPT | Performed by: STUDENT IN AN ORGANIZED HEALTH CARE EDUCATION/TRAINING PROGRAM

## 2024-05-06 RX ORDER — AZITHROMYCIN 250 MG/1
TABLET, FILM COATED ORAL
Qty: 6 TABLET | Refills: 0 | Status: SHIPPED | OUTPATIENT
Start: 2024-05-06 | End: 2024-05-16

## 2024-05-06 RX ORDER — PREDNISONE 20 MG/1
TABLET ORAL
Qty: 14 TABLET | Refills: 0 | Status: SHIPPED | OUTPATIENT
Start: 2024-05-06 | End: 2024-05-18

## 2024-05-06 NOTE — PROGRESS NOTES
SICK VISIT     Elisabeth Steiner (: 1960) is a 63 y.o. female here for evaluation of the following chief concerns(s):  Cough (Patient reports having a lack of energy and a cough that won't stop for a couple weeks. Made appt with pulmonologist but the soonest they had was the middle of . Also having really bad hot flashes)       ASSESSMENT/PLAN:  1. COPD exacerbation (HCC)  -     predniSONE (DELTASONE) 20 MG tablet; Take 2 tablets by mouth daily for 4 days, THEN 1 tablet daily for 4 days, THEN 0.5 tablets daily for 4 days., Disp-14 tablet, R-0Normal  -     azithromycin (ZITHROMAX) 250 MG tablet; 500mg on day 1 followed by 250mg on days 2 - 5, Disp-6 tablet, R-0Normal  2. Depression, unspecified depression type    No orders of the defined types were placed in this encounter.    COPD exacerbation-treat with prednisone, Z-Nabil.    Depression- uncontrolled, situational. Good home support. Trial of psychotherapy- resource list given. Continue Wellbutrin/Cymbalta    FU as scheduled for chronic disease    Patient agrees with plan as above and has no additional questions at this time.     SUBJECTIVE/OBJECTIVE:    Patient presents for coughing fits.  Started about 3 weeks ago.  Prior to this she had been in [place] as her son has been very ill, she felt horrible the entire time she was there-felt like altitude sickness-possibly had a virus.    She is having coughing fits throughout the day.  Coughing so hard her ribs hurt and she is having stool incontinence.  She has tried all sorts of over-the-counter remedies which have not helped.  No fevers or chills, no other sick symptoms.  She has COPD and is on Trelegy.  She describes increased mucus production, mucus is clear.    She is having worsening depression and feeling down since her son has been ill.  It sounds like things have taken a turn for the better, but she has still been very stressed.    Vitals:    24 1419   BP: 134/86   Site: Right Upper Arm

## 2024-05-06 NOTE — PROGRESS NOTES
Elisabeth Casenis presents today for   Chief Complaint   Patient presents with    Cough     Patient reports having a lack of energy and a cough that won't stop for a couple weeks. Made appt with pulmonologist but the soonest they had was the middle of June. Also having really bad hot flashes       Is someone accompanying this pt? no    Is the patient using any DME equipment during OV? no    Health Maintenance reviewed and discussed and ordered per Provider.    Health Maintenance Due   Topic Date Due    HIV screen  Never done    Hepatitis C screen  Never done    Low dose CT lung screening &/or counseling  02/23/2023    COVID-19 Vaccine (5 - 2023-24 season) 09/01/2023    Diabetic retinal exam  Never done    Annual Wellness Visit (Medicare Advantage)  01/01/2024    GFR test (Diabetes, CKD 3-4, OR last GFR 15-59)  01/24/2024   .      \"Have you been to the ER, urgent care clinic since your last visit?  Hospitalized since your last visit?\"    NO    “Have you seen or consulted any other health care providers outside of [facility] since your last visit?”    NO

## 2024-06-01 DIAGNOSIS — T45.1X5A NEUROPATHY DUE TO CHEMOTHERAPEUTIC DRUG (HCC): ICD-10-CM

## 2024-06-01 DIAGNOSIS — G62.0 NEUROPATHY DUE TO CHEMOTHERAPEUTIC DRUG (HCC): ICD-10-CM

## 2024-06-03 RX ORDER — GABAPENTIN 300 MG/1
CAPSULE ORAL
Qty: 270 CAPSULE | Refills: 0 | Status: SHIPPED | OUTPATIENT
Start: 2024-06-03 | End: 2024-09-01

## 2024-07-19 DIAGNOSIS — F33.1 MODERATE EPISODE OF RECURRENT MAJOR DEPRESSIVE DISORDER (HCC): ICD-10-CM

## 2024-07-19 RX ORDER — BUPROPION HYDROCHLORIDE 300 MG/1
300 TABLET ORAL EVERY MORNING
Qty: 90 TABLET | Refills: 1 | Status: SHIPPED | OUTPATIENT
Start: 2024-07-19

## 2024-08-14 ENCOUNTER — NURSE ONLY (OUTPATIENT)
Facility: CLINIC | Age: 64
End: 2024-08-14
Payer: MEDICARE

## 2024-08-14 DIAGNOSIS — Z11.1 VISIT FOR TB SKIN TEST: Primary | ICD-10-CM

## 2024-08-14 PROCEDURE — 86580 TB INTRADERMAL TEST: CPT | Performed by: STUDENT IN AN ORGANIZED HEALTH CARE EDUCATION/TRAINING PROGRAM

## 2024-08-14 NOTE — PROGRESS NOTES
Elisabeth Steiner presented to office for PPD placement. Reviewed allergies.  Patient states she has never had a positive PPD in the past. PPD injection at 1455 in right forearm.  Patient advised to return 48-72hrs for reading.  Patient tolerated injection well and left ambulatory without any complaints. Patient verbalizes understanding.     PPD skin test questions:    Any recent cough? no  Night sweats?  no  Unexplained weight loss?  no  Any recent exposure to someone with TB?  no  Any recent travel overseas?  no  Time spent in a nursing home?  no  Time spent in a homeless shelter?  no

## 2024-08-16 ENCOUNTER — NURSE ONLY (OUTPATIENT)
Facility: CLINIC | Age: 64
End: 2024-08-16

## 2024-08-16 DIAGNOSIS — Z11.1 ENCOUNTER FOR PPD SKIN TEST READING: Primary | ICD-10-CM

## 2024-08-16 PROCEDURE — 90000 NO LOS: CPT | Performed by: STUDENT IN AN ORGANIZED HEALTH CARE EDUCATION/TRAINING PROGRAM

## 2024-08-16 NOTE — PROGRESS NOTES
Elisabeth Steiner presented for a PPD reading. Result negative, 0mm. Patient received letter stating this and left office with no complaints of pain or distress at this time.

## 2024-08-19 ENCOUNTER — TELEPHONE (OUTPATIENT)
Facility: CLINIC | Age: 64
End: 2024-08-19

## 2024-08-19 NOTE — TELEPHONE ENCOUNTER
----- Message from Dr. Justine Prasad MD sent at 8/19/2024 10:13 AM EDT -----  Do you mind calling patient and asking her if we could make her an apt (can be virtual) to go over exactly what she wants me to put on her paperwork that I got sent for accommodations for her job? I want to make sure I fill it out correctly the first time.     Thanks!    Justine

## 2024-08-19 NOTE — TELEPHONE ENCOUNTER
Called and lvm for patient to call the office back to call and schedule an appointment for Dr. Prasad to fill out paperwork.

## 2024-08-22 RX ORDER — ROFLUMILAST 250 UG/1
TABLET ORAL
COMMUNITY
Start: 2024-08-05

## 2024-08-27 NOTE — PROGRESS NOTES
Elisabeth Steiner is a 64 y.o. female (: 1960)     No chief complaint on file.      Medication list and allergies have been reviewed with Elisabeth Steiner and updated as of today's date.     I have gone over all Medical, Surgical and Social History with Elisabeth Steiner and updated/added the information accordingly.      1. Have you been to the ER, urgent care clinic since your last visit?  Hospitalized since your last visit?No    2. Have you seen or consulted any other health care providers outside of the [facility] since your last visit?  Include any pap smears or colon screening. No

## 2024-08-28 ENCOUNTER — OFFICE VISIT (OUTPATIENT)
Facility: CLINIC | Age: 64
End: 2024-08-28

## 2024-08-28 ENCOUNTER — OFFICE VISIT (OUTPATIENT)
Age: 64
End: 2024-08-28
Payer: MEDICARE

## 2024-08-28 VITALS
HEIGHT: 64 IN | RESPIRATION RATE: 18 BRPM | HEART RATE: 80 BPM | TEMPERATURE: 97 F | OXYGEN SATURATION: 97 % | BODY MASS INDEX: 43.54 KG/M2 | SYSTOLIC BLOOD PRESSURE: 116 MMHG | DIASTOLIC BLOOD PRESSURE: 78 MMHG | WEIGHT: 255 LBS

## 2024-08-28 VITALS
DIASTOLIC BLOOD PRESSURE: 77 MMHG | TEMPERATURE: 97.3 F | OXYGEN SATURATION: 94 % | BODY MASS INDEX: 43.54 KG/M2 | SYSTOLIC BLOOD PRESSURE: 130 MMHG | HEIGHT: 64 IN | WEIGHT: 255 LBS | HEART RATE: 72 BPM | RESPIRATION RATE: 18 BRPM

## 2024-08-28 DIAGNOSIS — Z87.891 PERSONAL HISTORY OF TOBACCO USE: ICD-10-CM

## 2024-08-28 DIAGNOSIS — Z17.1 MALIGNANT NEOPLASM OF UPPER-INNER QUADRANT OF LEFT BREAST IN FEMALE, ESTROGEN RECEPTOR NEGATIVE (HCC): Primary | ICD-10-CM

## 2024-08-28 DIAGNOSIS — C50.212 MALIGNANT NEOPLASM OF UPPER-INNER QUADRANT OF LEFT BREAST IN FEMALE, ESTROGEN RECEPTOR NEGATIVE (HCC): Primary | ICD-10-CM

## 2024-08-28 DIAGNOSIS — Z15.09 BRCA GENE MUTATION POSITIVE: ICD-10-CM

## 2024-08-28 DIAGNOSIS — M17.0 PRIMARY OSTEOARTHRITIS OF BOTH KNEES: ICD-10-CM

## 2024-08-28 DIAGNOSIS — Z00.00 MEDICARE ANNUAL WELLNESS VISIT, SUBSEQUENT: Primary | ICD-10-CM

## 2024-08-28 DIAGNOSIS — Z15.01 BRCA GENE MUTATION POSITIVE: ICD-10-CM

## 2024-08-28 PROCEDURE — 99214 OFFICE O/P EST MOD 30 MIN: CPT | Performed by: SURGERY

## 2024-08-28 PROCEDURE — 1123F ACP DISCUSS/DSCN MKR DOCD: CPT | Performed by: SURGERY

## 2024-08-28 SDOH — ECONOMIC STABILITY: FOOD INSECURITY: WITHIN THE PAST 12 MONTHS, THE FOOD YOU BOUGHT JUST DIDN'T LAST AND YOU DIDN'T HAVE MONEY TO GET MORE.: SOMETIMES TRUE

## 2024-08-28 SDOH — ECONOMIC STABILITY: INCOME INSECURITY: HOW HARD IS IT FOR YOU TO PAY FOR THE VERY BASICS LIKE FOOD, HOUSING, MEDICAL CARE, AND HEATING?: SOMEWHAT HARD

## 2024-08-28 SDOH — ECONOMIC STABILITY: FOOD INSECURITY: WITHIN THE PAST 12 MONTHS, YOU WORRIED THAT YOUR FOOD WOULD RUN OUT BEFORE YOU GOT MONEY TO BUY MORE.: SOMETIMES TRUE

## 2024-08-28 ASSESSMENT — PATIENT HEALTH QUESTIONNAIRE - PHQ9
SUM OF ALL RESPONSES TO PHQ QUESTIONS 1-9: 2
1. LITTLE INTEREST OR PLEASURE IN DOING THINGS: NOT AT ALL
SUM OF ALL RESPONSES TO PHQ QUESTIONS 1-9: 2
5. POOR APPETITE OR OVEREATING: NOT AT ALL
10. IF YOU CHECKED OFF ANY PROBLEMS, HOW DIFFICULT HAVE THESE PROBLEMS MADE IT FOR YOU TO DO YOUR WORK, TAKE CARE OF THINGS AT HOME, OR GET ALONG WITH OTHER PEOPLE: NOT DIFFICULT AT ALL
7. TROUBLE CONCENTRATING ON THINGS, SUCH AS READING THE NEWSPAPER OR WATCHING TELEVISION: NOT AT ALL
8. MOVING OR SPEAKING SO SLOWLY THAT OTHER PEOPLE COULD HAVE NOTICED. OR THE OPPOSITE, BEING SO FIGETY OR RESTLESS THAT YOU HAVE BEEN MOVING AROUND A LOT MORE THAN USUAL: NOT AT ALL
3. TROUBLE FALLING OR STAYING ASLEEP: SEVERAL DAYS
SUM OF ALL RESPONSES TO PHQ QUESTIONS 1-9: 2
2. FEELING DOWN, DEPRESSED OR HOPELESS: NOT AT ALL
SUM OF ALL RESPONSES TO PHQ QUESTIONS 1-9: 2
SUM OF ALL RESPONSES TO PHQ9 QUESTIONS 1 & 2: 0
4. FEELING TIRED OR HAVING LITTLE ENERGY: NOT AT ALL
9. THOUGHTS THAT YOU WOULD BE BETTER OFF DEAD, OR OF HURTING YOURSELF: NOT AT ALL
6. FEELING BAD ABOUT YOURSELF - OR THAT YOU ARE A FAILURE OR HAVE LET YOURSELF OR YOUR FAMILY DOWN: SEVERAL DAYS

## 2024-08-28 ASSESSMENT — LIFESTYLE VARIABLES
HOW MANY STANDARD DRINKS CONTAINING ALCOHOL DO YOU HAVE ON A TYPICAL DAY: PATIENT DOES NOT DRINK
HOW OFTEN DO YOU HAVE A DRINK CONTAINING ALCOHOL: NEVER

## 2024-08-28 NOTE — PROGRESS NOTES
Medicare Annual Wellness Visit    Elisabeth Steiner is here for Medicare AWV (Patient is here to have disability paperwork signed.) and Medication Refill (Amlodipine and sucralfate)    Assessment & Plan   Personal history of tobacco use  -     [place] VISIT TO DISCUSS LUNG CA SCREEN W LDCT  Recommendations for Preventive Services Due: see orders and patient instructions/AVS.  Recommended screening schedule for the next 5-10 years is provided to the patient in written form: see Patient Instructions/AVS.     No follow-ups on file.     Subjective       Patient's complete Health Risk Assessment and screening values have been reviewed and are found in Flowsheets. The following problems were reviewed today and where indicated follow up appointments were made and/or referrals ordered.    Positive Risk Factor Screenings with Interventions:    Fall Risk:  Do you feel unsteady or are you worried about falling? : (!) yes  2 or more falls in past year?: no  Fall with injury in past year?: no     Interventions:    Reviewed medications, home hazards, visual acuity, and co-morbidities that can increase risk for falls            General HRA Questions:  Select all that apply: (!) New or Increased Pain  Interventions - Pain:  See A/P for plan and any pertinent orders        Abnormal BMI (obese):  Body mass index is 43.77 kg/m². (!) Abnormal  Interventions:  See AVS for additional education material            ADL's:   Patient reports needing help with:  Select all that apply: (!) Walking/Balance  Select all that apply: (!) Housekeeping, Shopping, Food Preparation  Interventions:  See A/P for plan and any pertinent orders      Lung Cancer Screening:  Does this with pulmonology                  Objective   Vitals:    08/28/24 1544   BP: 130/77   Site: Right Upper Arm   Position: Sitting   Cuff Size: Large Adult   Pulse: 72   Resp: 18   Temp: 97.3 °F (36.3 °C)   TempSrc: Temporal   SpO2: 94%   Weight: 115.7 kg (255 lb)   Height: 1.626 m (5'

## 2024-08-28 NOTE — PROGRESS NOTES
SICK VISIT     Elisabeth Steiner (: 1960) is a 64 y.o. female here for evaluation of the following chief concerns(s):  Medicare AWV (Patient is here to have disability paperwork signed.) and Medication Refill (Amlodipine and sucralfate)       ASSESSMENT/PLAN:  1. Medicare annual wellness visit, subsequent  2. Personal history of tobacco use  -     [place] VISIT TO DISCUSS LUNG CA SCREEN W LDCT  3. Primary osteoarthritis of both knees  -     [facility] - Nikolas Gonzalez MD, Orthopedic Surgery(General/Total Joint), [place] ([facility])    Orders Placed This Encounter   Procedures    [facility] - Nikolas Gonzalez MD, Orthopedic Surgery(General/Total Joint), [place] ([facility])     Referral Priority:   Routine     Referral Type:   Eval and Treat     Referral Reason:   Specialty Services Required     Referred to Provider:   Nikolas Gonzalez MD     Requested Specialty:   Orthopedic Surgery     Number of Visits Requested:   1    [place] VISIT TO DISCUSS LUNG CA SCREEN W LDCT     Paperwork for accommodation completed today with patient.    Referral placed back to orthopedic surgery.    FU as scheduled for chronic disease    Patient agrees with plan as above and has no additional questions at this time.     SUBJECTIVE/OBJECTIVE:    Patient presents for follow-up.  She has decided to go back to work as a  which has been great for her mental health.  She loves it.  However, she is having significant difficulty with her mobility and pain in both of her knees.  She is here to follow-up paperwork so she can take a scooter to school.  She also needs a referral back to her orthopedic surgeon Dr. Gonzalez to consider knee replacement surgery.  She has tried cortisone injections which were not effective in the past.  She was told she needed a knee replacement-at the time is almost 300 pounds, was told that she needed to get down to at least 240 pounds.  She has made great strides with weight loss and is down to 255  pounds.  Vitals:    08/28/24 1544   BP: 130/77   Site: Right Upper Arm   Position: Sitting   Cuff Size: Large Adult   Pulse: 72   Resp: 18   Temp: 97.3 °F (36.3 °C)   TempSrc: Temporal   SpO2: 94%   Weight: 115.7 kg (255 lb)   Height: 1.626 m (5' 4\")      Body mass index is 43.77 kg/m².     Gen: NAD, well appearing   Psych: cooperative. Appropriate mood and affect.    On this date 08/28/24 I have spent 35 minutes reviewing previous notes, test results and face to face with the patient for interview/exam, discussing working diagnosis and treatment plan as well as documenting on the day of the visit.       I have discussed the diagnosis with the patient and the intended plan as seen in the above orders.  The patient has received an after-visit summary and questions were answered concerning future plans.  I have discussed medication side effects and warnings with the patient as well. I have reviewed the plan of care with the patient, accepted their input and they are in agreement with the treatment goals. Previous lab and imaging results were reviewed by me.     Justine Prasad MD   Family Medicine

## 2024-08-28 NOTE — PROGRESS NOTES
Elisabeth Valenciagermán Steiner presents today for   Chief Complaint   Patient presents with    Medicare AWV     Patient is here to have disability paperwork signed.    Medication Refill     Amlodipine and sucralfate       Is someone accompanying this pt? non    Is the patient using any DME equipment during OV? no    Risk Factor Screenings with Interventions     Fall Risk:  2 or more falls in past year?: no  Fall with injury in past year?: no    Alcohol:  Alcohol Use: Not At Risk (8/28/2024)    AUDIT-C     Frequency of Alcohol Consumption: Never     Average Number of Drinks: Patient does not drink     Frequency of Binge Drinking: Never       Depression:  PHQ-2 Score: 0    Cognitive:  Clock Drawing Test (CDT): Normal  Words recalled: 3 Words Recalled  Total Score: 5  Total Score Interpretation: Normal Mini-Cog    Health Risk Assessment:     General  In general, how would you say your health is?: Good  In the past 7 days, have you experienced any of the following: New or Increased Pain, New or Increased Fatigue, Loneliness, Social Isolation, Stress or Anger?: (!) Yes  Select all that apply: (!) New or Increased Pain      Health Habits/Nutrition  On average, how many days per week do you engage in moderate to strenuous exercise (like a brisk walk)?: 5 days  Do you eat balanced/healthy meals regularly?: Yes  Have you seen the dentist within the past year?: Yes  Body mass index is 43.77 kg/m².      Hearing/Vision  Have you had an eye exam within the past year?: Yes  Do you have difficulty driving, watching TV, or doing any of your daily activities because of your eyesight?: No  Do you or your family notice any trouble with your hearing that hasn't been managed with hearing aids?: No      Safety  Do you have working smoke detectors?: Yes  Do you have any tripping hazards - loose or unsecured carpets or rugs?: No  Do you have non-slip mats or non-slip surfaces or shower bars or grab bars in your shower or bathtub?: Yes  Do you always

## 2024-08-28 NOTE — PATIENT INSTRUCTIONS
Preventing Falls: Care Instructions  Injuries and health problems such as trouble walking or poor eyesight can increase your risk of falling. So can some medicines. But there are things you can do to help prevent falls. You can exercise to get stronger. You can also arrange your home to make it safer.    Talk to your doctor about the medicines you take. Ask if any of them increase the risk of falls and whether they can be changed or stopped.   Try to exercise regularly. It can help improve your strength and balance. This can help lower your risk of falling.         Practice fall safety and prevention.   Wear low-heeled shoes that fit well and give your feet good support. Talk to your doctor if you have foot problems that make this hard.  Carry a cellphone or wear a medical alert device that you can use to call for help.  Use stepladders instead of chairs to reach high objects. Don't climb if you're at risk for falls. Ask for help, if needed.  Wear the correct eyeglasses, if you need them.        Make your home safer.   Remove rugs, cords, clutter, and furniture from walkways.  Keep your house well lit. Use night-lights in hallways and bathrooms.  Install and use sturdy handrails on stairways.  Wear nonskid footwear, even inside. Don't walk barefoot or in socks without shoes.        Be safe outside.   Use handrails, curb cuts, and ramps whenever possible.  Keep your hands free by using a shoulder bag or backpack.  Try to walk in well-lit areas. Watch out for uneven ground, changes in pavement, and debris.  Be careful in the winter. Walk on the grass or gravel when sidewalks are slippery. Use de-icer on steps and walkways. Add non-slip devices to shoes.    Put grab bars and nonskid mats in your shower or tub and near the toilet. Try to use a shower chair or bath bench when bathing.   Get into a tub or shower by putting in your weaker leg first. Get out with your strong side first. Have a phone or medical alert  8/28/2024  Medicare offers a range of preventive health benefits. Some of the tests and screenings are paid in full while other may be subject to a deductible, co-insurance, and/or copay.    Some of these benefits include a comprehensive review of your medical history including lifestyle, illnesses that may run in your family, and various assessments and screenings as appropriate.    After reviewing your medical record and screening and assessments performed today your provider may have ordered immunizations, labs, imaging, and/or referrals for you.  A list of these orders (if applicable) as well as your Preventive Care list are included within your After Visit Summary for your review.    Other Preventive Recommendations:    A preventive eye exam performed by an eye specialist is recommended every 1-2 years to screen for glaucoma; cataracts, macular degeneration, and other eye disorders.  A preventive dental visit is recommended every 6 months.  Try to get at least 150 minutes of exercise per week or 10,000 steps per day on a pedometer .  Order or download the FREE \"Exercise & Physical Activity: Your Everyday Guide\" from [employer]. Call 1-350.251.4194 or search [employer] online.  You need 3729-5599 mg of calcium and 4966-8962 IU of vitamin D per day. It is possible to meet your calcium requirement with diet alone, but a vitamin D supplement is usually necessary to meet this goal.  When exposed to the sun, use a sunscreen that protects against both UVA and UVB radiation with an SPF of 30 or greater. Reapply every 2 to 3 hours or after sweating, drying off with a towel, or swimming.  Always wear a seat belt when traveling in a car. Always wear a helmet when riding a bicycle or motorcycle.

## 2024-08-28 NOTE — PROGRESS NOTES
CC:   Chief Complaint   Patient presents with    Follow-up     Left breast follow up        Assessment:    ICD-10-CM    1. Malignant neoplasm of upper-inner quadrant of left breast in female, estrogen receptor negative (HCC)  C50.212     Z17.1       2. BRCA gene mutation positive  Z15.01     Z15.09           Plan: I encouraged her to perform skin checks monthly even after mastectomy and should she notice any changes to her skin, new pain, swelling  or new lumps I would like to see her back prior to her 6 month follow up.  She will continue to follow with medical oncology. She has no interests in plastic referrals for reconstruction. Follow up in 6 months or sooner should she have any questions or concerns. She agrees with this plan.            HPI:  Ms. Elisabeth Steiner is a 64 year old woman with BRCA2  mutation and T2N0 triple negative breast cancer, s/p bilateral mastectomy 11/23/21. She had been diagnosed on core biopsy 10/19/21. The area of concern was identified as a palpable mass around September 2021. She denied nipple discharge. She denied breast  pain. There is family history of breast cancer in her sister, mother and maternal aunt. Her most recent previous mammogram was 10/14/19.        She has a personal history of fibrosarcoma. Her sister has HNPCC/PAULINO mutation.  Genetic testing via [facility] demonstrated BRCA2 mutation  11/8/21.        She did undergo mediport removal and mastectomy scar revision on 6/28/2022 medial aspect of incision so this was completely flat. Overall she is feeling well today and has no complaints. No changes to her mastectomy incisions.     Allergies:  Allergies   Allergen Reactions    Latex Hives and Rash    Adhesive Tape Swelling     REALLY BAD SWELLING AND SORE APPEAR    Alcohol Other (See Comments)     PT STATES SHE IS AN ALCOHOLIC    Lactose Other (See Comments)     PT STATES IT MAKES HER STOMACH HURT    Oxycodone Itching and Other (See Comments)     crying     Lesions:  None       Portal vein: 8 mm in transverse diameter with appropriate direction of  flow.    Biliary:        Intrahepatic Biliary Dilation: Absent        CBD: 10 mm, enlarged but can be seen in the post cholecystectomy state       Gallbladder: Absent          Right Kidney: Normal with no hydronephrosis.    Ascites: None.   Impression: Liver is enlarged echogenic compatible with chronic hepatic parenchymal disease  including but not limited to hepatic steatosis.    Dilated CBD measuring 10 mm which may be seen in the post cholecystectomy state.  If further evaluation is needed MRCP can be obtained.         Physical Exam:  /78   Pulse 80   Temp 97 °F (36.1 °C) (Temporal)   Resp 18   Ht 1.626 m (5' 4\")   Wt 115.7 kg (255 lb)   SpO2 97%   BMI 43.77 kg/m²  BMI: Body mass index is 43.77 kg/m².    Physical Exam  Constitutional:       Appearance: Normal appearance.   HENT:      Head: Normocephalic.   Eyes:      Extraocular Movements: Extraocular movements intact.      Conjunctiva/sclera: Conjunctivae normal.      Pupils: Pupils are equal, round, and reactive to light.   Cardiovascular:      Rate and Rhythm: Normal rate.   Pulmonary:      Effort: Pulmonary effort is normal.   Chest:   Breasts:     Right: Absent. No mass, skin change or tenderness.      Left: Absent. No mass, skin change or tenderness.   Lymphadenopathy:      Upper Body:      Right upper body: No supraclavicular, axillary or pectoral adenopathy.      Left upper body: No supraclavicular, axillary or pectoral adenopathy.   Neurological:      Mental Status: She is alert.   Psychiatric:         Mood and Affect: Mood normal.         Behavior: Behavior normal.         Thought Content: Thought content normal.         Judgment: Judgment normal.         I have reviewed the information entered by the clinical staff and/or patient and verified it as accurate or edited where necessary.     Electronically signed by:    Dari Quezada DO

## 2024-08-30 ASSESSMENT — ENCOUNTER SYMPTOMS
CHEST TIGHTNESS: 0
SHORTNESS OF BREATH: 0

## 2024-08-31 DIAGNOSIS — G62.0 NEUROPATHY DUE TO CHEMOTHERAPEUTIC DRUG (HCC): ICD-10-CM

## 2024-08-31 DIAGNOSIS — T45.1X5A NEUROPATHY DUE TO CHEMOTHERAPEUTIC DRUG (HCC): ICD-10-CM

## 2024-09-03 RX ORDER — GABAPENTIN 300 MG/1
CAPSULE ORAL
Qty: 270 CAPSULE | Refills: 0 | Status: SHIPPED | OUTPATIENT
Start: 2024-09-03 | End: 2024-12-02

## 2024-09-03 RX ORDER — SUCRALFATE 1 G/1
1 TABLET ORAL 2 TIMES DAILY
Qty: 180 TABLET | Refills: 1 | Status: SHIPPED | OUTPATIENT
Start: 2024-09-03

## 2024-09-13 ENCOUNTER — PATIENT MESSAGE (OUTPATIENT)
Facility: CLINIC | Age: 64
End: 2024-09-13

## 2024-09-13 DIAGNOSIS — G62.0 NEUROPATHY DUE TO CHEMOTHERAPEUTIC DRUG (HCC): ICD-10-CM

## 2024-09-13 DIAGNOSIS — T45.1X5A NEUROPATHY DUE TO CHEMOTHERAPEUTIC DRUG (HCC): ICD-10-CM

## 2024-09-16 RX ORDER — GABAPENTIN 300 MG/1
300 CAPSULE ORAL 3 TIMES DAILY
Qty: 270 CAPSULE | Refills: 0 | OUTPATIENT
Start: 2024-09-16 | End: 2024-12-15

## 2024-09-16 RX ORDER — AMLODIPINE BESYLATE 10 MG/1
10 TABLET ORAL DAILY
Qty: 90 TABLET | Refills: 3 | Status: SHIPPED | OUTPATIENT
Start: 2024-09-16

## 2024-10-09 ENCOUNTER — OFFICE VISIT (OUTPATIENT)
Age: 64
End: 2024-10-09
Payer: MEDICARE

## 2024-10-09 VITALS — WEIGHT: 247 LBS | BODY MASS INDEX: 42.17 KG/M2 | RESPIRATION RATE: 14 BRPM | HEIGHT: 64 IN

## 2024-10-09 DIAGNOSIS — M17.12 PRIMARY OSTEOARTHRITIS OF LEFT KNEE: ICD-10-CM

## 2024-10-09 DIAGNOSIS — M17.11 PRIMARY OSTEOARTHRITIS OF RIGHT KNEE: Primary | ICD-10-CM

## 2024-10-09 PROCEDURE — 99214 OFFICE O/P EST MOD 30 MIN: CPT | Performed by: ORTHOPAEDIC SURGERY

## 2024-10-09 PROCEDURE — 1123F ACP DISCUSS/DSCN MKR DOCD: CPT | Performed by: ORTHOPAEDIC SURGERY

## 2024-10-09 PROCEDURE — 20610 DRAIN/INJ JOINT/BURSA W/O US: CPT | Performed by: ORTHOPAEDIC SURGERY

## 2024-10-09 PROCEDURE — 73564 X-RAY EXAM KNEE 4 OR MORE: CPT | Performed by: ORTHOPAEDIC SURGERY

## 2024-10-09 RX ORDER — LIDOCAINE HYDROCHLORIDE 10 MG/ML
3 INJECTION, SOLUTION INFILTRATION; PERINEURAL ONCE
Status: COMPLETED | OUTPATIENT
Start: 2024-10-09 | End: 2024-10-09

## 2024-10-09 RX ORDER — BETAMETHASONE SODIUM PHOSPHATE AND BETAMETHASONE ACETATE 3; 3 MG/ML; MG/ML
6 INJECTION, SUSPENSION INTRA-ARTICULAR; INTRALESIONAL; INTRAMUSCULAR; SOFT TISSUE ONCE
Status: COMPLETED | OUTPATIENT
Start: 2024-10-09 | End: 2024-10-09

## 2024-10-09 RX ADMIN — BETAMETHASONE SODIUM PHOSPHATE AND BETAMETHASONE ACETATE 6 MG: 3; 3 INJECTION, SUSPENSION INTRA-ARTICULAR; INTRALESIONAL; INTRAMUSCULAR; SOFT TISSUE at 14:42

## 2024-10-09 RX ADMIN — LIDOCAINE HYDROCHLORIDE 3 ML: 10 INJECTION, SOLUTION INFILTRATION; PERINEURAL at 14:41

## 2024-10-09 NOTE — PROGRESS NOTES
Patient: Elisabeth Steiner                MRN: [id number]       SSN: [id number]  YOB: 1960        AGE: 64 y.o.        SEX: female  Body mass index is 42.4 kg/m².    PCP: Justine Prasad MD  10/09/24    Ms. Steiner is here today for reevaluation of bilateral knee pain she is lost over 40 pounds and would like to have her right knee replaced she is getting significant instability with it is not dependable and she is gone into varus she like to get it replaced the left knee she would like to get an injection for the right knee she has less than a 5-minute level walking tolerance pain at night pain with activities of daily living and stairs are very difficult as well she is tried injections in the past not too much success    The examination of today very pleasant lady in no acute distress she is lost a lot of weight BMI is at 42 hips rotate nicely calf nontender Homans' sign is negative good pulses present distally    At this point I would not hesitate to recommend surgery    Risks and benefits described including but not limited to infection DVT pulmonary embolism anesthetic complications blood loss requiring transfusion chronic pain instability implant longevity arthrofibrosis and also the need for bending and straightening knee on an hourly basis to avoid complications    Left knee injected as per protocol social determinants of health reviewed she does have handicap tag for the car I think she benefit from a cane as well      X-rays 4 views both knees 10/9/2024 confirm severe arthritis both knees in varus extensive patellofemoral involvement      [facility]  OFFICE PROCEDURE PROGRESS NOTE      Chart reviewed for the following:  Nikolas BOWLES M.D., have reviewed the History, Physical and updated the Allergic reactions for Elisabeth Steiner?    TIME OUT performed immediately prior to start of procedure:  Nikolas BOWLES M.D., have performed the following

## 2024-10-22 ENCOUNTER — TELEPHONE (OUTPATIENT)
Age: 64
End: 2024-10-22

## 2024-10-22 DIAGNOSIS — M17.11 PRIMARY OSTEOARTHRITIS OF RIGHT KNEE: Primary | ICD-10-CM

## 2024-10-22 NOTE — TELEPHONE ENCOUNTER
Please put Internal Referral in for [facility]monary Dr. Severo Gross : Pre Op Clearance for upcoming TKA, HX of COPD.

## 2024-10-24 ENCOUNTER — CLINICAL DOCUMENTATION (OUTPATIENT)
Age: 64
End: 2024-10-24

## 2024-11-05 RX ORDER — AZELASTINE HYDROCHLORIDE, FLUTICASONE PROPIONATE 137; 50 UG/1; UG/1
SPRAY, METERED NASAL
Qty: 23 G | Refills: 5 | Status: SHIPPED | OUTPATIENT
Start: 2024-11-05

## 2024-12-03 DIAGNOSIS — F33.1 MODERATE EPISODE OF RECURRENT MAJOR DEPRESSIVE DISORDER (HCC): ICD-10-CM

## 2024-12-03 RX ORDER — BUPROPION HYDROCHLORIDE 300 MG/1
300 TABLET ORAL EVERY MORNING
Qty: 90 TABLET | Refills: 1 | Status: SHIPPED | OUTPATIENT
Start: 2024-12-03

## 2024-12-11 DIAGNOSIS — T45.1X5A NEUROPATHY DUE TO CHEMOTHERAPEUTIC DRUG (HCC): ICD-10-CM

## 2024-12-11 DIAGNOSIS — G62.0 NEUROPATHY DUE TO CHEMOTHERAPEUTIC DRUG (HCC): ICD-10-CM

## 2024-12-11 RX ORDER — GABAPENTIN 300 MG/1
CAPSULE ORAL
Qty: 270 CAPSULE | Refills: 0 | Status: SHIPPED | OUTPATIENT
Start: 2024-12-11 | End: 2025-03-11

## 2024-12-19 ENCOUNTER — OFFICE VISIT (OUTPATIENT)
Facility: CLINIC | Age: 64
End: 2024-12-19
Payer: MEDICARE

## 2024-12-19 VITALS
WEIGHT: 240.2 LBS | DIASTOLIC BLOOD PRESSURE: 83 MMHG | BODY MASS INDEX: 41.01 KG/M2 | TEMPERATURE: 96.9 F | HEIGHT: 64 IN | HEART RATE: 79 BPM | RESPIRATION RATE: 14 BRPM | OXYGEN SATURATION: 91 % | SYSTOLIC BLOOD PRESSURE: 134 MMHG

## 2024-12-19 DIAGNOSIS — M85.851 OSTEOPENIA OF NECK OF RIGHT FEMUR: ICD-10-CM

## 2024-12-19 DIAGNOSIS — R73.03 PREDIABETES: ICD-10-CM

## 2024-12-19 DIAGNOSIS — F41.8 MIXED ANXIETY AND DEPRESSIVE DISORDER: ICD-10-CM

## 2024-12-19 DIAGNOSIS — Z90.13 HISTORY OF BILATERAL MASTECTOMY: ICD-10-CM

## 2024-12-19 DIAGNOSIS — Z87.891 PERSONAL HISTORY OF TOBACCO USE: Primary | ICD-10-CM

## 2024-12-19 DIAGNOSIS — J44.9 CHRONIC OBSTRUCTIVE PULMONARY DISEASE, UNSPECIFIED COPD TYPE (HCC): ICD-10-CM

## 2024-12-19 DIAGNOSIS — E66.01 OBESITY, MORBID: ICD-10-CM

## 2024-12-19 DIAGNOSIS — C49.9 MALIGNANT NEOPLASM OF CONNECTIVE AND SOFT TISSUE, UNSPECIFIED (HCC): ICD-10-CM

## 2024-12-19 DIAGNOSIS — Z85.3 HISTORY OF BREAST CANCER: ICD-10-CM

## 2024-12-19 DIAGNOSIS — G47.33 OBSTRUCTIVE SLEEP APNEA SYNDROME: ICD-10-CM

## 2024-12-19 DIAGNOSIS — F10.21 ALCOHOL DEPENDENCE, IN REMISSION (HCC): ICD-10-CM

## 2024-12-19 DIAGNOSIS — F33.1 MODERATE EPISODE OF RECURRENT MAJOR DEPRESSIVE DISORDER (HCC): ICD-10-CM

## 2024-12-19 DIAGNOSIS — E11.9 TYPE 2 DIABETES MELLITUS WITHOUT COMPLICATION, WITHOUT LONG-TERM CURRENT USE OF INSULIN (HCC): ICD-10-CM

## 2024-12-19 DIAGNOSIS — G62.0 NEUROPATHY DUE TO CHEMOTHERAPEUTIC DRUG (HCC): ICD-10-CM

## 2024-12-19 DIAGNOSIS — K21.9 GASTROESOPHAGEAL REFLUX DISEASE, UNSPECIFIED WHETHER ESOPHAGITIS PRESENT: ICD-10-CM

## 2024-12-19 DIAGNOSIS — T45.1X5A NEUROPATHY DUE TO CHEMOTHERAPEUTIC DRUG (HCC): ICD-10-CM

## 2024-12-19 PROCEDURE — 99214 OFFICE O/P EST MOD 30 MIN: CPT | Performed by: STUDENT IN AN ORGANIZED HEALTH CARE EDUCATION/TRAINING PROGRAM

## 2024-12-19 PROCEDURE — 3044F HG A1C LEVEL LT 7.0%: CPT | Performed by: STUDENT IN AN ORGANIZED HEALTH CARE EDUCATION/TRAINING PROGRAM

## 2024-12-19 PROCEDURE — 1159F MED LIST DOCD IN RCRD: CPT | Performed by: STUDENT IN AN ORGANIZED HEALTH CARE EDUCATION/TRAINING PROGRAM

## 2024-12-19 PROCEDURE — G0296 VISIT TO DETERM LDCT ELIG: HCPCS | Performed by: STUDENT IN AN ORGANIZED HEALTH CARE EDUCATION/TRAINING PROGRAM

## 2024-12-19 PROCEDURE — 1123F ACP DISCUSS/DSCN MKR DOCD: CPT | Performed by: STUDENT IN AN ORGANIZED HEALTH CARE EDUCATION/TRAINING PROGRAM

## 2024-12-19 NOTE — PROGRESS NOTES
Elisabeth Casenis presents today for   Chief Complaint   Patient presents with    3 Month Follow-Up     Patient is traveling to a high altitude place and want to discuss how not to get sick. Patient's hands are bothering her.       Is someone accompanying this pt? no    Is the patient using any DME equipment during OV? no    Health Maintenance Due   Topic Date Due    Hepatitis C screen  Never done    Diabetic retinal exam  Never done    GFR test (Diabetes, CKD 3-4, OR last GFR 15-59)  01/24/2024    Lung Cancer Screening &/or Counseling  01/24/2024    Flu vaccine (1) 08/01/2024    COVID-19 Vaccine (5 - 2023-24 season) 09/01/2024    DEXA (modify frequency per FRAX score)  10/24/2024    A1C test (Diabetic or Prediabetic)  01/18/2025         \"Have you been to the ER, urgent care clinic since your last visit?  Hospitalized since your last visit?\"    NO    “Have you seen or consulted any other health care providers outside our system since your last visit?”    NO      “Have you had a diabetic eye exam?”    NO     No diabetic eye exam on file

## 2024-12-19 NOTE — PROGRESS NOTES
Chronic Disease Follow up    Elisabeth Steiner (: 1960) is a 64 y.o. female here for evaluation of the following chief concerns(s):  3 Month Follow-Up (Patient is traveling to a high altitude place and want to discuss how not to get sick. Patient's hands are bothering her.)       ASSESSMENT/PLAN:  1. Personal history of tobacco use  -     MN VISIT TO DISCUSS LUNG CA SCREEN W LDCT  -     CT Lung Screen (Initial/Annual/Baseline); Future  2. Mixed anxiety and depressive disorder  3. Malignant neoplasm of connective and soft tissue, unspecified (HCC)  4. Moderate episode of recurrent major depressive disorder (HCC)  5. Type 2 diabetes mellitus without complication, without long-term current use of insulin (HCC)  6. Alcohol dependence, in remission (HCC)  7. Chronic obstructive pulmonary disease, unspecified COPD type (HCC)  8. Gastroesophageal reflux disease, unspecified whether esophagitis present  9. History of bilateral mastectomy  10. History of breast cancer  11. Neuropathy due to chemotherapeutic drug (HCC)  12. Obesity, morbid  13. Osteopenia of neck of right femur  14. Prediabetes  15. Obstructive sleep apnea syndrome    Orders Placed This Encounter   Procedures    CT Lung Screen (Initial/Annual/Baseline)     Age: Patient is 64 y.o.    Smoking History:   Tobacco Use      Smoking status: Former        Packs/day: 0.00        Years: 2.0 packs/day for 45.0 years (90.0 ttl pk-yrs)        Types: Cigarettes        Start date: 1975        Quit date: 2020        Years since quittin.5      Smokeless tobacco: Never        Date of last lung cancer screenin2022     Standing Status:   Future     Standing Expiration Date:   2026     Order Specific Question:   Is there documentation of shared decision making?     Answer:   Yes     Order Specific Question:   Is this a low dose CT or a routine CT?     Answer:   Low Dose CT [1]     Order Specific Question:   Is this the first (baseline) CT or an

## 2024-12-19 NOTE — PROGRESS NOTES
Discussed with the patient the current USPSTF guidelines released March 9, 2021 for screening for lung cancer.    For adults aged 50 to 80 years who have a 20 pack-year smoking history and currently smoke or have quit within the past 15 years the grade B recommendation is to:  Screen for lung cancer with low-dose computed tomography (LDCT) every year.  Stop screening once a person has not smoked for 15 years or has a health problem that limits life expectancy or the ability to have lung surgery.    The patient  reports that she quit smoking about 4 years ago. Her smoking use included cigarettes. She started smoking about 49 years ago. She has a 90 pack-year smoking history. She has never used smokeless tobacco.. Discussed with patient the risks and benefits of screening, including over-diagnosis, false positive rate, and total radiation exposure.  The patient currently exhibits no signs or symptoms suggestive of lung cancer.  Discussed with patient the importance of compliance with yearly annual lung cancer screenings and willingness to undergo diagnosis and treatment if screening scan is positive.  In addition, the patient was counseled regarding the importance of remaining smoke free and/or total smoking cessation.    Also reviewed the following if the patient has Medicare that as of February 10, 2022, Medicare only covers LDCT screening in patients aged 50-77 with at least a 20 pack-year smoking history who currently smoke or have quit in the last 15 years

## 2025-01-10 ENCOUNTER — HOSPITAL ENCOUNTER (OUTPATIENT)
Facility: HOSPITAL | Age: 65
Discharge: HOME | End: 2025-01-13
Attending: STUDENT IN AN ORGANIZED HEALTH CARE EDUCATION/TRAINING PROGRAM
Payer: MEDICARE

## 2025-01-10 DIAGNOSIS — Z87.891 PERSONAL HISTORY OF TOBACCO USE: ICD-10-CM

## 2025-01-10 PROCEDURE — 71271 CT THORAX LUNG CANCER SCR C-: CPT

## 2025-02-19 ENCOUNTER — TELEPHONE (OUTPATIENT)
Facility: CLINIC | Age: 65
End: 2025-02-19

## 2025-02-19 NOTE — TELEPHONE ENCOUNTER
Prior Authorization request comes through Cover My Meds for this patient's Azelastine-Fluticasone 137-50 and the message below is what comes back from insurance.    The following alternatives are the preferred alternatives: AZELASTINE HCL, DYMISTA, FLUNISOLIDE, FLUTICASONE PROPIONATE SUSP, MOMETASONE FUROATE, RYALTRIS, CONCOMITANT USE OF AZELASTINE HCL WITH ONE OF THE FOLLOWING [FLUNISOLIDE OR FLUTICASONE PROPIONATE SUSP OR MOMETASONE FUROATE]. Would you like to switch to the provided preferred alternatives? Please note: Brand DYMISTA is the preferred covered alternative and will process at a lower tier without the need of a Prior Authorization.*    Looks like DYMISTA is preferred brand and will be cheaper for patient.  If you agree, please send new rx.

## 2025-02-24 RX ORDER — AZELASTINE HYDROCHLORIDE, FLUTICASONE PROPIONATE 137; 50 UG/1; UG/1
1 SPRAY, METERED NASAL 2 TIMES DAILY
Qty: 23 G | Refills: 3 | Status: SHIPPED | OUTPATIENT
Start: 2025-02-24

## 2025-03-07 DIAGNOSIS — G62.0 NEUROPATHY DUE TO CHEMOTHERAPEUTIC DRUG: ICD-10-CM

## 2025-03-07 DIAGNOSIS — T45.1X5A NEUROPATHY DUE TO CHEMOTHERAPEUTIC DRUG: ICD-10-CM

## 2025-03-10 RX ORDER — GABAPENTIN 300 MG/1
CAPSULE ORAL
Qty: 270 CAPSULE | Refills: 0 | Status: SHIPPED | OUTPATIENT
Start: 2025-03-10 | End: 2025-06-08

## 2025-03-10 NOTE — TELEPHONE ENCOUNTER
Refilled patient's Gabapentin 300 mg capsule 3 times daily on 3/10/2025 for 90 days.  PDMP reviewed.

## 2025-03-12 RX ORDER — SUCRALFATE 1 G/1
1 TABLET ORAL 2 TIMES DAILY
Qty: 180 TABLET | Refills: 1 | Status: SHIPPED | OUTPATIENT
Start: 2025-03-12

## 2025-03-21 SDOH — ECONOMIC STABILITY: FOOD INSECURITY: WITHIN THE PAST 12 MONTHS, YOU WORRIED THAT YOUR FOOD WOULD RUN OUT BEFORE YOU GOT MONEY TO BUY MORE.: NEVER TRUE

## 2025-03-21 SDOH — ECONOMIC STABILITY: FOOD INSECURITY: WITHIN THE PAST 12 MONTHS, THE FOOD YOU BOUGHT JUST DIDN'T LAST AND YOU DIDN'T HAVE MONEY TO GET MORE.: SOMETIMES TRUE

## 2025-03-21 SDOH — ECONOMIC STABILITY: INCOME INSECURITY: IN THE LAST 12 MONTHS, WAS THERE A TIME WHEN YOU WERE NOT ABLE TO PAY THE MORTGAGE OR RENT ON TIME?: YES

## 2025-03-21 SDOH — ECONOMIC STABILITY: TRANSPORTATION INSECURITY
IN THE PAST 12 MONTHS, HAS THE LACK OF TRANSPORTATION KEPT YOU FROM MEDICAL APPOINTMENTS OR FROM GETTING MEDICATIONS?: NO

## 2025-03-24 ENCOUNTER — OFFICE VISIT (OUTPATIENT)
Facility: CLINIC | Age: 65
End: 2025-03-24
Payer: MEDICARE

## 2025-03-24 ENCOUNTER — HOSPITAL ENCOUNTER (OUTPATIENT)
Age: 65
Discharge: HOME | End: 2025-03-27
Payer: MEDICARE

## 2025-03-24 VITALS
SYSTOLIC BLOOD PRESSURE: 115 MMHG | RESPIRATION RATE: 14 BRPM | TEMPERATURE: 97.5 F | DIASTOLIC BLOOD PRESSURE: 74 MMHG | HEART RATE: 79 BPM | OXYGEN SATURATION: 92 % | WEIGHT: 233.2 LBS | BODY MASS INDEX: 39.81 KG/M2 | HEIGHT: 64 IN

## 2025-03-24 DIAGNOSIS — Z00.00 HEALTHCARE MAINTENANCE: ICD-10-CM

## 2025-03-24 DIAGNOSIS — M85.851 OSTEOPENIA OF NECK OF RIGHT FEMUR: ICD-10-CM

## 2025-03-24 DIAGNOSIS — Z86.39 HISTORY OF IRON DEFICIENCY: ICD-10-CM

## 2025-03-24 DIAGNOSIS — E66.01 OBESITY, MORBID: ICD-10-CM

## 2025-03-24 DIAGNOSIS — R73.03 PREDIABETES: Primary | ICD-10-CM

## 2025-03-24 DIAGNOSIS — Z85.3 HISTORY OF BREAST CANCER: ICD-10-CM

## 2025-03-24 LAB
25(OH)D3 SERPL-MCNC: 39.4 NG/ML (ref 30–100)
ALBUMIN SERPL-MCNC: 3.2 G/DL (ref 3.4–5)
ALBUMIN/GLOB SERPL: 0.8 (ref 0.8–1.7)
ALP SERPL-CCNC: 125 U/L (ref 45–117)
ALT SERPL-CCNC: 28 U/L (ref 13–56)
ANION GAP SERPL CALC-SCNC: 8 MMOL/L (ref 3–18)
AST SERPL W P-5'-P-CCNC: 22 U/L (ref 10–38)
BASOPHILS # BLD: 0.07 K/UL (ref 0–0.1)
BASOPHILS NFR BLD: 0.7 % (ref 0–2)
BILIRUB SERPL-MCNC: 0.3 MG/DL (ref 0.2–1)
BUN SERPL-MCNC: 10 MG/DL (ref 7–18)
BUN/CREAT SERPL: 11 (ref 12–20)
CA-I BLD-MCNC: 8.7 MG/DL (ref 8.5–10.1)
CHLORIDE SERPL-SCNC: 108 MMOL/L (ref 100–111)
CHOLEST SERPL-MCNC: 161 MG/DL
CO2 SERPL-SCNC: 25 MMOL/L (ref 21–32)
CREAT SERPL-MCNC: 0.93 MG/DL (ref 0.6–1.3)
DIFFERENTIAL METHOD BLD: ABNORMAL
EOSINOPHIL # BLD: 0.27 K/UL (ref 0–0.4)
EOSINOPHIL NFR BLD: 2.6 % (ref 0–5)
ERYTHROCYTE [DISTWIDTH] IN BLOOD BY AUTOMATED COUNT: 13.8 % (ref 11.6–14.5)
FERRITIN SERPL-MCNC: 222 NG/ML (ref 8–388)
FOLATE SERPL-MCNC: >20 NG/ML (ref 3.1–17.5)
GLOBULIN SER CALC-MCNC: 4.2 G/DL (ref 2–4)
GLUCOSE SERPL-MCNC: 106 MG/DL (ref 74–99)
HBA1C MFR BLD: 5.6 %
HCT VFR BLD AUTO: 45.2 % (ref 35–45)
HDLC SERPL-MCNC: 73 MG/DL (ref 40–60)
HDLC SERPL: 2.2 (ref 0–5)
HGB BLD-MCNC: 15.1 G/DL (ref 12–16)
IMM GRANULOCYTES # BLD AUTO: 0.03 K/UL (ref 0–0.04)
IMM GRANULOCYTES NFR BLD AUTO: 0.3 % (ref 0–0.5)
IRON SATN MFR SERPL: 26 % (ref 20–50)
IRON SERPL-MCNC: 68 UG/DL (ref 50–175)
LDLC SERPL CALC-MCNC: 73.2 MG/DL (ref 0–100)
LIPID PANEL: ABNORMAL
LYMPHOCYTES # BLD: 2.54 K/UL (ref 0.9–3.6)
LYMPHOCYTES NFR BLD: 24.2 % (ref 21–52)
MCH RBC QN AUTO: 30 PG (ref 24–34)
MCHC RBC AUTO-ENTMCNC: 33.4 G/DL (ref 31–37)
MCV RBC AUTO: 89.9 FL (ref 78–100)
MONOCYTES # BLD: 0.5 K/UL (ref 0.05–1.2)
MONOCYTES NFR BLD: 4.8 % (ref 3–10)
NEUTS SEG # BLD: 7.1 K/UL (ref 1.8–8)
NEUTS SEG NFR BLD: 67.4 % (ref 40–73)
NRBC # BLD: 0 K/UL (ref 0–0.01)
NRBC BLD-RTO: 0 PER 100 WBC
PLATELET # BLD AUTO: 411 K/UL (ref 135–420)
PMV BLD AUTO: 9.3 FL (ref 9.2–11.8)
POTASSIUM SERPL-SCNC: 3.9 MMOL/L (ref 3.5–5.5)
PROT SERPL-MCNC: 7.4 G/DL (ref 6.4–8.2)
RBC # BLD AUTO: 5.03 M/UL (ref 4.2–5.3)
SODIUM SERPL-SCNC: 141 MMOL/L (ref 136–145)
TIBC SERPL-MCNC: 258 UG/DL (ref 250–450)
TRIGL SERPL-MCNC: 74 MG/DL
VIT B12 SERPL-MCNC: 820 PG/ML (ref 211–911)
VLDLC SERPL CALC-MCNC: 14.8 MG/DL
WBC # BLD AUTO: 10.5 K/UL (ref 4.6–13.2)

## 2025-03-24 PROCEDURE — 82607 VITAMIN B-12: CPT

## 2025-03-24 PROCEDURE — 85025 COMPLETE CBC W/AUTO DIFF WBC: CPT

## 2025-03-24 PROCEDURE — 82728 ASSAY OF FERRITIN: CPT

## 2025-03-24 PROCEDURE — 80053 COMPREHEN METABOLIC PANEL: CPT

## 2025-03-24 PROCEDURE — 83036 HEMOGLOBIN GLYCOSYLATED A1C: CPT | Performed by: STUDENT IN AN ORGANIZED HEALTH CARE EDUCATION/TRAINING PROGRAM

## 2025-03-24 PROCEDURE — 80061 LIPID PANEL: CPT

## 2025-03-24 PROCEDURE — 99214 OFFICE O/P EST MOD 30 MIN: CPT | Performed by: STUDENT IN AN ORGANIZED HEALTH CARE EDUCATION/TRAINING PROGRAM

## 2025-03-24 PROCEDURE — 1159F MED LIST DOCD IN RCRD: CPT | Performed by: STUDENT IN AN ORGANIZED HEALTH CARE EDUCATION/TRAINING PROGRAM

## 2025-03-24 PROCEDURE — 83540 ASSAY OF IRON: CPT

## 2025-03-24 PROCEDURE — 36415 COLL VENOUS BLD VENIPUNCTURE: CPT

## 2025-03-24 PROCEDURE — 82746 ASSAY OF FOLIC ACID SERUM: CPT

## 2025-03-24 PROCEDURE — 1123F ACP DISCUSS/DSCN MKR DOCD: CPT | Performed by: STUDENT IN AN ORGANIZED HEALTH CARE EDUCATION/TRAINING PROGRAM

## 2025-03-24 PROCEDURE — 82306 VITAMIN D 25 HYDROXY: CPT

## 2025-03-24 RX ORDER — SEMAGLUTIDE 2.68 MG/ML
2 INJECTION, SOLUTION SUBCUTANEOUS WEEKLY
Qty: 9 ML | Refills: 3 | Status: SHIPPED | OUTPATIENT
Start: 2025-03-24

## 2025-03-24 RX ORDER — DULOXETIN HYDROCHLORIDE 60 MG/1
60 CAPSULE, DELAYED RELEASE ORAL DAILY
Qty: 90 CAPSULE | Refills: 1 | Status: SHIPPED | OUTPATIENT
Start: 2025-03-24

## 2025-03-24 ASSESSMENT — PATIENT HEALTH QUESTIONNAIRE - PHQ9
10. IF YOU CHECKED OFF ANY PROBLEMS, HOW DIFFICULT HAVE THESE PROBLEMS MADE IT FOR YOU TO DO YOUR WORK, TAKE CARE OF THINGS AT HOME, OR GET ALONG WITH OTHER PEOPLE: NOT DIFFICULT AT ALL
SUM OF ALL RESPONSES TO PHQ QUESTIONS 1-9: 1
4. FEELING TIRED OR HAVING LITTLE ENERGY: SEVERAL DAYS
1. LITTLE INTEREST OR PLEASURE IN DOING THINGS: NOT AT ALL
9. THOUGHTS THAT YOU WOULD BE BETTER OFF DEAD, OR OF HURTING YOURSELF: NOT AT ALL
7. TROUBLE CONCENTRATING ON THINGS, SUCH AS READING THE NEWSPAPER OR WATCHING TELEVISION: NOT AT ALL
5. POOR APPETITE OR OVEREATING: NOT AT ALL
SUM OF ALL RESPONSES TO PHQ QUESTIONS 1-9: 1
3. TROUBLE FALLING OR STAYING ASLEEP: NOT AT ALL
8. MOVING OR SPEAKING SO SLOWLY THAT OTHER PEOPLE COULD HAVE NOTICED. OR THE OPPOSITE, BEING SO FIGETY OR RESTLESS THAT YOU HAVE BEEN MOVING AROUND A LOT MORE THAN USUAL: NOT AT ALL
2. FEELING DOWN, DEPRESSED OR HOPELESS: NOT AT ALL
6. FEELING BAD ABOUT YOURSELF - OR THAT YOU ARE A FAILURE OR HAVE LET YOURSELF OR YOUR FAMILY DOWN: NOT AT ALL

## 2025-03-24 NOTE — PROGRESS NOTES
Elisabeth Casenis presents today for   Chief Complaint   Patient presents with    3 Month Follow-Up    Medication Refill       Is someone accompanying this pt? no    Is the patient using any DME equipment during OV? no    Health Maintenance Due   Topic Date Due    Diabetic foot exam  Never done    Diabetic Alb to Cr ratio (uACR) test  Never done    Hepatitis C screen  Never done    Diabetic retinal exam  Never done    GFR test (Diabetes, CKD 3-4, OR last GFR 15-59)  01/24/2024    Flu vaccine (1) 08/01/2024    COVID-19 Vaccine (6 - 2024-25 season) 09/01/2024    DEXA (modify frequency per FRAX score)  10/24/2024    A1C test (Diabetic or Prediabetic)  01/18/2025    Lipids  01/18/2025         \"Have you been to the ER, urgent care clinic since your last visit?  Hospitalized since your last visit?\"    YES - When: approximately 1 months ago.  Where and Why: [facility] for fall at work.    “Have you seen or consulted any other health care providers outside our system since your last visit?”    NO      “Have you had a diabetic eye exam?”    NO     No diabetic eye exam on file          
Fingerstick for HBa1C done in first finger by Shlomo Painter MA per order of  Dr. Justine Prasad  after cleaning area with alcohol wipe.  Patient tolerated procedure well.   
in .    H/O breast cancer   Diagnosed in    Both breasts- triple negative   Initial double mastectomy was 2021- had revision for scar tissue in 2022  Follows with Dr. Forte (surgeon), Dr. Rojas at [place] for oncology  S/P chemo just recently finished 2023  Mom and sister and grandmother all  at young ages. 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 WILBERT Gross  Compliant with CPAP for COPD    Osteopenia  DEXA   No fracture history  No recent falls    Acid reflux  On PPI- Protonix BID + carafate  No breakthrough symptoms   EGD 2023- gastritis    History of gastric bypass surgery     Heaptosteatosis   Elevated LFT's + elevated GGT  Abdominal US 2024: \" Liver is enlarged echogenic compatible with chronic hepatic parenchymal disease  including but not limited to hepatic steatosis. Dilated CBD measuring 10 mm which may be seen in the post cholecystectomy state\"  Has seen GI- recommendation is to monitor every 6 months with labs and ultrasound    Prediabetes  Hemoglobin A1C   Date Value Ref Range Status   2024 5.7 (H) 4.2 - 5.6 % Final     Comment:     (NOTE)  HbA1C Interpretive Ranges  <5.7              Normal  5.7 - 6.4         Consider Prediabetes  >6.5              Consider Diabetes       Hemoglobin A1C, POC   Date Value Ref Range Status   2025 5.6 % Final      HM/Social   Former smoker   Hx of alcoholism- has been sober since    Colonoscopy 2023- 2x polyps, one polyp with early adenomatous change- recommendation is 5 year follow up  Lung cancer screening 2025- negative  S/P total hysterectomy

## 2025-03-25 ENCOUNTER — RESULTS FOLLOW-UP (OUTPATIENT)
Facility: CLINIC | Age: 65
End: 2025-03-25

## 2025-04-23 DIAGNOSIS — M17.11 PRIMARY OSTEOARTHRITIS OF RIGHT KNEE: ICD-10-CM

## 2025-04-23 DIAGNOSIS — Z01.818 PRE-OP TESTING: Primary | ICD-10-CM

## 2025-04-30 ENCOUNTER — OFFICE VISIT (OUTPATIENT)
Facility: CLINIC | Age: 65
End: 2025-04-30
Payer: MEDICARE

## 2025-04-30 ENCOUNTER — HOSPITAL ENCOUNTER (OUTPATIENT)
Age: 65
Discharge: HOME | End: 2025-05-03
Payer: MEDICARE

## 2025-04-30 VITALS
OXYGEN SATURATION: 97 % | HEIGHT: 64 IN | RESPIRATION RATE: 18 BRPM | TEMPERATURE: 97.6 F | DIASTOLIC BLOOD PRESSURE: 66 MMHG | SYSTOLIC BLOOD PRESSURE: 125 MMHG | BODY MASS INDEX: 39.61 KG/M2 | WEIGHT: 232 LBS | HEART RATE: 77 BPM

## 2025-04-30 DIAGNOSIS — M17.11 PRIMARY OSTEOARTHRITIS OF RIGHT KNEE: ICD-10-CM

## 2025-04-30 DIAGNOSIS — Z01.818 PRE-OP TESTING: ICD-10-CM

## 2025-04-30 DIAGNOSIS — M25.562 CHRONIC PAIN OF BOTH KNEES: ICD-10-CM

## 2025-04-30 DIAGNOSIS — Z01.818 PREOP EXAMINATION: Primary | ICD-10-CM

## 2025-04-30 DIAGNOSIS — J44.9 CHRONIC OBSTRUCTIVE PULMONARY DISEASE, UNSPECIFIED COPD TYPE (HCC): ICD-10-CM

## 2025-04-30 DIAGNOSIS — G89.29 CHRONIC PAIN OF BOTH KNEES: ICD-10-CM

## 2025-04-30 DIAGNOSIS — M25.561 CHRONIC PAIN OF BOTH KNEES: ICD-10-CM

## 2025-04-30 DIAGNOSIS — R73.03 PREDIABETES: ICD-10-CM

## 2025-04-30 LAB
ALBUMIN SERPL-MCNC: 3.1 G/DL (ref 3.4–5)
ANION GAP SERPL CALC-SCNC: 5 MMOL/L (ref 3–18)
APPEARANCE UR: ABNORMAL
APTT PPP: 33.6 SEC (ref 23–36.4)
BACTERIA URNS QL MICRO: ABNORMAL /HPF
BASOPHILS # BLD: 0.04 K/UL (ref 0–0.1)
BASOPHILS NFR BLD: 0.5 % (ref 0–2)
BILIRUB UR QL: NEGATIVE
BUN SERPL-MCNC: 11 MG/DL (ref 7–18)
BUN/CREAT SERPL: 13 (ref 12–20)
CA-I BLD-MCNC: 8.9 MG/DL (ref 8.5–10.1)
CHLORIDE SERPL-SCNC: 109 MMOL/L (ref 100–111)
CO2 SERPL-SCNC: 29 MMOL/L (ref 21–32)
COLOR UR: YELLOW
CREAT SERPL-MCNC: 0.87 MG/DL (ref 0.6–1.3)
DIFFERENTIAL METHOD BLD: NORMAL
EKG ATRIAL RATE: 67 BPM
EKG DIAGNOSIS: NORMAL
EKG P AXIS: 36 DEGREES
EKG P-R INTERVAL: 150 MS
EKG Q-T INTERVAL: 394 MS
EKG QRS DURATION: 92 MS
EKG QTC CALCULATION (BAZETT): 416 MS
EKG R AXIS: -4 DEGREES
EKG T AXIS: 18 DEGREES
EKG VENTRICULAR RATE: 67 BPM
EOSINOPHIL # BLD: 0.17 K/UL (ref 0–0.4)
EOSINOPHIL NFR BLD: 2.1 % (ref 0–5)
EPITH CASTS URNS QL MICRO: ABNORMAL /LPF (ref 0–20)
ERYTHROCYTE [DISTWIDTH] IN BLOOD BY AUTOMATED COUNT: 13.8 % (ref 11.6–14.5)
GLUCOSE SERPL-MCNC: 81 MG/DL (ref 74–99)
GLUCOSE UR STRIP.AUTO-MCNC: NEGATIVE MG/DL
HCT VFR BLD AUTO: 42.2 % (ref 35–45)
HGB BLD-MCNC: 14.1 G/DL (ref 12–16)
HGB UR QL STRIP: NEGATIVE
IMM GRANULOCYTES # BLD AUTO: 0.02 K/UL (ref 0–0.04)
IMM GRANULOCYTES NFR BLD AUTO: 0.2 % (ref 0–0.5)
INR PPP: 1 (ref 0.9–1.1)
KETONES UR QL STRIP.AUTO: NEGATIVE MG/DL
LEUKOCYTE ESTERASE UR QL STRIP.AUTO: ABNORMAL
LYMPHOCYTES # BLD: 2.25 K/UL (ref 0.9–3.6)
LYMPHOCYTES NFR BLD: 28.1 % (ref 21–52)
MCH RBC QN AUTO: 30.5 PG (ref 24–34)
MCHC RBC AUTO-ENTMCNC: 33.4 G/DL (ref 31–37)
MCV RBC AUTO: 91.1 FL (ref 78–100)
MONOCYTES # BLD: 0.44 K/UL (ref 0.05–1.2)
MONOCYTES NFR BLD: 5.5 % (ref 3–10)
NEUTS SEG # BLD: 5.09 K/UL (ref 1.8–8)
NEUTS SEG NFR BLD: 63.6 % (ref 40–73)
NITRITE UR QL STRIP.AUTO: NEGATIVE
NRBC # BLD: 0 K/UL (ref 0–0.01)
NRBC BLD-RTO: 0 PER 100 WBC
PH UR STRIP: 7 (ref 5–8)
PLATELET # BLD AUTO: 402 K/UL (ref 135–420)
PMV BLD AUTO: 9.2 FL (ref 9.2–11.8)
POTASSIUM SERPL-SCNC: 4.1 MMOL/L (ref 3.5–5.5)
PROT UR STRIP-MCNC: NEGATIVE MG/DL
PROTHROMBIN TIME: 13 SEC (ref 11.9–14.9)
RBC # BLD AUTO: 4.63 M/UL (ref 4.2–5.3)
RBC #/AREA URNS HPF: ABNORMAL /HPF (ref 0–2)
SODIUM SERPL-SCNC: 143 MMOL/L (ref 136–145)
SP GR UR REFRACTOMETRY: 1.01 (ref 1–1.03)
THERAPEUTIC RANGE: NORMAL SEC (ref 73–110)
UROBILINOGEN UR QL STRIP.AUTO: 0.2 EU/DL (ref 0.2–1)
WBC # BLD AUTO: 8 K/UL (ref 4.6–13.2)
WBC URNS QL MICRO: ABNORMAL /HPF (ref 0–4)

## 2025-04-30 PROCEDURE — 85025 COMPLETE CBC W/AUTO DIFF WBC: CPT

## 2025-04-30 PROCEDURE — 36415 COLL VENOUS BLD VENIPUNCTURE: CPT

## 2025-04-30 PROCEDURE — 99215 OFFICE O/P EST HI 40 MIN: CPT | Performed by: STUDENT IN AN ORGANIZED HEALTH CARE EDUCATION/TRAINING PROGRAM

## 2025-04-30 PROCEDURE — 83036 HEMOGLOBIN GLYCOSYLATED A1C: CPT

## 2025-04-30 PROCEDURE — 93005 ELECTROCARDIOGRAM TRACING: CPT

## 2025-04-30 PROCEDURE — 82040 ASSAY OF SERUM ALBUMIN: CPT

## 2025-04-30 PROCEDURE — 85610 PROTHROMBIN TIME: CPT

## 2025-04-30 PROCEDURE — 93010 ELECTROCARDIOGRAM REPORT: CPT | Performed by: SPECIALIST

## 2025-04-30 PROCEDURE — 71046 X-RAY EXAM CHEST 2 VIEWS: CPT

## 2025-04-30 PROCEDURE — 85730 THROMBOPLASTIN TIME PARTIAL: CPT

## 2025-04-30 PROCEDURE — 1123F ACP DISCUSS/DSCN MKR DOCD: CPT | Performed by: STUDENT IN AN ORGANIZED HEALTH CARE EDUCATION/TRAINING PROGRAM

## 2025-04-30 PROCEDURE — 87086 URINE CULTURE/COLONY COUNT: CPT

## 2025-04-30 PROCEDURE — 80048 BASIC METABOLIC PNL TOTAL CA: CPT

## 2025-04-30 PROCEDURE — 81001 URINALYSIS AUTO W/SCOPE: CPT

## 2025-04-30 NOTE — PROGRESS NOTES
Elisabeth Casenis presents today for   Chief Complaint   Patient presents with    Follow-up     1m follow up       Is someone accompanying this pt? no    Is the patient using any DME equipment during OV? no    Health Maintenance Due   Topic Date Due    Diabetic foot exam  Never done    Diabetic Alb to Cr ratio (uACR) test  Never done    Hepatitis C screen  Never done    Diabetic retinal exam  Never done    COVID-19 Vaccine (7 - 2024-25 season) 09/01/2024    DEXA (modify frequency per FRAX score)  10/24/2024         \"Have you been to the ER, urgent care clinic since your last visit?  Hospitalized since your last visit?\"    NO    “Have you seen or consulted any other health care providers outside our system since your last visit?”    NO           
03:32 PM    VLDL 14.8 03/24/2025 08:56 AM       Medical decision making complexity: HIGH    I have discussed the diagnosis with the patient and the intended plan as seen in the above orders.  The patient has received an after-visit summary and questions were answered concerning future plans.  I have discussed medication side effects and warnings with the patient as well. I have reviewed the plan of care with the patient, accepted their input and they are in agreement with the treatment goals. Previous lab and imaging results were reviewed by me.     Justine Prasad MD   Family Medicine

## 2025-05-01 LAB
BACTERIA SPEC CULT: NORMAL
COLONY COUNT, CNT: NORMAL
EST. AVERAGE GLUCOSE BLD GHB EST-MCNC: 113 MG/DL
HBA1C MFR BLD: 5.6 % (ref 4.2–5.6)
Lab: NORMAL

## 2025-05-07 ENCOUNTER — PREP FOR PROCEDURE (OUTPATIENT)
Age: 65
End: 2025-05-07

## 2025-05-07 PROBLEM — M17.11 OSTEOARTHRITIS OF RIGHT KNEE: Status: ACTIVE | Noted: 2025-05-07

## 2025-05-12 ENCOUNTER — CLINICAL SUPPORT (OUTPATIENT)
Age: 65
End: 2025-05-12
Payer: MEDICARE

## 2025-05-12 DIAGNOSIS — M17.11 PRIMARY OSTEOARTHRITIS OF RIGHT KNEE: Primary | ICD-10-CM

## 2025-05-12 PROCEDURE — 73564 X-RAY EXAM KNEE 4 OR MORE: CPT | Performed by: ORTHOPAEDIC SURGERY

## 2025-05-21 ENCOUNTER — OFFICE VISIT (OUTPATIENT)
Age: 65
End: 2025-05-21
Payer: MEDICARE

## 2025-05-21 VITALS
HEIGHT: 64 IN | BODY MASS INDEX: 41.01 KG/M2 | DIASTOLIC BLOOD PRESSURE: 77 MMHG | WEIGHT: 240.2 LBS | SYSTOLIC BLOOD PRESSURE: 143 MMHG

## 2025-05-21 DIAGNOSIS — Z01.818 PRE-OP EXAM: ICD-10-CM

## 2025-05-21 DIAGNOSIS — M17.11 PRIMARY OSTEOARTHRITIS OF RIGHT KNEE: Primary | ICD-10-CM

## 2025-05-21 PROCEDURE — 1125F AMNT PAIN NOTED PAIN PRSNT: CPT | Performed by: PHYSICIAN ASSISTANT

## 2025-05-21 PROCEDURE — 1160F RVW MEDS BY RX/DR IN RCRD: CPT | Performed by: PHYSICIAN ASSISTANT

## 2025-05-21 PROCEDURE — 1123F ACP DISCUSS/DSCN MKR DOCD: CPT | Performed by: PHYSICIAN ASSISTANT

## 2025-05-21 PROCEDURE — 1159F MED LIST DOCD IN RCRD: CPT | Performed by: PHYSICIAN ASSISTANT

## 2025-05-21 PROCEDURE — 99214 OFFICE O/P EST MOD 30 MIN: CPT | Performed by: PHYSICIAN ASSISTANT

## 2025-05-21 RX ORDER — TAMSULOSIN HYDROCHLORIDE 0.4 MG/1
0.4 CAPSULE ORAL
OUTPATIENT
Start: 2025-05-21 | End: 2025-05-21

## 2025-05-21 RX ORDER — CELECOXIB 100 MG/1
200 CAPSULE ORAL
OUTPATIENT
Start: 2025-05-21 | End: 2025-05-21

## 2025-05-21 RX ORDER — NITROFURANTOIN 25; 75 MG/1; MG/1
100 CAPSULE ORAL 2 TIMES DAILY
Qty: 10 CAPSULE | Refills: 0 | Status: SHIPPED | OUTPATIENT
Start: 2025-05-21 | End: 2025-05-26

## 2025-05-21 RX ORDER — ACETAMINOPHEN 325 MG/1
1000 TABLET ORAL
OUTPATIENT
Start: 2025-05-21 | End: 2025-05-21

## 2025-05-21 NOTE — H&P
HISTORY & PHYSICAL      Patient: Elisabeth Steiner                MRN: [id number]       SSN: [id number]  YOB: 1960        AGE: 64 y.o.        SEX: female  Body mass index is 41.23 kg/m².    PCP: Justine Prasad MD  05/21/25      CC: right knee end stage OA  Problem List Items Addressed This Visit          Musculoskeletal and Integument    Osteoarthritis of right knee - Primary     Other Visit Diagnoses         Pre-op exam                  HPI:  The patient is a pleasant 64 y.o. whom has end stage OA of their right knee and has failed conservative treatment including but not limited to NSAIDS, cortisone injections, viscosupplementation, PT, and pain medicine.  Due to the current findings and affected activities of daily living, surgical intervention is indicated.  The alternatives, risks, complications, as well as expected outcome were discussed.  These include but are not limited to infection, blood loss, need for blood transfusion, neurovascular damage, DVT, PE,  post-op stiffness and pain, leg length discrepancy, dislocation, anesthetic complications, prothesis longevity, need for more surgery, MI, stroke, and even death.  The patient understands and wishes to proceed with surgery.      Past Medical History:   Diagnosis Date    Alcoholism in remission (HCC)     Anxiety     Arthritis 2016    Asthma     Breast cancer (HCC)     breast cancer left    Chronic obstructive pulmonary disease (HCC)     Chronic pain 1999    Diverticulitis 10/13/2021    Fibrosarcoma (HCC) 1963    connective tissue cancer    GERD (gastroesophageal reflux disease)     Headache     History of gastric bypass 2012    History of meniscal tear 2015, 2018    right in 2015, left in 2018    HNP (herniated nucleus pulposus), lumbar     patient reported    Lung nodules     resolved 2018 CT    Neuropathy     Obesity     Osteopenia 10/03/2017    Recurrent depression     Sleep apnea     on cpap    Spinal stenosis, lumbar

## 2025-05-21 NOTE — H&P (VIEW-ONLY)
HISTORY & PHYSICAL      Patient: Elisabeth Steiner                MRN: [id number]       SSN: [id number]  YOB: 1960        AGE: 64 y.o.        SEX: female  Body mass index is 41.23 kg/m².    PCP: Justine Prasad MD  05/21/25      CC: right knee end stage OA  Problem List Items Addressed This Visit          Musculoskeletal and Integument    Osteoarthritis of right knee - Primary     Other Visit Diagnoses         Pre-op exam                  HPI:  The patient is a pleasant 64 y.o. whom has end stage OA of their right knee and has failed conservative treatment including but not limited to NSAIDS, cortisone injections, viscosupplementation, PT, and pain medicine.  Due to the current findings and affected activities of daily living, surgical intervention is indicated.  The alternatives, risks, complications, as well as expected outcome were discussed.  These include but are not limited to infection, blood loss, need for blood transfusion, neurovascular damage, DVT, PE,  post-op stiffness and pain, leg length discrepancy, dislocation, anesthetic complications, prothesis longevity, need for more surgery, MI, stroke, and even death.  The patient understands and wishes to proceed with surgery.      Past Medical History:   Diagnosis Date    Alcoholism in remission (HCC)     Anxiety     Arthritis 2016    Asthma     Breast cancer (HCC)     breast cancer left    Chronic obstructive pulmonary disease (HCC)     Chronic pain 1999    Diverticulitis 10/13/2021    Fibrosarcoma (HCC) 1963    connective tissue cancer    GERD (gastroesophageal reflux disease)     Headache     History of gastric bypass 2012    History of meniscal tear 2015, 2018    right in 2015, left in 2018    HNP (herniated nucleus pulposus), lumbar     patient reported    Lung nodules     resolved 2018 CT    Neuropathy     Obesity     Osteopenia 10/03/2017    Recurrent depression     Sleep apnea     on cpap    Spinal stenosis, lumbar

## 2025-05-21 NOTE — PATIENT INSTRUCTIONS
Patient: Elisabeth Steiner                MRN: [id number]       SSN:   YOB: 1960        AGE: 64 y.o.        SEX: female  There is no height or weight on file to calculate BMI.    05/21/25    DO:  1:  Sit with the leg out straight 5-10 min every hour while awake.  Keep the toes pointed       up towards the farheen.         2.  Bend your knee 5-10 min every hour.  Bend it to the point of pain and hold it for 5-10       Min.        3.  ICE your knee 20 min every hour    4.  You can expect to have swelling and discomfort in your thigh down to your knee.  Swelling may extend to your foot.  You may have bruising from the thigh to the foot as well.  Some numbness can be expected to the outside part of the knee.  Wear the compression stockings and elevate your leg.      DO NOT:    1.  Do not place anything under your knee to prop it up  2.  Do not sit in the recliner chair.

## 2025-05-30 ENCOUNTER — ANESTHESIA EVENT (OUTPATIENT)
Facility: HOSPITAL | Age: 65
End: 2025-05-30
Payer: MEDICARE

## 2025-05-30 RX ORDER — ALBUTEROL SULFATE 0.83 MG/ML
2.5 SOLUTION RESPIRATORY (INHALATION) EVERY 6 HOURS PRN
COMMUNITY

## 2025-06-02 ENCOUNTER — APPOINTMENT (OUTPATIENT)
Facility: HOSPITAL | Age: 65
End: 2025-06-02
Attending: ORTHOPAEDIC SURGERY
Payer: MEDICARE

## 2025-06-02 ENCOUNTER — HOSPITAL ENCOUNTER (OUTPATIENT)
Facility: HOSPITAL | Age: 65
Discharge: HOME | End: 2025-06-02
Attending: ORTHOPAEDIC SURGERY | Admitting: ORTHOPAEDIC SURGERY
Payer: MEDICARE

## 2025-06-02 ENCOUNTER — ANESTHESIA (OUTPATIENT)
Facility: HOSPITAL | Age: 65
End: 2025-06-02
Payer: MEDICARE

## 2025-06-02 VITALS
WEIGHT: 233.03 LBS | SYSTOLIC BLOOD PRESSURE: 136 MMHG | TEMPERATURE: 98.4 F | DIASTOLIC BLOOD PRESSURE: 78 MMHG | OXYGEN SATURATION: 93 % | HEIGHT: 64 IN | BODY MASS INDEX: 39.78 KG/M2 | RESPIRATION RATE: 16 BRPM | HEART RATE: 91 BPM

## 2025-06-02 DIAGNOSIS — M17.11 PRIMARY OSTEOARTHRITIS OF RIGHT KNEE: Primary | ICD-10-CM

## 2025-06-02 PROBLEM — E66.813 CLASS 3 SEVERE OBESITY DUE TO EXCESS CALORIES WITHOUT SERIOUS COMORBIDITY WITH BODY MASS INDEX (BMI) OF 40.0 TO 44.9 IN ADULT (HCC): Status: ACTIVE | Noted: 2025-06-02

## 2025-06-02 PROBLEM — M25.361 KNEE INSTABILITY, RIGHT: Status: ACTIVE | Noted: 2025-06-02

## 2025-06-02 PROBLEM — M21.861: Status: ACTIVE | Noted: 2025-06-02

## 2025-06-02 PROBLEM — M17.10 ARTHRITIS OF KNEE: Status: ACTIVE | Noted: 2025-06-02

## 2025-06-02 LAB
AMPHET UR QL SCN: NEGATIVE
BARBITURATES UR QL SCN: NEGATIVE
BENZODIAZ UR QL: NEGATIVE
CANNABINOIDS UR QL SCN: POSITIVE
COCAINE UR QL SCN: NEGATIVE
FENTANYL: NEGATIVE
GLUCOSE BLD STRIP.AUTO-MCNC: 104 MG/DL (ref 70–110)
GLUCOSE BLD STRIP.AUTO-MCNC: 134 MG/DL (ref 70–110)
Lab: ABNORMAL
METHADONE UR QL: NEGATIVE
OPIATES UR QL: NEGATIVE
OXYCODONE UR QL SCN: NEGATIVE

## 2025-06-02 PROCEDURE — 6370000000 HC RX 637 (ALT 250 FOR IP): Performed by: ORTHOPAEDIC SURGERY

## 2025-06-02 PROCEDURE — 2500000003 HC RX 250 WO HCPCS: Performed by: NURSE ANESTHETIST, CERTIFIED REGISTERED

## 2025-06-02 PROCEDURE — 97530 THERAPEUTIC ACTIVITIES: CPT

## 2025-06-02 PROCEDURE — 2580000003 HC RX 258: Performed by: NURSE ANESTHETIST, CERTIFIED REGISTERED

## 2025-06-02 PROCEDURE — 97535 SELF CARE MNGMENT TRAINING: CPT

## 2025-06-02 PROCEDURE — 27447 TOTAL KNEE ARTHROPLASTY: CPT | Performed by: ORTHOPAEDIC SURGERY

## 2025-06-02 PROCEDURE — 6360000002 HC RX W HCPCS: Performed by: ANESTHESIOLOGY

## 2025-06-02 PROCEDURE — 97116 GAIT TRAINING THERAPY: CPT

## 2025-06-02 PROCEDURE — C1776 JOINT DEVICE (IMPLANTABLE): HCPCS | Performed by: ORTHOPAEDIC SURGERY

## 2025-06-02 PROCEDURE — 97165 OT EVAL LOW COMPLEX 30 MIN: CPT

## 2025-06-02 PROCEDURE — 6360000002 HC RX W HCPCS: Performed by: NURSE ANESTHETIST, CERTIFIED REGISTERED

## 2025-06-02 PROCEDURE — 80307 DRUG TEST PRSMV CHEM ANLYZR: CPT

## 2025-06-02 PROCEDURE — 7100000001 HC PACU RECOVERY - ADDTL 15 MIN: Performed by: ORTHOPAEDIC SURGERY

## 2025-06-02 PROCEDURE — 3600000002 HC SURGERY LEVEL 2 BASE: Performed by: ORTHOPAEDIC SURGERY

## 2025-06-02 PROCEDURE — 97161 PT EVAL LOW COMPLEX 20 MIN: CPT

## 2025-06-02 PROCEDURE — C1713 ANCHOR/SCREW BN/BN,TIS/BN: HCPCS | Performed by: ORTHOPAEDIC SURGERY

## 2025-06-02 PROCEDURE — 27447 TOTAL KNEE ARTHROPLASTY: CPT | Performed by: PHYSICIAN ASSISTANT

## 2025-06-02 PROCEDURE — 6360000002 HC RX W HCPCS: Performed by: ORTHOPAEDIC SURGERY

## 2025-06-02 PROCEDURE — 7100000000 HC PACU RECOVERY - FIRST 15 MIN: Performed by: ORTHOPAEDIC SURGERY

## 2025-06-02 PROCEDURE — 82962 GLUCOSE BLOOD TEST: CPT

## 2025-06-02 PROCEDURE — 2720000010 HC SURG SUPPLY STERILE: Performed by: ORTHOPAEDIC SURGERY

## 2025-06-02 PROCEDURE — 73560 X-RAY EXAM OF KNEE 1 OR 2: CPT

## 2025-06-02 PROCEDURE — 2709999900 HC NON-CHARGEABLE SUPPLY: Performed by: ORTHOPAEDIC SURGERY

## 2025-06-02 PROCEDURE — 3700000000 HC ANESTHESIA ATTENDED CARE: Performed by: ORTHOPAEDIC SURGERY

## 2025-06-02 PROCEDURE — 6370000000 HC RX 637 (ALT 250 FOR IP): Performed by: PHYSICIAN ASSISTANT

## 2025-06-02 PROCEDURE — 64447 NJX AA&/STRD FEMORAL NRV IMG: CPT | Performed by: ANESTHESIOLOGY

## 2025-06-02 PROCEDURE — 3600000012 HC SURGERY LEVEL 2 ADDTL 15MIN: Performed by: ORTHOPAEDIC SURGERY

## 2025-06-02 PROCEDURE — 6370000000 HC RX 637 (ALT 250 FOR IP): Performed by: NURSE ANESTHETIST, CERTIFIED REGISTERED

## 2025-06-02 PROCEDURE — 3700000001 HC ADD 15 MINUTES (ANESTHESIA): Performed by: ORTHOPAEDIC SURGERY

## 2025-06-02 PROCEDURE — 2500000003 HC RX 250 WO HCPCS: Performed by: PHYSICIAN ASSISTANT

## 2025-06-02 PROCEDURE — 2580000003 HC RX 258: Performed by: ORTHOPAEDIC SURGERY

## 2025-06-02 PROCEDURE — 6360000002 HC RX W HCPCS: Performed by: PHYSICIAN ASSISTANT

## 2025-06-02 PROCEDURE — 2500000003 HC RX 250 WO HCPCS: Performed by: ORTHOPAEDIC SURGERY

## 2025-06-02 PROCEDURE — 2580000003 HC RX 258: Performed by: PHYSICIAN ASSISTANT

## 2025-06-02 DEVICE — LEGION POSTERIOR STABILIZED                                    OXINIUM FEMORAL SIZE 7 RIGHT
Type: IMPLANTABLE DEVICE | Site: KNEE | Status: FUNCTIONAL
Brand: LEGION

## 2025-06-02 DEVICE — GENESIS II NON-POROUS TIBIAL                                    BASEPLATE SIZE 5 RIGHT
Type: IMPLANTABLE DEVICE | Status: FUNCTIONAL
Brand: GENESIS II

## 2025-06-02 DEVICE — LEGION POSTERIOR STABILIZED HIGH                                    FLEX HIGHLY CROSS LINKED                                    POLYETHYLENE SIZE 5-6 9MM
Type: IMPLANTABLE DEVICE | Site: KNEE | Status: FUNCTIONAL
Brand: LEGION

## 2025-06-02 DEVICE — GENESIS II OVAL RESURFACING                                    PATELLAR 32MM
Type: IMPLANTABLE DEVICE | Site: PATELLA | Status: FUNCTIONAL
Brand: GENESIS II

## 2025-06-02 DEVICE — GENESIS TROCHLEAR PIN 1/8 IN. X 5IN
Type: IMPLANTABLE DEVICE | Site: KNEE | Status: FUNCTIONAL
Brand: GENESIS

## 2025-06-02 DEVICE — CEMENT BNE 20ML 41GM FULL DOSE PMMA W/ TOBRA M VISC RADPQ: Type: IMPLANTABLE DEVICE | Site: KNEE | Status: FUNCTIONAL

## 2025-06-02 DEVICE — GENESIS PIN AND DRILL SET
Type: IMPLANTABLE DEVICE | Site: KNEE | Status: FUNCTIONAL
Brand: GENESIS

## 2025-06-02 RX ORDER — OXYCODONE HYDROCHLORIDE 5 MG/1
5 TABLET ORAL
Status: DISCONTINUED | OUTPATIENT
Start: 2025-06-02 | End: 2025-06-02 | Stop reason: HOSPADM

## 2025-06-02 RX ORDER — ACETAMINOPHEN 500 MG
1000 TABLET ORAL EVERY 6 HOURS
Status: DISCONTINUED | OUTPATIENT
Start: 2025-06-02 | End: 2025-06-02 | Stop reason: HOSPADM

## 2025-06-02 RX ORDER — PANTOPRAZOLE SODIUM 20 MG/1
20 TABLET, DELAYED RELEASE ORAL
Status: DISCONTINUED | OUTPATIENT
Start: 2025-06-02 | End: 2025-06-02 | Stop reason: HOSPADM

## 2025-06-02 RX ORDER — ALBUTEROL SULFATE 90 UG/1
2 INHALANT RESPIRATORY (INHALATION) EVERY 6 HOURS PRN
Status: DISCONTINUED | OUTPATIENT
Start: 2025-06-02 | End: 2025-06-02

## 2025-06-02 RX ORDER — SODIUM CHLORIDE, SODIUM LACTATE, POTASSIUM CHLORIDE, CALCIUM CHLORIDE 600; 310; 30; 20 MG/100ML; MG/100ML; MG/100ML; MG/100ML
INJECTION, SOLUTION INTRAVENOUS
Status: DISCONTINUED | OUTPATIENT
Start: 2025-06-02 | End: 2025-06-02 | Stop reason: SDUPTHER

## 2025-06-02 RX ORDER — BUDESONIDE 0.25 MG/2ML
0.25 INHALANT ORAL
Status: DISCONTINUED | OUTPATIENT
Start: 2025-06-02 | End: 2025-06-02 | Stop reason: HOSPADM

## 2025-06-02 RX ORDER — AMLODIPINE BESYLATE 10 MG/1
10 TABLET ORAL
Status: DISCONTINUED | OUTPATIENT
Start: 2025-06-02 | End: 2025-06-02 | Stop reason: HOSPADM

## 2025-06-02 RX ORDER — ALBUTEROL SULFATE 0.83 MG/ML
2.5 SOLUTION RESPIRATORY (INHALATION) EVERY 6 HOURS PRN
Status: DISCONTINUED | OUTPATIENT
Start: 2025-06-02 | End: 2025-06-02 | Stop reason: HOSPADM

## 2025-06-02 RX ORDER — ARFORMOTEROL TARTRATE 15 UG/2ML
15 SOLUTION RESPIRATORY (INHALATION)
Status: DISCONTINUED | OUTPATIENT
Start: 2025-06-02 | End: 2025-06-02 | Stop reason: HOSPADM

## 2025-06-02 RX ORDER — ACETAMINOPHEN 500 MG
1000 TABLET ORAL EVERY 8 HOURS PRN
Qty: 90 TABLET | Refills: 1 | Status: SHIPPED | OUTPATIENT
Start: 2025-06-02

## 2025-06-02 RX ORDER — DULOXETIN HYDROCHLORIDE 60 MG/1
60 CAPSULE, DELAYED RELEASE ORAL DAILY
Status: DISCONTINUED | OUTPATIENT
Start: 2025-06-02 | End: 2025-06-02 | Stop reason: HOSPADM

## 2025-06-02 RX ORDER — SODIUM CHLORIDE 0.9 % (FLUSH) 0.9 %
5-40 SYRINGE (ML) INJECTION PRN
Status: DISCONTINUED | OUTPATIENT
Start: 2025-06-02 | End: 2025-06-02 | Stop reason: HOSPADM

## 2025-06-02 RX ORDER — ONDANSETRON 4 MG/1
4 TABLET, ORALLY DISINTEGRATING ORAL EVERY 6 HOURS PRN
Status: DISCONTINUED | OUTPATIENT
Start: 2025-06-02 | End: 2025-06-02 | Stop reason: HOSPADM

## 2025-06-02 RX ORDER — BUPROPION HYDROCHLORIDE 150 MG/1
300 TABLET ORAL EVERY MORNING
Status: DISCONTINUED | OUTPATIENT
Start: 2025-06-02 | End: 2025-06-02 | Stop reason: HOSPADM

## 2025-06-02 RX ORDER — FENTANYL CITRATE 50 UG/ML
50 INJECTION, SOLUTION INTRAMUSCULAR; INTRAVENOUS EVERY 10 MIN PRN
Refills: 0 | Status: COMPLETED | OUTPATIENT
Start: 2025-06-02 | End: 2025-06-02

## 2025-06-02 RX ORDER — FENTANYL CITRATE 50 UG/ML
100 INJECTION, SOLUTION INTRAMUSCULAR; INTRAVENOUS ONCE
Status: COMPLETED | OUTPATIENT
Start: 2025-06-02 | End: 2025-06-02

## 2025-06-02 RX ORDER — INSULIN LISPRO 100 [IU]/ML
0-15 INJECTION, SOLUTION INTRAVENOUS; SUBCUTANEOUS ONCE
Status: DISCONTINUED | OUTPATIENT
Start: 2025-06-02 | End: 2025-06-02 | Stop reason: HOSPADM

## 2025-06-02 RX ORDER — CEFADROXIL 500 MG/1
500 CAPSULE ORAL 2 TIMES DAILY
Qty: 10 CAPSULE | Refills: 0 | Status: SHIPPED | OUTPATIENT
Start: 2025-06-02 | End: 2025-06-07

## 2025-06-02 RX ORDER — ONDANSETRON 4 MG/1
4 TABLET, FILM COATED ORAL EVERY 8 HOURS PRN
Qty: 30 TABLET | Refills: 2 | Status: SHIPPED | OUTPATIENT
Start: 2025-06-02

## 2025-06-02 RX ORDER — TRAMADOL HYDROCHLORIDE 50 MG/1
50 TABLET ORAL
Status: DISCONTINUED | OUTPATIENT
Start: 2025-06-02 | End: 2025-06-02 | Stop reason: HOSPADM

## 2025-06-02 RX ORDER — SODIUM CHLORIDE 9 MG/ML
INJECTION, SOLUTION INTRAVENOUS PRN
Status: DISCONTINUED | OUTPATIENT
Start: 2025-06-02 | End: 2025-06-02 | Stop reason: HOSPADM

## 2025-06-02 RX ORDER — DIPHENHYDRAMINE HYDROCHLORIDE 50 MG/ML
12.5 INJECTION, SOLUTION INTRAMUSCULAR; INTRAVENOUS
Status: DISCONTINUED | OUTPATIENT
Start: 2025-06-02 | End: 2025-06-02 | Stop reason: HOSPADM

## 2025-06-02 RX ORDER — FENTANYL CITRATE 50 UG/ML
50 INJECTION, SOLUTION INTRAMUSCULAR; INTRAVENOUS EVERY 5 MIN PRN
Status: COMPLETED | OUTPATIENT
Start: 2025-06-02 | End: 2025-06-02

## 2025-06-02 RX ORDER — ACETAMINOPHEN 325 MG/1
1000 TABLET ORAL
Status: COMPLETED | OUTPATIENT
Start: 2025-06-02 | End: 2025-06-02

## 2025-06-02 RX ORDER — NALOXONE HYDROCHLORIDE 0.4 MG/ML
INJECTION, SOLUTION INTRAMUSCULAR; INTRAVENOUS; SUBCUTANEOUS PRN
Status: DISCONTINUED | OUTPATIENT
Start: 2025-06-02 | End: 2025-06-02 | Stop reason: HOSPADM

## 2025-06-02 RX ORDER — DEXTROSE MONOHYDRATE 100 MG/ML
INJECTION, SOLUTION INTRAVENOUS CONTINUOUS PRN
Status: DISCONTINUED | OUTPATIENT
Start: 2025-06-02 | End: 2025-06-02 | Stop reason: HOSPADM

## 2025-06-02 RX ORDER — SODIUM CHLORIDE, SODIUM LACTATE, POTASSIUM CHLORIDE, CALCIUM CHLORIDE 600; 310; 30; 20 MG/100ML; MG/100ML; MG/100ML; MG/100ML
INJECTION, SOLUTION INTRAVENOUS CONTINUOUS
Status: DISCONTINUED | OUTPATIENT
Start: 2025-06-02 | End: 2025-06-02 | Stop reason: HOSPADM

## 2025-06-02 RX ORDER — ONDANSETRON 2 MG/ML
4 INJECTION INTRAMUSCULAR; INTRAVENOUS
Status: DISCONTINUED | OUTPATIENT
Start: 2025-06-02 | End: 2025-06-02 | Stop reason: HOSPADM

## 2025-06-02 RX ORDER — ROPIVACAINE HYDROCHLORIDE 2 MG/ML
30 INJECTION, SOLUTION EPIDURAL; INFILTRATION; PERINEURAL ONCE
Status: DISCONTINUED | OUTPATIENT
Start: 2025-06-02 | End: 2025-06-02 | Stop reason: HOSPADM

## 2025-06-02 RX ORDER — ONDANSETRON 2 MG/ML
4 INJECTION INTRAMUSCULAR; INTRAVENOUS EVERY 6 HOURS PRN
Status: DISCONTINUED | OUTPATIENT
Start: 2025-06-02 | End: 2025-06-02 | Stop reason: HOSPADM

## 2025-06-02 RX ORDER — KETOROLAC TROMETHAMINE 15 MG/ML
15 INJECTION, SOLUTION INTRAMUSCULAR; INTRAVENOUS EVERY 6 HOURS
Status: DISCONTINUED | OUTPATIENT
Start: 2025-06-02 | End: 2025-06-02 | Stop reason: HOSPADM

## 2025-06-02 RX ORDER — FLUTICASONE PROPIONATE 50 MCG
1 SPRAY, SUSPENSION (ML) NASAL 2 TIMES DAILY
Status: DISCONTINUED | OUTPATIENT
Start: 2025-06-02 | End: 2025-06-02 | Stop reason: HOSPADM

## 2025-06-02 RX ORDER — LABETALOL HYDROCHLORIDE 5 MG/ML
INJECTION, SOLUTION INTRAVENOUS
Status: DISCONTINUED | OUTPATIENT
Start: 2025-06-02 | End: 2025-06-02 | Stop reason: SDUPTHER

## 2025-06-02 RX ORDER — POLYETHYLENE GLYCOL 3350 17 G/17G
17 POWDER, FOR SOLUTION ORAL DAILY PRN
Status: DISCONTINUED | OUTPATIENT
Start: 2025-06-02 | End: 2025-06-02 | Stop reason: HOSPADM

## 2025-06-02 RX ORDER — DOCUSATE SODIUM 100 MG/1
100 CAPSULE, LIQUID FILLED ORAL 2 TIMES DAILY
Qty: 60 CAPSULE | Refills: 1 | Status: SHIPPED | OUTPATIENT
Start: 2025-06-02 | End: 2025-08-01

## 2025-06-02 RX ORDER — SODIUM CHLORIDE 9 MG/ML
INJECTION, SOLUTION INTRAVENOUS CONTINUOUS
Status: DISCONTINUED | OUTPATIENT
Start: 2025-06-02 | End: 2025-06-02 | Stop reason: HOSPADM

## 2025-06-02 RX ORDER — SENNA AND DOCUSATE SODIUM 50; 8.6 MG/1; MG/1
1 TABLET, FILM COATED ORAL 2 TIMES DAILY
Status: DISCONTINUED | OUTPATIENT
Start: 2025-06-02 | End: 2025-06-02 | Stop reason: HOSPADM

## 2025-06-02 RX ORDER — LIDOCAINE HYDROCHLORIDE 20 MG/ML
INJECTION, SOLUTION EPIDURAL; INFILTRATION; INTRACAUDAL; PERINEURAL
Status: DISCONTINUED | OUTPATIENT
Start: 2025-06-02 | End: 2025-06-02 | Stop reason: SDUPTHER

## 2025-06-02 RX ORDER — SODIUM CHLORIDE 0.9 % (FLUSH) 0.9 %
5-40 SYRINGE (ML) INJECTION EVERY 12 HOURS SCHEDULED
Status: DISCONTINUED | OUTPATIENT
Start: 2025-06-02 | End: 2025-06-02 | Stop reason: HOSPADM

## 2025-06-02 RX ORDER — OXYCODONE HYDROCHLORIDE 5 MG/1
5 TABLET ORAL EVERY 4 HOURS PRN
Qty: 42 TABLET | Refills: 0 | Status: SHIPPED | OUTPATIENT
Start: 2025-06-02 | End: 2025-06-09

## 2025-06-02 RX ORDER — WARFARIN SODIUM 3 MG/1
3 TABLET ORAL DAILY
Qty: 40 TABLET | Refills: 1 | Status: SHIPPED | OUTPATIENT
Start: 2025-06-02

## 2025-06-02 RX ORDER — MIDAZOLAM HYDROCHLORIDE 2 MG/2ML
2 INJECTION, SOLUTION INTRAMUSCULAR; INTRAVENOUS ONCE
Status: COMPLETED | OUTPATIENT
Start: 2025-06-02 | End: 2025-06-02

## 2025-06-02 RX ORDER — TAMSULOSIN HYDROCHLORIDE 0.4 MG/1
0.4 CAPSULE ORAL
Status: COMPLETED | OUTPATIENT
Start: 2025-06-02 | End: 2025-06-02

## 2025-06-02 RX ORDER — PHENYLEPHRINE HCL IN 0.9% NACL 1 MG/10 ML
SYRINGE (ML) INTRAVENOUS
Status: DISCONTINUED | OUTPATIENT
Start: 2025-06-02 | End: 2025-06-02 | Stop reason: SDUPTHER

## 2025-06-02 RX ORDER — PROPOFOL 10 MG/ML
INJECTION, EMULSION INTRAVENOUS
Status: DISCONTINUED | OUTPATIENT
Start: 2025-06-02 | End: 2025-06-02 | Stop reason: SDUPTHER

## 2025-06-02 RX ORDER — CELECOXIB 100 MG/1
200 CAPSULE ORAL
Status: COMPLETED | OUTPATIENT
Start: 2025-06-02 | End: 2025-06-02

## 2025-06-02 RX ORDER — GABAPENTIN 300 MG/1
300 CAPSULE ORAL 2 TIMES DAILY
Status: DISCONTINUED | OUTPATIENT
Start: 2025-06-02 | End: 2025-06-02 | Stop reason: HOSPADM

## 2025-06-02 RX ORDER — ROPIVACAINE HYDROCHLORIDE 5 MG/ML
30 INJECTION, SOLUTION EPIDURAL; INFILTRATION; PERINEURAL ONCE
Status: COMPLETED | OUTPATIENT
Start: 2025-06-02 | End: 2025-06-02

## 2025-06-02 RX ORDER — ASPIRIN 81 MG/1
81 TABLET ORAL 2 TIMES DAILY
Status: DISCONTINUED | OUTPATIENT
Start: 2025-06-03 | End: 2025-06-02 | Stop reason: HOSPADM

## 2025-06-02 RX ORDER — ROPIVACAINE HYDROCHLORIDE 5 MG/ML
INJECTION, SOLUTION EPIDURAL; INFILTRATION; PERINEURAL
Status: COMPLETED | OUTPATIENT
Start: 2025-06-02 | End: 2025-06-02

## 2025-06-02 RX ORDER — OXYCODONE HYDROCHLORIDE 10 MG/1
10 TABLET ORAL
Status: DISCONTINUED | OUTPATIENT
Start: 2025-06-02 | End: 2025-06-02 | Stop reason: HOSPADM

## 2025-06-02 RX ORDER — FENTANYL CITRATE 50 UG/ML
INJECTION, SOLUTION INTRAMUSCULAR; INTRAVENOUS
Status: DISCONTINUED | OUTPATIENT
Start: 2025-06-02 | End: 2025-06-02 | Stop reason: SDUPTHER

## 2025-06-02 RX ORDER — ROCURONIUM BROMIDE 10 MG/ML
INJECTION, SOLUTION INTRAVENOUS
Status: DISCONTINUED | OUTPATIENT
Start: 2025-06-02 | End: 2025-06-02 | Stop reason: SDUPTHER

## 2025-06-02 RX ORDER — GLUCAGON 1 MG
1 KIT INJECTION PRN
Status: DISCONTINUED | OUTPATIENT
Start: 2025-06-02 | End: 2025-06-02 | Stop reason: HOSPADM

## 2025-06-02 RX ORDER — ONDANSETRON 2 MG/ML
INJECTION INTRAMUSCULAR; INTRAVENOUS
Status: DISCONTINUED | OUTPATIENT
Start: 2025-06-02 | End: 2025-06-02 | Stop reason: SDUPTHER

## 2025-06-02 RX ORDER — TAMSULOSIN HYDROCHLORIDE 0.4 MG/1
0.4 CAPSULE ORAL DAILY
Status: DISCONTINUED | OUTPATIENT
Start: 2025-06-02 | End: 2025-06-02 | Stop reason: HOSPADM

## 2025-06-02 RX ORDER — DEXMEDETOMIDINE HYDROCHLORIDE 100 UG/ML
INJECTION, SOLUTION INTRAVENOUS
Status: DISCONTINUED | OUTPATIENT
Start: 2025-06-02 | End: 2025-06-02 | Stop reason: SDUPTHER

## 2025-06-02 RX ORDER — LIDOCAINE HYDROCHLORIDE 10 MG/ML
1 INJECTION, SOLUTION EPIDURAL; INFILTRATION; INTRACAUDAL; PERINEURAL
Status: COMPLETED | OUTPATIENT
Start: 2025-06-02 | End: 2025-06-02

## 2025-06-02 RX ORDER — FENTANYL CITRATE 50 UG/ML
25 INJECTION, SOLUTION INTRAMUSCULAR; INTRAVENOUS EVERY 10 MIN PRN
Refills: 0 | Status: DISCONTINUED | OUTPATIENT
Start: 2025-06-02 | End: 2025-06-02

## 2025-06-02 RX ORDER — FAMOTIDINE 20 MG/1
20 TABLET, FILM COATED ORAL ONCE
Status: COMPLETED | OUTPATIENT
Start: 2025-06-02 | End: 2025-06-02

## 2025-06-02 RX ADMIN — SODIUM CHLORIDE, SODIUM LACTATE, POTASSIUM CHLORIDE, AND CALCIUM CHLORIDE: 600; 310; 30; 20 INJECTION, SOLUTION INTRAVENOUS at 09:02

## 2025-06-02 RX ADMIN — FENTANYL CITRATE 50 MCG: 50 INJECTION INTRAMUSCULAR; INTRAVENOUS at 09:55

## 2025-06-02 RX ADMIN — SODIUM CHLORIDE, SODIUM LACTATE, POTASSIUM CHLORIDE, AND CALCIUM CHLORIDE: 600; 310; 30; 20 INJECTION, SOLUTION INTRAVENOUS at 07:28

## 2025-06-02 RX ADMIN — ROPIVACAINE HYDROCHLORIDE 30 ML: 5 INJECTION EPIDURAL; INFILTRATION; PERINEURAL at 07:19

## 2025-06-02 RX ADMIN — FENTANYL CITRATE 50 MCG: 50 INJECTION INTRAMUSCULAR; INTRAVENOUS at 10:05

## 2025-06-02 RX ADMIN — WATER 3000 MG: 1 INJECTION INTRAMUSCULAR; INTRAVENOUS; SUBCUTANEOUS at 07:42

## 2025-06-02 RX ADMIN — SODIUM CHLORIDE, SODIUM LACTATE, POTASSIUM CHLORIDE, AND CALCIUM CHLORIDE: .6; .31; .03; .02 INJECTION, SOLUTION INTRAVENOUS at 06:40

## 2025-06-02 RX ADMIN — PROPOFOL 150 MG: 10 INJECTION, EMULSION INTRAVENOUS at 07:34

## 2025-06-02 RX ADMIN — LIDOCAINE HYDROCHLORIDE 1 ML: 10 INJECTION, SOLUTION EPIDURAL; INFILTRATION; INTRACAUDAL; PERINEURAL at 07:16

## 2025-06-02 RX ADMIN — PROPOFOL 50 MG: 10 INJECTION, EMULSION INTRAVENOUS at 08:09

## 2025-06-02 RX ADMIN — FAMOTIDINE 20 MG: 20 TABLET, FILM COATED ORAL at 06:42

## 2025-06-02 RX ADMIN — FENTANYL CITRATE 50 MCG: 50 INJECTION INTRAMUSCULAR; INTRAVENOUS at 09:18

## 2025-06-02 RX ADMIN — MIDAZOLAM HYDROCHLORIDE 2 MG: 1 INJECTION, SOLUTION INTRAMUSCULAR; INTRAVENOUS at 07:18

## 2025-06-02 RX ADMIN — ACETAMINOPHEN 975 MG: 325 TABLET ORAL at 06:42

## 2025-06-02 RX ADMIN — OXYCODONE HYDROCHLORIDE 5 MG: 5 TABLET ORAL at 10:29

## 2025-06-02 RX ADMIN — FENTANYL CITRATE 50 MCG: 50 INJECTION INTRAMUSCULAR; INTRAVENOUS at 08:01

## 2025-06-02 RX ADMIN — LABETALOL HYDROCHLORIDE 2.5 MG: 5 INJECTION, SOLUTION INTRAVENOUS at 08:12

## 2025-06-02 RX ADMIN — TRANEXAMIC ACID 1000 MG: 100 INJECTION, SOLUTION INTRAVENOUS at 09:00

## 2025-06-02 RX ADMIN — ROPIVACAINE HYDROCHLORIDE 20 ML: 5 INJECTION, SOLUTION EPIDURAL; INFILTRATION; PERINEURAL at 07:09

## 2025-06-02 RX ADMIN — LIDOCAINE HYDROCHLORIDE 100 MG: 20 INJECTION, SOLUTION EPIDURAL; INFILTRATION; INTRACAUDAL; PERINEURAL at 07:34

## 2025-06-02 RX ADMIN — ROCURONIUM BROMIDE 50 MG: 50 INJECTION INTRAVENOUS at 07:34

## 2025-06-02 RX ADMIN — FENTANYL CITRATE 100 MCG: 0.05 INJECTION, SOLUTION INTRAMUSCULAR; INTRAVENOUS at 07:17

## 2025-06-02 RX ADMIN — CELECOXIB 200 MG: 100 CAPSULE ORAL at 06:41

## 2025-06-02 RX ADMIN — DEXMEDETOMIDINE 4 MCG: 200 INJECTION, SOLUTION INTRAVENOUS at 07:53

## 2025-06-02 RX ADMIN — TAMSULOSIN HYDROCHLORIDE 0.4 MG: 0.4 CAPSULE ORAL at 06:42

## 2025-06-02 RX ADMIN — OXYCODONE HYDROCHLORIDE 10 MG: 10 TABLET ORAL at 14:01

## 2025-06-02 RX ADMIN — FENTANYL CITRATE 50 MCG: 50 INJECTION INTRAMUSCULAR; INTRAVENOUS at 11:21

## 2025-06-02 RX ADMIN — SUGAMMADEX 200 MG: 100 INJECTION, SOLUTION INTRAVENOUS at 09:11

## 2025-06-02 RX ADMIN — Medication 100 MCG: at 07:48

## 2025-06-02 RX ADMIN — TRANEXAMIC ACID 1000 MG: 100 INJECTION, SOLUTION INTRAVENOUS at 07:40

## 2025-06-02 RX ADMIN — DEXMEDETOMIDINE 6 MCG: 200 INJECTION, SOLUTION INTRAVENOUS at 08:01

## 2025-06-02 RX ADMIN — ONDANSETRON 4 MG: 2 INJECTION INTRAMUSCULAR; INTRAVENOUS at 09:11

## 2025-06-02 ASSESSMENT — PAIN DESCRIPTION - LOCATION
LOCATION: KNEE
LOCATION: LEG;KNEE
LOCATION: KNEE

## 2025-06-02 ASSESSMENT — PAIN DESCRIPTION - ONSET
ONSET: GRADUAL
ONSET: GRADUAL

## 2025-06-02 ASSESSMENT — PAIN SCALES - GENERAL
PAINLEVEL_OUTOF10: 0
PAINLEVEL_OUTOF10: 6
PAINLEVEL_OUTOF10: 2
PAINLEVEL_OUTOF10: 0
PAINLEVEL_OUTOF10: 7
PAINLEVEL_OUTOF10: 6
PAINLEVEL_OUTOF10: 7
PAINLEVEL_OUTOF10: 8
PAINLEVEL_OUTOF10: 8
PAINLEVEL_OUTOF10: 7
PAINLEVEL_OUTOF10: 4
PAINLEVEL_OUTOF10: 7

## 2025-06-02 ASSESSMENT — PAIN DESCRIPTION - DESCRIPTORS
DESCRIPTORS: SHARP;ACHING;DISCOMFORT
DESCRIPTORS: PRESSURE

## 2025-06-02 ASSESSMENT — PAIN DESCRIPTION - FREQUENCY
FREQUENCY: INTERMITTENT
FREQUENCY: CONTINUOUS

## 2025-06-02 ASSESSMENT — PAIN - FUNCTIONAL ASSESSMENT
PAIN_FUNCTIONAL_ASSESSMENT: 0-10
PAIN_FUNCTIONAL_ASSESSMENT: ACTIVITIES ARE NOT PREVENTED

## 2025-06-02 ASSESSMENT — PAIN DESCRIPTION - PAIN TYPE
TYPE: SURGICAL PAIN
TYPE: SURGICAL PAIN;ACUTE PAIN

## 2025-06-02 ASSESSMENT — COPD QUESTIONNAIRES: CAT_SEVERITY: NO INTERVAL CHANGE

## 2025-06-02 ASSESSMENT — PAIN DESCRIPTION - ORIENTATION
ORIENTATION: RIGHT
ORIENTATION: RIGHT
ORIENTATION: RIGHT;ANTERIOR
ORIENTATION: RIGHT

## 2025-06-02 NOTE — CARE COORDINATION
06/02/25 1439   IMM Letter   Observation Status Letter date given: 06/02/25   Observation Status Letter time given: 1246   Observation Status Letter given to Patient/Family/Significant other/Guardian/POA/by: Cande Medellin       
6/2/25 1721Rafael ADORNO was notified by [facility] Health Liaison Hemanth Mojica that patient has been accepted by [facility] agency and she will be sending clinicals to accepting agency.  
6/2/25 6455 - message received from [facility] Home health liaison Hemanth Mojica  via [employer] that agency can not accept patient at this time due to staffing, Hemanth Mojica agreed to call pt to provide update and to assist with finding a [facility] home Health agency to accept pt at this time.  
Alone   Current Services Prior To Admission None   Potential Assistance Needed Home Care   DME Ordered? No   Potential Assistance Purchasing Medications No   Type of Home Care Services None   Patient expects to be discharged to: House   One/Two Story Residence One story   History of falls? 0   Services At/After Discharge   Transition of Care Consult (CM Consult) Home Health   Internal Home Health Yes   Services At/After Discharge Home Health   [place] Resource Information Provided?   (n/a)   Mode of Transport at Discharge Other (see comment)  (family-car)   Confirm Follow Up Transport Other (see comment)  (family-car)   Condition of Participation: Discharge Planning   The Plan for Transition of Care is related to the following treatment goals: Transition of care- home with HH and resources   The Patient and/or Patient Representative was provided with a Choice of Provider? Patient   The Patient and/Or Patient Representative agree with the Discharge Plan? Yes   Freedom of Choice list was provided with basic dialogue that supports the patient's individualized plan of care/goals, treatment preferences, and shares the quality data associated with the providers?  Yes

## 2025-06-02 NOTE — ANESTHESIA PRE PROCEDURE
Cardiovascular:  Exercise tolerance: good (>4 METS)  (+) hypertension:                  Neuro/Psych:   (+) depression/anxiety             GI/Hepatic/Renal:   (+) GERD: no interval change, liver disease (Nonalcoholic steatosis):          Endo/Other:    (+) DiabetesType II DM, no interval change.                  ROS comment: H/o left breast cancer-2021 Abdominal:             Vascular: negative vascular ROS.         Other Findings:       Anesthesia Plan      general and regional     ASA 3       Induction: intravenous.      Anesthetic plan and risks discussed with patient.      Plan discussed with CRNA.                EVA RIVAS MD   6/2/2025

## 2025-06-02 NOTE — OP NOTE
patellar tracking stability and alignment all of which were delighted with fashion a bone plug for the femoral canal further control hemostasis cemented in the knee removing all extraneous cement and only in full extension the cement was fully cured further cement removal pulse lavage and trialing happiest with the 9 locked in place again reducing a left tourniquet dilute Betadine protocol routine closure no complications excellent outcome of the case    Electronically signed by ELOISE SIDDIQUI MD on 6/2/2025 at 9:10 AM

## 2025-06-02 NOTE — INTERVAL H&P NOTE
Update History & Physical    The patient's History and Physical of June 2, 2025 was reviewed with the patient and I examined the patient. There was no change. The surgical site was confirmed by the patient and me.     Plan: The risks, benefits, expected outcome, and alternative to the recommended procedure have been discussed with the patient. Patient understands and wants to proceed with the procedure.     Electronically signed by ELOISE SIDDIQUI MD on 6/2/2025 at 6:53 AM

## 2025-06-02 NOTE — DISCHARGE INSTRUCTIONS
DISCHARGE ACTIVITY  ·   You may put full weight on your knee.     Use crutches or a walker for a minimum of 2 weeks to prevent falls. After you feel comfortable (everyone is different), slowly transition to a cane and then nothing. This can take anywhere from a few weeks to a few months.     No running or high impact activities.  ·  Avoid ANY kneeling for 3 months.     THERAPY AFTER A KNEE REPLACEMENT  ·   Therapy is important after surgery. You must do the therapy range of motion and exercises several times a day!     Walking is the most important exercise and cannot be stressed enough. You should increase the frequency and duration of your walks as you feel stronger.  ·   Ankle pumps: 3 sets of 10-15     Work on straightening the knee:  While lying flat on your back, drive your knee down into the bed to straighten it. Hold for 15 seconds. Repeat 10 times for 3-5 sets.     While sitting in a chair, lift your leg straight and let it down slowly. Repeat 10 times for 3 sets.  ·   Prop the back of your foot on an ottoman or footstool and allow gravity to straighten it.      You may place ice on the knee or gently push on your thigh to supplement gravity. You may do this as much as tolerated. You should feel a stretch in the back of your knee.     Work on bending the knee:  While in a chair with your knee bent, slowly move your bottom forward to get more bend. Hold for 15 seconds and see if you can keep moving forward to bend. You want to see your knee at 100 degrees degrees by week 2 postop.   ·   While lying in bed, slowly move your heel towards your bottom. If you can, you may grab your shin to gently assist. This should not cause pain, but a stretch is OK.     Be sure to ice several times a day to help decrease inflammation in the knee which is normal for several months after a knee replacement.     BLOOD CLOT PREVENTION     You will be on a medication to prevent blood clots for 4-6 weeks after surgery. Take as

## 2025-06-02 NOTE — PROGRESS NOTES
4 Eyes Skin Assessment     NAME:  Elisabeth Steiner  YOB: 1960  MEDICAL RECORD NUMBER:  [id number]    The patient is being assessed for  {Reason for Assessment:34402}    I agree that at least one RN has performed a thorough Head to Toe Skin Assessment on the patient. ALL assessment sites listed below have been assessed.      Areas assessed by both nurses:    {Pressure Areas Assessed:99477}        Does the Patient have a Wound? {Action Wound:27697}       Vance Prevention initiated by RN: {YES/NO:19726}  Wound Care Orders initiated by RN: {YES/NO:19726}    Pressure Injury (Stage 3,4, Unstageable, DTI, NWPT, and Complex wounds) if present, place Wound referral order by RN under : {YES/NO:19726}    New Ostomies, if present place, Ostomy referral order under : {YES/NO:19726}     Nurse 1 eSignature: {Esignature:[id number]}    **SHARE this note so that the co-signing nurse can place an eSignature**    Nurse 2 eSignature: {Esignature:[id number]}      
Instructions for your surgery at [facility]      Today's Date:  5/30/2025      Patient's Name:  Elisabeth Steiner           Surgery Date:  June 2, 2025              Please enter the main entrance of the hospital and check-in at the front security desk located in the lobby. They will direct you to the area to report for your surgery.     Do NOT eat or drink anything, including candy, gum, or ice chips after midnight prior to your surgery, unless you have specific instructions from your surgeon or anesthesia provider to do so.  Brush your teeth before coming to the hospital. You may swish with water, but do not swallow.  No smoking/Vaping/E-Cigarettes 24 hours prior to the day of surgery.  No alcohol 24 hours prior to the day of surgery.  No recreational drugs for one week prior to the day of surgery.  Bring Photo ID, Insurance information, and Co-pay if required on day of surgery.  Bring in pertinent legal documents, such as, Medical Power of , DNR, Advance Directive, etc.  Leave all valuables, including money/purse, weapons at home.  Remove all jewelry, including ALL body piercings, nail polish, acrylic nails, and makeup (including mascara); no lotions, powders, deodorant, or perfume/cologne/after shave on the skin.  Follow instruction for Hibiclens washes and CHG wipes from surgeon's office.   Glasses and dentures may be worn to the hospital. They must be removed prior to surgery. Please bring case/container for glasses or dentures.   Contact lenses should not be worn on day of surgery.   Call your doctor's office if symptoms of a cold or illness develop within 24-48 hours prior to your surgery.  Call your doctor's office if you have any questions concerning insurance or co-pays.  15. AN ADULT (relative or friend 18 years or older) MUST DRIVE YOU HOME AFTER YOUR SURGERY.  16. Please make arrangements for a responsible adult (18 years or older) to be with you for 24 hours after your 
OCCUPATIONAL THERAPY EVALUATION/DISCHARGE    Patient: Elisabeth Steiner (64 y.o. female)  Date: 6/2/2025  Primary Diagnosis: Osteoarthritis of right knee [M17.11]  Arthritis of knee [M17.10]  Procedure(s) (LRB):  Right total knee replacement. (Right) * Day of Surgery *   Precautions: Fall Risk, General Precautions, Weight Bearing, Right Lower Extremity Weight Bearing: Weight Bearing As Tolerated  PLOF: Pt was modified independent with self-care and functional mobility.      ASSESSMENT AND RECOMMENDATIONS:  Pt cleared to participate in OT evaluation by RN. Pt seated in chair, alert, and agreeable to participate with supportive sister present. Pt educated on weight-bearing status, importance of ice, towel rolled underneath ankle, and safety during this admission/around the house. Pt is stand by assist - independent with basic self-care and stand by assist with functional transfers using rolling walker in preparation for ADLs. Increased pain noted with donning socks and underwear, pt educated on adaptive equipment for lower body dressing and how to don/doff socks, pants, and underwear. Pt practiced lower body dressing with use of AE given. Pt also given a long handled sponge. Based on the objective data described below, pt presents with no deficits that impede pt function with ADLs, functional transfers, and functional mobility in preparation for selfcare tasks. At this time pt is safe to d/c home with family support from selfcare standpoint. Pt left dressed in recliner with all needs met and call bell in reach.      Maximum therapeutic gains met at current level of care and patient will be discharged from occupational therapy at this time.    Further Equipment Recommendations for Discharge: Patient has all needed DME for home safety.    [facility]: AM-PAC Inpatient Daily Activity Raw Score: 21    Current research shows that an AM-PAC score of 18 or greater is associated with a discharge to the patient's home 
Mobility:     Bed Mobility Training  Bed Mobility Training: No  Transfers:     Transfer Training  Transfer Training: Yes  Sit to Stand: Stand by assistance  Stand to Sit: Stand by assistance  Balance:               Balance  Sitting: Intact  Standing: Impaired;With support  Standing - Static: Fair  Standing - Dynamic: Fair  Wheelchair Mobility:        Ambulation/Gait Training:                       Gait  Gait Training: Yes  Left Side Weight Bearing: Full  Right Side Weight Bearing: As tolerated  Overall Level of Assistance: Stand by assistance  Distance (ft): 70 Feet  Assistive Device: Walker, rolling  Interventions: Verbal cues;Tactile cues;Safety awareness training  Base of Support: Shift to left  Speed/Yusra: Pace decreased (< 100 feet/min);Shuffled  Step Length: Right shortened;Left shortened  Stance: Right decreased  Gait Abnormalities: Decreased step clearance  Rail Use: Both  Stairs - Level of Assistance: Stand by assistance  Number of Stairs Trained: 4                  Pain:  Intensity Pre-treatment: 7/10   Intensity Post-treatment: 7/10  Scale: Numeric Rating Scale  Location: Right Knee  Quality: Sharp, Aching, Throbbing, and Sore  Intervention(s): Nurse notified, Repositioning , Ice, and Rest      Activity Tolerance:   Activity Tolerance: Patient tolerated evaluation without incident  Please refer to the flowsheet for vital signs taken during this treatment.    After treatment:   [x]         Patient left in no apparent distress sitting up in chair  []         Patient left in no apparent distress in bed  [x]         Call bell left within reach  [x]         Nursing notified  [x]         Caregiver present  []         Bed/chair alarm activated  [x]         No alarmAlert and oriented x4 and Nurse approved  []         SCDs applied    COMMUNICATION/EDUCATION:   Patient Education  Education Given To: Patient  Education Provided: Role of Therapy;Plan of Care;Transfer Training;Home Exercise Program;Mobility

## 2025-06-02 NOTE — PERIOP NOTE
Dr. Gonzalez called and said that patient can be given oxycodone and toradol. Patient has these two medications listed as allergies.  
Patient given oxycodone 5mg po after she verified that she can tolerate and toradoll 15mg held as patient said that she cannot take NSAIDS.  
Statement Selected

## 2025-06-02 NOTE — NURSE NAVIGATOR
Rounded on patient s/p right total knee replacement with Dr. Gonzalez, dos 06/02/2025. Patient observed to be alert and oriented x 3, sitting up in bedside chair. She denies chest pain,shortness of breath,nausea, vomiting or calf pain. She denies any residual numbness in her right lower extremity, she has already voided spontaneously. She has quadricept fire in her right leg. She was able to ambulate to the restroom and back to bedside chair with use of rolling walker. Ace wrap observed to right lower extremity, dressing underneath observed to be clean, dry and intact.       Patient did not attend the total joint class so all education was provided to patient today. She does have her own walker and a support system in place. She was reminded that she may remove her ace wrap tonight and utilize during the daytime hours for compression to assist with swelling that will move to her foot and ankle. Reviewed the use of incentive spirometry. Encouraged continued use of incentive spirometry for the next few days to keep lungs expanded and free from complications. Reminded patient that fevers 99- 100 after surgery is typically because isc is not being used enough which is why she should use hourly after her ambulation. Education provided that fevers do not become concerning unless they reach 101. 3 and remain there. Reviewed postoperative showering instructions.Patient was instructed that she may shower tomorrow, no tubs or submersion in water for a full six weeks. Reminded patient of the importance of hourly ambulation 5 minutes each hour with her rolling walker to assist with pain and stiffness and to improve mobility. In addition patient was reminded that ambulation is the best prevention for blood clots.In addition, patient was educated to perform quad sets hourly, ankle pumps and to bend her knee. Encouraged continuous icing for the next 48 hours and then after that she was instructed to ice 20 minutes every hour, not to

## 2025-06-02 NOTE — ANESTHESIA POSTPROCEDURE EVALUATION
Department of Anesthesiology  Postprocedure Note    Patient: Elisabeth Steiner  MRN: [id number]  YOB: 1960  Date of evaluation: 6/2/2025    Procedure Summary       Date: 06/02/25 Room / Location: [facility] MAIN 07 / [facility] MAIN OR    Anesthesia Start: 0728 Anesthesia Stop: 0937    Procedure: Right total knee replacement. (Right: Knee) Diagnosis:       Osteoarthritis of right knee      (Osteoarthritis of right knee [M17.11])    Surgeons: Nikolas Gonzalez MD Responsible Provider: Becky Pantoja MD    Anesthesia Type: General ASA Status: 3            Anesthesia Type: General    Ofelia Phase I: Ofelia Score: 9    Ofelia Phase II:      Anesthesia Post Evaluation    Patient location during evaluation: PACU  Patient participation: complete - patient participated  Level of consciousness: awake and alert  Pain score: 2  Airway patency: patent  Nausea & Vomiting: no nausea and no vomiting  Cardiovascular status: hemodynamically stable  Respiratory status: acceptable  Hydration status: euvolemic  Multimodal analgesia pain management approach  Pain management: adequate    No notable events documented.

## 2025-06-02 NOTE — DISCHARGE SUMMARY
6/2/2025  5:32 AM    6/2/2025, 9:31 AM    Primary Dx:right Orthopedic / Rheumatologic: Total Knee Replacement  Secondary Dx: Etiological Diagnoses: none    HPI:  Pt has end stage OA of their right knee and had failed conservative treatment.  Due to the current findings and affected activity of daily living surgical intervention is indicated.  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    GERD (gastroesophageal reflux disease)     Headache     History of gastric bypass 2012    History of meniscal tear 2015, 2018    right in 2015, left in 2018    HNP (herniated nucleus pulposus), lumbar     patient reported    Hypertension     Lung nodules     resolved 2018 CT    Neuropathy     Obesity     Osteopenia 10/03/2017    Recurrent depression     Sleep apnea     on cpap    Spinal stenosis, lumbar     patient reported    Type 2 diabetes mellitus without complication, without long-term current use of insulin ([facility]) 12/19/2024    Xanthelasma of left upper eyelid 07/20/2020         Current Facility-Administered Medications:     ROPivacaine (NAROPIN) 0.2% injection 0.2% 30 mL, 30 mL, Other, Once, Katharine Castro, APRN - CURTIS    insulin lispro (HUMALOG,ADMELOG) injection vial 0-15 Units, 0-15 Units, SubCUTAneous, Once, Katharine Castro, APRN - CRNA    glucose chewable tablet 16 g, 4 tablet, Oral, PRN, Katharine Castro E, APRN - CRNA    dextrose bolus 10% 125 mL, 125 mL, IntraVENous, PRN **OR** dextrose bolus 10% 250 mL, 250 mL, IntraVENous, PRN, Katharine Castro E, APRN - CRNA    glucagon injection 1 mg, 1 mg, SubCUTAneous, PRN,

## 2025-06-02 NOTE — ANESTHESIA PROCEDURE NOTES
Peripheral Block    Patient location during procedure: holding area  Reason for block: post-op pain management and at surgeon's request  Start time: 6/2/2025 7:09 AM  End time: 6/2/2025 7:14 AM  Staffing  Performed: anesthesiologist   Anesthesiologist: Becky Pantoja MD  Performed by: Becky Pantoja MD  Authorized by: Becky Pantoja MD    Preanesthetic Checklist  Completed: patient identified, IV checked, site marked, risks and benefits discussed, surgical/procedural consents, equipment checked, pre-op evaluation, timeout performed, anesthesia consent given, oxygen available, monitors applied/VS acknowledged and fire risk safety assessment completed and verbalized  Peripheral Block   Patient position: supine  Prep: ChloraPrep  Provider prep: mask and sterile gloves  Patient monitoring: cardiac monitor, continuous pulse ox, frequent blood pressure checks, IV access, oxygen and responsive to questions  Block type: Femoral  Adductor canal  Laterality: right  Injection technique: single-shot  Guidance: nerve stimulator and ultrasound guided  Local infiltration: lidocaine  Infiltration strength: 1 %  Local infiltration: lidocaine  Dose: 1 mL    Needle   Needle type: insulated echogenic nerve stimulator needle   Needle gauge: 20 G  Needle localization: anatomical landmarks, nerve stimulator and ultrasound guidance  Test dose: negative  Needle length: 8 cm  Assessment   Injection assessment: negative aspiration for heme, no paresthesia on injection, local visualized surrounding nerve on ultrasound and no intravascular symptoms  Paresthesia pain: none  Slow fractionated injection: yes  Hemodynamics: stable  Outcomes: uncomplicated    Medications Administered  ropivacaine (NAROPIN) injection 0.5% - Perineural   20 mL - 6/2/2025 7:09:00 AM

## 2025-06-04 ENCOUNTER — TELEPHONE (OUTPATIENT)
Age: 65
End: 2025-06-04

## 2025-06-04 NOTE — TELEPHONE ENCOUNTER
Per Dr. Gonzalez patient should take 9mg of coumadin today and will have a redraw today. Patient and Kristy verbalized understanding.

## 2025-06-04 NOTE — TELEPHONE ENCOUNTER
Kristy from [facility] wants the provider to know the patient's INR is 1.3    Patient was open to nurses and PT for today    Patient tel [phone]

## 2025-06-05 ENCOUNTER — TELEPHONE (OUTPATIENT)
Age: 65
End: 2025-06-05

## 2025-06-05 ENCOUNTER — TELEPHONE (OUTPATIENT)
Facility: HOSPITAL | Age: 65
End: 2025-06-05

## 2025-06-05 NOTE — TELEPHONE ENCOUNTER
Call placed to patient, ID verified x 2. Patient is s/p right total knee replacement with Dr. Gonzalez, dos 06/02/2025. She denies chest pain, shortness of breath, nausea, vomiting, fever, chills or calf pain. She denies any residual numbness in her right lower extremity. She denies any difficulty with bowel or bladder. She states that her pain has been very hard to control with the oxycodone one every four hours . She states that she has tried two and it is controlling pain much better. Patient states that if she takes the two every four hours she is able to work on her HEP and ambulation. Patient instructed to keep her dosage at 2 oxycodone every four hours and to taper down as pain improves as this is the worst time for pain and swelling. She was instructed to call the office tomorrow if she is unable to lower her dosage back to the 5 mg that way she will not run out of medication and that it is documented so she can get refills. She reports that home physical therapy has been out working with her . She currently has a 45 degree bend. Patient reassured that she does have 2 weeks to get to 100 so was encouraged to keep working on her HEP. Her dressing is described as Dry and intact with some drainage on it. She states that the drainage does not require changing per her home health nurse until Monday. Other than pain patient feels she is doing very well. She has no further questions or concerns. She will follow up with Dr. Gonzalez in two weeks or sooner if needed.

## 2025-06-05 NOTE — TELEPHONE ENCOUNTER
Per JR-     6 mg today  3 mg Friday  1.5 mg Saturday and Sunday    Repeat INR Monday    Called and spoke with Francesca- francesca will call patient.

## 2025-06-06 ENCOUNTER — TELEPHONE (OUTPATIENT)
Age: 65
End: 2025-06-06

## 2025-06-06 DIAGNOSIS — G89.18 POST-OP PAIN: Primary | ICD-10-CM

## 2025-06-06 RX ORDER — OXYCODONE HYDROCHLORIDE 5 MG/1
5 TABLET ORAL EVERY 4 HOURS PRN
Qty: 42 TABLET | Refills: 0 | Status: SHIPPED | OUTPATIENT
Start: 2025-06-06 | End: 2025-06-13

## 2025-06-09 ENCOUNTER — TELEPHONE (OUTPATIENT)
Age: 65
End: 2025-06-09

## 2025-06-09 DIAGNOSIS — F33.1 MODERATE EPISODE OF RECURRENT MAJOR DEPRESSIVE DISORDER (HCC): ICD-10-CM

## 2025-06-09 RX ORDER — BUPROPION HYDROCHLORIDE 300 MG/1
300 TABLET ORAL EVERY MORNING
Qty: 90 TABLET | Refills: 1 | Status: SHIPPED | OUTPATIENT
Start: 2025-06-09

## 2025-06-09 NOTE — TELEPHONE ENCOUNTER
Gricelda from [facility] called in to report the InR 1.7    Please respond to Gricelda @ [phone]

## 2025-06-10 NOTE — TELEPHONE ENCOUNTER
Called and spoke with francesca and she informed me someone had already called her with instructions.

## 2025-06-12 DIAGNOSIS — T45.1X5A NEUROPATHY DUE TO CHEMOTHERAPEUTIC DRUG: ICD-10-CM

## 2025-06-12 DIAGNOSIS — G62.0 NEUROPATHY DUE TO CHEMOTHERAPEUTIC DRUG: ICD-10-CM

## 2025-06-13 ENCOUNTER — TELEPHONE (OUTPATIENT)
Age: 65
End: 2025-06-13

## 2025-06-13 NOTE — TELEPHONE ENCOUNTER
Per ILENE Vo patient should take 3mg daily, repeat blood draw on Monday. Kristy and patient verbalized understanding.

## 2025-06-13 NOTE — TELEPHONE ENCOUNTER
Ms. Kristy from [facility] called for .     Ms. Kristy said that the patient INR is 1.8. That this was a re-check.    Ms. Wagner tel. [phone].    Ms. Kristy said for  office to call the patient to let her know the New Dosing.

## 2025-06-15 RX ORDER — GABAPENTIN 300 MG/1
CAPSULE ORAL
Qty: 270 CAPSULE | Refills: 0 | Status: SHIPPED | OUTPATIENT
Start: 2025-06-15 | End: 2025-09-13

## 2025-06-16 ENCOUNTER — TELEPHONE (OUTPATIENT)
Age: 65
End: 2025-06-16

## 2025-06-16 DIAGNOSIS — G89.18 POST-OP PAIN: Primary | ICD-10-CM

## 2025-06-16 RX ORDER — OXYCODONE HYDROCHLORIDE 5 MG/1
5 TABLET ORAL EVERY 4 HOURS PRN
Qty: 42 TABLET | Refills: 0 | Status: SHIPPED | OUTPATIENT
Start: 2025-06-16 | End: 2025-06-23

## 2025-06-16 NOTE — TELEPHONE ENCOUNTER
Gricelda with [facility] called with the patient INR results.      INR is 1.2        Gricelda with [facility]   [phone]

## 2025-06-16 NOTE — TELEPHONE ENCOUNTER
Patient called to request a refill of oxyCODONE (ROXICODONE) 5 MG immediate release tablet be sent to [id number] [place] [address] [address] [facility] - P [phone] - F [phone] if possible.

## 2025-06-19 ENCOUNTER — OFFICE VISIT (OUTPATIENT)
Age: 65
End: 2025-06-19

## 2025-06-19 VITALS — HEIGHT: 64 IN | WEIGHT: 233 LBS | BODY MASS INDEX: 39.78 KG/M2

## 2025-06-19 DIAGNOSIS — Z48.02 ENCOUNTER FOR STAPLE REMOVAL: ICD-10-CM

## 2025-06-19 DIAGNOSIS — Z96.651 STATUS POST RIGHT KNEE REPLACEMENT: Primary | ICD-10-CM

## 2025-06-19 PROCEDURE — 99024 POSTOP FOLLOW-UP VISIT: CPT | Performed by: PHYSICIAN ASSISTANT

## 2025-06-19 NOTE — PROGRESS NOTES
06/1984    benign tumor    NEUROLOGICAL SURGERY  2000, 2007    Cervical fusion    ORTHOPEDIC SURGERY  1964    orthopaedic excision Fibrosarcoma and Lymphangiogram    PELVIC LAPAROSCOPY  04/1984    large uterine blockage    TOTAL KNEE ARTHROPLASTY Right 6/2/2025    Right total knee replacement. performed by Nikolas Gonzalez MD at [facility] MAIN OR    TUBAL LIGATION  1986    UPPER GASTROINTESTINAL ENDOSCOPY N/A 02/27/2023    EGD ESOPHAGOGASTRODUODENOSCOPY/ BIOPSIES performed by Vinita Joseph MD at [facility] ENDOSCOPY    US BREAST BIOPSY W LOC DEVICE 1ST LESION LEFT Left 10/22/2021    US BREAST NEEDLE BIOPSY LEFT 10/22/2021 [facility] RAD US    VASCULAR SURGERY Right     mediport 2021 then removed 2022         Patient seen evaluated today for her right total knee replacement.  She is now 17 days status post surgery.  The patient has been noncompliant and work on her range of motion activities as instructed prior to surgery.  She has had no troubles the wound.  Her pain is well-controlled.  She has been receiving home physical therapy without complications.  No injuries or falls.  No fevers or chills.  She has been on Coumadin.    Patient denies recent fevers, chills, chest pain, SOB, or injuries.   No recent systemic changes noted.  A 12-point review of systems is performed today.  Pertinent positives are noted.  All other systems reviewed and otherwise are negative.    Physical exam: General: Alert and oriented x3, nad.  well-developed, well nourished.  normal affect, AF.  NC/AT, EOMI, neck supple, trachea midline, no JVD present. Breathing is non-labored.  Examination of the right knee reveals skin intact.  There is no erythema.  Mild resolving ecchymosis.  Negative effusion.  She has full extension.  She bends to about 85.  Negative calf tenderness.  Negative Homans.  No signs of DVT present.    Assessment: Status post right total knee replacement    Plan: At this point, we discussed treatment options.  She will be set up with 
No

## 2025-06-20 ENCOUNTER — TELEPHONE (OUTPATIENT)
Age: 65
End: 2025-06-20

## 2025-06-20 NOTE — TELEPHONE ENCOUNTER
Ca with [facility] called to report the patients' INR done today.    INR- 1.1    Patient reported today that GUILLERMO stopped her Coumadin, they are asking if INR still needs to be tested, if so, they need the next draw date and any updated orders as fas as the patient taking the medication.    Please review and advise, [phone] (cell)

## 2025-06-26 ENCOUNTER — TELEPHONE (OUTPATIENT)
Age: 65
End: 2025-06-26

## 2025-06-26 DIAGNOSIS — Z96.651 STATUS POST RIGHT KNEE REPLACEMENT: Primary | ICD-10-CM

## 2025-06-26 RX ORDER — OXYCODONE HYDROCHLORIDE 5 MG/1
5 TABLET ORAL EVERY 4 HOURS PRN
Qty: 28 TABLET | Refills: 0 | Status: SHIPPED | OUTPATIENT
Start: 2025-06-26 | End: 2025-07-03

## 2025-06-26 NOTE — TELEPHONE ENCOUNTER
Patient called to request a rx refill of Oxycodone.      Pharmacy:      [id number] [place] [place] - [address] - P [phone] - F [phone] [address] [facility] [address]  Phone: [phone]  Fax: [phone]

## 2025-07-02 ENCOUNTER — HOSPITAL ENCOUNTER (OUTPATIENT)
Facility: HOSPITAL | Age: 65
Setting detail: RECURRING SERIES
Discharge: HOME | End: 2025-07-05
Payer: MEDICARE

## 2025-07-02 PROCEDURE — 97162 PT EVAL MOD COMPLEX 30 MIN: CPT

## 2025-07-02 PROCEDURE — 97110 THERAPEUTIC EXERCISES: CPT

## 2025-07-02 NOTE — PROGRESS NOTES
PHYSICAL / OCCUPATIONAL THERAPY - DAILY TREATMENT NOTE AND KNEE EVALUATION    Patient Name: Elisabeth Steiner    Date: 2025    : 1960  Insurance: Payor: [facility] MEDICARE / Plan: [facility] MEDICARE ADVANTAGE / Product Type: *No Product type* /      Patient  verified Yes     Visit #   Current / Total 1 24   Time   In / Out 140 220   Pain   In / Out 5 5   Subjective Functional Status/Changes: See Plan of Care     TREATMENT AREA =  Status post right knee replacement    SUBJECTIVE  History/Mechanism of Injury: Elisabeth Steiner is a 65 y.o. female with Dx of Status post right knee replacement.  25  Prior level of function: independent with increased pain negotiating stairs    Comorbidities/Allergies:   OA asthma DM HTN double mastectomy  s/p breast CA   Diagnostic Tests: none   Pain:  Worst: 9/10  Best: 3/10  Location: ant knee   Aggravated by: with flexion   Alleviated by: rest ice elevation pain medication   Previous Treatment: none   Mobility Devices: 4 wheeled walker   Falls: none   Living Situation: lives with sister in one story house 3 stairs to enter   Work History: works as a teacher   Social/Recreational Activities: none     OBJECTIVE      32 min    [x]Eval (un-timed code)                   Therapeutic Procedures:  Tx Min Billable or 1:1 Min (if diff from Tx Min) Procedure, Rationale, Specifics   8  95441 Therapeutic Exercise (timed):  increase ROM, strength, coordination, balance, and proprioception to improve patient's ability to progress to PLOF and address remaining functional goals. (see flow sheet as applicable)     Details if applicable:       8  MC BC Totals Reminder: bill using total billable min of TIMED therapeutic procedures (example: do not include dry needle or estim unattended, both untimed codes, in totals to left)  8-22 min = 1 unit; 23-37 min = 2 units; 38-52 min = 3 units; 53-67 min = 4 units; 68-82 min = 5 units   Total Total     [x]  Patient Education billed concurrently

## 2025-07-02 NOTE — THERAPY EVALUATION
[facility]  [address] [facility] [address] Ph: [phone] Fx: [phone]  Plan of Care / Statement of Necessity for Physical Therapy Services     Patient Name: Elisabeth Steiner : 1960   Medical   Diagnosis: Status post right knee replacement  Pain in right knee Treatment Diagnosis:   M25.561  RIGHT KNEE PAIN     Onset Date: 25     Referral Source: Contreras Vo PA-C Start of Care (SOC): 2025   Prior Hospitalization: See medical history Provider #: [id number]   Prior Level of Function: Amb without AD    Comorbidities:  Other: OA asthma DM HTN double mastectomy  s/p breast CA     Assessment / key information:  Patient is 65 year old female presenting to PT with complaints of DX R knee pain . Patient is s/p TKR on 25   Presents to OPPT to address functional , balance ,  mobility and strength deficits s/p R TKR   Self reported functional deficits include transferring, prolonged walking, static standing, bending, lifting, squatting, doing yard work and household chores.    PT evaluation reveals decreased R knee AROM, tenderness of R knee joint  , strength impairments, functional movement abnormalities, balance limitations and flexibility deficits.    Patient would benefit from skilled PT to address noted impairments in order to return to PLOF, maintain independence and reduce risk of further debility.       POST OPERATIVE URGENT: Patient was evaluated for a post operative condition and early physical therapy intervention is critical to positive outcomes.  Insurance authorization should be expedited to prevent delay in treatment.      Evaluation Complexity:  History:  MEDIUM  Complexity : 1-2 comorbidities / personal factors will impact the outcome/ POC ; Examination:  MEDIUM Complexity : 3 Standardized tests and measures addressin body structure, function, activity limitation and / or participation in recreation  ;Presentation:  MEDIUM

## 2025-07-03 ENCOUNTER — OFFICE VISIT (OUTPATIENT)
Age: 65
End: 2025-07-03

## 2025-07-03 VITALS — BODY MASS INDEX: 39.99 KG/M2 | HEIGHT: 64 IN

## 2025-07-03 DIAGNOSIS — Z96.651 STATUS POST RIGHT KNEE REPLACEMENT: Primary | ICD-10-CM

## 2025-07-03 PROCEDURE — 99024 POSTOP FOLLOW-UP VISIT: CPT | Performed by: PHYSICIAN ASSISTANT

## 2025-07-03 NOTE — PROGRESS NOTES
Patient: Elisabeth Steiner                MRN: [id number]       SSN: [id number]  YOB: 1960        AGE: 65 y.o.        SEX: female  Body mass index is 39.99 kg/m².    PCP: Justine Prasad MD  07/03/25      This office note has been dictated.      REVIEW OF SYSTEMS:  Constitutional: Negative for fever, chills, weight loss and malaise/fatigue.   HENT: Negative.    Eyes: Negative.    Respiratory: Negative.   Cardiovascular: Negative.   Gastrointestinal: No bowel incontinence or constipation.  Genitourinary: No bladder incontinence or saddle anesthesia.  Skin: Negative.   Neurological: Negative.    Endo/Heme/Allergies: Negative.    Psychiatric/Behavioral: Negative.  Musculoskeletal: As per HPI above.     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    GERD (gastroesophageal reflux disease)     Headache     History of gastric bypass 2012    History of meniscal tear 2015, 2018    right in 2015, left in 2018    HNP (herniated nucleus pulposus), lumbar     patient reported    Hypertension     Lung nodules     resolved 2018 CT    Neuropathy     Obesity     Osteopenia 10/03/2017    Recurrent depression     Sleep apnea     on cpap    Spinal stenosis, lumbar     patient reported    Type 2 diabetes mellitus without complication, without long-term current use of insulin ([facility]) 12/19/2024    Xanthelasma of left upper eyelid 07/20/2020         Current Outpatient Medications:     oxyCODONE (ROXICODONE) 5 MG immediate release tablet, Take 1 tablet by mouth every 4 hours as needed for Pain for up to 7 days. Take lowest dose possible to manage pain for acute post operative pain. Do not take until after surgery Max Daily Amount: 30 mg, Disp: 28 tablet, Rfl:

## 2025-07-07 ENCOUNTER — HOSPITAL ENCOUNTER (OUTPATIENT)
Facility: HOSPITAL | Age: 65
Setting detail: RECURRING SERIES
Discharge: HOME | End: 2025-07-10
Payer: MEDICARE

## 2025-07-07 PROCEDURE — 97110 THERAPEUTIC EXERCISES: CPT

## 2025-07-07 NOTE — PROGRESS NOTES
PHYSICAL / OCCUPATIONAL THERAPY - DAILY TREATMENT NOTE    Patient Name: Elisabeth Steiner    Date: 2025    : 1960  Insurance: Payor: [facility] MEDICARE / Plan: [facility] MEDICARE ADVANTAGE / Product Type: *No Product type* /      Patient  verified Yes     Visit #   Current / Total 2 24   Time   In / Out 819 914   Pain   In / Out 5 3   Subjective Functional Status/Changes: Patient states she performs her HEP every hour.      TREATMENT AREA =  Status post right knee replacement  Pain in right knee    OBJECTIVE    Ice (UNBILLED):  location/position: sitting; right knee     Min Rationale   10 decrease pain to improve patient's ability to progress to PLOF and address remaining functional goals.     Skin assessment post-treatment:   Redness, no adverse reactions     Therapeutic Procedures:    01013 Therapeutic Exercise (timed):  increase ROM, strength, coordination, balance, and proprioception to improve patient's ability to progress to PLOF and address remaining functional goals.   Tx Min Billable or 1:1 Min   (if diff from Tx Min) Details:   45  See flow sheet as applicable    Supine- PROM of right knee, scar massage       45  [facility] Totals Reminder: bill using total billable min of TIMED therapeutic procedures (example: do not include dry needle or estim unattended, both untimed codes, in totals to left)  8-22 min = 1 unit; 23-37 min = 2 units; 38-52 min = 3 units; 53-67 min = 4 units; 68-82 min = 5 units   Total Total     Charge Capture    [x]  Patient Education billed concurrently with other procedures   [x] Review HEP    [] Progressed/Changed HEP, detail:    [] Other detail:       Objective Information/Functional Measures/Assessment    Initiated execises per POC. Patient states her doctor was interested in her attending therapy 3x a week due to having limited ROM, but due to limited insurance visits unable to have patient attend therapy 2x a week. Educated patient on patellar mobs and scar massage to improve

## 2025-07-09 ENCOUNTER — HOSPITAL ENCOUNTER (OUTPATIENT)
Facility: HOSPITAL | Age: 65
Setting detail: RECURRING SERIES
Discharge: HOME | End: 2025-07-12
Payer: MEDICARE

## 2025-07-09 PROCEDURE — 97110 THERAPEUTIC EXERCISES: CPT

## 2025-07-09 NOTE — PROGRESS NOTES
Goal Outcome Evaluation:  Plan of Care Reviewed With: patient        Progress: no change  Outcome Evaluation: Pt  resting in bed, very drowsy. Echo and venous US done. Family updated. neuro following.                                PHYSICAL / OCCUPATIONAL THERAPY - DAILY TREATMENT NOTE    Patient Name: Elisabeth Steiner    Date: 2025    : 1960  Insurance: Payor: [facility] MEDICARE / Plan: [facility] MEDICARE ADVANTAGE / Product Type: *No Product type* /      Patient  verified Yes     Visit #   Current / Total 3 24   Time   In / Out 223 321   Pain   In / Out 5-5.5 7   Subjective Functional Status/Changes: Patient reports that MD is discussing a possible manipulation if her ROM does not improve by her next followup. Reports higher pain level today 2/2 riding in car for a prolonged period prior to PT. Her last dose of pain medication was at 8 am today. States that she usually takes it every 6 hours.      TREATMENT AREA =  Status post right knee replacement  Pain in right knee    OBJECTIVE    Ice (UNBILLED):  location/position: longsitting; right knee     Min Rationale   10 decrease pain to improve patient's ability to progress to PLOF and address remaining functional goals.     Skin assessment post-treatment:   Redness, no adverse reactions     Therapeutic Procedures:    06637 Therapeutic Exercise (timed):  increase ROM, strength, coordination, balance, and proprioception to improve patient's ability to progress to PLOF and address remaining functional goals.   Tx Min Billable or 1:1 Min   (if diff from Tx Min) Details:   48  See flow sheet as applicable    Supine- PROM of right knee, scar massage       48  [facility] Totals Reminder: bill using total billable min of TIMED therapeutic procedures (example: do not include dry needle or estim unattended, both untimed codes, in totals to left)  8-22 min = 1 unit; 23-37 min = 2 units; 38-52 min = 3 units; 53-67 min = 4 units; 68-82 min = 5 units   Total Total     Charge Capture    [x]  Patient Education billed concurrently with other procedures   [x] Review HEP    [] Progressed/Changed HEP, detail:    [] Other detail:       Objective Information/Functional Measures/Assessment    Initiated standing

## 2025-07-10 ENCOUNTER — TELEPHONE (OUTPATIENT)
Age: 65
End: 2025-07-10

## 2025-07-10 DIAGNOSIS — G89.18 POST-OP PAIN: Primary | ICD-10-CM

## 2025-07-10 RX ORDER — OXYCODONE HYDROCHLORIDE 5 MG/1
5 TABLET ORAL EVERY 4 HOURS PRN
Qty: 42 TABLET | Refills: 0 | Status: SHIPPED | OUTPATIENT
Start: 2025-07-10 | End: 2025-07-17

## 2025-07-10 NOTE — TELEPHONE ENCOUNTER
Patient called to request a refill of oxyCODONE (ROXICODONE) 5 MG immediate release tablet be sent to [id number] [place] [address] [address] - P [phone] - F [phone]. She has started more intense PT and has noticed she is in more pain than usual.

## 2025-07-14 ENCOUNTER — HOSPITAL ENCOUNTER (OUTPATIENT)
Facility: HOSPITAL | Age: 65
Setting detail: RECURRING SERIES
Discharge: HOME | End: 2025-07-17
Payer: MEDICARE

## 2025-07-14 PROCEDURE — 97530 THERAPEUTIC ACTIVITIES: CPT

## 2025-07-14 PROCEDURE — 97110 THERAPEUTIC EXERCISES: CPT

## 2025-07-14 PROCEDURE — 97140 MANUAL THERAPY 1/> REGIONS: CPT

## 2025-07-14 NOTE — PROGRESS NOTES
PHYSICAL / OCCUPATIONAL THERAPY - DAILY TREATMENT NOTE    Patient Name: Elisabeth Steiner    Date: 2025    : 1960  Insurance: Payor: [facility] MEDICARE / Plan: [facility] MEDICARE ADVANTAGE / Product Type: *No Product type* /      Patient  verified Yes     Visit #   Current / Total 4 24   Time   In / Out 1141 1231   Pain   In / Out 4 3   Subjective Functional Status/Changes: \"I took my pain medication an hour ago\". Patient reports improved pain upon presentation but notes that she feels as if she continues to experience excessive swelling of R knee. States that she had an instance of increased low back pain on Friday but that \"that happens sometimes\".      TREATMENT AREA =  Status post right knee replacement  Pain in right knee    OBJECTIVE    Ice (UNBILLED):  location/position: longsitting; right knee     Min Rationale   10 decrease pain to improve patient's ability to progress to PLOF and address remaining functional goals.     Skin assessment post-treatment:   Redness, no adverse reactions     Therapeutic Procedures:  98879 Therapeutic Exercise (timed):  increase ROM, strength, coordination, balance, and proprioception to improve patient's ability to progress to PLOF and address remaining functional goals.   Tx Min Billable or 1:1 Min   (if diff from Tx Min) Details:   16  See flow sheet as applicable     17553 Manual Therapy (timed):  decrease pain, increase ROM, increase tissue extensibility, and decrease trigger points to improve patient's ability to progress to PLOF and address remaining functional goals.  The manual therapy interventions were performed at a separate and distinct time from the therapeutic activities interventions .   Tx Min Billable or 1:1 Min   (if diff from Tx Min) Details:   8  See flow sheet as applicable  Supine- scar massage, STM/TPR to R quad, PROM into end-range R knee flex/ext, Grade 2-3 joint mobs, 90/90 HS stretching     49116 Therapeutic Activity (timed):  use of dynamic  10

## 2025-07-16 ENCOUNTER — HOSPITAL ENCOUNTER (OUTPATIENT)
Facility: HOSPITAL | Age: 65
Setting detail: RECURRING SERIES
Discharge: HOME | End: 2025-07-19
Payer: MEDICARE

## 2025-07-16 PROCEDURE — 97140 MANUAL THERAPY 1/> REGIONS: CPT

## 2025-07-16 PROCEDURE — 97110 THERAPEUTIC EXERCISES: CPT

## 2025-07-16 PROCEDURE — 97530 THERAPEUTIC ACTIVITIES: CPT

## 2025-07-16 NOTE — PROGRESS NOTES
4.5PHYSICAL / OCCUPATIONAL THERAPY - DAILY TREATMENT NOTE    Patient Name: Elisabeth Steiner    Date: 2025    : 1960  Insurance: Payor: [facility] MEDICARE / Plan: [facility] MEDICARE ADVANTAGE / Product Type: *No Product type* /      Patient  verified Yes     Visit #   Current / Total 5 24   Time   In / Out 935 1032   Pain   In / Out 3 4.5   Subjective Functional Status/Changes: Patient states she has been using her walker within her home, but she feels she is ready to progress.      TREATMENT AREA =  Status post right knee replacement  Pain in right knee    OBJECTIVE    Ice (UNBILLED):  location/position: longsitting; right knee     Min Rationale   10 decrease pain to improve patient's ability to progress to PLOF and address remaining functional goals.     Skin assessment post-treatment:   Redness, no adverse reactions      Therapeutic Procedures:  73551 Therapeutic Exercise (timed):  increase ROM, strength, coordination, balance, and proprioception to improve patient's ability to progress to PLOF and address remaining functional goals.   Tx Min Billable or 1:1 Min   (if diff from Tx Min) Details:   21   See flow sheet as applicable      17770 Manual Therapy (timed):  decrease pain, increase ROM, increase tissue extensibility, and decrease trigger points to improve patient's ability to progress to PLOF and address remaining functional goals.  The manual therapy interventions were performed at a separate and distinct time from the therapeutic activities interventions .   Tx Min Billable or 1:1 Min   (if diff from Tx Min) Details:   10   See flow sheet as applicable  Supine- scar massage, STM/TPR to R quad, PROM into end-range R knee flex/ext, Grade 2-3 joint mobs, 90/90 HS stretching      53479 Therapeutic Activity (timed):  use of dynamic activities replicating functional movements to increase ROM, strength, coordination, balance, and proprioception in order to improve patient's ability to progress to PLOF

## 2025-07-18 ENCOUNTER — OFFICE VISIT (OUTPATIENT)
Age: 65
End: 2025-07-18

## 2025-07-18 DIAGNOSIS — Z96.651 STATUS POST RIGHT KNEE REPLACEMENT: Primary | ICD-10-CM

## 2025-07-18 RX ORDER — CYCLOBENZAPRINE HCL 10 MG
10 TABLET ORAL 3 TIMES DAILY PRN
Qty: 30 TABLET | Refills: 1 | Status: SHIPPED | OUTPATIENT
Start: 2025-07-18 | End: 2025-07-28

## 2025-07-18 RX ORDER — GABAPENTIN 100 MG/1
100 CAPSULE ORAL 3 TIMES DAILY
Qty: 30 CAPSULE | Refills: 1 | Status: SHIPPED | OUTPATIENT
Start: 2025-07-18 | End: 2025-08-07

## 2025-07-18 NOTE — PROGRESS NOTES
Patient: Elisabeth Steiner                MRN: [id number]       SSN: [id number]  YOB: 1960        AGE: 65 y.o.        SEX: female  There is no height or weight on file to calculate BMI.    PCP: Justine Prasad MD  07/18/25      This office note has been dictated.      REVIEW OF SYSTEMS:  Constitutional: Negative for fever, chills, weight loss and malaise/fatigue.   HENT: Negative.    Eyes: Negative.    Respiratory: Negative.   Cardiovascular: Negative.   Gastrointestinal: No bowel incontinence or constipation.  Genitourinary: No bladder incontinence or saddle anesthesia.  Skin: Negative.   Neurological: Negative.    Endo/Heme/Allergies: Negative.    Psychiatric/Behavioral: Negative.  Musculoskeletal: As per HPI above.     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    GERD (gastroesophageal reflux disease)     Headache     History of gastric bypass 2012    History of meniscal tear 2015, 2018    right in 2015, left in 2018    HNP (herniated nucleus pulposus), lumbar     patient reported    Hypertension     Lung nodules     resolved 2018 CT    Neuropathy     Obesity     Osteopenia 10/03/2017    Recurrent depression     Sleep apnea     on cpap    Spinal stenosis, lumbar     patient reported    Type 2 diabetes mellitus without complication, without long-term current use of insulin ([facility]) 12/19/2024    Xanthelasma of left upper eyelid 07/20/2020         Current Outpatient Medications:     gabapentin (NEURONTIN) 100 MG capsule, Take 1 capsule by mouth 3 times daily for 20 days. Max Daily Amount: 300 mg, Disp: 30 capsule, Rfl: 1    cyclobenzaprine (FLEXERIL) 10 MG tablet, Take 1 tablet by mouth 3 times daily as needed for Muscle spasms, Disp:

## 2025-07-21 ENCOUNTER — HOSPITAL ENCOUNTER (OUTPATIENT)
Facility: HOSPITAL | Age: 65
Setting detail: RECURRING SERIES
Discharge: HOME | End: 2025-07-24
Payer: MEDICARE

## 2025-07-21 PROCEDURE — 97110 THERAPEUTIC EXERCISES: CPT

## 2025-07-21 PROCEDURE — 97140 MANUAL THERAPY 1/> REGIONS: CPT

## 2025-07-21 PROCEDURE — 97530 THERAPEUTIC ACTIVITIES: CPT

## 2025-07-21 NOTE — PROGRESS NOTES
PHYSICAL / OCCUPATIONAL THERAPY - DAILY TREATMENT NOTE    Patient Name: Elisabeth Steiner    Date: 2025    : 1960  Insurance: Payor: [facility] MEDICARE / Plan: [facility] MEDICARE ADVANTAGE / Product Type: *No Product type* /      Patient  verified Yes     Visit #   Current / Total 6 24   Time   In / Out 936 1024   Pain   In / Out 6 4   Subjective Functional Status/Changes: Patient reports she went to the doctor on 25 due to large increase in pain. Doctor reported everything in the knee looked fine, stated it was coming from her back and provided her with muscle relaxer.      TREATMENT AREA =  Status post right knee replacement  Pain in right knee    OBJECTIVE      Ice (UNBILLED):  location/position: supine; right knee     Min Rationale   10 decrease pain to improve patient's ability to progress to PLOF and address remaining functional goals.     Skin assessment post-treatment:   Redness, no adverse reactions      Therapeutic Procedures:  31842 Therapeutic Exercise (timed):  increase ROM, strength, coordination, balance, and proprioception to improve patient's ability to progress to PLOF and address remaining functional goals.   Tx Min Billable or 1:1 Min   (if diff from Tx Min) Details:   15   See flow sheet as applicable      75245 Manual Therapy (timed):  decrease pain, increase ROM, increase tissue extensibility, and decrease trigger points to improve patient's ability to progress to PLOF and address remaining functional goals.  The manual therapy interventions were performed at a separate and distinct time from the therapeutic activities interventions .   Tx Min Billable or 1:1 Min   (if diff from Tx Min) Details:   8   See flow sheet as applicable  Supine- scar massage, STM/TPR to R quad, PROM into end-range R knee flex/ext, Grade 2-3 joint mobs, 90/90 HS stretching      10824 Therapeutic Activity (timed):  use of dynamic activities replicating functional movements to increase ROM, strength,

## 2025-07-23 ENCOUNTER — HOSPITAL ENCOUNTER (OUTPATIENT)
Facility: HOSPITAL | Age: 65
Setting detail: RECURRING SERIES
Discharge: HOME | End: 2025-07-26
Payer: MEDICARE

## 2025-07-23 PROCEDURE — 97140 MANUAL THERAPY 1/> REGIONS: CPT

## 2025-07-23 PROCEDURE — 97112 NEUROMUSCULAR REEDUCATION: CPT

## 2025-07-23 PROCEDURE — 97110 THERAPEUTIC EXERCISES: CPT

## 2025-07-23 NOTE — PROGRESS NOTES
PHYSICAL / OCCUPATIONAL THERAPY - DAILY TREATMENT NOTE    Patient Name: Elisabeth Steiner    Date: 2025    : 1960  Insurance: Payor: [facility] MEDICARE / Plan: [facility] MEDICARE ADVANTAGE / Product Type: *No Product type* /      Patient  verified Yes     Visit #   Current / Total 7 24   Time   In / Out 740 832   Pain   In / Out 5 3   Subjective Functional Status/Changes: Patient reports she has continued to use her rollator to help manage her pain, due to the left knee starting to bother her also.      TREATMENT AREA =  Status post right knee replacement  Pain in right knee    OBJECTIVE    Ice (UNBILLED):  location/position: supine; right knee     Min Rationale   10 decrease pain to improve patient's ability to progress to PLOF and address remaining functional goals.     Skin assessment post-treatment:   Redness, no adverse reactions      Therapeutic Procedures:  86818 Therapeutic Exercise (timed):  increase ROM, strength, coordination, balance, and proprioception to improve patient's ability to progress to PLOF and address remaining functional goals.   Tx Min Billable or 1:1 Min   (if diff from Tx Min) Details:   17   See flow sheet as applicable      09897 Manual Therapy (timed):  decrease pain, increase ROM, increase tissue extensibility, and decrease trigger points to improve patient's ability to progress to PLOF and address remaining functional goals.  The manual therapy interventions were performed at a separate and distinct time from the therapeutic activities interventions .   Tx Min Billable or 1:1 Min   (if diff from Tx Min) Details:   10   See flow sheet as applicable  Supine- scar massage, STM/TPR to R quad, PROM into end-range R knee flex/ext, Grade 2-3 joint mobs, 90/90 HS stretching      83317 Therapeutic Activity (timed):  use of dynamic activities replicating functional movements to increase ROM, strength, coordination, balance, and proprioception in order to improve patient's ability to

## 2025-07-28 ENCOUNTER — APPOINTMENT (OUTPATIENT)
Facility: HOSPITAL | Age: 65
End: 2025-07-28
Payer: MEDICARE

## 2025-07-30 ENCOUNTER — HOSPITAL ENCOUNTER (OUTPATIENT)
Facility: HOSPITAL | Age: 65
Setting detail: RECURRING SERIES
Discharge: HOME | End: 2025-08-02
Payer: MEDICARE

## 2025-07-30 PROCEDURE — 97110 THERAPEUTIC EXERCISES: CPT

## 2025-07-30 PROCEDURE — 97530 THERAPEUTIC ACTIVITIES: CPT

## 2025-07-30 PROCEDURE — 97140 MANUAL THERAPY 1/> REGIONS: CPT

## 2025-07-30 NOTE — PROGRESS NOTES
PHYSICAL / OCCUPATIONAL THERAPY - DAILY TREATMENT NOTE    Patient Name: Elisabeth Steiner    Date: 2025    : 1960  Insurance: Payor: [facility] MEDICARE / Plan: [facility] MEDICARE ADVANTAGE / Product Type: *No Product type* /      Patient  verified Yes     Visit #   Current / Total 8 24   Time   In / Out 741 835   Pain   In / Out 0 3   Subjective Functional Status/Changes: Patient states she has been sick for a week and have been taking it easy while still working on her home exercises.      TREATMENT AREA =  Status post right knee replacement  Pain in right knee    OBJECTIVE      Ice (UNBILLED):  location/position: supine; right knee     Min Rationale   10 decrease pain to improve patient's ability to progress to PLOF and address remaining functional goals.     Skin assessment post-treatment:   Redness, no adverse reactions      Therapeutic Procedures:  64084 Therapeutic Exercise (timed):  increase ROM, strength, coordination, balance, and proprioception to improve patient's ability to progress to PLOF and address remaining functional goals.   Tx Min Billable or 1:1 Min   (if diff from Tx Min) Details:   17   See flow sheet as applicable      60943 Manual Therapy (timed):  decrease pain, increase ROM, increase tissue extensibility, and decrease trigger points to improve patient's ability to progress to PLOF and address remaining functional goals.  The manual therapy interventions were performed at a separate and distinct time from the therapeutic activities interventions .   Tx Min Billable or 1:1 Min   (if diff from Tx Min) Details:   10   See flow sheet as applicable  Supine- scar massage, STM/TPR to R quad, PROM into end-range R knee flex/ext, Grade 2-3 joint mobs, 90/90 HS stretching      49614 Therapeutic Activity (timed):  use of dynamic activities replicating functional movements to increase ROM, strength, coordination, balance, and proprioception in order to improve patient's ability to progress to

## 2025-07-30 NOTE — THERAPY RECERTIFICATION
[facility]  [address] [facility] [address] Ph: [phone] Fx: [phone]  CONTINUED PLAN OF CARE/RECERTIFICATION FOR PHYSICAL THERAPY          Patient Name: Elisabeth Steiner : 1960   Treatment/Medical Diagnosis: Status post right knee replacement  Pain in right knee   Onset Date: 25    Referral Source: Contreras Vo PA-C Prior Hospitalization: See Medical History   Prior Level of Function: Amb without AD    Comorbidities: Other: OA asthma DM HTN double mastectomy  s/p breast CA    Visits from SOC: 8 Missed Visits: 1     Progress to Goals:    1. Patient will be able to report pain at < or =4/10 at worst to improve patient ablility to tolerate prolonged functional activity  Status at evaluation: : pain rating: current 5, worst 8-9   Current: progressing, max pain 7/10 over the last few days 25     2.Patient will be independent with HEP to facilitate carry over of functional gains made in PT at home and community  Status at evaluation: HEP initiated  Current: METpatient has initiated HEP. 25      3.Patient will improve R knee AROM flex to 100 deg to improve patient's ability to ambulate and transfer at home   Status at evaluation: R knee AROM flex 78 deg measured seated in chair    Current: MET knee flexion 112 deg AROM 25     4.Patient will improve R knee AROM ext  to 0  deg to improve patient's ability to ambulate and transfer at home   Status at evaluation: R knee AROM ext -30  deg measured seated in chair  Current: MET: pt at neutral knee ext. 25     5. Patient will improve LEFS score to at least  30 /80 to indicate decreased self perceived debility and clinically significant improved in condition  Status at evaluation:      Long Term Goals: To be accomplished in 12 WEEKS     1.Patient will perform TUG score <or 18  sec with LRAD to improve ability to perform transfers and ambulation and to decrease fall

## 2025-07-31 RX ORDER — AZELASTINE HYDROCHLORIDE AND FLUTICASONE PROPIONATE 137; 50 UG/1; UG/1
SPRAY, METERED NASAL
Qty: 1 EACH | Refills: 3 | Status: SHIPPED | OUTPATIENT
Start: 2025-07-31

## 2025-08-01 ENCOUNTER — HOSPITAL ENCOUNTER (OUTPATIENT)
Facility: HOSPITAL | Age: 65
Setting detail: RECURRING SERIES
Discharge: HOME | End: 2025-08-04
Payer: MEDICARE

## 2025-08-01 PROCEDURE — 97140 MANUAL THERAPY 1/> REGIONS: CPT

## 2025-08-01 PROCEDURE — 97110 THERAPEUTIC EXERCISES: CPT

## 2025-08-01 PROCEDURE — 97530 THERAPEUTIC ACTIVITIES: CPT

## 2025-08-06 ENCOUNTER — HOSPITAL ENCOUNTER (OUTPATIENT)
Facility: HOSPITAL | Age: 65
Setting detail: RECURRING SERIES
Discharge: HOME | End: 2025-08-09
Payer: MEDICARE

## 2025-08-06 PROCEDURE — 97110 THERAPEUTIC EXERCISES: CPT

## 2025-08-06 PROCEDURE — 97140 MANUAL THERAPY 1/> REGIONS: CPT

## 2025-08-06 PROCEDURE — 97530 THERAPEUTIC ACTIVITIES: CPT

## 2025-08-08 ENCOUNTER — HOSPITAL ENCOUNTER (OUTPATIENT)
Facility: HOSPITAL | Age: 65
Setting detail: RECURRING SERIES
Discharge: HOME | End: 2025-08-11
Payer: MEDICARE

## 2025-08-08 PROCEDURE — 97110 THERAPEUTIC EXERCISES: CPT

## 2025-08-08 PROCEDURE — 97530 THERAPEUTIC ACTIVITIES: CPT

## 2025-08-08 PROCEDURE — 97140 MANUAL THERAPY 1/> REGIONS: CPT

## 2025-08-11 ENCOUNTER — PATIENT MESSAGE (OUTPATIENT)
Age: 65
End: 2025-08-11

## 2025-08-11 ENCOUNTER — TELEPHONE (OUTPATIENT)
Facility: HOSPITAL | Age: 65
End: 2025-08-11

## 2025-08-11 DIAGNOSIS — Z96.651 STATUS POST RIGHT KNEE REPLACEMENT: Primary | ICD-10-CM

## 2025-08-11 DIAGNOSIS — G89.18 POST-OP PAIN: ICD-10-CM

## 2025-08-13 ENCOUNTER — HOSPITAL ENCOUNTER (OUTPATIENT)
Facility: HOSPITAL | Age: 65
Setting detail: RECURRING SERIES
Discharge: HOME | End: 2025-08-16
Payer: MEDICARE

## 2025-08-13 PROCEDURE — 97140 MANUAL THERAPY 1/> REGIONS: CPT

## 2025-08-13 PROCEDURE — 97530 THERAPEUTIC ACTIVITIES: CPT

## 2025-08-13 PROCEDURE — 97110 THERAPEUTIC EXERCISES: CPT

## 2025-08-15 ENCOUNTER — APPOINTMENT (OUTPATIENT)
Facility: HOSPITAL | Age: 65
End: 2025-08-15
Payer: MEDICARE

## 2025-08-15 RX ORDER — OXYCODONE HYDROCHLORIDE 5 MG/1
5 TABLET ORAL EVERY 4 HOURS PRN
Qty: 42 TABLET | Refills: 0 | Status: SHIPPED | OUTPATIENT
Start: 2025-08-15 | End: 2025-08-22

## 2025-08-18 ENCOUNTER — HOSPITAL ENCOUNTER (OUTPATIENT)
Facility: HOSPITAL | Age: 65
Setting detail: RECURRING SERIES
Discharge: HOME | End: 2025-08-21
Payer: MEDICARE

## 2025-08-18 PROCEDURE — 97530 THERAPEUTIC ACTIVITIES: CPT

## 2025-08-18 PROCEDURE — 97110 THERAPEUTIC EXERCISES: CPT

## 2025-08-20 ENCOUNTER — HOSPITAL ENCOUNTER (OUTPATIENT)
Facility: HOSPITAL | Age: 65
Setting detail: RECURRING SERIES
Discharge: HOME | End: 2025-08-23
Payer: MEDICARE

## 2025-08-20 PROCEDURE — 97110 THERAPEUTIC EXERCISES: CPT

## 2025-08-20 PROCEDURE — 97530 THERAPEUTIC ACTIVITIES: CPT

## 2025-08-20 PROCEDURE — 97140 MANUAL THERAPY 1/> REGIONS: CPT

## 2025-08-22 ENCOUNTER — OFFICE VISIT (OUTPATIENT)
Age: 65
End: 2025-08-22

## 2025-08-22 DIAGNOSIS — Z96.651 STATUS POST RIGHT KNEE REPLACEMENT: ICD-10-CM

## 2025-08-22 DIAGNOSIS — G89.18 POST-OP PAIN: Primary | ICD-10-CM

## 2025-08-22 PROCEDURE — 99024 POSTOP FOLLOW-UP VISIT: CPT | Performed by: PHYSICIAN ASSISTANT

## 2025-08-22 RX ORDER — HYDROCODONE BITARTRATE AND ACETAMINOPHEN 5; 325 MG/1; MG/1
1 TABLET ORAL EVERY 6 HOURS PRN
Qty: 28 TABLET | Refills: 0 | Status: SHIPPED | OUTPATIENT
Start: 2025-08-22 | End: 2025-08-29

## 2025-08-25 ENCOUNTER — APPOINTMENT (OUTPATIENT)
Facility: HOSPITAL | Age: 65
End: 2025-08-25
Attending: EMERGENCY MEDICINE
Payer: MEDICARE

## 2025-08-25 ENCOUNTER — HOSPITAL ENCOUNTER (EMERGENCY)
Facility: HOSPITAL | Age: 65
Discharge: HOME | End: 2025-08-25
Attending: EMERGENCY MEDICINE
Payer: MEDICARE

## 2025-08-25 VITALS
OXYGEN SATURATION: 93 % | HEIGHT: 64 IN | RESPIRATION RATE: 23 BRPM | HEART RATE: 101 BPM | TEMPERATURE: 100.2 F | WEIGHT: 209 LBS | SYSTOLIC BLOOD PRESSURE: 97 MMHG | DIASTOLIC BLOOD PRESSURE: 69 MMHG | BODY MASS INDEX: 35.68 KG/M2

## 2025-08-25 DIAGNOSIS — R50.9 FEVER, UNSPECIFIED FEVER CAUSE: ICD-10-CM

## 2025-08-25 DIAGNOSIS — M25.561 ACUTE PAIN OF RIGHT KNEE: Primary | ICD-10-CM

## 2025-08-25 PROBLEM — Z96.651 HISTORY OF TOTAL KNEE ARTHROPLASTY, RIGHT: Status: ACTIVE | Noted: 2025-08-25

## 2025-08-25 LAB
ANION GAP SERPL CALC-SCNC: 9 MMOL/L (ref 3–18)
APPEARANCE FLD: ABNORMAL
BASOPHILS # BLD: 0.04 K/UL (ref 0–0.1)
BASOPHILS NFR BLD: 0.4 % (ref 0–2)
BODY FLD TYPE: NORMAL
BUN SERPL-MCNC: 4 MG/DL (ref 6–23)
BUN/CREAT SERPL: 5 (ref 12–20)
CALCIUM SERPL-MCNC: 8.9 MG/DL (ref 8.5–10.1)
CHLORIDE SERPL-SCNC: 103 MMOL/L (ref 98–107)
CO2 SERPL-SCNC: 25 MMOL/L (ref 21–32)
COLOR FLD: ABNORMAL
CREAT SERPL-MCNC: 0.71 MG/DL (ref 0.6–1.3)
CRP SERPL-MCNC: 6.8 MG/DL (ref 0–5)
CRYSTALS FLD MICRO: NORMAL
DIFFERENTIAL METHOD BLD: ABNORMAL
EOSINOPHIL # BLD: 0.51 K/UL (ref 0–0.4)
EOSINOPHIL NFR BLD: 5.1 % (ref 0–5)
EOSINOPHIL NFR FLD MANUAL: 0 %
ERYTHROCYTE [DISTWIDTH] IN BLOOD BY AUTOMATED COUNT: 14.3 % (ref 11.6–14.5)
ERYTHROCYTE [SEDIMENTATION RATE] IN BLOOD: 73 MM/HR (ref 0–30)
GLUCOSE SERPL-MCNC: 89 MG/DL (ref 74–108)
HCT VFR BLD AUTO: 38.9 % (ref 35–45)
HGB BLD-MCNC: 12.6 G/DL (ref 12–16)
IMM GRANULOCYTES # BLD AUTO: 0.02 K/UL (ref 0–0.04)
IMM GRANULOCYTES NFR BLD AUTO: 0.2 % (ref 0–0.5)
LYMPHOCYTES # BLD: 1.71 K/UL (ref 0.9–3.6)
LYMPHOCYTES NFR BLD: 17.2 % (ref 21–52)
LYMPHOCYTES NFR FLD: 52 %
MCH RBC QN AUTO: 28.4 PG (ref 24–34)
MCHC RBC AUTO-ENTMCNC: 32.4 G/DL (ref 31–37)
MCV RBC AUTO: 87.8 FL (ref 78–100)
MONOCYTES # BLD: 0.76 K/UL (ref 0.05–1.2)
MONOCYTES NFR BLD: 7.6 % (ref 3–10)
MONOCYTES NFR FLD: 13 %
NEUTROPHILS NFR FLD: 35 % (ref 0–25)
NEUTS BAND # FLD: 0 %
NEUTS SEG # BLD: 6.92 K/UL (ref 1.8–8)
NEUTS SEG NFR BLD: 69.5 % (ref 40–73)
NRBC # BLD: 0 K/UL (ref 0–0.01)
NRBC BLD-RTO: 0 PER 100 WBC
NUC CELL # FLD: 989 /CU MM
PLATELET # BLD AUTO: 436 K/UL (ref 135–420)
PMV BLD AUTO: 8.5 FL (ref 9.2–11.8)
POTASSIUM SERPL-SCNC: 3.8 MMOL/L (ref 3.5–5.5)
RBC # BLD AUTO: 4.43 M/UL (ref 4.2–5.3)
RBC # FLD: ABNORMAL /CU MM
SODIUM SERPL-SCNC: 137 MMOL/L (ref 136–145)
SPECIMEN SOURCE FLD: ABNORMAL
WBC # BLD AUTO: 10 K/UL (ref 4.6–13.2)

## 2025-08-25 PROCEDURE — 96376 TX/PRO/DX INJ SAME DRUG ADON: CPT

## 2025-08-25 PROCEDURE — 93971 EXTREMITY STUDY: CPT | Performed by: SURGERY

## 2025-08-25 PROCEDURE — 99284 EMERGENCY DEPT VISIT MOD MDM: CPT

## 2025-08-25 PROCEDURE — 85652 RBC SED RATE AUTOMATED: CPT

## 2025-08-25 PROCEDURE — 89051 BODY FLUID CELL COUNT: CPT

## 2025-08-25 PROCEDURE — 89060 EXAM SYNOVIAL FLUID CRYSTALS: CPT

## 2025-08-25 PROCEDURE — 93971 EXTREMITY STUDY: CPT

## 2025-08-25 PROCEDURE — 94761 N-INVAS EAR/PLS OXIMETRY MLT: CPT

## 2025-08-25 PROCEDURE — 96374 THER/PROPH/DIAG INJ IV PUSH: CPT

## 2025-08-25 PROCEDURE — 6360000002 HC RX W HCPCS: Performed by: EMERGENCY MEDICINE

## 2025-08-25 PROCEDURE — 86140 C-REACTIVE PROTEIN: CPT

## 2025-08-25 PROCEDURE — 87102 FUNGUS ISOLATION CULTURE: CPT

## 2025-08-25 PROCEDURE — 87070 CULTURE OTHR SPECIMN AEROBIC: CPT

## 2025-08-25 PROCEDURE — 80048 BASIC METABOLIC PNL TOTAL CA: CPT

## 2025-08-25 PROCEDURE — 85025 COMPLETE CBC W/AUTO DIFF WBC: CPT

## 2025-08-25 PROCEDURE — 87205 SMEAR GRAM STAIN: CPT

## 2025-08-25 RX ORDER — MORPHINE SULFATE 4 MG/ML
4 INJECTION, SOLUTION INTRAMUSCULAR; INTRAVENOUS
Status: COMPLETED | OUTPATIENT
Start: 2025-08-25 | End: 2025-08-25

## 2025-08-25 RX ORDER — MORPHINE SULFATE 4 MG/ML
4 INJECTION, SOLUTION INTRAMUSCULAR; INTRAVENOUS
Refills: 0 | Status: DISCONTINUED | OUTPATIENT
Start: 2025-08-25 | End: 2025-08-25

## 2025-08-25 RX ORDER — MORPHINE SULFATE 2 MG/ML
2 INJECTION, SOLUTION INTRAMUSCULAR; INTRAVENOUS
Status: COMPLETED | OUTPATIENT
Start: 2025-08-25 | End: 2025-08-25

## 2025-08-25 RX ORDER — LIDOCAINE HYDROCHLORIDE 10 MG/ML
10 INJECTION, SOLUTION EPIDURAL; INFILTRATION; INTRACAUDAL; PERINEURAL
Status: COMPLETED | OUTPATIENT
Start: 2025-08-25 | End: 2025-08-25

## 2025-08-25 RX ADMIN — MORPHINE SULFATE 2 MG: 2 INJECTION, SOLUTION INTRAMUSCULAR; INTRAVENOUS at 08:42

## 2025-08-25 RX ADMIN — LIDOCAINE HYDROCHLORIDE 10 ML: 10 INJECTION, SOLUTION EPIDURAL; INFILTRATION; INTRACAUDAL; PERINEURAL at 05:05

## 2025-08-25 RX ADMIN — MORPHINE SULFATE 4 MG: 4 INJECTION, SOLUTION INTRAMUSCULAR; INTRAVENOUS at 06:11

## 2025-08-25 ASSESSMENT — PAIN DESCRIPTION - LOCATION
LOCATION: KNEE

## 2025-08-25 ASSESSMENT — PAIN DESCRIPTION - ORIENTATION
ORIENTATION: RIGHT

## 2025-08-25 ASSESSMENT — PAIN SCALES - GENERAL
PAINLEVEL_OUTOF10: 7
PAINLEVEL_OUTOF10: 7
PAINLEVEL_OUTOF10: 10
PAINLEVEL_OUTOF10: 8

## 2025-08-25 ASSESSMENT — LIFESTYLE VARIABLES
HOW OFTEN DO YOU HAVE A DRINK CONTAINING ALCOHOL: NEVER
HOW MANY STANDARD DRINKS CONTAINING ALCOHOL DO YOU HAVE ON A TYPICAL DAY: PATIENT DOES NOT DRINK

## 2025-08-25 ASSESSMENT — PAIN - FUNCTIONAL ASSESSMENT
PAIN_FUNCTIONAL_ASSESSMENT: 0-10
PAIN_FUNCTIONAL_ASSESSMENT: 0-10

## 2025-08-26 ENCOUNTER — TELEPHONE (OUTPATIENT)
Age: 65
End: 2025-08-26

## 2025-08-26 LAB — ECHO BSA: 2.07 M2

## 2025-08-28 ENCOUNTER — TELEPHONE (OUTPATIENT)
Facility: HOSPITAL | Age: 65
End: 2025-08-28

## 2025-08-29 LAB
BACTERIA SPEC CULT: NORMAL
GRAM STN SPEC: NORMAL
GRAM STN SPEC: NORMAL
SERVICE CMNT-IMP: NORMAL

## 2025-09-01 LAB
BACTERIA SPEC CULT: NORMAL
SERVICE CMNT-IMP: NORMAL

## (undated) DEVICE — THREE-QUARTER SHEET: Brand: CONVERTORS

## (undated) DEVICE — BLANKET WRM AD W50XL85.8IN PACU FULL BODY FORC AIR

## (undated) DEVICE — FLEX ADVANTAGE 3000CC: Brand: FLEX ADVANTAGE

## (undated) DEVICE — SOLUTION IRRIG 1000ML H2O STRL BLT

## (undated) DEVICE — UNDERGLOVE SURG SZ 7.5 BLU LTX FREE SYN POLYISOPRENE

## (undated) DEVICE — PREMIUM DRY TRAY LF: Brand: MEDLINE INDUSTRIES, INC.

## (undated) DEVICE — SUT ETHLN 3-0 18IN PS1 BLK --

## (undated) DEVICE — BASIN EMSIS 16OZ GRAPHITE PLAS KID SHP MOLD GRAD FOR ORAL

## (undated) DEVICE — INTENDED FOR TISSUE SEPARATION, AND OTHER PROCEDURES THAT REQUIRE A SHARP SURGICAL BLADE TO PUNCTURE OR CUT.: Brand: BARD-PARKER ®  SAFETY SCALPED

## (undated) DEVICE — TAPE,CLOTH/SILK,CURAD,3"X10YD,LF,40/CS: Brand: CURAD

## (undated) DEVICE — DRAPE,REIN 53X77,STERILE: Brand: MEDLINE

## (undated) DEVICE — SUTURE PERMA-HAND 3-0 L30IN NONABSORBABLE BLK ST-1 L45MM K852H

## (undated) DEVICE — GAUZE SPONGES,16 PLY: Brand: CURITY

## (undated) DEVICE — Device: Brand: DEFENDO VALVE AND CONNECTOR KIT

## (undated) DEVICE — STERILE LATEX POWDER-FREE SURGICAL GLOVESWITH NITRILE COATING: Brand: PROTEXIS

## (undated) DEVICE — PAD,ABDOMINAL,8"X10",ST,LF: Brand: MEDLINE

## (undated) DEVICE — BITE BLK ENDOSCP AD 54FR GRN POLYETH ENDOSCP W STRP SLD

## (undated) DEVICE — SHEARS ENDOSCP L9CM CRV HARM FOCS +

## (undated) DEVICE — FCPS RAD JAW 4LC 240CM W/NDL -- BX/20 RADIAL JAW 4

## (undated) DEVICE — BITE BLOCK ENDOSCP UNIV AD 6 TO 9.4 MM

## (undated) DEVICE — SYRINGE MED 30ML STD CLR PLAS LUERLOCK TIP N CTRL DISP

## (undated) DEVICE — SUTURE VCRL SZ 3-0 L27IN ABSRB UD L26MM SH 1/2 CIR J416H

## (undated) DEVICE — BLADE,STAINLESS-STEEL,15,STRL,DISPOSABLE: Brand: MEDLINE

## (undated) DEVICE — FLEX ADVANTAGE 1500CC: Brand: FLEX ADVANTAGE

## (undated) DEVICE — SUTURE MCRYL SZ 4-0 L27IN ABSRB UD L24MM PS-1 3/8 CIR PRIM Y935H

## (undated) DEVICE — SUTURE VICRYL SZ 0 L27IN ABSRB UD L36MM CT-1 1/2 CIR J260H

## (undated) DEVICE — WEREWOLF FASTSEAL 6.0 HEMOSTASIS WAND: Brand: FASTSEAL 6.0 HEMOSTASIS WAND

## (undated) DEVICE — STERILE POLYISOPRENE POWDER-FREE SURGICAL GLOVES: Brand: PROTEXIS

## (undated) DEVICE — BLADE,STAINLESS-STEEL,10,STRL,DISPOSABLE: Brand: MEDLINE

## (undated) DEVICE — GLOVE SURG SZ 8 CRM LTX FREE POLYISOPRENE POLYMER BEAD ANTI

## (undated) DEVICE — BLOCK BITE BLOX W DENTAL RIM --

## (undated) DEVICE — SYR 50ML SLIP TIP NSAF LF STRL --

## (undated) DEVICE — INTENDED FOR TISSUE SEPARATION, AND OTHER PROCEDURES THAT REQUIRE A SHARP SURGICAL BLADE TO PUNCTURE OR CUT.: Brand: BARD-PARKER ® STAINLESS STEEL BLADES

## (undated) DEVICE — DERMABOND SKIN ADH 0.7ML -- DERMABOND ADVANCED 12/BX

## (undated) DEVICE — BNDG,ELSTC,MATRIX,STRL,6"X5YD,LF,HOOK&LP: Brand: MEDLINE

## (undated) DEVICE — DRAIN SURG 15FR SIL RND CHN W/ TRCR FULL FLUT DBL WRP TRAD

## (undated) DEVICE — TUBING, SUCTION, 3/16" X 6', STRAIGHT: Brand: MEDLINE

## (undated) DEVICE — CANNULA ORIG TL CLR W FOAM CUSHIONS AND 14FT SUPL TB 3 CHN

## (undated) DEVICE — (D)GLOVE SURG TRIFLX 7.5 PWD -- DISC BY MFR USE ITEM 102005

## (undated) DEVICE — MEDI-VAC NON-CONDUCTIVE SUCTION TUBING: Brand: CARDINAL HEALTH

## (undated) DEVICE — SUTURE PERMA-HAND SZ 3-0 L18IN NONABSORBABLE BLK L24MM PS-1 1684G

## (undated) DEVICE — Device: Brand: JELCO

## (undated) DEVICE — SHEET, T, LAPAROTOMY, STERILE: Brand: MEDLINE

## (undated) DEVICE — 3M™ TEGADERM™ HP TRANSPARENT FILM DRESSING FRAME STYLE, 9534HP, 2-3/8 X 2-3/4 IN (6 CM X 7 CM), 100/CT 4CT/CASE: Brand: 3M™ TEGADERM™

## (undated) DEVICE — DRESSING FOAM DISK DIA1IN H 7MM HYDRPHLC CHG IMPREG IN SL

## (undated) DEVICE — BLANKET WRM AD PREM MISTRAL-AIR

## (undated) DEVICE — REM POLYHESIVE ADULT PATIENT RETURN ELECTRODE: Brand: VALLEYLAB

## (undated) DEVICE — APPLIER CLP L9.375IN APER 2.1MM CLS L3.8MM 20 SM TI CLP

## (undated) DEVICE — SUT SLK 2-0SH 30IN BLK --

## (undated) DEVICE — PLASTICS CHEST BREAST ASU: Brand: MEDLINE INDUSTRIES, INC.

## (undated) DEVICE — SUTURE VCRL SZ 3-0 L18IN ABSRB UD POLYGLACTIN 910 BRAID TIE J910T

## (undated) DEVICE — SUT VCRL + 2-0 36IN CT1 UD --

## (undated) DEVICE — SUTURE MCRYL SZ 4-0 L18IN ABSRB UD L19MM PS-2 3/8 CIR PRIM Y496G

## (undated) DEVICE — BASIN EMESIS 500CC ROSE 250/CS 60/PLT: Brand: MEDEGEN MEDICAL PRODUCTS, LLC

## (undated) DEVICE — KENDALL SCD EXPRESS SLEEVES, KNEE LENGTH, MEDIUM: Brand: KENDALL SCD

## (undated) DEVICE — 3M™ TEGADERM™ TRANSPARENT FILM DRESSING FRAME STYLE, 1626W, 4 IN X 4-3/4 IN (10 CM X 12 CM), 50/CT 4CT/CASE: Brand: 3M™ TEGADERM™

## (undated) DEVICE — ELECTRODE PT RET AD L9FT HI MOIST COND ADH HYDRGEL CORDED

## (undated) DEVICE — SYRINGE MED 50ML LUERSLIP TIP

## (undated) DEVICE — PACK PROCEDURE SURG MAJ W/ BASIN LF

## (undated) DEVICE — SYRINGE MED 25GA 3ML L5/8IN SUBQ PLAS W/ DETACH NDL SFTY

## (undated) DEVICE — ARTHROSCOPIC KNEE HOLDER: Brand: DEVON

## (undated) DEVICE — INSULATED BLADE ELECTRODE: Brand: EDGE

## (undated) DEVICE — SUTURE VCRL SZ 3-0 L27IN ABSRB UD L36MM CT-1 1/2 CIR J258H

## (undated) DEVICE — INTENDED FOR TISSUE SEPARATION, AND OTHER PROCEDURES THAT REQUIRE A SHARP SURGICAL BLADE TO PUNCTURE OR CUT.: Brand: BARD-PARKER ® CARBON RIB-BACK BLADES

## (undated) DEVICE — BLUNTFILL: Brand: MONOJECT

## (undated) DEVICE — BLADE SURG NO10 C STL REUSE HNDL CONVENTIONAL DISP RIB BK

## (undated) DEVICE — GLOVE SURG SZ 65 L12IN FNGR THK79MIL GRN LTX FREE

## (undated) DEVICE — KIT CLN UP BON SECOURS MARYV

## (undated) DEVICE — SMOKE EVACUATION PENCIL: Brand: VALLEYLAB

## (undated) DEVICE — DRAIN SURG 15FR L3/16IN DIA4.7MM SIL CHN RND HUBLESS FULL

## (undated) DEVICE — SYRINGE MED 10ML LUERLOCK TIP W/O SFTY DISP

## (undated) DEVICE — STRYKER PERFORMANCE SERIES SAGITTAL BLADE: Brand: STRYKER PERFORMANCE SERIES

## (undated) DEVICE — DISPOSABLE MULTI BAG ADAPTERS Y                                    TUBING, STERILE, 2 TO A SET 6 SETS                                    PER BOX

## (undated) DEVICE — SUTURE SZ 0 27IN 5/8 CIR UR-6  TAPER PT VIOLET ABSRB VICRYL J603H

## (undated) DEVICE — UNDERPAD INCONT W23XL36IN STD BLU POLYPR BK FLUF SFT

## (undated) DEVICE — HANDPIECE SET WITH HIGH FLOW TIP AND SUCTION TUBE: Brand: INTERPULSE

## (undated) DEVICE — GENESIS TROCHLEAR PIN 1/8 X 3: Brand: GENESIS

## (undated) DEVICE — GARMENT,MEDLINE,DVT,INT,CALF,MED, GEN2: Brand: MEDLINE

## (undated) DEVICE — YANKAUER,SMOOTH HANDLE,HIGH CAPACITY: Brand: MEDLINE INDUSTRIES, INC.

## (undated) DEVICE — SUPPORT MAMM SURG 48-50 IN 2XL BRA

## (undated) DEVICE — ENDOSCOPY PUMP TUBING/ CAP SET: Brand: ERBE

## (undated) DEVICE — SUTURE MONOCRYL SZ 2-0 L36IN ABSRB UD L36MM CT-1 1/2 CIR Y945H

## (undated) DEVICE — SOLUTION 1000ML NRML NACL

## (undated) DEVICE — NEEDLE HYPO 25GA L1.5IN BVL ORIENTED ECLIPSE

## (undated) DEVICE — MEDI-VAC SUCTION HIGH CAPACITY: Brand: CARDINAL HEALTH

## (undated) DEVICE — GLOVE SURG SZ 85 CRM LTX FREE POLYISOPRENE POLYMER BEAD ANTI

## (undated) DEVICE — PROBE SET W/ DRP

## (undated) DEVICE — Device

## (undated) DEVICE — SUTURE ABSORBABLE ANTIBACT 1-0 CT-1 24 IN STRATAFIX PDS + SXPP1A443

## (undated) DEVICE — CATHETER SUCT TR FL TIP 14FR W/ O CTRL

## (undated) DEVICE — 3M™ STERI-STRIP™ COMPOUND BENZOIN TINCTURE 40 BAGS/CARTON 4 CARTONS/CASE C1544: Brand: 3M™ STERI-STRIP™

## (undated) DEVICE — SPONGE LAP 18X18IN STRL -- 5/PK

## (undated) DEVICE — NET RETRV FB ENDOSCP 2.5MMX230 -- ROTH NET

## (undated) DEVICE — GARMENT,MEDLINE,DVT,INT,CALF,LG, GEN2: Brand: MEDLINE

## (undated) DEVICE — KIT COLON W/ 1.1OZ LUB AND 2 END

## (undated) DEVICE — SOLUTION IV 1000ML 0.9% SOD CHL

## (undated) DEVICE — [ARTHROSCOPY PUMP,  DO NOT USE IF PACKAGE IS DAMAGED,  KEEP DRY,  KEEP AWAY FROM SUNLIGHT,  PROTECT FROM HEAT AND RADIOACTIVE SOURCES.]: Brand: FLOSTEADY

## (undated) DEVICE — Z INACTIVE USE 2855128 SPONGE GZ 16 PLY WVN COT 4INX4IN  HHH

## (undated) DEVICE — COVER INSTRUMENT LOCALIZER

## (undated) DEVICE — COVER LT HNDL BLU STRL -- MEDICHOICE

## (undated) DEVICE — (D)PACK ICE DISP -- DISC BY MFR

## (undated) DEVICE — SYRINGE 20ML LL S/C 50

## (undated) DEVICE — DRAPE TOWEL: Brand: CONVERTORS

## (undated) DEVICE — GLOVE SURG SZ 6 CRM LTX FREE POLYISOPRENE POLYMER BEAD ANTI

## (undated) DEVICE — GAUZE,SPONGE,4"X4",12PLY,STERILE,LF,2'S: Brand: MEDLINE

## (undated) DEVICE — DEVICE GRASPING RAPTOR --

## (undated) DEVICE — SUTURE VICRYL SZ 0 L36IN ABSRB UD L36MM CT-1 1/2 CIR J946H

## (undated) DEVICE — PREP SKN CHLRAPRP APL 26ML STR --

## (undated) DEVICE — CANNULA NSL AD TBNG L14FT STD PVC O2 CRV CONN NONFLARED NSL

## (undated) DEVICE — (D)STRIP SKN CLSR 0.5X4IN WHT --

## (undated) DEVICE — FLUFF AND POLYMER UNDERPAD,EXTRA HEAVY: Brand: WINGS

## (undated) DEVICE — (D)PREP SKN CHLRAPRP APPL 26ML -- CONVERT TO ITEM 371833

## (undated) DEVICE — SKIN MARKER,REGULAR TIP WITH RULER AND LABELS: Brand: DEVON

## (undated) DEVICE — SUTURE VCRL SZ 2-0 L36IN ABSRB UD L36MM CT-1 1/2 CIR J945H

## (undated) DEVICE — GAMMEX® NON-LATEX PI UNDERGLOVE SIZE 6.5, STERILE POLYISOPRENE POWDER-FREE SURGICAL GLOVE: Brand: GAMMEX

## (undated) DEVICE — HYPODERMIC SAFETY NEEDLE: Brand: MAGELLAN

## (undated) DEVICE — INTENDED FOR TISSUE SEPARATION, AND OTHER PROCEDURES THAT REQUIRE A SHARP SURGICAL BLADE TO PUNCTURE OR CUT.: Brand: BARD-PARKER SAFETY BLADES SIZE 15, STERILE

## (undated) DEVICE — KENDALL 500 SERIES DIAPHORETIC FOAM MONITORING ELECTRODE - TEAR DROP SHAPE ( 30/PK): Brand: KENDALL

## (undated) DEVICE — SNARE POLYP M W27MMXL240CM OVL STIFF DISP CAPTIVATOR

## (undated) DEVICE — 3M™ BAIR PAWS FLEX™ WARMING GOWN, STANDARD, 20 PER CASE 81003: Brand: BAIR PAWS™

## (undated) DEVICE — DRESSING HYDROFIBER AQUACEL AG ADVANTAGE 3.5X14 IN

## (undated) DEVICE — SHEET, DRAPE, SPLIT, STERILE: Brand: MEDLINE

## (undated) DEVICE — KNEE ARTHROSCOPY III-LF: Brand: MEDLINE INDUSTRIES, INC.

## (undated) DEVICE — COVER,LIGHT HANDLE,FLX,1/PK: Brand: MEDLINE INDUSTRIES, INC.

## (undated) DEVICE — APPLIER CLP L9.38IN M LIG TI DISP STR RNG HNDL LIGACLP

## (undated) DEVICE — 3M™ STERI-DRAPE™ INSTRUMENT POUCH 1018: Brand: STERI-DRAPE™

## (undated) DEVICE — PACK SURG CUST BSHR TOT KNEE LF MMC

## (undated) DEVICE — GLOVE SURG SZ 65 CRM LTX FREE POLYISOPRENE POLYMER BEAD ANTI

## (undated) DEVICE — SPONGE LAP W18XL18IN WHT COT 4 PLY FLD STRUNG RADPQ DISP ST 2 PER PACK

## (undated) DEVICE — SUTURE VICRYL SZ 2 L27IN ABSRB VLT L65MM TP-1 1/2 CIR J649G

## (undated) DEVICE — FORCEPS BX L240CM JAW DIA2.8MM L CAP W/ NDL MIC MESH TOOTH

## (undated) DEVICE — AIRLIFE™ NASAL OXYGEN CANNULA CURVED, FLARED TIP WITH 14 FOOT (4.3 M) CRUSH-RESISTANT TUBING, OVER-THE-EAR STYLE: Brand: AIRLIFE™

## (undated) DEVICE — LINER SUCT CANSTR 3000CC PLAS SFT PRE ASSEMB W/OUT TBNG W/

## (undated) DEVICE — INTENDED FOR TISSUE SEPARATION, AND OTHER PROCEDURES THAT REQUIRE A SHARP SURGICAL BLADE TO PUNCTURE OR CUT.: Brand: BARD-PARKER SAFETY BLADES SIZE 11, STERILE

## (undated) DEVICE — GLOVE SURG SZ 85 L12IN FNGR THK79MIL GRN LTX FREE

## (undated) DEVICE — DECANTER BAG 9": Brand: MEDLINE INDUSTRIES, INC.

## (undated) DEVICE — GOWN PLASTIC FILM THMBHKS UNIV BLUE: Brand: CARDINAL HEALTH

## (undated) DEVICE — FORCEPS BX L240CM JAW DIA2.4MM ORNG L CAP W/ NDL DISP RAD

## (undated) DEVICE — GARMENT,MEDLINE,DVT,INT,CALF,FOAM,MED: Brand: MEDLINE

## (undated) DEVICE — SINGLE PORT MANIFOLD: Brand: NEPTUNE 2

## (undated) DEVICE — BLADE ES L2.75IN ELASTOMERIC COAT DURABLE BEND UPTO 90DEG

## (undated) DEVICE — (D)GLOVE SURG ORTH 7.5 PWD LTX -- DISC BY MFR USE ITEM 278014

## (undated) DEVICE — APPLICATOR MEDICATED 26 CC SOLUTION HI LT ORNG CHLORAPREP

## (undated) DEVICE — GLOVE SURG SZ 7 CRM LTX FREE POLYISOPRENE POLYMER BEAD ANTI

## (undated) DEVICE — GLOVE SURG SZ 65 THK91MIL LTX FREE SYN POLYISOPRENE

## (undated) DEVICE — NEPTUNE E-SEP SMOKE EVACUATION PENCIL, COATED, 70MM BLADE, PUSH BUTTON SWITCH: Brand: NEPTUNE E-SEP

## (undated) DEVICE — (D)GLOVE SURG ORTH 8 PWD LTX -- DISC BY MFR USE ITEM 278015

## (undated) DEVICE — GOWN ,SIRUS ,NONREINFORCED 4XL: Brand: MEDLINE

## (undated) DEVICE — BOWL AND CEMENT CARTRIDGE WITH BREAKAWAY FEMORAL NOZZLE: Brand: ACM

## (undated) DEVICE — GLOVE SURG SZ 8 L11.77IN FNGR THK9.8MIL STRW LTX POLYMER

## (undated) DEVICE — GOWN,SIRUS,POLYRNF,SETINSLV,L,20/CS: Brand: MEDLINE

## (undated) DEVICE — CLEAN UP KIT: Brand: MEDLINE INDUSTRIES, INC.

## (undated) DEVICE — NEEDLE SPNL L3.5IN PNK HUB S STL REG WALL FIT STYL W/ QNCKE

## (undated) DEVICE — 4-PORT MANIFOLD: Brand: NEPTUNE 2

## (undated) DEVICE — SOLUTION IRRIG 1000ML 0.9% SOD CHL USP POUR PLAS BTL

## (undated) DEVICE — INTENDED FOR TISSUE SEPARATION, AND OTHER PROCEDURES THAT REQUIRE A SHARP SURGICAL BLADE TO PUNCTURE OR CUT.: Brand: BARD-PARKER SAFETY BLADES SIZE 10, STERILE

## (undated) DEVICE — ZIMMER® STERILE DISPOSABLE TOURNIQUET CUFF WITH PLC, DUAL PORT, SINGLE BLADDER, 34 IN. (86 CM)

## (undated) DEVICE — SUTURE VCRL SZ 2-0 L27IN ABSRB UD L26MM SH 1/2 CIR J417H